# Patient Record
Sex: MALE | Race: WHITE | NOT HISPANIC OR LATINO | Employment: OTHER | URBAN - METROPOLITAN AREA
[De-identification: names, ages, dates, MRNs, and addresses within clinical notes are randomized per-mention and may not be internally consistent; named-entity substitution may affect disease eponyms.]

---

## 2021-04-24 ENCOUNTER — APPOINTMENT (OUTPATIENT)
Dept: GENERAL RADIOLOGY | Facility: HOSPITAL | Age: 76
End: 2021-04-24

## 2021-04-24 ENCOUNTER — APPOINTMENT (OUTPATIENT)
Dept: CT IMAGING | Facility: HOSPITAL | Age: 76
End: 2021-04-24

## 2021-04-24 ENCOUNTER — HOSPITAL ENCOUNTER (INPATIENT)
Facility: HOSPITAL | Age: 76
LOS: 26 days | Discharge: NURSING, REHAB OR LONG-TERM CARE | End: 2021-05-20
Attending: EMERGENCY MEDICINE | Admitting: INTERNAL MEDICINE

## 2021-04-24 ENCOUNTER — APPOINTMENT (OUTPATIENT)
Dept: CARDIOLOGY | Facility: HOSPITAL | Age: 76
End: 2021-04-24

## 2021-04-24 DIAGNOSIS — S01.311A LACERATION OF RIGHT EAR, INITIAL ENCOUNTER: ICD-10-CM

## 2021-04-24 DIAGNOSIS — M54.50 CHRONIC BILATERAL LOW BACK PAIN WITHOUT SCIATICA: ICD-10-CM

## 2021-04-24 DIAGNOSIS — G93.41 ENCEPHALOPATHY, METABOLIC: ICD-10-CM

## 2021-04-24 DIAGNOSIS — G89.29 CHRONIC BILATERAL LOW BACK PAIN WITHOUT SCIATICA: ICD-10-CM

## 2021-04-24 DIAGNOSIS — N39.0 URINARY TRACT INFECTION WITHOUT HEMATURIA, SITE UNSPECIFIED: Primary | ICD-10-CM

## 2021-04-24 DIAGNOSIS — R13.12 OROPHARYNGEAL DYSPHAGIA: ICD-10-CM

## 2021-04-24 DIAGNOSIS — R41.841 COGNITIVE COMMUNICATION DEFICIT: ICD-10-CM

## 2021-04-24 DIAGNOSIS — N28.9 RENAL INSUFFICIENCY: ICD-10-CM
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
## 2021-04-24 DIAGNOSIS — R41.82 ALTERED MENTAL STATUS, UNSPECIFIED ALTERED MENTAL STATUS TYPE: ICD-10-CM

## 2021-04-24 PROBLEM — E03.9 HYPOTHYROIDISM (ACQUIRED): Status: ACTIVE | Noted: 2021-04-24

## 2021-04-24 PROBLEM — Z85.46 HISTORY OF PROSTATE CANCER: Status: ACTIVE | Noted: 2021-04-24

## 2021-04-24 PROBLEM — N17.9 AKI (ACUTE KIDNEY INJURY) (HCC): Status: ACTIVE | Noted: 2021-04-24

## 2021-04-24 PROBLEM — R19.7 DIARRHEA: Status: ACTIVE | Noted: 2021-04-24

## 2021-04-24 PROBLEM — E11.9 INSULIN DEPENDENT TYPE 2 DIABETES MELLITUS: Status: ACTIVE | Noted: 2021-04-24

## 2021-04-24 PROBLEM — E11.628 CELLULITIS IN DIABETIC FOOT: Status: ACTIVE | Noted: 2021-04-24

## 2021-04-24 PROBLEM — IMO0002 SOLITARY KIDNEY: Status: ACTIVE | Noted: 2021-04-24

## 2021-04-24 PROBLEM — D72.829 LEUKOCYTOSIS: Status: ACTIVE | Noted: 2021-04-24

## 2021-04-24 PROBLEM — L03.119 CELLULITIS IN DIABETIC FOOT: Status: ACTIVE | Noted: 2021-04-24

## 2021-04-24 PROBLEM — Z79.4 INSULIN DEPENDENT TYPE 2 DIABETES MELLITUS (HCC): Status: ACTIVE | Noted: 2021-04-24

## 2021-04-24 PROBLEM — R53.1 WEAKNESS: Status: ACTIVE | Noted: 2021-04-24

## 2021-04-24 PROBLEM — I48.0 PAROXYSMAL A-FIB: Status: ACTIVE | Noted: 2021-04-24

## 2021-04-24 PROBLEM — I10 HTN (HYPERTENSION): Status: ACTIVE | Noted: 2021-04-24

## 2021-04-24 PROBLEM — R82.81 PYURIA: Status: ACTIVE | Noted: 2021-04-24

## 2021-04-24 PROBLEM — M54.9 CHRONIC BACK PAIN: Status: ACTIVE | Noted: 2021-04-24

## 2021-04-24 PROBLEM — R25.1 TREMOR: Status: ACTIVE | Noted: 2021-04-24

## 2021-04-24 LAB
ADV 40+41 DNA STL QL NAA+NON-PROBE: NOT DETECTED
ALBUMIN SERPL-MCNC: 4.1 G/DL (ref 3.5–5.2)
ALBUMIN/GLOB SERPL: 1.5 G/DL
ALP SERPL-CCNC: 140 U/L (ref 39–117)
ALT SERPL W P-5'-P-CCNC: 20 U/L (ref 1–41)
AMPHET+METHAMPHET UR QL: NEGATIVE
AMPHETAMINES UR QL: NEGATIVE
ANION GAP SERPL CALCULATED.3IONS-SCNC: 12 MMOL/L (ref 5–15)
ASCENDING AORTA: 3.1 CM
AST SERPL-CCNC: 15 U/L (ref 1–40)
ASTRO TYP 1-8 RNA STL QL NAA+NON-PROBE: NOT DETECTED
B PARAPERT DNA SPEC QL NAA+PROBE: NOT DETECTED
B PERT DNA SPEC QL NAA+PROBE: NOT DETECTED
BACTERIA UR QL AUTO: ABNORMAL /HPF
BARBITURATES UR QL SCN: NEGATIVE
BASOPHILS # BLD AUTO: 0.17 10*3/MM3 (ref 0–0.2)
BASOPHILS NFR BLD AUTO: 1.2 % (ref 0–1.5)
BENZODIAZ UR QL SCN: NEGATIVE
BH CV ECHO MEAS - AO MAX PG (FULL): 3 MMHG
BH CV ECHO MEAS - AO MAX PG: 7.7 MMHG
BH CV ECHO MEAS - AO ROOT AREA (BSA CORRECTED): 1.3
BH CV ECHO MEAS - AO ROOT AREA: 7.4 CM^2
BH CV ECHO MEAS - AO ROOT DIAM: 3.1 CM
BH CV ECHO MEAS - AO V2 MAX: 138.8 CM/SEC
BH CV ECHO MEAS - ASC AORTA: 3.1 CM
BH CV ECHO MEAS - AVA(V,A): 2.8 CM^2
BH CV ECHO MEAS - AVA(V,D): 2.8 CM^2
BH CV ECHO MEAS - BSA(HAYCOCK): 2.4 M^2
BH CV ECHO MEAS - BSA: 2.3 M^2
BH CV ECHO MEAS - BZI_BMI: 38.4 KILOGRAMS/M^2
BH CV ECHO MEAS - BZI_METRIC_HEIGHT: 175.3 CM
BH CV ECHO MEAS - BZI_METRIC_WEIGHT: 117.9 KG
BH CV ECHO MEAS - EDV(CUBED): 123.8 ML
BH CV ECHO MEAS - EDV(MOD-SP4): 47 ML
BH CV ECHO MEAS - EDV(TEICH): 117.4 ML
BH CV ECHO MEAS - EF(CUBED): 73.6 %
BH CV ECHO MEAS - EF(MOD-SP4): 55.3 %
BH CV ECHO MEAS - EF(TEICH): 65.2 %
BH CV ECHO MEAS - ESV(CUBED): 32.6 ML
BH CV ECHO MEAS - ESV(MOD-SP4): 21 ML
BH CV ECHO MEAS - ESV(TEICH): 40.8 ML
BH CV ECHO MEAS - FS: 35.9 %
BH CV ECHO MEAS - IVS/LVPW: 0.81
BH CV ECHO MEAS - IVSD: 0.79 CM
BH CV ECHO MEAS - LA DIMENSION: 2.7 CM
BH CV ECHO MEAS - LA/AO: 0.87
BH CV ECHO MEAS - LAT PEAK E' VEL: 5.6 CM/SEC
BH CV ECHO MEAS - LATERAL E/E' RATIO: 11.9
BH CV ECHO MEAS - LV DIASTOLIC VOL/BSA (35-75): 20.4 ML/M^2
BH CV ECHO MEAS - LV IVRT: 0.16 SEC
BH CV ECHO MEAS - LV MASS(C)D: 153.3 GRAMS
BH CV ECHO MEAS - LV MASS(C)DI: 66.4 GRAMS/M^2
BH CV ECHO MEAS - LV MAX PG: 4.7 MMHG
BH CV ECHO MEAS - LV MEAN PG: 3.1 MMHG
BH CV ECHO MEAS - LV SYSTOLIC VOL/BSA (12-30): 9.1 ML/M^2
BH CV ECHO MEAS - LV V1 MAX: 108.1 CM/SEC
BH CV ECHO MEAS - LV V1 MEAN: 84.6 CM/SEC
BH CV ECHO MEAS - LV V1 VTI: 16.5 CM
BH CV ECHO MEAS - LVIDD: 5 CM
BH CV ECHO MEAS - LVIDS: 3.2 CM
BH CV ECHO MEAS - LVLD AP4: 6.8 CM
BH CV ECHO MEAS - LVLS AP4: 7.1 CM
BH CV ECHO MEAS - LVOT AREA (M): 3.5 CM^2
BH CV ECHO MEAS - LVOT AREA: 3.6 CM^2
BH CV ECHO MEAS - LVOT DIAM: 2.1 CM
BH CV ECHO MEAS - LVPWD: 0.98 CM
BH CV ECHO MEAS - MED PEAK E' VEL: 4.5 CM/SEC
BH CV ECHO MEAS - MEDIAL E/E' RATIO: 14.9
BH CV ECHO MEAS - MV A MAX VEL: 96.3 CM/SEC
BH CV ECHO MEAS - MV DEC TIME: 0.23 SEC
BH CV ECHO MEAS - MV E MAX VEL: 68.6 CM/SEC
BH CV ECHO MEAS - MV E/A: 0.71
BH CV ECHO MEAS - SI(CUBED): 39.5 ML/M^2
BH CV ECHO MEAS - SI(LVOT): 25.6 ML/M^2
BH CV ECHO MEAS - SI(MOD-SP4): 11.3 ML/M^2
BH CV ECHO MEAS - SI(TEICH): 33.1 ML/M^2
BH CV ECHO MEAS - SV(CUBED): 91.2 ML
BH CV ECHO MEAS - SV(LVOT): 59.2 ML
BH CV ECHO MEAS - SV(MOD-SP4): 26 ML
BH CV ECHO MEAS - SV(TEICH): 76.5 ML
BH CV ECHO MEASUREMENTS AVERAGE E/E' RATIO: 13.58
BH CV XLRA - RV BASE: 3.4 CM
BH CV XLRA - RV LENGTH: 7.1 CM
BH CV XLRA - RV MID: 3.1 CM
BILIRUB SERPL-MCNC: 1.7 MG/DL (ref 0–1.2)
BILIRUB UR QL STRIP: NEGATIVE
BUN SERPL-MCNC: 14 MG/DL (ref 8–23)
BUN/CREAT SERPL: 7.3 (ref 7–25)
BUPRENORPHINE SERPL-MCNC: NEGATIVE NG/ML
C CAYETANENSIS DNA STL QL NAA+NON-PROBE: NOT DETECTED
C COLI+JEJ+UPSA DNA STL QL NAA+NON-PROBE: NOT DETECTED
C DIFF TOX GENS STL QL NAA+PROBE: NOT DETECTED
C PNEUM DNA NPH QL NAA+NON-PROBE: NOT DETECTED
CALCIUM SPEC-SCNC: 8.9 MG/DL (ref 8.6–10.5)
CANNABINOIDS SERPL QL: NEGATIVE
CHLORIDE SERPL-SCNC: 99 MMOL/L (ref 98–107)
CK SERPL-CCNC: 54 U/L (ref 20–200)
CLARITY UR: ABNORMAL
CO2 SERPL-SCNC: 26 MMOL/L (ref 22–29)
COCAINE UR QL: NEGATIVE
COLOR UR: ABNORMAL
CREAT SERPL-MCNC: 1.92 MG/DL (ref 0.76–1.27)
CREAT UR-MCNC: 309.6 MG/DL
CRYPTOSP DNA STL QL NAA+NON-PROBE: NOT DETECTED
D-LACTATE SERPL-SCNC: 1.6 MMOL/L (ref 0.5–2)
DEPRECATED RDW RBC AUTO: 47.3 FL (ref 37–54)
E HISTOLYT DNA STL QL NAA+NON-PROBE: NOT DETECTED
EAEC PAA PLAS AGGR+AATA ST NAA+NON-PRB: NOT DETECTED
EC STX1+STX2 GENES STL QL NAA+NON-PROBE: NOT DETECTED
EOSINOPHIL # BLD AUTO: 0.21 10*3/MM3 (ref 0–0.4)
EOSINOPHIL NFR BLD AUTO: 1.5 % (ref 0.3–6.2)
EOSINOPHIL SPEC QL MICRO: 0 % EOS/100 CELLS (ref 0–0)
EPEC EAE GENE STL QL NAA+NON-PROBE: NOT DETECTED
ERYTHROCYTE [DISTWIDTH] IN BLOOD BY AUTOMATED COUNT: 13.7 % (ref 12.3–15.4)
ERYTHROCYTE [SEDIMENTATION RATE] IN BLOOD: 18 MM/HR (ref 0–20)
ETEC LTA+ST1A+ST1B TOX ST NAA+NON-PROBE: NOT DETECTED
FLUAV SUBTYP SPEC NAA+PROBE: NOT DETECTED
FLUBV RNA ISLT QL NAA+PROBE: NOT DETECTED
G LAMBLIA DNA STL QL NAA+NON-PROBE: NOT DETECTED
GFR SERPL CREATININE-BSD FRML MDRD: 34 ML/MIN/1.73
GLOBULIN UR ELPH-MCNC: 2.8 GM/DL
GLUCOSE BLDC GLUCOMTR-MCNC: 255 MG/DL (ref 70–130)
GLUCOSE BLDC GLUCOMTR-MCNC: 266 MG/DL (ref 70–130)
GLUCOSE BLDC GLUCOMTR-MCNC: 581 MG/DL (ref 70–130)
GLUCOSE SERPL-MCNC: 280 MG/DL (ref 65–99)
GLUCOSE UR STRIP-MCNC: NEGATIVE MG/DL
HADV DNA SPEC NAA+PROBE: NOT DETECTED
HBA1C MFR BLD: 8.1 % (ref 4.8–5.6)
HCOV 229E RNA SPEC QL NAA+PROBE: NOT DETECTED
HCOV HKU1 RNA SPEC QL NAA+PROBE: NOT DETECTED
HCOV NL63 RNA SPEC QL NAA+PROBE: NOT DETECTED
HCOV OC43 RNA SPEC QL NAA+PROBE: NOT DETECTED
HCT VFR BLD AUTO: 38.6 % (ref 37.5–51)
HGB BLD-MCNC: 12.8 G/DL (ref 13–17.7)
HGB UR QL STRIP.AUTO: ABNORMAL
HMPV RNA NPH QL NAA+NON-PROBE: NOT DETECTED
HPIV1 RNA SPEC QL NAA+PROBE: NOT DETECTED
HPIV2 RNA SPEC QL NAA+PROBE: NOT DETECTED
HPIV3 RNA NPH QL NAA+PROBE: NOT DETECTED
HPIV4 P GENE NPH QL NAA+PROBE: NOT DETECTED
HYALINE CASTS UR QL AUTO: ABNORMAL /LPF
IMM GRANULOCYTES # BLD AUTO: 0.08 10*3/MM3 (ref 0–0.05)
IMM GRANULOCYTES NFR BLD AUTO: 0.6 % (ref 0–0.5)
IVRT: 161 MSEC
KETONES UR QL STRIP: ABNORMAL
LEUKOCYTE ESTERASE UR QL STRIP.AUTO: NEGATIVE
LIPASE SERPL-CCNC: 18 U/L (ref 13–60)
LYMPHOCYTES # BLD AUTO: 1.4 10*3/MM3 (ref 0.7–3.1)
LYMPHOCYTES NFR BLD AUTO: 10.1 % (ref 19.6–45.3)
M PNEUMO IGG SER IA-ACNC: NOT DETECTED
MAGNESIUM SERPL-MCNC: 1.9 MG/DL (ref 1.6–2.4)
MAXIMAL PREDICTED HEART RATE: 146 BPM
MCH RBC QN AUTO: 31.1 PG (ref 26.6–33)
MCHC RBC AUTO-ENTMCNC: 33.2 G/DL (ref 31.5–35.7)
MCV RBC AUTO: 93.9 FL (ref 79–97)
METHADONE UR QL SCN: NEGATIVE
MONOCYTES # BLD AUTO: 1.14 10*3/MM3 (ref 0.1–0.9)
MONOCYTES NFR BLD AUTO: 8.2 % (ref 5–12)
NEUTROPHILS NFR BLD AUTO: 10.93 10*3/MM3 (ref 1.7–7)
NEUTROPHILS NFR BLD AUTO: 78.4 % (ref 42.7–76)
NITRITE UR QL STRIP: NEGATIVE
NOROVIRUS GI+II RNA STL QL NAA+NON-PROBE: NOT DETECTED
NRBC BLD AUTO-RTO: 0 /100 WBC (ref 0–0.2)
NT-PROBNP SERPL-MCNC: 487.3 PG/ML (ref 0–900)
OPIATES UR QL: NEGATIVE
OXYCODONE UR QL SCN: NEGATIVE
P SHIGELLOIDES DNA STL QL NAA+NON-PROBE: NOT DETECTED
PCP UR QL SCN: NEGATIVE
PH UR STRIP.AUTO: 5.5 [PH] (ref 5–8)
PLATELET # BLD AUTO: 246 10*3/MM3 (ref 140–450)
PMV BLD AUTO: 9.5 FL (ref 6–12)
POTASSIUM SERPL-SCNC: 3.3 MMOL/L (ref 3.5–5.2)
PROCALCITONIN SERPL-MCNC: 0.16 NG/ML (ref 0–0.25)
PROPOXYPH UR QL: NEGATIVE
PROT SERPL-MCNC: 6.9 G/DL (ref 6–8.5)
PROT UR QL STRIP: ABNORMAL
QT INTERVAL: 386 MS
QTC INTERVAL: 479 MS
RBC # BLD AUTO: 4.11 10*6/MM3 (ref 4.14–5.8)
RBC # UR: ABNORMAL /HPF
REF LAB TEST METHOD: ABNORMAL
RHINOVIRUS RNA SPEC NAA+PROBE: NOT DETECTED
RSV RNA NPH QL NAA+NON-PROBE: NOT DETECTED
RVA RNA STL QL NAA+NON-PROBE: NOT DETECTED
S ENT+BONG DNA STL QL NAA+NON-PROBE: NOT DETECTED
SAPO I+II+IV+V RNA STL QL NAA+NON-PROBE: NOT DETECTED
SARS-COV-2 RNA NPH QL NAA+NON-PROBE: NOT DETECTED
SHIGELLA SP+EIEC IPAH ST NAA+NON-PROBE: NOT DETECTED
SODIUM SERPL-SCNC: 137 MMOL/L (ref 136–145)
SODIUM UR-SCNC: 104 MMOL/L
SP GR UR STRIP: >=1.03 (ref 1–1.03)
SQUAMOUS #/AREA URNS HPF: ABNORMAL /HPF
STRESS TARGET HR: 124 BPM
T4 FREE SERPL-MCNC: 0.95 NG/DL (ref 0.93–1.7)
TRICYCLICS UR QL SCN: NEGATIVE
TROPONIN T SERPL-MCNC: 0.03 NG/ML (ref 0–0.03)
TSH SERPL DL<=0.05 MIU/L-ACNC: 50.67 UIU/ML (ref 0.27–4.2)
UROBILINOGEN UR QL STRIP: ABNORMAL
V CHOL+PARA+VUL DNA STL QL NAA+NON-PROBE: NOT DETECTED
V CHOLERAE DNA STL QL NAA+NON-PROBE: NOT DETECTED
WBC # BLD AUTO: 13.93 10*3/MM3 (ref 3.4–10.8)
WBC UR QL AUTO: ABNORMAL /HPF
Y ENTEROCOL DNA STL QL NAA+NON-PROBE: NOT DETECTED

## 2021-04-24 PROCEDURE — 87086 URINE CULTURE/COLONY COUNT: CPT | Performed by: NURSE PRACTITIONER

## 2021-04-24 PROCEDURE — 84484 ASSAY OF TROPONIN QUANT: CPT | Performed by: NURSE PRACTITIONER

## 2021-04-24 PROCEDURE — 81001 URINALYSIS AUTO W/SCOPE: CPT | Performed by: NURSE PRACTITIONER

## 2021-04-24 PROCEDURE — 83880 ASSAY OF NATRIURETIC PEPTIDE: CPT | Performed by: NURSE PRACTITIONER

## 2021-04-24 PROCEDURE — 87493 C DIFF AMPLIFIED PROBE: CPT | Performed by: INTERNAL MEDICINE

## 2021-04-24 PROCEDURE — 82550 ASSAY OF CK (CPK): CPT | Performed by: NURSE PRACTITIONER

## 2021-04-24 PROCEDURE — 25010000002 LORAZEPAM PER 2 MG: Performed by: NURSE PRACTITIONER

## 2021-04-24 PROCEDURE — 84443 ASSAY THYROID STIM HORMONE: CPT | Performed by: NURSE PRACTITIONER

## 2021-04-24 PROCEDURE — 25010000002 LORAZEPAM PER 2 MG: Performed by: EMERGENCY MEDICINE

## 2021-04-24 PROCEDURE — 87040 BLOOD CULTURE FOR BACTERIA: CPT | Performed by: INTERNAL MEDICINE

## 2021-04-24 PROCEDURE — 0097U HC BIOFIRE FILMARRAY GI PANEL: CPT | Performed by: INTERNAL MEDICINE

## 2021-04-24 PROCEDURE — 87150 DNA/RNA AMPLIFIED PROBE: CPT | Performed by: INTERNAL MEDICINE

## 2021-04-24 PROCEDURE — 99223 1ST HOSP IP/OBS HIGH 75: CPT | Performed by: INTERNAL MEDICINE

## 2021-04-24 PROCEDURE — 93306 TTE W/DOPPLER COMPLETE: CPT

## 2021-04-24 PROCEDURE — 84300 ASSAY OF URINE SODIUM: CPT | Performed by: INTERNAL MEDICINE

## 2021-04-24 PROCEDURE — 71250 CT THORAX DX C-: CPT

## 2021-04-24 PROCEDURE — 71045 X-RAY EXAM CHEST 1 VIEW: CPT

## 2021-04-24 PROCEDURE — 80306 DRUG TEST PRSMV INSTRMNT: CPT | Performed by: INTERNAL MEDICINE

## 2021-04-24 PROCEDURE — 09Q1XZZ REPAIR LEFT EXTERNAL EAR, EXTERNAL APPROACH: ICD-10-PCS | Performed by: EMERGENCY MEDICINE

## 2021-04-24 PROCEDURE — 84145 PROCALCITONIN (PCT): CPT | Performed by: INTERNAL MEDICINE

## 2021-04-24 PROCEDURE — 83690 ASSAY OF LIPASE: CPT | Performed by: NURSE PRACTITIONER

## 2021-04-24 PROCEDURE — 74176 CT ABD & PELVIS W/O CONTRAST: CPT

## 2021-04-24 PROCEDURE — 85652 RBC SED RATE AUTOMATED: CPT | Performed by: INTERNAL MEDICINE

## 2021-04-24 PROCEDURE — 70450 CT HEAD/BRAIN W/O DYE: CPT

## 2021-04-24 PROCEDURE — 25010000002 CEFTRIAXONE PER 250 MG: Performed by: NURSE PRACTITIONER

## 2021-04-24 PROCEDURE — 25010000002 DAPTOMYCIN PER 1 MG: Performed by: INTERNAL MEDICINE

## 2021-04-24 PROCEDURE — 99284 EMERGENCY DEPT VISIT MOD MDM: CPT

## 2021-04-24 PROCEDURE — 99285 EMERGENCY DEPT VISIT HI MDM: CPT

## 2021-04-24 PROCEDURE — 82962 GLUCOSE BLOOD TEST: CPT

## 2021-04-24 PROCEDURE — 25010000002 HYDRALAZINE PER 20 MG: Performed by: INTERNAL MEDICINE

## 2021-04-24 PROCEDURE — 82570 ASSAY OF URINE CREATININE: CPT | Performed by: INTERNAL MEDICINE

## 2021-04-24 PROCEDURE — 80053 COMPREHEN METABOLIC PANEL: CPT | Performed by: EMERGENCY MEDICINE

## 2021-04-24 PROCEDURE — 83036 HEMOGLOBIN GLYCOSYLATED A1C: CPT | Performed by: INTERNAL MEDICINE

## 2021-04-24 PROCEDURE — 83605 ASSAY OF LACTIC ACID: CPT | Performed by: NURSE PRACTITIONER

## 2021-04-24 PROCEDURE — 85025 COMPLETE CBC W/AUTO DIFF WBC: CPT | Performed by: EMERGENCY MEDICINE

## 2021-04-24 PROCEDURE — 72125 CT NECK SPINE W/O DYE: CPT

## 2021-04-24 PROCEDURE — 87040 BLOOD CULTURE FOR BACTERIA: CPT | Performed by: NURSE PRACTITIONER

## 2021-04-24 PROCEDURE — 0202U NFCT DS 22 TRGT SARS-COV-2: CPT | Performed by: INTERNAL MEDICINE

## 2021-04-24 PROCEDURE — 84439 ASSAY OF FREE THYROXINE: CPT | Performed by: INTERNAL MEDICINE

## 2021-04-24 PROCEDURE — 83735 ASSAY OF MAGNESIUM: CPT | Performed by: NURSE PRACTITIONER

## 2021-04-24 PROCEDURE — 73140 X-RAY EXAM OF FINGER(S): CPT

## 2021-04-24 PROCEDURE — 93005 ELECTROCARDIOGRAM TRACING: CPT | Performed by: NURSE PRACTITIONER

## 2021-04-24 PROCEDURE — 87205 SMEAR GRAM STAIN: CPT | Performed by: INTERNAL MEDICINE

## 2021-04-24 PROCEDURE — 63710000001 INSULIN LISPRO (HUMAN) PER 5 UNITS: Performed by: INTERNAL MEDICINE

## 2021-04-24 PROCEDURE — 87147 CULTURE TYPE IMMUNOLOGIC: CPT | Performed by: INTERNAL MEDICINE

## 2021-04-24 RX ORDER — LEVOTHYROXINE SODIUM 0.1 MG/1
100 TABLET ORAL DAILY
COMMUNITY
End: 2021-05-20 | Stop reason: HOSPADM

## 2021-04-24 RX ORDER — DULOXETIN HYDROCHLORIDE 30 MG/1
30 CAPSULE, DELAYED RELEASE ORAL DAILY
COMMUNITY
End: 2021-05-20 | Stop reason: HOSPADM

## 2021-04-24 RX ORDER — ONDANSETRON 2 MG/ML
4 INJECTION INTRAMUSCULAR; INTRAVENOUS EVERY 6 HOURS PRN
Status: DISCONTINUED | OUTPATIENT
Start: 2021-04-24 | End: 2021-05-20 | Stop reason: HOSPADM

## 2021-04-24 RX ORDER — METOPROLOL TARTRATE 50 MG/1
50 TABLET, FILM COATED ORAL ONCE
Status: COMPLETED | OUTPATIENT
Start: 2021-04-24 | End: 2021-04-24

## 2021-04-24 RX ORDER — HYDROCODONE BITARTRATE AND ACETAMINOPHEN 10; 325 MG/1; MG/1
1 TABLET ORAL EVERY 8 HOURS
COMMUNITY
End: 2021-05-20 | Stop reason: HOSPADM

## 2021-04-24 RX ORDER — ONDANSETRON 4 MG/1
4 TABLET, FILM COATED ORAL EVERY 6 HOURS PRN
Status: DISCONTINUED | OUTPATIENT
Start: 2021-04-24 | End: 2021-04-26

## 2021-04-24 RX ORDER — LORAZEPAM 2 MG/ML
0.5 INJECTION INTRAMUSCULAR ONCE
Status: COMPLETED | OUTPATIENT
Start: 2021-04-24 | End: 2021-04-24

## 2021-04-24 RX ORDER — AZITHROMYCIN 250 MG/1
250 TABLET, FILM COATED ORAL DAILY
COMMUNITY
End: 2021-05-20 | Stop reason: HOSPADM

## 2021-04-24 RX ORDER — ATORVASTATIN CALCIUM 40 MG/1
40 TABLET, FILM COATED ORAL NIGHTLY
COMMUNITY

## 2021-04-24 RX ORDER — SODIUM CHLORIDE 0.9 % (FLUSH) 0.9 %
10 SYRINGE (ML) INJECTION AS NEEDED
Status: DISCONTINUED | OUTPATIENT
Start: 2021-04-24 | End: 2021-04-26

## 2021-04-24 RX ORDER — LORAZEPAM 2 MG/ML
1 INJECTION INTRAMUSCULAR ONCE
Status: DISCONTINUED | OUTPATIENT
Start: 2021-04-24 | End: 2021-04-24

## 2021-04-24 RX ORDER — NICOTINE POLACRILEX 4 MG
15 LOZENGE BUCCAL
Status: DISCONTINUED | OUTPATIENT
Start: 2021-04-24 | End: 2021-04-26

## 2021-04-24 RX ORDER — AMOXICILLIN 875 MG/1
875 TABLET, COATED ORAL 2 TIMES DAILY
COMMUNITY
End: 2021-05-20 | Stop reason: HOSPADM

## 2021-04-24 RX ORDER — LORAZEPAM 0.5 MG/1
0.5 TABLET ORAL EVERY 6 HOURS PRN
Status: DISCONTINUED | OUTPATIENT
Start: 2021-04-24 | End: 2021-04-25

## 2021-04-24 RX ORDER — SODIUM CHLORIDE, SODIUM LACTATE, POTASSIUM CHLORIDE, CALCIUM CHLORIDE 600; 310; 30; 20 MG/100ML; MG/100ML; MG/100ML; MG/100ML
100 INJECTION, SOLUTION INTRAVENOUS CONTINUOUS
Status: ACTIVE | OUTPATIENT
Start: 2021-04-24 | End: 2021-04-25

## 2021-04-24 RX ORDER — SIMETHICONE 80 MG
80 TABLET,CHEWABLE ORAL EVERY 6 HOURS PRN
COMMUNITY
End: 2022-03-25

## 2021-04-24 RX ORDER — LISINOPRIL 20 MG/1
10 TABLET ORAL DAILY
COMMUNITY
End: 2021-05-20 | Stop reason: HOSPADM

## 2021-04-24 RX ORDER — GLIPIZIDE 10 MG/1
10 TABLET ORAL
COMMUNITY
End: 2021-05-20 | Stop reason: HOSPADM

## 2021-04-24 RX ORDER — HYDRALAZINE HYDROCHLORIDE 20 MG/ML
10 INJECTION INTRAMUSCULAR; INTRAVENOUS EVERY 4 HOURS PRN
Status: DISCONTINUED | OUTPATIENT
Start: 2021-04-24 | End: 2021-05-20 | Stop reason: HOSPADM

## 2021-04-24 RX ORDER — HYDROCODONE BITARTRATE AND ACETAMINOPHEN 7.5; 325 MG/1; MG/1
1 TABLET ORAL ONCE
Status: COMPLETED | OUTPATIENT
Start: 2021-04-24 | End: 2021-04-24

## 2021-04-24 RX ORDER — ACETAMINOPHEN 325 MG/1
650 TABLET ORAL EVERY 6 HOURS PRN
Status: DISCONTINUED | OUTPATIENT
Start: 2021-04-24 | End: 2021-04-26

## 2021-04-24 RX ORDER — MELATONIN
1000 DAILY
COMMUNITY
End: 2022-03-25

## 2021-04-24 RX ORDER — LEVOTHYROXINE SODIUM 20 UG/ML
75 INJECTION, SOLUTION INTRAVENOUS
Status: DISCONTINUED | OUTPATIENT
Start: 2021-04-24 | End: 2021-04-25

## 2021-04-24 RX ORDER — SODIUM CHLORIDE 0.9 % (FLUSH) 0.9 %
10 SYRINGE (ML) INJECTION EVERY 12 HOURS SCHEDULED
Status: DISCONTINUED | OUTPATIENT
Start: 2021-04-24 | End: 2021-04-26

## 2021-04-24 RX ORDER — POTASSIUM CHLORIDE 750 MG/1
40 CAPSULE, EXTENDED RELEASE ORAL DAILY
Status: DISCONTINUED | OUTPATIENT
Start: 2021-04-24 | End: 2021-04-26

## 2021-04-24 RX ORDER — FUROSEMIDE 20 MG/1
10 TABLET ORAL DAILY PRN
COMMUNITY
End: 2021-05-20 | Stop reason: HOSPADM

## 2021-04-24 RX ORDER — TAMSULOSIN HYDROCHLORIDE 0.4 MG/1
0.4 CAPSULE ORAL DAILY
Status: DISCONTINUED | OUTPATIENT
Start: 2021-04-24 | End: 2021-04-26

## 2021-04-24 RX ORDER — DEXTROSE MONOHYDRATE 25 G/50ML
25 INJECTION, SOLUTION INTRAVENOUS
Status: DISCONTINUED | OUTPATIENT
Start: 2021-04-24 | End: 2021-05-20 | Stop reason: HOSPADM

## 2021-04-24 RX ORDER — METOPROLOL TARTRATE 100 MG/1
50 TABLET ORAL 2 TIMES DAILY
COMMUNITY
End: 2021-05-20 | Stop reason: HOSPADM

## 2021-04-24 RX ORDER — CLOTRIMAZOLE 1 %
CREAM (GRAM) TOPICAL 2 TIMES DAILY
COMMUNITY
End: 2021-05-20 | Stop reason: HOSPADM

## 2021-04-24 RX ORDER — FLUOROURACIL 50 MG/G
CREAM TOPICAL 2 TIMES DAILY
COMMUNITY
End: 2022-03-25

## 2021-04-24 RX ORDER — ASCORBIC ACID 500 MG
500 TABLET ORAL DAILY
COMMUNITY

## 2021-04-24 RX ORDER — OMEPRAZOLE 20 MG/1
20 CAPSULE, DELAYED RELEASE ORAL DAILY
COMMUNITY
End: 2021-05-20 | Stop reason: HOSPADM

## 2021-04-24 RX ORDER — FAMOTIDINE 20 MG/1
20 TABLET, FILM COATED ORAL 2 TIMES DAILY
COMMUNITY
End: 2021-05-20 | Stop reason: HOSPADM

## 2021-04-24 RX ORDER — LISINOPRIL 10 MG/1
20 TABLET ORAL ONCE
Status: COMPLETED | OUTPATIENT
Start: 2021-04-24 | End: 2021-04-24

## 2021-04-24 RX ORDER — AMLODIPINE BESYLATE 5 MG/1
5 TABLET ORAL
Status: DISCONTINUED | OUTPATIENT
Start: 2021-04-24 | End: 2021-04-26

## 2021-04-24 RX ORDER — LIDOCAINE HYDROCHLORIDE 10 MG/ML
10 INJECTION, SOLUTION EPIDURAL; INFILTRATION; INTRACAUDAL; PERINEURAL ONCE
Status: COMPLETED | OUTPATIENT
Start: 2021-04-24 | End: 2021-04-24

## 2021-04-24 RX ADMIN — SODIUM CHLORIDE 1 G: 900 INJECTION INTRAVENOUS at 10:10

## 2021-04-24 RX ADMIN — SODIUM CHLORIDE, PRESERVATIVE FREE 10 ML: 5 INJECTION INTRAVENOUS at 13:51

## 2021-04-24 RX ADMIN — ACETAMINOPHEN 650 MG: 325 TABLET ORAL at 21:25

## 2021-04-24 RX ADMIN — TAMSULOSIN HYDROCHLORIDE 0.4 MG: 0.4 CAPSULE ORAL at 18:27

## 2021-04-24 RX ADMIN — LORAZEPAM 0.5 MG: 2 INJECTION INTRAMUSCULAR; INTRAVENOUS at 12:10

## 2021-04-24 RX ADMIN — HYDRALAZINE HYDROCHLORIDE 10 MG: 20 INJECTION INTRAMUSCULAR; INTRAVENOUS at 11:54

## 2021-04-24 RX ADMIN — LORAZEPAM 0.5 MG: 2 INJECTION INTRAMUSCULAR; INTRAVENOUS at 10:15

## 2021-04-24 RX ADMIN — SODIUM CHLORIDE, PRESERVATIVE FREE 10 ML: 5 INJECTION INTRAVENOUS at 21:28

## 2021-04-24 RX ADMIN — METOPROLOL TARTRATE 50 MG: 50 TABLET, FILM COATED ORAL at 07:29

## 2021-04-24 RX ADMIN — LEVOTHYROXINE SODIUM 75 MCG: 20 INJECTION, SOLUTION INTRAVENOUS at 14:14

## 2021-04-24 RX ADMIN — DAPTOMYCIN 350 MG: 500 INJECTION, POWDER, LYOPHILIZED, FOR SOLUTION INTRAVENOUS at 15:17

## 2021-04-24 RX ADMIN — INSULIN LISPRO 6 UNITS: 100 INJECTION, SOLUTION INTRAVENOUS; SUBCUTANEOUS at 17:41

## 2021-04-24 RX ADMIN — HYDROCODONE BITARTRATE AND ACETAMINOPHEN 1 TABLET: 7.5; 325 TABLET ORAL at 10:08

## 2021-04-24 RX ADMIN — LISINOPRIL 20 MG: 10 TABLET ORAL at 07:29

## 2021-04-24 RX ADMIN — LORAZEPAM 0.5 MG: 0.5 TABLET ORAL at 15:17

## 2021-04-24 RX ADMIN — SODIUM CHLORIDE 500 ML: 9 INJECTION, SOLUTION INTRAVENOUS at 07:29

## 2021-04-24 RX ADMIN — INSULIN LISPRO 2 UNITS: 100 INJECTION, SOLUTION INTRAVENOUS; SUBCUTANEOUS at 17:42

## 2021-04-24 RX ADMIN — AMLODIPINE BESYLATE 5 MG: 5 TABLET ORAL at 17:42

## 2021-04-24 RX ADMIN — SODIUM CHLORIDE, POTASSIUM CHLORIDE, SODIUM LACTATE AND CALCIUM CHLORIDE 75 ML/HR: 600; 310; 30; 20 INJECTION, SOLUTION INTRAVENOUS at 13:49

## 2021-04-24 RX ADMIN — POTASSIUM CHLORIDE 40 MEQ: 750 CAPSULE, EXTENDED RELEASE ORAL at 14:14

## 2021-04-24 RX ADMIN — LORAZEPAM 0.5 MG: 0.5 TABLET ORAL at 21:26

## 2021-04-24 RX ADMIN — LIDOCAINE HYDROCHLORIDE 10 ML: 10 INJECTION, SOLUTION EPIDURAL; INFILTRATION; INTRACAUDAL; PERINEURAL at 12:05

## 2021-04-24 RX ADMIN — HYDRALAZINE HYDROCHLORIDE 10 MG: 20 INJECTION INTRAMUSCULAR; INTRAVENOUS at 16:06

## 2021-04-25 ENCOUNTER — APPOINTMENT (OUTPATIENT)
Dept: NEUROLOGY | Facility: HOSPITAL | Age: 76
End: 2021-04-25

## 2021-04-25 PROBLEM — S62.609A: Status: ACTIVE | Noted: 2021-04-25

## 2021-04-25 PROBLEM — S62.639A: Status: ACTIVE | Noted: 2021-04-25

## 2021-04-25 LAB
ALBUMIN SERPL-MCNC: 3.3 G/DL (ref 3.5–5.2)
ALBUMIN/GLOB SERPL: 1.2 G/DL
ALP SERPL-CCNC: 116 U/L (ref 39–117)
ALT SERPL W P-5'-P-CCNC: 15 U/L (ref 1–41)
AMMONIA BLD-SCNC: 21 UMOL/L (ref 16–60)
ANION GAP SERPL CALCULATED.3IONS-SCNC: 12 MMOL/L (ref 5–15)
AST SERPL-CCNC: 12 U/L (ref 1–40)
BACTERIA SPEC AEROBE CULT: NO GROWTH
BASOPHILS # BLD AUTO: 0.12 10*3/MM3 (ref 0–0.2)
BASOPHILS NFR BLD AUTO: 1 % (ref 0–1.5)
BILIRUB SERPL-MCNC: 1.9 MG/DL (ref 0–1.2)
BUN SERPL-MCNC: 15 MG/DL (ref 8–23)
BUN/CREAT SERPL: 10.1 (ref 7–25)
CALCIUM SPEC-SCNC: 8.4 MG/DL (ref 8.6–10.5)
CHLORIDE SERPL-SCNC: 104 MMOL/L (ref 98–107)
CO2 SERPL-SCNC: 20 MMOL/L (ref 22–29)
CREAT SERPL-MCNC: 1.48 MG/DL (ref 0.76–1.27)
CRP SERPL-MCNC: 2.34 MG/DL (ref 0–0.5)
DEPRECATED RDW RBC AUTO: 46.7 FL (ref 37–54)
EOSINOPHIL # BLD AUTO: 0.07 10*3/MM3 (ref 0–0.4)
EOSINOPHIL NFR BLD AUTO: 0.6 % (ref 0.3–6.2)
ERYTHROCYTE [DISTWIDTH] IN BLOOD BY AUTOMATED COUNT: 13.5 % (ref 12.3–15.4)
GFR SERPL CREATININE-BSD FRML MDRD: 46 ML/MIN/1.73
GLOBULIN UR ELPH-MCNC: 2.7 GM/DL
GLUCOSE BLDC GLUCOMTR-MCNC: 237 MG/DL (ref 70–130)
GLUCOSE BLDC GLUCOMTR-MCNC: 238 MG/DL (ref 70–130)
GLUCOSE BLDC GLUCOMTR-MCNC: 271 MG/DL (ref 70–130)
GLUCOSE BLDC GLUCOMTR-MCNC: 272 MG/DL (ref 70–130)
GLUCOSE BLDC GLUCOMTR-MCNC: 281 MG/DL (ref 70–130)
GLUCOSE SERPL-MCNC: 287 MG/DL (ref 65–99)
HCT VFR BLD AUTO: 34.4 % (ref 37.5–51)
HGB BLD-MCNC: 11.3 G/DL (ref 13–17.7)
IMM GRANULOCYTES # BLD AUTO: 0.06 10*3/MM3 (ref 0–0.05)
IMM GRANULOCYTES NFR BLD AUTO: 0.5 % (ref 0–0.5)
INR PPP: 1.07 (ref 0.85–1.16)
LYMPHOCYTES # BLD AUTO: 0.97 10*3/MM3 (ref 0.7–3.1)
LYMPHOCYTES NFR BLD AUTO: 8 % (ref 19.6–45.3)
MAGNESIUM SERPL-MCNC: 1.8 MG/DL (ref 1.6–2.4)
MCH RBC QN AUTO: 31 PG (ref 26.6–33)
MCHC RBC AUTO-ENTMCNC: 32.8 G/DL (ref 31.5–35.7)
MCV RBC AUTO: 94.5 FL (ref 79–97)
MONOCYTES # BLD AUTO: 1.29 10*3/MM3 (ref 0.1–0.9)
MONOCYTES NFR BLD AUTO: 10.7 % (ref 5–12)
NEUTROPHILS NFR BLD AUTO: 79.2 % (ref 42.7–76)
NEUTROPHILS NFR BLD AUTO: 9.59 10*3/MM3 (ref 1.7–7)
NRBC BLD AUTO-RTO: 0 /100 WBC (ref 0–0.2)
PLATELET # BLD AUTO: 204 10*3/MM3 (ref 140–450)
PMV BLD AUTO: 10.2 FL (ref 6–12)
POTASSIUM SERPL-SCNC: 3.5 MMOL/L (ref 3.5–5.2)
PROT SERPL-MCNC: 6 G/DL (ref 6–8.5)
PROTHROMBIN TIME: 13.6 SECONDS (ref 11.4–14.4)
RBC # BLD AUTO: 3.64 10*6/MM3 (ref 4.14–5.8)
SODIUM SERPL-SCNC: 136 MMOL/L (ref 136–145)
WBC # BLD AUTO: 12.1 10*3/MM3 (ref 3.4–10.8)

## 2021-04-25 PROCEDURE — 82140 ASSAY OF AMMONIA: CPT | Performed by: INTERNAL MEDICINE

## 2021-04-25 PROCEDURE — 85025 COMPLETE CBC W/AUTO DIFF WBC: CPT | Performed by: INTERNAL MEDICINE

## 2021-04-25 PROCEDURE — 92610 EVALUATE SWALLOWING FUNCTION: CPT

## 2021-04-25 PROCEDURE — 25010000002 HYDRALAZINE PER 20 MG: Performed by: INTERNAL MEDICINE

## 2021-04-25 PROCEDURE — 80053 COMPREHEN METABOLIC PANEL: CPT | Performed by: INTERNAL MEDICINE

## 2021-04-25 PROCEDURE — 82962 GLUCOSE BLOOD TEST: CPT

## 2021-04-25 PROCEDURE — 99233 SBSQ HOSP IP/OBS HIGH 50: CPT | Performed by: INTERNAL MEDICINE

## 2021-04-25 PROCEDURE — 95816 EEG AWAKE AND DROWSY: CPT

## 2021-04-25 PROCEDURE — 85610 PROTHROMBIN TIME: CPT | Performed by: INTERNAL MEDICINE

## 2021-04-25 PROCEDURE — 86140 C-REACTIVE PROTEIN: CPT | Performed by: INTERNAL MEDICINE

## 2021-04-25 PROCEDURE — 63710000001 INSULIN LISPRO (HUMAN) PER 5 UNITS: Performed by: INTERNAL MEDICINE

## 2021-04-25 PROCEDURE — 83735 ASSAY OF MAGNESIUM: CPT | Performed by: INTERNAL MEDICINE

## 2021-04-25 PROCEDURE — 25010000002 LORAZEPAM PER 2 MG: Performed by: INTERNAL MEDICINE

## 2021-04-25 PROCEDURE — 25010000002 CEFTRIAXONE PER 250 MG: Performed by: INTERNAL MEDICINE

## 2021-04-25 PROCEDURE — 97162 PT EVAL MOD COMPLEX 30 MIN: CPT

## 2021-04-25 RX ORDER — DULOXETIN HYDROCHLORIDE 30 MG/1
30 CAPSULE, DELAYED RELEASE ORAL DAILY
Status: DISCONTINUED | OUTPATIENT
Start: 2021-04-25 | End: 2021-04-26

## 2021-04-25 RX ORDER — ACETAMINOPHEN 650 MG/1
650 SUPPOSITORY RECTAL EVERY 6 HOURS PRN
Status: DISCONTINUED | OUTPATIENT
Start: 2021-04-25 | End: 2021-05-04

## 2021-04-25 RX ORDER — ATORVASTATIN CALCIUM 40 MG/1
40 TABLET, FILM COATED ORAL NIGHTLY
Status: DISCONTINUED | OUTPATIENT
Start: 2021-04-25 | End: 2021-04-26

## 2021-04-25 RX ORDER — METOPROLOL TARTRATE 5 MG/5ML
INJECTION INTRAVENOUS
Status: DISPENSED
Start: 2021-04-25 | End: 2021-04-26

## 2021-04-25 RX ORDER — METOPROLOL TARTRATE 5 MG/5ML
2.5 INJECTION INTRAVENOUS ONCE
Status: COMPLETED | OUTPATIENT
Start: 2021-04-25 | End: 2021-04-25

## 2021-04-25 RX ORDER — METOPROLOL TARTRATE 50 MG/1
50 TABLET, FILM COATED ORAL 2 TIMES DAILY
Status: DISCONTINUED | OUTPATIENT
Start: 2021-04-25 | End: 2021-04-26

## 2021-04-25 RX ORDER — LEVOTHYROXINE SODIUM 20 UG/ML
150 INJECTION, SOLUTION INTRAVENOUS
Status: DISCONTINUED | OUTPATIENT
Start: 2021-04-26 | End: 2021-04-25

## 2021-04-25 RX ORDER — FAMOTIDINE 20 MG/1
20 TABLET, FILM COATED ORAL
Status: DISCONTINUED | OUTPATIENT
Start: 2021-04-25 | End: 2021-04-26

## 2021-04-25 RX ORDER — LEVOTHYROXINE SODIUM 20 UG/ML
75 INJECTION, SOLUTION INTRAVENOUS
Status: DISCONTINUED | OUTPATIENT
Start: 2021-04-26 | End: 2021-05-14

## 2021-04-25 RX ORDER — LORAZEPAM 2 MG/ML
0.25 INJECTION INTRAMUSCULAR EVERY 4 HOURS PRN
Status: DISCONTINUED | OUTPATIENT
Start: 2021-04-25 | End: 2021-04-26

## 2021-04-25 RX ADMIN — LORAZEPAM 0.5 MG: 0.5 TABLET ORAL at 04:47

## 2021-04-25 RX ADMIN — INSULIN LISPRO 6 UNITS: 100 INJECTION, SOLUTION INTRAVENOUS; SUBCUTANEOUS at 09:37

## 2021-04-25 RX ADMIN — METOPROLOL TARTRATE 2.5 MG: 5 INJECTION INTRAVENOUS at 21:55

## 2021-04-25 RX ADMIN — INSULIN LISPRO 8 UNITS: 100 INJECTION, SOLUTION INTRAVENOUS; SUBCUTANEOUS at 18:55

## 2021-04-25 RX ADMIN — SODIUM CHLORIDE, POTASSIUM CHLORIDE, SODIUM LACTATE AND CALCIUM CHLORIDE 100 ML/HR: 600; 310; 30; 20 INJECTION, SOLUTION INTRAVENOUS at 09:36

## 2021-04-25 RX ADMIN — INSULIN LISPRO 2 UNITS: 100 INJECTION, SOLUTION INTRAVENOUS; SUBCUTANEOUS at 09:37

## 2021-04-25 RX ADMIN — ACETAMINOPHEN 650 MG: 325 TABLET ORAL at 04:45

## 2021-04-25 RX ADMIN — HYDRALAZINE HYDROCHLORIDE 10 MG: 20 INJECTION INTRAMUSCULAR; INTRAVENOUS at 06:12

## 2021-04-25 RX ADMIN — HYDRALAZINE HYDROCHLORIDE 10 MG: 20 INJECTION INTRAMUSCULAR; INTRAVENOUS at 10:08

## 2021-04-25 RX ADMIN — SODIUM CHLORIDE 1 G: 900 INJECTION INTRAVENOUS at 09:38

## 2021-04-25 RX ADMIN — INSULIN LISPRO 2 UNITS: 100 INJECTION, SOLUTION INTRAVENOUS; SUBCUTANEOUS at 12:12

## 2021-04-25 RX ADMIN — LEVOTHYROXINE SODIUM 75 MCG: 20 INJECTION, SOLUTION INTRAVENOUS at 12:02

## 2021-04-25 RX ADMIN — INSULIN LISPRO 6 UNITS: 100 INJECTION, SOLUTION INTRAVENOUS; SUBCUTANEOUS at 12:12

## 2021-04-25 RX ADMIN — LORAZEPAM 0.25 MG: 2 INJECTION INTRAMUSCULAR; INTRAVENOUS at 21:15

## 2021-04-25 RX ADMIN — SODIUM CHLORIDE, PRESERVATIVE FREE 10 ML: 5 INJECTION INTRAVENOUS at 21:15

## 2021-04-25 RX ADMIN — SODIUM CHLORIDE, POTASSIUM CHLORIDE, SODIUM LACTATE AND CALCIUM CHLORIDE 100 ML/HR: 600; 310; 30; 20 INJECTION, SOLUTION INTRAVENOUS at 05:07

## 2021-04-25 RX ADMIN — INSULIN LISPRO 4 UNITS: 100 INJECTION, SOLUTION INTRAVENOUS; SUBCUTANEOUS at 00:07

## 2021-04-25 RX ADMIN — AMLODIPINE BESYLATE 5 MG: 5 TABLET ORAL at 09:37

## 2021-04-25 RX ADMIN — ACETAMINOPHEN 650 MG: 650 SUPPOSITORY RECTAL at 21:15

## 2021-04-25 RX ADMIN — APIXABAN 5 MG: 5 TABLET, FILM COATED ORAL at 14:38

## 2021-04-25 RX ADMIN — SODIUM CHLORIDE, PRESERVATIVE FREE 10 ML: 5 INJECTION INTRAVENOUS at 09:38

## 2021-04-26 ENCOUNTER — APPOINTMENT (OUTPATIENT)
Dept: GENERAL RADIOLOGY | Facility: HOSPITAL | Age: 76
End: 2021-04-26

## 2021-04-26 PROBLEM — R53.1 WEAKNESS: Status: RESOLVED | Noted: 2021-04-24 | Resolved: 2021-04-26

## 2021-04-26 PROBLEM — G25.0 BENIGN ESSENTIAL TREMOR: Status: ACTIVE | Noted: 2021-04-24

## 2021-04-26 PROBLEM — K02.9 DENTAL CARIES: Status: ACTIVE | Noted: 2021-04-26

## 2021-04-26 LAB
ANION GAP SERPL CALCULATED.3IONS-SCNC: 14 MMOL/L (ref 5–15)
ARTERIAL PATENCY WRIST A: ABNORMAL
ATMOSPHERIC PRESS: ABNORMAL MM[HG]
BACTERIA BLD CULT: ABNORMAL
BASE EXCESS BLDA CALC-SCNC: -2.1 MMOL/L (ref 0–2)
BDY SITE: ABNORMAL
BODY TEMPERATURE: 37 C
BUN SERPL-MCNC: 16 MG/DL (ref 8–23)
BUN/CREAT SERPL: 11.8 (ref 7–25)
CA-I SERPL ISE-MCNC: 1.28 MMOL/L (ref 1.12–1.32)
CALCIUM SPEC-SCNC: 8.9 MG/DL (ref 8.6–10.5)
CHLORIDE SERPL-SCNC: 107 MMOL/L (ref 98–107)
CK SERPL-CCNC: 96 U/L (ref 20–200)
CO2 BLDA-SCNC: 20.7 MMOL/L (ref 22–33)
CO2 SERPL-SCNC: 19 MMOL/L (ref 22–29)
COHGB MFR BLD: 0.8 % (ref 0–2)
CREAT SERPL-MCNC: 1.36 MG/DL (ref 0.76–1.27)
D-LACTATE SERPL-SCNC: 1.4 MMOL/L (ref 0.5–2)
D-LACTATE SERPL-SCNC: 2.4 MMOL/L (ref 0.5–2)
DEPRECATED RDW RBC AUTO: 49.7 FL (ref 37–54)
ERYTHROCYTE [DISTWIDTH] IN BLOOD BY AUTOMATED COUNT: 13.7 % (ref 12.3–15.4)
GFR SERPL CREATININE-BSD FRML MDRD: 51 ML/MIN/1.73
GLUCOSE BLDC GLUCOMTR-MCNC: 226 MG/DL (ref 70–130)
GLUCOSE BLDC GLUCOMTR-MCNC: 263 MG/DL (ref 70–130)
GLUCOSE BLDC GLUCOMTR-MCNC: 264 MG/DL (ref 70–130)
GLUCOSE BLDC GLUCOMTR-MCNC: 303 MG/DL (ref 70–130)
GLUCOSE SERPL-MCNC: 317 MG/DL (ref 65–99)
HCO3 BLDA-SCNC: 19.9 MMOL/L (ref 20–26)
HCT VFR BLD AUTO: 38.6 % (ref 37.5–51)
HCT VFR BLD CALC: 37.3 %
HGB BLD-MCNC: 11.9 G/DL (ref 13–17.7)
HGB BLDA-MCNC: 12.2 G/DL (ref 13.5–17.5)
INHALED O2 CONCENTRATION: 21 %
MAGNESIUM SERPL-MCNC: 1.9 MG/DL (ref 1.6–2.4)
MCH RBC QN AUTO: 30.5 PG (ref 26.6–33)
MCHC RBC AUTO-ENTMCNC: 30.8 G/DL (ref 31.5–35.7)
MCV RBC AUTO: 99 FL (ref 79–97)
METHGB BLD QL: ABNORMAL
MODALITY: ABNORMAL
NOTE: ABNORMAL
OXYHGB MFR BLDV: 98.3 % (ref 94–99)
PCO2 BLDA: 25.8 MM HG (ref 35–45)
PCO2 TEMP ADJ BLD: 25.8 MM HG (ref 35–48)
PH BLDA: 7.5 PH UNITS (ref 7.35–7.45)
PH, TEMP CORRECTED: 7.5 PH UNITS
PLATELET # BLD AUTO: 232 10*3/MM3 (ref 140–450)
PMV BLD AUTO: 10 FL (ref 6–12)
PO2 BLDA: 81.9 MM HG (ref 83–108)
PO2 TEMP ADJ BLD: 81.9 MM HG (ref 83–108)
POTASSIUM SERPL-SCNC: 3.6 MMOL/L (ref 3.5–5.2)
PROCALCITONIN SERPL-MCNC: 0.38 NG/ML (ref 0–0.25)
QT INTERVAL: 302 MS
QTC INTERVAL: 428 MS
RBC # BLD AUTO: 3.9 10*6/MM3 (ref 4.14–5.8)
SODIUM SERPL-SCNC: 140 MMOL/L (ref 136–145)
VENTILATOR MODE: ABNORMAL
WBC # BLD AUTO: 17.54 10*3/MM3 (ref 3.4–10.8)

## 2021-04-26 PROCEDURE — 5A09357 ASSISTANCE WITH RESPIRATORY VENTILATION, LESS THAN 24 CONSECUTIVE HOURS, CONTINUOUS POSITIVE AIRWAY PRESSURE: ICD-10-PCS | Performed by: INTERNAL MEDICINE

## 2021-04-26 PROCEDURE — 93005 ELECTROCARDIOGRAM TRACING: CPT | Performed by: INTERNAL MEDICINE

## 2021-04-26 PROCEDURE — 99291 CRITICAL CARE FIRST HOUR: CPT | Performed by: INTERNAL MEDICINE

## 2021-04-26 PROCEDURE — 25010000002 DAPTOMYCIN PER 1 MG: Performed by: INTERNAL MEDICINE

## 2021-04-26 PROCEDURE — 94660 CPAP INITIATION&MGMT: CPT

## 2021-04-26 PROCEDURE — 25010000002 MEROPENEM PER 100 MG: Performed by: INTERNAL MEDICINE

## 2021-04-26 PROCEDURE — 84145 PROCALCITONIN (PCT): CPT | Performed by: INTERNAL MEDICINE

## 2021-04-26 PROCEDURE — 25010000002 HYDRALAZINE PER 20 MG: Performed by: INTERNAL MEDICINE

## 2021-04-26 PROCEDURE — 82375 ASSAY CARBOXYHB QUANT: CPT

## 2021-04-26 PROCEDURE — 87899 AGENT NOS ASSAY W/OPTIC: CPT | Performed by: INTERNAL MEDICINE

## 2021-04-26 PROCEDURE — 99233 SBSQ HOSP IP/OBS HIGH 50: CPT | Performed by: INTERNAL MEDICINE

## 2021-04-26 PROCEDURE — 74018 RADEX ABDOMEN 1 VIEW: CPT

## 2021-04-26 PROCEDURE — 25010000002 CEFTRIAXONE PER 250 MG: Performed by: INTERNAL MEDICINE

## 2021-04-26 PROCEDURE — C1751 CATH, INF, PER/CENT/MIDLINE: HCPCS

## 2021-04-26 PROCEDURE — 82550 ASSAY OF CK (CPK): CPT | Performed by: INTERNAL MEDICINE

## 2021-04-26 PROCEDURE — 82962 GLUCOSE BLOOD TEST: CPT

## 2021-04-26 PROCEDURE — 93010 ELECTROCARDIOGRAM REPORT: CPT | Performed by: INTERNAL MEDICINE

## 2021-04-26 PROCEDURE — 80048 BASIC METABOLIC PNL TOTAL CA: CPT | Performed by: INTERNAL MEDICINE

## 2021-04-26 PROCEDURE — 63710000001 INSULIN DETEMIR PER 5 UNITS: Performed by: INTERNAL MEDICINE

## 2021-04-26 PROCEDURE — 85027 COMPLETE CBC AUTOMATED: CPT | Performed by: INTERNAL MEDICINE

## 2021-04-26 PROCEDURE — 63710000001 INSULIN LISPRO (HUMAN) PER 5 UNITS: Performed by: INTERNAL MEDICINE

## 2021-04-26 PROCEDURE — 25010000002 ACYCLOVIR PER 5 MG: Performed by: INTERNAL MEDICINE

## 2021-04-26 PROCEDURE — 99223 1ST HOSP IP/OBS HIGH 75: CPT | Performed by: PSYCHIATRY & NEUROLOGY

## 2021-04-26 PROCEDURE — 36600 WITHDRAWAL OF ARTERIAL BLOOD: CPT

## 2021-04-26 PROCEDURE — 02HV33Z INSERTION OF INFUSION DEVICE INTO SUPERIOR VENA CAVA, PERCUTANEOUS APPROACH: ICD-10-PCS | Performed by: INTERNAL MEDICINE

## 2021-04-26 PROCEDURE — 82330 ASSAY OF CALCIUM: CPT | Performed by: INTERNAL MEDICINE

## 2021-04-26 PROCEDURE — 83735 ASSAY OF MAGNESIUM: CPT | Performed by: INTERNAL MEDICINE

## 2021-04-26 PROCEDURE — C1894 INTRO/SHEATH, NON-LASER: HCPCS

## 2021-04-26 PROCEDURE — 25010000002 MORPHINE PER 10 MG: Performed by: INTERNAL MEDICINE

## 2021-04-26 PROCEDURE — 83605 ASSAY OF LACTIC ACID: CPT | Performed by: INTERNAL MEDICINE

## 2021-04-26 PROCEDURE — 83050 HGB METHEMOGLOBIN QUAN: CPT

## 2021-04-26 PROCEDURE — 63710000001 INSULIN REGULAR HUMAN PER 5 UNITS: Performed by: INTERNAL MEDICINE

## 2021-04-26 PROCEDURE — 82805 BLOOD GASES W/O2 SATURATION: CPT

## 2021-04-26 PROCEDURE — 25010000003 POTASSIUM CHLORIDE 10 MEQ/100ML SOLUTION

## 2021-04-26 RX ORDER — MORPHINE SULFATE 4 MG/ML
4 INJECTION, SOLUTION INTRAMUSCULAR; INTRAVENOUS EVERY 4 HOURS PRN
Status: DISCONTINUED | OUTPATIENT
Start: 2021-04-26 | End: 2021-04-26

## 2021-04-26 RX ORDER — AMLODIPINE BESYLATE 5 MG/1
5 TABLET ORAL
Status: DISCONTINUED | OUTPATIENT
Start: 2021-04-27 | End: 2021-05-16

## 2021-04-26 RX ORDER — SODIUM CHLORIDE 9 MG/ML
100 INJECTION, SOLUTION INTRAVENOUS CONTINUOUS
Status: DISCONTINUED | OUTPATIENT
Start: 2021-04-26 | End: 2021-05-01

## 2021-04-26 RX ORDER — POTASSIUM CHLORIDE 1.5 G/1.77G
40 POWDER, FOR SOLUTION ORAL DAILY
Status: DISCONTINUED | OUTPATIENT
Start: 2021-04-27 | End: 2021-04-26

## 2021-04-26 RX ORDER — METOPROLOL TARTRATE 5 MG/5ML
2.5 INJECTION INTRAVENOUS ONCE
Status: COMPLETED | OUTPATIENT
Start: 2021-04-26 | End: 2021-04-26

## 2021-04-26 RX ORDER — POTASSIUM CHLORIDE 1.5 G/1.77G
40 POWDER, FOR SOLUTION ORAL AS NEEDED
Status: DISCONTINUED | OUTPATIENT
Start: 2021-04-26 | End: 2021-05-20 | Stop reason: HOSPADM

## 2021-04-26 RX ORDER — ESMOLOL HYDROCHLORIDE 10 MG/ML
500 INJECTION INTRAVENOUS ONCE
Status: DISCONTINUED | OUTPATIENT
Start: 2021-04-26 | End: 2021-04-26

## 2021-04-26 RX ORDER — POTASSIUM CHLORIDE 750 MG/1
40 CAPSULE, EXTENDED RELEASE ORAL AS NEEDED
Status: DISCONTINUED | OUTPATIENT
Start: 2021-04-26 | End: 2021-04-26

## 2021-04-26 RX ORDER — POTASSIUM CHLORIDE 7.45 MG/ML
10 INJECTION INTRAVENOUS
Status: DISCONTINUED | OUTPATIENT
Start: 2021-04-26 | End: 2021-05-20 | Stop reason: HOSPADM

## 2021-04-26 RX ORDER — LINEZOLID 600 MG/1
600 TABLET, FILM COATED ORAL ONCE
Status: COMPLETED | OUTPATIENT
Start: 2021-04-26 | End: 2021-04-26

## 2021-04-26 RX ORDER — ESMOLOL HYDROCHLORIDE 10 MG/ML
50-300 INJECTION, SOLUTION INTRAVENOUS
Status: DISCONTINUED | OUTPATIENT
Start: 2021-04-26 | End: 2021-04-26

## 2021-04-26 RX ORDER — SODIUM CHLORIDE 0.9 % (FLUSH) 0.9 %
10 SYRINGE (ML) INJECTION EVERY 12 HOURS SCHEDULED
Status: DISCONTINUED | OUTPATIENT
Start: 2021-04-26 | End: 2021-05-20 | Stop reason: HOSPADM

## 2021-04-26 RX ORDER — METOPROLOL TARTRATE 5 MG/5ML
2.5 INJECTION INTRAVENOUS EVERY 6 HOURS
Status: DISCONTINUED | OUTPATIENT
Start: 2021-04-26 | End: 2021-04-26

## 2021-04-26 RX ORDER — POTASSIUM CHLORIDE 7.45 MG/ML
10 INJECTION INTRAVENOUS
Status: DISCONTINUED | OUTPATIENT
Start: 2021-04-26 | End: 2021-04-26

## 2021-04-26 RX ORDER — SODIUM CHLORIDE 0.9 % (FLUSH) 0.9 %
10 SYRINGE (ML) INJECTION AS NEEDED
Status: DISCONTINUED | OUTPATIENT
Start: 2021-04-26 | End: 2021-04-27

## 2021-04-26 RX ORDER — POTASSIUM CHLORIDE 1.5 G/1.77G
40 POWDER, FOR SOLUTION ORAL AS NEEDED
Status: DISCONTINUED | OUTPATIENT
Start: 2021-04-26 | End: 2021-04-26

## 2021-04-26 RX ORDER — ATORVASTATIN CALCIUM 40 MG/1
40 TABLET, FILM COATED ORAL NIGHTLY
Status: DISCONTINUED | OUTPATIENT
Start: 2021-04-26 | End: 2021-04-30

## 2021-04-26 RX ORDER — DEXMEDETOMIDINE HYDROCHLORIDE 4 UG/ML
.2-1.5 INJECTION, SOLUTION INTRAVENOUS
Status: DISCONTINUED | OUTPATIENT
Start: 2021-04-26 | End: 2021-05-01

## 2021-04-26 RX ORDER — MORPHINE SULFATE 4 MG/ML
4 INJECTION, SOLUTION INTRAMUSCULAR; INTRAVENOUS
Status: DISCONTINUED | OUTPATIENT
Start: 2021-04-26 | End: 2021-04-27

## 2021-04-26 RX ORDER — LABETALOL HYDROCHLORIDE 5 MG/ML
20 INJECTION, SOLUTION INTRAVENOUS EVERY 4 HOURS PRN
Status: DISCONTINUED | OUTPATIENT
Start: 2021-04-26 | End: 2021-04-28

## 2021-04-26 RX ORDER — SODIUM CHLORIDE 0.9 % (FLUSH) 0.9 %
20 SYRINGE (ML) INJECTION AS NEEDED
Status: DISCONTINUED | OUTPATIENT
Start: 2021-04-26 | End: 2021-05-20 | Stop reason: HOSPADM

## 2021-04-26 RX ORDER — FAMOTIDINE 10 MG/ML
20 INJECTION, SOLUTION INTRAVENOUS EVERY 12 HOURS SCHEDULED
Status: DISCONTINUED | OUTPATIENT
Start: 2021-04-26 | End: 2021-04-28

## 2021-04-26 RX ADMIN — HYDRALAZINE HYDROCHLORIDE 10 MG: 20 INJECTION INTRAMUSCULAR; INTRAVENOUS at 06:17

## 2021-04-26 RX ADMIN — ACYCLOVIR SODIUM 900 MG: 500 INJECTION, SOLUTION INTRAVENOUS at 12:44

## 2021-04-26 RX ADMIN — FAMOTIDINE 20 MG: 10 INJECTION, SOLUTION INTRAVENOUS at 12:44

## 2021-04-26 RX ADMIN — ATORVASTATIN CALCIUM 40 MG: 40 TABLET, FILM COATED ORAL at 20:35

## 2021-04-26 RX ADMIN — DEXMEDETOMIDINE HYDROCHLORIDE 0.6 MCG/KG/HR: 4 INJECTION, SOLUTION INTRAVENOUS at 23:10

## 2021-04-26 RX ADMIN — DEXMEDETOMIDINE HYDROCHLORIDE 0.6 MCG/KG/HR: 4 INJECTION, SOLUTION INTRAVENOUS at 18:13

## 2021-04-26 RX ADMIN — DEXMEDETOMIDINE HYDROCHLORIDE 1 MCG/KG/HR: 4 INJECTION, SOLUTION INTRAVENOUS at 14:45

## 2021-04-26 RX ADMIN — MORPHINE SULFATE 4 MG: 4 INJECTION, SOLUTION INTRAMUSCULAR; INTRAVENOUS at 23:13

## 2021-04-26 RX ADMIN — SODIUM CHLORIDE 100 ML/HR: 9 INJECTION, SOLUTION INTRAVENOUS at 11:21

## 2021-04-26 RX ADMIN — SODIUM CHLORIDE 100 ML/HR: 9 INJECTION, SOLUTION INTRAVENOUS at 08:08

## 2021-04-26 RX ADMIN — SODIUM CHLORIDE 100 ML/HR: 9 INJECTION, SOLUTION INTRAVENOUS at 11:25

## 2021-04-26 RX ADMIN — MORPHINE SULFATE 4 MG: 4 INJECTION, SOLUTION INTRAMUSCULAR; INTRAVENOUS at 12:09

## 2021-04-26 RX ADMIN — MEROPENEM 1 G: 1 INJECTION, POWDER, FOR SOLUTION INTRAVENOUS at 18:13

## 2021-04-26 RX ADMIN — MEROPENEM 1 G: 1 INJECTION, POWDER, FOR SOLUTION INTRAVENOUS at 12:44

## 2021-04-26 RX ADMIN — ACYCLOVIR SODIUM 900 MG: 500 INJECTION, SOLUTION INTRAVENOUS at 23:14

## 2021-04-26 RX ADMIN — DEXMEDETOMIDINE HYDROCHLORIDE 0.2 MCG/KG/HR: 4 INJECTION, SOLUTION INTRAVENOUS at 11:21

## 2021-04-26 RX ADMIN — METOPROLOL TARTRATE 2.5 MG: 5 INJECTION INTRAVENOUS at 03:54

## 2021-04-26 RX ADMIN — SODIUM CHLORIDE, PRESERVATIVE FREE 10 ML: 5 INJECTION INTRAVENOUS at 08:10

## 2021-04-26 RX ADMIN — INSULIN DETEMIR 15 UNITS: 100 INJECTION, SOLUTION SUBCUTANEOUS at 20:35

## 2021-04-26 RX ADMIN — INSULIN LISPRO 12 UNITS: 100 INJECTION, SOLUTION INTRAVENOUS; SUBCUTANEOUS at 08:07

## 2021-04-26 RX ADMIN — INSULIN HUMAN 10 UNITS: 100 INJECTION, SOLUTION PARENTERAL at 18:13

## 2021-04-26 RX ADMIN — SODIUM CHLORIDE 100 ML/HR: 9 INJECTION, SOLUTION INTRAVENOUS at 21:53

## 2021-04-26 RX ADMIN — ACETAMINOPHEN 650 MG: 650 SUPPOSITORY RECTAL at 06:17

## 2021-04-26 RX ADMIN — SODIUM CHLORIDE, PRESERVATIVE FREE 10 ML: 5 INJECTION INTRAVENOUS at 20:35

## 2021-04-26 RX ADMIN — MORPHINE SULFATE 4 MG: 4 INJECTION, SOLUTION INTRAMUSCULAR; INTRAVENOUS at 14:12

## 2021-04-26 RX ADMIN — POTASSIUM CHLORIDE 10 MEQ: 7.46 INJECTION, SOLUTION INTRAVENOUS at 08:08

## 2021-04-26 RX ADMIN — DAPTOMYCIN 350 MG: 500 INJECTION, POWDER, LYOPHILIZED, FOR SOLUTION INTRAVENOUS at 12:44

## 2021-04-26 RX ADMIN — SODIUM CHLORIDE 1 G: 900 INJECTION INTRAVENOUS at 08:07

## 2021-04-26 RX ADMIN — FAMOTIDINE 20 MG: 10 INJECTION, SOLUTION INTRAVENOUS at 20:34

## 2021-04-26 RX ADMIN — METOPROLOL TARTRATE 2.5 MG: 5 INJECTION INTRAVENOUS at 09:27

## 2021-04-26 RX ADMIN — LINEZOLID 600 MG: 600 TABLET, FILM COATED ORAL at 16:10

## 2021-04-26 RX ADMIN — SODIUM CHLORIDE 1000 ML: 9 INJECTION, SOLUTION INTRAVENOUS at 12:18

## 2021-04-27 LAB
ALBUMIN SERPL-MCNC: 3 G/DL (ref 3.5–5.2)
ALBUMIN/GLOB SERPL: 0.9 G/DL
ALP SERPL-CCNC: 101 U/L (ref 39–117)
ALT SERPL W P-5'-P-CCNC: 10 U/L (ref 1–41)
ANION GAP SERPL CALCULATED.3IONS-SCNC: 9 MMOL/L (ref 5–15)
AST SERPL-CCNC: 13 U/L (ref 1–40)
BACTERIA SPEC AEROBE CULT: ABNORMAL
BASOPHILS # BLD AUTO: 0.09 10*3/MM3 (ref 0–0.2)
BASOPHILS NFR BLD AUTO: 0.6 % (ref 0–1.5)
BILIRUB SERPL-MCNC: 0.6 MG/DL (ref 0–1.2)
BUN SERPL-MCNC: 16 MG/DL (ref 8–23)
BUN/CREAT SERPL: 13.4 (ref 7–25)
CALCIUM SPEC-SCNC: 8.5 MG/DL (ref 8.6–10.5)
CHLORIDE SERPL-SCNC: 112 MMOL/L (ref 98–107)
CK SERPL-CCNC: 110 U/L (ref 20–200)
CO2 SERPL-SCNC: 23 MMOL/L (ref 22–29)
CREAT SERPL-MCNC: 1.19 MG/DL (ref 0.76–1.27)
CRYPTOC AG CSF QL: NEGATIVE
D-LACTATE SERPL-SCNC: 1.3 MMOL/L (ref 0.5–2)
DEPRECATED RDW RBC AUTO: 50.3 FL (ref 37–54)
EOSINOPHIL # BLD AUTO: 0.25 10*3/MM3 (ref 0–0.4)
EOSINOPHIL NFR BLD AUTO: 1.8 % (ref 0.3–6.2)
ERYTHROCYTE [DISTWIDTH] IN BLOOD BY AUTOMATED COUNT: 13.7 % (ref 12.3–15.4)
GFR SERPL CREATININE-BSD FRML MDRD: 60 ML/MIN/1.73
GLOBULIN UR ELPH-MCNC: 3.2 GM/DL
GLUCOSE BLDC GLUCOMTR-MCNC: 165 MG/DL (ref 70–130)
GLUCOSE BLDC GLUCOMTR-MCNC: 197 MG/DL (ref 70–130)
GLUCOSE BLDC GLUCOMTR-MCNC: 201 MG/DL (ref 70–130)
GLUCOSE BLDC GLUCOMTR-MCNC: 208 MG/DL (ref 70–130)
GLUCOSE SERPL-MCNC: 194 MG/DL (ref 65–99)
GRAM STN SPEC: ABNORMAL
HCT VFR BLD AUTO: 35.8 % (ref 37.5–51)
HGB BLD-MCNC: 11 G/DL (ref 13–17.7)
IMM GRANULOCYTES # BLD AUTO: 0.18 10*3/MM3 (ref 0–0.05)
IMM GRANULOCYTES NFR BLD AUTO: 1.3 % (ref 0–0.5)
ISOLATED FROM: ABNORMAL
LYMPHOCYTES # BLD AUTO: 0.99 10*3/MM3 (ref 0.7–3.1)
LYMPHOCYTES NFR BLD AUTO: 7.1 % (ref 19.6–45.3)
MCH RBC QN AUTO: 30.6 PG (ref 26.6–33)
MCHC RBC AUTO-ENTMCNC: 30.7 G/DL (ref 31.5–35.7)
MCV RBC AUTO: 99.7 FL (ref 79–97)
MONOCYTES # BLD AUTO: 1.01 10*3/MM3 (ref 0.1–0.9)
MONOCYTES NFR BLD AUTO: 7.2 % (ref 5–12)
NEUTROPHILS NFR BLD AUTO: 11.46 10*3/MM3 (ref 1.7–7)
NEUTROPHILS NFR BLD AUTO: 82 % (ref 42.7–76)
NRBC BLD AUTO-RTO: 0 /100 WBC (ref 0–0.2)
PLATELET # BLD AUTO: 210 10*3/MM3 (ref 140–450)
PMV BLD AUTO: 10.5 FL (ref 6–12)
POTASSIUM SERPL-SCNC: 3.6 MMOL/L (ref 3.5–5.2)
PROCALCITONIN SERPL-MCNC: 0.37 NG/ML (ref 0–0.25)
PROT SERPL-MCNC: 6.2 G/DL (ref 6–8.5)
RBC # BLD AUTO: 3.59 10*6/MM3 (ref 4.14–5.8)
SODIUM SERPL-SCNC: 144 MMOL/L (ref 136–145)
WBC # BLD AUTO: 13.98 10*3/MM3 (ref 3.4–10.8)

## 2021-04-27 PROCEDURE — 25010000002 MEROPENEM PER 100 MG: Performed by: INTERNAL MEDICINE

## 2021-04-27 PROCEDURE — 83605 ASSAY OF LACTIC ACID: CPT | Performed by: INTERNAL MEDICINE

## 2021-04-27 PROCEDURE — 82962 GLUCOSE BLOOD TEST: CPT

## 2021-04-27 PROCEDURE — 25010000002 ACYCLOVIR PER 5 MG: Performed by: INTERNAL MEDICINE

## 2021-04-27 PROCEDURE — 82550 ASSAY OF CK (CPK): CPT | Performed by: INTERNAL MEDICINE

## 2021-04-27 PROCEDURE — 63710000001 INSULIN DETEMIR PER 5 UNITS: Performed by: PHYSICIAN ASSISTANT

## 2021-04-27 PROCEDURE — 63710000001 INSULIN REGULAR HUMAN PER 5 UNITS: Performed by: INTERNAL MEDICINE

## 2021-04-27 PROCEDURE — 94660 CPAP INITIATION&MGMT: CPT

## 2021-04-27 PROCEDURE — 80053 COMPREHEN METABOLIC PANEL: CPT | Performed by: INTERNAL MEDICINE

## 2021-04-27 PROCEDURE — 25010000002 ACYCLOVIR PER 5 MG: Performed by: PHYSICIAN ASSISTANT

## 2021-04-27 PROCEDURE — 99232 SBSQ HOSP IP/OBS MODERATE 35: CPT | Performed by: PSYCHIATRY & NEUROLOGY

## 2021-04-27 PROCEDURE — 84145 PROCALCITONIN (PCT): CPT | Performed by: INTERNAL MEDICINE

## 2021-04-27 PROCEDURE — 25010000002 HYDROMORPHONE PER 4 MG: Performed by: INTERNAL MEDICINE

## 2021-04-27 PROCEDURE — 86593 SYPHILIS TEST NON-TREP QUANT: CPT | Performed by: INTERNAL MEDICINE

## 2021-04-27 PROCEDURE — 25010000002 MORPHINE PER 10 MG: Performed by: INTERNAL MEDICINE

## 2021-04-27 PROCEDURE — 94799 UNLISTED PULMONARY SVC/PX: CPT

## 2021-04-27 PROCEDURE — 63710000001 INSULIN REGULAR HUMAN PER 5 UNITS: Performed by: PHYSICIAN ASSISTANT

## 2021-04-27 PROCEDURE — 99291 CRITICAL CARE FIRST HOUR: CPT | Performed by: INTERNAL MEDICINE

## 2021-04-27 PROCEDURE — 85025 COMPLETE CBC W/AUTO DIFF WBC: CPT | Performed by: INTERNAL MEDICINE

## 2021-04-27 PROCEDURE — 25010000002 MEROPENEM: Performed by: INTERNAL MEDICINE

## 2021-04-27 PROCEDURE — 25010000002 DAPTOMYCIN PER 1 MG: Performed by: INTERNAL MEDICINE

## 2021-04-27 RX ORDER — LABETALOL HYDROCHLORIDE 5 MG/ML
20 INJECTION, SOLUTION INTRAVENOUS EVERY 4 HOURS PRN
Status: DISCONTINUED | OUTPATIENT
Start: 2021-04-27 | End: 2021-05-20 | Stop reason: HOSPADM

## 2021-04-27 RX ORDER — HYDROMORPHONE HYDROCHLORIDE 1 MG/ML
0.5 INJECTION, SOLUTION INTRAMUSCULAR; INTRAVENOUS; SUBCUTANEOUS
Status: DISPENSED | OUTPATIENT
Start: 2021-04-27 | End: 2021-05-04

## 2021-04-27 RX ADMIN — MORPHINE SULFATE 4 MG: 4 INJECTION, SOLUTION INTRAMUSCULAR; INTRAVENOUS at 05:23

## 2021-04-27 RX ADMIN — ACYCLOVIR SODIUM 900 MG: 500 INJECTION, SOLUTION INTRAVENOUS at 12:51

## 2021-04-27 RX ADMIN — INSULIN HUMAN 3 UNITS: 100 INJECTION, SOLUTION PARENTERAL at 23:55

## 2021-04-27 RX ADMIN — ACYCLOVIR SODIUM 900 MG: 500 INJECTION, SOLUTION INTRAVENOUS at 23:55

## 2021-04-27 RX ADMIN — SODIUM CHLORIDE 100 ML/HR: 9 INJECTION, SOLUTION INTRAVENOUS at 08:14

## 2021-04-27 RX ADMIN — DEXMEDETOMIDINE HYDROCHLORIDE 0.8 MCG/KG/HR: 4 INJECTION, SOLUTION INTRAVENOUS at 04:07

## 2021-04-27 RX ADMIN — FAMOTIDINE 20 MG: 10 INJECTION, SOLUTION INTRAVENOUS at 20:38

## 2021-04-27 RX ADMIN — LEVOTHYROXINE SODIUM 75 MCG: 20 INJECTION, SOLUTION INTRAVENOUS at 12:52

## 2021-04-27 RX ADMIN — DEXMEDETOMIDINE HYDROCHLORIDE 0.8 MCG/KG/HR: 4 INJECTION, SOLUTION INTRAVENOUS at 08:13

## 2021-04-27 RX ADMIN — HYDROMORPHONE HYDROCHLORIDE 0.5 MG: 1 INJECTION, SOLUTION INTRAMUSCULAR; INTRAVENOUS; SUBCUTANEOUS at 15:36

## 2021-04-27 RX ADMIN — DEXMEDETOMIDINE HYDROCHLORIDE 1.2 MCG/KG/HR: 4 INJECTION, SOLUTION INTRAVENOUS at 18:02

## 2021-04-27 RX ADMIN — INSULIN HUMAN 5 UNITS: 100 INJECTION, SOLUTION PARENTERAL at 12:51

## 2021-04-27 RX ADMIN — AMLODIPINE BESYLATE 5 MG: 5 TABLET ORAL at 08:13

## 2021-04-27 RX ADMIN — MEROPENEM 1 G: 1 INJECTION, POWDER, FOR SOLUTION INTRAVENOUS at 03:40

## 2021-04-27 RX ADMIN — ATORVASTATIN CALCIUM 40 MG: 40 TABLET, FILM COATED ORAL at 20:38

## 2021-04-27 RX ADMIN — INSULIN DETEMIR 15 UNITS: 100 INJECTION, SOLUTION SUBCUTANEOUS at 20:40

## 2021-04-27 RX ADMIN — DEXMEDETOMIDINE HYDROCHLORIDE 0.8 MCG/KG/HR: 4 INJECTION, SOLUTION INTRAVENOUS at 12:51

## 2021-04-27 RX ADMIN — FAMOTIDINE 20 MG: 10 INJECTION, SOLUTION INTRAVENOUS at 08:13

## 2021-04-27 RX ADMIN — INSULIN HUMAN 5 UNITS: 100 INJECTION, SOLUTION PARENTERAL at 06:10

## 2021-04-27 RX ADMIN — MEROPENEM 1 G: 1 INJECTION, POWDER, FOR SOLUTION INTRAVENOUS at 17:59

## 2021-04-27 RX ADMIN — INSULIN HUMAN 5 UNITS: 100 INJECTION, SOLUTION PARENTERAL at 00:02

## 2021-04-27 RX ADMIN — DAPTOMYCIN 350 MG: 500 INJECTION, POWDER, LYOPHILIZED, FOR SOLUTION INTRAVENOUS at 12:51

## 2021-04-27 RX ADMIN — SODIUM CHLORIDE 100 ML/HR: 9 INJECTION, SOLUTION INTRAVENOUS at 18:02

## 2021-04-27 RX ADMIN — DEXMEDETOMIDINE HYDROCHLORIDE 1.2 MCG/KG/HR: 4 INJECTION, SOLUTION INTRAVENOUS at 23:55

## 2021-04-27 RX ADMIN — SODIUM CHLORIDE, PRESERVATIVE FREE 10 ML: 5 INJECTION INTRAVENOUS at 20:41

## 2021-04-27 RX ADMIN — MEROPENEM 1 G: 1 INJECTION, POWDER, FOR SOLUTION INTRAVENOUS at 10:42

## 2021-04-27 RX ADMIN — DEXMEDETOMIDINE HYDROCHLORIDE 1.2 MCG/KG/HR: 4 INJECTION, SOLUTION INTRAVENOUS at 20:38

## 2021-04-28 LAB
ALBUMIN SERPL-MCNC: 3 G/DL (ref 3.5–5.2)
ALBUMIN/GLOB SERPL: 0.8 G/DL
ALP SERPL-CCNC: 121 U/L (ref 39–117)
ALT SERPL W P-5'-P-CCNC: 11 U/L (ref 1–41)
ANION GAP SERPL CALCULATED.3IONS-SCNC: 11 MMOL/L (ref 5–15)
AST SERPL-CCNC: 19 U/L (ref 1–40)
BASOPHILS # BLD AUTO: 0.11 10*3/MM3 (ref 0–0.2)
BASOPHILS NFR BLD AUTO: 0.8 % (ref 0–1.5)
BILIRUB SERPL-MCNC: 0.7 MG/DL (ref 0–1.2)
BUN SERPL-MCNC: 17 MG/DL (ref 8–23)
BUN/CREAT SERPL: 13.7 (ref 7–25)
CALCIUM SPEC-SCNC: 8.5 MG/DL (ref 8.6–10.5)
CHLORIDE SERPL-SCNC: 112 MMOL/L (ref 98–107)
CK SERPL-CCNC: 69 U/L (ref 20–200)
CO2 SERPL-SCNC: 20 MMOL/L (ref 22–29)
CREAT SERPL-MCNC: 1.24 MG/DL (ref 0.76–1.27)
CRP SERPL-MCNC: 11.83 MG/DL (ref 0–0.5)
D-LACTATE SERPL-SCNC: 2.2 MMOL/L (ref 0.5–2)
DEPRECATED RDW RBC AUTO: 49.1 FL (ref 37–54)
EOSINOPHIL # BLD AUTO: 0.52 10*3/MM3 (ref 0–0.4)
EOSINOPHIL NFR BLD AUTO: 3.9 % (ref 0.3–6.2)
ERYTHROCYTE [DISTWIDTH] IN BLOOD BY AUTOMATED COUNT: 13.3 % (ref 12.3–15.4)
GFR SERPL CREATININE-BSD FRML MDRD: 57 ML/MIN/1.73
GLOBULIN UR ELPH-MCNC: 3.6 GM/DL
GLUCOSE BLDC GLUCOMTR-MCNC: 159 MG/DL (ref 70–130)
GLUCOSE BLDC GLUCOMTR-MCNC: 168 MG/DL (ref 70–130)
GLUCOSE BLDC GLUCOMTR-MCNC: 175 MG/DL (ref 70–130)
GLUCOSE BLDC GLUCOMTR-MCNC: 182 MG/DL (ref 70–130)
GLUCOSE SERPL-MCNC: 173 MG/DL (ref 65–99)
HCT VFR BLD AUTO: 36.9 % (ref 37.5–51)
HGB BLD-MCNC: 11.4 G/DL (ref 13–17.7)
IMM GRANULOCYTES # BLD AUTO: 0.09 10*3/MM3 (ref 0–0.05)
IMM GRANULOCYTES NFR BLD AUTO: 0.7 % (ref 0–0.5)
LYMPHOCYTES # BLD AUTO: 1.4 10*3/MM3 (ref 0.7–3.1)
LYMPHOCYTES NFR BLD AUTO: 10.4 % (ref 19.6–45.3)
MCH RBC QN AUTO: 30.9 PG (ref 26.6–33)
MCHC RBC AUTO-ENTMCNC: 30.9 G/DL (ref 31.5–35.7)
MCV RBC AUTO: 100 FL (ref 79–97)
MONOCYTES # BLD AUTO: 0.79 10*3/MM3 (ref 0.1–0.9)
MONOCYTES NFR BLD AUTO: 5.9 % (ref 5–12)
NEUTROPHILS NFR BLD AUTO: 10.53 10*3/MM3 (ref 1.7–7)
NEUTROPHILS NFR BLD AUTO: 78.3 % (ref 42.7–76)
NRBC BLD AUTO-RTO: 0 /100 WBC (ref 0–0.2)
PLATELET # BLD AUTO: 258 10*3/MM3 (ref 140–450)
PMV BLD AUTO: 10.1 FL (ref 6–12)
POTASSIUM SERPL-SCNC: 3.5 MMOL/L (ref 3.5–5.2)
PROCALCITONIN SERPL-MCNC: 0.25 NG/ML (ref 0–0.25)
PROT SERPL-MCNC: 6.6 G/DL (ref 6–8.5)
RBC # BLD AUTO: 3.69 10*6/MM3 (ref 4.14–5.8)
RPR SER-TITR: NON REACTIVE {TITER}
SODIUM SERPL-SCNC: 143 MMOL/L (ref 136–145)
WBC # BLD AUTO: 13.44 10*3/MM3 (ref 3.4–10.8)

## 2021-04-28 PROCEDURE — 80053 COMPREHEN METABOLIC PANEL: CPT | Performed by: PHYSICIAN ASSISTANT

## 2021-04-28 PROCEDURE — 92610 EVALUATE SWALLOWING FUNCTION: CPT

## 2021-04-28 PROCEDURE — 25010000002 DAPTOMYCIN PER 1 MG: Performed by: PHYSICIAN ASSISTANT

## 2021-04-28 PROCEDURE — 85025 COMPLETE CBC W/AUTO DIFF WBC: CPT | Performed by: PHYSICIAN ASSISTANT

## 2021-04-28 PROCEDURE — 97110 THERAPEUTIC EXERCISES: CPT

## 2021-04-28 PROCEDURE — 97164 PT RE-EVAL EST PLAN CARE: CPT

## 2021-04-28 PROCEDURE — 84145 PROCALCITONIN (PCT): CPT | Performed by: PHYSICIAN ASSISTANT

## 2021-04-28 PROCEDURE — 63710000001 INSULIN LISPRO (HUMAN) PER 5 UNITS: Performed by: INTERNAL MEDICINE

## 2021-04-28 PROCEDURE — 94660 CPAP INITIATION&MGMT: CPT

## 2021-04-28 PROCEDURE — 94799 UNLISTED PULMONARY SVC/PX: CPT

## 2021-04-28 PROCEDURE — 25010000002 MEROPENEM PER 100 MG: Performed by: PHYSICIAN ASSISTANT

## 2021-04-28 PROCEDURE — 86140 C-REACTIVE PROTEIN: CPT | Performed by: PHYSICIAN ASSISTANT

## 2021-04-28 PROCEDURE — 25010000002 HYDRALAZINE PER 20 MG: Performed by: PHYSICIAN ASSISTANT

## 2021-04-28 PROCEDURE — 99231 SBSQ HOSP IP/OBS SF/LOW 25: CPT | Performed by: PSYCHIATRY & NEUROLOGY

## 2021-04-28 PROCEDURE — 63710000001 INSULIN DETEMIR PER 5 UNITS: Performed by: PHYSICIAN ASSISTANT

## 2021-04-28 PROCEDURE — 25010000002 HEPARIN (PORCINE) PER 1000 UNITS: Performed by: INTERNAL MEDICINE

## 2021-04-28 PROCEDURE — 25010000002 HYDROMORPHONE PER 4 MG: Performed by: PHYSICIAN ASSISTANT

## 2021-04-28 PROCEDURE — 82550 ASSAY OF CK (CPK): CPT | Performed by: PHYSICIAN ASSISTANT

## 2021-04-28 PROCEDURE — 25010000002 MEROPENEM: Performed by: PHYSICIAN ASSISTANT

## 2021-04-28 PROCEDURE — 99291 CRITICAL CARE FIRST HOUR: CPT | Performed by: INTERNAL MEDICINE

## 2021-04-28 PROCEDURE — 25010000002 ACYCLOVIR PER 5 MG: Performed by: PHYSICIAN ASSISTANT

## 2021-04-28 PROCEDURE — 82962 GLUCOSE BLOOD TEST: CPT

## 2021-04-28 PROCEDURE — 63710000001 INSULIN REGULAR HUMAN PER 5 UNITS: Performed by: PHYSICIAN ASSISTANT

## 2021-04-28 PROCEDURE — 83605 ASSAY OF LACTIC ACID: CPT | Performed by: PHYSICIAN ASSISTANT

## 2021-04-28 RX ORDER — HYDROCODONE BITARTRATE AND ACETAMINOPHEN 10; 325 MG/1; MG/1
1 TABLET ORAL EVERY 8 HOURS
Status: DISCONTINUED | OUTPATIENT
Start: 2021-04-28 | End: 2021-05-03

## 2021-04-28 RX ORDER — RISPERIDONE 1 MG/ML
1 SOLUTION ORAL EVERY 12 HOURS SCHEDULED
Status: DISCONTINUED | OUTPATIENT
Start: 2021-04-28 | End: 2021-04-29

## 2021-04-28 RX ORDER — METOPROLOL TARTRATE 50 MG/1
50 TABLET, FILM COATED ORAL EVERY 12 HOURS SCHEDULED
Status: DISCONTINUED | OUTPATIENT
Start: 2021-04-28 | End: 2021-04-28

## 2021-04-28 RX ORDER — HEPARIN SODIUM 5000 [USP'U]/ML
5000 INJECTION, SOLUTION INTRAVENOUS; SUBCUTANEOUS EVERY 8 HOURS SCHEDULED
Status: DISCONTINUED | OUTPATIENT
Start: 2021-04-28 | End: 2021-04-29

## 2021-04-28 RX ORDER — FAMOTIDINE 20 MG/1
20 TABLET, FILM COATED ORAL EVERY 12 HOURS
Status: DISCONTINUED | OUTPATIENT
Start: 2021-04-28 | End: 2021-05-06

## 2021-04-28 RX ORDER — PREGABALIN 75 MG/1
75 CAPSULE ORAL EVERY 12 HOURS SCHEDULED
Status: DISCONTINUED | OUTPATIENT
Start: 2021-04-28 | End: 2021-05-03

## 2021-04-28 RX ORDER — ECHINACEA PURPUREA EXTRACT 125 MG
2 TABLET ORAL AS NEEDED
Status: DISCONTINUED | OUTPATIENT
Start: 2021-04-28 | End: 2021-05-20 | Stop reason: HOSPADM

## 2021-04-28 RX ORDER — METOPROLOL TARTRATE 50 MG/1
50 TABLET, FILM COATED ORAL EVERY 12 HOURS SCHEDULED
Status: DISCONTINUED | OUTPATIENT
Start: 2021-04-28 | End: 2021-05-16

## 2021-04-28 RX ORDER — LISINOPRIL 20 MG/1
20 TABLET ORAL
Status: DISCONTINUED | OUTPATIENT
Start: 2021-04-28 | End: 2021-05-06

## 2021-04-28 RX ORDER — HYDROCODONE BITARTRATE AND ACETAMINOPHEN 10; 325 MG/1; MG/1
1 TABLET ORAL EVERY 8 HOURS
Status: DISCONTINUED | OUTPATIENT
Start: 2021-04-28 | End: 2021-04-28

## 2021-04-28 RX ADMIN — HYDROMORPHONE HYDROCHLORIDE 0.5 MG: 1 INJECTION, SOLUTION INTRAMUSCULAR; INTRAVENOUS; SUBCUTANEOUS at 05:40

## 2021-04-28 RX ADMIN — DEXMEDETOMIDINE HYDROCHLORIDE 1.2 MCG/KG/HR: 4 INJECTION, SOLUTION INTRAVENOUS at 11:22

## 2021-04-28 RX ADMIN — PREGABALIN 75 MG: 75 CAPSULE ORAL at 21:07

## 2021-04-28 RX ADMIN — HYDROMORPHONE HYDROCHLORIDE 0.5 MG: 1 INJECTION, SOLUTION INTRAMUSCULAR; INTRAVENOUS; SUBCUTANEOUS at 22:11

## 2021-04-28 RX ADMIN — DEXMEDETOMIDINE HYDROCHLORIDE 1.2 MCG/KG/HR: 4 INJECTION, SOLUTION INTRAVENOUS at 02:58

## 2021-04-28 RX ADMIN — HYDROCODONE BITARTRATE AND ACETAMINOPHEN 1 TABLET: 10; 325 TABLET ORAL at 21:07

## 2021-04-28 RX ADMIN — HYDRALAZINE HYDROCHLORIDE 10 MG: 20 INJECTION INTRAMUSCULAR; INTRAVENOUS at 00:07

## 2021-04-28 RX ADMIN — HYDROCODONE BITARTRATE AND ACETAMINOPHEN 1 TABLET: 10; 325 TABLET ORAL at 14:48

## 2021-04-28 RX ADMIN — LEVOTHYROXINE SODIUM 75 MCG: 20 INJECTION, SOLUTION INTRAVENOUS at 11:15

## 2021-04-28 RX ADMIN — METOPROLOL TARTRATE 50 MG: 50 TABLET, FILM COATED ORAL at 21:07

## 2021-04-28 RX ADMIN — ACYCLOVIR SODIUM 900 MG: 500 INJECTION, SOLUTION INTRAVENOUS at 11:15

## 2021-04-28 RX ADMIN — INSULIN LISPRO 3 UNITS: 100 INJECTION, SOLUTION INTRAVENOUS; SUBCUTANEOUS at 21:08

## 2021-04-28 RX ADMIN — SODIUM CHLORIDE 100 ML/HR: 9 INJECTION, SOLUTION INTRAVENOUS at 21:12

## 2021-04-28 RX ADMIN — DEXMEDETOMIDINE HYDROCHLORIDE 1.2 MCG/KG/HR: 4 INJECTION, SOLUTION INTRAVENOUS at 05:41

## 2021-04-28 RX ADMIN — MEROPENEM 1 G: 1 INJECTION, POWDER, FOR SOLUTION INTRAVENOUS at 01:52

## 2021-04-28 RX ADMIN — RISPERIDONE 1 MG: 1 SOLUTION ORAL at 14:48

## 2021-04-28 RX ADMIN — RISPERIDONE 1 MG: 1 SOLUTION ORAL at 21:07

## 2021-04-28 RX ADMIN — HEPARIN SODIUM 5000 UNITS: 5000 INJECTION INTRAVENOUS; SUBCUTANEOUS at 14:48

## 2021-04-28 RX ADMIN — FAMOTIDINE 20 MG: 20 TABLET, FILM COATED ORAL at 21:06

## 2021-04-28 RX ADMIN — SODIUM CHLORIDE, PRESERVATIVE FREE 10 ML: 5 INJECTION INTRAVENOUS at 21:09

## 2021-04-28 RX ADMIN — FAMOTIDINE 20 MG: 10 INJECTION, SOLUTION INTRAVENOUS at 08:15

## 2021-04-28 RX ADMIN — HEPARIN SODIUM 5000 UNITS: 5000 INJECTION INTRAVENOUS; SUBCUTANEOUS at 21:07

## 2021-04-28 RX ADMIN — MEROPENEM 1 G: 1 INJECTION, POWDER, FOR SOLUTION INTRAVENOUS at 10:55

## 2021-04-28 RX ADMIN — HYDROMORPHONE HYDROCHLORIDE 0.5 MG: 1 INJECTION, SOLUTION INTRAMUSCULAR; INTRAVENOUS; SUBCUTANEOUS at 00:07

## 2021-04-28 RX ADMIN — MEROPENEM 1 G: 1 INJECTION, POWDER, FOR SOLUTION INTRAVENOUS at 08:15

## 2021-04-28 RX ADMIN — INSULIN DETEMIR 15 UNITS: 100 INJECTION, SOLUTION SUBCUTANEOUS at 21:08

## 2021-04-28 RX ADMIN — ATORVASTATIN CALCIUM 40 MG: 40 TABLET, FILM COATED ORAL at 21:06

## 2021-04-28 RX ADMIN — DEXMEDETOMIDINE HYDROCHLORIDE 1.2 MCG/KG/HR: 4 INJECTION, SOLUTION INTRAVENOUS at 10:54

## 2021-04-28 RX ADMIN — DAPTOMYCIN 350 MG: 500 INJECTION, POWDER, LYOPHILIZED, FOR SOLUTION INTRAVENOUS at 11:15

## 2021-04-28 RX ADMIN — METOPROLOL TARTRATE 50 MG: 50 TABLET, FILM COATED ORAL at 11:15

## 2021-04-28 RX ADMIN — POTASSIUM CHLORIDE 40 MEQ: 1.5 POWDER, FOR SOLUTION ORAL at 11:15

## 2021-04-28 RX ADMIN — AMLODIPINE BESYLATE 5 MG: 5 TABLET ORAL at 08:16

## 2021-04-28 RX ADMIN — PREGABALIN 75 MG: 75 CAPSULE ORAL at 14:47

## 2021-04-28 RX ADMIN — INSULIN HUMAN 3 UNITS: 100 INJECTION, SOLUTION PARENTERAL at 05:40

## 2021-04-28 RX ADMIN — LISINOPRIL 20 MG: 20 TABLET ORAL at 11:15

## 2021-04-28 RX ADMIN — HYDRALAZINE HYDROCHLORIDE 10 MG: 20 INJECTION INTRAMUSCULAR; INTRAVENOUS at 08:15

## 2021-04-28 RX ADMIN — DEXMEDETOMIDINE HYDROCHLORIDE 1.5 MCG/KG/HR: 4 INJECTION, SOLUTION INTRAVENOUS at 19:59

## 2021-04-28 RX ADMIN — MEROPENEM 1 G: 1 INJECTION, POWDER, FOR SOLUTION INTRAVENOUS at 17:13

## 2021-04-28 RX ADMIN — POTASSIUM CHLORIDE 40 MEQ: 1.5 POWDER, FOR SOLUTION ORAL at 08:15

## 2021-04-29 LAB
ALBUMIN SERPL-MCNC: 2.4 G/DL (ref 3.5–5.2)
ALBUMIN/GLOB SERPL: 0.8 G/DL
ALP SERPL-CCNC: 94 U/L (ref 39–117)
ALT SERPL W P-5'-P-CCNC: 11 U/L (ref 1–41)
ANION GAP SERPL CALCULATED.3IONS-SCNC: 12 MMOL/L (ref 5–15)
AST SERPL-CCNC: 15 U/L (ref 1–40)
BACTERIA SPEC AEROBE CULT: NORMAL
BASOPHILS # BLD AUTO: 0.06 10*3/MM3 (ref 0–0.2)
BASOPHILS NFR BLD AUTO: 0.8 % (ref 0–1.5)
BILIRUB SERPL-MCNC: 0.8 MG/DL (ref 0–1.2)
BUN SERPL-MCNC: 15 MG/DL (ref 8–23)
BUN/CREAT SERPL: 13 (ref 7–25)
CALCIUM SPEC-SCNC: 8 MG/DL (ref 8.6–10.5)
CHLORIDE SERPL-SCNC: 109 MMOL/L (ref 98–107)
CO2 SERPL-SCNC: 19 MMOL/L (ref 22–29)
CREAT SERPL-MCNC: 1.15 MG/DL (ref 0.76–1.27)
D-LACTATE SERPL-SCNC: 1.3 MMOL/L (ref 0.5–2)
DEPRECATED RDW RBC AUTO: 47.8 FL (ref 37–54)
EOSINOPHIL # BLD AUTO: 0.55 10*3/MM3 (ref 0–0.4)
EOSINOPHIL NFR BLD AUTO: 7.6 % (ref 0.3–6.2)
ERYTHROCYTE [DISTWIDTH] IN BLOOD BY AUTOMATED COUNT: 13.3 % (ref 12.3–15.4)
GFR SERPL CREATININE-BSD FRML MDRD: 62 ML/MIN/1.73
GLOBULIN UR ELPH-MCNC: 3.2 GM/DL
GLUCOSE BLDC GLUCOMTR-MCNC: 135 MG/DL (ref 70–130)
GLUCOSE BLDC GLUCOMTR-MCNC: 161 MG/DL (ref 70–130)
GLUCOSE BLDC GLUCOMTR-MCNC: 179 MG/DL (ref 70–130)
GLUCOSE BLDC GLUCOMTR-MCNC: 181 MG/DL (ref 70–130)
GLUCOSE SERPL-MCNC: 158 MG/DL (ref 65–99)
HCT VFR BLD AUTO: 34.2 % (ref 37.5–51)
HGB BLD-MCNC: 10.5 G/DL (ref 13–17.7)
IMM GRANULOCYTES # BLD AUTO: 0.04 10*3/MM3 (ref 0–0.05)
IMM GRANULOCYTES NFR BLD AUTO: 0.6 % (ref 0–0.5)
LYMPHOCYTES # BLD AUTO: 0.87 10*3/MM3 (ref 0.7–3.1)
LYMPHOCYTES NFR BLD AUTO: 12.1 % (ref 19.6–45.3)
MAGNESIUM SERPL-MCNC: 1.7 MG/DL (ref 1.6–2.4)
MCH RBC QN AUTO: 30.3 PG (ref 26.6–33)
MCHC RBC AUTO-ENTMCNC: 30.7 G/DL (ref 31.5–35.7)
MCV RBC AUTO: 98.6 FL (ref 79–97)
MONOCYTES # BLD AUTO: 0.48 10*3/MM3 (ref 0.1–0.9)
MONOCYTES NFR BLD AUTO: 6.7 % (ref 5–12)
NEUTROPHILS NFR BLD AUTO: 5.21 10*3/MM3 (ref 1.7–7)
NEUTROPHILS NFR BLD AUTO: 72.2 % (ref 42.7–76)
NRBC BLD AUTO-RTO: 0 /100 WBC (ref 0–0.2)
PHOSPHATE SERPL-MCNC: 2.4 MG/DL (ref 2.5–4.5)
PLATELET # BLD AUTO: 205 10*3/MM3 (ref 140–450)
PMV BLD AUTO: 9.9 FL (ref 6–12)
POTASSIUM SERPL-SCNC: 3.6 MMOL/L (ref 3.5–5.2)
PROCALCITONIN SERPL-MCNC: 0.21 NG/ML (ref 0–0.25)
PROT SERPL-MCNC: 5.6 G/DL (ref 6–8.5)
QT INTERVAL: 446 MS
QTC INTERVAL: 527 MS
RBC # BLD AUTO: 3.47 10*6/MM3 (ref 4.14–5.8)
SODIUM SERPL-SCNC: 140 MMOL/L (ref 136–145)
WBC # BLD AUTO: 7.21 10*3/MM3 (ref 3.4–10.8)

## 2021-04-29 PROCEDURE — 97530 THERAPEUTIC ACTIVITIES: CPT

## 2021-04-29 PROCEDURE — 93005 ELECTROCARDIOGRAM TRACING: CPT | Performed by: INTERNAL MEDICINE

## 2021-04-29 PROCEDURE — 25010000002 ACYCLOVIR PER 5 MG: Performed by: PHYSICIAN ASSISTANT

## 2021-04-29 PROCEDURE — 97166 OT EVAL MOD COMPLEX 45 MIN: CPT

## 2021-04-29 PROCEDURE — 80053 COMPREHEN METABOLIC PANEL: CPT | Performed by: INTERNAL MEDICINE

## 2021-04-29 PROCEDURE — 82962 GLUCOSE BLOOD TEST: CPT

## 2021-04-29 PROCEDURE — 25010000002 HEPARIN (PORCINE) PER 1000 UNITS: Performed by: INTERNAL MEDICINE

## 2021-04-29 PROCEDURE — 25010000002 HYDROMORPHONE PER 4 MG: Performed by: PHYSICIAN ASSISTANT

## 2021-04-29 PROCEDURE — 25010000002 HYDRALAZINE PER 20 MG: Performed by: PHYSICIAN ASSISTANT

## 2021-04-29 PROCEDURE — 93010 ELECTROCARDIOGRAM REPORT: CPT | Performed by: INTERNAL MEDICINE

## 2021-04-29 PROCEDURE — 84100 ASSAY OF PHOSPHORUS: CPT | Performed by: INTERNAL MEDICINE

## 2021-04-29 PROCEDURE — 83735 ASSAY OF MAGNESIUM: CPT | Performed by: INTERNAL MEDICINE

## 2021-04-29 PROCEDURE — 92523 SPEECH SOUND LANG COMPREHEN: CPT

## 2021-04-29 PROCEDURE — 99291 CRITICAL CARE FIRST HOUR: CPT | Performed by: INTERNAL MEDICINE

## 2021-04-29 PROCEDURE — 94799 UNLISTED PULMONARY SVC/PX: CPT

## 2021-04-29 PROCEDURE — 85025 COMPLETE CBC W/AUTO DIFF WBC: CPT | Performed by: INTERNAL MEDICINE

## 2021-04-29 PROCEDURE — 84145 PROCALCITONIN (PCT): CPT | Performed by: INTERNAL MEDICINE

## 2021-04-29 PROCEDURE — 92610 EVALUATE SWALLOWING FUNCTION: CPT

## 2021-04-29 PROCEDURE — 83605 ASSAY OF LACTIC ACID: CPT | Performed by: INTERNAL MEDICINE

## 2021-04-29 PROCEDURE — 25010000002 DAPTOMYCIN PER 1 MG: Performed by: PHYSICIAN ASSISTANT

## 2021-04-29 PROCEDURE — 63710000001 INSULIN DETEMIR PER 5 UNITS: Performed by: PHYSICIAN ASSISTANT

## 2021-04-29 PROCEDURE — 25010000002 MEROPENEM PER 100 MG: Performed by: PHYSICIAN ASSISTANT

## 2021-04-29 PROCEDURE — 63710000001 INSULIN LISPRO (HUMAN) PER 5 UNITS: Performed by: INTERNAL MEDICINE

## 2021-04-29 PROCEDURE — 99232 SBSQ HOSP IP/OBS MODERATE 35: CPT | Performed by: PSYCHIATRY & NEUROLOGY

## 2021-04-29 RX ORDER — RIVASTIGMINE 4.6 MG/24H
1 PATCH, EXTENDED RELEASE TRANSDERMAL DAILY
Status: DISCONTINUED | OUTPATIENT
Start: 2021-04-29 | End: 2021-05-13

## 2021-04-29 RX ORDER — CHOLECALCIFEROL (VITAMIN D3) 125 MCG
5 CAPSULE ORAL NIGHTLY
Status: DISCONTINUED | OUTPATIENT
Start: 2021-04-29 | End: 2021-05-01

## 2021-04-29 RX ORDER — MAGNESIUM SULFATE HEPTAHYDRATE 40 MG/ML
2 INJECTION, SOLUTION INTRAVENOUS AS NEEDED
Status: DISCONTINUED | OUTPATIENT
Start: 2021-04-29 | End: 2021-05-20 | Stop reason: HOSPADM

## 2021-04-29 RX ORDER — AMINO ACIDS/PROTEIN HYDROLYS 15G-100/30
30 LIQUID (ML) ORAL 2 TIMES DAILY
Status: DISCONTINUED | OUTPATIENT
Start: 2021-04-29 | End: 2021-05-05

## 2021-04-29 RX ORDER — RISPERIDONE 1 MG/ML
2 SOLUTION ORAL EVERY 12 HOURS SCHEDULED
Status: DISCONTINUED | OUTPATIENT
Start: 2021-04-29 | End: 2021-05-03

## 2021-04-29 RX ORDER — RISPERIDONE 1 MG/1
1 TABLET ORAL ONCE
Status: COMPLETED | OUTPATIENT
Start: 2021-04-29 | End: 2021-04-29

## 2021-04-29 RX ORDER — CITALOPRAM 20 MG/1
20 TABLET ORAL DAILY
Status: DISCONTINUED | OUTPATIENT
Start: 2021-04-29 | End: 2021-05-01

## 2021-04-29 RX ORDER — MAGNESIUM SULFATE HEPTAHYDRATE 40 MG/ML
4 INJECTION, SOLUTION INTRAVENOUS AS NEEDED
Status: DISCONTINUED | OUTPATIENT
Start: 2021-04-29 | End: 2021-05-20 | Stop reason: HOSPADM

## 2021-04-29 RX ADMIN — DEXMEDETOMIDINE HYDROCHLORIDE 1.5 MCG/KG/HR: 4 INJECTION, SOLUTION INTRAVENOUS at 06:43

## 2021-04-29 RX ADMIN — LEVOTHYROXINE SODIUM 75 MCG: 20 INJECTION, SOLUTION INTRAVENOUS at 11:17

## 2021-04-29 RX ADMIN — DEXMEDETOMIDINE HYDROCHLORIDE 1.5 MCG/KG/HR: 4 INJECTION, SOLUTION INTRAVENOUS at 02:04

## 2021-04-29 RX ADMIN — DAPTOMYCIN 350 MG: 500 INJECTION, POWDER, LYOPHILIZED, FOR SOLUTION INTRAVENOUS at 11:17

## 2021-04-29 RX ADMIN — PREGABALIN 75 MG: 75 CAPSULE ORAL at 22:06

## 2021-04-29 RX ADMIN — DEXMEDETOMIDINE HYDROCHLORIDE 1.5 MCG/KG/HR: 4 INJECTION, SOLUTION INTRAVENOUS at 04:35

## 2021-04-29 RX ADMIN — METOPROLOL TARTRATE 50 MG: 50 TABLET, FILM COATED ORAL at 08:27

## 2021-04-29 RX ADMIN — INSULIN LISPRO 3 UNITS: 100 INJECTION, SOLUTION INTRAVENOUS; SUBCUTANEOUS at 22:07

## 2021-04-29 RX ADMIN — APIXABAN 5 MG: 5 TABLET, FILM COATED ORAL at 22:06

## 2021-04-29 RX ADMIN — ATORVASTATIN CALCIUM 40 MG: 40 TABLET, FILM COATED ORAL at 22:06

## 2021-04-29 RX ADMIN — DEXMEDETOMIDINE 1.5 MCG/KG/HR: 100 INJECTION, SOLUTION, CONCENTRATE INTRAVENOUS at 13:37

## 2021-04-29 RX ADMIN — LISINOPRIL 20 MG: 20 TABLET ORAL at 08:27

## 2021-04-29 RX ADMIN — HYDROCODONE BITARTRATE AND ACETAMINOPHEN 1 TABLET: 10; 325 TABLET ORAL at 13:37

## 2021-04-29 RX ADMIN — RISPERIDONE 1 MG: 1 TABLET ORAL at 13:38

## 2021-04-29 RX ADMIN — MEROPENEM 1 G: 1 INJECTION, POWDER, FOR SOLUTION INTRAVENOUS at 02:04

## 2021-04-29 RX ADMIN — RIVASTIGMINE TRANSDERMAL SYSTEM 1 PATCH: 4.6 PATCH, EXTENDED RELEASE TRANSDERMAL at 14:12

## 2021-04-29 RX ADMIN — INSULIN LISPRO 3 UNITS: 100 INJECTION, SOLUTION INTRAVENOUS; SUBCUTANEOUS at 08:27

## 2021-04-29 RX ADMIN — PREGABALIN 75 MG: 75 CAPSULE ORAL at 08:38

## 2021-04-29 RX ADMIN — DEXMEDETOMIDINE HYDROCHLORIDE 1.5 MCG/KG/HR: 4 INJECTION, SOLUTION INTRAVENOUS at 00:09

## 2021-04-29 RX ADMIN — HYDROMORPHONE HYDROCHLORIDE 0.5 MG: 1 INJECTION, SOLUTION INTRAMUSCULAR; INTRAVENOUS; SUBCUTANEOUS at 04:35

## 2021-04-29 RX ADMIN — DEXMEDETOMIDINE HYDROCHLORIDE 1.5 MCG/KG/HR: 4 INJECTION, SOLUTION INTRAVENOUS at 12:15

## 2021-04-29 RX ADMIN — APIXABAN 5 MG: 5 TABLET, FILM COATED ORAL at 13:37

## 2021-04-29 RX ADMIN — FAMOTIDINE 20 MG: 20 TABLET, FILM COATED ORAL at 08:27

## 2021-04-29 RX ADMIN — METOPROLOL TARTRATE 50 MG: 50 TABLET, FILM COATED ORAL at 22:06

## 2021-04-29 RX ADMIN — MEROPENEM 1 G: 1 INJECTION, POWDER, FOR SOLUTION INTRAVENOUS at 17:35

## 2021-04-29 RX ADMIN — DEXMEDETOMIDINE HYDROCHLORIDE 1.5 MCG/KG/HR: 4 INJECTION, SOLUTION INTRAVENOUS at 10:22

## 2021-04-29 RX ADMIN — ACYCLOVIR SODIUM 900 MG: 500 INJECTION, SOLUTION INTRAVENOUS at 11:17

## 2021-04-29 RX ADMIN — INSULIN LISPRO 3 UNITS: 100 INJECTION, SOLUTION INTRAVENOUS; SUBCUTANEOUS at 18:13

## 2021-04-29 RX ADMIN — AMLODIPINE BESYLATE 5 MG: 5 TABLET ORAL at 08:27

## 2021-04-29 RX ADMIN — MEROPENEM 1 G: 1 INJECTION, POWDER, FOR SOLUTION INTRAVENOUS at 11:17

## 2021-04-29 RX ADMIN — Medication 30 ML: at 22:06

## 2021-04-29 RX ADMIN — SODIUM CHLORIDE, PRESERVATIVE FREE 10 ML: 5 INJECTION INTRAVENOUS at 08:28

## 2021-04-29 RX ADMIN — SODIUM CHLORIDE, PRESERVATIVE FREE 10 ML: 5 INJECTION INTRAVENOUS at 22:07

## 2021-04-29 RX ADMIN — RISPERIDONE 1 MG: 1 SOLUTION ORAL at 08:38

## 2021-04-29 RX ADMIN — RISPERIDONE 2 MG: 1 SOLUTION ORAL at 22:06

## 2021-04-29 RX ADMIN — HYDROCODONE BITARTRATE AND ACETAMINOPHEN 1 TABLET: 10; 325 TABLET ORAL at 22:06

## 2021-04-29 RX ADMIN — HYDRALAZINE HYDROCHLORIDE 10 MG: 20 INJECTION INTRAMUSCULAR; INTRAVENOUS at 05:44

## 2021-04-29 RX ADMIN — Medication 5 MG: at 22:06

## 2021-04-29 RX ADMIN — INSULIN DETEMIR 15 UNITS: 100 INJECTION, SOLUTION SUBCUTANEOUS at 22:07

## 2021-04-29 RX ADMIN — ACYCLOVIR SODIUM 900 MG: 500 INJECTION, SOLUTION INTRAVENOUS at 00:10

## 2021-04-29 RX ADMIN — HEPARIN SODIUM 5000 UNITS: 5000 INJECTION INTRAVENOUS; SUBCUTANEOUS at 05:44

## 2021-04-29 RX ADMIN — FAMOTIDINE 20 MG: 20 TABLET, FILM COATED ORAL at 22:05

## 2021-04-29 RX ADMIN — CITALOPRAM HYDROBROMIDE 20 MG: 20 TABLET ORAL at 13:37

## 2021-04-29 RX ADMIN — HYDROCODONE BITARTRATE AND ACETAMINOPHEN 1 TABLET: 10; 325 TABLET ORAL at 05:45

## 2021-04-29 RX ADMIN — DEXMEDETOMIDINE 1 MCG/KG/HR: 100 INJECTION, SOLUTION, CONCENTRATE INTRAVENOUS at 22:07

## 2021-04-30 LAB
ALBUMIN SERPL-MCNC: 2.3 G/DL (ref 3.5–5.2)
ALP SERPL-CCNC: 88 U/L (ref 39–117)
ALT SERPL W P-5'-P-CCNC: 14 U/L (ref 1–41)
ANION GAP SERPL CALCULATED.3IONS-SCNC: 9 MMOL/L (ref 5–15)
AST SERPL-CCNC: 29 U/L (ref 1–40)
BASOPHILS # BLD AUTO: 0.04 10*3/MM3 (ref 0–0.2)
BASOPHILS NFR BLD AUTO: 0.9 % (ref 0–1.5)
BILIRUB SERPL-MCNC: 0.6 MG/DL (ref 0–1.2)
BUN SERPL-MCNC: 18 MG/DL (ref 8–23)
CALCIUM SPEC-SCNC: 8.2 MG/DL (ref 8.6–10.5)
CHLORIDE SERPL-SCNC: 112 MMOL/L (ref 98–107)
CHOLEST SERPL-MCNC: 78 MG/DL (ref 0–200)
CK SERPL-CCNC: 458 U/L (ref 20–200)
CO2 SERPL-SCNC: 19 MMOL/L (ref 22–29)
CREAT SERPL-MCNC: 1.16 MG/DL (ref 0.76–1.27)
CRP SERPL-MCNC: 5.05 MG/DL (ref 0–0.5)
D-LACTATE SERPL-SCNC: 0.9 MMOL/L (ref 0.5–2)
DEPRECATED RDW RBC AUTO: 47.7 FL (ref 37–54)
EOSINOPHIL # BLD AUTO: 0.44 10*3/MM3 (ref 0–0.4)
EOSINOPHIL NFR BLD AUTO: 9.5 % (ref 0.3–6.2)
ERYTHROCYTE [DISTWIDTH] IN BLOOD BY AUTOMATED COUNT: 13.1 % (ref 12.3–15.4)
GLUCOSE BLDC GLUCOMTR-MCNC: 211 MG/DL (ref 70–130)
GLUCOSE BLDC GLUCOMTR-MCNC: 212 MG/DL (ref 70–130)
GLUCOSE BLDC GLUCOMTR-MCNC: 239 MG/DL (ref 70–130)
GLUCOSE BLDC GLUCOMTR-MCNC: 242 MG/DL (ref 70–130)
GLUCOSE SERPL-MCNC: 162 MG/DL (ref 65–99)
HCT VFR BLD AUTO: 31 % (ref 37.5–51)
HGB BLD-MCNC: 9.5 G/DL (ref 13–17.7)
IMM GRANULOCYTES # BLD AUTO: 0.03 10*3/MM3 (ref 0–0.05)
IMM GRANULOCYTES NFR BLD AUTO: 0.7 % (ref 0–0.5)
LYMPHOCYTES # BLD AUTO: 0.56 10*3/MM3 (ref 0.7–3.1)
LYMPHOCYTES NFR BLD AUTO: 12.1 % (ref 19.6–45.3)
MAGNESIUM SERPL-MCNC: 1.8 MG/DL (ref 1.6–2.4)
MCH RBC QN AUTO: 30.4 PG (ref 26.6–33)
MCHC RBC AUTO-ENTMCNC: 30.6 G/DL (ref 31.5–35.7)
MCV RBC AUTO: 99.4 FL (ref 79–97)
MONOCYTES # BLD AUTO: 0.49 10*3/MM3 (ref 0.1–0.9)
MONOCYTES NFR BLD AUTO: 10.6 % (ref 5–12)
NEUTROPHILS NFR BLD AUTO: 3.05 10*3/MM3 (ref 1.7–7)
NEUTROPHILS NFR BLD AUTO: 66.2 % (ref 42.7–76)
NRBC BLD AUTO-RTO: 0 /100 WBC (ref 0–0.2)
PHOSPHATE SERPL-MCNC: 2.7 MG/DL (ref 2.5–4.5)
PLATELET # BLD AUTO: 178 10*3/MM3 (ref 140–450)
PMV BLD AUTO: 9.9 FL (ref 6–12)
POTASSIUM SERPL-SCNC: 3.7 MMOL/L (ref 3.5–5.2)
PREALB SERPL-MCNC: 6.6 MG/DL (ref 20–40)
PROCALCITONIN SERPL-MCNC: 0.27 NG/ML (ref 0–0.25)
PROT SERPL-MCNC: 5.1 G/DL (ref 6–8.5)
RBC # BLD AUTO: 3.12 10*6/MM3 (ref 4.14–5.8)
SODIUM SERPL-SCNC: 140 MMOL/L (ref 136–145)
TRIGL SERPL-MCNC: 104 MG/DL (ref 0–150)
WBC # BLD AUTO: 4.61 10*3/MM3 (ref 3.4–10.8)

## 2021-04-30 PROCEDURE — 97110 THERAPEUTIC EXERCISES: CPT

## 2021-04-30 PROCEDURE — 82550 ASSAY OF CK (CPK): CPT | Performed by: INTERNAL MEDICINE

## 2021-04-30 PROCEDURE — 92526 ORAL FUNCTION THERAPY: CPT

## 2021-04-30 PROCEDURE — 92507 TX SP LANG VOICE COMM INDIV: CPT

## 2021-04-30 PROCEDURE — 63710000001 INSULIN LISPRO (HUMAN) PER 5 UNITS: Performed by: INTERNAL MEDICINE

## 2021-04-30 PROCEDURE — 84145 PROCALCITONIN (PCT): CPT | Performed by: INTERNAL MEDICINE

## 2021-04-30 PROCEDURE — 25010000002 ACYCLOVIR PER 5 MG: Performed by: PHYSICIAN ASSISTANT

## 2021-04-30 PROCEDURE — 84100 ASSAY OF PHOSPHORUS: CPT

## 2021-04-30 PROCEDURE — 86140 C-REACTIVE PROTEIN: CPT

## 2021-04-30 PROCEDURE — 25010000002 MEROPENEM PER 100 MG: Performed by: INTERNAL MEDICINE

## 2021-04-30 PROCEDURE — 82465 ASSAY BLD/SERUM CHOLESTEROL: CPT

## 2021-04-30 PROCEDURE — 25010000002 CEFTAROLINE FOSAMIL PER 10 MG: Performed by: INTERNAL MEDICINE

## 2021-04-30 PROCEDURE — G0108 DIAB MANAGE TRN  PER INDIV: HCPCS

## 2021-04-30 PROCEDURE — 83735 ASSAY OF MAGNESIUM: CPT

## 2021-04-30 PROCEDURE — 99233 SBSQ HOSP IP/OBS HIGH 50: CPT | Performed by: INTERNAL MEDICINE

## 2021-04-30 PROCEDURE — 63710000001 INSULIN DETEMIR PER 5 UNITS: Performed by: PHYSICIAN ASSISTANT

## 2021-04-30 PROCEDURE — 83605 ASSAY OF LACTIC ACID: CPT | Performed by: INTERNAL MEDICINE

## 2021-04-30 PROCEDURE — 97530 THERAPEUTIC ACTIVITIES: CPT

## 2021-04-30 PROCEDURE — 80053 COMPREHEN METABOLIC PANEL: CPT

## 2021-04-30 PROCEDURE — 25010000003 MAGNESIUM SULFATE 4 GM/100ML SOLUTION: Performed by: INTERNAL MEDICINE

## 2021-04-30 PROCEDURE — 82962 GLUCOSE BLOOD TEST: CPT

## 2021-04-30 PROCEDURE — 25010000002 HYDROMORPHONE PER 4 MG: Performed by: PHYSICIAN ASSISTANT

## 2021-04-30 PROCEDURE — 84478 ASSAY OF TRIGLYCERIDES: CPT

## 2021-04-30 PROCEDURE — 85025 COMPLETE CBC W/AUTO DIFF WBC: CPT | Performed by: INTERNAL MEDICINE

## 2021-04-30 PROCEDURE — 99232 SBSQ HOSP IP/OBS MODERATE 35: CPT | Performed by: PSYCHIATRY & NEUROLOGY

## 2021-04-30 PROCEDURE — 25010000002 MEROPENEM PER 100 MG: Performed by: PHYSICIAN ASSISTANT

## 2021-04-30 PROCEDURE — 94799 UNLISTED PULMONARY SVC/PX: CPT

## 2021-04-30 PROCEDURE — 94660 CPAP INITIATION&MGMT: CPT

## 2021-04-30 PROCEDURE — 84134 ASSAY OF PREALBUMIN: CPT

## 2021-04-30 RX ADMIN — INSULIN LISPRO 3 UNITS: 100 INJECTION, SOLUTION INTRAVENOUS; SUBCUTANEOUS at 08:07

## 2021-04-30 RX ADMIN — METOPROLOL TARTRATE 50 MG: 50 TABLET, FILM COATED ORAL at 21:25

## 2021-04-30 RX ADMIN — DEXMEDETOMIDINE HYDROCHLORIDE 1.2 MCG/KG/HR: 4 INJECTION, SOLUTION INTRAVENOUS at 05:54

## 2021-04-30 RX ADMIN — FAMOTIDINE 20 MG: 20 TABLET, FILM COATED ORAL at 21:25

## 2021-04-30 RX ADMIN — HYDROCODONE BITARTRATE AND ACETAMINOPHEN 1 TABLET: 10; 325 TABLET ORAL at 14:06

## 2021-04-30 RX ADMIN — LISINOPRIL 20 MG: 20 TABLET ORAL at 08:05

## 2021-04-30 RX ADMIN — APIXABAN 5 MG: 5 TABLET, FILM COATED ORAL at 21:25

## 2021-04-30 RX ADMIN — PREGABALIN 75 MG: 75 CAPSULE ORAL at 08:05

## 2021-04-30 RX ADMIN — RIVASTIGMINE TRANSDERMAL SYSTEM 1 PATCH: 4.6 PATCH, EXTENDED RELEASE TRANSDERMAL at 08:10

## 2021-04-30 RX ADMIN — MEROPENEM 1 G: 1 INJECTION, POWDER, FOR SOLUTION INTRAVENOUS at 09:53

## 2021-04-30 RX ADMIN — MEROPENEM 1 G: 1 INJECTION, POWDER, FOR SOLUTION INTRAVENOUS at 02:08

## 2021-04-30 RX ADMIN — APIXABAN 5 MG: 5 TABLET, FILM COATED ORAL at 08:05

## 2021-04-30 RX ADMIN — CEFTAROLINE FOSAMIL 600 MG: 600 POWDER, FOR SOLUTION INTRAVENOUS at 23:55

## 2021-04-30 RX ADMIN — AMLODIPINE BESYLATE 5 MG: 5 TABLET ORAL at 08:05

## 2021-04-30 RX ADMIN — INSULIN LISPRO 5 UNITS: 100 INJECTION, SOLUTION INTRAVENOUS; SUBCUTANEOUS at 21:24

## 2021-04-30 RX ADMIN — Medication 5 MG: at 21:25

## 2021-04-30 RX ADMIN — SODIUM CHLORIDE, PRESERVATIVE FREE 10 ML: 5 INJECTION INTRAVENOUS at 08:10

## 2021-04-30 RX ADMIN — PREGABALIN 75 MG: 75 CAPSULE ORAL at 21:25

## 2021-04-30 RX ADMIN — SODIUM CHLORIDE 100 ML/HR: 9 INJECTION, SOLUTION INTRAVENOUS at 23:12

## 2021-04-30 RX ADMIN — CITALOPRAM HYDROBROMIDE 20 MG: 20 TABLET ORAL at 08:10

## 2021-04-30 RX ADMIN — FAMOTIDINE 20 MG: 20 TABLET, FILM COATED ORAL at 08:05

## 2021-04-30 RX ADMIN — RISPERIDONE 2 MG: 1 SOLUTION ORAL at 08:06

## 2021-04-30 RX ADMIN — HYDROCODONE BITARTRATE AND ACETAMINOPHEN 1 TABLET: 10; 325 TABLET ORAL at 05:54

## 2021-04-30 RX ADMIN — HYDROMORPHONE HYDROCHLORIDE 0.5 MG: 1 INJECTION, SOLUTION INTRAMUSCULAR; INTRAVENOUS; SUBCUTANEOUS at 00:05

## 2021-04-30 RX ADMIN — LEVOTHYROXINE SODIUM 75 MCG: 20 INJECTION, SOLUTION INTRAVENOUS at 12:08

## 2021-04-30 RX ADMIN — INSULIN LISPRO 5 UNITS: 100 INJECTION, SOLUTION INTRAVENOUS; SUBCUTANEOUS at 17:46

## 2021-04-30 RX ADMIN — HYDROCODONE BITARTRATE AND ACETAMINOPHEN 1 TABLET: 10; 325 TABLET ORAL at 21:37

## 2021-04-30 RX ADMIN — ACYCLOVIR SODIUM 900 MG: 500 INJECTION, SOLUTION INTRAVENOUS at 12:09

## 2021-04-30 RX ADMIN — SODIUM CHLORIDE, PRESERVATIVE FREE 10 ML: 5 INJECTION INTRAVENOUS at 21:25

## 2021-04-30 RX ADMIN — METOPROLOL TARTRATE 50 MG: 50 TABLET, FILM COATED ORAL at 08:05

## 2021-04-30 RX ADMIN — RISPERIDONE 2 MG: 1 SOLUTION ORAL at 21:25

## 2021-04-30 RX ADMIN — ACYCLOVIR SODIUM 900 MG: 500 INJECTION, SOLUTION INTRAVENOUS at 00:01

## 2021-04-30 RX ADMIN — MEROPENEM 1 G: 1 INJECTION, POWDER, FOR SOLUTION INTRAVENOUS at 17:46

## 2021-04-30 RX ADMIN — Medication 30 ML: at 08:10

## 2021-04-30 RX ADMIN — HYDROMORPHONE HYDROCHLORIDE 0.5 MG: 1 INJECTION, SOLUTION INTRAMUSCULAR; INTRAVENOUS; SUBCUTANEOUS at 17:05

## 2021-04-30 RX ADMIN — ACYCLOVIR SODIUM 900 MG: 500 INJECTION, SOLUTION INTRAVENOUS at 23:55

## 2021-04-30 RX ADMIN — Medication 30 ML: at 21:37

## 2021-04-30 RX ADMIN — INSULIN LISPRO 5 UNITS: 100 INJECTION, SOLUTION INTRAVENOUS; SUBCUTANEOUS at 12:08

## 2021-04-30 RX ADMIN — MAGNESIUM SULFATE HEPTAHYDRATE 4 G: 40 INJECTION, SOLUTION INTRAVENOUS at 08:07

## 2021-04-30 RX ADMIN — INSULIN DETEMIR 15 UNITS: 100 INJECTION, SOLUTION SUBCUTANEOUS at 21:23

## 2021-04-30 RX ADMIN — CEFTAROLINE FOSAMIL 600 MG: 600 POWDER, FOR SOLUTION INTRAVENOUS at 12:08

## 2021-04-30 RX ADMIN — DEXMEDETOMIDINE HYDROCHLORIDE 1.2 MCG/KG/HR: 4 INJECTION, SOLUTION INTRAVENOUS at 08:20

## 2021-05-01 LAB
ALBUMIN SERPL-MCNC: 2.5 G/DL (ref 3.5–5.2)
ALBUMIN/GLOB SERPL: 0.9 G/DL
ALP SERPL-CCNC: 91 U/L (ref 39–117)
ALT SERPL W P-5'-P-CCNC: 16 U/L (ref 1–41)
ANION GAP SERPL CALCULATED.3IONS-SCNC: 10 MMOL/L (ref 5–15)
AST SERPL-CCNC: 29 U/L (ref 1–40)
BASOPHILS # BLD AUTO: 0.05 10*3/MM3 (ref 0–0.2)
BASOPHILS NFR BLD AUTO: 0.9 % (ref 0–1.5)
BILIRUB SERPL-MCNC: 0.4 MG/DL (ref 0–1.2)
BUN SERPL-MCNC: 26 MG/DL (ref 8–23)
BUN/CREAT SERPL: 19 (ref 7–25)
CALCIUM SPEC-SCNC: 8.7 MG/DL (ref 8.6–10.5)
CHLORIDE SERPL-SCNC: 112 MMOL/L (ref 98–107)
CK SERPL-CCNC: 339 U/L (ref 20–200)
CO2 SERPL-SCNC: 21 MMOL/L (ref 22–29)
CREAT SERPL-MCNC: 1.37 MG/DL (ref 0.76–1.27)
D-LACTATE SERPL-SCNC: 0.9 MMOL/L (ref 0.5–2)
DEPRECATED RDW RBC AUTO: 47.1 FL (ref 37–54)
EOSINOPHIL # BLD AUTO: 0.29 10*3/MM3 (ref 0–0.4)
EOSINOPHIL NFR BLD AUTO: 4.9 % (ref 0.3–6.2)
ERYTHROCYTE [DISTWIDTH] IN BLOOD BY AUTOMATED COUNT: 13.3 % (ref 12.3–15.4)
GFR SERPL CREATININE-BSD FRML MDRD: 51 ML/MIN/1.73
GLOBULIN UR ELPH-MCNC: 2.8 GM/DL
GLUCOSE BLDC GLUCOMTR-MCNC: 203 MG/DL (ref 70–130)
GLUCOSE BLDC GLUCOMTR-MCNC: 203 MG/DL (ref 70–130)
GLUCOSE BLDC GLUCOMTR-MCNC: 222 MG/DL (ref 70–130)
GLUCOSE BLDC GLUCOMTR-MCNC: 244 MG/DL (ref 70–130)
GLUCOSE SERPL-MCNC: 217 MG/DL (ref 65–99)
HCT VFR BLD AUTO: 31 % (ref 37.5–51)
HGB BLD-MCNC: 9.8 G/DL (ref 13–17.7)
IMM GRANULOCYTES # BLD AUTO: 0.07 10*3/MM3 (ref 0–0.05)
IMM GRANULOCYTES NFR BLD AUTO: 1.2 % (ref 0–0.5)
LYMPHOCYTES # BLD AUTO: 0.67 10*3/MM3 (ref 0.7–3.1)
LYMPHOCYTES NFR BLD AUTO: 11.4 % (ref 19.6–45.3)
MCH RBC QN AUTO: 30.4 PG (ref 26.6–33)
MCHC RBC AUTO-ENTMCNC: 31.6 G/DL (ref 31.5–35.7)
MCV RBC AUTO: 96.3 FL (ref 79–97)
MONOCYTES # BLD AUTO: 0.6 10*3/MM3 (ref 0.1–0.9)
MONOCYTES NFR BLD AUTO: 10.2 % (ref 5–12)
NEUTROPHILS NFR BLD AUTO: 4.19 10*3/MM3 (ref 1.7–7)
NEUTROPHILS NFR BLD AUTO: 71.4 % (ref 42.7–76)
NRBC BLD AUTO-RTO: 0 /100 WBC (ref 0–0.2)
PLATELET # BLD AUTO: 165 10*3/MM3 (ref 140–450)
PMV BLD AUTO: 9.8 FL (ref 6–12)
POTASSIUM SERPL-SCNC: 3.8 MMOL/L (ref 3.5–5.2)
PROT SERPL-MCNC: 5.3 G/DL (ref 6–8.5)
RBC # BLD AUTO: 3.22 10*6/MM3 (ref 4.14–5.8)
SODIUM SERPL-SCNC: 143 MMOL/L (ref 136–145)
WBC # BLD AUTO: 5.87 10*3/MM3 (ref 3.4–10.8)

## 2021-05-01 PROCEDURE — 25010000002 MEROPENEM PER 100 MG: Performed by: INTERNAL MEDICINE

## 2021-05-01 PROCEDURE — 82962 GLUCOSE BLOOD TEST: CPT

## 2021-05-01 PROCEDURE — 25010000002 FUROSEMIDE PER 20 MG: Performed by: INTERNAL MEDICINE

## 2021-05-01 PROCEDURE — 25010000002 ACYCLOVIR PER 5 MG: Performed by: PHYSICIAN ASSISTANT

## 2021-05-01 PROCEDURE — 99232 SBSQ HOSP IP/OBS MODERATE 35: CPT | Performed by: PSYCHIATRY & NEUROLOGY

## 2021-05-01 PROCEDURE — 80053 COMPREHEN METABOLIC PANEL: CPT | Performed by: INTERNAL MEDICINE

## 2021-05-01 PROCEDURE — 94799 UNLISTED PULMONARY SVC/PX: CPT

## 2021-05-01 PROCEDURE — 85025 COMPLETE CBC W/AUTO DIFF WBC: CPT | Performed by: INTERNAL MEDICINE

## 2021-05-01 PROCEDURE — 25010000002 CEFTAROLINE FOSAMIL PER 10 MG: Performed by: INTERNAL MEDICINE

## 2021-05-01 PROCEDURE — 63710000001 INSULIN LISPRO (HUMAN) PER 5 UNITS: Performed by: INTERNAL MEDICINE

## 2021-05-01 PROCEDURE — 82550 ASSAY OF CK (CPK): CPT | Performed by: INTERNAL MEDICINE

## 2021-05-01 PROCEDURE — 99233 SBSQ HOSP IP/OBS HIGH 50: CPT | Performed by: INTERNAL MEDICINE

## 2021-05-01 PROCEDURE — 63710000001 INSULIN DETEMIR PER 5 UNITS: Performed by: PHYSICIAN ASSISTANT

## 2021-05-01 PROCEDURE — 83605 ASSAY OF LACTIC ACID: CPT | Performed by: INTERNAL MEDICINE

## 2021-05-01 RX ORDER — CHOLECALCIFEROL (VITAMIN D3) 125 MCG
5 CAPSULE ORAL NIGHTLY
Status: DISCONTINUED | OUTPATIENT
Start: 2021-05-01 | End: 2021-05-03

## 2021-05-01 RX ORDER — FUROSEMIDE 10 MG/ML
40 INJECTION INTRAMUSCULAR; INTRAVENOUS ONCE
Status: COMPLETED | OUTPATIENT
Start: 2021-05-01 | End: 2021-05-01

## 2021-05-01 RX ORDER — CITALOPRAM 20 MG/1
20 TABLET ORAL DAILY
Status: DISCONTINUED | OUTPATIENT
Start: 2021-05-02 | End: 2021-05-13

## 2021-05-01 RX ORDER — ATORVASTATIN CALCIUM 40 MG/1
40 TABLET, FILM COATED ORAL NIGHTLY
Status: DISCONTINUED | OUTPATIENT
Start: 2021-05-01 | End: 2021-05-16

## 2021-05-01 RX ADMIN — SALINE NASAL SPRAY 2 SPRAY: 1.5 SOLUTION NASAL at 10:15

## 2021-05-01 RX ADMIN — INSULIN HUMAN 5 UNITS: 100 INJECTION, SOLUTION PARENTERAL at 23:52

## 2021-05-01 RX ADMIN — RISPERIDONE 2 MG: 1 SOLUTION ORAL at 20:37

## 2021-05-01 RX ADMIN — Medication 30 ML: at 10:21

## 2021-05-01 RX ADMIN — FAMOTIDINE 20 MG: 20 TABLET, FILM COATED ORAL at 20:38

## 2021-05-01 RX ADMIN — PREGABALIN 75 MG: 75 CAPSULE ORAL at 10:17

## 2021-05-01 RX ADMIN — FUROSEMIDE 40 MG: 10 INJECTION, SOLUTION INTRAVENOUS at 16:13

## 2021-05-01 RX ADMIN — CEFTAROLINE FOSAMIL 600 MG: 600 POWDER, FOR SOLUTION INTRAVENOUS at 23:52

## 2021-05-01 RX ADMIN — APIXABAN 5 MG: 5 TABLET, FILM COATED ORAL at 20:38

## 2021-05-01 RX ADMIN — INSULIN LISPRO 5 UNITS: 100 INJECTION, SOLUTION INTRAVENOUS; SUBCUTANEOUS at 18:58

## 2021-05-01 RX ADMIN — FAMOTIDINE 20 MG: 20 TABLET, FILM COATED ORAL at 10:16

## 2021-05-01 RX ADMIN — MEROPENEM 1 G: 1 INJECTION, POWDER, FOR SOLUTION INTRAVENOUS at 18:58

## 2021-05-01 RX ADMIN — ATORVASTATIN CALCIUM 40 MG: 40 TABLET, FILM COATED ORAL at 20:38

## 2021-05-01 RX ADMIN — LISINOPRIL 20 MG: 20 TABLET ORAL at 10:17

## 2021-05-01 RX ADMIN — SODIUM CHLORIDE, PRESERVATIVE FREE 10 ML: 5 INJECTION INTRAVENOUS at 10:20

## 2021-05-01 RX ADMIN — ACYCLOVIR SODIUM 900 MG: 500 INJECTION, SOLUTION INTRAVENOUS at 23:52

## 2021-05-01 RX ADMIN — METOPROLOL TARTRATE 50 MG: 50 TABLET, FILM COATED ORAL at 10:15

## 2021-05-01 RX ADMIN — METOPROLOL TARTRATE 50 MG: 50 TABLET, FILM COATED ORAL at 20:38

## 2021-05-01 RX ADMIN — CEFTAROLINE FOSAMIL 600 MG: 600 POWDER, FOR SOLUTION INTRAVENOUS at 12:40

## 2021-05-01 RX ADMIN — CITALOPRAM HYDROBROMIDE 20 MG: 20 TABLET ORAL at 10:19

## 2021-05-01 RX ADMIN — LEVOTHYROXINE SODIUM 75 MCG: 20 INJECTION, SOLUTION INTRAVENOUS at 10:24

## 2021-05-01 RX ADMIN — HYDROCODONE BITARTRATE AND ACETAMINOPHEN 1 TABLET: 10; 325 TABLET ORAL at 05:55

## 2021-05-01 RX ADMIN — SODIUM CHLORIDE, PRESERVATIVE FREE 10 ML: 5 INJECTION INTRAVENOUS at 20:37

## 2021-05-01 RX ADMIN — HYDROCODONE BITARTRATE AND ACETAMINOPHEN 1 TABLET: 10; 325 TABLET ORAL at 21:58

## 2021-05-01 RX ADMIN — INSULIN DETEMIR 15 UNITS: 100 INJECTION, SOLUTION SUBCUTANEOUS at 20:38

## 2021-05-01 RX ADMIN — RIVASTIGMINE TRANSDERMAL SYSTEM 1 PATCH: 4.6 PATCH, EXTENDED RELEASE TRANSDERMAL at 10:19

## 2021-05-01 RX ADMIN — Medication 30 ML: at 20:43

## 2021-05-01 RX ADMIN — SODIUM CHLORIDE, PRESERVATIVE FREE 20 ML: 5 INJECTION INTRAVENOUS at 10:20

## 2021-05-01 RX ADMIN — MEROPENEM 1 G: 1 INJECTION, POWDER, FOR SOLUTION INTRAVENOUS at 10:15

## 2021-05-01 RX ADMIN — INSULIN LISPRO 5 UNITS: 100 INJECTION, SOLUTION INTRAVENOUS; SUBCUTANEOUS at 10:17

## 2021-05-01 RX ADMIN — APIXABAN 5 MG: 5 TABLET, FILM COATED ORAL at 10:19

## 2021-05-01 RX ADMIN — HYDROCODONE BITARTRATE AND ACETAMINOPHEN 1 TABLET: 10; 325 TABLET ORAL at 16:14

## 2021-05-01 RX ADMIN — PREGABALIN 75 MG: 75 CAPSULE ORAL at 20:38

## 2021-05-01 RX ADMIN — MEROPENEM 1 G: 1 INJECTION, POWDER, FOR SOLUTION INTRAVENOUS at 01:57

## 2021-05-01 RX ADMIN — AMLODIPINE BESYLATE 5 MG: 5 TABLET ORAL at 10:16

## 2021-05-01 RX ADMIN — ACYCLOVIR SODIUM 900 MG: 500 INJECTION, SOLUTION INTRAVENOUS at 12:40

## 2021-05-01 RX ADMIN — INSULIN LISPRO 5 UNITS: 100 INJECTION, SOLUTION INTRAVENOUS; SUBCUTANEOUS at 12:41

## 2021-05-01 RX ADMIN — RISPERIDONE 2 MG: 1 SOLUTION ORAL at 10:16

## 2021-05-01 RX ADMIN — Medication 5 MG: at 20:38

## 2021-05-02 LAB
ANION GAP SERPL CALCULATED.3IONS-SCNC: 7 MMOL/L (ref 5–15)
BACTERIA UR QL AUTO: ABNORMAL /HPF
BILIRUB UR QL STRIP: NEGATIVE
BUN SERPL-MCNC: 30 MG/DL (ref 8–23)
BUN/CREAT SERPL: 19.7 (ref 7–25)
CALCIUM SPEC-SCNC: 8.6 MG/DL (ref 8.6–10.5)
CHLORIDE SERPL-SCNC: 113 MMOL/L (ref 98–107)
CLARITY UR: CLEAR
CO2 SERPL-SCNC: 23 MMOL/L (ref 22–29)
COLOR UR: YELLOW
CREAT SERPL-MCNC: 1.52 MG/DL (ref 0.76–1.27)
DEPRECATED RDW RBC AUTO: 48.8 FL (ref 37–54)
EOSINOPHIL SPEC QL MICRO: 2 % EOS/100 CELLS (ref 0–0)
ERYTHROCYTE [DISTWIDTH] IN BLOOD BY AUTOMATED COUNT: 13.6 % (ref 12.3–15.4)
GFR SERPL CREATININE-BSD FRML MDRD: 45 ML/MIN/1.73
GLUCOSE BLDC GLUCOMTR-MCNC: 177 MG/DL (ref 70–130)
GLUCOSE BLDC GLUCOMTR-MCNC: 204 MG/DL (ref 70–130)
GLUCOSE BLDC GLUCOMTR-MCNC: 234 MG/DL (ref 70–130)
GLUCOSE BLDC GLUCOMTR-MCNC: 276 MG/DL (ref 70–130)
GLUCOSE SERPL-MCNC: 194 MG/DL (ref 65–99)
GLUCOSE UR STRIP-MCNC: NEGATIVE MG/DL
HCT VFR BLD AUTO: 30.1 % (ref 37.5–51)
HGB BLD-MCNC: 9.5 G/DL (ref 13–17.7)
HGB UR QL STRIP.AUTO: ABNORMAL
HYALINE CASTS UR QL AUTO: ABNORMAL /LPF
KETONES UR QL STRIP: NEGATIVE
LEUKOCYTE ESTERASE UR QL STRIP.AUTO: NEGATIVE
MCH RBC QN AUTO: 30.7 PG (ref 26.6–33)
MCHC RBC AUTO-ENTMCNC: 31.6 G/DL (ref 31.5–35.7)
MCV RBC AUTO: 97.4 FL (ref 79–97)
NITRITE UR QL STRIP: NEGATIVE
PH UR STRIP.AUTO: 5.5 [PH] (ref 5–8)
PLATELET # BLD AUTO: 178 10*3/MM3 (ref 140–450)
PMV BLD AUTO: 10.2 FL (ref 6–12)
POTASSIUM SERPL-SCNC: 3.6 MMOL/L (ref 3.5–5.2)
PROT UR QL STRIP: ABNORMAL
PROT UR-MCNC: 52.3 MG/DL
RBC # BLD AUTO: 3.09 10*6/MM3 (ref 4.14–5.8)
RBC # UR: ABNORMAL /HPF
REF LAB TEST METHOD: ABNORMAL
SODIUM SERPL-SCNC: 143 MMOL/L (ref 136–145)
SODIUM UR-SCNC: 107 MMOL/L
SP GR UR STRIP: 1.02 (ref 1–1.03)
SQUAMOUS #/AREA URNS HPF: ABNORMAL /HPF
UROBILINOGEN UR QL STRIP: ABNORMAL
WBC # BLD AUTO: 6.83 10*3/MM3 (ref 3.4–10.8)
WBC UR QL AUTO: ABNORMAL /HPF
YEAST URNS QL MICRO: ABNORMAL /HPF

## 2021-05-02 PROCEDURE — 85027 COMPLETE CBC AUTOMATED: CPT | Performed by: INTERNAL MEDICINE

## 2021-05-02 PROCEDURE — 81001 URINALYSIS AUTO W/SCOPE: CPT | Performed by: INTERNAL MEDICINE

## 2021-05-02 PROCEDURE — 80048 BASIC METABOLIC PNL TOTAL CA: CPT | Performed by: INTERNAL MEDICINE

## 2021-05-02 PROCEDURE — 87205 SMEAR GRAM STAIN: CPT | Performed by: INTERNAL MEDICINE

## 2021-05-02 PROCEDURE — 94799 UNLISTED PULMONARY SVC/PX: CPT

## 2021-05-02 PROCEDURE — 82570 ASSAY OF URINE CREATININE: CPT | Performed by: INTERNAL MEDICINE

## 2021-05-02 PROCEDURE — 84540 ASSAY OF URINE/UREA-N: CPT | Performed by: INTERNAL MEDICINE

## 2021-05-02 PROCEDURE — 99233 SBSQ HOSP IP/OBS HIGH 50: CPT | Performed by: INTERNAL MEDICINE

## 2021-05-02 PROCEDURE — 25010000002 MEROPENEM PER 100 MG: Performed by: INTERNAL MEDICINE

## 2021-05-02 PROCEDURE — 94660 CPAP INITIATION&MGMT: CPT

## 2021-05-02 PROCEDURE — 84300 ASSAY OF URINE SODIUM: CPT | Performed by: INTERNAL MEDICINE

## 2021-05-02 PROCEDURE — 25010000002 CEFTAROLINE FOSAMIL PER 10 MG: Performed by: INTERNAL MEDICINE

## 2021-05-02 PROCEDURE — 82962 GLUCOSE BLOOD TEST: CPT

## 2021-05-02 PROCEDURE — 99231 SBSQ HOSP IP/OBS SF/LOW 25: CPT | Performed by: PSYCHIATRY & NEUROLOGY

## 2021-05-02 PROCEDURE — 63710000001 INSULIN DETEMIR PER 5 UNITS: Performed by: PHYSICIAN ASSISTANT

## 2021-05-02 PROCEDURE — 63710000001 INSULIN REGULAR HUMAN PER 5 UNITS: Performed by: INTERNAL MEDICINE

## 2021-05-02 PROCEDURE — 84156 ASSAY OF PROTEIN URINE: CPT | Performed by: INTERNAL MEDICINE

## 2021-05-02 PROCEDURE — 25010000002 ACYCLOVIR PER 5 MG: Performed by: PHYSICIAN ASSISTANT

## 2021-05-02 RX ORDER — ACETAMINOPHEN 325 MG/1
650 TABLET ORAL EVERY 6 HOURS PRN
Status: DISCONTINUED | OUTPATIENT
Start: 2021-05-02 | End: 2021-05-20 | Stop reason: HOSPADM

## 2021-05-02 RX ADMIN — PREGABALIN 75 MG: 75 CAPSULE ORAL at 10:15

## 2021-05-02 RX ADMIN — INSULIN HUMAN 5 UNITS: 100 INJECTION, SOLUTION PARENTERAL at 12:21

## 2021-05-02 RX ADMIN — FAMOTIDINE 20 MG: 20 TABLET, FILM COATED ORAL at 20:16

## 2021-05-02 RX ADMIN — SODIUM CHLORIDE, PRESERVATIVE FREE 10 ML: 5 INJECTION INTRAVENOUS at 10:15

## 2021-05-02 RX ADMIN — LEVOTHYROXINE SODIUM 75 MCG: 20 INJECTION, SOLUTION INTRAVENOUS at 12:21

## 2021-05-02 RX ADMIN — INSULIN HUMAN 8 UNITS: 100 INJECTION, SOLUTION PARENTERAL at 17:36

## 2021-05-02 RX ADMIN — AMLODIPINE BESYLATE 5 MG: 5 TABLET ORAL at 10:15

## 2021-05-02 RX ADMIN — Medication 5 MG: at 20:16

## 2021-05-02 RX ADMIN — MEROPENEM 1 G: 1 INJECTION, POWDER, FOR SOLUTION INTRAVENOUS at 17:37

## 2021-05-02 RX ADMIN — ACYCLOVIR SODIUM 900 MG: 500 INJECTION, SOLUTION INTRAVENOUS at 12:20

## 2021-05-02 RX ADMIN — MEROPENEM 1 G: 1 INJECTION, POWDER, FOR SOLUTION INTRAVENOUS at 10:13

## 2021-05-02 RX ADMIN — ATORVASTATIN CALCIUM 40 MG: 40 TABLET, FILM COATED ORAL at 20:16

## 2021-05-02 RX ADMIN — MEROPENEM 1 G: 1 INJECTION, POWDER, FOR SOLUTION INTRAVENOUS at 01:50

## 2021-05-02 RX ADMIN — PREGABALIN 75 MG: 75 CAPSULE ORAL at 20:16

## 2021-05-02 RX ADMIN — HYDROCODONE BITARTRATE AND ACETAMINOPHEN 1 TABLET: 10; 325 TABLET ORAL at 10:13

## 2021-05-02 RX ADMIN — ACETAMINOPHEN 650 MG: 325 TABLET, FILM COATED ORAL at 17:36

## 2021-05-02 RX ADMIN — FAMOTIDINE 20 MG: 20 TABLET, FILM COATED ORAL at 10:14

## 2021-05-02 RX ADMIN — HYDROCODONE BITARTRATE AND ACETAMINOPHEN 1 TABLET: 10; 325 TABLET ORAL at 17:36

## 2021-05-02 RX ADMIN — LISINOPRIL 20 MG: 20 TABLET ORAL at 10:14

## 2021-05-02 RX ADMIN — APIXABAN 5 MG: 5 TABLET, FILM COATED ORAL at 20:16

## 2021-05-02 RX ADMIN — CITALOPRAM HYDROBROMIDE 20 MG: 20 TABLET ORAL at 10:15

## 2021-05-02 RX ADMIN — RISPERIDONE 2 MG: 1 SOLUTION ORAL at 20:15

## 2021-05-02 RX ADMIN — APIXABAN 5 MG: 5 TABLET, FILM COATED ORAL at 10:15

## 2021-05-02 RX ADMIN — METOPROLOL TARTRATE 50 MG: 50 TABLET, FILM COATED ORAL at 10:15

## 2021-05-02 RX ADMIN — INSULIN DETEMIR 15 UNITS: 100 INJECTION, SOLUTION SUBCUTANEOUS at 20:14

## 2021-05-02 RX ADMIN — INSULIN HUMAN 5 UNITS: 100 INJECTION, SOLUTION PARENTERAL at 06:44

## 2021-05-02 RX ADMIN — RIVASTIGMINE TRANSDERMAL SYSTEM 1 PATCH: 4.6 PATCH, EXTENDED RELEASE TRANSDERMAL at 10:16

## 2021-05-02 RX ADMIN — METOPROLOL TARTRATE 50 MG: 50 TABLET, FILM COATED ORAL at 20:16

## 2021-05-02 RX ADMIN — CEFTAROLINE FOSAMIL 600 MG: 600 POWDER, FOR SOLUTION INTRAVENOUS at 12:20

## 2021-05-02 RX ADMIN — SODIUM CHLORIDE, PRESERVATIVE FREE 10 ML: 5 INJECTION INTRAVENOUS at 21:23

## 2021-05-02 RX ADMIN — INSULIN HUMAN 5 UNITS: 100 INJECTION, SOLUTION PARENTERAL at 12:20

## 2021-05-02 RX ADMIN — RISPERIDONE 2 MG: 1 SOLUTION ORAL at 10:14

## 2021-05-02 RX ADMIN — Medication 30 ML: at 10:17

## 2021-05-03 LAB
ALBUMIN SERPL-MCNC: 2.4 G/DL (ref 3.5–5.2)
ALBUMIN SERPL-MCNC: 2.4 G/DL (ref 3.5–5.2)
ALBUMIN/GLOB SERPL: 0.9 G/DL
ALP SERPL-CCNC: 89 U/L (ref 39–117)
ALP SERPL-CCNC: 89 U/L (ref 39–117)
ALT SERPL W P-5'-P-CCNC: 16 U/L (ref 1–41)
ALT SERPL W P-5'-P-CCNC: 16 U/L (ref 1–41)
ANION GAP SERPL CALCULATED.3IONS-SCNC: 7 MMOL/L (ref 5–15)
ANION GAP SERPL CALCULATED.3IONS-SCNC: 7 MMOL/L (ref 5–15)
AST SERPL-CCNC: 17 U/L (ref 1–40)
AST SERPL-CCNC: 17 U/L (ref 1–40)
BASOPHILS # BLD AUTO: 0.05 10*3/MM3 (ref 0–0.2)
BASOPHILS NFR BLD AUTO: 0.6 % (ref 0–1.5)
BILIRUB SERPL-MCNC: 0.4 MG/DL (ref 0–1.2)
BILIRUB SERPL-MCNC: 0.4 MG/DL (ref 0–1.2)
BUN SERPL-MCNC: 38 MG/DL (ref 8–23)
BUN SERPL-MCNC: 38 MG/DL (ref 8–23)
BUN/CREAT SERPL: 23.2 (ref 7–25)
CALCIUM SPEC-SCNC: 9.1 MG/DL (ref 8.6–10.5)
CALCIUM SPEC-SCNC: 9.1 MG/DL (ref 8.6–10.5)
CHLORIDE SERPL-SCNC: 113 MMOL/L (ref 98–107)
CHLORIDE SERPL-SCNC: 113 MMOL/L (ref 98–107)
CHOLEST SERPL-MCNC: 82 MG/DL (ref 0–200)
CK SERPL-CCNC: 44 U/L (ref 20–200)
CO2 SERPL-SCNC: 24 MMOL/L (ref 22–29)
CO2 SERPL-SCNC: 24 MMOL/L (ref 22–29)
CREAT SERPL-MCNC: 1.64 MG/DL (ref 0.76–1.27)
CREAT SERPL-MCNC: 1.64 MG/DL (ref 0.76–1.27)
CREAT UR-MCNC: 75.3 MG/DL
CRP SERPL-MCNC: 3.79 MG/DL (ref 0–0.5)
D-LACTATE SERPL-SCNC: 0.8 MMOL/L (ref 0.5–2)
DEPRECATED RDW RBC AUTO: 49.1 FL (ref 37–54)
EOSINOPHIL # BLD AUTO: 0.45 10*3/MM3 (ref 0–0.4)
EOSINOPHIL NFR BLD AUTO: 5.3 % (ref 0.3–6.2)
ERYTHROCYTE [DISTWIDTH] IN BLOOD BY AUTOMATED COUNT: 13.6 % (ref 12.3–15.4)
GFR SERPL CREATININE-BSD FRML MDRD: 41 ML/MIN/1.73
GLOBULIN UR ELPH-MCNC: 2.7 GM/DL
GLUCOSE BLDC GLUCOMTR-MCNC: 165 MG/DL (ref 70–130)
GLUCOSE BLDC GLUCOMTR-MCNC: 176 MG/DL (ref 70–130)
GLUCOSE BLDC GLUCOMTR-MCNC: 227 MG/DL (ref 70–130)
GLUCOSE BLDC GLUCOMTR-MCNC: 239 MG/DL (ref 70–130)
GLUCOSE SERPL-MCNC: 162 MG/DL (ref 65–99)
GLUCOSE SERPL-MCNC: 162 MG/DL (ref 65–99)
HCT VFR BLD AUTO: 30.2 % (ref 37.5–51)
HGB BLD-MCNC: 9.3 G/DL (ref 13–17.7)
IMM GRANULOCYTES # BLD AUTO: 0.15 10*3/MM3 (ref 0–0.05)
IMM GRANULOCYTES NFR BLD AUTO: 1.8 % (ref 0–0.5)
LYMPHOCYTES # BLD AUTO: 0.88 10*3/MM3 (ref 0.7–3.1)
LYMPHOCYTES NFR BLD AUTO: 10.4 % (ref 19.6–45.3)
MAGNESIUM SERPL-MCNC: 2.3 MG/DL (ref 1.6–2.4)
MCH RBC QN AUTO: 30.6 PG (ref 26.6–33)
MCHC RBC AUTO-ENTMCNC: 30.8 G/DL (ref 31.5–35.7)
MCV RBC AUTO: 99.3 FL (ref 79–97)
MONOCYTES # BLD AUTO: 0.8 10*3/MM3 (ref 0.1–0.9)
MONOCYTES NFR BLD AUTO: 9.5 % (ref 5–12)
NEUTROPHILS NFR BLD AUTO: 6.1 10*3/MM3 (ref 1.7–7)
NEUTROPHILS NFR BLD AUTO: 72.4 % (ref 42.7–76)
NRBC BLD AUTO-RTO: 0 /100 WBC (ref 0–0.2)
PHOSPHATE SERPL-MCNC: 2.7 MG/DL (ref 2.5–4.5)
PLATELET # BLD AUTO: 178 10*3/MM3 (ref 140–450)
PMV BLD AUTO: 10.5 FL (ref 6–12)
POTASSIUM SERPL-SCNC: 3.4 MMOL/L (ref 3.5–5.2)
POTASSIUM SERPL-SCNC: 3.4 MMOL/L (ref 3.5–5.2)
POTASSIUM SERPL-SCNC: 4.1 MMOL/L (ref 3.5–5.2)
PREALB SERPL-MCNC: 10.2 MG/DL (ref 20–40)
PROCALCITONIN SERPL-MCNC: 0.21 NG/ML (ref 0–0.25)
PROT SERPL-MCNC: 5.1 G/DL (ref 6–8.5)
PROT SERPL-MCNC: 5.1 G/DL (ref 6–8.5)
RBC # BLD AUTO: 3.04 10*6/MM3 (ref 4.14–5.8)
SODIUM SERPL-SCNC: 144 MMOL/L (ref 136–145)
SODIUM SERPL-SCNC: 144 MMOL/L (ref 136–145)
TRIGL SERPL-MCNC: 104 MG/DL (ref 0–150)
UUN 24H UR-MCNC: 528 MG/DL
WBC # BLD AUTO: 8.43 10*3/MM3 (ref 3.4–10.8)

## 2021-05-03 PROCEDURE — 25010000002 CEFTAROLINE FOSAMIL PER 10 MG: Performed by: INTERNAL MEDICINE

## 2021-05-03 PROCEDURE — 84145 PROCALCITONIN (PCT): CPT | Performed by: INTERNAL MEDICINE

## 2021-05-03 PROCEDURE — 25010000002 MEROPENEM PER 100 MG: Performed by: INTERNAL MEDICINE

## 2021-05-03 PROCEDURE — 82962 GLUCOSE BLOOD TEST: CPT

## 2021-05-03 PROCEDURE — 85025 COMPLETE CBC W/AUTO DIFF WBC: CPT | Performed by: INTERNAL MEDICINE

## 2021-05-03 PROCEDURE — 94799 UNLISTED PULMONARY SVC/PX: CPT

## 2021-05-03 PROCEDURE — 63710000001 INSULIN REGULAR HUMAN PER 5 UNITS: Performed by: INTERNAL MEDICINE

## 2021-05-03 PROCEDURE — 25010000002 ACYCLOVIR PER 5 MG: Performed by: INTERNAL MEDICINE

## 2021-05-03 PROCEDURE — 63710000001 INSULIN DETEMIR PER 5 UNITS: Performed by: INTERNAL MEDICINE

## 2021-05-03 PROCEDURE — 97110 THERAPEUTIC EXERCISES: CPT

## 2021-05-03 PROCEDURE — 83605 ASSAY OF LACTIC ACID: CPT | Performed by: INTERNAL MEDICINE

## 2021-05-03 PROCEDURE — 82550 ASSAY OF CK (CPK): CPT | Performed by: INTERNAL MEDICINE

## 2021-05-03 PROCEDURE — 87040 BLOOD CULTURE FOR BACTERIA: CPT | Performed by: INTERNAL MEDICINE

## 2021-05-03 PROCEDURE — 94660 CPAP INITIATION&MGMT: CPT

## 2021-05-03 PROCEDURE — 84478 ASSAY OF TRIGLYCERIDES: CPT

## 2021-05-03 PROCEDURE — 92526 ORAL FUNCTION THERAPY: CPT

## 2021-05-03 PROCEDURE — 80053 COMPREHEN METABOLIC PANEL: CPT | Performed by: INTERNAL MEDICINE

## 2021-05-03 PROCEDURE — 80053 COMPREHEN METABOLIC PANEL: CPT

## 2021-05-03 PROCEDURE — 86140 C-REACTIVE PROTEIN: CPT

## 2021-05-03 PROCEDURE — 84100 ASSAY OF PHOSPHORUS: CPT

## 2021-05-03 PROCEDURE — 99232 SBSQ HOSP IP/OBS MODERATE 35: CPT | Performed by: PSYCHIATRY & NEUROLOGY

## 2021-05-03 PROCEDURE — 99291 CRITICAL CARE FIRST HOUR: CPT | Performed by: INTERNAL MEDICINE

## 2021-05-03 PROCEDURE — 82465 ASSAY BLD/SERUM CHOLESTEROL: CPT

## 2021-05-03 PROCEDURE — 92507 TX SP LANG VOICE COMM INDIV: CPT

## 2021-05-03 PROCEDURE — 84134 ASSAY OF PREALBUMIN: CPT

## 2021-05-03 PROCEDURE — 25010000002 ACYCLOVIR PER 5 MG: Performed by: PHYSICIAN ASSISTANT

## 2021-05-03 PROCEDURE — 83735 ASSAY OF MAGNESIUM: CPT

## 2021-05-03 PROCEDURE — 84132 ASSAY OF SERUM POTASSIUM: CPT | Performed by: INTERNAL MEDICINE

## 2021-05-03 RX ORDER — RISPERIDONE 1 MG/ML
1 SOLUTION ORAL NIGHTLY
Status: DISCONTINUED | OUTPATIENT
Start: 2021-05-03 | End: 2021-05-13

## 2021-05-03 RX ORDER — RISPERIDONE 1 MG/ML
1 SOLUTION ORAL EVERY 12 HOURS SCHEDULED
Status: DISCONTINUED | OUTPATIENT
Start: 2021-05-03 | End: 2021-05-03

## 2021-05-03 RX ORDER — CHOLECALCIFEROL (VITAMIN D3) 125 MCG
5 CAPSULE ORAL NIGHTLY PRN
Status: DISCONTINUED | OUTPATIENT
Start: 2021-05-03 | End: 2021-05-08

## 2021-05-03 RX ORDER — RISPERIDONE 1 MG/ML
0.5 SOLUTION ORAL DAILY PRN
Status: DISCONTINUED | OUTPATIENT
Start: 2021-05-03 | End: 2021-05-18

## 2021-05-03 RX ORDER — HYDROCODONE BITARTRATE AND ACETAMINOPHEN 7.5; 325 MG/1; MG/1
1 TABLET ORAL EVERY 8 HOURS
Status: DISCONTINUED | OUTPATIENT
Start: 2021-05-03 | End: 2021-05-04

## 2021-05-03 RX ORDER — PREGABALIN 50 MG/1
50 CAPSULE ORAL EVERY 12 HOURS SCHEDULED
Status: DISCONTINUED | OUTPATIENT
Start: 2021-05-03 | End: 2021-05-09

## 2021-05-03 RX ORDER — GUAR GUM
1 PACKET (EA) ORAL 3 TIMES DAILY
Status: DISCONTINUED | OUTPATIENT
Start: 2021-05-03 | End: 2021-05-05

## 2021-05-03 RX ADMIN — FAMOTIDINE 20 MG: 20 TABLET, FILM COATED ORAL at 20:23

## 2021-05-03 RX ADMIN — Medication 1 PACKET: at 17:11

## 2021-05-03 RX ADMIN — POTASSIUM CHLORIDE 40 MEQ: 1.5 POWDER, FOR SOLUTION ORAL at 17:11

## 2021-05-03 RX ADMIN — INSULIN HUMAN 8 UNITS: 100 INJECTION, SOLUTION PARENTERAL at 06:57

## 2021-05-03 RX ADMIN — Medication 30 ML: at 20:25

## 2021-05-03 RX ADMIN — ACYCLOVIR SODIUM 900 MG: 500 INJECTION, SOLUTION INTRAVENOUS at 12:26

## 2021-05-03 RX ADMIN — SODIUM CHLORIDE, PRESERVATIVE FREE 10 ML: 5 INJECTION INTRAVENOUS at 08:58

## 2021-05-03 RX ADMIN — CEFTAROLINE FOSAMIL 600 MG: 600 POWDER, FOR SOLUTION INTRAVENOUS at 12:26

## 2021-05-03 RX ADMIN — INSULIN HUMAN 3 UNITS: 100 INJECTION, SOLUTION PARENTERAL at 06:58

## 2021-05-03 RX ADMIN — INSULIN HUMAN 3 UNITS: 100 INJECTION, SOLUTION PARENTERAL at 00:48

## 2021-05-03 RX ADMIN — HYDROCODONE BITARTRATE AND ACETAMINOPHEN 1 TABLET: 10; 325 TABLET ORAL at 03:17

## 2021-05-03 RX ADMIN — POTASSIUM CHLORIDE 40 MEQ: 1.5 POWDER, FOR SOLUTION ORAL at 10:33

## 2021-05-03 RX ADMIN — INSULIN HUMAN 8 UNITS: 100 INJECTION, SOLUTION PARENTERAL at 12:27

## 2021-05-03 RX ADMIN — RISPERIDONE 1 MG: 1 SOLUTION ORAL at 20:24

## 2021-05-03 RX ADMIN — Medication 1 PACKET: at 20:30

## 2021-05-03 RX ADMIN — INSULIN HUMAN 8 UNITS: 100 INJECTION, SOLUTION PARENTERAL at 18:32

## 2021-05-03 RX ADMIN — AMLODIPINE BESYLATE 5 MG: 5 TABLET ORAL at 08:56

## 2021-05-03 RX ADMIN — ACYCLOVIR SODIUM 900 MG: 500 INJECTION, SOLUTION INTRAVENOUS at 00:49

## 2021-05-03 RX ADMIN — APIXABAN 5 MG: 5 TABLET, FILM COATED ORAL at 20:24

## 2021-05-03 RX ADMIN — Medication 30 ML: at 10:35

## 2021-05-03 RX ADMIN — RISPERIDONE 1 MG: 1 SOLUTION ORAL at 08:58

## 2021-05-03 RX ADMIN — LEVOTHYROXINE SODIUM 75 MCG: 20 INJECTION, SOLUTION INTRAVENOUS at 10:32

## 2021-05-03 RX ADMIN — MEROPENEM 1 G: 1 INJECTION, POWDER, FOR SOLUTION INTRAVENOUS at 03:17

## 2021-05-03 RX ADMIN — INSULIN HUMAN 3 UNITS: 100 INJECTION, SOLUTION PARENTERAL at 12:27

## 2021-05-03 RX ADMIN — APIXABAN 5 MG: 5 TABLET, FILM COATED ORAL at 08:57

## 2021-05-03 RX ADMIN — RIVASTIGMINE TRANSDERMAL SYSTEM 1 PATCH: 4.6 PATCH, EXTENDED RELEASE TRANSDERMAL at 09:57

## 2021-05-03 RX ADMIN — ACETAMINOPHEN 650 MG: 325 TABLET, FILM COATED ORAL at 12:26

## 2021-05-03 RX ADMIN — CEFTAROLINE FOSAMIL 600 MG: 600 POWDER, FOR SOLUTION INTRAVENOUS at 23:50

## 2021-05-03 RX ADMIN — CEFTAROLINE FOSAMIL 600 MG: 600 POWDER, FOR SOLUTION INTRAVENOUS at 00:49

## 2021-05-03 RX ADMIN — ATORVASTATIN CALCIUM 40 MG: 40 TABLET, FILM COATED ORAL at 20:23

## 2021-05-03 RX ADMIN — ACYCLOVIR SODIUM 900 MG: 500 INJECTION, SOLUTION INTRAVENOUS at 23:50

## 2021-05-03 RX ADMIN — INSULIN HUMAN 8 UNITS: 100 INJECTION, SOLUTION PARENTERAL at 00:48

## 2021-05-03 RX ADMIN — HYDROCODONE BITARTRATE AND ACETAMINOPHEN 1 TABLET: 7.5; 325 TABLET ORAL at 12:27

## 2021-05-03 RX ADMIN — LISINOPRIL 20 MG: 20 TABLET ORAL at 08:57

## 2021-05-03 RX ADMIN — HYDROCODONE BITARTRATE AND ACETAMINOPHEN 1 TABLET: 7.5; 325 TABLET ORAL at 20:23

## 2021-05-03 RX ADMIN — INSULIN DETEMIR 15 UNITS: 100 INJECTION, SOLUTION SUBCUTANEOUS at 20:21

## 2021-05-03 RX ADMIN — METOPROLOL TARTRATE 50 MG: 50 TABLET, FILM COATED ORAL at 20:23

## 2021-05-03 RX ADMIN — PREGABALIN 50 MG: 50 CAPSULE ORAL at 20:23

## 2021-05-03 RX ADMIN — METOPROLOL TARTRATE 50 MG: 50 TABLET, FILM COATED ORAL at 08:56

## 2021-05-03 RX ADMIN — FAMOTIDINE 20 MG: 20 TABLET, FILM COATED ORAL at 08:56

## 2021-05-03 RX ADMIN — SODIUM CHLORIDE, PRESERVATIVE FREE 10 ML: 5 INJECTION INTRAVENOUS at 20:50

## 2021-05-03 RX ADMIN — INSULIN HUMAN 5 UNITS: 100 INJECTION, SOLUTION PARENTERAL at 18:32

## 2021-05-03 RX ADMIN — PREGABALIN 75 MG: 75 CAPSULE ORAL at 08:57

## 2021-05-03 RX ADMIN — CITALOPRAM HYDROBROMIDE 20 MG: 20 TABLET ORAL at 08:57

## 2021-05-04 ENCOUNTER — APPOINTMENT (OUTPATIENT)
Dept: GENERAL RADIOLOGY | Facility: HOSPITAL | Age: 76
End: 2021-05-04

## 2021-05-04 LAB
ALBUMIN SERPL-MCNC: 2.5 G/DL (ref 3.5–5.2)
ALBUMIN/GLOB SERPL: 0.9 G/DL
ALP SERPL-CCNC: 90 U/L (ref 39–117)
ALT SERPL W P-5'-P-CCNC: 15 U/L (ref 1–41)
ANION GAP SERPL CALCULATED.3IONS-SCNC: 7 MMOL/L (ref 5–15)
ARTERIAL PATENCY WRIST A: POSITIVE
AST SERPL-CCNC: 16 U/L (ref 1–40)
ATMOSPHERIC PRESS: ABNORMAL MM[HG]
BASE EXCESS BLDA CALC-SCNC: -1.6 MMOL/L (ref 0–2)
BASOPHILS # BLD AUTO: 0.08 10*3/MM3 (ref 0–0.2)
BASOPHILS NFR BLD AUTO: 0.9 % (ref 0–1.5)
BDY SITE: ABNORMAL
BILIRUB SERPL-MCNC: 0.3 MG/DL (ref 0–1.2)
BODY TEMPERATURE: 37 C
BUN SERPL-MCNC: 43 MG/DL (ref 8–23)
BUN/CREAT SERPL: 29.9 (ref 7–25)
CALCIUM SPEC-SCNC: 8.8 MG/DL (ref 8.6–10.5)
CHLORIDE SERPL-SCNC: 115 MMOL/L (ref 98–107)
CO2 BLDA-SCNC: 25 MMOL/L (ref 22–33)
CO2 SERPL-SCNC: 23 MMOL/L (ref 22–29)
COHGB MFR BLD: 0.5 % (ref 0–2)
CREAT SERPL-MCNC: 1.44 MG/DL (ref 0.76–1.27)
D-LACTATE SERPL-SCNC: 1.1 MMOL/L (ref 0.5–2)
DEPRECATED RDW RBC AUTO: 49.1 FL (ref 37–54)
EOSINOPHIL # BLD AUTO: 0.5 10*3/MM3 (ref 0–0.4)
EOSINOPHIL NFR BLD AUTO: 5.7 % (ref 0.3–6.2)
ERYTHROCYTE [DISTWIDTH] IN BLOOD BY AUTOMATED COUNT: 13.9 % (ref 12.3–15.4)
GFR SERPL CREATININE-BSD FRML MDRD: 48 ML/MIN/1.73
GLOBULIN UR ELPH-MCNC: 2.9 GM/DL
GLUCOSE BLDC GLUCOMTR-MCNC: 148 MG/DL (ref 70–130)
GLUCOSE BLDC GLUCOMTR-MCNC: 160 MG/DL (ref 70–130)
GLUCOSE BLDC GLUCOMTR-MCNC: 180 MG/DL (ref 70–130)
GLUCOSE SERPL-MCNC: 186 MG/DL (ref 65–99)
HCO3 BLDA-SCNC: 23.7 MMOL/L (ref 20–26)
HCT VFR BLD AUTO: 30.6 % (ref 37.5–51)
HCT VFR BLD CALC: 28.2 %
HGB BLD-MCNC: 9.5 G/DL (ref 13–17.7)
HGB BLDA-MCNC: 9.2 G/DL (ref 13.5–17.5)
IMM GRANULOCYTES # BLD AUTO: 0.16 10*3/MM3 (ref 0–0.05)
IMM GRANULOCYTES NFR BLD AUTO: 1.8 % (ref 0–0.5)
INHALED O2 CONCENTRATION: 32 %
LYMPHOCYTES # BLD AUTO: 1.05 10*3/MM3 (ref 0.7–3.1)
LYMPHOCYTES NFR BLD AUTO: 12.1 % (ref 19.6–45.3)
MCH RBC QN AUTO: 30.5 PG (ref 26.6–33)
MCHC RBC AUTO-ENTMCNC: 31 G/DL (ref 31.5–35.7)
MCV RBC AUTO: 98.4 FL (ref 79–97)
METHGB BLD QL: -0.9 % (ref 0–1.5)
MODALITY: ABNORMAL
MONOCYTES # BLD AUTO: 0.82 10*3/MM3 (ref 0.1–0.9)
MONOCYTES NFR BLD AUTO: 9.4 % (ref 5–12)
NEUTROPHILS NFR BLD AUTO: 6.09 10*3/MM3 (ref 1.7–7)
NEUTROPHILS NFR BLD AUTO: 70.1 % (ref 42.7–76)
NOTE: ABNORMAL
NRBC BLD AUTO-RTO: 0 /100 WBC (ref 0–0.2)
OXYHGB MFR BLDV: 95.7 % (ref 94–99)
PCO2 BLDA: 41.5 MM HG (ref 35–45)
PCO2 TEMP ADJ BLD: 41.5 MM HG (ref 35–48)
PH BLDA: 7.37 PH UNITS (ref 7.35–7.45)
PH, TEMP CORRECTED: 7.37 PH UNITS
PLATELET # BLD AUTO: 189 10*3/MM3 (ref 140–450)
PMV BLD AUTO: 10.3 FL (ref 6–12)
PO2 BLDA: 76.1 MM HG (ref 83–108)
PO2 TEMP ADJ BLD: 76.1 MM HG (ref 83–108)
POTASSIUM SERPL-SCNC: 3.9 MMOL/L (ref 3.5–5.2)
PROCALCITONIN SERPL-MCNC: 0.17 NG/ML (ref 0–0.25)
PROT SERPL-MCNC: 5.4 G/DL (ref 6–8.5)
RBC # BLD AUTO: 3.11 10*6/MM3 (ref 4.14–5.8)
SODIUM SERPL-SCNC: 145 MMOL/L (ref 136–145)
VENTILATOR MODE: ABNORMAL
WBC # BLD AUTO: 8.7 10*3/MM3 (ref 3.4–10.8)

## 2021-05-04 PROCEDURE — 82805 BLOOD GASES W/O2 SATURATION: CPT

## 2021-05-04 PROCEDURE — 63710000001 INSULIN REGULAR HUMAN PER 5 UNITS: Performed by: INTERNAL MEDICINE

## 2021-05-04 PROCEDURE — 99232 SBSQ HOSP IP/OBS MODERATE 35: CPT | Performed by: PSYCHIATRY & NEUROLOGY

## 2021-05-04 PROCEDURE — 94660 CPAP INITIATION&MGMT: CPT

## 2021-05-04 PROCEDURE — 85025 COMPLETE CBC W/AUTO DIFF WBC: CPT | Performed by: INTERNAL MEDICINE

## 2021-05-04 PROCEDURE — 82375 ASSAY CARBOXYHB QUANT: CPT

## 2021-05-04 PROCEDURE — 63710000001 INSULIN DETEMIR PER 5 UNITS: Performed by: INTERNAL MEDICINE

## 2021-05-04 PROCEDURE — 74018 RADEX ABDOMEN 1 VIEW: CPT

## 2021-05-04 PROCEDURE — 74230 X-RAY XM SWLNG FUNCJ C+: CPT

## 2021-05-04 PROCEDURE — 97110 THERAPEUTIC EXERCISES: CPT

## 2021-05-04 PROCEDURE — 83050 HGB METHEMOGLOBIN QUAN: CPT

## 2021-05-04 PROCEDURE — 94799 UNLISTED PULMONARY SVC/PX: CPT

## 2021-05-04 PROCEDURE — 25010000002 ACYCLOVIR PER 5 MG: Performed by: INTERNAL MEDICINE

## 2021-05-04 PROCEDURE — 99233 SBSQ HOSP IP/OBS HIGH 50: CPT | Performed by: INTERNAL MEDICINE

## 2021-05-04 PROCEDURE — 92611 MOTION FLUOROSCOPY/SWALLOW: CPT | Performed by: SPEECH-LANGUAGE PATHOLOGIST

## 2021-05-04 PROCEDURE — 25010000002 ONDANSETRON PER 1 MG: Performed by: INTERNAL MEDICINE

## 2021-05-04 PROCEDURE — 80053 COMPREHEN METABOLIC PANEL: CPT | Performed by: INTERNAL MEDICINE

## 2021-05-04 PROCEDURE — 25010000002 CEFTAROLINE FOSAMIL PER 10 MG: Performed by: INTERNAL MEDICINE

## 2021-05-04 PROCEDURE — 36600 WITHDRAWAL OF ARTERIAL BLOOD: CPT

## 2021-05-04 PROCEDURE — 82962 GLUCOSE BLOOD TEST: CPT

## 2021-05-04 PROCEDURE — 83605 ASSAY OF LACTIC ACID: CPT | Performed by: INTERNAL MEDICINE

## 2021-05-04 PROCEDURE — 97530 THERAPEUTIC ACTIVITIES: CPT

## 2021-05-04 PROCEDURE — 84145 PROCALCITONIN (PCT): CPT | Performed by: INTERNAL MEDICINE

## 2021-05-04 RX ORDER — NYSTATIN 100000 U/G
CREAM TOPICAL EVERY 12 HOURS SCHEDULED
Status: DISCONTINUED | OUTPATIENT
Start: 2021-05-04 | End: 2021-05-07

## 2021-05-04 RX ORDER — HYDROCODONE BITARTRATE AND ACETAMINOPHEN 5; 325 MG/1; MG/1
1 TABLET ORAL EVERY 8 HOURS
Status: DISCONTINUED | OUTPATIENT
Start: 2021-05-04 | End: 2021-05-06

## 2021-05-04 RX ORDER — HYDROCODONE BITARTRATE AND ACETAMINOPHEN 7.5; 325 MG/1; MG/1
1 TABLET ORAL EVERY 8 HOURS
Status: DISCONTINUED | OUTPATIENT
Start: 2021-05-04 | End: 2021-05-04

## 2021-05-04 RX ADMIN — ACETAMINOPHEN 650 MG: 325 TABLET, FILM COATED ORAL at 21:45

## 2021-05-04 RX ADMIN — LEVOTHYROXINE SODIUM 75 MCG: 20 INJECTION, SOLUTION INTRAVENOUS at 11:00

## 2021-05-04 RX ADMIN — AMLODIPINE BESYLATE 5 MG: 5 TABLET ORAL at 08:51

## 2021-05-04 RX ADMIN — NYSTATIN: 100000 CREAM TOPICAL at 11:00

## 2021-05-04 RX ADMIN — METOPROLOL TARTRATE 50 MG: 50 TABLET, FILM COATED ORAL at 21:38

## 2021-05-04 RX ADMIN — INSULIN HUMAN 8 UNITS: 100 INJECTION, SOLUTION PARENTERAL at 17:33

## 2021-05-04 RX ADMIN — FAMOTIDINE 20 MG: 20 TABLET, FILM COATED ORAL at 21:38

## 2021-05-04 RX ADMIN — BARIUM SULFATE 10 ML: 400 SUSPENSION ORAL at 12:55

## 2021-05-04 RX ADMIN — CEFTAROLINE FOSAMIL 600 MG: 600 POWDER, FOR SOLUTION INTRAVENOUS at 11:55

## 2021-05-04 RX ADMIN — LISINOPRIL 20 MG: 20 TABLET ORAL at 08:51

## 2021-05-04 RX ADMIN — NYSTATIN: 100000 CREAM TOPICAL at 21:39

## 2021-05-04 RX ADMIN — CITALOPRAM HYDROBROMIDE 20 MG: 20 TABLET ORAL at 08:51

## 2021-05-04 RX ADMIN — FAMOTIDINE 20 MG: 20 TABLET, FILM COATED ORAL at 08:51

## 2021-05-04 RX ADMIN — INSULIN HUMAN 8 UNITS: 100 INJECTION, SOLUTION PARENTERAL at 11:49

## 2021-05-04 RX ADMIN — PREGABALIN 50 MG: 50 CAPSULE ORAL at 08:51

## 2021-05-04 RX ADMIN — APIXABAN 5 MG: 5 TABLET, FILM COATED ORAL at 21:38

## 2021-05-04 RX ADMIN — INSULIN DETEMIR 15 UNITS: 100 INJECTION, SOLUTION SUBCUTANEOUS at 21:41

## 2021-05-04 RX ADMIN — HYDROCODONE BITARTRATE AND ACETAMINOPHEN 1 TABLET: 5; 325 TABLET ORAL at 19:38

## 2021-05-04 RX ADMIN — INSULIN HUMAN 8 UNITS: 100 INJECTION, SOLUTION PARENTERAL at 00:04

## 2021-05-04 RX ADMIN — SODIUM CHLORIDE, PRESERVATIVE FREE 10 ML: 5 INJECTION INTRAVENOUS at 21:45

## 2021-05-04 RX ADMIN — INSULIN HUMAN 3 UNITS: 100 INJECTION, SOLUTION PARENTERAL at 11:49

## 2021-05-04 RX ADMIN — METOPROLOL TARTRATE 50 MG: 50 TABLET, FILM COATED ORAL at 08:51

## 2021-05-04 RX ADMIN — ATORVASTATIN CALCIUM 40 MG: 40 TABLET, FILM COATED ORAL at 21:38

## 2021-05-04 RX ADMIN — INSULIN HUMAN 5 UNITS: 100 INJECTION, SOLUTION PARENTERAL at 00:07

## 2021-05-04 RX ADMIN — HYDROCODONE BITARTRATE AND ACETAMINOPHEN 1 TABLET: 7.5; 325 TABLET ORAL at 04:31

## 2021-05-04 RX ADMIN — ACYCLOVIR SODIUM 900 MG: 500 INJECTION, SOLUTION INTRAVENOUS at 11:55

## 2021-05-04 RX ADMIN — Medication 30 ML: at 08:51

## 2021-05-04 RX ADMIN — BARIUM SULFATE 20 ML: 400 PASTE ORAL at 12:56

## 2021-05-04 RX ADMIN — INSULIN HUMAN 3 UNITS: 100 INJECTION, SOLUTION PARENTERAL at 05:55

## 2021-05-04 RX ADMIN — ONDANSETRON 4 MG: 2 INJECTION INTRAMUSCULAR; INTRAVENOUS at 19:38

## 2021-05-04 RX ADMIN — BARIUM SULFATE 100 ML: 0.81 POWDER, FOR SUSPENSION ORAL at 12:56

## 2021-05-04 RX ADMIN — APIXABAN 5 MG: 5 TABLET, FILM COATED ORAL at 08:51

## 2021-05-04 RX ADMIN — RIVASTIGMINE TRANSDERMAL SYSTEM 1 PATCH: 4.6 PATCH, EXTENDED RELEASE TRANSDERMAL at 09:24

## 2021-05-04 RX ADMIN — Medication 1 PACKET: at 21:40

## 2021-05-04 RX ADMIN — INSULIN HUMAN 8 UNITS: 100 INJECTION, SOLUTION PARENTERAL at 05:55

## 2021-05-04 RX ADMIN — Medication 30 ML: at 21:40

## 2021-05-04 RX ADMIN — BARIUM SULFATE 50 ML: 400 SUSPENSION ORAL at 12:55

## 2021-05-04 RX ADMIN — Medication 1 PACKET: at 08:51

## 2021-05-04 RX ADMIN — PREGABALIN 50 MG: 50 CAPSULE ORAL at 21:38

## 2021-05-04 RX ADMIN — RISPERIDONE 1 MG: 1 SOLUTION ORAL at 21:38

## 2021-05-05 ENCOUNTER — APPOINTMENT (OUTPATIENT)
Dept: GENERAL RADIOLOGY | Facility: HOSPITAL | Age: 76
End: 2021-05-05

## 2021-05-05 LAB
ALBUMIN SERPL-MCNC: 2.5 G/DL (ref 3.5–5.2)
ALBUMIN/GLOB SERPL: 0.8 G/DL
ALP SERPL-CCNC: 100 U/L (ref 39–117)
ALT SERPL W P-5'-P-CCNC: 27 U/L (ref 1–41)
ANION GAP SERPL CALCULATED.3IONS-SCNC: 6 MMOL/L (ref 5–15)
AST SERPL-CCNC: 30 U/L (ref 1–40)
BASOPHILS # BLD AUTO: 0.06 10*3/MM3 (ref 0–0.2)
BASOPHILS NFR BLD AUTO: 0.6 % (ref 0–1.5)
BILIRUB SERPL-MCNC: 0.4 MG/DL (ref 0–1.2)
BUN SERPL-MCNC: 47 MG/DL (ref 8–23)
BUN/CREAT SERPL: 32.4 (ref 7–25)
CALCIUM SPEC-SCNC: 9.4 MG/DL (ref 8.6–10.5)
CHLORIDE SERPL-SCNC: 112 MMOL/L (ref 98–107)
CO2 SERPL-SCNC: 24 MMOL/L (ref 22–29)
CREAT SERPL-MCNC: 1.45 MG/DL (ref 0.76–1.27)
D-LACTATE SERPL-SCNC: 1 MMOL/L (ref 0.5–2)
DEPRECATED RDW RBC AUTO: 49 FL (ref 37–54)
EOSINOPHIL # BLD AUTO: 0.43 10*3/MM3 (ref 0–0.4)
EOSINOPHIL NFR BLD AUTO: 4.6 % (ref 0.3–6.2)
ERYTHROCYTE [DISTWIDTH] IN BLOOD BY AUTOMATED COUNT: 13.8 % (ref 12.3–15.4)
GFR SERPL CREATININE-BSD FRML MDRD: 48 ML/MIN/1.73
GLOBULIN UR ELPH-MCNC: 3 GM/DL
GLUCOSE BLDC GLUCOMTR-MCNC: 189 MG/DL (ref 70–130)
GLUCOSE BLDC GLUCOMTR-MCNC: 209 MG/DL (ref 70–130)
GLUCOSE BLDC GLUCOMTR-MCNC: 233 MG/DL (ref 70–130)
GLUCOSE BLDC GLUCOMTR-MCNC: 276 MG/DL (ref 70–130)
GLUCOSE SERPL-MCNC: 223 MG/DL (ref 65–99)
HCT VFR BLD AUTO: 28.8 % (ref 37.5–51)
HGB BLD-MCNC: 9 G/DL (ref 13–17.7)
IMM GRANULOCYTES # BLD AUTO: 0.21 10*3/MM3 (ref 0–0.05)
IMM GRANULOCYTES NFR BLD AUTO: 2.3 % (ref 0–0.5)
LYMPHOCYTES # BLD AUTO: 0.8 10*3/MM3 (ref 0.7–3.1)
LYMPHOCYTES NFR BLD AUTO: 8.6 % (ref 19.6–45.3)
MCH RBC QN AUTO: 30.9 PG (ref 26.6–33)
MCHC RBC AUTO-ENTMCNC: 31.3 G/DL (ref 31.5–35.7)
MCV RBC AUTO: 99 FL (ref 79–97)
MONOCYTES # BLD AUTO: 0.68 10*3/MM3 (ref 0.1–0.9)
MONOCYTES NFR BLD AUTO: 7.3 % (ref 5–12)
NEUTROPHILS NFR BLD AUTO: 7.11 10*3/MM3 (ref 1.7–7)
NEUTROPHILS NFR BLD AUTO: 76.6 % (ref 42.7–76)
NRBC BLD AUTO-RTO: 0 /100 WBC (ref 0–0.2)
PLATELET # BLD AUTO: 193 10*3/MM3 (ref 140–450)
PMV BLD AUTO: 10.5 FL (ref 6–12)
POTASSIUM SERPL-SCNC: 3.8 MMOL/L (ref 3.5–5.2)
PROCALCITONIN SERPL-MCNC: 0.13 NG/ML (ref 0–0.25)
PROT SERPL-MCNC: 5.5 G/DL (ref 6–8.5)
RBC # BLD AUTO: 2.91 10*6/MM3 (ref 4.14–5.8)
SODIUM SERPL-SCNC: 142 MMOL/L (ref 136–145)
WBC # BLD AUTO: 9.29 10*3/MM3 (ref 3.4–10.8)

## 2021-05-05 PROCEDURE — 94799 UNLISTED PULMONARY SVC/PX: CPT

## 2021-05-05 PROCEDURE — 83605 ASSAY OF LACTIC ACID: CPT | Performed by: INTERNAL MEDICINE

## 2021-05-05 PROCEDURE — 25010000002 HALOPERIDOL LACTATE PER 5 MG: Performed by: NURSE PRACTITIONER

## 2021-05-05 PROCEDURE — 63710000001 INSULIN REGULAR HUMAN PER 5 UNITS: Performed by: INTERNAL MEDICINE

## 2021-05-05 PROCEDURE — 99232 SBSQ HOSP IP/OBS MODERATE 35: CPT | Performed by: NURSE PRACTITIONER

## 2021-05-05 PROCEDURE — 99231 SBSQ HOSP IP/OBS SF/LOW 25: CPT | Performed by: PSYCHIATRY & NEUROLOGY

## 2021-05-05 PROCEDURE — 85025 COMPLETE CBC W/AUTO DIFF WBC: CPT | Performed by: INTERNAL MEDICINE

## 2021-05-05 PROCEDURE — 80053 COMPREHEN METABOLIC PANEL: CPT | Performed by: INTERNAL MEDICINE

## 2021-05-05 PROCEDURE — 84145 PROCALCITONIN (PCT): CPT | Performed by: INTERNAL MEDICINE

## 2021-05-05 PROCEDURE — 74018 RADEX ABDOMEN 1 VIEW: CPT

## 2021-05-05 PROCEDURE — 97530 THERAPEUTIC ACTIVITIES: CPT

## 2021-05-05 PROCEDURE — 25010000002 CEFTAROLINE FOSAMIL PER 10 MG: Performed by: INTERNAL MEDICINE

## 2021-05-05 PROCEDURE — 25010000002 ACYCLOVIR PER 5 MG: Performed by: INTERNAL MEDICINE

## 2021-05-05 PROCEDURE — 82962 GLUCOSE BLOOD TEST: CPT

## 2021-05-05 PROCEDURE — 94660 CPAP INITIATION&MGMT: CPT

## 2021-05-05 PROCEDURE — 73110 X-RAY EXAM OF WRIST: CPT

## 2021-05-05 PROCEDURE — 63710000001 INSULIN DETEMIR PER 5 UNITS: Performed by: NURSE PRACTITIONER

## 2021-05-05 RX ORDER — HALOPERIDOL 5 MG/ML
5 INJECTION INTRAMUSCULAR EVERY 6 HOURS PRN
Status: DISCONTINUED | OUTPATIENT
Start: 2021-05-05 | End: 2021-05-18

## 2021-05-05 RX ADMIN — ACYCLOVIR SODIUM 900 MG: 500 INJECTION, SOLUTION INTRAVENOUS at 12:03

## 2021-05-05 RX ADMIN — NYSTATIN: 100000 CREAM TOPICAL at 08:03

## 2021-05-05 RX ADMIN — PREGABALIN 50 MG: 50 CAPSULE ORAL at 08:05

## 2021-05-05 RX ADMIN — AMLODIPINE BESYLATE 5 MG: 5 TABLET ORAL at 08:05

## 2021-05-05 RX ADMIN — PREGABALIN 50 MG: 50 CAPSULE ORAL at 21:20

## 2021-05-05 RX ADMIN — Medication 30 ML: at 08:03

## 2021-05-05 RX ADMIN — FAMOTIDINE 20 MG: 20 TABLET, FILM COATED ORAL at 08:05

## 2021-05-05 RX ADMIN — NYSTATIN: 100000 CREAM TOPICAL at 20:46

## 2021-05-05 RX ADMIN — SODIUM CHLORIDE, PRESERVATIVE FREE 10 ML: 5 INJECTION INTRAVENOUS at 20:46

## 2021-05-05 RX ADMIN — METOPROLOL TARTRATE 50 MG: 50 TABLET, FILM COATED ORAL at 21:20

## 2021-05-05 RX ADMIN — ACYCLOVIR SODIUM 900 MG: 500 INJECTION, SOLUTION INTRAVENOUS at 00:18

## 2021-05-05 RX ADMIN — INSULIN HUMAN 8 UNITS: 100 INJECTION, SOLUTION PARENTERAL at 06:03

## 2021-05-05 RX ADMIN — CEFTAROLINE FOSAMIL 600 MG: 600 POWDER, FOR SOLUTION INTRAVENOUS at 00:20

## 2021-05-05 RX ADMIN — INSULIN HUMAN 5 UNITS: 100 INJECTION, SOLUTION PARENTERAL at 00:31

## 2021-05-05 RX ADMIN — CITALOPRAM HYDROBROMIDE 20 MG: 20 TABLET ORAL at 08:04

## 2021-05-05 RX ADMIN — INSULIN HUMAN 5 UNITS: 100 INJECTION, SOLUTION PARENTERAL at 12:03

## 2021-05-05 RX ADMIN — CEFTAROLINE FOSAMIL 600 MG: 600 POWDER, FOR SOLUTION INTRAVENOUS at 12:03

## 2021-05-05 RX ADMIN — LISINOPRIL 20 MG: 20 TABLET ORAL at 08:05

## 2021-05-05 RX ADMIN — ATORVASTATIN CALCIUM 40 MG: 40 TABLET, FILM COATED ORAL at 21:20

## 2021-05-05 RX ADMIN — APIXABAN 5 MG: 5 TABLET, FILM COATED ORAL at 21:20

## 2021-05-05 RX ADMIN — HYDROCODONE BITARTRATE AND ACETAMINOPHEN 1 TABLET: 5; 325 TABLET ORAL at 10:06

## 2021-05-05 RX ADMIN — HALOPERIDOL LACTATE 5 MG: 5 INJECTION, SOLUTION INTRAMUSCULAR at 02:25

## 2021-05-05 RX ADMIN — Medication 1 PACKET: at 08:03

## 2021-05-05 RX ADMIN — INSULIN DETEMIR 18 UNITS: 100 INJECTION, SOLUTION SUBCUTANEOUS at 21:22

## 2021-05-05 RX ADMIN — RIVASTIGMINE TRANSDERMAL SYSTEM 1 PATCH: 4.6 PATCH, EXTENDED RELEASE TRANSDERMAL at 08:09

## 2021-05-05 RX ADMIN — INSULIN HUMAN 8 UNITS: 100 INJECTION, SOLUTION PARENTERAL at 00:30

## 2021-05-05 RX ADMIN — APIXABAN 5 MG: 5 TABLET, FILM COATED ORAL at 08:05

## 2021-05-05 RX ADMIN — LEVOTHYROXINE SODIUM 75 MCG: 20 INJECTION, SOLUTION INTRAVENOUS at 10:06

## 2021-05-05 RX ADMIN — INSULIN HUMAN 8 UNITS: 100 INJECTION, SOLUTION PARENTERAL at 12:03

## 2021-05-05 RX ADMIN — INSULIN HUMAN 8 UNITS: 100 INJECTION, SOLUTION PARENTERAL at 06:02

## 2021-05-05 RX ADMIN — RISPERIDONE 1 MG: 1 SOLUTION ORAL at 21:20

## 2021-05-05 RX ADMIN — METOPROLOL TARTRATE 50 MG: 50 TABLET, FILM COATED ORAL at 08:05

## 2021-05-05 RX ADMIN — HYDROCODONE BITARTRATE AND ACETAMINOPHEN 1 TABLET: 5; 325 TABLET ORAL at 21:20

## 2021-05-05 RX ADMIN — RISPERIDONE 0.5 MG: 1 SOLUTION ORAL at 00:17

## 2021-05-05 RX ADMIN — FAMOTIDINE 20 MG: 20 TABLET, FILM COATED ORAL at 21:21

## 2021-05-06 ENCOUNTER — APPOINTMENT (OUTPATIENT)
Dept: CARDIOLOGY | Facility: HOSPITAL | Age: 76
End: 2021-05-06

## 2021-05-06 ENCOUNTER — APPOINTMENT (OUTPATIENT)
Dept: GENERAL RADIOLOGY | Facility: HOSPITAL | Age: 76
End: 2021-05-06

## 2021-05-06 LAB
ANION GAP SERPL CALCULATED.3IONS-SCNC: 6 MMOL/L (ref 5–15)
BUN SERPL-MCNC: 51 MG/DL (ref 8–23)
BUN/CREAT SERPL: 32.3 (ref 7–25)
CALCIUM SPEC-SCNC: 9.2 MG/DL (ref 8.6–10.5)
CHLORIDE SERPL-SCNC: 114 MMOL/L (ref 98–107)
CO2 SERPL-SCNC: 26 MMOL/L (ref 22–29)
CREAT SERPL-MCNC: 1.58 MG/DL (ref 0.76–1.27)
DEPRECATED RDW RBC AUTO: 50.5 FL (ref 37–54)
ERYTHROCYTE [DISTWIDTH] IN BLOOD BY AUTOMATED COUNT: 13.8 % (ref 12.3–15.4)
GFR SERPL CREATININE-BSD FRML MDRD: 43 ML/MIN/1.73
GLUCOSE BLDC GLUCOMTR-MCNC: 259 MG/DL (ref 70–130)
GLUCOSE BLDC GLUCOMTR-MCNC: 269 MG/DL (ref 70–130)
GLUCOSE BLDC GLUCOMTR-MCNC: 271 MG/DL (ref 70–130)
GLUCOSE BLDC GLUCOMTR-MCNC: 328 MG/DL (ref 70–130)
GLUCOSE SERPL-MCNC: 329 MG/DL (ref 65–99)
HCT VFR BLD AUTO: 30.7 % (ref 37.5–51)
HGB BLD-MCNC: 9.3 G/DL (ref 13–17.7)
MCH RBC QN AUTO: 30.8 PG (ref 26.6–33)
MCHC RBC AUTO-ENTMCNC: 30.3 G/DL (ref 31.5–35.7)
MCV RBC AUTO: 101.7 FL (ref 79–97)
PLATELET # BLD AUTO: 202 10*3/MM3 (ref 140–450)
PMV BLD AUTO: 10.6 FL (ref 6–12)
POTASSIUM SERPL-SCNC: 4.2 MMOL/L (ref 3.5–5.2)
RBC # BLD AUTO: 3.02 10*6/MM3 (ref 4.14–5.8)
SODIUM SERPL-SCNC: 146 MMOL/L (ref 136–145)
WBC # BLD AUTO: 9.3 10*3/MM3 (ref 3.4–10.8)

## 2021-05-06 PROCEDURE — 85027 COMPLETE CBC AUTOMATED: CPT | Performed by: INTERNAL MEDICINE

## 2021-05-06 PROCEDURE — 93971 EXTREMITY STUDY: CPT

## 2021-05-06 PROCEDURE — 99232 SBSQ HOSP IP/OBS MODERATE 35: CPT | Performed by: PSYCHIATRY & NEUROLOGY

## 2021-05-06 PROCEDURE — 80048 BASIC METABOLIC PNL TOTAL CA: CPT | Performed by: INTERNAL MEDICINE

## 2021-05-06 PROCEDURE — 93971 EXTREMITY STUDY: CPT | Performed by: INTERNAL MEDICINE

## 2021-05-06 PROCEDURE — 71045 X-RAY EXAM CHEST 1 VIEW: CPT

## 2021-05-06 PROCEDURE — 92507 TX SP LANG VOICE COMM INDIV: CPT

## 2021-05-06 PROCEDURE — 99232 SBSQ HOSP IP/OBS MODERATE 35: CPT | Performed by: NURSE PRACTITIONER

## 2021-05-06 PROCEDURE — 97530 THERAPEUTIC ACTIVITIES: CPT

## 2021-05-06 PROCEDURE — 82962 GLUCOSE BLOOD TEST: CPT

## 2021-05-06 PROCEDURE — 94799 UNLISTED PULMONARY SVC/PX: CPT

## 2021-05-06 PROCEDURE — 94660 CPAP INITIATION&MGMT: CPT

## 2021-05-06 PROCEDURE — 63710000001 INSULIN REGULAR HUMAN PER 5 UNITS: Performed by: INTERNAL MEDICINE

## 2021-05-06 PROCEDURE — 63710000001 INSULIN DETEMIR PER 5 UNITS: Performed by: NURSE PRACTITIONER

## 2021-05-06 PROCEDURE — 92526 ORAL FUNCTION THERAPY: CPT

## 2021-05-06 PROCEDURE — 25010000002 ACYCLOVIR PER 5 MG: Performed by: INTERNAL MEDICINE

## 2021-05-06 PROCEDURE — 25010000002 CEFTAROLINE FOSAMIL PER 10 MG: Performed by: INTERNAL MEDICINE

## 2021-05-06 PROCEDURE — 63710000001 INSULIN REGULAR HUMAN PER 5 UNITS: Performed by: NURSE PRACTITIONER

## 2021-05-06 RX ORDER — HYDROCODONE BITARTRATE AND ACETAMINOPHEN 5; 325 MG/1; MG/1
1 TABLET ORAL EVERY 8 HOURS PRN
Status: DISCONTINUED | OUTPATIENT
Start: 2021-05-06 | End: 2021-05-20 | Stop reason: HOSPADM

## 2021-05-06 RX ORDER — PANTOPRAZOLE SODIUM 40 MG/10ML
40 INJECTION, POWDER, LYOPHILIZED, FOR SOLUTION INTRAVENOUS
Status: DISCONTINUED | OUTPATIENT
Start: 2021-05-07 | End: 2021-05-16

## 2021-05-06 RX ADMIN — CITALOPRAM HYDROBROMIDE 20 MG: 20 TABLET ORAL at 08:17

## 2021-05-06 RX ADMIN — INSULIN HUMAN 10 UNITS: 100 INJECTION, SOLUTION PARENTERAL at 05:53

## 2021-05-06 RX ADMIN — ATORVASTATIN CALCIUM 40 MG: 40 TABLET, FILM COATED ORAL at 20:35

## 2021-05-06 RX ADMIN — NYSTATIN: 100000 CREAM TOPICAL at 08:20

## 2021-05-06 RX ADMIN — HYDROCODONE BITARTRATE AND ACETAMINOPHEN 1 TABLET: 5; 325 TABLET ORAL at 20:35

## 2021-05-06 RX ADMIN — INSULIN HUMAN 10 UNITS: 100 INJECTION, SOLUTION PARENTERAL at 12:32

## 2021-05-06 RX ADMIN — HYDROCODONE BITARTRATE AND ACETAMINOPHEN 1 TABLET: 5; 325 TABLET ORAL at 10:44

## 2021-05-06 RX ADMIN — INSULIN HUMAN 8 UNITS: 100 INJECTION, SOLUTION PARENTERAL at 00:12

## 2021-05-06 RX ADMIN — INSULIN DETEMIR 20 UNITS: 100 INJECTION, SOLUTION SUBCUTANEOUS at 20:41

## 2021-05-06 RX ADMIN — LISINOPRIL 20 MG: 20 TABLET ORAL at 08:18

## 2021-05-06 RX ADMIN — PREGABALIN 50 MG: 50 CAPSULE ORAL at 08:18

## 2021-05-06 RX ADMIN — ACYCLOVIR SODIUM 900 MG: 500 INJECTION, SOLUTION INTRAVENOUS at 00:00

## 2021-05-06 RX ADMIN — RISPERIDONE 1 MG: 1 SOLUTION ORAL at 20:36

## 2021-05-06 RX ADMIN — INSULIN HUMAN 8 UNITS: 100 INJECTION, SOLUTION PARENTERAL at 17:43

## 2021-05-06 RX ADMIN — METOPROLOL TARTRATE 50 MG: 50 TABLET, FILM COATED ORAL at 08:17

## 2021-05-06 RX ADMIN — ACETAMINOPHEN 650 MG: 325 TABLET, FILM COATED ORAL at 08:53

## 2021-05-06 RX ADMIN — INSULIN HUMAN 8 UNITS: 100 INJECTION, SOLUTION PARENTERAL at 05:53

## 2021-05-06 RX ADMIN — AMLODIPINE BESYLATE 5 MG: 5 TABLET ORAL at 08:17

## 2021-05-06 RX ADMIN — PREGABALIN 50 MG: 50 CAPSULE ORAL at 20:35

## 2021-05-06 RX ADMIN — SODIUM CHLORIDE, PRESERVATIVE FREE 10 ML: 5 INJECTION INTRAVENOUS at 20:36

## 2021-05-06 RX ADMIN — APIXABAN 5 MG: 5 TABLET, FILM COATED ORAL at 08:18

## 2021-05-06 RX ADMIN — RIVASTIGMINE TRANSDERMAL SYSTEM 1 PATCH: 4.6 PATCH, EXTENDED RELEASE TRANSDERMAL at 08:18

## 2021-05-06 RX ADMIN — APIXABAN 5 MG: 5 TABLET, FILM COATED ORAL at 20:35

## 2021-05-06 RX ADMIN — NYSTATIN: 100000 CREAM TOPICAL at 20:36

## 2021-05-06 RX ADMIN — FAMOTIDINE 20 MG: 20 TABLET, FILM COATED ORAL at 08:21

## 2021-05-06 RX ADMIN — INSULIN HUMAN 10 UNITS: 100 INJECTION, SOLUTION PARENTERAL at 17:42

## 2021-05-06 RX ADMIN — CEFTAROLINE FOSAMIL 600 MG: 600 POWDER, FOR SOLUTION INTRAVENOUS at 23:03

## 2021-05-06 RX ADMIN — ACYCLOVIR SODIUM 900 MG: 500 INJECTION, SOLUTION INTRAVENOUS at 12:06

## 2021-05-06 RX ADMIN — LEVOTHYROXINE SODIUM 75 MCG: 20 INJECTION, SOLUTION INTRAVENOUS at 10:44

## 2021-05-06 RX ADMIN — METOPROLOL TARTRATE 50 MG: 50 TABLET, FILM COATED ORAL at 20:35

## 2021-05-06 RX ADMIN — CEFTAROLINE FOSAMIL 600 MG: 600 POWDER, FOR SOLUTION INTRAVENOUS at 00:00

## 2021-05-06 RX ADMIN — HYDROCODONE BITARTRATE AND ACETAMINOPHEN 1 TABLET: 5; 325 TABLET ORAL at 03:00

## 2021-05-06 RX ADMIN — INSULIN HUMAN 8 UNITS: 100 INJECTION, SOLUTION PARENTERAL at 12:31

## 2021-05-06 RX ADMIN — CEFTAROLINE FOSAMIL 600 MG: 600 POWDER, FOR SOLUTION INTRAVENOUS at 12:06

## 2021-05-06 RX ADMIN — SODIUM CHLORIDE, PRESERVATIVE FREE 10 ML: 5 INJECTION INTRAVENOUS at 08:21

## 2021-05-07 ENCOUNTER — APPOINTMENT (OUTPATIENT)
Dept: GENERAL RADIOLOGY | Facility: HOSPITAL | Age: 76
End: 2021-05-07

## 2021-05-07 LAB
ANION GAP SERPL CALCULATED.3IONS-SCNC: 7 MMOL/L (ref 5–15)
BH CV ECHO MEAS - BSA(HAYCOCK): 2.6 M^2
BH CV ECHO MEAS - BSA: 2.5 M^2
BH CV ECHO MEAS - BZI_BMI: 44.5 KILOGRAMS/M^2
BH CV ECHO MEAS - BZI_METRIC_HEIGHT: 175.3 CM
BH CV ECHO MEAS - BZI_METRIC_WEIGHT: 136.5 KG
BH CV UPPER VENOUS LEFT INTERNAL JUGULAR AUGMENT: NORMAL
BH CV UPPER VENOUS LEFT INTERNAL JUGULAR COMPRESS: NORMAL
BH CV UPPER VENOUS LEFT INTERNAL JUGULAR PHASIC: NORMAL
BH CV UPPER VENOUS LEFT INTERNAL JUGULAR SPONT: NORMAL
BH CV UPPER VENOUS LEFT SUBCLAVIAN AUGMENT: NORMAL
BH CV UPPER VENOUS LEFT SUBCLAVIAN PHASIC: NORMAL
BH CV UPPER VENOUS LEFT SUBCLAVIAN SPONT: NORMAL
BH CV UPPER VENOUS RIGHT AXILLARY AUGMENT: NORMAL
BH CV UPPER VENOUS RIGHT AXILLARY COMPRESS: NORMAL
BH CV UPPER VENOUS RIGHT AXILLARY PHASIC: NORMAL
BH CV UPPER VENOUS RIGHT AXILLARY SPONT: NORMAL
BH CV UPPER VENOUS RIGHT BASILIC FOREARM COMPRESS: NORMAL
BH CV UPPER VENOUS RIGHT BRACHIAL AUGMENT: NORMAL
BH CV UPPER VENOUS RIGHT BRACHIAL COMPRESS: NORMAL
BH CV UPPER VENOUS RIGHT CEPHALIC FOREARM COMPRESS: NORMAL
BH CV UPPER VENOUS RIGHT CEPHALIC UPPER AUGMENT: NORMAL
BH CV UPPER VENOUS RIGHT CEPHALIC UPPER COMPRESS: NORMAL
BH CV UPPER VENOUS RIGHT INTERNAL JUGULAR AUGMENT: NORMAL
BH CV UPPER VENOUS RIGHT INTERNAL JUGULAR COMPRESS: NORMAL
BH CV UPPER VENOUS RIGHT INTERNAL JUGULAR PHASIC: NORMAL
BH CV UPPER VENOUS RIGHT INTERNAL JUGULAR SPONT: NORMAL
BH CV UPPER VENOUS RIGHT RADIAL AUGMENT: NORMAL
BH CV UPPER VENOUS RIGHT RADIAL COMPRESS: NORMAL
BH CV UPPER VENOUS RIGHT SUBCLAVIAN AUGMENT: NORMAL
BH CV UPPER VENOUS RIGHT SUBCLAVIAN COMPRESS: NORMAL
BH CV UPPER VENOUS RIGHT SUBCLAVIAN PHASIC: NORMAL
BH CV UPPER VENOUS RIGHT SUBCLAVIAN SPONT: NORMAL
BH CV UPPER VENOUS RIGHT ULNAR AUGMENT: NORMAL
BH CV UPPER VENOUS RIGHT ULNAR COMPRESS: NORMAL
BUN SERPL-MCNC: 47 MG/DL (ref 8–23)
BUN/CREAT SERPL: 30.1 (ref 7–25)
CALCIUM SPEC-SCNC: 9.1 MG/DL (ref 8.6–10.5)
CHLORIDE SERPL-SCNC: 109 MMOL/L (ref 98–107)
CO2 SERPL-SCNC: 25 MMOL/L (ref 22–29)
CREAT SERPL-MCNC: 1.56 MG/DL (ref 0.76–1.27)
DEPRECATED RDW RBC AUTO: 49 FL (ref 37–54)
ERYTHROCYTE [DISTWIDTH] IN BLOOD BY AUTOMATED COUNT: 13.7 % (ref 12.3–15.4)
GFR SERPL CREATININE-BSD FRML MDRD: 44 ML/MIN/1.73
GLUCOSE BLDC GLUCOMTR-MCNC: 176 MG/DL (ref 70–130)
GLUCOSE BLDC GLUCOMTR-MCNC: 232 MG/DL (ref 70–130)
GLUCOSE BLDC GLUCOMTR-MCNC: 264 MG/DL (ref 70–130)
GLUCOSE BLDC GLUCOMTR-MCNC: 272 MG/DL (ref 70–130)
GLUCOSE SERPL-MCNC: 243 MG/DL (ref 65–99)
HCT VFR BLD AUTO: 27.9 % (ref 37.5–51)
HGB BLD-MCNC: 8.4 G/DL (ref 13–17.7)
MCH RBC QN AUTO: 30.2 PG (ref 26.6–33)
MCHC RBC AUTO-ENTMCNC: 30.1 G/DL (ref 31.5–35.7)
MCV RBC AUTO: 100.4 FL (ref 79–97)
PLATELET # BLD AUTO: 209 10*3/MM3 (ref 140–450)
PMV BLD AUTO: 10.5 FL (ref 6–12)
POTASSIUM SERPL-SCNC: 3.6 MMOL/L (ref 3.5–5.2)
RBC # BLD AUTO: 2.78 10*6/MM3 (ref 4.14–5.8)
SODIUM SERPL-SCNC: 141 MMOL/L (ref 136–145)
WBC # BLD AUTO: 9.58 10*3/MM3 (ref 3.4–10.8)

## 2021-05-07 PROCEDURE — 63710000001 INSULIN REGULAR HUMAN PER 5 UNITS: Performed by: NURSE PRACTITIONER

## 2021-05-07 PROCEDURE — 25010000002 CEFTAROLINE FOSAMIL PER 10 MG: Performed by: NURSE PRACTITIONER

## 2021-05-07 PROCEDURE — 82962 GLUCOSE BLOOD TEST: CPT

## 2021-05-07 PROCEDURE — 63710000001 INSULIN DETEMIR PER 5 UNITS: Performed by: NURSE PRACTITIONER

## 2021-05-07 PROCEDURE — 63710000001 INSULIN REGULAR HUMAN PER 5 UNITS: Performed by: INTERNAL MEDICINE

## 2021-05-07 PROCEDURE — 94660 CPAP INITIATION&MGMT: CPT

## 2021-05-07 PROCEDURE — 74230 X-RAY XM SWLNG FUNCJ C+: CPT

## 2021-05-07 PROCEDURE — 94799 UNLISTED PULMONARY SVC/PX: CPT

## 2021-05-07 PROCEDURE — 99232 SBSQ HOSP IP/OBS MODERATE 35: CPT | Performed by: NURSE PRACTITIONER

## 2021-05-07 PROCEDURE — 85027 COMPLETE CBC AUTOMATED: CPT | Performed by: INTERNAL MEDICINE

## 2021-05-07 PROCEDURE — 92611 MOTION FLUOROSCOPY/SWALLOW: CPT

## 2021-05-07 PROCEDURE — 80048 BASIC METABOLIC PNL TOTAL CA: CPT | Performed by: INTERNAL MEDICINE

## 2021-05-07 PROCEDURE — 25010000002 ACYCLOVIR PER 5 MG: Performed by: INTERNAL MEDICINE

## 2021-05-07 PROCEDURE — 25010000002 CEFTAROLINE FOSAMIL PER 10 MG: Performed by: INTERNAL MEDICINE

## 2021-05-07 PROCEDURE — 25010000002 ACYCLOVIR PER 5 MG: Performed by: NURSE PRACTITIONER

## 2021-05-07 RX ORDER — NYSTATIN 100000 [USP'U]/G
POWDER TOPICAL EVERY 12 HOURS SCHEDULED
Status: DISCONTINUED | OUTPATIENT
Start: 2021-05-07 | End: 2021-05-20 | Stop reason: HOSPADM

## 2021-05-07 RX ADMIN — APIXABAN 5 MG: 5 TABLET, FILM COATED ORAL at 08:04

## 2021-05-07 RX ADMIN — LEVOTHYROXINE SODIUM 75 MCG: 20 INJECTION, SOLUTION INTRAVENOUS at 11:54

## 2021-05-07 RX ADMIN — INSULIN DETEMIR 30 UNITS: 100 INJECTION, SOLUTION SUBCUTANEOUS at 21:45

## 2021-05-07 RX ADMIN — CITALOPRAM HYDROBROMIDE 20 MG: 20 TABLET ORAL at 08:04

## 2021-05-07 RX ADMIN — BARIUM SULFATE 20 ML: 400 PASTE ORAL at 10:44

## 2021-05-07 RX ADMIN — SODIUM CHLORIDE, PRESERVATIVE FREE 10 ML: 5 INJECTION INTRAVENOUS at 21:46

## 2021-05-07 RX ADMIN — BARIUM SULFATE 100 ML: 0.81 POWDER, FOR SUSPENSION ORAL at 10:44

## 2021-05-07 RX ADMIN — INSULIN HUMAN 3 UNITS: 100 INJECTION, SOLUTION PARENTERAL at 18:02

## 2021-05-07 RX ADMIN — PANTOPRAZOLE SODIUM 40 MG: 40 INJECTION, POWDER, FOR SOLUTION INTRAVENOUS at 06:26

## 2021-05-07 RX ADMIN — HYDROCODONE BITARTRATE AND ACETAMINOPHEN 1 TABLET: 5; 325 TABLET ORAL at 22:37

## 2021-05-07 RX ADMIN — SODIUM CHLORIDE, PRESERVATIVE FREE 10 ML: 5 INJECTION INTRAVENOUS at 08:03

## 2021-05-07 RX ADMIN — ATORVASTATIN CALCIUM 40 MG: 40 TABLET, FILM COATED ORAL at 21:46

## 2021-05-07 RX ADMIN — INSULIN HUMAN 10 UNITS: 100 INJECTION, SOLUTION PARENTERAL at 00:23

## 2021-05-07 RX ADMIN — RIVASTIGMINE TRANSDERMAL SYSTEM 1 PATCH: 4.6 PATCH, EXTENDED RELEASE TRANSDERMAL at 08:05

## 2021-05-07 RX ADMIN — INSULIN HUMAN 10 UNITS: 100 INJECTION, SOLUTION PARENTERAL at 12:06

## 2021-05-07 RX ADMIN — INSULIN HUMAN 8 UNITS: 100 INJECTION, SOLUTION PARENTERAL at 00:24

## 2021-05-07 RX ADMIN — CEFTAROLINE FOSAMIL 600 MG: 600 POWDER, FOR SOLUTION INTRAVENOUS at 11:54

## 2021-05-07 RX ADMIN — CEFTAROLINE FOSAMIL 600 MG: 600 POWDER, FOR SOLUTION INTRAVENOUS at 23:11

## 2021-05-07 RX ADMIN — INSULIN HUMAN 10 UNITS: 100 INJECTION, SOLUTION PARENTERAL at 06:27

## 2021-05-07 RX ADMIN — PREGABALIN 50 MG: 50 CAPSULE ORAL at 08:04

## 2021-05-07 RX ADMIN — INSULIN HUMAN 15 UNITS: 100 INJECTION, SOLUTION PARENTERAL at 18:02

## 2021-05-07 RX ADMIN — ACYCLOVIR SODIUM 900 MG: 500 INJECTION, SOLUTION INTRAVENOUS at 23:22

## 2021-05-07 RX ADMIN — HYDROCODONE BITARTRATE AND ACETAMINOPHEN 1 TABLET: 5; 325 TABLET ORAL at 06:27

## 2021-05-07 RX ADMIN — NYSTATIN: 100000 POWDER TOPICAL at 23:07

## 2021-05-07 RX ADMIN — NYSTATIN: 100000 CREAM TOPICAL at 08:05

## 2021-05-07 RX ADMIN — ACYCLOVIR SODIUM 900 MG: 500 INJECTION, SOLUTION INTRAVENOUS at 00:23

## 2021-05-07 RX ADMIN — METOPROLOL TARTRATE 50 MG: 50 TABLET, FILM COATED ORAL at 08:04

## 2021-05-07 RX ADMIN — AMLODIPINE BESYLATE 5 MG: 5 TABLET ORAL at 08:04

## 2021-05-07 RX ADMIN — METOPROLOL TARTRATE 50 MG: 50 TABLET, FILM COATED ORAL at 21:46

## 2021-05-07 RX ADMIN — BARIUM SULFATE 50 ML: 400 SUSPENSION ORAL at 10:44

## 2021-05-07 RX ADMIN — RISPERIDONE 1 MG: 1 SOLUTION ORAL at 23:44

## 2021-05-07 RX ADMIN — APIXABAN 5 MG: 5 TABLET, FILM COATED ORAL at 21:46

## 2021-05-07 RX ADMIN — INSULIN HUMAN 8 UNITS: 100 INJECTION, SOLUTION PARENTERAL at 06:26

## 2021-05-07 RX ADMIN — PREGABALIN 50 MG: 50 CAPSULE ORAL at 21:46

## 2021-05-07 RX ADMIN — INSULIN HUMAN 5 UNITS: 100 INJECTION, SOLUTION PARENTERAL at 12:05

## 2021-05-07 RX ADMIN — ACYCLOVIR SODIUM 900 MG: 500 INJECTION, SOLUTION INTRAVENOUS at 11:54

## 2021-05-08 LAB
ANION GAP SERPL CALCULATED.3IONS-SCNC: 10 MMOL/L (ref 5–15)
BACTERIA SPEC AEROBE CULT: NORMAL
BUN SERPL-MCNC: 42 MG/DL (ref 8–23)
BUN/CREAT SERPL: 29.4 (ref 7–25)
CALCIUM SPEC-SCNC: 9 MG/DL (ref 8.6–10.5)
CHLORIDE SERPL-SCNC: 106 MMOL/L (ref 98–107)
CO2 SERPL-SCNC: 26 MMOL/L (ref 22–29)
CREAT SERPL-MCNC: 1.43 MG/DL (ref 0.76–1.27)
DEPRECATED RDW RBC AUTO: 47.8 FL (ref 37–54)
ERYTHROCYTE [DISTWIDTH] IN BLOOD BY AUTOMATED COUNT: 13.7 % (ref 12.3–15.4)
GFR SERPL CREATININE-BSD FRML MDRD: 48 ML/MIN/1.73
GLUCOSE BLDC GLUCOMTR-MCNC: 152 MG/DL (ref 70–130)
GLUCOSE BLDC GLUCOMTR-MCNC: 174 MG/DL (ref 70–130)
GLUCOSE BLDC GLUCOMTR-MCNC: 189 MG/DL (ref 70–130)
GLUCOSE BLDC GLUCOMTR-MCNC: 74 MG/DL (ref 70–130)
GLUCOSE SERPL-MCNC: 257 MG/DL (ref 65–99)
HCT VFR BLD AUTO: 28.6 % (ref 37.5–51)
HGB BLD-MCNC: 9 G/DL (ref 13–17.7)
MCH RBC QN AUTO: 30.9 PG (ref 26.6–33)
MCHC RBC AUTO-ENTMCNC: 31.5 G/DL (ref 31.5–35.7)
MCV RBC AUTO: 98.3 FL (ref 79–97)
PLATELET # BLD AUTO: 243 10*3/MM3 (ref 140–450)
PMV BLD AUTO: 11.1 FL (ref 6–12)
POTASSIUM SERPL-SCNC: 4.1 MMOL/L (ref 3.5–5.2)
RBC # BLD AUTO: 2.91 10*6/MM3 (ref 4.14–5.8)
SODIUM SERPL-SCNC: 142 MMOL/L (ref 136–145)
WBC # BLD AUTO: 11.25 10*3/MM3 (ref 3.4–10.8)

## 2021-05-08 PROCEDURE — 85027 COMPLETE CBC AUTOMATED: CPT | Performed by: NURSE PRACTITIONER

## 2021-05-08 PROCEDURE — 97530 THERAPEUTIC ACTIVITIES: CPT

## 2021-05-08 PROCEDURE — 63710000001 INSULIN REGULAR HUMAN PER 5 UNITS: Performed by: NURSE PRACTITIONER

## 2021-05-08 PROCEDURE — 25010000002 CEFTAROLINE FOSAMIL PER 10 MG: Performed by: NURSE PRACTITIONER

## 2021-05-08 PROCEDURE — 25010000002 ACYCLOVIR PER 5 MG: Performed by: NURSE PRACTITIONER

## 2021-05-08 PROCEDURE — 63710000001 INSULIN REGULAR HUMAN PER 5 UNITS: Performed by: INTERNAL MEDICINE

## 2021-05-08 PROCEDURE — 63710000001 INSULIN DETEMIR PER 5 UNITS: Performed by: NURSE PRACTITIONER

## 2021-05-08 PROCEDURE — 82962 GLUCOSE BLOOD TEST: CPT

## 2021-05-08 PROCEDURE — 99233 SBSQ HOSP IP/OBS HIGH 50: CPT | Performed by: INTERNAL MEDICINE

## 2021-05-08 PROCEDURE — 80048 BASIC METABOLIC PNL TOTAL CA: CPT | Performed by: NURSE PRACTITIONER

## 2021-05-08 RX ORDER — CHOLECALCIFEROL (VITAMIN D3) 125 MCG
10 CAPSULE ORAL NIGHTLY
Status: DISCONTINUED | OUTPATIENT
Start: 2021-05-08 | End: 2021-05-20 | Stop reason: HOSPADM

## 2021-05-08 RX ORDER — DIPHENHYDRAMINE HYDROCHLORIDE 50 MG/ML
12.5 INJECTION INTRAMUSCULAR; INTRAVENOUS NIGHTLY PRN
Status: DISCONTINUED | OUTPATIENT
Start: 2021-05-08 | End: 2021-05-12

## 2021-05-08 RX ADMIN — INSULIN HUMAN 15 UNITS: 100 INJECTION, SOLUTION PARENTERAL at 06:17

## 2021-05-08 RX ADMIN — PREGABALIN 50 MG: 50 CAPSULE ORAL at 09:02

## 2021-05-08 RX ADMIN — RIVASTIGMINE TRANSDERMAL SYSTEM 1 PATCH: 4.6 PATCH, EXTENDED RELEASE TRANSDERMAL at 09:03

## 2021-05-08 RX ADMIN — HYDROCODONE BITARTRATE AND ACETAMINOPHEN 1 TABLET: 5; 325 TABLET ORAL at 22:51

## 2021-05-08 RX ADMIN — PANTOPRAZOLE SODIUM 40 MG: 40 INJECTION, POWDER, FOR SOLUTION INTRAVENOUS at 06:18

## 2021-05-08 RX ADMIN — METOPROLOL TARTRATE 50 MG: 50 TABLET, FILM COATED ORAL at 21:13

## 2021-05-08 RX ADMIN — SODIUM CHLORIDE, PRESERVATIVE FREE 10 ML: 5 INJECTION INTRAVENOUS at 21:13

## 2021-05-08 RX ADMIN — CITALOPRAM HYDROBROMIDE 20 MG: 20 TABLET ORAL at 09:02

## 2021-05-08 RX ADMIN — INSULIN DETEMIR 30 UNITS: 100 INJECTION, SOLUTION SUBCUTANEOUS at 09:50

## 2021-05-08 RX ADMIN — INSULIN HUMAN 3 UNITS: 100 INJECTION, SOLUTION PARENTERAL at 09:10

## 2021-05-08 RX ADMIN — HYDROCODONE BITARTRATE AND ACETAMINOPHEN 1 TABLET: 5; 325 TABLET ORAL at 15:26

## 2021-05-08 RX ADMIN — PREGABALIN 50 MG: 50 CAPSULE ORAL at 21:13

## 2021-05-08 RX ADMIN — SODIUM CHLORIDE, PRESERVATIVE FREE 10 ML: 5 INJECTION INTRAVENOUS at 09:03

## 2021-05-08 RX ADMIN — INSULIN HUMAN 3 UNITS: 100 INJECTION, SOLUTION PARENTERAL at 11:56

## 2021-05-08 RX ADMIN — RISPERIDONE 1 MG: 1 SOLUTION ORAL at 21:13

## 2021-05-08 RX ADMIN — ACYCLOVIR SODIUM 900 MG: 500 INJECTION, SOLUTION INTRAVENOUS at 11:56

## 2021-05-08 RX ADMIN — LEVOTHYROXINE SODIUM 75 MCG: 20 INJECTION, SOLUTION INTRAVENOUS at 11:57

## 2021-05-08 RX ADMIN — ATORVASTATIN CALCIUM 40 MG: 40 TABLET, FILM COATED ORAL at 21:13

## 2021-05-08 RX ADMIN — NYSTATIN: 100000 POWDER TOPICAL at 09:10

## 2021-05-08 RX ADMIN — METOPROLOL TARTRATE 50 MG: 50 TABLET, FILM COATED ORAL at 09:02

## 2021-05-08 RX ADMIN — Medication 10 MG: at 21:13

## 2021-05-08 RX ADMIN — INSULIN HUMAN 20 UNITS: 100 INJECTION, SOLUTION PARENTERAL at 11:57

## 2021-05-08 RX ADMIN — NYSTATIN 1 APPLICATION: 100000 POWDER TOPICAL at 21:14

## 2021-05-08 RX ADMIN — APIXABAN 5 MG: 5 TABLET, FILM COATED ORAL at 09:03

## 2021-05-08 RX ADMIN — CEFTAROLINE FOSAMIL 600 MG: 600 POWDER, FOR SOLUTION INTRAVENOUS at 11:56

## 2021-05-08 RX ADMIN — INSULIN DETEMIR 30 UNITS: 100 INJECTION, SOLUTION SUBCUTANEOUS at 23:00

## 2021-05-08 RX ADMIN — APIXABAN 5 MG: 5 TABLET, FILM COATED ORAL at 21:13

## 2021-05-08 RX ADMIN — AMLODIPINE BESYLATE 5 MG: 5 TABLET ORAL at 09:02

## 2021-05-08 RX ADMIN — INSULIN HUMAN 15 UNITS: 100 INJECTION, SOLUTION PARENTERAL at 01:02

## 2021-05-09 LAB
ALBUMIN SERPL-MCNC: 2.2 G/DL (ref 3.5–5.2)
ANION GAP SERPL CALCULATED.3IONS-SCNC: 6 MMOL/L (ref 5–15)
BASOPHILS # BLD AUTO: 0.09 10*3/MM3 (ref 0–0.2)
BASOPHILS NFR BLD AUTO: 0.8 % (ref 0–1.5)
BUN SERPL-MCNC: 40 MG/DL (ref 8–23)
BUN/CREAT SERPL: 25 (ref 7–25)
CALCIUM SPEC-SCNC: 8.4 MG/DL (ref 8.6–10.5)
CHLORIDE SERPL-SCNC: 106 MMOL/L (ref 98–107)
CO2 SERPL-SCNC: 28 MMOL/L (ref 22–29)
CREAT SERPL-MCNC: 1.6 MG/DL (ref 0.76–1.27)
DEPRECATED RDW RBC AUTO: 49.3 FL (ref 37–54)
EOSINOPHIL # BLD AUTO: 0.44 10*3/MM3 (ref 0–0.4)
EOSINOPHIL NFR BLD AUTO: 4 % (ref 0.3–6.2)
ERYTHROCYTE [DISTWIDTH] IN BLOOD BY AUTOMATED COUNT: 14 % (ref 12.3–15.4)
GFR SERPL CREATININE-BSD FRML MDRD: 42 ML/MIN/1.73
GLUCOSE BLDC GLUCOMTR-MCNC: 139 MG/DL (ref 70–130)
GLUCOSE BLDC GLUCOMTR-MCNC: 153 MG/DL (ref 70–130)
GLUCOSE BLDC GLUCOMTR-MCNC: 212 MG/DL (ref 70–130)
GLUCOSE BLDC GLUCOMTR-MCNC: 246 MG/DL (ref 70–130)
GLUCOSE BLDC GLUCOMTR-MCNC: 274 MG/DL (ref 70–130)
GLUCOSE BLDC GLUCOMTR-MCNC: 285 MG/DL (ref 70–130)
GLUCOSE BLDC GLUCOMTR-MCNC: 59 MG/DL (ref 70–130)
GLUCOSE BLDC GLUCOMTR-MCNC: 74 MG/DL (ref 70–130)
GLUCOSE SERPL-MCNC: 246 MG/DL (ref 65–99)
HCT VFR BLD AUTO: 26.1 % (ref 37.5–51)
HGB BLD-MCNC: 7.9 G/DL (ref 13–17.7)
IMM GRANULOCYTES # BLD AUTO: 0.15 10*3/MM3 (ref 0–0.05)
IMM GRANULOCYTES NFR BLD AUTO: 1.4 % (ref 0–0.5)
LYMPHOCYTES # BLD AUTO: 1.01 10*3/MM3 (ref 0.7–3.1)
LYMPHOCYTES NFR BLD AUTO: 9.1 % (ref 19.6–45.3)
MCH RBC QN AUTO: 30.3 PG (ref 26.6–33)
MCHC RBC AUTO-ENTMCNC: 30.3 G/DL (ref 31.5–35.7)
MCV RBC AUTO: 100 FL (ref 79–97)
MONOCYTES # BLD AUTO: 1.11 10*3/MM3 (ref 0.1–0.9)
MONOCYTES NFR BLD AUTO: 10 % (ref 5–12)
NEUTROPHILS NFR BLD AUTO: 74.7 % (ref 42.7–76)
NEUTROPHILS NFR BLD AUTO: 8.31 10*3/MM3 (ref 1.7–7)
NRBC BLD AUTO-RTO: 0 /100 WBC (ref 0–0.2)
PHOSPHATE SERPL-MCNC: 3.7 MG/DL (ref 2.5–4.5)
PLATELET # BLD AUTO: 233 10*3/MM3 (ref 140–450)
PMV BLD AUTO: 10.8 FL (ref 6–12)
POTASSIUM SERPL-SCNC: 4.1 MMOL/L (ref 3.5–5.2)
RBC # BLD AUTO: 2.61 10*6/MM3 (ref 4.14–5.8)
SODIUM SERPL-SCNC: 140 MMOL/L (ref 136–145)
WBC # BLD AUTO: 11.11 10*3/MM3 (ref 3.4–10.8)

## 2021-05-09 PROCEDURE — 97110 THERAPEUTIC EXERCISES: CPT

## 2021-05-09 PROCEDURE — 80069 RENAL FUNCTION PANEL: CPT | Performed by: INTERNAL MEDICINE

## 2021-05-09 PROCEDURE — 25010000002 CEFTAROLINE FOSAMIL PER 10 MG: Performed by: NURSE PRACTITIONER

## 2021-05-09 PROCEDURE — 63710000001 INSULIN REGULAR HUMAN PER 5 UNITS: Performed by: NURSE PRACTITIONER

## 2021-05-09 PROCEDURE — 63710000001 INSULIN DETEMIR PER 5 UNITS: Performed by: NURSE PRACTITIONER

## 2021-05-09 PROCEDURE — 97535 SELF CARE MNGMENT TRAINING: CPT

## 2021-05-09 PROCEDURE — 82962 GLUCOSE BLOOD TEST: CPT

## 2021-05-09 PROCEDURE — 99233 SBSQ HOSP IP/OBS HIGH 50: CPT | Performed by: INTERNAL MEDICINE

## 2021-05-09 PROCEDURE — 25010000002 ACYCLOVIR PER 5 MG: Performed by: NURSE PRACTITIONER

## 2021-05-09 PROCEDURE — 97530 THERAPEUTIC ACTIVITIES: CPT

## 2021-05-09 PROCEDURE — 85025 COMPLETE CBC W/AUTO DIFF WBC: CPT | Performed by: INTERNAL MEDICINE

## 2021-05-09 PROCEDURE — 63710000001 INSULIN REGULAR HUMAN PER 5 UNITS: Performed by: INTERNAL MEDICINE

## 2021-05-09 RX ORDER — PREGABALIN 50 MG/1
50 CAPSULE ORAL NIGHTLY
Status: DISCONTINUED | OUTPATIENT
Start: 2021-05-09 | End: 2021-05-20 | Stop reason: HOSPADM

## 2021-05-09 RX ADMIN — NYSTATIN: 100000 POWDER TOPICAL at 21:06

## 2021-05-09 RX ADMIN — METOPROLOL TARTRATE 50 MG: 50 TABLET, FILM COATED ORAL at 21:07

## 2021-05-09 RX ADMIN — ATORVASTATIN CALCIUM 40 MG: 40 TABLET, FILM COATED ORAL at 21:07

## 2021-05-09 RX ADMIN — INSULIN HUMAN 20 UNITS: 100 INJECTION, SOLUTION PARENTERAL at 06:38

## 2021-05-09 RX ADMIN — METOPROLOL TARTRATE 50 MG: 50 TABLET, FILM COATED ORAL at 08:42

## 2021-05-09 RX ADMIN — ACYCLOVIR SODIUM 900 MG: 500 INJECTION, SOLUTION INTRAVENOUS at 12:36

## 2021-05-09 RX ADMIN — PREGABALIN 50 MG: 50 CAPSULE ORAL at 21:07

## 2021-05-09 RX ADMIN — Medication 10 MG: at 21:07

## 2021-05-09 RX ADMIN — INSULIN HUMAN 8 UNITS: 100 INJECTION, SOLUTION PARENTERAL at 06:38

## 2021-05-09 RX ADMIN — ACYCLOVIR SODIUM 900 MG: 500 INJECTION, SOLUTION INTRAVENOUS at 00:45

## 2021-05-09 RX ADMIN — PANTOPRAZOLE SODIUM 40 MG: 40 INJECTION, POWDER, FOR SOLUTION INTRAVENOUS at 06:38

## 2021-05-09 RX ADMIN — SODIUM CHLORIDE, PRESERVATIVE FREE 10 ML: 5 INJECTION INTRAVENOUS at 21:07

## 2021-05-09 RX ADMIN — HYDROCODONE BITARTRATE AND ACETAMINOPHEN 1 TABLET: 5; 325 TABLET ORAL at 18:04

## 2021-05-09 RX ADMIN — CITALOPRAM HYDROBROMIDE 20 MG: 20 TABLET ORAL at 08:42

## 2021-05-09 RX ADMIN — LEVOTHYROXINE SODIUM 75 MCG: 20 INJECTION, SOLUTION INTRAVENOUS at 12:36

## 2021-05-09 RX ADMIN — CEFTAROLINE FOSAMIL 600 MG: 600 POWDER, FOR SOLUTION INTRAVENOUS at 00:45

## 2021-05-09 RX ADMIN — SALINE NASAL SPRAY 2 SPRAY: 1.5 SOLUTION NASAL at 08:42

## 2021-05-09 RX ADMIN — INSULIN HUMAN 20 UNITS: 100 INJECTION, SOLUTION PARENTERAL at 12:35

## 2021-05-09 RX ADMIN — INSULIN DETEMIR 30 UNITS: 100 INJECTION, SOLUTION SUBCUTANEOUS at 21:08

## 2021-05-09 RX ADMIN — CEFTAROLINE FOSAMIL 600 MG: 600 POWDER, FOR SOLUTION INTRAVENOUS at 12:35

## 2021-05-09 RX ADMIN — INSULIN HUMAN 3 UNITS: 100 INJECTION, SOLUTION PARENTERAL at 12:35

## 2021-05-09 RX ADMIN — AMLODIPINE BESYLATE 5 MG: 5 TABLET ORAL at 08:42

## 2021-05-09 RX ADMIN — APIXABAN 5 MG: 5 TABLET, FILM COATED ORAL at 08:42

## 2021-05-09 RX ADMIN — NYSTATIN: 100000 POWDER TOPICAL at 08:43

## 2021-05-09 RX ADMIN — RISPERIDONE 1 MG: 1 SOLUTION ORAL at 21:08

## 2021-05-09 RX ADMIN — INSULIN DETEMIR 30 UNITS: 100 INJECTION, SOLUTION SUBCUTANEOUS at 09:00

## 2021-05-09 RX ADMIN — PREGABALIN 50 MG: 50 CAPSULE ORAL at 08:42

## 2021-05-09 RX ADMIN — RIVASTIGMINE TRANSDERMAL SYSTEM 1 PATCH: 4.6 PATCH, EXTENDED RELEASE TRANSDERMAL at 08:41

## 2021-05-09 RX ADMIN — HYDROCODONE BITARTRATE AND ACETAMINOPHEN 1 TABLET: 5; 325 TABLET ORAL at 08:47

## 2021-05-09 RX ADMIN — APIXABAN 5 MG: 5 TABLET, FILM COATED ORAL at 21:07

## 2021-05-10 LAB
ALBUMIN SERPL-MCNC: 2.2 G/DL (ref 3.5–5.2)
ALP SERPL-CCNC: 107 U/L (ref 39–117)
ALT SERPL W P-5'-P-CCNC: 40 U/L (ref 1–41)
ANION GAP SERPL CALCULATED.3IONS-SCNC: 8 MMOL/L (ref 5–15)
AST SERPL-CCNC: 30 U/L (ref 1–40)
BACTERIA UR QL AUTO: ABNORMAL /HPF
BASOPHILS # BLD AUTO: 0.1 10*3/MM3 (ref 0–0.2)
BASOPHILS NFR BLD AUTO: 0.8 % (ref 0–1.5)
BILIRUB SERPL-MCNC: 0.6 MG/DL (ref 0–1.2)
BILIRUB UR QL STRIP: NEGATIVE
BUN SERPL-MCNC: 41 MG/DL (ref 8–23)
CALCIUM SPEC-SCNC: 8.5 MG/DL (ref 8.6–10.5)
CHLORIDE SERPL-SCNC: 101 MMOL/L (ref 98–107)
CHOLEST SERPL-MCNC: 72 MG/DL (ref 0–200)
CLARITY UR: CLEAR
CO2 SERPL-SCNC: 25 MMOL/L (ref 22–29)
COLOR UR: YELLOW
CREAT SERPL-MCNC: 1.6 MG/DL (ref 0.76–1.27)
CRP SERPL-MCNC: 8.52 MG/DL (ref 0–0.5)
DEPRECATED RDW RBC AUTO: 49.1 FL (ref 37–54)
EOSINOPHIL # BLD AUTO: 0.57 10*3/MM3 (ref 0–0.4)
EOSINOPHIL NFR BLD AUTO: 4.7 % (ref 0.3–6.2)
ERYTHROCYTE [DISTWIDTH] IN BLOOD BY AUTOMATED COUNT: 13.9 % (ref 12.3–15.4)
GLUCOSE BLDC GLUCOMTR-MCNC: 143 MG/DL (ref 70–130)
GLUCOSE BLDC GLUCOMTR-MCNC: 170 MG/DL (ref 70–130)
GLUCOSE BLDC GLUCOMTR-MCNC: 204 MG/DL (ref 70–130)
GLUCOSE BLDC GLUCOMTR-MCNC: 238 MG/DL (ref 70–130)
GLUCOSE BLDC GLUCOMTR-MCNC: 78 MG/DL (ref 70–130)
GLUCOSE SERPL-MCNC: 138 MG/DL (ref 65–99)
GLUCOSE UR STRIP-MCNC: NEGATIVE MG/DL
HCT VFR BLD AUTO: 25 % (ref 37.5–51)
HGB BLD-MCNC: 7.8 G/DL (ref 13–17.7)
HGB UR QL STRIP.AUTO: ABNORMAL
HYALINE CASTS UR QL AUTO: ABNORMAL /LPF
IMM GRANULOCYTES # BLD AUTO: 0.12 10*3/MM3 (ref 0–0.05)
IMM GRANULOCYTES NFR BLD AUTO: 1 % (ref 0–0.5)
KETONES UR QL STRIP: NEGATIVE
LEUKOCYTE ESTERASE UR QL STRIP.AUTO: ABNORMAL
LYMPHOCYTES # BLD AUTO: 0.89 10*3/MM3 (ref 0.7–3.1)
LYMPHOCYTES NFR BLD AUTO: 7.3 % (ref 19.6–45.3)
MAGNESIUM SERPL-MCNC: 2.1 MG/DL (ref 1.6–2.4)
MCH RBC QN AUTO: 31.3 PG (ref 26.6–33)
MCHC RBC AUTO-ENTMCNC: 31.2 G/DL (ref 31.5–35.7)
MCV RBC AUTO: 100.4 FL (ref 79–97)
MONOCYTES # BLD AUTO: 1.09 10*3/MM3 (ref 0.1–0.9)
MONOCYTES NFR BLD AUTO: 9 % (ref 5–12)
NEUTROPHILS NFR BLD AUTO: 77.2 % (ref 42.7–76)
NEUTROPHILS NFR BLD AUTO: 9.4 10*3/MM3 (ref 1.7–7)
NITRITE UR QL STRIP: NEGATIVE
NRBC BLD AUTO-RTO: 0 /100 WBC (ref 0–0.2)
PH UR STRIP.AUTO: 5.5 [PH] (ref 5–8)
PHOSPHATE SERPL-MCNC: 3.9 MG/DL (ref 2.5–4.5)
PLATELET # BLD AUTO: 242 10*3/MM3 (ref 140–450)
PMV BLD AUTO: 10.8 FL (ref 6–12)
POTASSIUM SERPL-SCNC: 4.1 MMOL/L (ref 3.5–5.2)
PREALB SERPL-MCNC: 10.2 MG/DL (ref 20–40)
PROT SERPL-MCNC: 5.5 G/DL (ref 6–8.5)
PROT UR QL STRIP: ABNORMAL
RBC # BLD AUTO: 2.49 10*6/MM3 (ref 4.14–5.8)
RBC # UR: ABNORMAL /HPF
REF LAB TEST METHOD: ABNORMAL
SODIUM SERPL-SCNC: 134 MMOL/L (ref 136–145)
SP GR UR STRIP: 1.02 (ref 1–1.03)
SQUAMOUS #/AREA URNS HPF: ABNORMAL /HPF
TRIGL SERPL-MCNC: 84 MG/DL (ref 0–150)
UROBILINOGEN UR QL STRIP: ABNORMAL
WBC # BLD AUTO: 12.17 10*3/MM3 (ref 3.4–10.8)
WBC UR QL AUTO: ABNORMAL /HPF

## 2021-05-10 PROCEDURE — 83735 ASSAY OF MAGNESIUM: CPT | Performed by: NURSE PRACTITIONER

## 2021-05-10 PROCEDURE — 63710000001 INSULIN REGULAR HUMAN PER 5 UNITS: Performed by: NURSE PRACTITIONER

## 2021-05-10 PROCEDURE — 84134 ASSAY OF PREALBUMIN: CPT | Performed by: NURSE PRACTITIONER

## 2021-05-10 PROCEDURE — 92526 ORAL FUNCTION THERAPY: CPT

## 2021-05-10 PROCEDURE — 97530 THERAPEUTIC ACTIVITIES: CPT

## 2021-05-10 PROCEDURE — 84478 ASSAY OF TRIGLYCERIDES: CPT | Performed by: NURSE PRACTITIONER

## 2021-05-10 PROCEDURE — 87040 BLOOD CULTURE FOR BACTERIA: CPT | Performed by: INTERNAL MEDICINE

## 2021-05-10 PROCEDURE — 81001 URINALYSIS AUTO W/SCOPE: CPT | Performed by: INTERNAL MEDICINE

## 2021-05-10 PROCEDURE — 84100 ASSAY OF PHOSPHORUS: CPT | Performed by: NURSE PRACTITIONER

## 2021-05-10 PROCEDURE — 25010000002 HALOPERIDOL LACTATE PER 5 MG: Performed by: NURSE PRACTITIONER

## 2021-05-10 PROCEDURE — 25010000002 CEFTAROLINE FOSAMIL PER 10 MG: Performed by: NURSE PRACTITIONER

## 2021-05-10 PROCEDURE — 82465 ASSAY BLD/SERUM CHOLESTEROL: CPT | Performed by: NURSE PRACTITIONER

## 2021-05-10 PROCEDURE — 82962 GLUCOSE BLOOD TEST: CPT

## 2021-05-10 PROCEDURE — 99233 SBSQ HOSP IP/OBS HIGH 50: CPT | Performed by: INTERNAL MEDICINE

## 2021-05-10 PROCEDURE — 80053 COMPREHEN METABOLIC PANEL: CPT | Performed by: NURSE PRACTITIONER

## 2021-05-10 PROCEDURE — 25010000002 ACYCLOVIR PER 5 MG: Performed by: NURSE PRACTITIONER

## 2021-05-10 PROCEDURE — 25010000002 DIPHENHYDRAMINE PER 50 MG: Performed by: INTERNAL MEDICINE

## 2021-05-10 PROCEDURE — 85025 COMPLETE CBC W/AUTO DIFF WBC: CPT | Performed by: INTERNAL MEDICINE

## 2021-05-10 PROCEDURE — 63710000001 INSULIN DETEMIR PER 5 UNITS: Performed by: NURSE PRACTITIONER

## 2021-05-10 PROCEDURE — 63710000001 INSULIN REGULAR HUMAN PER 5 UNITS: Performed by: INTERNAL MEDICINE

## 2021-05-10 PROCEDURE — 86140 C-REACTIVE PROTEIN: CPT | Performed by: NURSE PRACTITIONER

## 2021-05-10 RX ORDER — FUROSEMIDE 40 MG/1
80 TABLET ORAL ONCE
Status: COMPLETED | OUTPATIENT
Start: 2021-05-10 | End: 2021-05-10

## 2021-05-10 RX ADMIN — INSULIN HUMAN 3 UNITS: 100 INJECTION, SOLUTION PARENTERAL at 06:51

## 2021-05-10 RX ADMIN — NYSTATIN: 100000 POWDER TOPICAL at 20:21

## 2021-05-10 RX ADMIN — CEFTAROLINE FOSAMIL 600 MG: 600 POWDER, FOR SOLUTION INTRAVENOUS at 00:02

## 2021-05-10 RX ADMIN — ATORVASTATIN CALCIUM 40 MG: 40 TABLET, FILM COATED ORAL at 20:22

## 2021-05-10 RX ADMIN — PREGABALIN 50 MG: 50 CAPSULE ORAL at 20:22

## 2021-05-10 RX ADMIN — HYDROCODONE BITARTRATE AND ACETAMINOPHEN 1 TABLET: 5; 325 TABLET ORAL at 16:03

## 2021-05-10 RX ADMIN — METOPROLOL TARTRATE 50 MG: 50 TABLET, FILM COATED ORAL at 08:08

## 2021-05-10 RX ADMIN — RIVASTIGMINE TRANSDERMAL SYSTEM 1 PATCH: 4.6 PATCH, EXTENDED RELEASE TRANSDERMAL at 08:55

## 2021-05-10 RX ADMIN — INSULIN HUMAN 20 UNITS: 100 INJECTION, SOLUTION PARENTERAL at 06:52

## 2021-05-10 RX ADMIN — INSULIN HUMAN 20 UNITS: 100 INJECTION, SOLUTION PARENTERAL at 18:51

## 2021-05-10 RX ADMIN — METOPROLOL TARTRATE 50 MG: 50 TABLET, FILM COATED ORAL at 20:21

## 2021-05-10 RX ADMIN — NYSTATIN: 100000 POWDER TOPICAL at 08:08

## 2021-05-10 RX ADMIN — CITALOPRAM HYDROBROMIDE 20 MG: 20 TABLET ORAL at 08:08

## 2021-05-10 RX ADMIN — ACYCLOVIR SODIUM 900 MG: 500 INJECTION, SOLUTION INTRAVENOUS at 00:02

## 2021-05-10 RX ADMIN — INSULIN DETEMIR 30 UNITS: 100 INJECTION, SOLUTION SUBCUTANEOUS at 20:22

## 2021-05-10 RX ADMIN — AMLODIPINE BESYLATE 5 MG: 5 TABLET ORAL at 08:08

## 2021-05-10 RX ADMIN — DIPHENHYDRAMINE HYDROCHLORIDE 12.5 MG: 50 INJECTION INTRAMUSCULAR; INTRAVENOUS at 00:01

## 2021-05-10 RX ADMIN — Medication 10 MG: at 20:22

## 2021-05-10 RX ADMIN — RISPERIDONE 1 MG: 1 SOLUTION ORAL at 20:21

## 2021-05-10 RX ADMIN — INSULIN HUMAN 20 UNITS: 100 INJECTION, SOLUTION PARENTERAL at 00:02

## 2021-05-10 RX ADMIN — FUROSEMIDE 80 MG: 40 TABLET ORAL at 12:33

## 2021-05-10 RX ADMIN — APIXABAN 5 MG: 5 TABLET, FILM COATED ORAL at 20:22

## 2021-05-10 RX ADMIN — HALOPERIDOL LACTATE 5 MG: 5 INJECTION, SOLUTION INTRAMUSCULAR at 01:34

## 2021-05-10 RX ADMIN — INSULIN HUMAN 5 UNITS: 100 INJECTION, SOLUTION PARENTERAL at 18:52

## 2021-05-10 RX ADMIN — HYDROCODONE BITARTRATE AND ACETAMINOPHEN 1 TABLET: 5; 325 TABLET ORAL at 08:55

## 2021-05-10 RX ADMIN — APIXABAN 5 MG: 5 TABLET, FILM COATED ORAL at 08:08

## 2021-05-10 RX ADMIN — INSULIN DETEMIR 30 UNITS: 100 INJECTION, SOLUTION SUBCUTANEOUS at 08:28

## 2021-05-11 LAB
ALBUMIN SERPL-MCNC: 2.3 G/DL (ref 3.5–5.2)
ALBUMIN/GLOB SERPL: 0.7 G/DL
ALP SERPL-CCNC: 120 U/L (ref 39–117)
ALT SERPL W P-5'-P-CCNC: 54 U/L (ref 1–41)
ANION GAP SERPL CALCULATED.3IONS-SCNC: 10 MMOL/L (ref 5–15)
AST SERPL-CCNC: 50 U/L (ref 1–40)
BASOPHILS # BLD AUTO: 0.11 10*3/MM3 (ref 0–0.2)
BASOPHILS NFR BLD AUTO: 0.9 % (ref 0–1.5)
BILIRUB SERPL-MCNC: 0.5 MG/DL (ref 0–1.2)
BUN SERPL-MCNC: 43 MG/DL (ref 8–23)
BUN/CREAT SERPL: 25.1 (ref 7–25)
CALCIUM SPEC-SCNC: 8.6 MG/DL (ref 8.6–10.5)
CHLORIDE SERPL-SCNC: 100 MMOL/L (ref 98–107)
CO2 SERPL-SCNC: 26 MMOL/L (ref 22–29)
CREAT SERPL-MCNC: 1.71 MG/DL (ref 0.76–1.27)
D-LACTATE SERPL-SCNC: 1.2 MMOL/L (ref 0.5–2)
DEPRECATED RDW RBC AUTO: 50 FL (ref 37–54)
EOSINOPHIL # BLD AUTO: 0.61 10*3/MM3 (ref 0–0.4)
EOSINOPHIL NFR BLD AUTO: 5 % (ref 0.3–6.2)
ERYTHROCYTE [DISTWIDTH] IN BLOOD BY AUTOMATED COUNT: 14 % (ref 12.3–15.4)
GFR SERPL CREATININE-BSD FRML MDRD: 39 ML/MIN/1.73
GLOBULIN UR ELPH-MCNC: 3.5 GM/DL
GLUCOSE BLDC GLUCOMTR-MCNC: 157 MG/DL (ref 70–130)
GLUCOSE BLDC GLUCOMTR-MCNC: 216 MG/DL (ref 70–130)
GLUCOSE BLDC GLUCOMTR-MCNC: 250 MG/DL (ref 70–130)
GLUCOSE BLDC GLUCOMTR-MCNC: 253 MG/DL (ref 70–130)
GLUCOSE BLDC GLUCOMTR-MCNC: 258 MG/DL (ref 70–130)
GLUCOSE BLDC GLUCOMTR-MCNC: 263 MG/DL (ref 70–130)
GLUCOSE SERPL-MCNC: 232 MG/DL (ref 65–99)
HCT VFR BLD AUTO: 24.7 % (ref 37.5–51)
HGB BLD-MCNC: 7.7 G/DL (ref 13–17.7)
IMM GRANULOCYTES # BLD AUTO: 0.15 10*3/MM3 (ref 0–0.05)
IMM GRANULOCYTES NFR BLD AUTO: 1.2 % (ref 0–0.5)
LYMPHOCYTES # BLD AUTO: 0.95 10*3/MM3 (ref 0.7–3.1)
LYMPHOCYTES NFR BLD AUTO: 7.7 % (ref 19.6–45.3)
MCH RBC QN AUTO: 30.9 PG (ref 26.6–33)
MCHC RBC AUTO-ENTMCNC: 31.2 G/DL (ref 31.5–35.7)
MCV RBC AUTO: 99.2 FL (ref 79–97)
MONOCYTES # BLD AUTO: 1.01 10*3/MM3 (ref 0.1–0.9)
MONOCYTES NFR BLD AUTO: 8.2 % (ref 5–12)
NEUTROPHILS NFR BLD AUTO: 77 % (ref 42.7–76)
NEUTROPHILS NFR BLD AUTO: 9.43 10*3/MM3 (ref 1.7–7)
NRBC BLD AUTO-RTO: 0 /100 WBC (ref 0–0.2)
PLATELET # BLD AUTO: 273 10*3/MM3 (ref 140–450)
PMV BLD AUTO: 10.8 FL (ref 6–12)
POTASSIUM SERPL-SCNC: 4.5 MMOL/L (ref 3.5–5.2)
PROT SERPL-MCNC: 5.8 G/DL (ref 6–8.5)
RBC # BLD AUTO: 2.49 10*6/MM3 (ref 4.14–5.8)
SODIUM SERPL-SCNC: 136 MMOL/L (ref 136–145)
WBC # BLD AUTO: 12.26 10*3/MM3 (ref 3.4–10.8)

## 2021-05-11 PROCEDURE — 82962 GLUCOSE BLOOD TEST: CPT

## 2021-05-11 PROCEDURE — 85025 COMPLETE CBC W/AUTO DIFF WBC: CPT | Performed by: INTERNAL MEDICINE

## 2021-05-11 PROCEDURE — 63710000001 INSULIN DETEMIR PER 5 UNITS: Performed by: NURSE PRACTITIONER

## 2021-05-11 PROCEDURE — 63710000001 INSULIN REGULAR HUMAN PER 5 UNITS: Performed by: INTERNAL MEDICINE

## 2021-05-11 PROCEDURE — 97530 THERAPEUTIC ACTIVITIES: CPT

## 2021-05-11 PROCEDURE — 63710000001 INSULIN REGULAR HUMAN PER 5 UNITS: Performed by: NURSE PRACTITIONER

## 2021-05-11 PROCEDURE — 83605 ASSAY OF LACTIC ACID: CPT | Performed by: INTERNAL MEDICINE

## 2021-05-11 PROCEDURE — 80053 COMPREHEN METABOLIC PANEL: CPT | Performed by: INTERNAL MEDICINE

## 2021-05-11 PROCEDURE — 99233 SBSQ HOSP IP/OBS HIGH 50: CPT | Performed by: INTERNAL MEDICINE

## 2021-05-11 RX ADMIN — APIXABAN 5 MG: 5 TABLET, FILM COATED ORAL at 08:11

## 2021-05-11 RX ADMIN — INSULIN HUMAN 8 UNITS: 100 INJECTION, SOLUTION PARENTERAL at 08:11

## 2021-05-11 RX ADMIN — APIXABAN 5 MG: 5 TABLET, FILM COATED ORAL at 20:49

## 2021-05-11 RX ADMIN — NYSTATIN: 100000 POWDER TOPICAL at 08:12

## 2021-05-11 RX ADMIN — INSULIN DETEMIR 30 UNITS: 100 INJECTION, SOLUTION SUBCUTANEOUS at 21:05

## 2021-05-11 RX ADMIN — RIVASTIGMINE TRANSDERMAL SYSTEM 1 PATCH: 4.6 PATCH, EXTENDED RELEASE TRANSDERMAL at 10:55

## 2021-05-11 RX ADMIN — PANTOPRAZOLE SODIUM 40 MG: 40 INJECTION, POWDER, FOR SOLUTION INTRAVENOUS at 06:23

## 2021-05-11 RX ADMIN — SODIUM CHLORIDE, PRESERVATIVE FREE 10 ML: 5 INJECTION INTRAVENOUS at 08:12

## 2021-05-11 RX ADMIN — Medication 10 MG: at 20:49

## 2021-05-11 RX ADMIN — HYDROCODONE BITARTRATE AND ACETAMINOPHEN 1 TABLET: 5; 325 TABLET ORAL at 08:11

## 2021-05-11 RX ADMIN — INSULIN HUMAN 20 UNITS: 100 INJECTION, SOLUTION PARENTERAL at 12:30

## 2021-05-11 RX ADMIN — PREGABALIN 50 MG: 50 CAPSULE ORAL at 20:49

## 2021-05-11 RX ADMIN — INSULIN HUMAN 3 UNITS: 100 INJECTION, SOLUTION PARENTERAL at 12:30

## 2021-05-11 RX ADMIN — ATORVASTATIN CALCIUM 40 MG: 40 TABLET, FILM COATED ORAL at 20:49

## 2021-05-11 RX ADMIN — NYSTATIN: 100000 POWDER TOPICAL at 20:49

## 2021-05-11 RX ADMIN — INSULIN DETEMIR 30 UNITS: 100 INJECTION, SOLUTION SUBCUTANEOUS at 08:16

## 2021-05-11 RX ADMIN — AMLODIPINE BESYLATE 5 MG: 5 TABLET ORAL at 08:11

## 2021-05-11 RX ADMIN — INSULIN HUMAN 20 UNITS: 100 INJECTION, SOLUTION PARENTERAL at 00:23

## 2021-05-11 RX ADMIN — SODIUM CHLORIDE, PRESERVATIVE FREE 10 ML: 5 INJECTION INTRAVENOUS at 20:49

## 2021-05-11 RX ADMIN — METOPROLOL TARTRATE 50 MG: 50 TABLET, FILM COATED ORAL at 20:49

## 2021-05-11 RX ADMIN — RISPERIDONE 1 MG: 1 SOLUTION ORAL at 20:49

## 2021-05-11 RX ADMIN — CITALOPRAM HYDROBROMIDE 20 MG: 20 TABLET ORAL at 08:11

## 2021-05-11 RX ADMIN — INSULIN HUMAN 20 UNITS: 100 INJECTION, SOLUTION PARENTERAL at 06:23

## 2021-05-11 RX ADMIN — METOPROLOL TARTRATE 50 MG: 50 TABLET, FILM COATED ORAL at 08:12

## 2021-05-12 ENCOUNTER — APPOINTMENT (OUTPATIENT)
Dept: GENERAL RADIOLOGY | Facility: HOSPITAL | Age: 76
End: 2021-05-12

## 2021-05-12 LAB
ANION GAP SERPL CALCULATED.3IONS-SCNC: 9 MMOL/L (ref 5–15)
ARTERIAL PATENCY WRIST A: ABNORMAL
ATMOSPHERIC PRESS: ABNORMAL MM[HG]
BASE EXCESS BLDA CALC-SCNC: 5.4 MMOL/L (ref 0–2)
BDY SITE: ABNORMAL
BODY TEMPERATURE: 37 C
BUN SERPL-MCNC: 47 MG/DL (ref 8–23)
BUN/CREAT SERPL: 27.3 (ref 7–25)
CALCIUM SPEC-SCNC: 8.5 MG/DL (ref 8.6–10.5)
CHLORIDE SERPL-SCNC: 97 MMOL/L (ref 98–107)
CO2 BLDA-SCNC: 29.8 MMOL/L (ref 22–33)
CO2 SERPL-SCNC: 28 MMOL/L (ref 22–29)
COHGB MFR BLD: 1.1 % (ref 0–2)
CREAT SERPL-MCNC: 1.72 MG/DL (ref 0.76–1.27)
DEPRECATED RDW RBC AUTO: 49.5 FL (ref 37–54)
EPAP: 0
ERYTHROCYTE [DISTWIDTH] IN BLOOD BY AUTOMATED COUNT: 14.2 % (ref 12.3–15.4)
GFR SERPL CREATININE-BSD FRML MDRD: 39 ML/MIN/1.73
GLUCOSE BLDC GLUCOMTR-MCNC: 101 MG/DL (ref 70–130)
GLUCOSE BLDC GLUCOMTR-MCNC: 148 MG/DL (ref 70–130)
GLUCOSE BLDC GLUCOMTR-MCNC: 162 MG/DL (ref 70–130)
GLUCOSE BLDC GLUCOMTR-MCNC: 175 MG/DL (ref 70–130)
GLUCOSE BLDC GLUCOMTR-MCNC: 251 MG/DL (ref 70–130)
GLUCOSE BLDC GLUCOMTR-MCNC: 255 MG/DL (ref 70–130)
GLUCOSE BLDC GLUCOMTR-MCNC: 260 MG/DL (ref 70–130)
GLUCOSE SERPL-MCNC: 233 MG/DL (ref 65–99)
HCO3 BLDA-SCNC: 28.7 MMOL/L (ref 20–26)
HCT VFR BLD AUTO: 24.5 % (ref 37.5–51)
HCT VFR BLD CALC: 24.5 %
HGB BLD-MCNC: 7.7 G/DL (ref 13–17.7)
HGB BLDA-MCNC: 8 G/DL (ref 13.5–17.5)
INHALED O2 CONCENTRATION: 28 %
IPAP: 0
MCH RBC QN AUTO: 30.3 PG (ref 26.6–33)
MCHC RBC AUTO-ENTMCNC: 31.4 G/DL (ref 31.5–35.7)
MCV RBC AUTO: 96.5 FL (ref 79–97)
METHGB BLD QL: ABNORMAL
MODALITY: ABNORMAL
NOTE: ABNORMAL
OXYHGB MFR BLDV: 96 % (ref 94–99)
PAW @ PEAK INSP FLOW SETTING VENT: 0 CMH2O
PCO2 BLDA: 35.9 MM HG (ref 35–45)
PCO2 TEMP ADJ BLD: 35.9 MM HG (ref 35–48)
PH BLDA: 7.51 PH UNITS (ref 7.35–7.45)
PH, TEMP CORRECTED: 7.51 PH UNITS
PLATELET # BLD AUTO: 307 10*3/MM3 (ref 140–450)
PMV BLD AUTO: 10.9 FL (ref 6–12)
PO2 BLDA: 67.3 MM HG (ref 83–108)
PO2 TEMP ADJ BLD: 67.3 MM HG (ref 83–108)
POTASSIUM SERPL-SCNC: 4.4 MMOL/L (ref 3.5–5.2)
RBC # BLD AUTO: 2.54 10*6/MM3 (ref 4.14–5.8)
SODIUM SERPL-SCNC: 134 MMOL/L (ref 136–145)
TOTAL RATE: 0 BREATHS/MINUTE
WBC # BLD AUTO: 12.01 10*3/MM3 (ref 3.4–10.8)

## 2021-05-12 PROCEDURE — 97110 THERAPEUTIC EXERCISES: CPT

## 2021-05-12 PROCEDURE — 83050 HGB METHEMOGLOBIN QUAN: CPT

## 2021-05-12 PROCEDURE — 80048 BASIC METABOLIC PNL TOTAL CA: CPT | Performed by: NURSE PRACTITIONER

## 2021-05-12 PROCEDURE — 82962 GLUCOSE BLOOD TEST: CPT

## 2021-05-12 PROCEDURE — 97535 SELF CARE MNGMENT TRAINING: CPT

## 2021-05-12 PROCEDURE — 63710000001 INSULIN REGULAR HUMAN PER 5 UNITS: Performed by: NURSE PRACTITIONER

## 2021-05-12 PROCEDURE — 97530 THERAPEUTIC ACTIVITIES: CPT

## 2021-05-12 PROCEDURE — 63710000001 INSULIN REGULAR HUMAN PER 5 UNITS: Performed by: INTERNAL MEDICINE

## 2021-05-12 PROCEDURE — 63710000001 INSULIN DETEMIR PER 5 UNITS: Performed by: INTERNAL MEDICINE

## 2021-05-12 PROCEDURE — 99232 SBSQ HOSP IP/OBS MODERATE 35: CPT | Performed by: INTERNAL MEDICINE

## 2021-05-12 PROCEDURE — 82375 ASSAY CARBOXYHB QUANT: CPT

## 2021-05-12 PROCEDURE — 92610 EVALUATE SWALLOWING FUNCTION: CPT

## 2021-05-12 PROCEDURE — 63710000001 INSULIN DETEMIR PER 5 UNITS: Performed by: NURSE PRACTITIONER

## 2021-05-12 PROCEDURE — 85027 COMPLETE CBC AUTOMATED: CPT | Performed by: NURSE PRACTITIONER

## 2021-05-12 PROCEDURE — 82805 BLOOD GASES W/O2 SATURATION: CPT

## 2021-05-12 PROCEDURE — 71046 X-RAY EXAM CHEST 2 VIEWS: CPT

## 2021-05-12 PROCEDURE — 36600 WITHDRAWAL OF ARTERIAL BLOOD: CPT

## 2021-05-12 PROCEDURE — 25010000002 HALOPERIDOL LACTATE PER 5 MG: Performed by: NURSE PRACTITIONER

## 2021-05-12 RX ORDER — QUETIAPINE FUMARATE 25 MG/1
12.5 TABLET, FILM COATED ORAL NIGHTLY
Status: DISCONTINUED | OUTPATIENT
Start: 2021-05-12 | End: 2021-05-13

## 2021-05-12 RX ADMIN — INSULIN HUMAN 3 UNITS: 100 INJECTION, SOLUTION PARENTERAL at 23:59

## 2021-05-12 RX ADMIN — METOPROLOL TARTRATE 50 MG: 50 TABLET, FILM COATED ORAL at 09:09

## 2021-05-12 RX ADMIN — RIVASTIGMINE TRANSDERMAL SYSTEM 1 PATCH: 4.6 PATCH, EXTENDED RELEASE TRANSDERMAL at 09:11

## 2021-05-12 RX ADMIN — METOPROLOL TARTRATE 50 MG: 50 TABLET, FILM COATED ORAL at 21:39

## 2021-05-12 RX ADMIN — INSULIN HUMAN 8 UNITS: 100 INJECTION, SOLUTION PARENTERAL at 06:31

## 2021-05-12 RX ADMIN — ATORVASTATIN CALCIUM 40 MG: 40 TABLET, FILM COATED ORAL at 21:39

## 2021-05-12 RX ADMIN — PANTOPRAZOLE SODIUM 40 MG: 40 INJECTION, POWDER, FOR SOLUTION INTRAVENOUS at 06:31

## 2021-05-12 RX ADMIN — SODIUM CHLORIDE, PRESERVATIVE FREE 10 ML: 5 INJECTION INTRAVENOUS at 09:11

## 2021-05-12 RX ADMIN — INSULIN DETEMIR 35 UNITS: 100 INJECTION, SOLUTION SUBCUTANEOUS at 21:39

## 2021-05-12 RX ADMIN — INSULIN HUMAN 8 UNITS: 100 INJECTION, SOLUTION PARENTERAL at 01:00

## 2021-05-12 RX ADMIN — NYSTATIN: 100000 POWDER TOPICAL at 09:10

## 2021-05-12 RX ADMIN — INSULIN HUMAN 20 UNITS: 100 INJECTION, SOLUTION PARENTERAL at 12:17

## 2021-05-12 RX ADMIN — APIXABAN 5 MG: 5 TABLET, FILM COATED ORAL at 09:09

## 2021-05-12 RX ADMIN — PREGABALIN 50 MG: 50 CAPSULE ORAL at 21:39

## 2021-05-12 RX ADMIN — INSULIN HUMAN 20 UNITS: 100 INJECTION, SOLUTION PARENTERAL at 23:59

## 2021-05-12 RX ADMIN — RISPERIDONE 1 MG: 1 SOLUTION ORAL at 21:38

## 2021-05-12 RX ADMIN — QUETIAPINE FUMARATE 12.5 MG: 25 TABLET ORAL at 21:39

## 2021-05-12 RX ADMIN — NYSTATIN: 100000 POWDER TOPICAL at 21:39

## 2021-05-12 RX ADMIN — APIXABAN 5 MG: 5 TABLET, FILM COATED ORAL at 21:39

## 2021-05-12 RX ADMIN — INSULIN HUMAN 20 UNITS: 100 INJECTION, SOLUTION PARENTERAL at 06:31

## 2021-05-12 RX ADMIN — AMLODIPINE BESYLATE 5 MG: 5 TABLET ORAL at 09:09

## 2021-05-12 RX ADMIN — INSULIN HUMAN 8 UNITS: 100 INJECTION, SOLUTION PARENTERAL at 12:18

## 2021-05-12 RX ADMIN — HALOPERIDOL LACTATE 5 MG: 5 INJECTION, SOLUTION INTRAMUSCULAR at 23:59

## 2021-05-12 RX ADMIN — Medication 10 MG: at 21:39

## 2021-05-12 RX ADMIN — SODIUM CHLORIDE, PRESERVATIVE FREE 10 ML: 5 INJECTION INTRAVENOUS at 21:39

## 2021-05-12 RX ADMIN — LEVOTHYROXINE SODIUM 75 MCG: 20 INJECTION, SOLUTION INTRAVENOUS at 11:15

## 2021-05-12 RX ADMIN — INSULIN HUMAN 20 UNITS: 100 INJECTION, SOLUTION PARENTERAL at 01:00

## 2021-05-12 RX ADMIN — CITALOPRAM HYDROBROMIDE 20 MG: 20 TABLET ORAL at 09:09

## 2021-05-12 RX ADMIN — INSULIN DETEMIR 30 UNITS: 100 INJECTION, SOLUTION SUBCUTANEOUS at 09:09

## 2021-05-13 ENCOUNTER — APPOINTMENT (OUTPATIENT)
Dept: NEUROLOGY | Facility: HOSPITAL | Age: 76
End: 2021-05-13

## 2021-05-13 LAB
ANION GAP SERPL CALCULATED.3IONS-SCNC: 10 MMOL/L (ref 5–15)
ARTERIAL PATENCY WRIST A: ABNORMAL
ATMOSPHERIC PRESS: ABNORMAL MM[HG]
BACTERIA UR QL AUTO: ABNORMAL /HPF
BASE EXCESS BLDA CALC-SCNC: 5.3 MMOL/L (ref 0–2)
BDY SITE: ABNORMAL
BILIRUB UR QL STRIP: NEGATIVE
BODY TEMPERATURE: 37 C
BUN SERPL-MCNC: 46 MG/DL (ref 8–23)
BUN/CREAT SERPL: 26 (ref 7–25)
CALCIUM SPEC-SCNC: 8.6 MG/DL (ref 8.6–10.5)
CHLORIDE SERPL-SCNC: 98 MMOL/L (ref 98–107)
CLARITY UR: CLEAR
CO2 BLDA-SCNC: 30.6 MMOL/L (ref 22–33)
CO2 SERPL-SCNC: 26 MMOL/L (ref 22–29)
COHGB MFR BLD: 1.3 % (ref 0–2)
COLOR UR: YELLOW
CORTIS SERPL-MCNC: 11.27 MCG/DL
CREAT SERPL-MCNC: 1.77 MG/DL (ref 0.76–1.27)
D-LACTATE SERPL-SCNC: 1.2 MMOL/L (ref 0.5–2)
DEPRECATED RDW RBC AUTO: 50.7 FL (ref 37–54)
EPAP: 0
ERYTHROCYTE [DISTWIDTH] IN BLOOD BY AUTOMATED COUNT: 14.1 % (ref 12.3–15.4)
GFR SERPL CREATININE-BSD FRML MDRD: 38 ML/MIN/1.73
GLUCOSE BLDC GLUCOMTR-MCNC: 124 MG/DL (ref 70–130)
GLUCOSE BLDC GLUCOMTR-MCNC: 153 MG/DL (ref 70–130)
GLUCOSE BLDC GLUCOMTR-MCNC: 160 MG/DL (ref 70–130)
GLUCOSE BLDC GLUCOMTR-MCNC: 249 MG/DL (ref 70–130)
GLUCOSE BLDC GLUCOMTR-MCNC: 267 MG/DL (ref 70–130)
GLUCOSE SERPL-MCNC: 234 MG/DL (ref 65–99)
GLUCOSE UR STRIP-MCNC: NEGATIVE MG/DL
HCO3 BLDA-SCNC: 29.3 MMOL/L (ref 20–26)
HCT VFR BLD AUTO: 25 % (ref 37.5–51)
HCT VFR BLD CALC: 22.4 %
HGB BLD-MCNC: 7.7 G/DL (ref 13–17.7)
HGB BLDA-MCNC: 7.3 G/DL (ref 13.5–17.5)
HGB UR QL STRIP.AUTO: NEGATIVE
HYALINE CASTS UR QL AUTO: ABNORMAL /LPF
INHALED O2 CONCENTRATION: 30 %
IPAP: 0
KETONES UR QL STRIP: NEGATIVE
LEUKOCYTE ESTERASE UR QL STRIP.AUTO: ABNORMAL
MCH RBC QN AUTO: 30.7 PG (ref 26.6–33)
MCHC RBC AUTO-ENTMCNC: 30.8 G/DL (ref 31.5–35.7)
MCV RBC AUTO: 99.6 FL (ref 79–97)
METHGB BLD QL: 0.5 % (ref 0–1.5)
MODALITY: ABNORMAL
MRSA DNA SPEC QL NAA+PROBE: NEGATIVE
NITRITE UR QL STRIP: NEGATIVE
NOTE: ABNORMAL
OXYHGB MFR BLDV: 92.9 % (ref 94–99)
PAW @ PEAK INSP FLOW SETTING VENT: 0 CMH2O
PCO2 BLDA: 40.1 MM HG (ref 35–45)
PCO2 TEMP ADJ BLD: 40.1 MM HG (ref 35–48)
PH BLDA: 7.47 PH UNITS (ref 7.35–7.45)
PH UR STRIP.AUTO: 6.5 [PH] (ref 5–8)
PH, TEMP CORRECTED: 7.47 PH UNITS
PLATELET # BLD AUTO: 326 10*3/MM3 (ref 140–450)
PMV BLD AUTO: 10.5 FL (ref 6–12)
PO2 BLDA: 68.4 MM HG (ref 83–108)
PO2 TEMP ADJ BLD: 68.4 MM HG (ref 83–108)
POTASSIUM SERPL-SCNC: 4.7 MMOL/L (ref 3.5–5.2)
PROT UR QL STRIP: ABNORMAL
RBC # BLD AUTO: 2.51 10*6/MM3 (ref 4.14–5.8)
RBC # UR: ABNORMAL /HPF
REF LAB TEST METHOD: ABNORMAL
SODIUM SERPL-SCNC: 134 MMOL/L (ref 136–145)
SP GR UR STRIP: 1.02 (ref 1–1.03)
SQUAMOUS #/AREA URNS HPF: ABNORMAL /HPF
T4 FREE SERPL-MCNC: 0.73 NG/DL (ref 0.93–1.7)
TOTAL RATE: 0 BREATHS/MINUTE
TSH SERPL DL<=0.05 MIU/L-ACNC: 59.82 UIU/ML (ref 0.27–4.2)
UROBILINOGEN UR QL STRIP: ABNORMAL
WBC # BLD AUTO: 12.5 10*3/MM3 (ref 3.4–10.8)
WBC UR QL AUTO: ABNORMAL /HPF
YEAST URNS QL MICRO: ABNORMAL /HPF

## 2021-05-13 PROCEDURE — 99233 SBSQ HOSP IP/OBS HIGH 50: CPT | Performed by: INTERNAL MEDICINE

## 2021-05-13 PROCEDURE — 63710000001 INSULIN REGULAR HUMAN PER 5 UNITS: Performed by: NURSE PRACTITIONER

## 2021-05-13 PROCEDURE — 82533 TOTAL CORTISOL: CPT | Performed by: INTERNAL MEDICINE

## 2021-05-13 PROCEDURE — 92610 EVALUATE SWALLOWING FUNCTION: CPT

## 2021-05-13 PROCEDURE — 82375 ASSAY CARBOXYHB QUANT: CPT

## 2021-05-13 PROCEDURE — 63710000001 INSULIN DETEMIR PER 5 UNITS: Performed by: INTERNAL MEDICINE

## 2021-05-13 PROCEDURE — 25010000002 HALOPERIDOL LACTATE PER 5 MG: Performed by: NURSE PRACTITIONER

## 2021-05-13 PROCEDURE — 84443 ASSAY THYROID STIM HORMONE: CPT | Performed by: INTERNAL MEDICINE

## 2021-05-13 PROCEDURE — 82962 GLUCOSE BLOOD TEST: CPT

## 2021-05-13 PROCEDURE — 87086 URINE CULTURE/COLONY COUNT: CPT | Performed by: INTERNAL MEDICINE

## 2021-05-13 PROCEDURE — 36600 WITHDRAWAL OF ARTERIAL BLOOD: CPT

## 2021-05-13 PROCEDURE — 84439 ASSAY OF FREE THYROXINE: CPT | Performed by: INTERNAL MEDICINE

## 2021-05-13 PROCEDURE — 63710000001 INSULIN REGULAR HUMAN PER 5 UNITS: Performed by: INTERNAL MEDICINE

## 2021-05-13 PROCEDURE — 80048 BASIC METABOLIC PNL TOTAL CA: CPT | Performed by: NURSE PRACTITIONER

## 2021-05-13 PROCEDURE — 81001 URINALYSIS AUTO W/SCOPE: CPT | Performed by: INTERNAL MEDICINE

## 2021-05-13 PROCEDURE — 95819 EEG AWAKE AND ASLEEP: CPT

## 2021-05-13 PROCEDURE — 87641 MR-STAPH DNA AMP PROBE: CPT | Performed by: INTERNAL MEDICINE

## 2021-05-13 PROCEDURE — 85027 COMPLETE CBC AUTOMATED: CPT | Performed by: NURSE PRACTITIONER

## 2021-05-13 PROCEDURE — 82805 BLOOD GASES W/O2 SATURATION: CPT

## 2021-05-13 PROCEDURE — 83605 ASSAY OF LACTIC ACID: CPT | Performed by: INTERNAL MEDICINE

## 2021-05-13 PROCEDURE — 25010000002 PIPERACILLIN SOD-TAZOBACTAM PER 1 G: Performed by: INTERNAL MEDICINE

## 2021-05-13 PROCEDURE — 83050 HGB METHEMOGLOBIN QUAN: CPT

## 2021-05-13 PROCEDURE — 87040 BLOOD CULTURE FOR BACTERIA: CPT | Performed by: INTERNAL MEDICINE

## 2021-05-13 RX ORDER — LEVOTHYROXINE SODIUM 20 UG/ML
125 INJECTION, SOLUTION INTRAVENOUS
Status: DISCONTINUED | OUTPATIENT
Start: 2021-05-14 | End: 2021-05-16

## 2021-05-13 RX ORDER — RISPERIDONE 1 MG/ML
2 SOLUTION ORAL NIGHTLY
Status: DISCONTINUED | OUTPATIENT
Start: 2021-05-13 | End: 2021-05-16

## 2021-05-13 RX ORDER — LEVOTHYROXINE SODIUM 20 UG/ML
50 INJECTION, SOLUTION INTRAVENOUS ONCE
Status: COMPLETED | OUTPATIENT
Start: 2021-05-13 | End: 2021-05-13

## 2021-05-13 RX ORDER — DONEPEZIL HYDROCHLORIDE 10 MG/1
10 TABLET, FILM COATED ORAL NIGHTLY
Status: DISCONTINUED | OUTPATIENT
Start: 2021-05-13 | End: 2021-05-18

## 2021-05-13 RX ORDER — CITALOPRAM 20 MG/1
20 TABLET ORAL NIGHTLY
Status: DISCONTINUED | OUTPATIENT
Start: 2021-05-14 | End: 2021-05-16

## 2021-05-13 RX ADMIN — APIXABAN 5 MG: 5 TABLET, FILM COATED ORAL at 22:50

## 2021-05-13 RX ADMIN — Medication 10 MG: at 22:47

## 2021-05-13 RX ADMIN — RIVASTIGMINE TRANSDERMAL SYSTEM 1 PATCH: 4.6 PATCH, EXTENDED RELEASE TRANSDERMAL at 08:49

## 2021-05-13 RX ADMIN — AMLODIPINE BESYLATE 5 MG: 5 TABLET ORAL at 08:48

## 2021-05-13 RX ADMIN — NYSTATIN: 100000 POWDER TOPICAL at 22:51

## 2021-05-13 RX ADMIN — PREGABALIN 50 MG: 50 CAPSULE ORAL at 22:47

## 2021-05-13 RX ADMIN — HALOPERIDOL LACTATE 5 MG: 5 INJECTION, SOLUTION INTRAMUSCULAR at 07:38

## 2021-05-13 RX ADMIN — HALOPERIDOL LACTATE 5 MG: 5 INJECTION, SOLUTION INTRAMUSCULAR at 13:25

## 2021-05-13 RX ADMIN — LEVOTHYROXINE SODIUM 50 MCG: 20 INJECTION, SOLUTION INTRAVENOUS at 17:40

## 2021-05-13 RX ADMIN — RISPERIDONE 2 MG: 1 SOLUTION ORAL at 22:49

## 2021-05-13 RX ADMIN — INSULIN HUMAN 3 UNITS: 100 INJECTION, SOLUTION PARENTERAL at 12:34

## 2021-05-13 RX ADMIN — INSULIN HUMAN 20 UNITS: 100 INJECTION, SOLUTION PARENTERAL at 17:40

## 2021-05-13 RX ADMIN — ATORVASTATIN CALCIUM 40 MG: 40 TABLET, FILM COATED ORAL at 22:48

## 2021-05-13 RX ADMIN — NYSTATIN: 100000 POWDER TOPICAL at 08:49

## 2021-05-13 RX ADMIN — INSULIN DETEMIR 35 UNITS: 100 INJECTION, SOLUTION SUBCUTANEOUS at 10:10

## 2021-05-13 RX ADMIN — INSULIN HUMAN 20 UNITS: 100 INJECTION, SOLUTION PARENTERAL at 12:34

## 2021-05-13 RX ADMIN — PANTOPRAZOLE SODIUM 40 MG: 40 INJECTION, POWDER, FOR SOLUTION INTRAVENOUS at 06:39

## 2021-05-13 RX ADMIN — INSULIN HUMAN 20 UNITS: 100 INJECTION, SOLUTION PARENTERAL at 06:39

## 2021-05-13 RX ADMIN — TAZOBACTAM SODIUM AND PIPERACILLIN SODIUM 3.38 G: 375; 3 INJECTION, SOLUTION INTRAVENOUS at 15:15

## 2021-05-13 RX ADMIN — SODIUM CHLORIDE, PRESERVATIVE FREE 10 ML: 5 INJECTION INTRAVENOUS at 08:50

## 2021-05-13 RX ADMIN — INSULIN HUMAN 5 UNITS: 100 INJECTION, SOLUTION PARENTERAL at 06:39

## 2021-05-13 RX ADMIN — APIXABAN 5 MG: 5 TABLET, FILM COATED ORAL at 08:48

## 2021-05-13 RX ADMIN — INSULIN HUMAN 3 UNITS: 100 INJECTION, SOLUTION PARENTERAL at 17:40

## 2021-05-13 RX ADMIN — CITALOPRAM HYDROBROMIDE 20 MG: 20 TABLET ORAL at 08:48

## 2021-05-13 RX ADMIN — SODIUM CHLORIDE, PRESERVATIVE FREE 10 ML: 5 INJECTION INTRAVENOUS at 22:49

## 2021-05-13 RX ADMIN — DONEPEZIL HYDROCHLORIDE 10 MG: 10 TABLET, FILM COATED ORAL at 22:47

## 2021-05-13 RX ADMIN — METOPROLOL TARTRATE 50 MG: 50 TABLET, FILM COATED ORAL at 22:47

## 2021-05-13 RX ADMIN — ACETAMINOPHEN 650 MG: 325 TABLET, FILM COATED ORAL at 12:34

## 2021-05-13 RX ADMIN — TAZOBACTAM SODIUM AND PIPERACILLIN SODIUM 3.38 G: 375; 3 INJECTION, SOLUTION INTRAVENOUS at 08:49

## 2021-05-13 RX ADMIN — TAZOBACTAM SODIUM AND PIPERACILLIN SODIUM 3.38 G: 375; 3 INJECTION, SOLUTION INTRAVENOUS at 22:48

## 2021-05-13 RX ADMIN — METOPROLOL TARTRATE 50 MG: 50 TABLET, FILM COATED ORAL at 08:49

## 2021-05-13 RX ADMIN — RISPERIDONE 0.5 MG: 1 SOLUTION ORAL at 18:52

## 2021-05-13 RX ADMIN — LEVOTHYROXINE SODIUM 75 MCG: 20 INJECTION, SOLUTION INTRAVENOUS at 11:52

## 2021-05-14 ENCOUNTER — APPOINTMENT (OUTPATIENT)
Dept: GENERAL RADIOLOGY | Facility: HOSPITAL | Age: 76
End: 2021-05-14

## 2021-05-14 LAB
ALBUMIN SERPL-MCNC: 2.3 G/DL (ref 3.5–5.2)
ALBUMIN/GLOB SERPL: 0.7 G/DL
ALP SERPL-CCNC: 145 U/L (ref 39–117)
ALT SERPL W P-5'-P-CCNC: 75 U/L (ref 1–41)
ANION GAP SERPL CALCULATED.3IONS-SCNC: 10 MMOL/L (ref 5–15)
AST SERPL-CCNC: 60 U/L (ref 1–40)
BACTERIA SPEC AEROBE CULT: NO GROWTH
BASOPHILS # BLD AUTO: 0.09 10*3/MM3 (ref 0–0.2)
BASOPHILS NFR BLD AUTO: 0.9 % (ref 0–1.5)
BILIRUB SERPL-MCNC: 0.6 MG/DL (ref 0–1.2)
BUN SERPL-MCNC: 46 MG/DL (ref 8–23)
BUN/CREAT SERPL: 24.5 (ref 7–25)
CALCIUM SPEC-SCNC: 8.3 MG/DL (ref 8.6–10.5)
CHLORIDE SERPL-SCNC: 99 MMOL/L (ref 98–107)
CO2 SERPL-SCNC: 26 MMOL/L (ref 22–29)
CREAT SERPL-MCNC: 1.88 MG/DL (ref 0.76–1.27)
D-LACTATE SERPL-SCNC: 1.1 MMOL/L (ref 0.5–2)
DEPRECATED RDW RBC AUTO: 51.5 FL (ref 37–54)
EOSINOPHIL # BLD AUTO: 0.65 10*3/MM3 (ref 0–0.4)
EOSINOPHIL NFR BLD AUTO: 6.5 % (ref 0.3–6.2)
ERYTHROCYTE [DISTWIDTH] IN BLOOD BY AUTOMATED COUNT: 14.1 % (ref 12.3–15.4)
GFR SERPL CREATININE-BSD FRML MDRD: 35 ML/MIN/1.73
GLOBULIN UR ELPH-MCNC: 3.5 GM/DL
GLUCOSE BLDC GLUCOMTR-MCNC: 111 MG/DL (ref 70–130)
GLUCOSE BLDC GLUCOMTR-MCNC: 148 MG/DL (ref 70–130)
GLUCOSE BLDC GLUCOMTR-MCNC: 180 MG/DL (ref 70–130)
GLUCOSE BLDC GLUCOMTR-MCNC: 80 MG/DL (ref 70–130)
GLUCOSE SERPL-MCNC: 164 MG/DL (ref 65–99)
HCT VFR BLD AUTO: 23.7 % (ref 37.5–51)
HGB BLD-MCNC: 7.2 G/DL (ref 13–17.7)
IMM GRANULOCYTES # BLD AUTO: 0.05 10*3/MM3 (ref 0–0.05)
IMM GRANULOCYTES NFR BLD AUTO: 0.5 % (ref 0–0.5)
LYMPHOCYTES # BLD AUTO: 0.97 10*3/MM3 (ref 0.7–3.1)
LYMPHOCYTES NFR BLD AUTO: 9.8 % (ref 19.6–45.3)
MCH RBC QN AUTO: 30.6 PG (ref 26.6–33)
MCHC RBC AUTO-ENTMCNC: 30.4 G/DL (ref 31.5–35.7)
MCV RBC AUTO: 100.9 FL (ref 79–97)
MONOCYTES # BLD AUTO: 0.74 10*3/MM3 (ref 0.1–0.9)
MONOCYTES NFR BLD AUTO: 7.5 % (ref 5–12)
NEUTROPHILS NFR BLD AUTO: 7.43 10*3/MM3 (ref 1.7–7)
NEUTROPHILS NFR BLD AUTO: 74.8 % (ref 42.7–76)
NRBC BLD AUTO-RTO: 0 /100 WBC (ref 0–0.2)
PLATELET # BLD AUTO: 302 10*3/MM3 (ref 140–450)
PMV BLD AUTO: 10.5 FL (ref 6–12)
POTASSIUM SERPL-SCNC: 4.7 MMOL/L (ref 3.5–5.2)
PROCALCITONIN SERPL-MCNC: 0.29 NG/ML (ref 0–0.25)
PROT SERPL-MCNC: 5.8 G/DL (ref 6–8.5)
RBC # BLD AUTO: 2.35 10*6/MM3 (ref 4.14–5.8)
SODIUM SERPL-SCNC: 135 MMOL/L (ref 136–145)
WBC # BLD AUTO: 9.93 10*3/MM3 (ref 3.4–10.8)

## 2021-05-14 PROCEDURE — 97530 THERAPEUTIC ACTIVITIES: CPT

## 2021-05-14 PROCEDURE — 80053 COMPREHEN METABOLIC PANEL: CPT | Performed by: INTERNAL MEDICINE

## 2021-05-14 PROCEDURE — 63710000001 INSULIN REGULAR HUMAN PER 5 UNITS: Performed by: INTERNAL MEDICINE

## 2021-05-14 PROCEDURE — 85025 COMPLETE CBC W/AUTO DIFF WBC: CPT | Performed by: INTERNAL MEDICINE

## 2021-05-14 PROCEDURE — 74230 X-RAY XM SWLNG FUNCJ C+: CPT

## 2021-05-14 PROCEDURE — 99233 SBSQ HOSP IP/OBS HIGH 50: CPT | Performed by: INTERNAL MEDICINE

## 2021-05-14 PROCEDURE — 97110 THERAPEUTIC EXERCISES: CPT

## 2021-05-14 PROCEDURE — 82962 GLUCOSE BLOOD TEST: CPT

## 2021-05-14 PROCEDURE — 92610 EVALUATE SWALLOWING FUNCTION: CPT

## 2021-05-14 PROCEDURE — 63710000001 INSULIN REGULAR HUMAN PER 5 UNITS: Performed by: NURSE PRACTITIONER

## 2021-05-14 PROCEDURE — 84145 PROCALCITONIN (PCT): CPT | Performed by: INTERNAL MEDICINE

## 2021-05-14 PROCEDURE — 63710000001 INSULIN DETEMIR PER 5 UNITS: Performed by: INTERNAL MEDICINE

## 2021-05-14 PROCEDURE — 83605 ASSAY OF LACTIC ACID: CPT | Performed by: INTERNAL MEDICINE

## 2021-05-14 PROCEDURE — 92611 MOTION FLUOROSCOPY/SWALLOW: CPT

## 2021-05-14 PROCEDURE — 99231 SBSQ HOSP IP/OBS SF/LOW 25: CPT | Performed by: PSYCHIATRY & NEUROLOGY

## 2021-05-14 PROCEDURE — 25010000002 PIPERACILLIN SOD-TAZOBACTAM PER 1 G: Performed by: INTERNAL MEDICINE

## 2021-05-14 RX ORDER — ACETAMINOPHEN 160 MG/5ML
325 SOLUTION ORAL 4 TIMES DAILY
Status: DISCONTINUED | OUTPATIENT
Start: 2021-05-14 | End: 2021-05-16

## 2021-05-14 RX ADMIN — ACETAMINOPHEN ORAL SOLUTION 325 MG: 650 SOLUTION ORAL at 17:23

## 2021-05-14 RX ADMIN — HYDROCODONE BITARTRATE AND ACETAMINOPHEN 5 ML: 7.5; 325 SOLUTION ORAL at 20:39

## 2021-05-14 RX ADMIN — HYDROCODONE BITARTRATE AND ACETAMINOPHEN 5 ML: 7.5; 325 SOLUTION ORAL at 12:48

## 2021-05-14 RX ADMIN — HYDROCODONE BITARTRATE AND ACETAMINOPHEN 1 TABLET: 5; 325 TABLET ORAL at 01:47

## 2021-05-14 RX ADMIN — PREGABALIN 50 MG: 50 CAPSULE ORAL at 20:41

## 2021-05-14 RX ADMIN — TAZOBACTAM SODIUM AND PIPERACILLIN SODIUM 3.38 G: 375; 3 INJECTION, SOLUTION INTRAVENOUS at 20:50

## 2021-05-14 RX ADMIN — BARIUM SULFATE 50 ML: 400 SUSPENSION ORAL at 11:18

## 2021-05-14 RX ADMIN — PANTOPRAZOLE SODIUM 40 MG: 40 INJECTION, POWDER, FOR SOLUTION INTRAVENOUS at 06:43

## 2021-05-14 RX ADMIN — BARIUM SULFATE 100 ML: 0.81 POWDER, FOR SUSPENSION ORAL at 11:18

## 2021-05-14 RX ADMIN — TAZOBACTAM SODIUM AND PIPERACILLIN SODIUM 3.38 G: 375; 3 INJECTION, SOLUTION INTRAVENOUS at 04:33

## 2021-05-14 RX ADMIN — DONEPEZIL HYDROCHLORIDE 10 MG: 10 TABLET, FILM COATED ORAL at 20:41

## 2021-05-14 RX ADMIN — INSULIN DETEMIR 35 UNITS: 100 INJECTION, SOLUTION SUBCUTANEOUS at 09:18

## 2021-05-14 RX ADMIN — CITALOPRAM HYDROBROMIDE 20 MG: 20 TABLET ORAL at 20:42

## 2021-05-14 RX ADMIN — ACETAMINOPHEN 650 MG: 325 TABLET, FILM COATED ORAL at 00:50

## 2021-05-14 RX ADMIN — INSULIN HUMAN 3 UNITS: 100 INJECTION, SOLUTION PARENTERAL at 06:43

## 2021-05-14 RX ADMIN — ACETAMINOPHEN ORAL SOLUTION 325 MG: 650 SOLUTION ORAL at 20:40

## 2021-05-14 RX ADMIN — LEVOTHYROXINE SODIUM 125 MCG: 20 INJECTION, SOLUTION INTRAVENOUS at 11:54

## 2021-05-14 RX ADMIN — METOPROLOL TARTRATE 50 MG: 50 TABLET, FILM COATED ORAL at 08:15

## 2021-05-14 RX ADMIN — NYSTATIN: 100000 POWDER TOPICAL at 08:15

## 2021-05-14 RX ADMIN — APIXABAN 5 MG: 5 TABLET, FILM COATED ORAL at 20:41

## 2021-05-14 RX ADMIN — INSULIN HUMAN 20 UNITS: 100 INJECTION, SOLUTION PARENTERAL at 00:21

## 2021-05-14 RX ADMIN — RISPERIDONE 2 MG: 1 SOLUTION ORAL at 20:42

## 2021-05-14 RX ADMIN — HYDROCODONE BITARTRATE AND ACETAMINOPHEN 5 ML: 7.5; 325 SOLUTION ORAL at 17:23

## 2021-05-14 RX ADMIN — NYSTATIN: 100000 POWDER TOPICAL at 20:34

## 2021-05-14 RX ADMIN — INSULIN HUMAN 20 UNITS: 100 INJECTION, SOLUTION PARENTERAL at 12:48

## 2021-05-14 RX ADMIN — TAZOBACTAM SODIUM AND PIPERACILLIN SODIUM 3.38 G: 375; 3 INJECTION, SOLUTION INTRAVENOUS at 08:15

## 2021-05-14 RX ADMIN — TAZOBACTAM SODIUM AND PIPERACILLIN SODIUM 3.38 G: 375; 3 INJECTION, SOLUTION INTRAVENOUS at 15:04

## 2021-05-14 RX ADMIN — ACETAMINOPHEN ORAL SOLUTION 325 MG: 650 SOLUTION ORAL at 12:48

## 2021-05-14 RX ADMIN — APIXABAN 5 MG: 5 TABLET, FILM COATED ORAL at 08:15

## 2021-05-14 RX ADMIN — BARIUM SULFATE 20 ML: 400 PASTE ORAL at 11:18

## 2021-05-14 RX ADMIN — ATORVASTATIN CALCIUM 40 MG: 40 TABLET, FILM COATED ORAL at 20:41

## 2021-05-14 RX ADMIN — AMLODIPINE BESYLATE 5 MG: 5 TABLET ORAL at 08:15

## 2021-05-14 RX ADMIN — METOPROLOL TARTRATE 50 MG: 50 TABLET, FILM COATED ORAL at 20:41

## 2021-05-14 RX ADMIN — SALINE NASAL SPRAY 2 SPRAY: 1.5 SOLUTION NASAL at 20:33

## 2021-05-14 RX ADMIN — Medication 10 MG: at 20:41

## 2021-05-14 RX ADMIN — SODIUM CHLORIDE, PRESERVATIVE FREE 10 ML: 5 INJECTION INTRAVENOUS at 20:38

## 2021-05-14 RX ADMIN — INSULIN HUMAN 20 UNITS: 100 INJECTION, SOLUTION PARENTERAL at 06:43

## 2021-05-15 LAB
ALBUMIN SERPL-MCNC: 2.5 G/DL (ref 3.5–5.2)
ALBUMIN/GLOB SERPL: 0.7 G/DL
ALP SERPL-CCNC: 135 U/L (ref 39–117)
ALT SERPL W P-5'-P-CCNC: 84 U/L (ref 1–41)
ANION GAP SERPL CALCULATED.3IONS-SCNC: 8 MMOL/L (ref 5–15)
AST SERPL-CCNC: 77 U/L (ref 1–40)
BACTERIA SPEC AEROBE CULT: NORMAL
BACTERIA SPEC AEROBE CULT: NORMAL
BASOPHILS # BLD AUTO: 0.1 10*3/MM3 (ref 0–0.2)
BASOPHILS NFR BLD AUTO: 1 % (ref 0–1.5)
BILIRUB SERPL-MCNC: 0.6 MG/DL (ref 0–1.2)
BUN SERPL-MCNC: 41 MG/DL (ref 8–23)
BUN/CREAT SERPL: 22.4 (ref 7–25)
CALCIUM SPEC-SCNC: 8.6 MG/DL (ref 8.6–10.5)
CHLORIDE SERPL-SCNC: 101 MMOL/L (ref 98–107)
CK SERPL-CCNC: 40 U/L (ref 20–200)
CO2 SERPL-SCNC: 29 MMOL/L (ref 22–29)
CREAT SERPL-MCNC: 1.83 MG/DL (ref 0.76–1.27)
D-LACTATE SERPL-SCNC: 0.7 MMOL/L (ref 0.5–2)
DEPRECATED RDW RBC AUTO: 50.2 FL (ref 37–54)
EOSINOPHIL # BLD AUTO: 0.89 10*3/MM3 (ref 0–0.4)
EOSINOPHIL NFR BLD AUTO: 9.2 % (ref 0.3–6.2)
ERYTHROCYTE [DISTWIDTH] IN BLOOD BY AUTOMATED COUNT: 14.1 % (ref 12.3–15.4)
GFR SERPL CREATININE-BSD FRML MDRD: 36 ML/MIN/1.73
GLOBULIN UR ELPH-MCNC: 3.6 GM/DL
GLUCOSE BLDC GLUCOMTR-MCNC: 106 MG/DL (ref 70–130)
GLUCOSE BLDC GLUCOMTR-MCNC: 116 MG/DL (ref 70–130)
GLUCOSE BLDC GLUCOMTR-MCNC: 124 MG/DL (ref 70–130)
GLUCOSE BLDC GLUCOMTR-MCNC: 62 MG/DL (ref 70–130)
GLUCOSE BLDC GLUCOMTR-MCNC: 74 MG/DL (ref 70–130)
GLUCOSE BLDC GLUCOMTR-MCNC: 79 MG/DL (ref 70–130)
GLUCOSE BLDC GLUCOMTR-MCNC: 85 MG/DL (ref 70–130)
GLUCOSE SERPL-MCNC: 83 MG/DL (ref 65–99)
HCT VFR BLD AUTO: 22.5 % (ref 37.5–51)
HGB BLD-MCNC: 7 G/DL (ref 13–17.7)
IMM GRANULOCYTES # BLD AUTO: 0.05 10*3/MM3 (ref 0–0.05)
IMM GRANULOCYTES NFR BLD AUTO: 0.5 % (ref 0–0.5)
LIPASE SERPL-CCNC: 23 U/L (ref 13–60)
LYMPHOCYTES # BLD AUTO: 0.98 10*3/MM3 (ref 0.7–3.1)
LYMPHOCYTES NFR BLD AUTO: 10.2 % (ref 19.6–45.3)
MCH RBC QN AUTO: 30.8 PG (ref 26.6–33)
MCHC RBC AUTO-ENTMCNC: 31.1 G/DL (ref 31.5–35.7)
MCV RBC AUTO: 99.1 FL (ref 79–97)
MONOCYTES # BLD AUTO: 0.67 10*3/MM3 (ref 0.1–0.9)
MONOCYTES NFR BLD AUTO: 7 % (ref 5–12)
NEUTROPHILS NFR BLD AUTO: 6.94 10*3/MM3 (ref 1.7–7)
NEUTROPHILS NFR BLD AUTO: 72.1 % (ref 42.7–76)
NRBC BLD AUTO-RTO: 0 /100 WBC (ref 0–0.2)
PLATELET # BLD AUTO: 354 10*3/MM3 (ref 140–450)
PMV BLD AUTO: 10.6 FL (ref 6–12)
POTASSIUM SERPL-SCNC: 4.8 MMOL/L (ref 3.5–5.2)
PROCALCITONIN SERPL-MCNC: 0.22 NG/ML (ref 0–0.25)
PROT SERPL-MCNC: 6.1 G/DL (ref 6–8.5)
RBC # BLD AUTO: 2.27 10*6/MM3 (ref 4.14–5.8)
SODIUM SERPL-SCNC: 138 MMOL/L (ref 136–145)
WBC # BLD AUTO: 9.63 10*3/MM3 (ref 3.4–10.8)

## 2021-05-15 PROCEDURE — 83605 ASSAY OF LACTIC ACID: CPT | Performed by: INTERNAL MEDICINE

## 2021-05-15 PROCEDURE — 82962 GLUCOSE BLOOD TEST: CPT

## 2021-05-15 PROCEDURE — 84145 PROCALCITONIN (PCT): CPT | Performed by: INTERNAL MEDICINE

## 2021-05-15 PROCEDURE — 63710000001 INSULIN DETEMIR PER 5 UNITS: Performed by: INTERNAL MEDICINE

## 2021-05-15 PROCEDURE — 83690 ASSAY OF LIPASE: CPT | Performed by: INTERNAL MEDICINE

## 2021-05-15 PROCEDURE — 85025 COMPLETE CBC W/AUTO DIFF WBC: CPT | Performed by: INTERNAL MEDICINE

## 2021-05-15 PROCEDURE — 99232 SBSQ HOSP IP/OBS MODERATE 35: CPT | Performed by: INTERNAL MEDICINE

## 2021-05-15 PROCEDURE — 25010000002 PIPERACILLIN SOD-TAZOBACTAM PER 1 G: Performed by: INTERNAL MEDICINE

## 2021-05-15 PROCEDURE — 63710000001 INSULIN LISPRO (HUMAN) PER 5 UNITS: Performed by: INTERNAL MEDICINE

## 2021-05-15 PROCEDURE — 25010000002 PIPERACILLIN SOD-TAZOBACTAM PER 1 G

## 2021-05-15 PROCEDURE — 82550 ASSAY OF CK (CPK): CPT | Performed by: INTERNAL MEDICINE

## 2021-05-15 PROCEDURE — 63710000001 INSULIN REGULAR HUMAN PER 5 UNITS: Performed by: INTERNAL MEDICINE

## 2021-05-15 PROCEDURE — 25010000002 FUROSEMIDE PER 20 MG: Performed by: INTERNAL MEDICINE

## 2021-05-15 PROCEDURE — 99232 SBSQ HOSP IP/OBS MODERATE 35: CPT | Performed by: PSYCHIATRY & NEUROLOGY

## 2021-05-15 PROCEDURE — 80053 COMPREHEN METABOLIC PANEL: CPT | Performed by: INTERNAL MEDICINE

## 2021-05-15 RX ORDER — FUROSEMIDE 10 MG/ML
40 INJECTION INTRAMUSCULAR; INTRAVENOUS ONCE
Status: COMPLETED | OUTPATIENT
Start: 2021-05-15 | End: 2021-05-15

## 2021-05-15 RX ORDER — MEGESTROL ACETATE 40 MG/ML
800 SUSPENSION ORAL DAILY
Status: DISCONTINUED | OUTPATIENT
Start: 2021-05-15 | End: 2021-05-20 | Stop reason: HOSPADM

## 2021-05-15 RX ADMIN — NYSTATIN: 100000 POWDER TOPICAL at 20:25

## 2021-05-15 RX ADMIN — HYDROCODONE BITARTRATE AND ACETAMINOPHEN 5 ML: 7.5; 325 SOLUTION ORAL at 20:47

## 2021-05-15 RX ADMIN — CITALOPRAM HYDROBROMIDE 20 MG: 20 TABLET ORAL at 20:23

## 2021-05-15 RX ADMIN — TAZOBACTAM SODIUM AND PIPERACILLIN SODIUM 3.38 G: 375; 3 INJECTION, SOLUTION INTRAVENOUS at 17:21

## 2021-05-15 RX ADMIN — FUROSEMIDE 40 MG: 10 INJECTION INTRAMUSCULAR; INTRAVENOUS at 15:17

## 2021-05-15 RX ADMIN — ATORVASTATIN CALCIUM 40 MG: 40 TABLET, FILM COATED ORAL at 20:23

## 2021-05-15 RX ADMIN — INSULIN LISPRO 10 UNITS: 100 INJECTION, SOLUTION INTRAVENOUS; SUBCUTANEOUS at 17:21

## 2021-05-15 RX ADMIN — METOPROLOL TARTRATE 50 MG: 50 TABLET, FILM COATED ORAL at 09:03

## 2021-05-15 RX ADMIN — DONEPEZIL HYDROCHLORIDE 10 MG: 10 TABLET, FILM COATED ORAL at 20:23

## 2021-05-15 RX ADMIN — AMLODIPINE BESYLATE 5 MG: 5 TABLET ORAL at 09:02

## 2021-05-15 RX ADMIN — HYDROCODONE BITARTRATE AND ACETAMINOPHEN 5 ML: 7.5; 325 SOLUTION ORAL at 13:12

## 2021-05-15 RX ADMIN — PREGABALIN 50 MG: 50 CAPSULE ORAL at 20:23

## 2021-05-15 RX ADMIN — ACETAMINOPHEN ORAL SOLUTION 325 MG: 650 SOLUTION ORAL at 17:20

## 2021-05-15 RX ADMIN — METOPROLOL TARTRATE 50 MG: 50 TABLET, FILM COATED ORAL at 20:23

## 2021-05-15 RX ADMIN — APIXABAN 5 MG: 5 TABLET, FILM COATED ORAL at 20:23

## 2021-05-15 RX ADMIN — SODIUM CHLORIDE, PRESERVATIVE FREE 10 ML: 5 INJECTION INTRAVENOUS at 10:48

## 2021-05-15 RX ADMIN — TAZOBACTAM SODIUM AND PIPERACILLIN SODIUM 3.38 G: 375; 3 INJECTION, SOLUTION INTRAVENOUS at 10:53

## 2021-05-15 RX ADMIN — Medication 10 MG: at 20:23

## 2021-05-15 RX ADMIN — MEGESTROL ACETATE 800 MG: 40 SUSPENSION ORAL at 15:18

## 2021-05-15 RX ADMIN — HYDROCODONE BITARTRATE AND ACETAMINOPHEN 5 ML: 7.5; 325 SOLUTION ORAL at 17:20

## 2021-05-15 RX ADMIN — INSULIN DETEMIR 35 UNITS: 100 INJECTION, SOLUTION SUBCUTANEOUS at 09:04

## 2021-05-15 RX ADMIN — INSULIN HUMAN 20 UNITS: 100 INJECTION, SOLUTION PARENTERAL at 13:12

## 2021-05-15 RX ADMIN — NYSTATIN: 100000 POWDER TOPICAL at 10:54

## 2021-05-15 RX ADMIN — RISPERIDONE 2 MG: 1 SOLUTION ORAL at 20:23

## 2021-05-15 RX ADMIN — ACETAMINOPHEN ORAL SOLUTION 325 MG: 650 SOLUTION ORAL at 09:03

## 2021-05-15 RX ADMIN — TAZOBACTAM SODIUM AND PIPERACILLIN SODIUM 3.38 G: 375; 3 INJECTION, SOLUTION INTRAVENOUS at 02:28

## 2021-05-15 RX ADMIN — ACETAMINOPHEN ORAL SOLUTION 325 MG: 650 SOLUTION ORAL at 13:12

## 2021-05-15 RX ADMIN — APIXABAN 5 MG: 5 TABLET, FILM COATED ORAL at 09:02

## 2021-05-15 RX ADMIN — PANTOPRAZOLE SODIUM 40 MG: 40 INJECTION, POWDER, FOR SOLUTION INTRAVENOUS at 05:34

## 2021-05-15 RX ADMIN — LEVOTHYROXINE SODIUM 125 MCG: 20 INJECTION, SOLUTION INTRAVENOUS at 10:53

## 2021-05-15 RX ADMIN — HYDROCODONE BITARTRATE AND ACETAMINOPHEN 5 ML: 7.5; 325 SOLUTION ORAL at 09:03

## 2021-05-15 RX ADMIN — ACETAMINOPHEN ORAL SOLUTION 325 MG: 650 SOLUTION ORAL at 20:24

## 2021-05-16 LAB
ANION GAP SERPL CALCULATED.3IONS-SCNC: 10 MMOL/L (ref 5–15)
BUN SERPL-MCNC: 37 MG/DL (ref 8–23)
BUN/CREAT SERPL: 18.3 (ref 7–25)
CALCIUM SPEC-SCNC: 8.4 MG/DL (ref 8.6–10.5)
CHLORIDE SERPL-SCNC: 96 MMOL/L (ref 98–107)
CO2 SERPL-SCNC: 26 MMOL/L (ref 22–29)
CREAT SERPL-MCNC: 2.02 MG/DL (ref 0.76–1.27)
DEPRECATED RDW RBC AUTO: 52.2 FL (ref 37–54)
ERYTHROCYTE [DISTWIDTH] IN BLOOD BY AUTOMATED COUNT: 13.9 % (ref 12.3–15.4)
FOLATE SERPL-MCNC: 9.15 NG/ML (ref 4.78–24.2)
GFR SERPL CREATININE-BSD FRML MDRD: 32 ML/MIN/1.73
GLUCOSE BLDC GLUCOMTR-MCNC: 182 MG/DL (ref 70–130)
GLUCOSE BLDC GLUCOMTR-MCNC: 193 MG/DL (ref 70–130)
GLUCOSE BLDC GLUCOMTR-MCNC: 206 MG/DL (ref 70–130)
GLUCOSE BLDC GLUCOMTR-MCNC: 219 MG/DL (ref 70–130)
GLUCOSE BLDC GLUCOMTR-MCNC: 232 MG/DL (ref 70–130)
GLUCOSE BLDC GLUCOMTR-MCNC: 296 MG/DL (ref 70–130)
GLUCOSE BLDC GLUCOMTR-MCNC: 328 MG/DL (ref 70–130)
GLUCOSE SERPL-MCNC: 281 MG/DL (ref 65–99)
HCT VFR BLD AUTO: 25.9 % (ref 37.5–51)
HGB BLD-MCNC: 7.4 G/DL (ref 13–17.7)
IRON 24H UR-MRATE: 32 MCG/DL (ref 59–158)
IRON SATN MFR SERPL: 15 % (ref 20–50)
MAGNESIUM SERPL-MCNC: 2.3 MG/DL (ref 1.6–2.4)
MCH RBC QN AUTO: 30 PG (ref 26.6–33)
MCHC RBC AUTO-ENTMCNC: 28.6 G/DL (ref 31.5–35.7)
MCV RBC AUTO: 104.9 FL (ref 79–97)
PLATELET # BLD AUTO: 375 10*3/MM3 (ref 140–450)
PMV BLD AUTO: 10.2 FL (ref 6–12)
POTASSIUM SERPL-SCNC: 4.7 MMOL/L (ref 3.5–5.2)
RBC # BLD AUTO: 2.47 10*6/MM3 (ref 4.14–5.8)
SODIUM SERPL-SCNC: 132 MMOL/L (ref 136–145)
TIBC SERPL-MCNC: 209 MCG/DL (ref 298–536)
TRANSFERRIN SERPL-MCNC: 140 MG/DL (ref 200–360)
VIT B12 BLD-MCNC: 935 PG/ML (ref 211–946)
WBC # BLD AUTO: 9.41 10*3/MM3 (ref 3.4–10.8)

## 2021-05-16 PROCEDURE — 63710000001 INSULIN DETEMIR PER 5 UNITS: Performed by: INTERNAL MEDICINE

## 2021-05-16 PROCEDURE — 82746 ASSAY OF FOLIC ACID SERUM: CPT | Performed by: PSYCHIATRY & NEUROLOGY

## 2021-05-16 PROCEDURE — 82607 VITAMIN B-12: CPT | Performed by: PSYCHIATRY & NEUROLOGY

## 2021-05-16 PROCEDURE — 83540 ASSAY OF IRON: CPT | Performed by: INTERNAL MEDICINE

## 2021-05-16 PROCEDURE — 85027 COMPLETE CBC AUTOMATED: CPT | Performed by: NURSE PRACTITIONER

## 2021-05-16 PROCEDURE — 99232 SBSQ HOSP IP/OBS MODERATE 35: CPT | Performed by: INTERNAL MEDICINE

## 2021-05-16 PROCEDURE — 63710000001 INSULIN LISPRO (HUMAN) PER 5 UNITS: Performed by: INTERNAL MEDICINE

## 2021-05-16 PROCEDURE — 25010000002 PIPERACILLIN SOD-TAZOBACTAM PER 1 G

## 2021-05-16 PROCEDURE — 63710000001 INSULIN REGULAR HUMAN PER 5 UNITS: Performed by: NURSE PRACTITIONER

## 2021-05-16 PROCEDURE — 80048 BASIC METABOLIC PNL TOTAL CA: CPT | Performed by: NURSE PRACTITIONER

## 2021-05-16 PROCEDURE — 82962 GLUCOSE BLOOD TEST: CPT

## 2021-05-16 PROCEDURE — 84466 ASSAY OF TRANSFERRIN: CPT | Performed by: INTERNAL MEDICINE

## 2021-05-16 PROCEDURE — 83735 ASSAY OF MAGNESIUM: CPT | Performed by: INTERNAL MEDICINE

## 2021-05-16 PROCEDURE — 63710000001 INSULIN LISPRO (HUMAN) PER 5 UNITS

## 2021-05-16 RX ORDER — AMLODIPINE BESYLATE 5 MG/1
5 TABLET ORAL
Status: DISCONTINUED | OUTPATIENT
Start: 2021-05-17 | End: 2021-05-19

## 2021-05-16 RX ORDER — METOPROLOL TARTRATE 50 MG/1
50 TABLET, FILM COATED ORAL EVERY 12 HOURS SCHEDULED
Status: DISCONTINUED | OUTPATIENT
Start: 2021-05-16 | End: 2021-05-20 | Stop reason: HOSPADM

## 2021-05-16 RX ORDER — LEVOTHYROXINE SODIUM 0.15 MG/1
150 TABLET ORAL
Status: DISCONTINUED | OUTPATIENT
Start: 2021-05-17 | End: 2021-05-20 | Stop reason: HOSPADM

## 2021-05-16 RX ORDER — PANTOPRAZOLE SODIUM 40 MG/1
40 TABLET, DELAYED RELEASE ORAL
Status: DISCONTINUED | OUTPATIENT
Start: 2021-05-17 | End: 2021-05-16

## 2021-05-16 RX ORDER — PANTOPRAZOLE SODIUM 40 MG/1
40 TABLET, DELAYED RELEASE ORAL
Status: DISCONTINUED | OUTPATIENT
Start: 2021-05-17 | End: 2021-05-20 | Stop reason: HOSPADM

## 2021-05-16 RX ORDER — CITALOPRAM 20 MG/1
20 TABLET ORAL NIGHTLY
Status: DISCONTINUED | OUTPATIENT
Start: 2021-05-16 | End: 2021-05-20 | Stop reason: HOSPADM

## 2021-05-16 RX ORDER — ACETAMINOPHEN 325 MG/1
325 TABLET ORAL 4 TIMES DAILY
Status: DISCONTINUED | OUTPATIENT
Start: 2021-05-16 | End: 2021-05-20 | Stop reason: HOSPADM

## 2021-05-16 RX ORDER — HYDROCODONE BITARTRATE AND ACETAMINOPHEN 5; 325 MG/1; MG/1
0.5 TABLET ORAL 4 TIMES DAILY
Status: DISCONTINUED | OUTPATIENT
Start: 2021-05-16 | End: 2021-05-20 | Stop reason: HOSPADM

## 2021-05-16 RX ORDER — ATORVASTATIN CALCIUM 40 MG/1
40 TABLET, FILM COATED ORAL NIGHTLY
Status: DISCONTINUED | OUTPATIENT
Start: 2021-05-16 | End: 2021-05-20 | Stop reason: HOSPADM

## 2021-05-16 RX ORDER — RISPERIDONE 1 MG/1
2 TABLET ORAL NIGHTLY
Status: DISCONTINUED | OUTPATIENT
Start: 2021-05-16 | End: 2021-05-20 | Stop reason: HOSPADM

## 2021-05-16 RX ADMIN — HYDROCODONE BITARTRATE AND ACETAMINOPHEN 5 ML: 7.5; 325 SOLUTION ORAL at 08:18

## 2021-05-16 RX ADMIN — SODIUM CHLORIDE, PRESERVATIVE FREE 10 ML: 5 INJECTION INTRAVENOUS at 08:17

## 2021-05-16 RX ADMIN — HYDROCODONE BITARTRATE AND ACETAMINOPHEN 0.5 TABLET: 5; 325 TABLET ORAL at 12:00

## 2021-05-16 RX ADMIN — INSULIN LISPRO 10 UNITS: 100 INJECTION, SOLUTION INTRAVENOUS; SUBCUTANEOUS at 12:01

## 2021-05-16 RX ADMIN — HYDROCODONE BITARTRATE AND ACETAMINOPHEN 0.5 TABLET: 5; 325 TABLET ORAL at 17:07

## 2021-05-16 RX ADMIN — INSULIN DETEMIR 35 UNITS: 100 INJECTION, SOLUTION SUBCUTANEOUS at 08:30

## 2021-05-16 RX ADMIN — Medication 10 MG: at 20:33

## 2021-05-16 RX ADMIN — APIXABAN 5 MG: 5 TABLET, FILM COATED ORAL at 08:18

## 2021-05-16 RX ADMIN — ACETAMINOPHEN 325 MG: 325 TABLET ORAL at 17:07

## 2021-05-16 RX ADMIN — TAZOBACTAM SODIUM AND PIPERACILLIN SODIUM 3.38 G: 375; 3 INJECTION, SOLUTION INTRAVENOUS at 17:07

## 2021-05-16 RX ADMIN — LEVOTHYROXINE SODIUM 125 MCG: 20 INJECTION, SOLUTION INTRAVENOUS at 12:01

## 2021-05-16 RX ADMIN — ACETAMINOPHEN 325 MG: 325 TABLET ORAL at 20:35

## 2021-05-16 RX ADMIN — DONEPEZIL HYDROCHLORIDE 10 MG: 10 TABLET, FILM COATED ORAL at 20:33

## 2021-05-16 RX ADMIN — PANTOPRAZOLE SODIUM 40 MG: 40 INJECTION, POWDER, FOR SOLUTION INTRAVENOUS at 05:27

## 2021-05-16 RX ADMIN — INSULIN LISPRO 10 UNITS: 100 INJECTION, SOLUTION INTRAVENOUS; SUBCUTANEOUS at 17:07

## 2021-05-16 RX ADMIN — AMLODIPINE BESYLATE 5 MG: 5 TABLET ORAL at 08:17

## 2021-05-16 RX ADMIN — INSULIN LISPRO 10 UNITS: 100 INJECTION, SOLUTION INTRAVENOUS; SUBCUTANEOUS at 12:02

## 2021-05-16 RX ADMIN — MEGESTROL ACETATE 800 MG: 40 SUSPENSION ORAL at 08:30

## 2021-05-16 RX ADMIN — RISPERIDONE 2 MG: 1 TABLET ORAL at 20:33

## 2021-05-16 RX ADMIN — TAZOBACTAM SODIUM AND PIPERACILLIN SODIUM 3.38 G: 375; 3 INJECTION, SOLUTION INTRAVENOUS at 00:16

## 2021-05-16 RX ADMIN — INSULIN LISPRO 10 UNITS: 100 INJECTION, SOLUTION INTRAVENOUS; SUBCUTANEOUS at 08:29

## 2021-05-16 RX ADMIN — APIXABAN 5 MG: 5 TABLET, FILM COATED ORAL at 20:33

## 2021-05-16 RX ADMIN — INSULIN LISPRO 5 UNITS: 100 INJECTION, SOLUTION INTRAVENOUS; SUBCUTANEOUS at 20:35

## 2021-05-16 RX ADMIN — PREGABALIN 50 MG: 50 CAPSULE ORAL at 20:32

## 2021-05-16 RX ADMIN — METOPROLOL TARTRATE 50 MG: 50 TABLET, FILM COATED ORAL at 08:18

## 2021-05-16 RX ADMIN — NYSTATIN: 100000 POWDER TOPICAL at 20:33

## 2021-05-16 RX ADMIN — HYDROCODONE BITARTRATE AND ACETAMINOPHEN 0.5 TABLET: 5; 325 TABLET ORAL at 20:35

## 2021-05-16 RX ADMIN — TAZOBACTAM SODIUM AND PIPERACILLIN SODIUM 3.38 G: 375; 3 INJECTION, SOLUTION INTRAVENOUS at 08:34

## 2021-05-16 RX ADMIN — ACETAMINOPHEN ORAL SOLUTION 325 MG: 650 SOLUTION ORAL at 08:18

## 2021-05-16 RX ADMIN — SODIUM CHLORIDE, PRESERVATIVE FREE 10 ML: 5 INJECTION INTRAVENOUS at 20:35

## 2021-05-16 RX ADMIN — INSULIN LISPRO 5 UNITS: 100 INJECTION, SOLUTION INTRAVENOUS; SUBCUTANEOUS at 08:30

## 2021-05-16 RX ADMIN — INSULIN DETEMIR 35 UNITS: 100 INJECTION, SOLUTION SUBCUTANEOUS at 20:35

## 2021-05-16 RX ADMIN — INSULIN LISPRO 8 UNITS: 100 INJECTION, SOLUTION INTRAVENOUS; SUBCUTANEOUS at 17:08

## 2021-05-16 RX ADMIN — CITALOPRAM HYDROBROMIDE 20 MG: 20 TABLET ORAL at 20:33

## 2021-05-16 RX ADMIN — NYSTATIN: 100000 POWDER TOPICAL at 08:22

## 2021-05-16 RX ADMIN — ATORVASTATIN CALCIUM 40 MG: 40 TABLET, FILM COATED ORAL at 20:33

## 2021-05-16 RX ADMIN — INSULIN HUMAN 3 UNITS: 100 INJECTION, SOLUTION PARENTERAL at 00:16

## 2021-05-16 RX ADMIN — ACETAMINOPHEN 325 MG: 325 TABLET ORAL at 12:00

## 2021-05-16 RX ADMIN — METOPROLOL TARTRATE 50 MG: 50 TABLET, FILM COATED ORAL at 20:33

## 2021-05-17 ENCOUNTER — APPOINTMENT (OUTPATIENT)
Dept: CARDIOLOGY | Facility: HOSPITAL | Age: 76
End: 2021-05-17

## 2021-05-17 LAB
ALBUMIN SERPL-MCNC: 2.7 G/DL (ref 3.5–5.2)
ALP SERPL-CCNC: 126 U/L (ref 39–117)
ALT SERPL W P-5'-P-CCNC: 55 U/L (ref 1–41)
ANION GAP SERPL CALCULATED.3IONS-SCNC: 8 MMOL/L (ref 5–15)
ANION GAP SERPL CALCULATED.3IONS-SCNC: 8 MMOL/L (ref 5–15)
AST SERPL-CCNC: 27 U/L (ref 1–40)
BILIRUB SERPL-MCNC: 0.4 MG/DL (ref 0–1.2)
BUN SERPL-MCNC: 32 MG/DL (ref 8–23)
BUN SERPL-MCNC: 32 MG/DL (ref 8–23)
BUN/CREAT SERPL: 16.5 (ref 7–25)
CA-I SERPL ISE-MCNC: 1.21 MMOL/L (ref 1.12–1.32)
CALCIUM SPEC-SCNC: 8.6 MG/DL (ref 8.6–10.5)
CALCIUM SPEC-SCNC: 8.6 MG/DL (ref 8.6–10.5)
CHLORIDE SERPL-SCNC: 98 MMOL/L (ref 98–107)
CHLORIDE SERPL-SCNC: 98 MMOL/L (ref 98–107)
CHOLEST SERPL-MCNC: 77 MG/DL (ref 0–200)
CO2 SERPL-SCNC: 30 MMOL/L (ref 22–29)
CO2 SERPL-SCNC: 30 MMOL/L (ref 22–29)
CREAT SERPL-MCNC: 1.94 MG/DL (ref 0.76–1.27)
CREAT SERPL-MCNC: 1.94 MG/DL (ref 0.76–1.27)
CRP SERPL-MCNC: 3.25 MG/DL (ref 0–0.5)
DEPRECATED RDW RBC AUTO: 49.4 FL (ref 37–54)
ERYTHROCYTE [DISTWIDTH] IN BLOOD BY AUTOMATED COUNT: 14 % (ref 12.3–15.4)
FERRITIN SERPL-MCNC: 514.2 NG/ML (ref 30–400)
GFR SERPL CREATININE-BSD FRML MDRD: 34 ML/MIN/1.73
GLUCOSE BLDC GLUCOMTR-MCNC: 218 MG/DL (ref 70–130)
GLUCOSE BLDC GLUCOMTR-MCNC: 284 MG/DL (ref 70–130)
GLUCOSE BLDC GLUCOMTR-MCNC: 316 MG/DL (ref 70–130)
GLUCOSE BLDC GLUCOMTR-MCNC: 369 MG/DL (ref 70–130)
GLUCOSE SERPL-MCNC: 215 MG/DL (ref 65–99)
GLUCOSE SERPL-MCNC: 215 MG/DL (ref 65–99)
HCT VFR BLD AUTO: 24.1 % (ref 37.5–51)
HGB BLD-MCNC: 7.4 G/DL (ref 13–17.7)
MAGNESIUM SERPL-MCNC: 2.3 MG/DL (ref 1.6–2.4)
MAGNESIUM SERPL-MCNC: 2.3 MG/DL (ref 1.6–2.4)
MCH RBC QN AUTO: 30.3 PG (ref 26.6–33)
MCHC RBC AUTO-ENTMCNC: 30.7 G/DL (ref 31.5–35.7)
MCV RBC AUTO: 98.8 FL (ref 79–97)
PHOSPHATE SERPL-MCNC: 4.3 MG/DL (ref 2.5–4.5)
PHOSPHATE SERPL-MCNC: 4.3 MG/DL (ref 2.5–4.5)
PLATELET # BLD AUTO: 407 10*3/MM3 (ref 140–450)
PMV BLD AUTO: 10.1 FL (ref 6–12)
POTASSIUM SERPL-SCNC: 4.9 MMOL/L (ref 3.5–5.2)
POTASSIUM SERPL-SCNC: 4.9 MMOL/L (ref 3.5–5.2)
PREALB SERPL-MCNC: 13.3 MG/DL (ref 20–40)
PROT SERPL-MCNC: 6.3 G/DL (ref 6–8.5)
RBC # BLD AUTO: 2.44 10*6/MM3 (ref 4.14–5.8)
SODIUM SERPL-SCNC: 136 MMOL/L (ref 136–145)
SODIUM SERPL-SCNC: 136 MMOL/L (ref 136–145)
TRIGL SERPL-MCNC: 64 MG/DL (ref 0–150)
WBC # BLD AUTO: 8.73 10*3/MM3 (ref 3.4–10.8)

## 2021-05-17 PROCEDURE — 80053 COMPREHEN METABOLIC PANEL: CPT | Performed by: NURSE PRACTITIONER

## 2021-05-17 PROCEDURE — 82728 ASSAY OF FERRITIN: CPT | Performed by: INTERNAL MEDICINE

## 2021-05-17 PROCEDURE — 97110 THERAPEUTIC EXERCISES: CPT

## 2021-05-17 PROCEDURE — 99233 SBSQ HOSP IP/OBS HIGH 50: CPT | Performed by: INTERNAL MEDICINE

## 2021-05-17 PROCEDURE — 82330 ASSAY OF CALCIUM: CPT | Performed by: INTERNAL MEDICINE

## 2021-05-17 PROCEDURE — 92507 TX SP LANG VOICE COMM INDIV: CPT

## 2021-05-17 PROCEDURE — 63710000001 INSULIN LISPRO (HUMAN) PER 5 UNITS: Performed by: INTERNAL MEDICINE

## 2021-05-17 PROCEDURE — 83735 ASSAY OF MAGNESIUM: CPT | Performed by: NURSE PRACTITIONER

## 2021-05-17 PROCEDURE — 92526 ORAL FUNCTION THERAPY: CPT

## 2021-05-17 PROCEDURE — 63710000001 INSULIN DETEMIR PER 5 UNITS: Performed by: INTERNAL MEDICINE

## 2021-05-17 PROCEDURE — 84134 ASSAY OF PREALBUMIN: CPT | Performed by: NURSE PRACTITIONER

## 2021-05-17 PROCEDURE — 97530 THERAPEUTIC ACTIVITIES: CPT

## 2021-05-17 PROCEDURE — 97116 GAIT TRAINING THERAPY: CPT

## 2021-05-17 PROCEDURE — 93306 TTE W/DOPPLER COMPLETE: CPT

## 2021-05-17 PROCEDURE — 82465 ASSAY BLD/SERUM CHOLESTEROL: CPT | Performed by: NURSE PRACTITIONER

## 2021-05-17 PROCEDURE — 85027 COMPLETE CBC AUTOMATED: CPT | Performed by: NURSE PRACTITIONER

## 2021-05-17 PROCEDURE — 84478 ASSAY OF TRIGLYCERIDES: CPT | Performed by: NURSE PRACTITIONER

## 2021-05-17 PROCEDURE — 86140 C-REACTIVE PROTEIN: CPT | Performed by: NURSE PRACTITIONER

## 2021-05-17 PROCEDURE — 82962 GLUCOSE BLOOD TEST: CPT

## 2021-05-17 PROCEDURE — 84100 ASSAY OF PHOSPHORUS: CPT | Performed by: NURSE PRACTITIONER

## 2021-05-17 PROCEDURE — 84100 ASSAY OF PHOSPHORUS: CPT | Performed by: INTERNAL MEDICINE

## 2021-05-17 PROCEDURE — 99231 SBSQ HOSP IP/OBS SF/LOW 25: CPT | Performed by: PSYCHIATRY & NEUROLOGY

## 2021-05-17 PROCEDURE — 97535 SELF CARE MNGMENT TRAINING: CPT

## 2021-05-17 PROCEDURE — 25010000002 NA FERRIC GLUC CPLX PER 12.5 MG: Performed by: INTERNAL MEDICINE

## 2021-05-17 PROCEDURE — 83735 ASSAY OF MAGNESIUM: CPT | Performed by: INTERNAL MEDICINE

## 2021-05-17 PROCEDURE — 63710000001 INSULIN LISPRO (HUMAN) PER 5 UNITS

## 2021-05-17 PROCEDURE — 25010000002 PIPERACILLIN SOD-TAZOBACTAM PER 1 G

## 2021-05-17 RX ORDER — SODIUM CHLORIDE, SODIUM LACTATE, POTASSIUM CHLORIDE, CALCIUM CHLORIDE 600; 310; 30; 20 MG/100ML; MG/100ML; MG/100ML; MG/100ML
50 INJECTION, SOLUTION INTRAVENOUS CONTINUOUS
Status: DISCONTINUED | OUTPATIENT
Start: 2021-05-17 | End: 2021-05-18

## 2021-05-17 RX ADMIN — NYSTATIN: 100000 POWDER TOPICAL at 10:05

## 2021-05-17 RX ADMIN — INSULIN DETEMIR 35 UNITS: 100 INJECTION, SOLUTION SUBCUTANEOUS at 09:00

## 2021-05-17 RX ADMIN — HYDROCODONE BITARTRATE AND ACETAMINOPHEN 0.5 TABLET: 5; 325 TABLET ORAL at 17:06

## 2021-05-17 RX ADMIN — ACETAMINOPHEN 325 MG: 325 TABLET ORAL at 10:05

## 2021-05-17 RX ADMIN — DONEPEZIL HYDROCHLORIDE 10 MG: 10 TABLET, FILM COATED ORAL at 20:48

## 2021-05-17 RX ADMIN — NYSTATIN: 100000 POWDER TOPICAL at 20:49

## 2021-05-17 RX ADMIN — INSULIN LISPRO 8 UNITS: 100 INJECTION, SOLUTION INTRAVENOUS; SUBCUTANEOUS at 20:49

## 2021-05-17 RX ADMIN — HYDROCODONE BITARTRATE AND ACETAMINOPHEN 0.5 TABLET: 5; 325 TABLET ORAL at 12:05

## 2021-05-17 RX ADMIN — INSULIN DETEMIR 35 UNITS: 100 INJECTION, SOLUTION SUBCUTANEOUS at 20:50

## 2021-05-17 RX ADMIN — ACETAMINOPHEN 325 MG: 325 TABLET ORAL at 20:47

## 2021-05-17 RX ADMIN — APIXABAN 5 MG: 5 TABLET, FILM COATED ORAL at 10:06

## 2021-05-17 RX ADMIN — PREGABALIN 50 MG: 50 CAPSULE ORAL at 20:48

## 2021-05-17 RX ADMIN — APIXABAN 5 MG: 5 TABLET, FILM COATED ORAL at 20:48

## 2021-05-17 RX ADMIN — RISPERIDONE 2 MG: 1 TABLET ORAL at 20:48

## 2021-05-17 RX ADMIN — HYDROCODONE BITARTRATE AND ACETAMINOPHEN 1 TABLET: 5; 325 TABLET ORAL at 15:38

## 2021-05-17 RX ADMIN — Medication 10 MG: at 20:47

## 2021-05-17 RX ADMIN — TAZOBACTAM SODIUM AND PIPERACILLIN SODIUM 3.38 G: 375; 3 INJECTION, SOLUTION INTRAVENOUS at 02:27

## 2021-05-17 RX ADMIN — ACETAMINOPHEN 325 MG: 325 TABLET ORAL at 17:05

## 2021-05-17 RX ADMIN — AMLODIPINE BESYLATE 5 MG: 5 TABLET ORAL at 10:06

## 2021-05-17 RX ADMIN — PANTOPRAZOLE SODIUM 40 MG: 40 TABLET, DELAYED RELEASE ORAL at 06:35

## 2021-05-17 RX ADMIN — SODIUM CHLORIDE, PRESERVATIVE FREE 10 ML: 5 INJECTION INTRAVENOUS at 10:04

## 2021-05-17 RX ADMIN — SODIUM CHLORIDE, POTASSIUM CHLORIDE, SODIUM LACTATE AND CALCIUM CHLORIDE 50 ML/HR: 600; 310; 30; 20 INJECTION, SOLUTION INTRAVENOUS at 12:04

## 2021-05-17 RX ADMIN — LEVOTHYROXINE SODIUM 150 MCG: 150 TABLET ORAL at 06:35

## 2021-05-17 RX ADMIN — INSULIN LISPRO 12 UNITS: 100 INJECTION, SOLUTION INTRAVENOUS; SUBCUTANEOUS at 12:06

## 2021-05-17 RX ADMIN — TAZOBACTAM SODIUM AND PIPERACILLIN SODIUM 3.38 G: 375; 3 INJECTION, SOLUTION INTRAVENOUS at 10:04

## 2021-05-17 RX ADMIN — INSULIN LISPRO 10 UNITS: 100 INJECTION, SOLUTION INTRAVENOUS; SUBCUTANEOUS at 17:51

## 2021-05-17 RX ADMIN — METOPROLOL TARTRATE 50 MG: 50 TABLET, FILM COATED ORAL at 10:04

## 2021-05-17 RX ADMIN — MEGESTROL ACETATE 800 MG: 40 SUSPENSION ORAL at 10:05

## 2021-05-17 RX ADMIN — TAZOBACTAM SODIUM AND PIPERACILLIN SODIUM 3.38 G: 375; 3 INJECTION, SOLUTION INTRAVENOUS at 17:07

## 2021-05-17 RX ADMIN — SODIUM CHLORIDE, PRESERVATIVE FREE 10 ML: 5 INJECTION INTRAVENOUS at 20:49

## 2021-05-17 RX ADMIN — HYDROCODONE BITARTRATE AND ACETAMINOPHEN 0.5 TABLET: 5; 325 TABLET ORAL at 20:48

## 2021-05-17 RX ADMIN — SODIUM CHLORIDE 125 MG: 9 INJECTION, SOLUTION INTRAVENOUS at 13:02

## 2021-05-17 RX ADMIN — INSULIN LISPRO 10 UNITS: 100 INJECTION, SOLUTION INTRAVENOUS; SUBCUTANEOUS at 10:09

## 2021-05-17 RX ADMIN — CITALOPRAM HYDROBROMIDE 20 MG: 20 TABLET ORAL at 20:48

## 2021-05-17 RX ADMIN — HYDROCODONE BITARTRATE AND ACETAMINOPHEN 0.5 TABLET: 5; 325 TABLET ORAL at 10:04

## 2021-05-17 RX ADMIN — INSULIN LISPRO 10 UNITS: 100 INJECTION, SOLUTION INTRAVENOUS; SUBCUTANEOUS at 12:05

## 2021-05-17 RX ADMIN — ATORVASTATIN CALCIUM 40 MG: 40 TABLET, FILM COATED ORAL at 20:48

## 2021-05-17 RX ADMIN — INSULIN LISPRO 10 UNITS: 100 INJECTION, SOLUTION INTRAVENOUS; SUBCUTANEOUS at 17:07

## 2021-05-17 RX ADMIN — INSULIN LISPRO 5 UNITS: 100 INJECTION, SOLUTION INTRAVENOUS; SUBCUTANEOUS at 10:09

## 2021-05-17 RX ADMIN — ACETAMINOPHEN 325 MG: 325 TABLET ORAL at 12:05

## 2021-05-17 RX ADMIN — METOPROLOL TARTRATE 50 MG: 50 TABLET, FILM COATED ORAL at 20:48

## 2021-05-18 LAB
ANION GAP SERPL CALCULATED.3IONS-SCNC: 7 MMOL/L (ref 5–15)
BACTERIA SPEC AEROBE CULT: NORMAL
BH CV ECHO MEAS - AO MAX PG (FULL): 2.2 MMHG
BH CV ECHO MEAS - AO MAX PG: 6 MMHG
BH CV ECHO MEAS - AO ROOT AREA (BSA CORRECTED): 1.3
BH CV ECHO MEAS - AO ROOT AREA: 10.7 CM^2
BH CV ECHO MEAS - AO ROOT DIAM: 3.7 CM
BH CV ECHO MEAS - AO V2 MAX: 122.5 CM/SEC
BH CV ECHO MEAS - ASC AORTA: 3.8 CM
BH CV ECHO MEAS - AVA(V,A): 2.8 CM^2
BH CV ECHO MEAS - AVA(V,D): 2.8 CM^2
BH CV ECHO MEAS - BSA(HAYCOCK): 3.1 M^2
BH CV ECHO MEAS - BSA: 2.8 M^2
BH CV ECHO MEAS - BZI_BMI: 58.6 KILOGRAMS/M^2
BH CV ECHO MEAS - BZI_METRIC_HEIGHT: 175.3 CM
BH CV ECHO MEAS - BZI_METRIC_WEIGHT: 180.1 KG
BH CV ECHO MEAS - EDV(CUBED): 71.5 ML
BH CV ECHO MEAS - EDV(MOD-SP4): 61 ML
BH CV ECHO MEAS - EDV(TEICH): 76.4 ML
BH CV ECHO MEAS - EF(CUBED): 79.2 %
BH CV ECHO MEAS - EF(MOD-SP4): 63.9 %
BH CV ECHO MEAS - EF(TEICH): 72 %
BH CV ECHO MEAS - ESV(CUBED): 14.9 ML
BH CV ECHO MEAS - ESV(MOD-SP4): 22 ML
BH CV ECHO MEAS - ESV(TEICH): 21.4 ML
BH CV ECHO MEAS - FS: 40.7 %
BH CV ECHO MEAS - IVS/LVPW: 0.73
BH CV ECHO MEAS - IVSD: 0.85 CM
BH CV ECHO MEAS - LA DIMENSION: 3.5 CM
BH CV ECHO MEAS - LAD MAJOR: 5.5 CM
BH CV ECHO MEAS - LAT PEAK E' VEL: 8.2 CM/SEC
BH CV ECHO MEAS - LATERAL E/E' RATIO: 11.8
BH CV ECHO MEAS - LV DIASTOLIC VOL/BSA (35-75): 22.1 ML/M^2
BH CV ECHO MEAS - LV IVRT: 0.1 SEC
BH CV ECHO MEAS - LV MASS(C)D: 136.6 GRAMS
BH CV ECHO MEAS - LV MASS(C)DI: 49.4 GRAMS/M^2
BH CV ECHO MEAS - LV MAX PG: 3.8 MMHG
BH CV ECHO MEAS - LV MEAN PG: 1.9 MMHG
BH CV ECHO MEAS - LV SYSTOLIC VOL/BSA (12-30): 8 ML/M^2
BH CV ECHO MEAS - LV V1 MAX: 97.7 CM/SEC
BH CV ECHO MEAS - LV V1 MEAN: 63.1 CM/SEC
BH CV ECHO MEAS - LV V1 VTI: 20.9 CM
BH CV ECHO MEAS - LVIDD: 4.2 CM
BH CV ECHO MEAS - LVIDS: 2.5 CM
BH CV ECHO MEAS - LVLD AP4: 7 CM
BH CV ECHO MEAS - LVLS AP4: 6.5 CM
BH CV ECHO MEAS - LVOT AREA (M): 3.5 CM^2
BH CV ECHO MEAS - LVOT AREA: 3.5 CM^2
BH CV ECHO MEAS - LVOT DIAM: 2.1 CM
BH CV ECHO MEAS - LVPWD: 1.2 CM
BH CV ECHO MEAS - MED PEAK E' VEL: 7.3 CM/SEC
BH CV ECHO MEAS - MEDIAL E/E' RATIO: 13.2
BH CV ECHO MEAS - MV A MAX VEL: 78 CM/SEC
BH CV ECHO MEAS - MV DEC TIME: 0.32 SEC
BH CV ECHO MEAS - MV E MAX VEL: 99.2 CM/SEC
BH CV ECHO MEAS - MV E/A: 1.3
BH CV ECHO MEAS - MV MAX PG: 5.4 MMHG
BH CV ECHO MEAS - MV MEAN PG: 1.6 MMHG
BH CV ECHO MEAS - MV V2 MAX: 116.4 CM/SEC
BH CV ECHO MEAS - MV V2 MEAN: 57.9 CM/SEC
BH CV ECHO MEAS - MV V2 VTI: 34 CM
BH CV ECHO MEAS - MVA(VTI): 2.2 CM^2
BH CV ECHO MEAS - PA ACC SLOPE: 873 CM/SEC^2
BH CV ECHO MEAS - PA ACC TIME: 0.08 SEC
BH CV ECHO MEAS - PA MAX PG: 4.3 MMHG
BH CV ECHO MEAS - PA PR(ACCEL): 44.1 MMHG
BH CV ECHO MEAS - PA V2 MAX: 103.9 CM/SEC
BH CV ECHO MEAS - SI(CUBED): 20.5 ML/M^2
BH CV ECHO MEAS - SI(LVOT): 26.6 ML/M^2
BH CV ECHO MEAS - SI(MOD-SP4): 14.1 ML/M^2
BH CV ECHO MEAS - SI(TEICH): 19.9 ML/M^2
BH CV ECHO MEAS - SV(CUBED): 56.6 ML
BH CV ECHO MEAS - SV(LVOT): 73.6 ML
BH CV ECHO MEAS - SV(MOD-SP4): 39 ML
BH CV ECHO MEAS - SV(TEICH): 55 ML
BH CV ECHO MEASUREMENTS AVERAGE E/E' RATIO: 12.8
BUN SERPL-MCNC: 26 MG/DL (ref 8–23)
BUN/CREAT SERPL: 15.1 (ref 7–25)
CALCIUM SPEC-SCNC: 8.5 MG/DL (ref 8.6–10.5)
CHLORIDE SERPL-SCNC: 102 MMOL/L (ref 98–107)
CO2 SERPL-SCNC: 28 MMOL/L (ref 22–29)
CREAT SERPL-MCNC: 1.72 MG/DL (ref 0.76–1.27)
DEPRECATED RDW RBC AUTO: 49.1 FL (ref 37–54)
ERYTHROCYTE [DISTWIDTH] IN BLOOD BY AUTOMATED COUNT: 13.8 % (ref 12.3–15.4)
GFR SERPL CREATININE-BSD FRML MDRD: 39 ML/MIN/1.73
GLUCOSE BLDC GLUCOMTR-MCNC: 178 MG/DL (ref 70–130)
GLUCOSE BLDC GLUCOMTR-MCNC: 209 MG/DL (ref 70–130)
GLUCOSE BLDC GLUCOMTR-MCNC: 278 MG/DL (ref 70–130)
GLUCOSE BLDC GLUCOMTR-MCNC: 399 MG/DL (ref 70–130)
GLUCOSE SERPL-MCNC: 149 MG/DL (ref 65–99)
HCT VFR BLD AUTO: 24 % (ref 37.5–51)
HGB BLD-MCNC: 7.2 G/DL (ref 13–17.7)
MAGNESIUM SERPL-MCNC: 2.7 MG/DL (ref 1.6–2.4)
MAXIMAL PREDICTED HEART RATE: 146 BPM
MCH RBC QN AUTO: 29.8 PG (ref 26.6–33)
MCHC RBC AUTO-ENTMCNC: 30 G/DL (ref 31.5–35.7)
MCV RBC AUTO: 99.2 FL (ref 79–97)
PLATELET # BLD AUTO: 389 10*3/MM3 (ref 140–450)
PMV BLD AUTO: 9.9 FL (ref 6–12)
POTASSIUM SERPL-SCNC: 4.9 MMOL/L (ref 3.5–5.2)
RBC # BLD AUTO: 2.42 10*6/MM3 (ref 4.14–5.8)
SODIUM SERPL-SCNC: 137 MMOL/L (ref 136–145)
STRESS TARGET HR: 124 BPM
WBC # BLD AUTO: 8.88 10*3/MM3 (ref 3.4–10.8)

## 2021-05-18 PROCEDURE — 25010000002 NA FERRIC GLUC CPLX PER 12.5 MG: Performed by: INTERNAL MEDICINE

## 2021-05-18 PROCEDURE — 92526 ORAL FUNCTION THERAPY: CPT

## 2021-05-18 PROCEDURE — 25010000002 PIPERACILLIN SOD-TAZOBACTAM PER 1 G

## 2021-05-18 PROCEDURE — 85027 COMPLETE CBC AUTOMATED: CPT | Performed by: NURSE PRACTITIONER

## 2021-05-18 PROCEDURE — 63710000001 INSULIN LISPRO (HUMAN) PER 5 UNITS: Performed by: INTERNAL MEDICINE

## 2021-05-18 PROCEDURE — 82962 GLUCOSE BLOOD TEST: CPT

## 2021-05-18 PROCEDURE — 97530 THERAPEUTIC ACTIVITIES: CPT

## 2021-05-18 PROCEDURE — 99233 SBSQ HOSP IP/OBS HIGH 50: CPT | Performed by: INTERNAL MEDICINE

## 2021-05-18 PROCEDURE — 25010000002 FUROSEMIDE PER 20 MG: Performed by: INTERNAL MEDICINE

## 2021-05-18 PROCEDURE — 83735 ASSAY OF MAGNESIUM: CPT | Performed by: INTERNAL MEDICINE

## 2021-05-18 PROCEDURE — 92507 TX SP LANG VOICE COMM INDIV: CPT

## 2021-05-18 PROCEDURE — 80048 BASIC METABOLIC PNL TOTAL CA: CPT | Performed by: INTERNAL MEDICINE

## 2021-05-18 PROCEDURE — 97110 THERAPEUTIC EXERCISES: CPT

## 2021-05-18 PROCEDURE — 97116 GAIT TRAINING THERAPY: CPT

## 2021-05-18 PROCEDURE — 63710000001 INSULIN DETEMIR PER 5 UNITS: Performed by: INTERNAL MEDICINE

## 2021-05-18 PROCEDURE — 63710000001 INSULIN LISPRO (HUMAN) PER 5 UNITS

## 2021-05-18 PROCEDURE — 99231 SBSQ HOSP IP/OBS SF/LOW 25: CPT | Performed by: PSYCHIATRY & NEUROLOGY

## 2021-05-18 RX ORDER — RIVASTIGMINE 9.5 MG/24H
1 PATCH, EXTENDED RELEASE TRANSDERMAL DAILY
Status: DISCONTINUED | OUTPATIENT
Start: 2021-05-18 | End: 2021-05-18

## 2021-05-18 RX ORDER — OLANZAPINE 10 MG/1
2.5 INJECTION, POWDER, LYOPHILIZED, FOR SOLUTION INTRAMUSCULAR EVERY 8 HOURS PRN
Status: DISCONTINUED | OUTPATIENT
Start: 2021-05-18 | End: 2021-05-18

## 2021-05-18 RX ORDER — DONEPEZIL HYDROCHLORIDE 10 MG/1
10 TABLET, FILM COATED ORAL NIGHTLY
Status: DISCONTINUED | OUTPATIENT
Start: 2021-05-18 | End: 2021-05-20 | Stop reason: HOSPADM

## 2021-05-18 RX ORDER — RISPERIDONE 1 MG/1
0.5 TABLET ORAL 2 TIMES DAILY PRN
Status: DISCONTINUED | OUTPATIENT
Start: 2021-05-18 | End: 2021-05-20 | Stop reason: HOSPADM

## 2021-05-18 RX ORDER — FUROSEMIDE 10 MG/ML
20 INJECTION INTRAMUSCULAR; INTRAVENOUS ONCE
Status: COMPLETED | OUTPATIENT
Start: 2021-05-18 | End: 2021-05-18

## 2021-05-18 RX ADMIN — DONEPEZIL HYDROCHLORIDE 10 MG: 10 TABLET, FILM COATED ORAL at 21:02

## 2021-05-18 RX ADMIN — INSULIN LISPRO 10 UNITS: 100 INJECTION, SOLUTION INTRAVENOUS; SUBCUTANEOUS at 12:19

## 2021-05-18 RX ADMIN — ATORVASTATIN CALCIUM 40 MG: 40 TABLET, FILM COATED ORAL at 21:01

## 2021-05-18 RX ADMIN — APIXABAN 5 MG: 5 TABLET, FILM COATED ORAL at 09:20

## 2021-05-18 RX ADMIN — HYDROCODONE BITARTRATE AND ACETAMINOPHEN 0.5 TABLET: 5; 325 TABLET ORAL at 12:05

## 2021-05-18 RX ADMIN — INSULIN LISPRO 3 UNITS: 100 INJECTION, SOLUTION INTRAVENOUS; SUBCUTANEOUS at 09:22

## 2021-05-18 RX ADMIN — PANTOPRAZOLE SODIUM 40 MG: 40 TABLET, DELAYED RELEASE ORAL at 06:47

## 2021-05-18 RX ADMIN — MEGESTROL ACETATE 800 MG: 40 SUSPENSION ORAL at 09:23

## 2021-05-18 RX ADMIN — INSULIN DETEMIR 35 UNITS: 100 INJECTION, SOLUTION SUBCUTANEOUS at 09:00

## 2021-05-18 RX ADMIN — SODIUM CHLORIDE, PRESERVATIVE FREE 10 ML: 5 INJECTION INTRAVENOUS at 21:02

## 2021-05-18 RX ADMIN — APIXABAN 5 MG: 5 TABLET, FILM COATED ORAL at 21:02

## 2021-05-18 RX ADMIN — TAZOBACTAM SODIUM AND PIPERACILLIN SODIUM 3.38 G: 375; 3 INJECTION, SOLUTION INTRAVENOUS at 01:32

## 2021-05-18 RX ADMIN — INSULIN LISPRO 8 UNITS: 100 INJECTION, SOLUTION INTRAVENOUS; SUBCUTANEOUS at 18:53

## 2021-05-18 RX ADMIN — SODIUM CHLORIDE 125 MG: 9 INJECTION, SOLUTION INTRAVENOUS at 09:20

## 2021-05-18 RX ADMIN — RISPERIDONE 2 MG: 1 TABLET ORAL at 21:01

## 2021-05-18 RX ADMIN — CITALOPRAM HYDROBROMIDE 20 MG: 20 TABLET ORAL at 21:02

## 2021-05-18 RX ADMIN — INSULIN LISPRO 10 UNITS: 100 INJECTION, SOLUTION INTRAVENOUS; SUBCUTANEOUS at 09:22

## 2021-05-18 RX ADMIN — HYDROCODONE BITARTRATE AND ACETAMINOPHEN 0.5 TABLET: 5; 325 TABLET ORAL at 09:20

## 2021-05-18 RX ADMIN — METOPROLOL TARTRATE 50 MG: 50 TABLET, FILM COATED ORAL at 21:02

## 2021-05-18 RX ADMIN — HYDROCODONE BITARTRATE AND ACETAMINOPHEN 0.5 TABLET: 5; 325 TABLET ORAL at 18:52

## 2021-05-18 RX ADMIN — TAZOBACTAM SODIUM AND PIPERACILLIN SODIUM 3.38 G: 375; 3 INJECTION, SOLUTION INTRAVENOUS at 09:20

## 2021-05-18 RX ADMIN — NYSTATIN 1 APPLICATION: 100000 POWDER TOPICAL at 21:01

## 2021-05-18 RX ADMIN — TAZOBACTAM SODIUM AND PIPERACILLIN SODIUM 3.38 G: 375; 3 INJECTION, SOLUTION INTRAVENOUS at 18:52

## 2021-05-18 RX ADMIN — Medication 10 MG: at 21:02

## 2021-05-18 RX ADMIN — ACETAMINOPHEN 325 MG: 325 TABLET ORAL at 09:20

## 2021-05-18 RX ADMIN — METOPROLOL TARTRATE 50 MG: 50 TABLET, FILM COATED ORAL at 09:20

## 2021-05-18 RX ADMIN — PREGABALIN 50 MG: 50 CAPSULE ORAL at 21:01

## 2021-05-18 RX ADMIN — NYSTATIN: 100000 POWDER TOPICAL at 09:20

## 2021-05-18 RX ADMIN — ACETAMINOPHEN 325 MG: 325 TABLET ORAL at 12:05

## 2021-05-18 RX ADMIN — AMLODIPINE BESYLATE 5 MG: 5 TABLET ORAL at 09:21

## 2021-05-18 RX ADMIN — INSULIN DETEMIR 35 UNITS: 100 INJECTION, SOLUTION SUBCUTANEOUS at 21:02

## 2021-05-18 RX ADMIN — ACETAMINOPHEN 325 MG: 325 TABLET ORAL at 18:52

## 2021-05-18 RX ADMIN — ACETAMINOPHEN 325 MG: 325 TABLET ORAL at 21:02

## 2021-05-18 RX ADMIN — HYDROCODONE BITARTRATE AND ACETAMINOPHEN 0.5 TABLET: 5; 325 TABLET ORAL at 21:02

## 2021-05-18 RX ADMIN — INSULIN LISPRO 5 UNITS: 100 INJECTION, SOLUTION INTRAVENOUS; SUBCUTANEOUS at 21:02

## 2021-05-18 RX ADMIN — LEVOTHYROXINE SODIUM 150 MCG: 150 TABLET ORAL at 06:47

## 2021-05-18 RX ADMIN — INSULIN LISPRO 12 UNITS: 100 INJECTION, SOLUTION INTRAVENOUS; SUBCUTANEOUS at 12:05

## 2021-05-18 RX ADMIN — FUROSEMIDE 20 MG: 10 INJECTION, SOLUTION INTRAMUSCULAR; INTRAVENOUS at 18:53

## 2021-05-18 RX ADMIN — INSULIN LISPRO 10 UNITS: 100 INJECTION, SOLUTION INTRAVENOUS; SUBCUTANEOUS at 18:00

## 2021-05-19 LAB
ANION GAP SERPL CALCULATED.3IONS-SCNC: 7 MMOL/L (ref 5–15)
BUN SERPL-MCNC: 22 MG/DL (ref 8–23)
BUN/CREAT SERPL: 13.4 (ref 7–25)
CALCIUM SPEC-SCNC: 9 MG/DL (ref 8.6–10.5)
CHLORIDE SERPL-SCNC: 101 MMOL/L (ref 98–107)
CO2 SERPL-SCNC: 27 MMOL/L (ref 22–29)
CREAT SERPL-MCNC: 1.64 MG/DL (ref 0.76–1.27)
GFR SERPL CREATININE-BSD FRML MDRD: 41 ML/MIN/1.73
GLUCOSE BLDC GLUCOMTR-MCNC: 121 MG/DL (ref 70–130)
GLUCOSE BLDC GLUCOMTR-MCNC: 185 MG/DL (ref 70–130)
GLUCOSE BLDC GLUCOMTR-MCNC: 269 MG/DL (ref 70–130)
GLUCOSE BLDC GLUCOMTR-MCNC: 272 MG/DL (ref 70–130)
GLUCOSE SERPL-MCNC: 98 MG/DL (ref 65–99)
HCT VFR BLD AUTO: 23.7 % (ref 37.5–51)
HGB BLD-MCNC: 7.5 G/DL (ref 13–17.7)
POTASSIUM SERPL-SCNC: 4.5 MMOL/L (ref 3.5–5.2)
SODIUM SERPL-SCNC: 135 MMOL/L (ref 136–145)

## 2021-05-19 PROCEDURE — 99231 SBSQ HOSP IP/OBS SF/LOW 25: CPT | Performed by: PSYCHIATRY & NEUROLOGY

## 2021-05-19 PROCEDURE — 97110 THERAPEUTIC EXERCISES: CPT

## 2021-05-19 PROCEDURE — 97530 THERAPEUTIC ACTIVITIES: CPT

## 2021-05-19 PROCEDURE — 80048 BASIC METABOLIC PNL TOTAL CA: CPT | Performed by: INTERNAL MEDICINE

## 2021-05-19 PROCEDURE — 97535 SELF CARE MNGMENT TRAINING: CPT

## 2021-05-19 PROCEDURE — 25010000002 NA FERRIC GLUC CPLX PER 12.5 MG: Performed by: INTERNAL MEDICINE

## 2021-05-19 PROCEDURE — 63710000001 INSULIN LISPRO (HUMAN) PER 5 UNITS: Performed by: INTERNAL MEDICINE

## 2021-05-19 PROCEDURE — 99233 SBSQ HOSP IP/OBS HIGH 50: CPT | Performed by: INTERNAL MEDICINE

## 2021-05-19 PROCEDURE — 97116 GAIT TRAINING THERAPY: CPT

## 2021-05-19 PROCEDURE — 85014 HEMATOCRIT: CPT | Performed by: INTERNAL MEDICINE

## 2021-05-19 PROCEDURE — 25010000002 FUROSEMIDE PER 20 MG: Performed by: INTERNAL MEDICINE

## 2021-05-19 PROCEDURE — 25010000002 PIPERACILLIN SOD-TAZOBACTAM PER 1 G

## 2021-05-19 PROCEDURE — 82962 GLUCOSE BLOOD TEST: CPT

## 2021-05-19 PROCEDURE — 85018 HEMOGLOBIN: CPT | Performed by: INTERNAL MEDICINE

## 2021-05-19 PROCEDURE — 63710000001 INSULIN DETEMIR PER 5 UNITS: Performed by: INTERNAL MEDICINE

## 2021-05-19 RX ORDER — AMLODIPINE BESYLATE 2.5 MG/1
2.5 TABLET ORAL
Status: DISCONTINUED | OUTPATIENT
Start: 2021-05-20 | End: 2021-05-20 | Stop reason: HOSPADM

## 2021-05-19 RX ORDER — FUROSEMIDE 10 MG/ML
20 INJECTION INTRAMUSCULAR; INTRAVENOUS ONCE
Status: COMPLETED | OUTPATIENT
Start: 2021-05-19 | End: 2021-05-19

## 2021-05-19 RX ADMIN — LEVOTHYROXINE SODIUM 150 MCG: 150 TABLET ORAL at 05:30

## 2021-05-19 RX ADMIN — CITALOPRAM HYDROBROMIDE 20 MG: 20 TABLET ORAL at 20:59

## 2021-05-19 RX ADMIN — INSULIN LISPRO 12 UNITS: 100 INJECTION, SOLUTION INTRAVENOUS; SUBCUTANEOUS at 17:45

## 2021-05-19 RX ADMIN — NYSTATIN: 100000 POWDER TOPICAL at 09:35

## 2021-05-19 RX ADMIN — HYDROCODONE BITARTRATE AND ACETAMINOPHEN 0.5 TABLET: 5; 325 TABLET ORAL at 21:04

## 2021-05-19 RX ADMIN — MEGESTROL ACETATE 800 MG: 40 SUSPENSION ORAL at 09:35

## 2021-05-19 RX ADMIN — ACETAMINOPHEN 325 MG: 325 TABLET ORAL at 17:44

## 2021-05-19 RX ADMIN — HYDROCODONE BITARTRATE AND ACETAMINOPHEN 1 TABLET: 5; 325 TABLET ORAL at 14:53

## 2021-05-19 RX ADMIN — APIXABAN 5 MG: 5 TABLET, FILM COATED ORAL at 20:59

## 2021-05-19 RX ADMIN — SODIUM CHLORIDE, PRESERVATIVE FREE 10 ML: 5 INJECTION INTRAVENOUS at 09:35

## 2021-05-19 RX ADMIN — INSULIN LISPRO 10 UNITS: 100 INJECTION, SOLUTION INTRAVENOUS; SUBCUTANEOUS at 09:34

## 2021-05-19 RX ADMIN — METOPROLOL TARTRATE 50 MG: 50 TABLET, FILM COATED ORAL at 20:59

## 2021-05-19 RX ADMIN — TAZOBACTAM SODIUM AND PIPERACILLIN SODIUM 3.38 G: 375; 3 INJECTION, SOLUTION INTRAVENOUS at 02:14

## 2021-05-19 RX ADMIN — TAZOBACTAM SODIUM AND PIPERACILLIN SODIUM 3.38 G: 375; 3 INJECTION, SOLUTION INTRAVENOUS at 17:45

## 2021-05-19 RX ADMIN — HYDROCODONE BITARTRATE AND ACETAMINOPHEN 0.5 TABLET: 5; 325 TABLET ORAL at 17:44

## 2021-05-19 RX ADMIN — RISPERIDONE 2 MG: 1 TABLET ORAL at 20:59

## 2021-05-19 RX ADMIN — INSULIN LISPRO 10 UNITS: 100 INJECTION, SOLUTION INTRAVENOUS; SUBCUTANEOUS at 12:11

## 2021-05-19 RX ADMIN — ATORVASTATIN CALCIUM 40 MG: 40 TABLET, FILM COATED ORAL at 20:59

## 2021-05-19 RX ADMIN — HYDROCODONE BITARTRATE AND ACETAMINOPHEN 0.5 TABLET: 5; 325 TABLET ORAL at 09:33

## 2021-05-19 RX ADMIN — ACETAMINOPHEN 325 MG: 325 TABLET ORAL at 12:11

## 2021-05-19 RX ADMIN — PREGABALIN 50 MG: 50 CAPSULE ORAL at 20:59

## 2021-05-19 RX ADMIN — PANTOPRAZOLE SODIUM 40 MG: 40 TABLET, DELAYED RELEASE ORAL at 05:30

## 2021-05-19 RX ADMIN — Medication 10 MG: at 20:59

## 2021-05-19 RX ADMIN — FUROSEMIDE 20 MG: 10 INJECTION, SOLUTION INTRAMUSCULAR; INTRAVENOUS at 14:44

## 2021-05-19 RX ADMIN — SODIUM CHLORIDE 125 MG: 9 INJECTION, SOLUTION INTRAVENOUS at 09:34

## 2021-05-19 RX ADMIN — DONEPEZIL HYDROCHLORIDE 10 MG: 10 TABLET, FILM COATED ORAL at 20:59

## 2021-05-19 RX ADMIN — RISPERIDONE 0.5 MG: 1 TABLET ORAL at 21:00

## 2021-05-19 RX ADMIN — METOPROLOL TARTRATE 50 MG: 50 TABLET, FILM COATED ORAL at 09:34

## 2021-05-19 RX ADMIN — TAZOBACTAM SODIUM AND PIPERACILLIN SODIUM 3.38 G: 375; 3 INJECTION, SOLUTION INTRAVENOUS at 09:34

## 2021-05-19 RX ADMIN — AMLODIPINE BESYLATE 5 MG: 5 TABLET ORAL at 09:42

## 2021-05-19 RX ADMIN — INSULIN DETEMIR 28 UNITS: 100 INJECTION, SOLUTION SUBCUTANEOUS at 21:00

## 2021-05-19 RX ADMIN — HYDROCODONE BITARTRATE AND ACETAMINOPHEN 0.5 TABLET: 5; 325 TABLET ORAL at 12:11

## 2021-05-19 RX ADMIN — INSULIN LISPRO 8 UNITS: 100 INJECTION, SOLUTION INTRAVENOUS; SUBCUTANEOUS at 12:12

## 2021-05-19 RX ADMIN — ACETAMINOPHEN 325 MG: 325 TABLET ORAL at 09:34

## 2021-05-19 RX ADMIN — INSULIN DETEMIR 28 UNITS: 100 INJECTION, SOLUTION SUBCUTANEOUS at 09:30

## 2021-05-19 RX ADMIN — ACETAMINOPHEN 325 MG: 325 TABLET ORAL at 20:59

## 2021-05-19 RX ADMIN — NYSTATIN: 100000 POWDER TOPICAL at 20:58

## 2021-05-19 RX ADMIN — APIXABAN 5 MG: 5 TABLET, FILM COATED ORAL at 09:34

## 2021-05-19 RX ADMIN — SODIUM CHLORIDE, PRESERVATIVE FREE 10 ML: 5 INJECTION INTRAVENOUS at 21:00

## 2021-05-19 RX ADMIN — INSULIN LISPRO 8 UNITS: 100 INJECTION, SOLUTION INTRAVENOUS; SUBCUTANEOUS at 17:44

## 2021-05-20 ENCOUNTER — TELEPHONE (OUTPATIENT)
Dept: NEUROLOGY | Facility: CLINIC | Age: 76
End: 2021-05-20

## 2021-05-20 VITALS
HEART RATE: 67 BPM | SYSTOLIC BLOOD PRESSURE: 130 MMHG | BODY MASS INDEX: 46.65 KG/M2 | OXYGEN SATURATION: 99 % | WEIGHT: 315 LBS | RESPIRATION RATE: 20 BRPM | DIASTOLIC BLOOD PRESSURE: 62 MMHG | TEMPERATURE: 98.2 F | HEIGHT: 69 IN

## 2021-05-20 PROBLEM — G93.41 ENCEPHALOPATHY, METABOLIC: Status: RESOLVED | Noted: 2021-04-24 | Resolved: 2021-05-20

## 2021-05-20 PROBLEM — R19.7 DIARRHEA: Status: RESOLVED | Noted: 2021-04-24 | Resolved: 2021-05-20

## 2021-05-20 LAB
ANION GAP SERPL CALCULATED.3IONS-SCNC: 7 MMOL/L (ref 5–15)
BUN SERPL-MCNC: 18 MG/DL (ref 8–23)
BUN/CREAT SERPL: 11.3 (ref 7–25)
CALCIUM SPEC-SCNC: 8.7 MG/DL (ref 8.6–10.5)
CHLORIDE SERPL-SCNC: 101 MMOL/L (ref 98–107)
CO2 SERPL-SCNC: 28 MMOL/L (ref 22–29)
CREAT SERPL-MCNC: 1.6 MG/DL (ref 0.76–1.27)
GFR SERPL CREATININE-BSD FRML MDRD: 42 ML/MIN/1.73
GLUCOSE BLDC GLUCOMTR-MCNC: 279 MG/DL (ref 70–130)
GLUCOSE BLDC GLUCOMTR-MCNC: 289 MG/DL (ref 70–130)
GLUCOSE SERPL-MCNC: 275 MG/DL (ref 65–99)
HCT VFR BLD AUTO: 25.9 % (ref 37.5–51)
HGB BLD-MCNC: 7.8 G/DL (ref 13–17.7)
MAGNESIUM SERPL-MCNC: 1.9 MG/DL (ref 1.6–2.4)
POTASSIUM SERPL-SCNC: 5 MMOL/L (ref 3.5–5.2)
SODIUM SERPL-SCNC: 136 MMOL/L (ref 136–145)

## 2021-05-20 PROCEDURE — 85018 HEMOGLOBIN: CPT | Performed by: INTERNAL MEDICINE

## 2021-05-20 PROCEDURE — 97530 THERAPEUTIC ACTIVITIES: CPT

## 2021-05-20 PROCEDURE — 63710000001 INSULIN DETEMIR PER 5 UNITS: Performed by: INTERNAL MEDICINE

## 2021-05-20 PROCEDURE — 99231 SBSQ HOSP IP/OBS SF/LOW 25: CPT | Performed by: PSYCHIATRY & NEUROLOGY

## 2021-05-20 PROCEDURE — 85014 HEMATOCRIT: CPT | Performed by: INTERNAL MEDICINE

## 2021-05-20 PROCEDURE — 82962 GLUCOSE BLOOD TEST: CPT

## 2021-05-20 PROCEDURE — 83735 ASSAY OF MAGNESIUM: CPT | Performed by: INTERNAL MEDICINE

## 2021-05-20 PROCEDURE — 63710000001 INSULIN LISPRO (HUMAN) PER 5 UNITS: Performed by: INTERNAL MEDICINE

## 2021-05-20 PROCEDURE — 99239 HOSP IP/OBS DSCHRG MGMT >30: CPT | Performed by: NURSE PRACTITIONER

## 2021-05-20 PROCEDURE — 80048 BASIC METABOLIC PNL TOTAL CA: CPT | Performed by: INTERNAL MEDICINE

## 2021-05-20 PROCEDURE — 25010000002 PIPERACILLIN SOD-TAZOBACTAM PER 1 G

## 2021-05-20 RX ORDER — ECHINACEA PURPUREA EXTRACT 125 MG
2 TABLET ORAL AS NEEDED
Refills: 12
Start: 2021-05-20 | End: 2022-03-25

## 2021-05-20 RX ORDER — HYDROCODONE BITARTRATE AND ACETAMINOPHEN 5; 325 MG/1; MG/1
0.5 TABLET ORAL 4 TIMES DAILY
Qty: 6 TABLET | Refills: 0
Start: 2021-05-20 | End: 2021-05-23

## 2021-05-20 RX ORDER — DONEPEZIL HYDROCHLORIDE 10 MG/1
10 TABLET, FILM COATED ORAL NIGHTLY
Start: 2021-05-20 | End: 2022-03-25

## 2021-05-20 RX ORDER — FERROUS SULFATE 325(65) MG
325 TABLET ORAL
Start: 2021-05-20 | End: 2022-03-25

## 2021-05-20 RX ORDER — MEGESTROL ACETATE 40 MG/ML
800 SUSPENSION ORAL DAILY
Start: 2021-05-21 | End: 2022-03-25

## 2021-05-20 RX ORDER — ACETAMINOPHEN 325 MG/1
325 TABLET ORAL 4 TIMES DAILY
Start: 2021-05-20 | End: 2022-04-15 | Stop reason: HOSPADM

## 2021-05-20 RX ORDER — RISPERIDONE 2 MG/1
2 TABLET ORAL NIGHTLY
Start: 2021-05-20 | End: 2022-03-25

## 2021-05-20 RX ORDER — AMLODIPINE BESYLATE 2.5 MG/1
2.5 TABLET ORAL
Start: 2021-05-21 | End: 2022-03-25

## 2021-05-20 RX ORDER — METOPROLOL TARTRATE 50 MG/1
50 TABLET, FILM COATED ORAL EVERY 12 HOURS SCHEDULED
Start: 2021-05-20 | End: 2022-04-15 | Stop reason: HOSPADM

## 2021-05-20 RX ORDER — PREGABALIN 50 MG/1
50 CAPSULE ORAL NIGHTLY
Start: 2021-05-20 | End: 2022-03-25

## 2021-05-20 RX ORDER — LEVOTHYROXINE SODIUM 0.15 MG/1
150 TABLET ORAL DAILY
Start: 2021-05-20

## 2021-05-20 RX ORDER — NYSTATIN 100000 [USP'U]/G
POWDER TOPICAL EVERY 12 HOURS SCHEDULED
Start: 2021-05-20 | End: 2022-03-25

## 2021-05-20 RX ORDER — PANTOPRAZOLE SODIUM 40 MG/1
40 TABLET, DELAYED RELEASE ORAL
Start: 2021-05-20 | End: 2022-03-25

## 2021-05-20 RX ORDER — CITALOPRAM 20 MG/1
20 TABLET ORAL NIGHTLY
Start: 2021-05-20 | End: 2022-03-25

## 2021-05-20 RX ORDER — CHOLECALCIFEROL (VITAMIN D3) 125 MCG
10 CAPSULE ORAL NIGHTLY
Start: 2021-05-20 | End: 2022-03-25

## 2021-05-20 RX ADMIN — HYDROCODONE BITARTRATE AND ACETAMINOPHEN 0.5 TABLET: 5; 325 TABLET ORAL at 11:55

## 2021-05-20 RX ADMIN — HYDROCODONE BITARTRATE AND ACETAMINOPHEN 0.5 TABLET: 5; 325 TABLET ORAL at 08:33

## 2021-05-20 RX ADMIN — NYSTATIN: 100000 POWDER TOPICAL at 08:33

## 2021-05-20 RX ADMIN — INSULIN LISPRO 8 UNITS: 100 INJECTION, SOLUTION INTRAVENOUS; SUBCUTANEOUS at 08:34

## 2021-05-20 RX ADMIN — ACETAMINOPHEN 325 MG: 325 TABLET ORAL at 08:33

## 2021-05-20 RX ADMIN — SODIUM CHLORIDE, PRESERVATIVE FREE 10 ML: 5 INJECTION INTRAVENOUS at 08:33

## 2021-05-20 RX ADMIN — INSULIN LISPRO 12 UNITS: 100 INJECTION, SOLUTION INTRAVENOUS; SUBCUTANEOUS at 08:33

## 2021-05-20 RX ADMIN — INSULIN LISPRO 12 UNITS: 100 INJECTION, SOLUTION INTRAVENOUS; SUBCUTANEOUS at 11:55

## 2021-05-20 RX ADMIN — AMLODIPINE BESYLATE 2.5 MG: 2.5 TABLET ORAL at 08:32

## 2021-05-20 RX ADMIN — METOPROLOL TARTRATE 50 MG: 50 TABLET, FILM COATED ORAL at 08:33

## 2021-05-20 RX ADMIN — PANTOPRAZOLE SODIUM 40 MG: 40 TABLET, DELAYED RELEASE ORAL at 06:15

## 2021-05-20 RX ADMIN — ACETAMINOPHEN 325 MG: 325 TABLET ORAL at 11:55

## 2021-05-20 RX ADMIN — INSULIN LISPRO 8 UNITS: 100 INJECTION, SOLUTION INTRAVENOUS; SUBCUTANEOUS at 11:55

## 2021-05-20 RX ADMIN — INSULIN DETEMIR 28 UNITS: 100 INJECTION, SOLUTION SUBCUTANEOUS at 08:30

## 2021-05-20 RX ADMIN — LEVOTHYROXINE SODIUM 150 MCG: 150 TABLET ORAL at 06:15

## 2021-05-20 RX ADMIN — APIXABAN 5 MG: 5 TABLET, FILM COATED ORAL at 08:32

## 2021-05-20 RX ADMIN — MEGESTROL ACETATE 800 MG: 40 SUSPENSION ORAL at 08:33

## 2021-05-20 RX ADMIN — HYDROCODONE BITARTRATE AND ACETAMINOPHEN 1 TABLET: 5; 325 TABLET ORAL at 13:43

## 2021-05-20 RX ADMIN — TAZOBACTAM SODIUM AND PIPERACILLIN SODIUM 3.38 G: 375; 3 INJECTION, SOLUTION INTRAVENOUS at 01:42

## 2021-05-20 NOTE — TELEPHONE ENCOUNTER
Caller: ROWAN - NURSE    Best call back number: [phone] - [facility]    New or established patient?  [x] New  [] Established    Date of discharge: 5/20/21    Facility discharged from: [facility]    Diagnosis/Symptoms: METABOLIC ENCEPHALOPATHY     Length of stay (If applicable): 26 DAYS    Specialty Only: Did you see a [facility] provider?    [x] Yes  [] No  If so, who? DR PINEDA    THE DISCHARGE PAPERWORK IS INSTRUCTING THE PT TO FOLLOW UP WITH DR PINEDA IN 1 MONTH. PT IS BEING DISCHARGED TO [facility] FOR 1 MONTH. PLEASE ADVISE ON SCHEDULING.

## 2021-05-20 NOTE — TELEPHONE ENCOUNTER
is a hospitalist so patient can be scheduled at our clinic with whomever after they get out of [facility] so a month out from now is fine. Thanks!

## 2022-03-25 ENCOUNTER — APPOINTMENT (OUTPATIENT)
Dept: CARDIOLOGY | Facility: HOSPITAL | Age: 77
End: 2022-03-25

## 2022-03-25 ENCOUNTER — APPOINTMENT (OUTPATIENT)
Dept: GENERAL RADIOLOGY | Facility: HOSPITAL | Age: 77
End: 2022-03-25

## 2022-03-25 ENCOUNTER — HOSPITAL ENCOUNTER (INPATIENT)
Facility: HOSPITAL | Age: 77
LOS: 21 days | Discharge: NURSING, REHAB OR LONG-TERM CARE | End: 2022-04-15
Attending: EMERGENCY MEDICINE | Admitting: INTERNAL MEDICINE

## 2022-03-25 DIAGNOSIS — N17.9 ACUTE RENAL FAILURE, UNSPECIFIED ACUTE RENAL FAILURE TYPE: ICD-10-CM

## 2022-03-25 DIAGNOSIS — R13.12 OROPHARYNGEAL DYSPHAGIA: Primary | ICD-10-CM

## 2022-03-25 DIAGNOSIS — L03.119 CELLULITIS OF LOWER EXTREMITY, UNSPECIFIED LATERALITY: ICD-10-CM

## 2022-03-25 DIAGNOSIS — R79.89 POSITIVE D DIMER: ICD-10-CM

## 2022-03-25 DIAGNOSIS — E86.0 DEHYDRATION: ICD-10-CM

## 2022-03-25 DIAGNOSIS — I50.9 CONGESTIVE HEART FAILURE, UNSPECIFIED HF CHRONICITY, UNSPECIFIED HEART FAILURE TYPE: ICD-10-CM

## 2022-03-25 DIAGNOSIS — I77.6 VASCULITIS: ICD-10-CM

## 2022-03-25 PROBLEM — R21 RASH: Status: ACTIVE | Noted: 2022-03-25

## 2022-03-25 PROBLEM — R11.2 NAUSEA AND VOMITING: Status: ACTIVE | Noted: 2022-03-25

## 2022-03-25 LAB
ALBUMIN SERPL-MCNC: 3.2 G/DL (ref 3.5–5.2)
ALBUMIN/GLOB SERPL: 1 G/DL
ALP SERPL-CCNC: 96 U/L (ref 39–117)
ALT SERPL W P-5'-P-CCNC: 12 U/L (ref 1–41)
AMORPH URATE CRY URNS QL MICRO: ABNORMAL /HPF
ANION GAP SERPL CALCULATED.3IONS-SCNC: 15 MMOL/L (ref 5–15)
AST SERPL-CCNC: 14 U/L (ref 1–40)
BACTERIA UR QL AUTO: ABNORMAL /HPF
BASOPHILS # BLD AUTO: 0.01 10*3/MM3 (ref 0–0.2)
BASOPHILS NFR BLD AUTO: 0.1 % (ref 0–1.5)
BH CV LOWER VASCULAR LEFT COMMON FEMORAL AUGMENT: NORMAL
BH CV LOWER VASCULAR LEFT COMMON FEMORAL COMPETENT: NORMAL
BH CV LOWER VASCULAR LEFT COMMON FEMORAL COMPRESS: NORMAL
BH CV LOWER VASCULAR LEFT COMMON FEMORAL PHASIC: NORMAL
BH CV LOWER VASCULAR LEFT COMMON FEMORAL SPONT: NORMAL
BH CV LOWER VASCULAR LEFT DISTAL FEMORAL COMPRESS: NORMAL
BH CV LOWER VASCULAR LEFT GASTRONEMIUS COMPRESS: NORMAL
BH CV LOWER VASCULAR LEFT GREATER SAPH AK COMPRESS: NORMAL
BH CV LOWER VASCULAR LEFT GREATER SAPH BK COMPRESS: NORMAL
BH CV LOWER VASCULAR LEFT LESSER SAPH COMPRESS: NORMAL
BH CV LOWER VASCULAR LEFT MID FEMORAL AUGMENT: NORMAL
BH CV LOWER VASCULAR LEFT MID FEMORAL COMPETENT: NORMAL
BH CV LOWER VASCULAR LEFT MID FEMORAL COMPRESS: NORMAL
BH CV LOWER VASCULAR LEFT MID FEMORAL PHASIC: NORMAL
BH CV LOWER VASCULAR LEFT MID FEMORAL SPONT: NORMAL
BH CV LOWER VASCULAR LEFT POPLITEAL AUGMENT: NORMAL
BH CV LOWER VASCULAR LEFT POPLITEAL COMPETENT: NORMAL
BH CV LOWER VASCULAR LEFT POPLITEAL COMPRESS: NORMAL
BH CV LOWER VASCULAR LEFT POPLITEAL PHASIC: NORMAL
BH CV LOWER VASCULAR LEFT POPLITEAL SPONT: NORMAL
BH CV LOWER VASCULAR LEFT POSTERIOR TIBIAL COMPRESS: NORMAL
BH CV LOWER VASCULAR LEFT PROFUNDA FEMORAL COMPRESS: NORMAL
BH CV LOWER VASCULAR LEFT PROXIMAL FEMORAL COMPRESS: NORMAL
BH CV LOWER VASCULAR LEFT SAPHENOFEMORAL JUNCTION COMPRESS: NORMAL
BH CV LOWER VASCULAR RIGHT COMMON FEMORAL AUGMENT: NORMAL
BH CV LOWER VASCULAR RIGHT COMMON FEMORAL COMPETENT: NORMAL
BH CV LOWER VASCULAR RIGHT COMMON FEMORAL COMPRESS: NORMAL
BH CV LOWER VASCULAR RIGHT COMMON FEMORAL PHASIC: NORMAL
BH CV LOWER VASCULAR RIGHT COMMON FEMORAL SPONT: NORMAL
BH CV LOWER VASCULAR RIGHT DISTAL FEMORAL COMPRESS: NORMAL
BH CV LOWER VASCULAR RIGHT GASTRONEMIUS COMPRESS: NORMAL
BH CV LOWER VASCULAR RIGHT GREATER SAPH AK COMPRESS: NORMAL
BH CV LOWER VASCULAR RIGHT GREATER SAPH BK COMPRESS: NORMAL
BH CV LOWER VASCULAR RIGHT LESSER SAPH COMPRESS: NORMAL
BH CV LOWER VASCULAR RIGHT MID FEMORAL AUGMENT: NORMAL
BH CV LOWER VASCULAR RIGHT MID FEMORAL COMPETENT: NORMAL
BH CV LOWER VASCULAR RIGHT MID FEMORAL COMPRESS: NORMAL
BH CV LOWER VASCULAR RIGHT MID FEMORAL PHASIC: NORMAL
BH CV LOWER VASCULAR RIGHT MID FEMORAL SPONT: NORMAL
BH CV LOWER VASCULAR RIGHT POPLITEAL AUGMENT: NORMAL
BH CV LOWER VASCULAR RIGHT POPLITEAL COMPETENT: NORMAL
BH CV LOWER VASCULAR RIGHT POPLITEAL COMPRESS: NORMAL
BH CV LOWER VASCULAR RIGHT POPLITEAL PHASIC: NORMAL
BH CV LOWER VASCULAR RIGHT POPLITEAL SPONT: NORMAL
BH CV LOWER VASCULAR RIGHT POSTERIOR TIBIAL COMPRESS: NORMAL
BH CV LOWER VASCULAR RIGHT PROFUNDA FEMORAL COMPRESS: NORMAL
BH CV LOWER VASCULAR RIGHT PROXIMAL FEMORAL COMPRESS: NORMAL
BH CV LOWER VASCULAR RIGHT SAPHENOFEMORAL JUNCTION COMPRESS: NORMAL
BILIRUB SERPL-MCNC: 0.7 MG/DL (ref 0–1.2)
BILIRUB UR QL STRIP: NEGATIVE
BUN SERPL-MCNC: 36 MG/DL (ref 8–23)
BUN/CREAT SERPL: 15.3 (ref 7–25)
C3 SERPL-MCNC: 144 MG/DL (ref 82–167)
C4 SERPL-MCNC: 29 MG/DL (ref 14–44)
CALCIUM SPEC-SCNC: 8.3 MG/DL (ref 8.6–10.5)
CHLORIDE SERPL-SCNC: 98 MMOL/L (ref 98–107)
CHROMATIN AB SERPL-ACNC: 16.4 IU/ML (ref 0–14)
CLARITY UR: ABNORMAL
CO2 SERPL-SCNC: 22 MMOL/L (ref 22–29)
COLOR UR: YELLOW
CREAT SERPL-MCNC: 2.35 MG/DL (ref 0.76–1.27)
CRP SERPL-MCNC: 12.02 MG/DL (ref 0–0.5)
D DIMER PPP FEU-MCNC: 4.44 MCGFEU/ML (ref 0.01–0.5)
D-LACTATE SERPL-SCNC: 2 MMOL/L (ref 0.5–2)
DEPRECATED RDW RBC AUTO: 47.8 FL (ref 37–54)
EGFRCR SERPLBLD CKD-EPI 2021: 28.2 ML/MIN/1.73
EOSINOPHIL # BLD AUTO: 0.02 10*3/MM3 (ref 0–0.4)
EOSINOPHIL NFR BLD AUTO: 0.2 % (ref 0.3–6.2)
ERYTHROCYTE [DISTWIDTH] IN BLOOD BY AUTOMATED COUNT: 14.5 % (ref 12.3–15.4)
ERYTHROCYTE [SEDIMENTATION RATE] IN BLOOD: 44 MM/HR (ref 0–20)
FLUAV RNA RESP QL NAA+PROBE: NOT DETECTED
FLUBV RNA RESP QL NAA+PROBE: NOT DETECTED
GLOBULIN UR ELPH-MCNC: 3.1 GM/DL
GLUCOSE BLDC GLUCOMTR-MCNC: 120 MG/DL (ref 70–130)
GLUCOSE BLDC GLUCOMTR-MCNC: 140 MG/DL (ref 70–130)
GLUCOSE BLDC GLUCOMTR-MCNC: 142 MG/DL (ref 70–130)
GLUCOSE SERPL-MCNC: 155 MG/DL (ref 65–99)
GLUCOSE UR STRIP-MCNC: NEGATIVE MG/DL
HAV IGM SERPL QL IA: NORMAL
HBV CORE IGM SERPL QL IA: NORMAL
HBV SURFACE AG SERPL QL IA: NORMAL
HCT VFR BLD AUTO: 29.4 % (ref 37.5–51)
HCV AB SER DONR QL: NORMAL
HGB BLD-MCNC: 9.8 G/DL (ref 13–17.7)
HGB UR QL STRIP.AUTO: ABNORMAL
HOLD SPECIMEN: NORMAL
HYALINE CASTS UR QL AUTO: ABNORMAL /LPF
IMM GRANULOCYTES # BLD AUTO: 0.1 10*3/MM3 (ref 0–0.05)
IMM GRANULOCYTES NFR BLD AUTO: 0.9 % (ref 0–0.5)
INR PPP: 1.2 (ref 0.85–1.16)
KETONES UR QL STRIP: ABNORMAL
LEUKOCYTE ESTERASE UR QL STRIP.AUTO: ABNORMAL
LIPASE SERPL-CCNC: 8 U/L (ref 13–60)
LYMPHOCYTES # BLD AUTO: 0.69 10*3/MM3 (ref 0.7–3.1)
LYMPHOCYTES NFR BLD AUTO: 6.5 % (ref 19.6–45.3)
MAGNESIUM SERPL-MCNC: 2 MG/DL (ref 1.6–2.4)
MAXIMAL PREDICTED HEART RATE: 145 BPM
MCH RBC QN AUTO: 30.3 PG (ref 26.6–33)
MCHC RBC AUTO-ENTMCNC: 33.3 G/DL (ref 31.5–35.7)
MCV RBC AUTO: 91 FL (ref 79–97)
MONOCYTES # BLD AUTO: 1.03 10*3/MM3 (ref 0.1–0.9)
MONOCYTES NFR BLD AUTO: 9.7 % (ref 5–12)
NEUTROPHILS NFR BLD AUTO: 8.79 10*3/MM3 (ref 1.7–7)
NEUTROPHILS NFR BLD AUTO: 82.6 % (ref 42.7–76)
NITRITE UR QL STRIP: NEGATIVE
NRBC BLD AUTO-RTO: 0 /100 WBC (ref 0–0.2)
NT-PROBNP SERPL-MCNC: 5087 PG/ML (ref 0–1800)
PH UR STRIP.AUTO: <=5 [PH] (ref 5–8)
PLATELET # BLD AUTO: 345 10*3/MM3 (ref 140–450)
PMV BLD AUTO: 8.5 FL (ref 6–12)
POTASSIUM SERPL-SCNC: 4.9 MMOL/L (ref 3.5–5.2)
PROCALCITONIN SERPL-MCNC: 0.78 NG/ML (ref 0–0.25)
PROT SERPL-MCNC: 6.3 G/DL (ref 6–8.5)
PROT UR QL STRIP: ABNORMAL
PROTHROMBIN TIME: 14.8 SECONDS (ref 11.4–14.4)
RBC # BLD AUTO: 3.23 10*6/MM3 (ref 4.14–5.8)
RBC # UR STRIP: ABNORMAL /HPF
REF LAB TEST METHOD: ABNORMAL
SARS-COV-2 RNA RESP QL NAA+PROBE: NOT DETECTED
SODIUM SERPL-SCNC: 135 MMOL/L (ref 136–145)
SP GR UR STRIP: 1.01 (ref 1–1.03)
SQUAMOUS #/AREA URNS HPF: ABNORMAL /HPF
STRESS TARGET HR: 123 BPM
UROBILINOGEN UR QL STRIP: ABNORMAL
WBC # UR STRIP: ABNORMAL /HPF
WBC NRBC COR # BLD: 10.64 10*3/MM3 (ref 3.4–10.8)
WHOLE BLOOD HOLD SPECIMEN: NORMAL
WHOLE BLOOD HOLD SPECIMEN: NORMAL

## 2022-03-25 PROCEDURE — 86160 COMPLEMENT ANTIGEN: CPT | Performed by: INTERNAL MEDICINE

## 2022-03-25 PROCEDURE — 87070 CULTURE OTHR SPECIMN AEROBIC: CPT | Performed by: EMERGENCY MEDICINE

## 2022-03-25 PROCEDURE — 25010000002 ONDANSETRON PER 1 MG: Performed by: INTERNAL MEDICINE

## 2022-03-25 PROCEDURE — 84145 PROCALCITONIN (PCT): CPT | Performed by: EMERGENCY MEDICINE

## 2022-03-25 PROCEDURE — 86140 C-REACTIVE PROTEIN: CPT | Performed by: EMERGENCY MEDICINE

## 2022-03-25 PROCEDURE — 87186 SC STD MICRODIL/AGAR DIL: CPT | Performed by: INTERNAL MEDICINE

## 2022-03-25 PROCEDURE — 85025 COMPLETE CBC W/AUTO DIFF WBC: CPT | Performed by: EMERGENCY MEDICINE

## 2022-03-25 PROCEDURE — 80053 COMPREHEN METABOLIC PANEL: CPT | Performed by: EMERGENCY MEDICINE

## 2022-03-25 PROCEDURE — 93970 EXTREMITY STUDY: CPT

## 2022-03-25 PROCEDURE — 86431 RHEUMATOID FACTOR QUANT: CPT | Performed by: INTERNAL MEDICINE

## 2022-03-25 PROCEDURE — 83605 ASSAY OF LACTIC ACID: CPT | Performed by: EMERGENCY MEDICINE

## 2022-03-25 PROCEDURE — 25010000002 CEFTRIAXONE PER 250 MG: Performed by: EMERGENCY MEDICINE

## 2022-03-25 PROCEDURE — 87636 SARSCOV2 & INF A&B AMP PRB: CPT | Performed by: EMERGENCY MEDICINE

## 2022-03-25 PROCEDURE — 83735 ASSAY OF MAGNESIUM: CPT | Performed by: EMERGENCY MEDICINE

## 2022-03-25 PROCEDURE — 71045 X-RAY EXAM CHEST 1 VIEW: CPT

## 2022-03-25 PROCEDURE — 93005 ELECTROCARDIOGRAM TRACING: CPT | Performed by: INTERNAL MEDICINE

## 2022-03-25 PROCEDURE — 85652 RBC SED RATE AUTOMATED: CPT | Performed by: EMERGENCY MEDICINE

## 2022-03-25 PROCEDURE — 87205 SMEAR GRAM STAIN: CPT | Performed by: INTERNAL MEDICINE

## 2022-03-25 PROCEDURE — 93010 ELECTROCARDIOGRAM REPORT: CPT | Performed by: INTERNAL MEDICINE

## 2022-03-25 PROCEDURE — 83880 ASSAY OF NATRIURETIC PEPTIDE: CPT | Performed by: EMERGENCY MEDICINE

## 2022-03-25 PROCEDURE — 25010000002 ENOXAPARIN PER 10 MG: Performed by: INTERNAL MEDICINE

## 2022-03-25 PROCEDURE — 82962 GLUCOSE BLOOD TEST: CPT

## 2022-03-25 PROCEDURE — 83690 ASSAY OF LIPASE: CPT | Performed by: EMERGENCY MEDICINE

## 2022-03-25 PROCEDURE — 25010000002 ONDANSETRON PER 1 MG: Performed by: EMERGENCY MEDICINE

## 2022-03-25 PROCEDURE — 85379 FIBRIN DEGRADATION QUANT: CPT | Performed by: EMERGENCY MEDICINE

## 2022-03-25 PROCEDURE — 81001 URINALYSIS AUTO W/SCOPE: CPT | Performed by: EMERGENCY MEDICINE

## 2022-03-25 PROCEDURE — 87205 SMEAR GRAM STAIN: CPT | Performed by: EMERGENCY MEDICINE

## 2022-03-25 PROCEDURE — 87070 CULTURE OTHR SPECIMN AEROBIC: CPT | Performed by: INTERNAL MEDICINE

## 2022-03-25 PROCEDURE — 99284 EMERGENCY DEPT VISIT MOD MDM: CPT

## 2022-03-25 PROCEDURE — 87147 CULTURE TYPE IMMUNOLOGIC: CPT | Performed by: INTERNAL MEDICINE

## 2022-03-25 PROCEDURE — 87076 CULTURE ANAEROBE IDENT EACH: CPT | Performed by: INTERNAL MEDICINE

## 2022-03-25 PROCEDURE — 80074 ACUTE HEPATITIS PANEL: CPT | Performed by: INTERNAL MEDICINE

## 2022-03-25 PROCEDURE — 85610 PROTHROMBIN TIME: CPT | Performed by: EMERGENCY MEDICINE

## 2022-03-25 PROCEDURE — 93970 EXTREMITY STUDY: CPT | Performed by: INTERNAL MEDICINE

## 2022-03-25 PROCEDURE — 99223 1ST HOSP IP/OBS HIGH 75: CPT | Performed by: INTERNAL MEDICINE

## 2022-03-25 RX ORDER — NOREPINEPHRINE BIT/0.9 % NACL 8 MG/250ML
.02-.3 INFUSION BOTTLE (ML) INTRAVENOUS
Status: DISCONTINUED | OUTPATIENT
Start: 2022-03-25 | End: 2022-03-25

## 2022-03-25 RX ORDER — MIRTAZAPINE 15 MG/1
7.5 TABLET, FILM COATED ORAL NIGHTLY
COMMUNITY

## 2022-03-25 RX ORDER — BUPRENORPHINE HYDROCHLORIDE AND NALOXONE HYDROCHLORIDE DIHYDRATE 8; 2 MG/1; MG/1
2 TABLET SUBLINGUAL NIGHTLY
Status: DISCONTINUED | OUTPATIENT
Start: 2022-03-25 | End: 2022-04-15 | Stop reason: HOSPADM

## 2022-03-25 RX ORDER — METOPROLOL TARTRATE 50 MG/1
50 TABLET, FILM COATED ORAL EVERY 12 HOURS SCHEDULED
Status: DISCONTINUED | OUTPATIENT
Start: 2022-03-25 | End: 2022-03-28

## 2022-03-25 RX ORDER — MULTIVITAMIN WITH IRON
50 TABLET ORAL DAILY
COMMUNITY

## 2022-03-25 RX ORDER — SODIUM CHLORIDE 9 MG/ML
125 INJECTION, SOLUTION INTRAVENOUS CONTINUOUS
Status: DISCONTINUED | OUTPATIENT
Start: 2022-03-25 | End: 2022-03-25

## 2022-03-25 RX ORDER — GLIPIZIDE 10 MG/1
10 TABLET ORAL
COMMUNITY

## 2022-03-25 RX ORDER — BUPRENORPHINE HYDROCHLORIDE AND NALOXONE HYDROCHLORIDE DIHYDRATE 8; 2 MG/1; MG/1
2 TABLET SUBLINGUAL
COMMUNITY
End: 2022-04-29 | Stop reason: HOSPADM

## 2022-03-25 RX ORDER — ACETAMINOPHEN 160 MG/5ML
650 SOLUTION ORAL EVERY 4 HOURS PRN
Status: DISCONTINUED | OUTPATIENT
Start: 2022-03-25 | End: 2022-04-07

## 2022-03-25 RX ORDER — POLYETHYLENE GLYCOL 3350 17 G/17G
17 POWDER, FOR SOLUTION ORAL DAILY
COMMUNITY

## 2022-03-25 RX ORDER — MIRTAZAPINE 15 MG/1
7.5 TABLET, FILM COATED ORAL NIGHTLY
Status: DISCONTINUED | OUTPATIENT
Start: 2022-03-25 | End: 2022-04-07

## 2022-03-25 RX ORDER — LEVOTHYROXINE SODIUM 0.15 MG/1
150 TABLET ORAL DAILY
Status: DISCONTINUED | OUTPATIENT
Start: 2022-03-26 | End: 2022-04-07

## 2022-03-25 RX ORDER — PANTOPRAZOLE SODIUM 40 MG/1
40 TABLET, DELAYED RELEASE ORAL EVERY MORNING
Status: DISCONTINUED | OUTPATIENT
Start: 2022-03-26 | End: 2022-04-09

## 2022-03-25 RX ORDER — DEXTROSE MONOHYDRATE 25 G/50ML
25 INJECTION, SOLUTION INTRAVENOUS
Status: DISCONTINUED | OUTPATIENT
Start: 2022-03-25 | End: 2022-04-15 | Stop reason: HOSPADM

## 2022-03-25 RX ORDER — ONDANSETRON 4 MG/1
4 TABLET, FILM COATED ORAL EVERY 6 HOURS PRN
Status: DISCONTINUED | OUTPATIENT
Start: 2022-03-25 | End: 2022-04-07

## 2022-03-25 RX ORDER — OMEPRAZOLE 20 MG/1
20 CAPSULE, DELAYED RELEASE ORAL DAILY
COMMUNITY
End: 2022-04-29 | Stop reason: HOSPADM

## 2022-03-25 RX ORDER — CEPHALEXIN 500 MG/1
500 CAPSULE ORAL 2 TIMES DAILY
COMMUNITY
End: 2022-04-15 | Stop reason: HOSPADM

## 2022-03-25 RX ORDER — ONDANSETRON 2 MG/ML
4 INJECTION INTRAMUSCULAR; INTRAVENOUS ONCE
Status: COMPLETED | OUTPATIENT
Start: 2022-03-25 | End: 2022-03-25

## 2022-03-25 RX ORDER — UBIDECARENONE 75 MG
50 CAPSULE ORAL DAILY
Status: DISCONTINUED | OUTPATIENT
Start: 2022-03-25 | End: 2022-04-07

## 2022-03-25 RX ORDER — SODIUM CHLORIDE 9 MG/ML
75 INJECTION, SOLUTION INTRAVENOUS CONTINUOUS
Status: ACTIVE | OUTPATIENT
Start: 2022-03-25 | End: 2022-03-25

## 2022-03-25 RX ORDER — DULOXETIN HYDROCHLORIDE 30 MG/1
90 CAPSULE, DELAYED RELEASE ORAL DAILY
COMMUNITY

## 2022-03-25 RX ORDER — SODIUM CHLORIDE 9 MG/ML
10 INJECTION INTRAVENOUS AS NEEDED
Status: DISCONTINUED | OUTPATIENT
Start: 2022-03-25 | End: 2022-04-15 | Stop reason: HOSPADM

## 2022-03-25 RX ORDER — ERYTHROMYCIN 5 MG/G
1 OINTMENT OPHTHALMIC 4 TIMES DAILY
COMMUNITY

## 2022-03-25 RX ORDER — LIDOCAINE 50 MG/G
2 PATCH TOPICAL EVERY 24 HOURS
COMMUNITY

## 2022-03-25 RX ORDER — MELATONIN
2000 DAILY
COMMUNITY

## 2022-03-25 RX ORDER — ATORVASTATIN CALCIUM 40 MG/1
40 TABLET, FILM COATED ORAL NIGHTLY
Status: DISCONTINUED | OUTPATIENT
Start: 2022-03-25 | End: 2022-04-07

## 2022-03-25 RX ORDER — ACETAMINOPHEN 325 MG/1
650 TABLET ORAL EVERY 4 HOURS PRN
Status: DISCONTINUED | OUTPATIENT
Start: 2022-03-25 | End: 2022-04-07

## 2022-03-25 RX ORDER — ONDANSETRON 2 MG/ML
4 INJECTION INTRAMUSCULAR; INTRAVENOUS EVERY 6 HOURS PRN
Status: DISCONTINUED | OUTPATIENT
Start: 2022-03-25 | End: 2022-04-15 | Stop reason: HOSPADM

## 2022-03-25 RX ORDER — SODIUM CHLORIDE 0.9 % (FLUSH) 0.9 %
10 SYRINGE (ML) INJECTION AS NEEDED
Status: DISCONTINUED | OUTPATIENT
Start: 2022-03-25 | End: 2022-04-15 | Stop reason: HOSPADM

## 2022-03-25 RX ORDER — ACETAMINOPHEN 650 MG/1
650 SUPPOSITORY RECTAL EVERY 4 HOURS PRN
Status: DISCONTINUED | OUTPATIENT
Start: 2022-03-25 | End: 2022-04-07

## 2022-03-25 RX ORDER — NICOTINE POLACRILEX 4 MG
15 LOZENGE BUCCAL
Status: DISCONTINUED | OUTPATIENT
Start: 2022-03-25 | End: 2022-04-07

## 2022-03-25 RX ORDER — POLYETHYLENE GLYCOL 3350 17 G/17G
17 POWDER, FOR SOLUTION ORAL DAILY
Status: DISCONTINUED | OUTPATIENT
Start: 2022-03-26 | End: 2022-04-07

## 2022-03-25 RX ORDER — SODIUM CHLORIDE 0.9 % (FLUSH) 0.9 %
10 SYRINGE (ML) INJECTION EVERY 12 HOURS SCHEDULED
Status: DISCONTINUED | OUTPATIENT
Start: 2022-03-25 | End: 2022-04-15 | Stop reason: HOSPADM

## 2022-03-25 RX ADMIN — ATORVASTATIN CALCIUM 40 MG: 40 TABLET, FILM COATED ORAL at 21:42

## 2022-03-25 RX ADMIN — METOPROLOL TARTRATE 50 MG: 50 TABLET, FILM COATED ORAL at 21:44

## 2022-03-25 RX ADMIN — Medication 10 ML: at 21:46

## 2022-03-25 RX ADMIN — SODIUM CHLORIDE 1000 ML: 9 INJECTION, SOLUTION INTRAVENOUS at 09:16

## 2022-03-25 RX ADMIN — SODIUM CHLORIDE 1 G: 900 INJECTION INTRAVENOUS at 12:14

## 2022-03-25 RX ADMIN — ONDANSETRON 4 MG: 2 INJECTION INTRAMUSCULAR; INTRAVENOUS at 09:16

## 2022-03-25 RX ADMIN — ENOXAPARIN SODIUM 110 MG: 120 INJECTION SUBCUTANEOUS at 21:45

## 2022-03-25 RX ADMIN — SODIUM CHLORIDE 125 ML/HR: 9 INJECTION, SOLUTION INTRAVENOUS at 12:14

## 2022-03-25 RX ADMIN — MIRTAZAPINE 7.5 MG: 15 TABLET, FILM COATED ORAL at 21:44

## 2022-03-25 RX ADMIN — ONDANSETRON 4 MG: 2 INJECTION INTRAMUSCULAR; INTRAVENOUS at 18:37

## 2022-03-25 RX ADMIN — VITAM B12 50 MCG: 100 TAB at 21:43

## 2022-03-26 ENCOUNTER — APPOINTMENT (OUTPATIENT)
Dept: ULTRASOUND IMAGING | Facility: HOSPITAL | Age: 77
End: 2022-03-26

## 2022-03-26 ENCOUNTER — APPOINTMENT (OUTPATIENT)
Dept: CT IMAGING | Facility: HOSPITAL | Age: 77
End: 2022-03-26

## 2022-03-26 LAB
ALBUMIN SERPL-MCNC: 2.5 G/DL (ref 3.5–5.2)
ALBUMIN/GLOB SERPL: 1 G/DL
ALP SERPL-CCNC: 81 U/L (ref 39–117)
ALT SERPL W P-5'-P-CCNC: 9 U/L (ref 1–41)
ANION GAP SERPL CALCULATED.3IONS-SCNC: 15 MMOL/L (ref 5–15)
AST SERPL-CCNC: 10 U/L (ref 1–40)
BACTERIA UR QL AUTO: ABNORMAL /HPF
BASOPHILS # BLD AUTO: 0.03 10*3/MM3 (ref 0–0.2)
BASOPHILS NFR BLD AUTO: 0.3 % (ref 0–1.5)
BILIRUB SERPL-MCNC: 0.4 MG/DL (ref 0–1.2)
BILIRUB UR QL STRIP: NEGATIVE
BUN SERPL-MCNC: 37 MG/DL (ref 8–23)
BUN/CREAT SERPL: 13.5 (ref 7–25)
CALCIUM SPEC-SCNC: 7.8 MG/DL (ref 8.6–10.5)
CHLORIDE SERPL-SCNC: 101 MMOL/L (ref 98–107)
CLARITY UR: ABNORMAL
CO2 SERPL-SCNC: 17 MMOL/L (ref 22–29)
COLOR UR: ABNORMAL
CREAT SERPL-MCNC: 2.75 MG/DL (ref 0.76–1.27)
CREAT UR-MCNC: 99.7 MG/DL
CRP SERPL-MCNC: 12.53 MG/DL (ref 0–0.5)
DEPRECATED RDW RBC AUTO: 49.9 FL (ref 37–54)
EGFRCR SERPLBLD CKD-EPI 2021: 23.3 ML/MIN/1.73
EOSINOPHIL # BLD AUTO: 0.02 10*3/MM3 (ref 0–0.4)
EOSINOPHIL NFR BLD AUTO: 0.2 % (ref 0.3–6.2)
EOSINOPHIL SPEC QL MICRO: 0 % EOS/100 CELLS (ref 0–0)
ERYTHROCYTE [DISTWIDTH] IN BLOOD BY AUTOMATED COUNT: 14.6 % (ref 12.3–15.4)
ERYTHROCYTE [SEDIMENTATION RATE] IN BLOOD: 62 MM/HR (ref 0–20)
GLOBULIN UR ELPH-MCNC: 2.5 GM/DL
GLUCOSE BLDC GLUCOMTR-MCNC: 167 MG/DL (ref 70–130)
GLUCOSE BLDC GLUCOMTR-MCNC: 173 MG/DL (ref 70–130)
GLUCOSE BLDC GLUCOMTR-MCNC: 181 MG/DL (ref 70–130)
GLUCOSE BLDC GLUCOMTR-MCNC: 182 MG/DL (ref 70–130)
GLUCOSE SERPL-MCNC: 168 MG/DL (ref 65–99)
GLUCOSE UR STRIP-MCNC: NEGATIVE MG/DL
HBA1C MFR BLD: 7.9 % (ref 4.8–5.6)
HCT VFR BLD AUTO: 28.6 % (ref 37.5–51)
HGB BLD-MCNC: 9.1 G/DL (ref 13–17.7)
HGB UR QL STRIP.AUTO: ABNORMAL
HYALINE CASTS UR QL AUTO: ABNORMAL /LPF
IMM GRANULOCYTES # BLD AUTO: 0.16 10*3/MM3 (ref 0–0.05)
IMM GRANULOCYTES NFR BLD AUTO: 1.5 % (ref 0–0.5)
KETONES UR QL STRIP: ABNORMAL
LEUKOCYTE ESTERASE UR QL STRIP.AUTO: ABNORMAL
LYMPHOCYTES # BLD AUTO: 0.72 10*3/MM3 (ref 0.7–3.1)
LYMPHOCYTES NFR BLD AUTO: 6.6 % (ref 19.6–45.3)
MCH RBC QN AUTO: 29.5 PG (ref 26.6–33)
MCHC RBC AUTO-ENTMCNC: 31.8 G/DL (ref 31.5–35.7)
MCV RBC AUTO: 92.9 FL (ref 79–97)
MONOCYTES # BLD AUTO: 0.94 10*3/MM3 (ref 0.1–0.9)
MONOCYTES NFR BLD AUTO: 8.6 % (ref 5–12)
NEUTROPHILS NFR BLD AUTO: 82.8 % (ref 42.7–76)
NEUTROPHILS NFR BLD AUTO: 9.12 10*3/MM3 (ref 1.7–7)
NITRITE UR QL STRIP: NEGATIVE
NRBC BLD AUTO-RTO: 0 /100 WBC (ref 0–0.2)
PH UR STRIP.AUTO: <=5 [PH] (ref 5–8)
PLATELET # BLD AUTO: 346 10*3/MM3 (ref 140–450)
PMV BLD AUTO: 8.7 FL (ref 6–12)
POTASSIUM SERPL-SCNC: 4.8 MMOL/L (ref 3.5–5.2)
PROCALCITONIN SERPL-MCNC: 0.94 NG/ML (ref 0–0.25)
PROT SERPL-MCNC: 5 G/DL (ref 6–8.5)
PROT UR QL STRIP: ABNORMAL
RBC # BLD AUTO: 3.08 10*6/MM3 (ref 4.14–5.8)
RBC # UR STRIP: ABNORMAL /HPF
REF LAB TEST METHOD: ABNORMAL
SODIUM SERPL-SCNC: 133 MMOL/L (ref 136–145)
SODIUM UR-SCNC: 32 MMOL/L
SP GR UR STRIP: 1.01 (ref 1–1.03)
SQUAMOUS #/AREA URNS HPF: ABNORMAL /HPF
T4 FREE SERPL-MCNC: 1.04 NG/DL (ref 0.93–1.7)
TSH SERPL DL<=0.05 MIU/L-ACNC: 2.67 UIU/ML (ref 0.27–4.2)
UROBILINOGEN UR QL STRIP: ABNORMAL
WBC # UR STRIP: ABNORMAL /HPF
WBC NRBC COR # BLD: 10.99 10*3/MM3 (ref 3.4–10.8)

## 2022-03-26 PROCEDURE — 83520 IMMUNOASSAY QUANT NOS NONAB: CPT | Performed by: INTERNAL MEDICINE

## 2022-03-26 PROCEDURE — 86140 C-REACTIVE PROTEIN: CPT | Performed by: INTERNAL MEDICINE

## 2022-03-26 PROCEDURE — 86162 COMPLEMENT TOTAL (CH50): CPT | Performed by: INTERNAL MEDICINE

## 2022-03-26 PROCEDURE — 84300 ASSAY OF URINE SODIUM: CPT | Performed by: INTERNAL MEDICINE

## 2022-03-26 PROCEDURE — 82595 ASSAY OF CRYOGLOBULIN: CPT | Performed by: INTERNAL MEDICINE

## 2022-03-26 PROCEDURE — 25010000002 ONDANSETRON PER 1 MG: Performed by: INTERNAL MEDICINE

## 2022-03-26 PROCEDURE — 86038 ANTINUCLEAR ANTIBODIES: CPT | Performed by: INTERNAL MEDICINE

## 2022-03-26 PROCEDURE — 86037 ANCA TITER EACH ANTIBODY: CPT | Performed by: INTERNAL MEDICINE

## 2022-03-26 PROCEDURE — 81001 URINALYSIS AUTO W/SCOPE: CPT | Performed by: INTERNAL MEDICINE

## 2022-03-26 PROCEDURE — 87205 SMEAR GRAM STAIN: CPT | Performed by: INTERNAL MEDICINE

## 2022-03-26 PROCEDURE — 93005 ELECTROCARDIOGRAM TRACING: CPT | Performed by: INTERNAL MEDICINE

## 2022-03-26 PROCEDURE — 82962 GLUCOSE BLOOD TEST: CPT

## 2022-03-26 PROCEDURE — 93010 ELECTROCARDIOGRAM REPORT: CPT | Performed by: INTERNAL MEDICINE

## 2022-03-26 PROCEDURE — 80053 COMPREHEN METABOLIC PANEL: CPT | Performed by: INTERNAL MEDICINE

## 2022-03-26 PROCEDURE — 99233 SBSQ HOSP IP/OBS HIGH 50: CPT | Performed by: INTERNAL MEDICINE

## 2022-03-26 PROCEDURE — 82570 ASSAY OF URINE CREATININE: CPT | Performed by: INTERNAL MEDICINE

## 2022-03-26 PROCEDURE — 25010000002 ENOXAPARIN PER 10 MG: Performed by: INTERNAL MEDICINE

## 2022-03-26 PROCEDURE — 84145 PROCALCITONIN (PCT): CPT | Performed by: INTERNAL MEDICINE

## 2022-03-26 PROCEDURE — 25010000002 CEFTRIAXONE PER 250 MG: Performed by: INTERNAL MEDICINE

## 2022-03-26 PROCEDURE — 85025 COMPLETE CBC W/AUTO DIFF WBC: CPT | Performed by: INTERNAL MEDICINE

## 2022-03-26 PROCEDURE — 63710000001 INSULIN LISPRO (HUMAN) PER 5 UNITS: Performed by: INTERNAL MEDICINE

## 2022-03-26 PROCEDURE — 74176 CT ABD & PELVIS W/O CONTRAST: CPT

## 2022-03-26 PROCEDURE — 76775 US EXAM ABDO BACK WALL LIM: CPT

## 2022-03-26 PROCEDURE — 84443 ASSAY THYROID STIM HORMONE: CPT | Performed by: INTERNAL MEDICINE

## 2022-03-26 PROCEDURE — 85652 RBC SED RATE AUTOMATED: CPT | Performed by: INTERNAL MEDICINE

## 2022-03-26 PROCEDURE — 84439 ASSAY OF FREE THYROXINE: CPT | Performed by: INTERNAL MEDICINE

## 2022-03-26 PROCEDURE — 83036 HEMOGLOBIN GLYCOSYLATED A1C: CPT | Performed by: INTERNAL MEDICINE

## 2022-03-26 RX ORDER — SODIUM CHLORIDE 9 MG/ML
100 INJECTION, SOLUTION INTRAVENOUS CONTINUOUS
Status: ACTIVE | OUTPATIENT
Start: 2022-03-26 | End: 2022-03-26

## 2022-03-26 RX ORDER — PROMETHAZINE HYDROCHLORIDE 12.5 MG/1
12.5 TABLET ORAL EVERY 8 HOURS PRN
Status: DISCONTINUED | OUTPATIENT
Start: 2022-03-26 | End: 2022-03-26

## 2022-03-26 RX ORDER — DILTIAZEM HCL IN NACL,ISO-OSM 125 MG/125
5-15 PLASTIC BAG, INJECTION (ML) INTRAVENOUS
Status: DISCONTINUED | OUTPATIENT
Start: 2022-03-26 | End: 2022-03-29

## 2022-03-26 RX ORDER — METOPROLOL TARTRATE 5 MG/5ML
2.5 INJECTION INTRAVENOUS ONCE
Status: COMPLETED | OUTPATIENT
Start: 2022-03-27 | End: 2022-03-26

## 2022-03-26 RX ADMIN — Medication 10 ML: at 21:21

## 2022-03-26 RX ADMIN — ONDANSETRON 4 MG: 2 INJECTION INTRAMUSCULAR; INTRAVENOUS at 21:21

## 2022-03-26 RX ADMIN — ONDANSETRON 4 MG: 2 INJECTION INTRAMUSCULAR; INTRAVENOUS at 14:43

## 2022-03-26 RX ADMIN — ONDANSETRON 4 MG: 2 INJECTION INTRAMUSCULAR; INTRAVENOUS at 09:02

## 2022-03-26 RX ADMIN — PANTOPRAZOLE SODIUM 40 MG: 40 TABLET, DELAYED RELEASE ORAL at 06:03

## 2022-03-26 RX ADMIN — SODIUM CHLORIDE 1 G: 900 INJECTION INTRAVENOUS at 11:04

## 2022-03-26 RX ADMIN — INSULIN LISPRO 2 UNITS: 100 INJECTION, SOLUTION INTRAVENOUS; SUBCUTANEOUS at 17:38

## 2022-03-26 RX ADMIN — METOPROLOL TARTRATE 2.5 MG: 5 INJECTION INTRAVENOUS at 23:43

## 2022-03-26 RX ADMIN — ACETAMINOPHEN 650 MG: 325 TABLET ORAL at 04:46

## 2022-03-26 RX ADMIN — SODIUM CHLORIDE 100 ML/HR: 9 INJECTION, SOLUTION INTRAVENOUS at 10:15

## 2022-03-26 RX ADMIN — ENOXAPARIN SODIUM 110 MG: 120 INJECTION SUBCUTANEOUS at 08:41

## 2022-03-26 RX ADMIN — DULOXETINE HYDROCHLORIDE 90 MG: 30 CAPSULE, DELAYED RELEASE ORAL at 08:39

## 2022-03-26 RX ADMIN — ENOXAPARIN SODIUM 110 MG: 120 INJECTION SUBCUTANEOUS at 21:21

## 2022-03-26 RX ADMIN — INSULIN LISPRO 2 UNITS: 100 INJECTION, SOLUTION INTRAVENOUS; SUBCUTANEOUS at 11:05

## 2022-03-26 RX ADMIN — POLYETHYLENE GLYCOL 3350 17 G: 17 POWDER, FOR SOLUTION ORAL at 08:43

## 2022-03-26 RX ADMIN — VITAM B12 50 MCG: 100 TAB at 08:43

## 2022-03-26 RX ADMIN — METOPROLOL TARTRATE 50 MG: 50 TABLET, FILM COATED ORAL at 08:32

## 2022-03-26 RX ADMIN — Medication 5 MG/HR: at 22:35

## 2022-03-27 LAB
ALBUMIN SERPL-MCNC: 2.3 G/DL (ref 3.5–5.2)
ALBUMIN/GLOB SERPL: 0.6 G/DL
ALP SERPL-CCNC: 84 U/L (ref 39–117)
ALT SERPL W P-5'-P-CCNC: 8 U/L (ref 1–41)
ANA SER QL: NEGATIVE
ANION GAP SERPL CALCULATED.3IONS-SCNC: 13 MMOL/L (ref 5–15)
ANION GAP SERPL CALCULATED.3IONS-SCNC: 19 MMOL/L (ref 5–15)
AST SERPL-CCNC: 11 U/L (ref 1–40)
BACTERIA SPEC AEROBE CULT: NORMAL
BASOPHILS # BLD AUTO: 0.05 10*3/MM3 (ref 0–0.2)
BASOPHILS NFR BLD AUTO: 0.3 % (ref 0–1.5)
BILIRUB SERPL-MCNC: 0.3 MG/DL (ref 0–1.2)
BUN SERPL-MCNC: 47 MG/DL (ref 8–23)
BUN SERPL-MCNC: 54 MG/DL (ref 8–23)
BUN/CREAT SERPL: 16.7 (ref 7–25)
BUN/CREAT SERPL: 19.9 (ref 7–25)
CALCIUM SPEC-SCNC: 8.2 MG/DL (ref 8.6–10.5)
CALCIUM SPEC-SCNC: 8.3 MG/DL (ref 8.6–10.5)
CHLORIDE SERPL-SCNC: 101 MMOL/L (ref 98–107)
CHLORIDE SERPL-SCNC: 98 MMOL/L (ref 98–107)
CK SERPL-CCNC: 25 U/L (ref 20–200)
CO2 SERPL-SCNC: 16 MMOL/L (ref 22–29)
CO2 SERPL-SCNC: 19 MMOL/L (ref 22–29)
COLLECT DURATION TIME UR: 24 HRS
CREAT SERPL-MCNC: 2.71 MG/DL (ref 0.76–1.27)
CREAT SERPL-MCNC: 2.82 MG/DL (ref 0.76–1.27)
CRP SERPL-MCNC: 11.58 MG/DL (ref 0–0.5)
DEPRECATED RDW RBC AUTO: 51 FL (ref 37–54)
EGFRCR SERPLBLD CKD-EPI 2021: 22.6 ML/MIN/1.73
EGFRCR SERPLBLD CKD-EPI 2021: 23.7 ML/MIN/1.73
EOSINOPHIL # BLD AUTO: 0 10*3/MM3 (ref 0–0.4)
EOSINOPHIL NFR BLD AUTO: 0 % (ref 0.3–6.2)
ERYTHROCYTE [DISTWIDTH] IN BLOOD BY AUTOMATED COUNT: 14.6 % (ref 12.3–15.4)
ERYTHROCYTE [SEDIMENTATION RATE] IN BLOOD: 27 MM/HR (ref 0–20)
FERRITIN SERPL-MCNC: 682 NG/ML (ref 30–400)
GLOBULIN UR ELPH-MCNC: 3.6 GM/DL
GLUCOSE BLDC GLUCOMTR-MCNC: 185 MG/DL (ref 70–130)
GLUCOSE BLDC GLUCOMTR-MCNC: 206 MG/DL (ref 70–130)
GLUCOSE BLDC GLUCOMTR-MCNC: 242 MG/DL (ref 70–130)
GLUCOSE BLDC GLUCOMTR-MCNC: 248 MG/DL (ref 70–130)
GLUCOSE SERPL-MCNC: 202 MG/DL (ref 65–99)
GLUCOSE SERPL-MCNC: 243 MG/DL (ref 65–99)
GRAM STN SPEC: NORMAL
GRAM STN SPEC: NORMAL
HCT VFR BLD AUTO: 38.5 % (ref 37.5–51)
HGB BLD-MCNC: 12 G/DL (ref 13–17.7)
IMM GRANULOCYTES # BLD AUTO: 0.22 10*3/MM3 (ref 0–0.05)
IMM GRANULOCYTES NFR BLD AUTO: 1.3 % (ref 0–0.5)
IRON 24H UR-MRATE: 23 MCG/DL (ref 59–158)
IRON SATN MFR SERPL: 15 % (ref 20–50)
LYMPHOCYTES # BLD AUTO: 0.86 10*3/MM3 (ref 0.7–3.1)
LYMPHOCYTES NFR BLD AUTO: 5 % (ref 19.6–45.3)
MCH RBC QN AUTO: 29.6 PG (ref 26.6–33)
MCHC RBC AUTO-ENTMCNC: 31.2 G/DL (ref 31.5–35.7)
MCV RBC AUTO: 95.1 FL (ref 79–97)
MONOCYTES # BLD AUTO: 1.17 10*3/MM3 (ref 0.1–0.9)
MONOCYTES NFR BLD AUTO: 6.8 % (ref 5–12)
NEUTROPHILS NFR BLD AUTO: 14.91 10*3/MM3 (ref 1.7–7)
NEUTROPHILS NFR BLD AUTO: 86.6 % (ref 42.7–76)
NRBC BLD AUTO-RTO: 0 /100 WBC (ref 0–0.2)
PHOSPHATE SERPL-MCNC: 5 MG/DL (ref 2.5–4.5)
PLATELET # BLD AUTO: 524 10*3/MM3 (ref 140–450)
PMV BLD AUTO: 9.3 FL (ref 6–12)
POTASSIUM SERPL-SCNC: 4.9 MMOL/L (ref 3.5–5.2)
POTASSIUM SERPL-SCNC: 5.2 MMOL/L (ref 3.5–5.2)
PROT 24H UR-MRATE: 358.8 MG/24HOURS (ref 0–150)
PROT SERPL-MCNC: 5.9 G/DL (ref 6–8.5)
QT INTERVAL: 278 MS
QT INTERVAL: 302 MS
QT INTERVAL: 346 MS
QTC INTERVAL: 437 MS
QTC INTERVAL: 475 MS
QTC INTERVAL: 480 MS
RBC # BLD AUTO: 4.05 10*6/MM3 (ref 4.14–5.8)
SODIUM SERPL-SCNC: 133 MMOL/L (ref 136–145)
SODIUM SERPL-SCNC: 133 MMOL/L (ref 136–145)
SPECIMEN VOL 24H UR: 300 ML
TIBC SERPL-MCNC: 153 MCG/DL (ref 298–536)
TRANSFERRIN SERPL-MCNC: 103 MG/DL (ref 200–360)
WBC NRBC COR # BLD: 17.21 10*3/MM3 (ref 3.4–10.8)

## 2022-03-27 PROCEDURE — 63710000001 INSULIN DETEMIR PER 5 UNITS: Performed by: INTERNAL MEDICINE

## 2022-03-27 PROCEDURE — 82728 ASSAY OF FERRITIN: CPT | Performed by: INTERNAL MEDICINE

## 2022-03-27 PROCEDURE — 99233 SBSQ HOSP IP/OBS HIGH 50: CPT | Performed by: INTERNAL MEDICINE

## 2022-03-27 PROCEDURE — 97110 THERAPEUTIC EXERCISES: CPT

## 2022-03-27 PROCEDURE — 82962 GLUCOSE BLOOD TEST: CPT

## 2022-03-27 PROCEDURE — 86140 C-REACTIVE PROTEIN: CPT | Performed by: INTERNAL MEDICINE

## 2022-03-27 PROCEDURE — 85025 COMPLETE CBC W/AUTO DIFF WBC: CPT | Performed by: INTERNAL MEDICINE

## 2022-03-27 PROCEDURE — 84466 ASSAY OF TRANSFERRIN: CPT | Performed by: INTERNAL MEDICINE

## 2022-03-27 PROCEDURE — 25010000002 NA FERRIC GLUC CPLX PER 12.5 MG: Performed by: INTERNAL MEDICINE

## 2022-03-27 PROCEDURE — 63710000001 INSULIN LISPRO (HUMAN) PER 5 UNITS: Performed by: INTERNAL MEDICINE

## 2022-03-27 PROCEDURE — 97530 THERAPEUTIC ACTIVITIES: CPT

## 2022-03-27 PROCEDURE — 84100 ASSAY OF PHOSPHORUS: CPT | Performed by: INTERNAL MEDICINE

## 2022-03-27 PROCEDURE — 93005 ELECTROCARDIOGRAM TRACING: CPT | Performed by: INTERNAL MEDICINE

## 2022-03-27 PROCEDURE — 84156 ASSAY OF PROTEIN URINE: CPT | Performed by: INTERNAL MEDICINE

## 2022-03-27 PROCEDURE — 81050 URINALYSIS VOLUME MEASURE: CPT | Performed by: INTERNAL MEDICINE

## 2022-03-27 PROCEDURE — 80053 COMPREHEN METABOLIC PANEL: CPT | Performed by: INTERNAL MEDICINE

## 2022-03-27 PROCEDURE — 82550 ASSAY OF CK (CPK): CPT | Performed by: INTERNAL MEDICINE

## 2022-03-27 PROCEDURE — 83540 ASSAY OF IRON: CPT | Performed by: INTERNAL MEDICINE

## 2022-03-27 PROCEDURE — 86334 IMMUNOFIX E-PHORESIS SERUM: CPT | Performed by: INTERNAL MEDICINE

## 2022-03-27 PROCEDURE — 25010000002 ONDANSETRON PER 1 MG: Performed by: INTERNAL MEDICINE

## 2022-03-27 PROCEDURE — 85652 RBC SED RATE AUTOMATED: CPT | Performed by: INTERNAL MEDICINE

## 2022-03-27 PROCEDURE — 97166 OT EVAL MOD COMPLEX 45 MIN: CPT

## 2022-03-27 PROCEDURE — 93010 ELECTROCARDIOGRAM REPORT: CPT | Performed by: INTERNAL MEDICINE

## 2022-03-27 PROCEDURE — 97162 PT EVAL MOD COMPLEX 30 MIN: CPT

## 2022-03-27 PROCEDURE — 82784 ASSAY IGA/IGD/IGG/IGM EACH: CPT | Performed by: INTERNAL MEDICINE

## 2022-03-27 PROCEDURE — 25010000002 ENOXAPARIN PER 10 MG: Performed by: INTERNAL MEDICINE

## 2022-03-27 RX ORDER — DILTIAZEM HYDROCHLORIDE 5 MG/ML
10 INJECTION INTRAVENOUS ONCE
Status: COMPLETED | OUTPATIENT
Start: 2022-03-27 | End: 2022-03-27

## 2022-03-27 RX ORDER — METOPROLOL TARTRATE 5 MG/5ML
2.5 INJECTION INTRAVENOUS ONCE
Status: COMPLETED | OUTPATIENT
Start: 2022-03-27 | End: 2022-03-27

## 2022-03-27 RX ADMIN — ONDANSETRON 4 MG: 2 INJECTION INTRAMUSCULAR; INTRAVENOUS at 07:58

## 2022-03-27 RX ADMIN — INSULIN LISPRO 2 UNITS: 100 INJECTION, SOLUTION INTRAVENOUS; SUBCUTANEOUS at 18:39

## 2022-03-27 RX ADMIN — METOPROLOL TARTRATE 50 MG: 50 TABLET, FILM COATED ORAL at 18:03

## 2022-03-27 RX ADMIN — LEVOTHYROXINE SODIUM 150 MCG: 150 TABLET ORAL at 16:40

## 2022-03-27 RX ADMIN — METOPROLOL TARTRATE 2.5 MG: 5 INJECTION INTRAVENOUS at 00:23

## 2022-03-27 RX ADMIN — ATORVASTATIN CALCIUM 40 MG: 40 TABLET, FILM COATED ORAL at 20:12

## 2022-03-27 RX ADMIN — ACETAMINOPHEN ORAL SOLUTION 649.6 MG: 650 SOLUTION ORAL at 09:43

## 2022-03-27 RX ADMIN — ENOXAPARIN SODIUM 110 MG: 120 INJECTION SUBCUTANEOUS at 08:01

## 2022-03-27 RX ADMIN — INSULIN DETEMIR 10 UNITS: 100 INJECTION, SOLUTION SUBCUTANEOUS at 22:09

## 2022-03-27 RX ADMIN — INSULIN LISPRO 4 UNITS: 100 INJECTION, SOLUTION INTRAVENOUS; SUBCUTANEOUS at 12:17

## 2022-03-27 RX ADMIN — SODIUM CHLORIDE 1000 ML: 9 INJECTION, SOLUTION INTRAVENOUS at 02:32

## 2022-03-27 RX ADMIN — PANTOPRAZOLE SODIUM 40 MG: 40 TABLET, DELAYED RELEASE ORAL at 05:52

## 2022-03-27 RX ADMIN — SODIUM CHLORIDE 125 MG: 9 INJECTION, SOLUTION INTRAVENOUS at 16:43

## 2022-03-27 RX ADMIN — Medication 5 MG/HR: at 01:22

## 2022-03-27 RX ADMIN — MIRTAZAPINE 7.5 MG: 15 TABLET, FILM COATED ORAL at 20:12

## 2022-03-27 RX ADMIN — Medication 10 ML: at 08:05

## 2022-03-27 RX ADMIN — Medication 10 ML: at 20:13

## 2022-03-27 RX ADMIN — DULOXETINE HYDROCHLORIDE 90 MG: 30 CAPSULE, DELAYED RELEASE ORAL at 18:04

## 2022-03-27 RX ADMIN — BUPRENORPHINE AND NALOXONE 2 TABLET: 8; 2 TABLET SUBLINGUAL at 20:13

## 2022-03-27 RX ADMIN — INSULIN LISPRO 4 UNITS: 100 INJECTION, SOLUTION INTRAVENOUS; SUBCUTANEOUS at 07:58

## 2022-03-27 RX ADMIN — DILTIAZEM HYDROCHLORIDE 10 MG: 5 INJECTION INTRAVENOUS at 01:23

## 2022-03-28 LAB
ANION GAP SERPL CALCULATED.3IONS-SCNC: 14 MMOL/L (ref 5–15)
BUN SERPL-MCNC: 56 MG/DL (ref 8–23)
BUN/CREAT SERPL: 21.7 (ref 7–25)
C-ANCA TITR SER IF: NORMAL TITER
CALCIUM SPEC-SCNC: 8.5 MG/DL (ref 8.6–10.5)
CH50 SERPL-ACNC: >60 U/ML
CHLORIDE SERPL-SCNC: 100 MMOL/L (ref 98–107)
CO2 SERPL-SCNC: 19 MMOL/L (ref 22–29)
CREAT SERPL-MCNC: 2.58 MG/DL (ref 0.76–1.27)
DEPRECATED RDW RBC AUTO: 51.1 FL (ref 37–54)
EGFRCR SERPLBLD CKD-EPI 2021: 25.2 ML/MIN/1.73
ERYTHROCYTE [DISTWIDTH] IN BLOOD BY AUTOMATED COUNT: 14.8 % (ref 12.3–15.4)
GLUCOSE BLDC GLUCOMTR-MCNC: 195 MG/DL (ref 70–130)
GLUCOSE BLDC GLUCOMTR-MCNC: 218 MG/DL (ref 70–130)
GLUCOSE BLDC GLUCOMTR-MCNC: 231 MG/DL (ref 70–130)
GLUCOSE BLDC GLUCOMTR-MCNC: 249 MG/DL (ref 70–130)
GLUCOSE SERPL-MCNC: 225 MG/DL (ref 65–99)
HCT VFR BLD AUTO: 37.7 % (ref 37.5–51)
HGB BLD-MCNC: 12.1 G/DL (ref 13–17.7)
INR PPP: 1.01 (ref 0.85–1.16)
MCH RBC QN AUTO: 30 PG (ref 26.6–33)
MCHC RBC AUTO-ENTMCNC: 32.1 G/DL (ref 31.5–35.7)
MCV RBC AUTO: 93.3 FL (ref 79–97)
MYELOPEROXIDASE AB SER IA-ACNC: <9 U/ML (ref 0–9)
P-ANCA ATYPICAL TITR SER IF: NORMAL TITER
P-ANCA TITR SER IF: NORMAL TITER
PLATELET # BLD AUTO: 523 10*3/MM3 (ref 140–450)
PMV BLD AUTO: 8.6 FL (ref 6–12)
POTASSIUM SERPL-SCNC: 5.1 MMOL/L (ref 3.5–5.2)
PROTEINASE3 AB SER IA-ACNC: <3.5 U/ML (ref 0–3.5)
PROTHROMBIN TIME: 13 SECONDS (ref 11.4–14.4)
RBC # BLD AUTO: 4.04 10*6/MM3 (ref 4.14–5.8)
SODIUM SERPL-SCNC: 133 MMOL/L (ref 136–145)
WBC NRBC COR # BLD: 14.58 10*3/MM3 (ref 3.4–10.8)

## 2022-03-28 PROCEDURE — 93005 ELECTROCARDIOGRAM TRACING: CPT | Performed by: NURSE PRACTITIONER

## 2022-03-28 PROCEDURE — 85610 PROTHROMBIN TIME: CPT | Performed by: INTERNAL MEDICINE

## 2022-03-28 PROCEDURE — 25010000002 NA FERRIC GLUC CPLX PER 12.5 MG: Performed by: INTERNAL MEDICINE

## 2022-03-28 PROCEDURE — 85027 COMPLETE CBC AUTOMATED: CPT | Performed by: INTERNAL MEDICINE

## 2022-03-28 PROCEDURE — 25010000002 ONDANSETRON PER 1 MG: Performed by: INTERNAL MEDICINE

## 2022-03-28 PROCEDURE — 63710000001 INSULIN LISPRO (HUMAN) PER 5 UNITS: Performed by: INTERNAL MEDICINE

## 2022-03-28 PROCEDURE — 80048 BASIC METABOLIC PNL TOTAL CA: CPT | Performed by: INTERNAL MEDICINE

## 2022-03-28 PROCEDURE — 99232 SBSQ HOSP IP/OBS MODERATE 35: CPT | Performed by: INTERNAL MEDICINE

## 2022-03-28 PROCEDURE — 82962 GLUCOSE BLOOD TEST: CPT

## 2022-03-28 PROCEDURE — 93010 ELECTROCARDIOGRAM REPORT: CPT | Performed by: INTERNAL MEDICINE

## 2022-03-28 PROCEDURE — 25010000002 ENOXAPARIN PER 10 MG: Performed by: INTERNAL MEDICINE

## 2022-03-28 RX ORDER — METOPROLOL TARTRATE 50 MG/1
50 TABLET, FILM COATED ORAL EVERY 8 HOURS SCHEDULED
Status: DISCONTINUED | OUTPATIENT
Start: 2022-03-28 | End: 2022-04-07

## 2022-03-28 RX ADMIN — MIRTAZAPINE 7.5 MG: 15 TABLET, FILM COATED ORAL at 21:58

## 2022-03-28 RX ADMIN — INSULIN LISPRO 4 UNITS: 100 INJECTION, SOLUTION INTRAVENOUS; SUBCUTANEOUS at 12:15

## 2022-03-28 RX ADMIN — LEVOTHYROXINE SODIUM 150 MCG: 150 TABLET ORAL at 08:45

## 2022-03-28 RX ADMIN — INSULIN LISPRO 4 UNITS: 100 INJECTION, SOLUTION INTRAVENOUS; SUBCUTANEOUS at 08:44

## 2022-03-28 RX ADMIN — ATORVASTATIN CALCIUM 40 MG: 40 TABLET, FILM COATED ORAL at 21:58

## 2022-03-28 RX ADMIN — INSULIN LISPRO 4 UNITS: 100 INJECTION, SOLUTION INTRAVENOUS; SUBCUTANEOUS at 17:15

## 2022-03-28 RX ADMIN — PANTOPRAZOLE SODIUM 40 MG: 40 TABLET, DELAYED RELEASE ORAL at 06:16

## 2022-03-28 RX ADMIN — ONDANSETRON 4 MG: 2 INJECTION INTRAMUSCULAR; INTRAVENOUS at 14:03

## 2022-03-28 RX ADMIN — DULOXETINE HYDROCHLORIDE 90 MG: 30 CAPSULE, DELAYED RELEASE ORAL at 08:45

## 2022-03-28 RX ADMIN — Medication 10 ML: at 08:46

## 2022-03-28 RX ADMIN — Medication 5 MG/HR: at 17:15

## 2022-03-28 RX ADMIN — METOPROLOL TARTRATE 50 MG: 50 TABLET, FILM COATED ORAL at 14:03

## 2022-03-28 RX ADMIN — METOPROLOL TARTRATE 50 MG: 50 TABLET, FILM COATED ORAL at 21:58

## 2022-03-28 RX ADMIN — ENOXAPARIN SODIUM 120 MG: 120 INJECTION SUBCUTANEOUS at 08:44

## 2022-03-28 RX ADMIN — VITAM B12 50 MCG: 100 TAB at 08:44

## 2022-03-28 RX ADMIN — Medication 10 ML: at 21:58

## 2022-03-28 RX ADMIN — METOPROLOL TARTRATE 50 MG: 50 TABLET, FILM COATED ORAL at 08:45

## 2022-03-28 RX ADMIN — SODIUM CHLORIDE 125 MG: 9 INJECTION, SOLUTION INTRAVENOUS at 16:02

## 2022-03-28 RX ADMIN — BUPRENORPHINE AND NALOXONE 2 TABLET: 8; 2 TABLET SUBLINGUAL at 21:58

## 2022-03-28 RX ADMIN — ONDANSETRON 4 MG: 2 INJECTION INTRAMUSCULAR; INTRAVENOUS at 08:45

## 2022-03-28 RX ADMIN — ACETAMINOPHEN 650 MG: 325 TABLET ORAL at 21:58

## 2022-03-29 LAB
ANION GAP SERPL CALCULATED.3IONS-SCNC: 12 MMOL/L (ref 5–15)
BACTERIA SPEC AEROBE CULT: ABNORMAL
BACTERIA SPEC AEROBE CULT: ABNORMAL
BUN SERPL-MCNC: 64 MG/DL (ref 8–23)
BUN/CREAT SERPL: 23.4 (ref 7–25)
CALCIUM SPEC-SCNC: 8.1 MG/DL (ref 8.6–10.5)
CHLORIDE SERPL-SCNC: 99 MMOL/L (ref 98–107)
CO2 SERPL-SCNC: 20 MMOL/L (ref 22–29)
CREAT SERPL-MCNC: 2.73 MG/DL (ref 0.76–1.27)
EGFRCR SERPLBLD CKD-EPI 2021: 23.5 ML/MIN/1.73
GLUCOSE BLDC GLUCOMTR-MCNC: 212 MG/DL (ref 70–130)
GLUCOSE BLDC GLUCOMTR-MCNC: 227 MG/DL (ref 70–130)
GLUCOSE BLDC GLUCOMTR-MCNC: 246 MG/DL (ref 70–130)
GLUCOSE SERPL-MCNC: 246 MG/DL (ref 65–99)
GRAM STN SPEC: ABNORMAL
IGA SERPL-MCNC: 409 MG/DL (ref 61–437)
IGG SERPL-MCNC: 1006 MG/DL (ref 603–1613)
IGM SERPL-MCNC: 51 MG/DL (ref 15–143)
POTASSIUM SERPL-SCNC: 5.4 MMOL/L (ref 3.5–5.2)
PROT PATTERN SERPL IFE-IMP: NORMAL
QT INTERVAL: 266 MS
QTC INTERVAL: 399 MS
SODIUM SERPL-SCNC: 131 MMOL/L (ref 136–145)

## 2022-03-29 PROCEDURE — 63710000001 INSULIN LISPRO (HUMAN) PER 5 UNITS: Performed by: INTERNAL MEDICINE

## 2022-03-29 PROCEDURE — 97110 THERAPEUTIC EXERCISES: CPT

## 2022-03-29 PROCEDURE — 99232 SBSQ HOSP IP/OBS MODERATE 35: CPT | Performed by: INTERNAL MEDICINE

## 2022-03-29 PROCEDURE — 82962 GLUCOSE BLOOD TEST: CPT

## 2022-03-29 PROCEDURE — 80048 BASIC METABOLIC PNL TOTAL CA: CPT | Performed by: INTERNAL MEDICINE

## 2022-03-29 PROCEDURE — 25010000002 AMIODARONE IN DEXTROSE 5% 150-4.21 MG/100ML-% SOLUTION: Performed by: INTERNAL MEDICINE

## 2022-03-29 PROCEDURE — 63710000001 INSULIN DETEMIR PER 5 UNITS: Performed by: INTERNAL MEDICINE

## 2022-03-29 PROCEDURE — 25010000002 AMIODARONE IN DEXTROSE 5% 360-4.14 MG/200ML-% SOLUTION: Performed by: INTERNAL MEDICINE

## 2022-03-29 PROCEDURE — 97530 THERAPEUTIC ACTIVITIES: CPT

## 2022-03-29 PROCEDURE — 25010000002 ENOXAPARIN PER 10 MG: Performed by: INTERNAL MEDICINE

## 2022-03-29 RX ORDER — AMIODARONE HYDROCHLORIDE 200 MG/1
200 TABLET ORAL ONCE
Status: COMPLETED | OUTPATIENT
Start: 2022-03-30 | End: 2022-03-30

## 2022-03-29 RX ORDER — AMIODARONE HYDROCHLORIDE 200 MG/1
200 TABLET ORAL EVERY 8 HOURS
Status: DISPENSED | OUTPATIENT
Start: 2022-03-30 | End: 2022-04-06

## 2022-03-29 RX ORDER — AMIODARONE HYDROCHLORIDE 200 MG/1
200 TABLET ORAL EVERY 12 HOURS
Status: DISCONTINUED | OUTPATIENT
Start: 2022-04-06 | End: 2022-04-07

## 2022-03-29 RX ORDER — AMIODARONE HYDROCHLORIDE 200 MG/1
200 TABLET ORAL DAILY
Status: DISCONTINUED | OUTPATIENT
Start: 2022-04-20 | End: 2022-04-07

## 2022-03-29 RX ADMIN — INSULIN DETEMIR 10 UNITS: 100 INJECTION, SOLUTION SUBCUTANEOUS at 21:27

## 2022-03-29 RX ADMIN — METOPROLOL TARTRATE 50 MG: 50 TABLET, FILM COATED ORAL at 05:42

## 2022-03-29 RX ADMIN — LEVOTHYROXINE SODIUM 150 MCG: 150 TABLET ORAL at 08:16

## 2022-03-29 RX ADMIN — METOPROLOL TARTRATE 50 MG: 50 TABLET, FILM COATED ORAL at 14:09

## 2022-03-29 RX ADMIN — POLYETHYLENE GLYCOL 3350 17 G: 17 POWDER, FOR SOLUTION ORAL at 08:16

## 2022-03-29 RX ADMIN — METOPROLOL TARTRATE 50 MG: 50 TABLET, FILM COATED ORAL at 21:27

## 2022-03-29 RX ADMIN — Medication 10 ML: at 08:15

## 2022-03-29 RX ADMIN — ATORVASTATIN CALCIUM 40 MG: 40 TABLET, FILM COATED ORAL at 21:27

## 2022-03-29 RX ADMIN — PANTOPRAZOLE SODIUM 40 MG: 40 TABLET, DELAYED RELEASE ORAL at 05:42

## 2022-03-29 RX ADMIN — SODIUM ZIRCONIUM CYCLOSILICATE 10 G: 10 POWDER, FOR SUSPENSION ORAL at 17:57

## 2022-03-29 RX ADMIN — Medication 15 MG/HR: at 02:08

## 2022-03-29 RX ADMIN — ENOXAPARIN SODIUM 120 MG: 120 INJECTION SUBCUTANEOUS at 08:15

## 2022-03-29 RX ADMIN — DULOXETINE HYDROCHLORIDE 90 MG: 30 CAPSULE, DELAYED RELEASE ORAL at 08:16

## 2022-03-29 RX ADMIN — MIRTAZAPINE 7.5 MG: 15 TABLET, FILM COATED ORAL at 21:27

## 2022-03-29 RX ADMIN — BUPRENORPHINE AND NALOXONE 2 TABLET: 8; 2 TABLET SUBLINGUAL at 21:27

## 2022-03-29 RX ADMIN — INSULIN LISPRO 4 UNITS: 100 INJECTION, SOLUTION INTRAVENOUS; SUBCUTANEOUS at 17:57

## 2022-03-29 RX ADMIN — AMIODARONE HYDROCHLORIDE 150 MG: 1.5 INJECTION, SOLUTION INTRAVENOUS at 08:16

## 2022-03-29 RX ADMIN — INSULIN LISPRO 4 UNITS: 100 INJECTION, SOLUTION INTRAVENOUS; SUBCUTANEOUS at 11:59

## 2022-03-29 RX ADMIN — AMIODARONE HYDROCHLORIDE 1 MG/MIN: 1.8 INJECTION, SOLUTION INTRAVENOUS at 08:29

## 2022-03-29 RX ADMIN — AMIODARONE HYDROCHLORIDE 0.5 MG/MIN: 1.8 INJECTION, SOLUTION INTRAVENOUS at 14:09

## 2022-03-29 RX ADMIN — AMIODARONE HYDROCHLORIDE 0.5 MG/MIN: 1.8 INJECTION, SOLUTION INTRAVENOUS at 22:38

## 2022-03-29 RX ADMIN — VITAM B12 50 MCG: 100 TAB at 08:15

## 2022-03-29 RX ADMIN — INSULIN LISPRO 4 UNITS: 100 INJECTION, SOLUTION INTRAVENOUS; SUBCUTANEOUS at 08:16

## 2022-03-30 LAB
ANION GAP SERPL CALCULATED.3IONS-SCNC: 14 MMOL/L (ref 5–15)
BUN SERPL-MCNC: 69 MG/DL (ref 8–23)
BUN/CREAT SERPL: 25.4 (ref 7–25)
CALCIUM SPEC-SCNC: 8.1 MG/DL (ref 8.6–10.5)
CHLORIDE SERPL-SCNC: 98 MMOL/L (ref 98–107)
CO2 SERPL-SCNC: 19 MMOL/L (ref 22–29)
CREAT SERPL-MCNC: 2.72 MG/DL (ref 0.76–1.27)
EGFRCR SERPLBLD CKD-EPI 2021: 23.6 ML/MIN/1.73
GLUCOSE BLDC GLUCOMTR-MCNC: 204 MG/DL (ref 70–130)
GLUCOSE BLDC GLUCOMTR-MCNC: 227 MG/DL (ref 70–130)
GLUCOSE BLDC GLUCOMTR-MCNC: 255 MG/DL (ref 70–130)
GLUCOSE BLDC GLUCOMTR-MCNC: 271 MG/DL (ref 70–130)
GLUCOSE SERPL-MCNC: 251 MG/DL (ref 65–99)
POTASSIUM SERPL-SCNC: 5.6 MMOL/L (ref 3.5–5.2)
SODIUM SERPL-SCNC: 131 MMOL/L (ref 136–145)

## 2022-03-30 PROCEDURE — 25010000002 ONDANSETRON PER 1 MG: Performed by: INTERNAL MEDICINE

## 2022-03-30 PROCEDURE — 82962 GLUCOSE BLOOD TEST: CPT

## 2022-03-30 PROCEDURE — 97535 SELF CARE MNGMENT TRAINING: CPT

## 2022-03-30 PROCEDURE — 63710000001 INSULIN LISPRO (HUMAN) PER 5 UNITS: Performed by: INTERNAL MEDICINE

## 2022-03-30 PROCEDURE — 97530 THERAPEUTIC ACTIVITIES: CPT

## 2022-03-30 PROCEDURE — 80048 BASIC METABOLIC PNL TOTAL CA: CPT | Performed by: INTERNAL MEDICINE

## 2022-03-30 PROCEDURE — 25010000002 ENOXAPARIN PER 10 MG: Performed by: INTERNAL MEDICINE

## 2022-03-30 PROCEDURE — 99232 SBSQ HOSP IP/OBS MODERATE 35: CPT | Performed by: INTERNAL MEDICINE

## 2022-03-30 PROCEDURE — 82595 ASSAY OF CRYOGLOBULIN: CPT | Performed by: INTERNAL MEDICINE

## 2022-03-30 PROCEDURE — 63710000001 INSULIN DETEMIR PER 5 UNITS: Performed by: INTERNAL MEDICINE

## 2022-03-30 PROCEDURE — 25010000002 AMIODARONE IN DEXTROSE 5% 360-4.14 MG/200ML-% SOLUTION: Performed by: INTERNAL MEDICINE

## 2022-03-30 PROCEDURE — 25010000002 NA FERRIC GLUC CPLX PER 12.5 MG

## 2022-03-30 RX ORDER — BUMETANIDE 0.25 MG/ML
2 INJECTION INTRAMUSCULAR; INTRAVENOUS DAILY
Status: DISCONTINUED | OUTPATIENT
Start: 2022-03-30 | End: 2022-04-03

## 2022-03-30 RX ORDER — CHOLECALCIFEROL (VITAMIN D3) 125 MCG
5 CAPSULE ORAL NIGHTLY PRN
Status: DISCONTINUED | OUTPATIENT
Start: 2022-03-30 | End: 2022-04-05

## 2022-03-30 RX ADMIN — INSULIN LISPRO 6 UNITS: 100 INJECTION, SOLUTION INTRAVENOUS; SUBCUTANEOUS at 08:40

## 2022-03-30 RX ADMIN — METOPROLOL TARTRATE 50 MG: 50 TABLET, FILM COATED ORAL at 07:14

## 2022-03-30 RX ADMIN — VITAM B12 50 MCG: 100 TAB at 08:42

## 2022-03-30 RX ADMIN — BUPRENORPHINE AND NALOXONE 2 TABLET: 8; 2 TABLET SUBLINGUAL at 22:14

## 2022-03-30 RX ADMIN — INSULIN LISPRO 6 UNITS: 100 INJECTION, SOLUTION INTRAVENOUS; SUBCUTANEOUS at 12:45

## 2022-03-30 RX ADMIN — SODIUM CHLORIDE 125 MG: 9 INJECTION, SOLUTION INTRAVENOUS at 16:39

## 2022-03-30 RX ADMIN — ATORVASTATIN CALCIUM 40 MG: 40 TABLET, FILM COATED ORAL at 21:05

## 2022-03-30 RX ADMIN — AMIODARONE HYDROCHLORIDE 0.5 MG/MIN: 1.8 INJECTION, SOLUTION INTRAVENOUS at 10:27

## 2022-03-30 RX ADMIN — PANTOPRAZOLE SODIUM 40 MG: 40 TABLET, DELAYED RELEASE ORAL at 07:14

## 2022-03-30 RX ADMIN — ONDANSETRON 4 MG: 2 INJECTION INTRAMUSCULAR; INTRAVENOUS at 14:03

## 2022-03-30 RX ADMIN — MIRTAZAPINE 7.5 MG: 15 TABLET, FILM COATED ORAL at 21:05

## 2022-03-30 RX ADMIN — AMIODARONE HYDROCHLORIDE 0.5 MG/MIN: 1.8 INJECTION, SOLUTION INTRAVENOUS at 22:14

## 2022-03-30 RX ADMIN — LEVOTHYROXINE SODIUM 150 MCG: 150 TABLET ORAL at 08:41

## 2022-03-30 RX ADMIN — METOPROLOL TARTRATE 50 MG: 50 TABLET, FILM COATED ORAL at 13:50

## 2022-03-30 RX ADMIN — Medication 5 MG: at 21:05

## 2022-03-30 RX ADMIN — METOPROLOL TARTRATE 50 MG: 50 TABLET, FILM COATED ORAL at 21:05

## 2022-03-30 RX ADMIN — ENOXAPARIN SODIUM 120 MG: 120 INJECTION SUBCUTANEOUS at 08:39

## 2022-03-30 RX ADMIN — INSULIN LISPRO 4 UNITS: 100 INJECTION, SOLUTION INTRAVENOUS; SUBCUTANEOUS at 18:20

## 2022-03-30 RX ADMIN — SODIUM ZIRCONIUM CYCLOSILICATE 10 G: 10 POWDER, FOR SUSPENSION ORAL at 14:57

## 2022-03-30 RX ADMIN — DULOXETINE HYDROCHLORIDE 90 MG: 30 CAPSULE, DELAYED RELEASE ORAL at 08:41

## 2022-03-30 RX ADMIN — BUMETANIDE 2 MG: 0.25 INJECTION, SOLUTION INTRAMUSCULAR; INTRAVENOUS at 14:57

## 2022-03-30 RX ADMIN — AMIODARONE HYDROCHLORIDE 200 MG: 200 TABLET ORAL at 08:41

## 2022-03-30 RX ADMIN — AMIODARONE HYDROCHLORIDE 200 MG: 200 TABLET ORAL at 16:26

## 2022-03-30 RX ADMIN — INSULIN DETEMIR 10 UNITS: 100 INJECTION, SOLUTION SUBCUTANEOUS at 22:14

## 2022-03-31 LAB
ALBUMIN SERPL-MCNC: 2.4 G/DL (ref 3.5–5.2)
ALBUMIN/GLOB SERPL: 1.1 G/DL
ALP SERPL-CCNC: 106 U/L (ref 39–117)
ALT SERPL W P-5'-P-CCNC: 14 U/L (ref 1–41)
ANION GAP SERPL CALCULATED.3IONS-SCNC: 13 MMOL/L (ref 5–15)
AST SERPL-CCNC: 16 U/L (ref 1–40)
BASOPHILS # BLD AUTO: 0.07 10*3/MM3 (ref 0–0.2)
BASOPHILS NFR BLD AUTO: 0.5 % (ref 0–1.5)
BILIRUB SERPL-MCNC: 0.3 MG/DL (ref 0–1.2)
BUN SERPL-MCNC: 70 MG/DL (ref 8–23)
BUN/CREAT SERPL: 26.9 (ref 7–25)
CALCIUM SPEC-SCNC: 7.8 MG/DL (ref 8.6–10.5)
CHLORIDE SERPL-SCNC: 100 MMOL/L (ref 98–107)
CO2 SERPL-SCNC: 20 MMOL/L (ref 22–29)
CREAT SERPL-MCNC: 2.6 MG/DL (ref 0.76–1.27)
CRP SERPL-MCNC: 6.84 MG/DL (ref 0–0.5)
CRYOGLOB SER QL 1D COLD INC: NORMAL
DEPRECATED RDW RBC AUTO: 50.5 FL (ref 37–54)
EGFRCR SERPLBLD CKD-EPI 2021: 24.9 ML/MIN/1.73
EOSINOPHIL # BLD AUTO: 0.08 10*3/MM3 (ref 0–0.4)
EOSINOPHIL NFR BLD AUTO: 0.5 % (ref 0.3–6.2)
ERYTHROCYTE [DISTWIDTH] IN BLOOD BY AUTOMATED COUNT: 14.8 % (ref 12.3–15.4)
GLOBULIN UR ELPH-MCNC: 2.2 GM/DL
GLUCOSE BLDC GLUCOMTR-MCNC: 189 MG/DL (ref 70–130)
GLUCOSE BLDC GLUCOMTR-MCNC: 191 MG/DL (ref 70–130)
GLUCOSE BLDC GLUCOMTR-MCNC: 194 MG/DL (ref 70–130)
GLUCOSE BLDC GLUCOMTR-MCNC: 208 MG/DL (ref 70–130)
GLUCOSE SERPL-MCNC: 179 MG/DL (ref 65–99)
HCT VFR BLD AUTO: 30 % (ref 37.5–51)
HGB BLD-MCNC: 9.6 G/DL (ref 13–17.7)
IMM GRANULOCYTES # BLD AUTO: 0.3 10*3/MM3 (ref 0–0.05)
IMM GRANULOCYTES NFR BLD AUTO: 2 % (ref 0–0.5)
LYMPHOCYTES # BLD AUTO: 1.27 10*3/MM3 (ref 0.7–3.1)
LYMPHOCYTES NFR BLD AUTO: 8.3 % (ref 19.6–45.3)
MCH RBC QN AUTO: 30 PG (ref 26.6–33)
MCHC RBC AUTO-ENTMCNC: 32 G/DL (ref 31.5–35.7)
MCV RBC AUTO: 93.8 FL (ref 79–97)
MONOCYTES # BLD AUTO: 1.22 10*3/MM3 (ref 0.1–0.9)
MONOCYTES NFR BLD AUTO: 7.9 % (ref 5–12)
NEUTROPHILS NFR BLD AUTO: 12.41 10*3/MM3 (ref 1.7–7)
NEUTROPHILS NFR BLD AUTO: 80.8 % (ref 42.7–76)
NRBC BLD AUTO-RTO: 0 /100 WBC (ref 0–0.2)
PLATELET # BLD AUTO: 337 10*3/MM3 (ref 140–450)
PMV BLD AUTO: 9 FL (ref 6–12)
POTASSIUM SERPL-SCNC: 4.9 MMOL/L (ref 3.5–5.2)
PROT SERPL-MCNC: 4.6 G/DL (ref 6–8.5)
RBC # BLD AUTO: 3.2 10*6/MM3 (ref 4.14–5.8)
SODIUM SERPL-SCNC: 133 MMOL/L (ref 136–145)
WBC NRBC COR # BLD: 15.35 10*3/MM3 (ref 3.4–10.8)

## 2022-03-31 PROCEDURE — 99232 SBSQ HOSP IP/OBS MODERATE 35: CPT | Performed by: INTERNAL MEDICINE

## 2022-03-31 PROCEDURE — 63710000001 INSULIN DETEMIR PER 5 UNITS: Performed by: INTERNAL MEDICINE

## 2022-03-31 PROCEDURE — 82962 GLUCOSE BLOOD TEST: CPT

## 2022-03-31 PROCEDURE — 25010000002 ENOXAPARIN PER 10 MG: Performed by: INTERNAL MEDICINE

## 2022-03-31 PROCEDURE — 85025 COMPLETE CBC W/AUTO DIFF WBC: CPT | Performed by: INTERNAL MEDICINE

## 2022-03-31 PROCEDURE — 63710000001 INSULIN LISPRO (HUMAN) PER 5 UNITS: Performed by: INTERNAL MEDICINE

## 2022-03-31 PROCEDURE — 86140 C-REACTIVE PROTEIN: CPT | Performed by: INTERNAL MEDICINE

## 2022-03-31 PROCEDURE — 80053 COMPREHEN METABOLIC PANEL: CPT | Performed by: INTERNAL MEDICINE

## 2022-03-31 PROCEDURE — 25010000002 AMIODARONE IN DEXTROSE 5% 360-4.14 MG/200ML-% SOLUTION: Performed by: INTERNAL MEDICINE

## 2022-03-31 RX ADMIN — LEVOTHYROXINE SODIUM 150 MCG: 150 TABLET ORAL at 08:51

## 2022-03-31 RX ADMIN — AMIODARONE HYDROCHLORIDE 0.5 MG/MIN: 1.8 INJECTION, SOLUTION INTRAVENOUS at 10:21

## 2022-03-31 RX ADMIN — AMIODARONE HYDROCHLORIDE 200 MG: 200 TABLET ORAL at 15:36

## 2022-03-31 RX ADMIN — INSULIN DETEMIR 10 UNITS: 100 INJECTION, SOLUTION SUBCUTANEOUS at 21:47

## 2022-03-31 RX ADMIN — ENOXAPARIN SODIUM 120 MG: 120 INJECTION SUBCUTANEOUS at 08:50

## 2022-03-31 RX ADMIN — METOPROLOL TARTRATE 50 MG: 50 TABLET, FILM COATED ORAL at 14:07

## 2022-03-31 RX ADMIN — BUMETANIDE 2 MG: 0.25 INJECTION, SOLUTION INTRAMUSCULAR; INTRAVENOUS at 08:50

## 2022-03-31 RX ADMIN — DULOXETINE HYDROCHLORIDE 90 MG: 30 CAPSULE, DELAYED RELEASE ORAL at 08:51

## 2022-03-31 RX ADMIN — Medication 10 ML: at 08:58

## 2022-03-31 RX ADMIN — ATORVASTATIN CALCIUM 40 MG: 40 TABLET, FILM COATED ORAL at 21:45

## 2022-03-31 RX ADMIN — MIRTAZAPINE 7.5 MG: 15 TABLET, FILM COATED ORAL at 21:45

## 2022-03-31 RX ADMIN — AMIODARONE HYDROCHLORIDE 200 MG: 200 TABLET ORAL at 00:13

## 2022-03-31 RX ADMIN — PANTOPRAZOLE SODIUM 40 MG: 40 TABLET, DELAYED RELEASE ORAL at 06:22

## 2022-03-31 RX ADMIN — AMIODARONE HYDROCHLORIDE 200 MG: 200 TABLET ORAL at 08:51

## 2022-03-31 RX ADMIN — INSULIN LISPRO 2 UNITS: 100 INJECTION, SOLUTION INTRAVENOUS; SUBCUTANEOUS at 11:32

## 2022-03-31 RX ADMIN — AMIODARONE HYDROCHLORIDE 0.5 MG/MIN: 1.8 INJECTION, SOLUTION INTRAVENOUS at 22:02

## 2022-03-31 RX ADMIN — BUPRENORPHINE AND NALOXONE 2 TABLET: 8; 2 TABLET SUBLINGUAL at 21:45

## 2022-03-31 RX ADMIN — Medication 5 MG: at 22:02

## 2022-03-31 RX ADMIN — VITAM B12 50 MCG: 100 TAB at 08:50

## 2022-03-31 RX ADMIN — METOPROLOL TARTRATE 50 MG: 50 TABLET, FILM COATED ORAL at 21:45

## 2022-03-31 RX ADMIN — SODIUM ZIRCONIUM CYCLOSILICATE 10 G: 10 POWDER, FOR SUSPENSION ORAL at 08:51

## 2022-03-31 RX ADMIN — INSULIN LISPRO 4 UNITS: 100 INJECTION, SOLUTION INTRAVENOUS; SUBCUTANEOUS at 08:50

## 2022-03-31 RX ADMIN — METOPROLOL TARTRATE 50 MG: 50 TABLET, FILM COATED ORAL at 06:22

## 2022-03-31 RX ADMIN — INSULIN LISPRO 2 UNITS: 100 INJECTION, SOLUTION INTRAVENOUS; SUBCUTANEOUS at 16:46

## 2022-04-01 ENCOUNTER — APPOINTMENT (OUTPATIENT)
Dept: CARDIOLOGY | Facility: HOSPITAL | Age: 77
End: 2022-04-01

## 2022-04-01 LAB
ALBUMIN SERPL-MCNC: 2.4 G/DL (ref 3.5–5.2)
ALBUMIN/GLOB SERPL: 1 G/DL
ALP SERPL-CCNC: 80 U/L (ref 39–117)
ALT SERPL W P-5'-P-CCNC: 12 U/L (ref 1–41)
ANION GAP SERPL CALCULATED.3IONS-SCNC: 12 MMOL/L (ref 5–15)
AST SERPL-CCNC: 16 U/L (ref 1–40)
BH CV ECHO MEAS - BSA(HAYCOCK): 2.5 M^2
BH CV ECHO MEAS - BSA: 2.3 M^2
BH CV ECHO MEAS - BZI_BMI: 38.8 KILOGRAMS/M^2
BH CV ECHO MEAS - BZI_METRIC_HEIGHT: 175.3 CM
BH CV ECHO MEAS - BZI_METRIC_WEIGHT: 119.3 KG
BH CV VAS BP LEFT ARM: NORMAL MMHG
BILIRUB SERPL-MCNC: 0.3 MG/DL (ref 0–1.2)
BUN SERPL-MCNC: 63 MG/DL (ref 8–23)
BUN/CREAT SERPL: 27 (ref 7–25)
CALCIUM SPEC-SCNC: 7.9 MG/DL (ref 8.6–10.5)
CHLORIDE SERPL-SCNC: 103 MMOL/L (ref 98–107)
CO2 SERPL-SCNC: 20 MMOL/L (ref 22–29)
CREAT SERPL-MCNC: 2.33 MG/DL (ref 0.76–1.27)
EGFRCR SERPLBLD CKD-EPI 2021: 28.4 ML/MIN/1.73
GLOBULIN UR ELPH-MCNC: 2.4 GM/DL
GLUCOSE BLDC GLUCOMTR-MCNC: 195 MG/DL (ref 70–130)
GLUCOSE BLDC GLUCOMTR-MCNC: 197 MG/DL (ref 70–130)
GLUCOSE BLDC GLUCOMTR-MCNC: 212 MG/DL (ref 70–130)
GLUCOSE BLDC GLUCOMTR-MCNC: 214 MG/DL (ref 70–130)
GLUCOSE SERPL-MCNC: 225 MG/DL (ref 65–99)
LV EF 2D ECHO EST: 65 %
MAXIMAL PREDICTED HEART RATE: 145 BPM
POTASSIUM SERPL-SCNC: 5 MMOL/L (ref 3.5–5.2)
PROT SERPL-MCNC: 4.8 G/DL (ref 6–8.5)
SODIUM SERPL-SCNC: 135 MMOL/L (ref 136–145)
STRESS TARGET HR: 123 BPM

## 2022-04-01 PROCEDURE — B246ZZ4 ULTRASONOGRAPHY OF RIGHT AND LEFT HEART, TRANSESOPHAGEAL: ICD-10-PCS | Performed by: INTERNAL MEDICINE

## 2022-04-01 PROCEDURE — 63710000001 INSULIN LISPRO (HUMAN) PER 5 UNITS: Performed by: INTERNAL MEDICINE

## 2022-04-01 PROCEDURE — 93312 ECHO TRANSESOPHAGEAL: CPT

## 2022-04-01 PROCEDURE — 63710000001 PREDNISONE PER 1 MG: Performed by: INTERNAL MEDICINE

## 2022-04-01 PROCEDURE — 82962 GLUCOSE BLOOD TEST: CPT

## 2022-04-01 PROCEDURE — 25010000002 AMIODARONE IN DEXTROSE 5% 360-4.14 MG/200ML-% SOLUTION: Performed by: INTERNAL MEDICINE

## 2022-04-01 PROCEDURE — 93321 DOPPLER ECHO F-UP/LMTD STD: CPT | Performed by: INTERNAL MEDICINE

## 2022-04-01 PROCEDURE — 93312 ECHO TRANSESOPHAGEAL: CPT | Performed by: INTERNAL MEDICINE

## 2022-04-01 PROCEDURE — 5A2204Z RESTORATION OF CARDIAC RHYTHM, SINGLE: ICD-10-PCS | Performed by: INTERNAL MEDICINE

## 2022-04-01 PROCEDURE — 93321 DOPPLER ECHO F-UP/LMTD STD: CPT

## 2022-04-01 PROCEDURE — 25010000002 ENOXAPARIN PER 10 MG: Performed by: INTERNAL MEDICINE

## 2022-04-01 PROCEDURE — 25010000002 MIDAZOLAM PER 1 MG: Performed by: INTERNAL MEDICINE

## 2022-04-01 PROCEDURE — 93325 DOPPLER ECHO COLOR FLOW MAPG: CPT | Performed by: INTERNAL MEDICINE

## 2022-04-01 PROCEDURE — 92960 CARDIOVERSION ELECTRIC EXT: CPT | Performed by: INTERNAL MEDICINE

## 2022-04-01 PROCEDURE — 99232 SBSQ HOSP IP/OBS MODERATE 35: CPT | Performed by: INTERNAL MEDICINE

## 2022-04-01 PROCEDURE — 93325 DOPPLER ECHO COLOR FLOW MAPG: CPT

## 2022-04-01 PROCEDURE — 92960 CARDIOVERSION ELECTRIC EXT: CPT

## 2022-04-01 PROCEDURE — 93005 ELECTROCARDIOGRAM TRACING: CPT | Performed by: INTERNAL MEDICINE

## 2022-04-01 PROCEDURE — 63710000001 INSULIN DETEMIR PER 5 UNITS: Performed by: INTERNAL MEDICINE

## 2022-04-01 PROCEDURE — 80053 COMPREHEN METABOLIC PANEL: CPT | Performed by: INTERNAL MEDICINE

## 2022-04-01 RX ORDER — PREDNISONE 20 MG/1
40 TABLET ORAL
Status: DISCONTINUED | OUTPATIENT
Start: 2022-04-01 | End: 2022-04-04

## 2022-04-01 RX ORDER — FLUMAZENIL 0.1 MG/ML
INJECTION INTRAVENOUS
Status: DISCONTINUED
Start: 2022-04-01 | End: 2022-04-01 | Stop reason: WASHOUT

## 2022-04-01 RX ORDER — NALOXONE HYDROCHLORIDE 0.4 MG/ML
INJECTION, SOLUTION INTRAMUSCULAR; INTRAVENOUS; SUBCUTANEOUS
Status: DISCONTINUED
Start: 2022-04-01 | End: 2022-04-01 | Stop reason: WASHOUT

## 2022-04-01 RX ORDER — FENTANYL CITRATE 50 UG/ML
INJECTION, SOLUTION INTRAMUSCULAR; INTRAVENOUS
Status: DISCONTINUED
Start: 2022-04-01 | End: 2022-04-01 | Stop reason: WASHOUT

## 2022-04-01 RX ORDER — MIDAZOLAM HYDROCHLORIDE 1 MG/ML
INJECTION INTRAMUSCULAR; INTRAVENOUS
Status: DISCONTINUED
Start: 2022-04-01 | End: 2022-04-15 | Stop reason: HOSPADM

## 2022-04-01 RX ORDER — MIDAZOLAM HYDROCHLORIDE 1 MG/ML
INJECTION INTRAMUSCULAR; INTRAVENOUS
Status: COMPLETED | OUTPATIENT
Start: 2022-04-01 | End: 2022-04-01

## 2022-04-01 RX ORDER — AMOXICILLIN 250 MG
2 CAPSULE ORAL 2 TIMES DAILY
Status: DISCONTINUED | OUTPATIENT
Start: 2022-04-01 | End: 2022-04-07

## 2022-04-01 RX ADMIN — INSULIN DETEMIR 15 UNITS: 100 INJECTION, SOLUTION SUBCUTANEOUS at 20:19

## 2022-04-01 RX ADMIN — METOPROLOL TARTRATE 50 MG: 50 TABLET, FILM COATED ORAL at 20:18

## 2022-04-01 RX ADMIN — ENOXAPARIN SODIUM 110 MG: 120 INJECTION SUBCUTANEOUS at 15:24

## 2022-04-01 RX ADMIN — MIRTAZAPINE 7.5 MG: 15 TABLET, FILM COATED ORAL at 20:18

## 2022-04-01 RX ADMIN — VITAM B12 50 MCG: 100 TAB at 15:23

## 2022-04-01 RX ADMIN — MIDAZOLAM 2 MG: 1 INJECTION INTRAMUSCULAR; INTRAVENOUS at 10:16

## 2022-04-01 RX ADMIN — ATORVASTATIN CALCIUM 40 MG: 40 TABLET, FILM COATED ORAL at 20:19

## 2022-04-01 RX ADMIN — Medication 10 ML: at 15:25

## 2022-04-01 RX ADMIN — Medication 10 ML: at 20:19

## 2022-04-01 RX ADMIN — AMIODARONE HYDROCHLORIDE 200 MG: 200 TABLET ORAL at 15:23

## 2022-04-01 RX ADMIN — ACETAMINOPHEN 650 MG: 325 TABLET ORAL at 20:18

## 2022-04-01 RX ADMIN — METOPROLOL TARTRATE 50 MG: 50 TABLET, FILM COATED ORAL at 15:24

## 2022-04-01 RX ADMIN — Medication 5 MG: at 20:18

## 2022-04-01 RX ADMIN — AMIODARONE HYDROCHLORIDE 200 MG: 200 TABLET ORAL at 00:27

## 2022-04-01 RX ADMIN — DULOXETINE HYDROCHLORIDE 90 MG: 30 CAPSULE, DELAYED RELEASE ORAL at 15:22

## 2022-04-01 RX ADMIN — LEVOTHYROXINE SODIUM 150 MCG: 150 TABLET ORAL at 15:23

## 2022-04-01 RX ADMIN — AMIODARONE HYDROCHLORIDE 200 MG: 200 TABLET ORAL at 23:15

## 2022-04-01 RX ADMIN — BUPRENORPHINE AND NALOXONE 2 TABLET: 8; 2 TABLET SUBLINGUAL at 21:40

## 2022-04-01 RX ADMIN — INSULIN LISPRO 2 UNITS: 100 INJECTION, SOLUTION INTRAVENOUS; SUBCUTANEOUS at 17:30

## 2022-04-01 RX ADMIN — AMIODARONE HYDROCHLORIDE 0.5 MG/MIN: 1.8 INJECTION, SOLUTION INTRAVENOUS at 09:32

## 2022-04-01 RX ADMIN — PANTOPRAZOLE SODIUM 40 MG: 40 TABLET, DELAYED RELEASE ORAL at 15:22

## 2022-04-01 RX ADMIN — SENNOSIDES AND DOCUSATE SODIUM 2 TABLET: 50; 8.6 TABLET ORAL at 17:30

## 2022-04-01 RX ADMIN — BUMETANIDE 2 MG: 0.25 INJECTION, SOLUTION INTRAMUSCULAR; INTRAVENOUS at 15:24

## 2022-04-01 RX ADMIN — MINERAL OIL 5 ML: 1000 LIQUID ORAL at 17:31

## 2022-04-01 RX ADMIN — PREDNISONE 40 MG: 20 TABLET ORAL at 15:24

## 2022-04-01 NOTE — PROGRESS NOTES
INFECTIOUS DISEASE Progress Note    Yinka Cummings  1945  [id number]      Admission Date: 3/25/2022      Requesting Provider: Yinka Desai MD  Evaluating Physician: Brandt Ward MD    Reason for Consultation: vasculitis rash with cellulitis    History of present illness:    3/26/2022: Patient is a 77 y.o. male, ,known to Dr. Wili Rees, with h/o CKD, T2DM, afib/flutter, chronic diastolic heart failure, VINI, chronic pain, gastroparesis, Gilbert's disease,  hypothyroidism, essential tremor, HTN, Obesity, Prostate cancer/radical prostatectomy, renal cell carcinoma/right nephrectomy, multiple skin cancer excisions, and cutaneous vasculitis who we were asked to see for cellulitis.  The patient presented to [facility] ED on 3/25/22 with nausea, vomiting, and LE rash/redness.  [facility] with rash and Afib/RVR.  Dermatology did a punch biopsy with path showing perivascular lymphocytic infiltrate with no evidence of vasculitis at the time.  He was though to have rash from Pradaxa and was changed to Lovenox.  The rash worsened and eventually Lyrica, Primidone, and Norco were stopped one by one.  He was started on Suboxone on 3/16 and his rash began to improve.  Primidone had been a new drug started just prior the start of the rash.  He [facility] cardio[facility] while at [facility] and converted to NSR.  He was discharge home on Lovenox.  He followed up with his PCP on 3/21 and was placed on Keflex for possible cutaneous infections at the rash site and Lasix for BLE edema.    His rash has worsened again on feet, legs, abdomen, and arms prompting him to come to [facility] ED on 3/25.  He developed nausea and vomiting prior to his presentation.  He denies fever, chills, abdominal pain, diarrhea, or dysuria.  On arrival, his Tmax is 99.4.  Initial labs were CRP 12.02-->12.5, INR 1.2, ESR 44-->62, PCT 0.78-->0.94, lactic acid 2.0, lipase 8, proBNP 5100, D-dimer 4.44, RF 16.4, C3 144, C4 29, WBC 10,600  with 83% neutrophils, and creatinine 2.35-->2.75 (baseline 1.6 on 5/20/21).  A leg wound culture showed normal skin ananth with GS showing GPC in pairs/clusters.  A second wound culture is pending with growth present.   A COVID-19/Flu PCR is negative.  A Hep panel was negative.  A UA WBC is 13-20 with TNTC RBCs, trace LE, negative nitrite.  KENZIE, ANCA panel, cryogobulin, and complement are pending.  A CXR showed no acute findings.  A DVT work up of BLE was negative although peroneal veins were not well visualized bilaterally. A CT scan of a/p with contrast has been ordered.  He is currently on low dose Rocephin.  ID was asked to evaluate and manage his antibiotic therapy.    He continues to have nausea and vomiting with cold sweat.  He denies any diarrhea.     3/27/2022: He complains of lower extremity pain which is most prominent in his feet.  He has remained afebrile.  His creatinine today is 2.82 and his C-reactive protein is 11.6.  His white blood cell count is 17.2.  He has anorexia but denies nausea and vomiting.    3/28/22: He has decreased Bilateral foot pain.  He has remained afebrile.  His creatinine is down to 2.5. His 24 hour protein is 360. He denies nausea, vomiting, and diarrhea    3/29/22:  Maximum temperature over the last 24 hours is 99.2. Creatinine yesterday was 2.58. CH 50 was greater than 60, ANCA was negative and KENZIE was negative.  He has decreased foot pain.  He complains of malaise but denies nausea and vomiting.    3/30/22: He has remained afebrile.  He denies nausea and vomiting.  He has decreased lower extremity pain.  He denies dyspnea.    3/31/22: He remains afebrile. His creatinine today is 2.6.  His C-reactive protein is 6.8.  His white blood cell count is 15.4.  His echocardiogram revealed no valvular vegetations. He and his family indicate improvement in his lower extremity rash but he has more extensive lesions over his palms.     4/1/2022: He underwent cardioversion today.  He is  now in sinus rhythm.  He has remained afebrile.  He is currently drowsy from his sedation for cardioversion.  His family thinks that his rash is no worse today.  His creatinine today is 2.33.    Past Medical History:   Diagnosis Date   • Anxiety    • Arthritis    • Chronic kidney disease    • Diabetes mellitus (HCC)    • Disease of thyroid gland    • Diverticulosis    • Essential tremor    • HL (hearing loss)    • Hypertension    • Obesity    • Prostate cancer (HCC)    • Renal cell cancer (HCC)    • Skin cancer    • Vasculitis of skin        Past Surgical History:   Procedure Laterality Date   • CHOLECYSTECTOMY     • COLONOSCOPY      Polyps in the past but not on the most recent scope   • NEPHRECTOMY Right    • PROSTATE SURGERY      Radical prostatectomy   • SKIN CANCER EXCISION      Large incision left neck, multiple other cancers removed       Family History   Problem Relation Age of Onset   • Cancer Mother    • Heart attack Father    • Kidney failure Sister    • Coronary artery disease Sister        Social History     Socioeconomic History   • Marital status:    • Number of children: 4   Tobacco Use   • Smoking status: Never Smoker   • Smokeless tobacco: Never Used   Vaping Use   • Vaping Use: Never used   Substance and Sexual Activity   • Alcohol use: Not Currently   • Drug use: Not Currently   • Sexual activity: Defer       Allergies   Allergen Reactions   • Contrast Dye Rash     [facility]   • Doxycycline Rash     Urticaria per [facility]   • Chlorthalidone Other (See Comments)     Hyponatremia [facility]   • Morphine Unknown - Low Severity     Headache [facility]   • Neurontin [Gabapentin] Mental Status Change     Drowsy [facility]   • Phenergan [Promethazine] Unknown - Low Severity     Confusion [facility]         Medication:    Current Facility-Administered Medications:   •  acetaminophen (TYLENOL) tablet 650 mg, 650 mg, Oral, Q4H PRN, 650 mg at 03/28/22 0223 **OR**  acetaminophen (TYLENOL) 160 MG/5ML solution 650 mg, 650 mg, Oral, Q4H PRN, 649.6 mg at 22 0943 **OR** acetaminophen (TYLENOL) suppository 650 mg, 650 mg, Rectal, Q4H PRN, Raymond Herrera MD  •  [COMPLETED] amiodarone in dextrose 5% (NEXTERONE) loading dose 150mg/100mL, 150 mg, Intravenous, Once, 150 mg at 22 0816 **FOLLOWED BY** [] amiodarone 360 mg in 200 mL D5W infusion, 1 mg/min, Intravenous, Continuous, Last Rate: 33.3 mL/hr at 22 08, 1 mg/min at 22 **FOLLOWED BY** [] amiodarone 360 mg in 200 mL D5W infusion, 0.5 mg/min, Intravenous, Continuous, Last Rate: 16.67 mL/hr at 22, 0.5 mg/min at 22 **FOLLOWED BY** [COMPLETED] amiodarone (PACERONE) tablet 200 mg, 200 mg, Oral, Once, 200 mg at 22 0841 **FOLLOWED BY** amiodarone (PACERONE) tablet 200 mg, 200 mg, Oral, Q8H, 200 mg at 22 1523 **FOLLOWED BY** [START ON 2022] amiodarone (PACERONE) tablet 200 mg, 200 mg, Oral, Q12H **FOLLOWED BY** [START ON 2022] amiodarone (PACERONE) tablet 200 mg, 200 mg, Oral, Daily, Raymond Herrera MD  •  atorvastatin (LIPITOR) tablet 40 mg, 40 mg, Oral, Nightly, Raymond Herrera MD, 40 mg at 22 2145  •  bumetanide (BUMEX) injection 2 mg, 2 mg, Intravenous, Daily, Raymond Herrera MD, 2 mg at 22 1524  •  buprenorphine-naloxone (SUBOXONE) 8-2 MG per SL tablet 2 tablet, 2 tablet, Sublingual, Nightly, Raymond Herrera MD, 2 tablet at 22 2145  •  dextrose (D50W) (25 g/50 mL) IV injection 25 g, 25 g, Intravenous, Q15 Min PRN, Raymond Herrera MD  •  dextrose (GLUTOSE) oral gel 15 g, 15 g, Oral, Q15 Min PRN, Raymond Herrera MD  •  DULoxetine (CYMBALTA) DR capsule 90 mg, 90 mg, Oral, Daily, Raymond Herrera MD, 90 mg at 22 1522  •  enoxaparin (LOVENOX) syringe 110 mg, 110 mg, Subcutaneous, Q12H, Obed Huston MD, 110 mg at 22 1524  •  glucagon (human recombinant) (GLUCAGEN DIAGNOSTIC) injection 1 mg, 1 mg, Intramuscular,  Q15 Min PRN, Raymond Herrera MD  •  insulin detemir (LEVEMIR) injection 15 Units, 15 Units, Subcutaneous, Nightly, Yinka Desai MD  •  insulin lispro (humaLOG) injection 0-9 Units, 0-9 Units, Subcutaneous, TID AC, Raymond Herrera MD, 2 Units at 03/31/22 1646  •  levothyroxine (SYNTHROID, LEVOTHROID) tablet 150 mcg, 150 mcg, Oral, Daily, Raymond Herrera MD, 150 mcg at 04/01/22 1523  •  melatonin tablet 5 mg, 5 mg, Oral, Nightly PRN, Raymond Herrera MD, 5 mg at 03/31/22 2202  •  methohexital (BREVITAL) injection  - ADS Override Pull, , , ,   •  metoprolol tartrate (LOPRESSOR) tablet 50 mg, 50 mg, Oral, Q8H, Raymond Herrera MD, 50 mg at 04/01/22 1524  •  midazolam (VERSED) 2 MG/2ML injection  - ADS Override Pull, , , ,   •  mirtazapine (REMERON) tablet 7.5 mg, 7.5 mg, Oral, Nightly, Raymond Herrera MD, 7.5 mg at 03/31/22 2145  •  ondansetron (ZOFRAN) tablet 4 mg, 4 mg, Oral, Q6H PRN **OR** ondansetron (ZOFRAN) injection 4 mg, 4 mg, Intravenous, Q6H PRN, Ryamond Herrera MD, 4 mg at 03/30/22 1403  •  pantoprazole (PROTONIX) EC tablet 40 mg, 40 mg, Oral, QAM, Raymond Herrera MD, 40 mg at 04/01/22 1522  •  polyethylene glycol (MIRALAX) packet 17 g, 17 g, Oral, Daily, Raymond Herrera MD, 17 g at 03/29/22 0816  •  predniSONE (DELTASONE) tablet 40 mg, 40 mg, Oral, Daily With Breakfast, Yinka Desai MD, 40 mg at 04/01/22 1524  •  Sodium Chloride (PF) 0.9 % 10 mL, 10 mL, Intravenous, PRN, Raymond Herrera MD  •  sodium chloride 0.9 % flush 10 mL, 10 mL, Intravenous, Q12H, Raymond Herrera MD, 10 mL at 04/01/22 1525  •  sodium chloride 0.9 % flush 10 mL, 10 mL, Intravenous, PRN, Raymond Herrera MD  •  vitamin B-12 (CYANOCOBALAMIN) tablet 50 mcg, 50 mcg, Oral, Daily, Raymond Herrera MD, 50 mcg at 04/01/22 1523    Antibiotics:  Anti-Infectives (From admission, onward)    Ordered     Dose/Rate Route Frequency Start Stop    03/25/22 1059  cefTRIAXone (ROCEPHIN) 1 g/100 mL 0.9% NS (MBP)        Ordering Provider:  Chace Chauhan MD    1 g  over 30 Minutes Intravenous Once 22 1101 22 1244            Review of Systems:  See HPI    Physical Exam:   Vital Signs  Temp (24hrs), Av.7 °F (37.1 °C), Min:97.8 °F (36.6 °C), Max:99.2 °F (37.3 °C)    Temp  Min: 97.8 °F (36.6 °C)  Max: 99.2 °F (37.3 °C)  BP  Min: 93/62  Max: 193/77  Pulse  Min: 80  Max: 120  Resp  Min: 18  Max: 20  SpO2  Min: 100 %  Max: 100 %    GENERAL: Debilitated appearing.  He is drowsy but in no acute distress  HEENT: Normocephalic, atraumatic.  PERRL. EOMI. No conjunctival injection. No icterus. Oropharynx clear without evidence of thrush or exudate.  NECK: Supple   HEART: RRR; No murmur  LUNGS: Clear to auscultation bilaterally without wheezing, rales, rhonchi. Normal respiratory effort. Nonlabored.  ABDOMEN: Soft, lower abdominal tenderness, nondistended. Positive bowel sounds. No rebound or guarding.   EXT: His feet are both dressed.  He has no increase in his vasculitic appearing necrotic ulcerations on his feet.  The palpable purpura on his legs is improving.  The rash on his hands is unchanged but is not ulcerated at present..  :  With condom catheter.  MSK:  FROM, no joint effusions  NEURO: Drowsy and not currently following commands.    Laboratory Data    Results from last 7 days   Lab Units 22  0805 22  0829 22  0755   WBC 10*3/mm3 15.35* 14.58* 17.21*   HEMOGLOBIN g/dL 9.6* 12.1* 12.0*   HEMATOCRIT % 30.0* 37.7 38.5   PLATELETS 10*3/mm3 337 523* 524*     Results from last 7 days   Lab Units 22  1227   SODIUM mmol/L 135*   POTASSIUM mmol/L 5.0   CHLORIDE mmol/L 103   CO2 mmol/L 20.0*   BUN mg/dL 63*   CREATININE mg/dL 2.33*   GLUCOSE mg/dL 225*   CALCIUM mg/dL 7.9*     Results from last 7 days   Lab Units 22  1227   ALK PHOS U/L 80   BILIRUBIN mg/dL 0.3   ALT (SGPT) U/L 12   AST (SGOT) U/L 16     Results from last 7 days   Lab Units 22  0755   SED RATE mm/hr 27*     Results from last 7 days   Lab  Units 03/31/22  0805   CRP mg/dL 6.84*         Results from last 7 days   Lab Units 03/27/22  0755   CK TOTAL U/L 25         Estimated Creatinine Clearance: 34.8 mL/min (A) (by C-G formula based on SCr of 2.33 mg/dL (H)).      Microbiology:  Wound cx 3/25: NL skin ananth SF  COVID-19/Flu PCR negative.  2nd wound cx 3/25: growth present but TYTE  Urine Eos Zero        Radiology:  Imaging Results (Last 72 Hours)     ** No results found for the last 72 hours. **            Impression:   1. Vasculitic rash-with palpable purpura.  This rash clinically looks like leukocytoclastic vasculitis.  I suspect that this is secondary to primidone given the apparent shortly after starting on primidone.  The rash is relatively stable.  He may have secondary involvement in his kidney given his renal dysfunction.  It would be relatively risky to biopsy his kidney since this is a solitary kidney.  I think it would be reasonable to give him another attempt at steroid therapy.  I discussed this with Dr. Yinka Desai and he will start him on prednisone at 40 mg/day.  2. Duodenitis- per CT scan  3. Elevated procalcitonin,  4. Acute kidney injury-a renal biopsy is relatively contraindicated since he has a solitary kidney.  5. Elevated CRP  6. Chronic diastolic heart failure  7. Afib/on Lovenox, recent cardioversion to NSR  8. Type 2 diabetes mellitus/gastroparesis  9. Hypothyroidism  10. Essential hypertension  11. Morbid obesity  12. Prostate cancer/radical prostatectomy  13. Renal cell carcinoma/radical prostatectomy  14. H/o multiple skin cancer excisions  15. Doxycycline (urticaria) allergy  16. Hematuria  17. Left heel decubitus lesion-he will need to aggressively offload this area.     PLAN/RECOMMENDATIONS:  1.  Continue wound care  2.  Continue off of primidone and as many other medications as possible  3.  Offload both heels  4.  Steroid therapy per Dr. Desai    I discussed his complex situation again with his family today.  I  discussed his situation in detail with Dr. Desai.  I will plan to see him again on Monday.      Brandt Ward MD  4/1/2022  16:01 EDT

## 2022-04-01 NOTE — CASE MANAGEMENT/SOCIAL WORK
Continued Stay Note  [facility]     Patient Name: Yinka Cummings  MRN: [id number]  Today's Date: 4/1/2022    Admit Date: 3/25/2022     Discharge Plan     Row Name 04/01/22 1431       Plan    Plan Will need SNF    Plan Comments I spoke with patient's spouse and daughter Nora. We discussed several facilities and I did reach out to the ones they had requested: [facility] ([facility] is reviewing), [facility] (No beds), [facility] (self pay), referral to [facility] facilities yesterday including [facility] referral given today. Current bed availability is for [facility].  I will review with family what facilities have to offer skilled services on Monday. Currently, [facility] would have a bed early next week.     Final Discharge Disposition Code 03 - skilled nursing facility (SNF)               Discharge Codes    No documentation.               Expected Discharge Date and Time     Expected Discharge Date Expected Discharge Time    Apr 6, 2022             Jessica Frias RN

## 2022-04-01 NOTE — PROGRESS NOTES
[facility] Medicine Services  PROGRESS NOTE    Patient Name: Yinka Cummings  : 1945  MRN: [id number]    Date of Admission: 3/25/2022  Primary Care Physician: Vivek Mercer,     Subjective   Subjective     CC:  Rash, nausea and vomiting    HPI:   Cardioverted this am.  Denies chest pain or palpitations.  Waiting for lunch      ROS:  Gen- No fevers, chills  CV- No chest pain, palpitations  Resp- No cough, dyspnea  GI- No N/V/D, abd pain            Objective   Objective     Vital Signs:   Temp:  [97.8 °F (36.6 °C)-99.2 °F (37.3 °C)] 98.6 °F (37 °C)  Heart Rate:  [] 87  Resp:  [18-20] 18  BP: ()/() 148/76  Flow (L/min):  [2-3] 2     Physical Exam:  Constitutional - appears fatigued and deconditioned, laying flat in bed  HEENT-NCAT, mucous membranes moist  CV-RRR  Resp-grossly clear bilaterally  Abd-soft, nontender, nondistended, normoactive bowel sounds  Ext-+ edema LE bilaterally  Neuro-awake, speech clear  Psych-flat affect   Skin- non blanching rash on palms, arms and legs          Results Reviewed:  LAB RESULTS:      Lab 22  0805 22  0829 22  0755 22  0708   WBC 15.35* 14.58* 17.21* 10.99*   HEMOGLOBIN 9.6* 12.1* 12.0* 9.1*   HEMATOCRIT 30.0* 37.7 38.5 28.6*   PLATELETS 337 523* 524* 346   NEUTROS ABS 12.41*  --  14.91* 9.12*   IMMATURE GRANS (ABS) 0.30*  --  0.22* 0.16*   LYMPHS ABS 1.27  --  0.86 0.72   MONOS ABS 1.22*  --  1.17* 0.94*   EOS ABS 0.08  --  0.00 0.02   MCV 93.8 93.3 95.1 92.9   SED RATE  --   --  27* 62*   CRP 6.84*  --  11.58* 12.53*   PROCALCITONIN  --   --   --  0.94*   PROTIME  --  13.0  --   --          Lab 22  1227 22  0805 22  0710 22  1110 22  0829 22  1547 22  0755 22  0708   SODIUM 135* 133* 131* 131* 133*   < > 133* 133*   POTASSIUM 5.0 4.9 5.6* 5.4* 5.1   < > 5.2 4.8   CHLORIDE 103 100 98 99 100   < > 98 101   CO2 20.0* 20.0* 19.0* 20.0* 19.0*   < >  16.0* 17.0*   ANION GAP 12.0 13.0 14.0 12.0 14.0   < > 19.0* 15.0   BUN 63* 70* 69* 64* 56*   < > 47* 37*   CREATININE 2.33* 2.60* 2.72* 2.73* 2.58*   < > 2.82* 2.75*   EGFR 28.4* 24.9* 23.6* 23.5* 25.2*   < > 22.6* 23.3*   GLUCOSE 225* 179* 251* 246* 225*   < > 243* 168*   CALCIUM 7.9* 7.8* 8.1* 8.1* 8.5*   < > 8.3* 7.8*   PHOSPHORUS  --   --   --   --   --   --  5.0*  --    HEMOGLOBIN A1C  --   --   --   --   --   --   --  7.90*   TSH  --   --   --   --   --   --   --  2.670    < > = values in this interval not displayed.         Lab 04/01/22  1227 03/31/22  0805 03/27/22  0755 03/26/22  0708   TOTAL PROTEIN 4.8* 4.6* 5.9* 5.0*   ALBUMIN 2.40* 2.40* 2.30* 2.50*   GLOBULIN 2.4 2.2 3.6 2.5   ALT (SGPT) 12 14 8 9   AST (SGOT) 16 16 11 10   BILIRUBIN 0.3 0.3 0.3 0.4   ALK PHOS 80 106 84 81         Lab 03/28/22  0829   PROTIME 13.0   INR 1.01             Lab 03/27/22  0755   IRON 23*   IRON SATURATION 15*   TIBC 153*   TRANSFERRIN 103*   FERRITIN 682.00*         Brief Urine Lab Results  (Last result in the past 365 days)      Color   Clarity   Blood   Leuk Est   Nitrite   Protein   CREAT   Urine HCG        03/26/22 1027             99.7         03/26/22 1027 Orange   Cloudy   Large (3+)   Trace   Negative   100 mg/dL (2+)                 Microbiology Results Abnormal     Procedure Component Value - Date/Time    Wound Culture - Wound, Leg, Left [[id number]] Collected: 03/25/22 0918    Lab Status: Final result Specimen: Wound from Leg, Left Updated: 03/27/22 0903     Wound Culture Light growth (2+) Normal Skin Cami     Gram Stain Rare (1+) WBCs seen      Moderate (3+) Gram positive cocci in pairs and clusters    Eosinophil Smear - Urine, Urine, Clean Catch [[id number]]  (Normal) Collected: 03/26/22 1027    Lab Status: Final result Specimen: Urine, Clean Catch Updated: 03/26/22 1113     Eosinophil Smear 0 % EOS/100 Cells     Narrative:      No eosinophil seen    COVID-19 and FLU A/B PCR - Swab, Nasopharynx [[id number]]   (Normal) Collected: 03/25/22 0918    Lab Status: Final result Specimen: Swab from Nasopharynx Updated: 03/25/22 0955     COVID19 Not Detected     Influenza A PCR Not Detected     Influenza B PCR Not Detected    Narrative:      Fact sheet for providers: https://www.fda.gov/media/930835/download    Fact sheet for patients: https://www.fda.gov/media/626123/download    Test performed by PCR.          Cardioversion External in Cardiology Department    Result Date: 4/1/2022  · Post cardioversion the patient displayed a sinus rhythm. · The cardioversion was successful.        Results for orders placed during the hospital encounter of 04/24/21    Adult Transthoracic Echo Complete W/ Cont if Necessary Per Protocol    Interpretation Summary  · Left ventricular ejection fraction appears to be 56 - 60%.  · No significant valvular disease      I have reviewed the medications:  Scheduled Meds:amiodarone, 200 mg, Oral, Q8H   Followed by  [START ON 4/6/2022] amiodarone, 200 mg, Oral, Q12H   Followed by  [START ON 4/20/2022] amiodarone, 200 mg, Oral, Daily  atorvastatin, 40 mg, Oral, Nightly  bumetanide, 2 mg, Intravenous, Daily  buprenorphine-naloxone, 2 tablet, Sublingual, Nightly  DULoxetine, 90 mg, Oral, Daily  enoxaparin, 120 mg, Subcutaneous, Q24H  insulin detemir, 15 Units, Subcutaneous, Nightly  insulin lispro, 0-9 Units, Subcutaneous, TID AC  levothyroxine, 150 mcg, Oral, Daily  methohexital, , ,   metoprolol tartrate, 50 mg, Oral, Q8H  midazolam, , ,   mirtazapine, 7.5 mg, Oral, Nightly  pantoprazole, 40 mg, Oral, QAM  polyethylene glycol, 17 g, Oral, Daily  predniSONE, 40 mg, Oral, Daily With Breakfast  sodium chloride, 10 mL, Intravenous, Q12H  cyanocobalamin, 50 mcg, Oral, Daily      Continuous Infusions:   PRN Meds:.•  acetaminophen **OR** acetaminophen **OR** acetaminophen  •  dextrose  •  dextrose  •  glucagon (human recombinant)  •  melatonin  •  ondansetron **OR** ondansetron  •  Sodium Chloride (PF)  •  sodium  "chloride    Assessment/Plan   Assessment & Plan     Active Hospital Problems    Diagnosis  POA   • Acute renal failure, unspecified acute renal failure type (HCC) [N17.9]  Yes   • Rash [R21]  Yes   • Nausea and vomiting [R11.2]  Unknown   • Paroxysmal A-fib (on eliquis & metoprolol) [I48.0]  Yes   • 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         Brief Hospital Course to date:  76 yo man with HTN Afib DHF VINI CKD3 (RCC/nephrectomy), DM and gastroparesis.  Comes w LE rash and N/V    All problems are new to me.  Chart reviewed and updated.       Rash  Cellulitis?  Leukocytosis   - appears vasculitic, non blanching   - main DDx is drug-induced (primidone most likely culprit) vs systemic vasculitis.    - [facility], biopsy showed a non-specific perivascular lymphocytic infiltrate.  - trial of steroids at [facility] stopped after one dose due to significant hyperglycemia  - multiple long term home medications stopped while at the [facility], including lortab, dabigatran, lyrica, lisinopril and primidone with subsequent improvement.  -Rheumatology saw the patient 3/29, much appreciate their input.  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 Recommended prednisone taper -- discussed with ID Dr Ward and Nephrology Dr Atkinson.  Start prednisone 40 mg daily with taper    Chronic Pain  - continue suboxone (home lortab stopped at [facility] out of concern this may have contributed to the rash)     Atrial fibrillation/Atrial Flutter  -Currently on lovenox.  Plans for resumption of NOAC, likely apixaban, after 30 day \"wash out\" period after stopping primidone.  Will defer to [facility].  -Cardiology follows, continue amiodarone load, now " oral.     SALEEM on CKD  Solitary kidney.  Anemia  - creatinine 2.3 today  - [facility] baseline around 1.5 (1.7 on 3/20/22 upon [facility]).  - [facility] follows    Left pyelo? Duodenitis? Both suspected on Abd CT  Bibasilar consolidations noted on Abd CT  Nausea and vomiting   - watching off abx       Hypothyroid - TSH 1.04   HTN   DMII  - insulin dependent  - levemir QHS, increase from 10 to 15 units hs  - SSI   - will likely need to add prandial humalog and increase levemir dose/frequency with prednisone initiation  - check midnight and 3 am glucose  - a1c 7.9     VINI  - CPAP non compliant     Tremor  - primidone recently discontinued -- appears patient was prescribed sinemet instead, but Mr Emiliano's daughter says he has not started this medicine yet. Will defer for now.     Mood disorder        DVT prophylaxis:  lovenox  Medical DVT prophylaxis orders are present.       AM-PAC 6 Clicks Score (PT): 11 (03/28/22 2000)    Disposition: I expect the patient to be discharged TBD    CODE STATUS:   Code Status and Medical Interventions:   Ordered at: 03/25/22 1328     Level Of Support Discussed With:    Patient     Code Status (Patient has no pulse and is not breathing):    CPR (Attempt to Resuscitate)     Medical Interventions (Patient has pulse or is breathing):    Full Support       Yinka Desai MD  04/01/22

## 2022-04-01 NOTE — DISCHARGE PLACEMENT REQUEST
"Yinka Mabry (77 y.o. Male)         From   Jessica Rodriguez DE LA CRUZ, RN, [facility]   [phone]      Rehab to home        Date of Birth   1945    Social Security Number       Address   [address] [address]    Home Phone   [phone]    MRN   [id number]       Spiritism   None    Marital Status                               Admission Date   3/25/22    Admission Type   Emergency    Admitting Provider   Yinka Desai MD    Attending Provider   Yinka Desai MD    Department, Room/Bed   [facility] 3F, S315/1       Discharge Date       Discharge Disposition       Discharge Destination                               Attending Provider: Yinka Desai MD    Allergies: Contrast Dye, Doxycycline, Chlorthalidone, Morphine, Neurontin [Gabapentin], Phenergan [Promethazine]    Isolation: None   Infection: None   Code Status: CPR   Advance Care Planning Activity    Ht: 175 cm (68.9\")   Wt: 119 kg (262 lb 5.6 oz)    Admission Cmt: None   Principal Problem: None                Active Insurance as of 3/25/2022     Primary Coverage     Payor Plan Insurance Group Employer/Plan Group    MEDICARE MEDICARE A & B      Payor Plan Address Payor Plan Phone Number Payor Plan Fax Number Effective Dates    [address] [phone]  10/1/2009 - None Entered    [place] [id number]       Subscriber Name Subscriber Birth Date Member ID       YINKA MABRY 1945 [id number]                 Emergency Contacts      (Rel.) Home Phone Work Phone Mobile Phone    Gauri Mabry (Spouse) -- -- [phone]    Claudia Mabry (Daughter) -- -- [phone]    Nora Shelton (Daughter) -- -- [phone]            Insurance Information                MEDICARE/MEDICARE A & B Phone: [phone]    Subscriber: Yinka Mabry Subscriber#: [id number]    Group#: -- Precert#: --             History & Physical      Yinka Desai MD at 03/25/22 [address] [place] [address]" [facility]  HISTORY AND PHYSICAL    Patient Name: Yinka Cummings  : 1945  MRN: [id number]  Primary Care Physician: Vivek Mercer DO  Date of admission: 3/25/2022      Subjective   Subjective     Chief Complaint:  Rash, nausea vomiting    HPI:  Yinka Cummings is a 77 y.o. male with history of hypertension, hypothyroid, A. Fib/flutter, DHF, VINI, chronic pain, GERD, CKD 3, essential tremor, renal cell carcinoma s/p nephrectomy, gastroparesis, DM 2 (insulin-dependent) and Gilbert's disease presents with lower extremity rash and nausea vomiting.    Patient was recently hospitalized at the [facility] from March 10 to 2022 with outside records obtained and summarized as follows:    · [facility] and A. fib with RVR  · For the rash, dermatology was consulted and a punch biopsy was performed which showed nonspecific perivascular lymphocytic infiltrate.  No vasculitis was noted at that time.  It was initially thought that the rash was secondary to Pradaxa so the patient was switched to therapeutic Lovenox.  The rash continued to progress.  Lyrica was then stopped but again the rash progressed.  The patient's primidone dose was weaned to 50 mg (eventually discontinued as an outpatient on 3/22).  Dermatology then recommended stopping the patient's Norco.  On 3/16 the patient was transition from Norco to Suboxone and the rash began to improve.  · For the A. fib, the patient was seen by cardiology and a LARRY was performed in conjunction with DCCV, which at that time restart the patient in normal sinus rhythm.  Pradaxa was stopped as above and the patient was placed on therapeutic Lovenox which continues presently.  Because of interactions between primidone and NOAC such as apixaban, 30-day washout period was recommended therefore the patient remains on low molecular weight heparin at present.    [facility]sequent [facility] the patient was seen by his PCP on Monday and placed on Keflex due to concern of secondary infection of the rash site as well as prescribed Lasix for lower extremity edema.  Over the past couple of days the rash is worsened again, including areas on the feet, legs, abdomen and both arms.  The distribution of the rash, however, is unchanged.    The patient denies fever chills    Yesterday Mr. Cummings developed nausea and vomiting.  He denies abdominal pain.  No loose stool.      COVID Details:    Symptoms:    [x] NONE [] Fever []  Cough [] Shortness of breath [] Change in taste/smell      Review of Systems   Constitutional: Positive for fatigue.   HENT: Negative.    Eyes: Negative.    Respiratory: Negative.    Cardiovascular: Negative.    Gastrointestinal: Positive for nausea and vomiting.   Endocrine: Negative.    Genitourinary: Negative.    Musculoskeletal: Negative.    Skin: Positive for rash.   Allergic/Immunologic: Negative.    Neurological: Negative.    Hematological: Negative.    Psychiatric/Behavioral: Negative.         All other systems reviewed and are negative.     Personal History     Past Medical History:   Diagnosis Date   • Anxiety    • Arthritis    • Chronic kidney disease    • Diabetes mellitus (HCC)    • Disease of thyroid gland    • Diverticulosis    • Essential tremor    • HL (hearing loss)    • Hypertension    • Obesity    • Prostate cancer (HCC)    • Renal cell cancer (HCC)    • Skin cancer    • Vasculitis of skin        Past Surgical History:   Procedure Laterality Date   • CHOLECYSTECTOMY     • COLONOSCOPY      Polyps in the past but not on the most recent scope   • NEPHRECTOMY Right    • PROSTATE SURGERY      Radical prostatectomy   • SKIN CANCER EXCISION      Large incision left neck, multiple other cancers removed       Family History:  family history includes Cancer in his mother; Coronary artery disease in his sister; Heart attack in his father; Kidney  failure in his sister. Otherwise pertinent FHx was reviewed and unremarkable.     Social History:  reports that he has never smoked. He has never used smokeless tobacco. He reports previous alcohol use. He reports previous drug use.  Social History     Social History Narrative   • Not on file       Medications:  Available home medication information reviewed.  Medications Prior to Admission   Medication Sig Dispense Refill Last Dose   • acetaminophen (TYLENOL) 325 MG tablet Take 1 tablet by mouth 4 (Four) Times a Day.   Past Week at Unknown time   • ascorbic acid (VITAMIN C) 500 MG tablet Take 500 mg by mouth Daily.   3/24/2022 at Unknown time   • atorvastatin (LIPITOR) 40 MG tablet Take 40 mg by mouth Every Night.   3/24/2022 at Unknown time   • buprenorphine-naloxone (SUBOXONE) 8-2 MG per SL tablet Place 2 tablets under the tongue every night at bedtime.   Past Week at Unknown time   • carbidopa-levodopa (SINEMET)  MG per tablet Take 0.5 tablets by mouth 3 (Three) Times a Day.   3/24/2022 at Unknown time   • cephalexin (KEFLEX) 500 MG capsule Take 500 mg by mouth 2 (Two) Times a Day. For 5 days, started on 03-22-22   3/24/2022 at Unknown time   • cholecalciferol (VITAMIN D3) 25 MCG (1000 UT) tablet Take 2,000 Units by mouth Daily.   3/24/2022 at Unknown time   • cyanocobalamin (VITAMIN B-12N) 50 MCG tablet Take 50 mcg by mouth Daily.   3/24/2022 at Unknown time   • Diclofenac Sodium (VOLTAREN) 1 % gel gel Apply 4 g topically to the appropriate area as directed 4 (Four) Times a Day As Needed.   Past Week at Unknown time   • DULoxetine (CYMBALTA) 30 MG capsule Take 90 mg by mouth Daily.   3/24/2022 at Unknown time   • enoxaparin (LOVENOX) 120 MG/0.8ML solution syringe Inject 120 mg under the skin into the appropriate area as directed Every 12 (Twelve) Hours.   3/24/2022 at Unknown time   • erythromycin (ROMYCIN) 5 MG/GM ophthalmic ointment Administer 1 application into the left eye 4 (Four) Times a Day. Left  lower eye lid, for 4 days, started on 03-22-22   3/24/2022 at Unknown time   • glipizide (GLUCOTROL) 10 MG tablet Take 10 mg by mouth 2 (Two) Times a Day Before Meals.   3/24/2022 at Unknown time   • insulin NPH (humuLIN N,novoLIN N) 100 UNIT/ML injection Inject 60 Units under the skin into the appropriate area as directed every night at bedtime.   3/24/2022 at Unknown time   • levothyroxine (SYNTHROID, LEVOTHROID) 150 MCG tablet Take 1 tablet by mouth Daily. (Patient taking differently: Take 175 mcg by mouth Daily.)   3/24/2022 at Unknown time   • lidocaine (LIDODERM) 5 % Place 2 patches on the skin as directed by provider Daily. Remove & Discard patch within 12 hours or as directed by MD   Past Week at Unknown time   • metoprolol tartrate (LOPRESSOR) 50 MG tablet Take 1 tablet by mouth Every 12 (Twelve) Hours. (Patient taking differently: Take 100 mg by mouth Every 12 (Twelve) Hours.)   3/24/2022 at Unknown time   • mirtazapine (REMERON) 15 MG tablet Take 7.5 mg by mouth Every Night.   3/24/2022 at Unknown time   • omeprazole (priLOSEC) 20 MG capsule Take 20 mg by mouth Daily.   3/24/2022 at Unknown time   • polyethylene glycol (MIRALAX) 17 g packet Take 17 g by mouth Daily.   3/24/2022 at Unknown time       Allergies   Allergen Reactions   • Contrast Dye Rash     Rash/swelling per [facility] records   • Doxycycline Rash     Urticaria per [facility] records   • Chlorthalidone Other (See Comments)     Hyponatremia per [facility] records   • Morphine Unknown - Low Severity     Headache per [facility] records   • Neurontin [Gabapentin] Mental Status Change     Drowsy per [facility] records   • Phenergan [Promethazine] Unknown - Low Severity     Confusion per [facility] records       Objective   Objective     Vital Signs:   Temp:  [97.7 °F (36.5 °C)] 97.7 °F (36.5 °C)  Heart Rate:  [84-91] 89  Resp:  [17-19] 19  BP: (149-181)/(75-79) 149/79       Physical Exam   Constitutional - appears fatigued, mucous membranes slightly dry  HEENT-NCAT, mucous membranes moist, a  bit hard of hearing  CV-RRR  Resp-CTAB  Abd-soft, nontender, nondistended, normoactive bowel sounds, obese  Ext-trace to 1+ edema LE bilaterally  Neuro-alert and oriented, speech clear, moves all extremities   Psych-slightly flat affect   Skin- non blanching rash, petechial in some areas (such as upper arms), but more confluent on lower arms, hands and legs.  What appear to be old sloughed blisters on dorsum of feet with mild surrounding erythema. Blood filled blisters on inner right lower leg.       Result Review:  I have personally reviewed the results from the time of this admission to 3/25/2022 13:38 EDT and agree with these findings:  []  Laboratory  []  Microbiology  []  Radiology  []  EKG/Telemetry   []  Cardiology/Vascular   []  Pathology  [x]  Old records  []  Other:  Most notable findings include: wbc 12, creat 2.3  LAB RESULTS:      Lab 03/25/22  0902   WBC 10.64   HEMOGLOBIN 9.8*   HEMATOCRIT 29.4*   PLATELETS 345   NEUTROS ABS 8.79*   IMMATURE GRANS (ABS) 0.10*   LYMPHS ABS 0.69*   MONOS ABS 1.03*   EOS ABS 0.02   MCV 91.0   SED RATE 44*   CRP 12.02*   PROCALCITONIN 0.78*   LACTATE 2.0   PROTIME 14.8*   INR 1.20*   D DIMER QUANT 4.44*         Lab 03/25/22  0902   SODIUM 135*   POTASSIUM 4.9   CHLORIDE 98   CO2 22.0   ANION GAP 15.0   BUN 36*   CREATININE 2.35*   EGFR 28.2*   GLUCOSE 155*   CALCIUM 8.3*   MAGNESIUM 2.0         Lab 03/25/22  0902   TOTAL PROTEIN 6.3   ALBUMIN 3.20*   GLOBULIN 3.1   ALT (SGPT) 12   AST (SGOT) 14   BILIRUBIN 0.7   ALK PHOS 96   LIPASE 8*         Lab 03/25/22  0902   PROBNP 5,087.0*                 UA    Urinalysis 5/10/21 5/10/21 5/13/21 5/13/21 3/25/22 3/25/22    1017 1017 1215 1215 1303 1303   Squamous Epithelial Cells, UA  0-2  0-2  0-2   Specific Gravity, UA 1.017  1.017  1.014    Ketones, UA Negative  Negative  Trace (A)    Blood, UA Small (1+) (A)  Negative  Large (3+) (A)    Leukocytes, UA Small (1+) (A)  Trace (A)  Trace (A)    Nitrite, UA Negative  Negative   Negative    RBC, UA  0-2  0-2  Too Numerous to Count (A)   WBC, UA  3-5 (A)  6-12 (A)  13-20 (A)   Bacteria, UA  None Seen  Trace  None Seen   (A) Abnormal value              Microbiology Results (last 10 days)     Procedure Component Value - Date/Time    COVID-19 and FLU A/B PCR - Swab, Nasopharynx [[id number]]  (Normal) Collected: 03/25/22 0918    Lab Status: Final result Specimen: Swab from Nasopharynx Updated: 03/25/22 0955     COVID19 Not Detected     Influenza A PCR Not Detected     Influenza B PCR Not Detected    Narrative:      Fact sheet for providers: https://www.fda.gov/media/528684/download    Fact sheet for patients: https://www.fda.gov/media/441550/download    Test performed by PCR.    Wound Culture - Wound, Leg, Left [[id number]] Collected: 03/25/22 0918    Lab Status: Preliminary result Specimen: Wound from Leg, Left Updated: 03/25/22 1100     Gram Stain Rare (1+) WBCs seen      Moderate (3+) Gram positive cocci in pairs and clusters          XR Chest 1 View    Result Date: 3/25/2022   DATE OF EXAM: 3/25/2022 10:45 AM  PROCEDURE: XR CHEST 1 VW-  INDICATIONS: chf  COMPARISON: 05/12/2021 11:32 PM  TECHNIQUE: [Portable chest radiograph]  FINDINGS: No new consolidations or pleural effusions are observed. The cardiac silhouette and mediastinum are stable. There is stable cardiomegaly. No acute osseous abnormalities are identified.      Impression: No evidence for acute cardiopulmonary process. Stable cardiomegaly is noted.  This report was finalized on 3/25/2022 10:53 AM by Yves Patino MD.        Results for orders placed during the hospital encounter of 04/24/21    Adult Transthoracic Echo Complete W/ Cont if Necessary Per Protocol    Interpretation Summary  · Left ventricular ejection fraction appears to be 56 - 60%.  · No significant valvular disease      Assessment/Plan   Assessment & Plan     Active Hospital Problems    Diagnosis  POA   • Acute renal failure, unspecified acute renal failure type  (HCC) [N17.9]  Yes       Rash  - appears vasculitic, non blanching --  ESR modestly elevated at 44, CRP 12.02  - [facility], biopsy showed a non-specific perivascular lymphocytic infiltrate.  - [facility] stopped after one dose due to significant hyperglycemia  - multiple long term home medications stopped, including lortab, dabigatran, lyrica, lisinopril and primidone with subsequent improvement.  - patient started on keflex and lasix post discharge with rash worsening described by patient and family -- however rash remains confined to the prior distribution  - Drug induced rash?  - patient denies infection prior to rash appearance  - received rocephin in ED for possible secondary infection, will continue for now.  Try to avoid lasix if possible for now.  - will check vasculitic serologies: hep B&C, serum complement levels (C3, C4 and total complement), KENZIE, RF, cyroglobulin level and ANCA panel.  - will ask Rheumatology to evaluate    Chronic Pain  - continue suboxone    Atrial fibrillation/Atrial Flutter  - continue therapeutic lovenox  - metoprolol  - check EKG    SALEEM on CKD  - creatinine 2.3, [facility] baseline around 1.5 (1.7 on 3/20/22).  - patient appears slightly volume depleted from N/V -- gentle IV fluids this afternoon, check bmp am    Nausea and vomiting.  - unclear etiology, denies abdominal pain  - clears diet    Hypothyroid  - check TSH    HTN    DMII  - insulin dependant  - SSI for now until appetite improves, then add basal bolus (patient says he typically uses 15-50 units of insulin/24hrs at home)    VINI  - CPAP non compliant    Tremor  - primidone recently discharge    Mood disorder      DVT prophylaxis:  lovenox      CODE STATUS:  full  Code Status and Medical Interventions:   Ordered at: 03/25/22 1328     Level Of Support Discussed With:    Patient     Code Status (Patient has no pulse and is not breathing):    CPR (Attempt to Resuscitate)     Medical  Interventions (Patient has pulse or is breathing):    Full Support         Yinka Desai MD  03/25/22    Electronically signed by Yinka Desai MD at 03/25/22 1440         Current Facility-Administered Medications   Medication Dose Route Frequency Provider Last Rate Last Admin   • acetaminophen (TYLENOL) tablet 650 mg  650 mg Oral Q4H PRN Raymond Herrera MD   650 mg at 03/28/22 2158    Or   • acetaminophen (TYLENOL) 160 MG/5ML solution 650 mg  650 mg Oral Q4H PRN Raymond Herrera MD   649.6 mg at 03/27/22 0943    Or   • acetaminophen (TYLENOL) suppository 650 mg  650 mg Rectal Q4H PRN Raymond Herrera MD       • amiodarone (PACERONE) tablet 200 mg  200 mg Oral Q8H Raymond Herrera MD   200 mg at 04/01/22 0027    Followed by   • [START ON 4/6/2022] amiodarone (PACERONE) tablet 200 mg  200 mg Oral Q12H Raymond Herrera MD        Followed by   • [START ON 4/20/2022] amiodarone (PACERONE) tablet 200 mg  200 mg Oral Daily Raymond Herrera MD       • atorvastatin (LIPITOR) tablet 40 mg  40 mg Oral Nightly Raymond Herrera MD   40 mg at 03/31/22 2145   • bumetanide (BUMEX) injection 2 mg  2 mg Intravenous Daily Raymond Herrera MD   2 mg at 03/31/22 0850   • buprenorphine-naloxone (SUBOXONE) 8-2 MG per SL tablet 2 tablet  2 tablet Sublingual Nightly Raymond Herrera MD   2 tablet at 03/31/22 2145   • dextrose (D50W) (25 g/50 mL) IV injection 25 g  25 g Intravenous Q15 Min PRN Raymond Herrera MD       • dextrose (GLUTOSE) oral gel 15 g  15 g Oral Q15 Min PRN Raymond Herrera MD       • DULoxetine (CYMBALTA) DR capsule 90 mg  90 mg Oral Daily Raymond Herrera MD   90 mg at 03/31/22 0851   • enoxaparin (LOVENOX) syringe 110 mg  110 mg Subcutaneous Q12H Obed Huston MD       • glucagon (human recombinant) (GLUCAGEN DIAGNOSTIC) injection 1 mg  1 mg Intramuscular Q15 Min PRN Raymond Herrera MD       • insulin detemir (LEVEMIR) injection 15 Units  15 Units Subcutaneous Nightly Yinka Desai MD       • insulin  lispro (humaLOG) injection 0-9 Units  0-9 Units Subcutaneous TID AC Raymnod Herrera MD   2 Units at 22 1646   • levothyroxine (SYNTHROID, LEVOTHROID) tablet 150 mcg  150 mcg Oral Daily Raymond Herrera MD   150 mcg at 22 0851   • melatonin tablet 5 mg  5 mg Oral Nightly PRN Raymond Herrera MD   5 mg at 22 2202   • methohexital (BREVITAL) injection  - ADS Override Pull            • metoprolol tartrate (LOPRESSOR) tablet 50 mg  50 mg Oral Q8H Raymond Herrera MD   50 mg at 22   • midazolam (VERSED) 2 MG/2ML injection  - ADS Override Pull            • mirtazapine (REMERON) tablet 7.5 mg  7.5 mg Oral Nightly Raymond Herrera MD   7.5 mg at 22 2145   • ondansetron (ZOFRAN) tablet 4 mg  4 mg Oral Q6H PRN Raymond Herrera MD        Or   • ondansetron (ZOFRAN) injection 4 mg  4 mg Intravenous Q6H PRN Raymond Herrera MD   4 mg at 22 1403   • pantoprazole (PROTONIX) EC tablet 40 mg  40 mg Oral QAM Raymond Herrera MD   40 mg at 22 0622   • polyethylene glycol (MIRALAX) packet 17 g  17 g Oral Daily Raymond Herrera MD   17 g at 22 0816   • predniSONE (DELTASONE) tablet 40 mg  40 mg Oral Daily With Breakfast Yinka Desai MD       • Sodium Chloride (PF) 0.9 % 10 mL  10 mL Intravenous PRN Raymond Herrera MD       • sodium chloride 0.9 % flush 10 mL  10 mL Intravenous Q12H Raymond Herrera MD   10 mL at 22 0858   • sodium chloride 0.9 % flush 10 mL  10 mL Intravenous PRN Raymond Herrera MD       • vitamin B-12 (CYANOCOBALAMIN) tablet 50 mcg  50 mcg Oral Daily Raymond Herrera MD   50 mcg at 22 0850        Occupational Therapy Notes (most recent note)      Annalisa Abbott, OT at 22 1147          Patient Name: Yinka Cummings  : 1945    MRN: [id number]                              Today's Date: 3/30/2022       Admit Date: 3/25/2022    Visit Dx:     ICD-10-CM ICD-9-CM   1. Acute renal failure, unspecified acute renal failure type (HCC)  N17.9  "584.9   2. Dehydration  E86.0 276.51   3. Cellulitis of lower extremity, unspecified laterality  L03.119 682.6   4. Vasculitis (HCC)  I77.6 447.6   5. Congestive heart failure, unspecified HF chronicity, unspecified heart failure type (HCC)  I50.9 428.0   6. 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)   • Chronic back pain (on chronic prescription lortab)   • HTN (hypertension)   • Solitary kidney (s/p nephrectomy for \"mass\")   • History of prostate cancer   • Closed fracture of phalanx of right hand   • Dental caries   • Acute renal failure, unspecified acute renal failure type (HCC)   • Rash   • Nausea and vomiting     Past Medical History:   Diagnosis Date   • Anxiety    • Arthritis    • Chronic kidney disease    • Diabetes mellitus (HCC)    • Disease of thyroid gland    • Diverticulosis    • Essential tremor    • HL (hearing loss)    • Hypertension    • Obesity    • Prostate cancer (HCC)    • Renal cell cancer (HCC)    • Skin cancer    • Vasculitis of skin      Past Surgical History:   Procedure Laterality Date   • CHOLECYSTECTOMY     • COLONOSCOPY      Polyps in the past but not on the most recent scope   • NEPHRECTOMY Right    • PROSTATE SURGERY      Radical prostatectomy   • SKIN CANCER EXCISION      Large incision left neck, multiple other cancers removed      General Information     Row Name 03/30/22 1344          OT Time and Intention    Document Type therapy note (daily note)  -JY     Mode of Treatment occupational therapy;individual therapy  -JY     Row Name 03/30/22 1973          General Information    Patient Profile Reviewed yes  -JY     Existing Precautions/Restrictions fall;other (see comments)  blisters on B feet, edematous hands, " sensitive to touch, [place]  -JY     Barriers to Rehab hearing deficit;medically complex;previous functional deficit  -JY     Row Name 03/30/22 1344          Cognition    Orientation Status (Cognition) oriented x 4  -JY     Row Name 03/30/22 1344          Safety Issues, Functional Mobility    Safety Issues Affecting Function (Mobility) safety precaution awareness;safety precautions follow-through/compliance;sequencing abilities  -JY     Impairments Affecting Function (Mobility) balance;coordination;endurance/activity tolerance;pain;strength;grasp  -JY     Comment, Safety Issues/Impairments (Mobility) Pt declined EOB or OOB activity on 2 different attempts with supplemental education on benefits indicating nausea, fatigue and pain; during bed mobility pt limited by pain, sensitivity to touch at extremities, weakness  -JY           User Key  (r) = Recorded By, (t) = Taken By, (c) = Cosigned By    Initials Name Provider Type    Annalisa Farrell, OT Occupational Therapist                 Mobility/ADL's     Row Name 03/30/22 8154          Bed Mobility    Bed Mobility rolling left;rolling right  -JY     Rolling Left [place] (Bed Mobility) maximum assist (25% patient effort);verbal cues  -JY     Rolling Right [place] (Bed Mobility) maximum assist (25% patient effort);verbal cues  -JY     Scooting/Bridging [place] (Bed Mobility) maximum assist (25% patient effort);2 person assist;verbal cues;nonverbal cues (demo/gesture)  -JY     Bed Mobility, Safety Issues decreased use of arms for pushing/pulling;decreased use of legs for bridging/pushing  -JY     Assistive Device (Bed Mobility) bed rails;draw sheet;head of bed elevated  -JY     Comment, (Bed Mobility) skilled cues for optimal hand placement to reach across body to assist with grasp of bed rail and pull to sidelying w/ A, pt u/a to reach bed rails to grasp d/t pain, edema at hands, initiated rolling w/ A and utilized draw sheet to place supports for pressure  relief  -[facility] Name 03/30/22 1452          Transfers    Comment, (Transfers) pt declined EOB or OOB activity on 2 different attempts with supplemental education on benefits with pt reporting nausea, fatigue and pain  -     Bed-Chair [place] (Transfers) not tested  -[facility] Name 03/30/22 1452          Functional Mobility    Functional Mobility- Comment pt declined EOB or OOB activity on 2 different attempts with supplemental education on benefits with pt reporting nausea, fatigue and pain  -[facility] Name 03/30/22 1452          Activities of Daily Living    BADL Assessment/Intervention feeding  -JY     Row Name 03/30/22 1452          Self-Feeding Assessment/Training    [place] Level (Feeding) unable to assess  -     Assistive Devices (Feeding) weighted utensils  -     Position (Self-Feeding) sitting up in bed  -     Comment, (Feeding) pt's family reports today being first opportunity for pt to feed self thus u/a to assess use of weighted utensils since admission (was using utensils at PLOF); pt agreeable to trial initially then declined indicating too nauseous to eat. Assessed pt grasp (palm) utensil to ensure optimal prehension, grasp and weight, did appear to decrease tremors minimally with simulated use in hand however did not assess authentic feeding per pt choice  -           User Key  (r) = Recorded By, (t) = Taken By, (c) = Cosigned By    Initials Name Provider Type    Annalisa Farrell OT Occupational Therapist               Obj/Interventions     [facility] Name 03/30/22 1500          Hand (Therapeutic Exercise)    Hand AROM/AAROM (Therapeutic Exercise) bilateral;AAROM (active assistive range of motion);finger flexion;finger extension;3 repetitions  -[facility] Name 03/30/22 1500          Motor Skills    Therapeutic Exercise hand;other (see comments)  attempted to facilitate flexion/extension at B hands to assist with edema prior to self feeding assessment, pt able to complete 3 reps  prior to pain too significant  -JJAMIL     Row Name 03/30/22 1500          Balance    Comment, Balance only able to assess pt balance while supported by bed at back, no overt LOB while supported and completing interventions  -JAYDEN           User Key  (r) = Recorded By, (t) = Taken By, (c) = Cosigned By    Initials Name Provider Type    Annalisa Farrell OT Occupational Therapist               Goals/Plan    No documentation.                Clinical Impression     Row Name 03/30/22 1502          Pain Assessment    Pretreatment Pain Rating 10/10  -JY     Posttreatment Pain Rating 10/10  -JY     Pain Location - Side/Orientation Bilateral  -JJAMIL     Pain Location - hand;other (see comments)  generalized body  -JY     Pre/Posttreatment Pain Comment pt self perceived pain at 10/10 at hands and general body throughout session, sensitive to touch at hands, RN aware  -JAYDEN     Pain Intervention(s) Repositioned;Ambulation/increased activity  -JJAMIL     Row Name 03/30/22 1502          Plan of Care Review    Plan of Care Reviewed With patient;spouse  -JAYDEN     Progress no change  -KAMILA     Outcome Evaluation Pt A & O x 4, reports 10/10 pain at B hands and generalized body w/ observed edema, very sensitive to touch at hands. Facilitated improved positioning in bed with gross max A for rolling L < > R and max A x 2 for upward movement toward HOB for improved position upon sitting with HOB elevated for meal time after pt deferred EOB or OOB activity x 2 attempts. Pt reported nausea, pain and fatigue limiting OOB activity this date. Attempted to facilitate hand flex/ext for edema mgmt in prep for self feeding, pt able to tolerate 3 reps prior to report of pain too significant. Assessed pt grasp of weighted utensils with good palmar grasp noted and mild decrease in tremors noted with simulated use, pt declined authentic self feeding assessment d/t nausea. Will progress pt as able while hospitalized, continue to recommend SNF at d/c.  -JAYDEN Lloyd  Name 03/30/22 1502          Therapy Plan Review/Discharge Plan (OT)    Anticipated Discharge Disposition (OT) skilled nursing facility  -JY     Row Name 03/30/22 1502          Vital Signs    Pre Systolic BP Rehab 144  -JY     Pre Treatment Diastolic BP 84  -JY     Post Systolic BP Rehab 132  -JY     Post Treatment Diastolic   -JY     Pretreatment Heart Rate (beats/min) 116  -JY     Posttreatment Heart Rate (beats/min) 114  -JY     Pre SpO2 (%) 97  -JY     O2 Delivery Pre Treatment room air  -JY     O2 Delivery Intra Treatment room air  -JY     Post SpO2 (%) 100  -JY     O2 Delivery Post Treatment room air  -JY     Pre Patient Position Supine  -JY     Intra Patient Position Side Lying  -JY     Post Patient Position Supine  sitting up in bed in preparation for lunch meal  -JY     Row Name 03/30/22 1502          Positioning and Restraints    Pre-Treatment Position in bed  -JY     Post Treatment Position bed  -JY     In Bed notified nsg;fowlers;call light within reach;encouraged to call for assist;exit alarm on;with family/caregiver;side rails up x3;legs elevated;heels elevated;waffle boots/both  -JY           User Key  (r) = Recorded By, (t) = Taken By, (c) = Cosigned By    Initials Name Provider Type    Annalisa Farrell OT Occupational Therapist               Outcome Measures     Row Name 03/30/22 1509          How much help from another is currently needed...    Putting on and taking off regular lower body clothing? 1  -JY     Bathing (including washing, rinsing, and drying) 2  -JY     Toileting (which includes using toilet bed pan or urinal) 1  -JY     Putting on and taking off regular upper body clothing 2  -JY     Taking care of personal grooming (such as brushing teeth) 2  -JY     Eating meals 2  -JY     AM-PAC 6 Clicks Score (OT) 10  -JY           User Key  (r) = Recorded By, (t) = Taken By, (c) = Cosigned By    Initials Name Provider Type    Annalisa Farrell OT Occupational Therapist                 Occupational Therapy Education                 Title: PT OT SLP Therapies (In Progress)     Topic: Occupational Therapy (In Progress)     Point: ADL training (In Progress)     Description:   Instruct learner(s) on proper safety adaptation and remediation techniques during self care or transfers.   Instruct in proper use of assistive devices.              Learning Progress Summary           Patient Acceptance, E,D, NR by JAYDEN at 3/30/2022 1147    Acceptance, E,D, VU,NR by FABIO at 3/27/2022 1502   Family Acceptance, E,D, NR by JAYDEN at 3/30/2022 1147                   Point: Home exercise program (In Progress)     Description:   Instruct learner(s) on appropriate technique for monitoring, assisting and/or progressing therapeutic exercises/activities.              Learning Progress Summary           Patient Acceptance, E,D, NR by JAYDEN at 3/30/2022 1147    Acceptance, E,D, VU,NR by FABIO at 3/27/2022 1502   Family Acceptance, E,D, NR by JAYDEN at 3/30/2022 1147                   Point: Precautions (In Progress)     Description:   Instruct learner(s) on prescribed precautions during self-care and functional transfers.              Learning Progress Summary           Patient Acceptance, E,D, NR by JAYDEN at 3/30/2022 1147    Acceptance, E,D, VU,NR by FABIO at 3/27/2022 1502   Family Acceptance, E,D, NR by JAYDEN at 3/30/2022 1147                   Point: Body mechanics (In Progress)     Description:   Instruct learner(s) on proper positioning and spine alignment during self-care, functional mobility activities and/or exercises.              Learning Progress Summary           Patient Acceptance, E,D, NR by JAYDEN at 3/30/2022 1147    Acceptance, E,D, VU,NR by FABIO at 3/27/2022 1502   Family Acceptance, E,D, NR by JAYDEN at 3/30/2022 1147                               User Key     Initials Effective Dates Name Provider Type Discipline    FABIO 06/16/21 -  Neno Russ OT Occupational Therapist OT    JAYDEN 06/16/21 -  Annalisa Abbott OT Occupational  Therapist OT              OT Recommendation and Plan     Plan of Care Review  Plan of Care Reviewed With: patient, spouse  Progress: no change  Outcome Evaluation: Pt A & O x 4, reports 10/10 pain at B hands and generalized body w/ observed edema, very sensitive to touch at hands. Facilitated improved positioning in bed with gross max A for rolling L < > R and max A x 2 for upward movement toward HOB for improved position upon sitting with HOB elevated for meal time after pt deferred EOB or OOB activity x 2 attempts. Pt reported nausea, pain and fatigue limiting OOB activity this date. Attempted to facilitate hand flex/ext for edema mgmt in prep for self feeding, pt able to tolerate 3 reps prior to report of pain too significant. Assessed pt grasp of weighted utensils with good palmar grasp noted and mild decrease in tremors noted with simulated use, pt declined authentic self feeding assessment d/t nausea. Will progress pt as able while hospitalized, continue to recommend SNF at d/c.     Time Calculation:    Time Calculation- OT     Row Name 03/30/22 1511             Time Calculation- OT    OT Start Time 1147  -JY      OT Received On 03/30/22  -JY      OT Goal Re-Cert Due Date 04/06/22  -JY              Timed Charges    42314 - OT Therapeutic Activity Minutes 15  -JY      60772 - OT Self Care/Mgmt Minutes 8  -JY              Total Minutes    Timed Charges Total Minutes 23  -JY       Total Minutes 23  -JY            User Key  (r) = Recorded By, (t) = Taken By, (c) = Cosigned By    Initials Name Provider Type    Annalisa Farrell OT Occupational Therapist              Therapy Charges for Today     Code Description Service Date Service Provider Modifiers Qty    60471763373 HC OT THERAPEUTIC ACT EA 15 MIN 3/30/2022 Annalisa Abbott OT GO 1    32517844133 HC OT SELF CARE/MGMT/TRAIN EA 15 MIN 3/30/2022 Annalisa Abbott OT GO 1               Annalisa Abbott OT  3/30/2022    Electronically signed by Annalisa Abbott OT at  03/30/22 1512       Anya Velez, PT    Physical Therapist   Physical Therapy   Plan of Care       Signed   Date of Service:  03/29/22 1427   Creation Time:  03/29/22 1517              Signed              Show:Clear all  []Manual[x]Template[]Copied    Added by:  [x]Anya Velez, PT      []Brandon for details    Goal Outcome Evaluation:  Plan of Care Reviewed With: patient, spouse  Progress: no change  Outcome Evaluation: Pt tolerated bed-level ther-ex and dep lift to chair well. Pt attempted to sit up at edge of chair but unable to maintain position with support due to reports of dizziness. Continue with PT POC as pt tolerates. Recommend SNF at discharge.

## 2022-04-01 NOTE — PROGRESS NOTES
"   LOS: 7 days    Patient Care Team:  Vivek Mercer DO as PCP - General (Family Medicine)    Reason For Visit:  F/U SALEEM ON CKD  Subjective     Underwent LARRY this morning. Doing well afterwards. No new labs.      Review of Systems:    Pulm: No soa   CV:  No CP      Objective     amiodarone, 200 mg, Oral, Q8H   Followed by  [START ON 4/6/2022] amiodarone, 200 mg, Oral, Q12H   Followed by  [START ON 4/20/2022] amiodarone, 200 mg, Oral, Daily  atorvastatin, 40 mg, Oral, Nightly  bumetanide, 2 mg, Intravenous, Daily  buprenorphine-naloxone, 2 tablet, Sublingual, Nightly  DULoxetine, 90 mg, Oral, Daily  enoxaparin, 120 mg, Subcutaneous, Q24H  insulin detemir, 10 Units, Subcutaneous, Nightly  insulin lispro, 0-9 Units, Subcutaneous, TID AC  levothyroxine, 150 mcg, Oral, Daily  methohexital, , ,   metoprolol tartrate, 50 mg, Oral, Q8H  midazolam, , ,   mirtazapine, 7.5 mg, Oral, Nightly  pantoprazole, 40 mg, Oral, QAM  polyethylene glycol, 17 g, Oral, Daily  sodium chloride, 10 mL, Intravenous, Q12H  sodium zirconium cyclosilicate, 10 g, Oral, Daily  cyanocobalamin, 50 mcg, Oral, Daily             Vital Signs:  Blood pressure 148/76, pulse 87, temperature 98.6 °F (37 °C), temperature source Axillary, resp. rate 18, height 175 cm (68.9\"), weight 119 kg (262 lb 5.6 oz), SpO2 100 %.    Flowsheet Rows    Flowsheet Row First Filed Value   Admission Height 175.3 cm (69\") Documented at 03/25/2022 0849   Admission Weight 113 kg (250 lb) Documented at 03/25/2022 0849          No intake/output data recorded.    Physical Exam:    General Appearance: NAD, alert and cooperative, Ox3  Eyes: PER, conjunctivae and sclerae normal, no icterus  Lungs: respirations regular and unlabored, no crepitus, clear to auscultation  Heart/CV: regular rhythm & normal rate, no murmur, no gallop, no rub and 1+ edema  Abdomen: not distended, soft, non-tender, no masses,  bowel sounds present  Skin:  Rash BETTER., Warm and " dry    Radiology:            Labs:  Results from last 7 days   Lab Units 03/31/22  0805 03/28/22  0829 03/27/22  0755   WBC 10*3/mm3 15.35* 14.58* 17.21*   HEMOGLOBIN g/dL 9.6* 12.1* 12.0*   HEMATOCRIT % 30.0* 37.7 38.5   PLATELETS 10*3/mm3 337 523* 524*     Results from last 7 days   Lab Units 03/31/22  0805 03/30/22  0710 03/29/22  1110 03/28/22  0829 03/27/22  1547 03/27/22  0755 03/26/22  0708   SODIUM mmol/L 133* 131* 131* 133*   < > 133* 133*   POTASSIUM mmol/L 4.9 5.6* 5.4* 5.1   < > 5.2 4.8   CHLORIDE mmol/L 100 98 99 100   < > 98 101   CO2 mmol/L 20.0* 19.0* 20.0* 19.0*   < > 16.0* 17.0*   BUN mg/dL 70* 69* 64* 56*   < > 47* 37*   CREATININE mg/dL 2.60* 2.72* 2.73* 2.58*   < > 2.82* 2.75*   CALCIUM mg/dL 7.8* 8.1* 8.1* 8.5*   < > 8.3* 7.8*   PHOSPHORUS mg/dL  --   --   --   --   --  5.0*  --    ALBUMIN g/dL 2.40*  --   --   --   --  2.30* 2.50*    < > = values in this interval not displayed.     Results from last 7 days   Lab Units 03/31/22  0805   GLUCOSE mg/dL 179*       Results from last 7 days   Lab Units 03/31/22  0805   ALK PHOS U/L 106   BILIRUBIN mg/dL 0.3   ALT (SGPT) U/L 14   AST (SGOT) U/L 16           Results from last 7 days   Lab Units 03/26/22  1027   COLOR UA  Orange*   CLARITY UA  Cloudy*   PH, URINE  <=5.0   SPECIFIC GRAVITY, URINE  1.013   GLUCOSE UA  Negative   KETONES UA  15 mg/dL (1+)*   BILIRUBIN UA  Negative   PROTEIN UA  100 mg/dL (2+)*   BLOOD UA  Large (3+)*   LEUKOCYTES UA  Trace*   NITRITE UA  Negative       Estimated Creatinine Clearance: 31.2 mL/min (A) (by C-G formula based on SCr of 2.6 mg/dL (H)).      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 Cr on this admission btw 2.3-2.7mg/dl. UA large blood 24hr urine protein 380mg. Etiology of SALEEM unspecified. Concern for possible drug eruption and possible AIN vs Systemic Ig-A vasculitis.   Serologies negative so far.     CKD stage III: Hx of solitary kidney. [facility].     Skin rash: Palpable purpura. Suspected drug rash vs Skin vasculitis.     Hyperkalemia: worsening     Hyponatremia: Worsening     Volume status: Dependent edema     Afib: On amiodarone. Plan for cardioversion per cardiology      Recommendations: No significant improvement in renal function. Relative contraindication to pursue renal biopsy given solitary kidney and high risk for bleeding. High suspicion for AIN in the setting of drug eruption. Less likely systemic IgA vasculitis w renal involvement given no significant proteinuria. Contiune Bumex 2mg IV daily. D/c  AIN main treatment includes stopping the offending agent. Trial of steroids can be considered however patient had significantly side effect [facility]. Discussed with daughter and wife at beside. Continue conservative care if worsening of renal function will consider steroids.         Francisco Atkinson MD  04/01/22  13:00 EDT

## 2022-04-02 LAB
ANION GAP SERPL CALCULATED.3IONS-SCNC: 13 MMOL/L (ref 5–15)
BUN SERPL-MCNC: 73 MG/DL (ref 8–23)
BUN/CREAT SERPL: 28.1 (ref 7–25)
CALCIUM SPEC-SCNC: 7.9 MG/DL (ref 8.6–10.5)
CHLORIDE SERPL-SCNC: 101 MMOL/L (ref 98–107)
CO2 SERPL-SCNC: 19 MMOL/L (ref 22–29)
CREAT SERPL-MCNC: 2.6 MG/DL (ref 0.76–1.27)
DEPRECATED RDW RBC AUTO: 46.8 FL (ref 37–54)
EGFRCR SERPLBLD CKD-EPI 2021: 24.9 ML/MIN/1.73
ERYTHROCYTE [DISTWIDTH] IN BLOOD BY AUTOMATED COUNT: 14.9 % (ref 12.3–15.4)
GLUCOSE BLDC GLUCOMTR-MCNC: 187 MG/DL (ref 70–130)
GLUCOSE BLDC GLUCOMTR-MCNC: 206 MG/DL (ref 70–130)
GLUCOSE BLDC GLUCOMTR-MCNC: 215 MG/DL (ref 70–130)
GLUCOSE BLDC GLUCOMTR-MCNC: 218 MG/DL (ref 70–130)
GLUCOSE BLDC GLUCOMTR-MCNC: 236 MG/DL (ref 70–130)
GLUCOSE SERPL-MCNC: 197 MG/DL (ref 65–99)
HCT VFR BLD AUTO: 24.1 % (ref 37.5–51)
HGB BLD-MCNC: 8 G/DL (ref 13–17.7)
MCH RBC QN AUTO: 29.3 PG (ref 26.6–33)
MCHC RBC AUTO-ENTMCNC: 33.2 G/DL (ref 31.5–35.7)
MCV RBC AUTO: 88.3 FL (ref 79–97)
PLATELET # BLD AUTO: 316 10*3/MM3 (ref 140–450)
PMV BLD AUTO: 9.2 FL (ref 6–12)
POTASSIUM SERPL-SCNC: 4.5 MMOL/L (ref 3.5–5.2)
RBC # BLD AUTO: 2.73 10*6/MM3 (ref 4.14–5.8)
SODIUM SERPL-SCNC: 133 MMOL/L (ref 136–145)
UFH PPP CHRO-ACNC: 1.17 IU/ML
WBC NRBC COR # BLD: 14.84 10*3/MM3 (ref 3.4–10.8)

## 2022-04-02 PROCEDURE — 63710000001 PREDNISONE PER 1 MG: Performed by: INTERNAL MEDICINE

## 2022-04-02 PROCEDURE — 25010000002 ENOXAPARIN PER 10 MG

## 2022-04-02 PROCEDURE — 85027 COMPLETE CBC AUTOMATED: CPT | Performed by: INTERNAL MEDICINE

## 2022-04-02 PROCEDURE — 63710000001 INSULIN DETEMIR PER 5 UNITS: Performed by: INTERNAL MEDICINE

## 2022-04-02 PROCEDURE — 63710000001 INSULIN LISPRO (HUMAN) PER 5 UNITS: Performed by: INTERNAL MEDICINE

## 2022-04-02 PROCEDURE — 97110 THERAPEUTIC EXERCISES: CPT

## 2022-04-02 PROCEDURE — 99232 SBSQ HOSP IP/OBS MODERATE 35: CPT | Performed by: INTERNAL MEDICINE

## 2022-04-02 PROCEDURE — 85520 HEPARIN ASSAY: CPT

## 2022-04-02 PROCEDURE — 97530 THERAPEUTIC ACTIVITIES: CPT

## 2022-04-02 PROCEDURE — 80048 BASIC METABOLIC PNL TOTAL CA: CPT | Performed by: INTERNAL MEDICINE

## 2022-04-02 PROCEDURE — 82962 GLUCOSE BLOOD TEST: CPT

## 2022-04-02 RX ADMIN — SENNOSIDES AND DOCUSATE SODIUM 2 TABLET: 50; 8.6 TABLET ORAL at 21:05

## 2022-04-02 RX ADMIN — ENOXAPARIN SODIUM 110 MG: 120 INJECTION SUBCUTANEOUS at 17:01

## 2022-04-02 RX ADMIN — SENNOSIDES AND DOCUSATE SODIUM 2 TABLET: 50; 8.6 TABLET ORAL at 08:25

## 2022-04-02 RX ADMIN — BUMETANIDE 2 MG: 0.25 INJECTION, SOLUTION INTRAMUSCULAR; INTRAVENOUS at 08:25

## 2022-04-02 RX ADMIN — INSULIN LISPRO 4 UNITS: 100 INJECTION, SOLUTION INTRAVENOUS; SUBCUTANEOUS at 17:01

## 2022-04-02 RX ADMIN — MINERAL OIL 5 ML: 1000 LIQUID ORAL at 08:29

## 2022-04-02 RX ADMIN — ACETAMINOPHEN 650 MG: 325 TABLET ORAL at 08:23

## 2022-04-02 RX ADMIN — INSULIN DETEMIR 12 UNITS: 100 INJECTION, SOLUTION SUBCUTANEOUS at 21:00

## 2022-04-02 RX ADMIN — Medication 10 ML: at 21:06

## 2022-04-02 RX ADMIN — AMIODARONE HYDROCHLORIDE 200 MG: 200 TABLET ORAL at 23:19

## 2022-04-02 RX ADMIN — INSULIN LISPRO 4 UNITS: 100 INJECTION, SOLUTION INTRAVENOUS; SUBCUTANEOUS at 08:28

## 2022-04-02 RX ADMIN — PREDNISONE 40 MG: 20 TABLET ORAL at 08:24

## 2022-04-02 RX ADMIN — METOPROLOL TARTRATE 50 MG: 50 TABLET, FILM COATED ORAL at 13:21

## 2022-04-02 RX ADMIN — Medication 10 ML: at 08:30

## 2022-04-02 RX ADMIN — BUPRENORPHINE AND NALOXONE 2 TABLET: 8; 2 TABLET SUBLINGUAL at 20:57

## 2022-04-02 RX ADMIN — DULOXETINE HYDROCHLORIDE 90 MG: 30 CAPSULE, DELAYED RELEASE ORAL at 08:24

## 2022-04-02 RX ADMIN — ENOXAPARIN SODIUM 110 MG: 120 INJECTION SUBCUTANEOUS at 05:51

## 2022-04-02 RX ADMIN — PANTOPRAZOLE SODIUM 40 MG: 40 TABLET, DELAYED RELEASE ORAL at 05:51

## 2022-04-02 RX ADMIN — METOPROLOL TARTRATE 50 MG: 50 TABLET, FILM COATED ORAL at 21:05

## 2022-04-02 RX ADMIN — ATORVASTATIN CALCIUM 40 MG: 40 TABLET, FILM COATED ORAL at 20:58

## 2022-04-02 RX ADMIN — VITAM B12 50 MCG: 100 TAB at 08:25

## 2022-04-02 RX ADMIN — INSULIN LISPRO 2 UNITS: 100 INJECTION, SOLUTION INTRAVENOUS; SUBCUTANEOUS at 13:21

## 2022-04-02 RX ADMIN — MIRTAZAPINE 7.5 MG: 15 TABLET, FILM COATED ORAL at 21:00

## 2022-04-02 RX ADMIN — AMIODARONE HYDROCHLORIDE 200 MG: 200 TABLET ORAL at 17:11

## 2022-04-02 RX ADMIN — AMIODARONE HYDROCHLORIDE 200 MG: 200 TABLET ORAL at 08:24

## 2022-04-02 RX ADMIN — LEVOTHYROXINE SODIUM 150 MCG: 150 TABLET ORAL at 08:24

## 2022-04-02 RX ADMIN — METOPROLOL TARTRATE 50 MG: 50 TABLET, FILM COATED ORAL at 05:51

## 2022-04-02 NOTE — PROGRESS NOTES
[facility] Medicine Services  PROGRESS NOTE    Patient Name: Yinka Cummings  : 1945  MRN: [id number]    Date of Admission: 3/25/2022  Primary Care Physician: Vivek Mercer,     Subjective   Subjective     CC:  Rash, nausea and vomiting    HPI:     Feels better, eating well. Looking forward to work with PT today.    ROS:  Gen- No fevers, chills  CV- No chest pain, palpitations  Resp- No cough, dyspnea  GI- No N/V/D, abd pain        Objective   Objective     Vital Signs:   Temp:  [97.4 °F (36.3 °C)-99.2 °F (37.3 °C)] 97.7 °F (36.5 °C)  Heart Rate:  [75-91] 84  Resp:  [20] 20  BP: ()/(58-75) 128/75     Physical Exam:    Constitutional - no acute distress, appears deconditioned, in bed  HEENT-NCAT  CV-RRR  Resp-grossly clear bilaterally  Abd-soft, nontender, nondistended, normoactive bowel sounds  Ext-+ edema  Neuro-alert, speech clear  Psych-at times flat affect   Skin- fading rash on palms, arms and legs      Results Reviewed:  LAB RESULTS:      Lab 22  1038 22  0805 22  0829 22  0755   WBC 14.84* 15.35* 14.58* 17.21*   HEMOGLOBIN 8.0* 9.6* 12.1* 12.0*   HEMATOCRIT 24.1* 30.0* 37.7 38.5   PLATELETS 316 337 523* 524*   NEUTROS ABS  --  12.41*  --  14.91*   IMMATURE GRANS (ABS)  --  0.30*  --  0.22*   LYMPHS ABS  --  1.27  --  0.86   MONOS ABS  --  1.22*  --  1.17*   EOS ABS  --  0.08  --  0.00   MCV 88.3 93.8 93.3 95.1   SED RATE  --   --   --  27*   CRP  --  6.84*  --  11.58*   PROTIME  --   --  13.0  --    HEPARIN ANTI-XA 1.17  --   --   --          Lab 22  1038 22  1227 22  0805 22  0710 22  1110 22  1547 22  0755   SODIUM 133* 135* 133* 131* 131*   < > 133*   POTASSIUM 4.5 5.0 4.9 5.6* 5.4*   < > 5.2   CHLORIDE 101 103 100 98 99   < > 98   CO2 19.0* 20.0* 20.0* 19.0* 20.0*   < > 16.0*   ANION GAP 13.0 12.0 13.0 14.0 12.0   < > 19.0*   BUN 73* 63* 70* 69* 64*   < > 47*   CREATININE 2.60* 2.33*  2.60* 2.72* 2.73*   < > 2.82*   EGFR 24.9* 28.4* 24.9* 23.6* 23.5*   < > 22.6*   GLUCOSE 197* 225* 179* 251* 246*   < > 243*   CALCIUM 7.9* 7.9* 7.8* 8.1* 8.1*   < > 8.3*   PHOSPHORUS  --   --   --   --   --   --  5.0*    < > = values in this interval not displayed.         Lab 04/01/22  1227 03/31/22  0805 03/27/22  0755   TOTAL PROTEIN 4.8* 4.6* 5.9*   ALBUMIN 2.40* 2.40* 2.30*   GLOBULIN 2.4 2.2 3.6   ALT (SGPT) 12 14 8   AST (SGOT) 16 16 11   BILIRUBIN 0.3 0.3 0.3   ALK PHOS 80 106 84         Lab 03/28/22  0829   PROTIME 13.0   INR 1.01             Lab 03/27/22  0755   IRON 23*   IRON SATURATION 15*   TIBC 153*   TRANSFERRIN 103*   FERRITIN 682.00*         Brief Urine Lab Results  (Last result in the past 365 days)      Color   Clarity   Blood   Leuk Est   Nitrite   Protein   CREAT   Urine HCG        03/26/22 1027             99.7         03/26/22 1027 Orange   Cloudy   Large (3+)   Trace   Negative   100 mg/dL (2+)                 Microbiology Results Abnormal     Procedure Component Value - Date/Time    Wound Culture - Wound, Leg, Left [[id number]] Collected: 03/25/22 0918    Lab Status: Final result Specimen: Wound from Leg, Left Updated: 03/27/22 0903     Wound Culture Light growth (2+) Normal Skin Cami     Gram Stain Rare (1+) WBCs seen      Moderate (3+) Gram positive cocci in pairs and clusters    Eosinophil Smear - Urine, Urine, Clean Catch [[id number]]  (Normal) Collected: 03/26/22 1027    Lab Status: Final result Specimen: Urine, Clean Catch Updated: 03/26/22 1113     Eosinophil Smear 0 % EOS/100 Cells     Narrative:      No eosinophil seen    COVID-19 and FLU A/B PCR - Swab, Nasopharynx [[id number]]  (Normal) Collected: 03/25/22 0918    Lab Status: Final result Specimen: Swab from Nasopharynx Updated: 03/25/22 0955     COVID19 Not Detected     Influenza A PCR Not Detected     Influenza B PCR Not Detected    Narrative:      Fact sheet for providers: https://www.fda.gov/media/311205/download    Fact sheet  for patients: https://www.fda.gov/media/604851/download    Test performed by PCR.          Adult Transesophageal Echo (LARRY) W/ Cont if Necessary Per Protocol    Result Date: 4/1/2022  · Estimated left ventricular EF = 65% · The cardiac valves are anatomically and functionally normal. · No evidence of a left atrial appendage thrombus was present.      Cardioversion External in Cardiology Department    Result Date: 4/1/2022  · Post cardioversion the patient displayed a sinus rhythm. · The cardioversion was successful.        Results for orders placed during the hospital encounter of 03/25/22    Adult Transesophageal Echo (LARRY) W/ Cont if Necessary Per Protocol    Interpretation Summary  · Estimated left ventricular EF = 65%  · The cardiac valves are anatomically and functionally normal.  · No evidence of a left atrial appendage thrombus was present.      I have reviewed the medications:  Scheduled Meds:amiodarone, 200 mg, Oral, Q8H   Followed by  [START ON 4/6/2022] amiodarone, 200 mg, Oral, Q12H   Followed by  [START ON 4/20/2022] amiodarone, 200 mg, Oral, Daily  atorvastatin, 40 mg, Oral, Nightly  bumetanide, 2 mg, Intravenous, Daily  buprenorphine-naloxone, 2 tablet, Sublingual, Nightly  DULoxetine, 90 mg, Oral, Daily  enoxaparin, 110 mg, Subcutaneous, Q12H  insulin detemir, 12 Units, Subcutaneous, Nightly  insulin lispro, 0-9 Units, Subcutaneous, TID AC  levothyroxine, 150 mcg, Oral, Daily  methohexital, , ,   metoprolol tartrate, 50 mg, Oral, Q8H  midazolam, , ,   mirtazapine, 7.5 mg, Oral, Nightly  pantoprazole, 40 mg, Oral, QAM  polyethylene glycol, 17 g, Oral, Daily  predniSONE, 40 mg, Oral, Daily With Breakfast  senna-docusate sodium, 2 tablet, Oral, BID  sodium chloride, 10 mL, Intravenous, Q12H  cyanocobalamin, 50 mcg, Oral, Daily      Continuous Infusions:   PRN Meds:.•  acetaminophen **OR** acetaminophen **OR** acetaminophen  •  dextrose  •  dextrose  •  glucagon (human recombinant)  •  melatonin  •   "ondansetron **OR** ondansetron  •  palliative care oral rinse  •  Sodium Chloride (PF)  •  sodium chloride    Assessment/Plan   Assessment & Plan     Active Hospital Problems    Diagnosis  POA   • Acute renal failure, unspecified acute renal failure type (HCC) [N17.9]  Yes   • Rash [R21]  Yes   • Nausea and vomiting [R11.2]  Unknown   • Paroxysmal A-fib (on eliquis & metoprolol) [I48.0]  Yes   • 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         Brief Hospital Course to date:  76 yo man with HTN Afib DHF VINI CKD3 (RCC/nephrectomy), DM and gastroparesis.  Comes w LE rash and N/V    All problems are new to me.  Chart reviewed and updated.       Rash  Leukocytosis   - rash appears vasculitic, non blanching   - main DDx is drug-induced (primidone most likely culprit) vs systemic vasculitis.    - [facility], biopsy showed a non-specific perivascular lymphocytic infiltrate.  - multiple long term home medications stopped while at the [facility], including lortab, dabigatran, lyrica, lisinopril and primidone with subsequent improvement.  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 Recommended prednisone taper.   - Steroids started 4/1/22 with some fading of rash on palms noted today    Chronic Pain  - continue suboxone (home lortab stopped at [facility] out of concern this may have contributed to the rash)     Atrial fibrillation/Atrial Flutter  -Currently on lovenox.  Plans for resumption of NOAC, likely apixaban, after 30 day \"wash out\" period after stopping primidone.  Will [facility] to [facility] providers.  -Cardiology follows, continue amiodarone load, now oral.  - NSR after ECV 4/1/22    Anemia  - hemoglobin 8.0  - cbc am     SALEEM on " CKD  Solitary kidney.  Anemia  - creatinine 2.6 today  - [facility] baseline around 1.5 (1.7 on 3/20/22 upon [facility]).    Left pyelo? Duodenitis? Both suspected on Abd CT  Bibasilar consolidations noted on Abd CT  Nausea and vomiting   - watching off abx       Hypothyroid - TSH 1.04   HTN   DMII  - insulin dependent  - levemir QHS, 12 units  - SSI   - may need to adjust insulin now that steroids started  - a1c 7.9     VINI  - CPAP non compliant     Tremor  - primidone recently discontinued -- appears patient was prescribed sinemet instead, but Mr Emiliano's daughter says he has not started this medicine yet. Will defer for now.     Mood disorder        DVT prophylaxis:  lovenox  Medical DVT prophylaxis orders are present.       AM-PAC 6 Clicks Score (PT): 8 (04/01/22 3975)    Disposition: I expect the patient to be discharged TBD    CODE STATUS:   Code Status and Medical Interventions:   Ordered at: 03/25/22 1328     Level Of Support Discussed With:    Patient     Code Status (Patient has no pulse and is not breathing):    CPR (Attempt to Resuscitate)     Medical Interventions (Patient has pulse or is breathing):    Full Support       Yinka Desai MD  04/02/22

## 2022-04-02 NOTE — PROGRESS NOTES
"   LOS: 8 days    Patient Care Team:  Vivek Mercer DO as PCP - General (Family Medicine)    Reason For Visit:  F/U SALEEM ON CKD  Subjective     Awake and alert this morning. Feels hungry wants home food. Cr 2.6 BUN 71. Skin rash improving       Review of Systems:    Pulm: No soa   CV:  No CP      Objective     amiodarone, 200 mg, Oral, Q8H   Followed by  [START ON 4/6/2022] amiodarone, 200 mg, Oral, Q12H   Followed by  [START ON 4/20/2022] amiodarone, 200 mg, Oral, Daily  atorvastatin, 40 mg, Oral, Nightly  bumetanide, 2 mg, Intravenous, Daily  buprenorphine-naloxone, 2 tablet, Sublingual, Nightly  DULoxetine, 90 mg, Oral, Daily  enoxaparin, 110 mg, Subcutaneous, Q12H  insulin detemir, 15 Units, Subcutaneous, Nightly  insulin lispro, 0-9 Units, Subcutaneous, TID AC  levothyroxine, 150 mcg, Oral, Daily  methohexital, , ,   metoprolol tartrate, 50 mg, Oral, Q8H  midazolam, , ,   mirtazapine, 7.5 mg, Oral, Nightly  pantoprazole, 40 mg, Oral, QAM  polyethylene glycol, 17 g, Oral, Daily  predniSONE, 40 mg, Oral, Daily With Breakfast  senna-docusate sodium, 2 tablet, Oral, BID  sodium chloride, 10 mL, Intravenous, Q12H  cyanocobalamin, 50 mcg, Oral, Daily             Vital Signs:  Blood pressure 130/58, pulse 81, temperature 97.4 °F (36.3 °C), temperature source Axillary, resp. rate 20, height 175 cm (68.9\"), weight 119 kg (262 lb 5.6 oz), SpO2 98 %.    Flowsheet Rows    Flowsheet Row First Filed Value   Admission Height 175.3 cm (69\") Documented at 03/25/2022 0849   Admission Weight 113 kg (250 lb) Documented at 03/25/2022 0849          No intake/output data recorded.    Physical Exam:    General Appearance: NAD, alert and cooperative, Ox3  Eyes: PER, conjunctivae and sclerae normal, no icterus  Lungs: respirations regular and unlabored, no crepitus, clear to auscultation  Heart/CV: regular rhythm & normal rate, no murmur, no gallop, no rub and 1+ edema  Abdomen: not distended, soft, non-tender, no masses,  bowel " sounds present  Skin:  Rash BETTER., Warm and dry    Radiology:            Labs:  Results from last 7 days   Lab Units 04/02/22  1038 03/31/22  0805 03/28/22  0829   WBC 10*3/mm3 14.84* 15.35* 14.58*   HEMOGLOBIN g/dL 8.0* 9.6* 12.1*   HEMATOCRIT % 24.1* 30.0* 37.7   PLATELETS 10*3/mm3 316 337 523*     Results from last 7 days   Lab Units 04/02/22  1038 04/01/22  1227 03/31/22  0805 03/30/22  0710 03/27/22  1547 03/27/22  0755   SODIUM mmol/L 133* 135* 133* 131*   < > 133*   POTASSIUM mmol/L 4.5 5.0 4.9 5.6*   < > 5.2   CHLORIDE mmol/L 101 103 100 98   < > 98   CO2 mmol/L 19.0* 20.0* 20.0* 19.0*   < > 16.0*   BUN mg/dL 73* 63* 70* 69*   < > 47*   CREATININE mg/dL 2.60* 2.33* 2.60* 2.72*   < > 2.82*   CALCIUM mg/dL 7.9* 7.9* 7.8* 8.1*   < > 8.3*   PHOSPHORUS mg/dL  --   --   --   --   --  5.0*   ALBUMIN g/dL  --  2.40* 2.40*  --   --  2.30*    < > = values in this interval not displayed.     Results from last 7 days   Lab Units 04/02/22  1038   GLUCOSE mg/dL 197*       Results from last 7 days   Lab Units 04/01/22  1227   ALK PHOS U/L 80   BILIRUBIN mg/dL 0.3   ALT (SGPT) U/L 12   AST (SGOT) U/L 16                 Estimated Creatinine Clearance: 31.2 mL/min (A) (by C-G formula based on SCr of 2.6 mg/dL (H)).      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 Cr on this admission btw 2.3-2.7mg/dl. UA large blood 24hr urine protein 380mg. Etiology of SHAILA unspecified. Concern for possible drug eruption and possible AIN vs Systemic Ig-A vasculitis.   Serologies negative so far.     CKD stage III: Hx of solitary kidney. [facility].     Skin rash: Palpable purpura. Suspected drug rash  vs Skin vasculitis.   Started on prednisone per primary service.    Hyperkalemia: worsening     Hyponatremia: Worsening     Volume status: Dependent edema     Afib: On amiodarone. Plan for cardioversion per cardiology      Recommendations: No significant improvement in renal function. Relative contraindication to pursue renal biopsy given solitary kidney and high risk for bleeding. High suspicion for AIN in the setting of drug eruption. Less likely systemic IgA vasculitis w renal involvement given no significant proteinuria. Started on prednisone per primary service. Will monitor renal function while on steroids. Continue diuretics for now.Daily labs.     Francisco Atkinson MD  04/02/22  12:54 EDT

## 2022-04-02 NOTE — THERAPY TREATMENT NOTE
"Patient Name: Yinka Cummings  : 1945    MRN: [id number]                              Today's Date: 2022       Admit Date: 3/25/2022    Visit Dx:     ICD-10-CM ICD-9-CM   1. Acute renal failure, unspecified acute renal failure type (HCC)  N17.9 584.9   2. Dehydration  E86.0 276.51   3. Cellulitis of lower extremity, unspecified laterality  L03.119 682.6   4. Vasculitis (HCC)  I77.6 447.6   5. Congestive heart failure, unspecified HF chronicity, unspecified heart failure type (HCC)  I50.9 428.0   6. 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)   • Chronic back pain (on chronic prescription lortab)   • HTN (hypertension)   • Solitary kidney (s/p nephrectomy for \"mass\")   • History of prostate cancer   • Closed fracture of phalanx of right hand   • Dental caries   • Acute renal failure, unspecified acute renal failure type (HCC)   • Rash   • Nausea and vomiting     Past Medical History:   Diagnosis Date   • Anxiety    • Arthritis    • Chronic kidney disease    • Diabetes mellitus (HCC)    • Disease of thyroid gland    • Diverticulosis    • Essential tremor    • HL (hearing loss)    • Hypertension    • Obesity    • Prostate cancer (HCC)    • Renal cell cancer (HCC)    • Skin cancer    • Vasculitis of skin      Past Surgical History:   Procedure Laterality Date   • CHOLECYSTECTOMY     • COLONOSCOPY      Polyps in the past but not on the most recent scope   • NEPHRECTOMY Right    • PROSTATE SURGERY      Radical prostatectomy   • SKIN CANCER EXCISION      Large incision left neck, multiple other cancers removed      General Information     Row Name 22 1622          Physical Therapy Time and Intention    Document Type therapy note (daily " note)  -CD     Mode of Treatment physical therapy  -CD     Row Name 04/02/22 1622          General Information    Patient Profile Reviewed yes  -CD     Existing Precautions/Restrictions fall  Blisters on B feet, L heel decubitis,  edematous hands, sensitive to touch, [place]  -CD     Barriers to Rehab hearing deficit;medically complex;previous functional deficit  -CD     Row Name 04/02/22 1622          Cognition    Orientation Status (Cognition) oriented to;person  -CD     Row Name 04/02/22 1622          Safety Issues, Functional Mobility    Safety Issues Affecting Function (Mobility) safety precaution awareness;safety precautions follow-through/compliance;sequencing abilities  -CD     Impairments Affecting Function (Mobility) balance;coordination;endurance/activity tolerance;pain;strength  -CD     Comment, Safety Issues/Impairments (Mobility) NEEDS CUES FOR SEQUENCING AND INCREASED TIME TO RESPOND TO COMMANDS. LIMITED BY PAIN, FATIGUE WITH MOBILITY. VITALS STABLE.  -CD           User Key  (r) = Recorded By, (t) = Taken By, (c) = Cosigned By    Initials Name Provider Type    CD Alessandra Madrid PT Physical Therapist               Mobility     Row Name 04/02/22 6194          Bed Mobility    Bed Mobility rolling left;rolling right  -CD     Rolling Left [place] (Bed Mobility) maximum assist (25% patient effort);2 person assist;verbal cues  -CD     Rolling Right [place] (Bed Mobility) maximum assist (25% patient effort);2 person assist;verbal cues  -CD     Assistive Device (Bed Mobility) bed rails;draw sheet  -CD     Comment, (Bed Mobility) ROLLED L/R FOR PLACEMENT OF MECHANICAL LIFT SLING. NOT SAFE TO ATTEMPT TO SIT EDGE OF SPECIALTY BED.  -CD     Row Name 04/02/22 3281          Transfers    Comment, (Transfers) MECHANICAL LIFT BED TO RECLINER. DEFERRED STS TRIALS DUE TO WEAKNESS, FATIGUE AND PAINFUL FEET. OPTED TO FOCUS ON STATIC SITTING BALANCE/ENDURANCE.  -CD     Row Name 04/02/22 0122          Bed-Chair  Transfer    Bed-Chair [place] (Transfers) dependent (less than 25% patient effort);2 person assist  -CD     Assistive Device (Bed-Chair Transfers) lift device  -CD           User Key  (r) = Recorded By, (t) = Taken By, (c) = Cosigned By    Initials Name Provider Type    Alessandra Choudhury PT Physical Therapist               Obj/Interventions     Row Name 04/02/22 1627          Motor Skills    Therapeutic Exercise --  SEATED B LE THER EX: LAQ, SHOULDER FLEX, CHICKEN WINGS, ELBOW FLEX/EXT - 1 SET OF 10 REPS EACH. AND B SLR FROM RECLINER- 1 SET OF 3 REPS EACH.  -CD     Row Name 04/02/22 1627          Balance    Balance Assessment sitting static balance;sitting dynamic balance  -CD     Static Sitting Balance minimal assist  UNABLE TO TOLERATE FULL PRESSURE OF FLOOR ON FEET SITTING EDGE OF RECLINER.  -CD     Dynamic Sitting Balance moderate assist  -CD     Position, Sitting Balance supported;sitting in chair  -CD           User Key  (r) = Recorded By, (t) = Taken By, (c) = Cosigned By    Initials Name Provider Type    Alessandra Choudhury PT Physical Therapist               Goals/Plan    No documentation.                Clinical Impression     Row Name 04/02/22 1630          Pain    Pretreatment Pain Rating 6/10  -CD     Posttreatment Pain Rating 6/10  -CD     Pain Location - head  AND MOUTH  -CD     Pre/Posttreatment Pain Comment INTERMITTENT PAIN AT BLE'S THROUGHOUT MOBILITY- BANDAGES INTACT B LE'S  -CD     Pain Intervention(s) Repositioned;Rest  -CD     Row Name 04/02/22 1630          Plan of Care Review    Plan of Care Reviewed With patient;spouse;daughter  -CD     Outcome Evaluation NEEDS CUES FOR SEQUENCING AND INCREASED TIME TO RESPOND TO COMMANDS. LIMITED BY PAIN, FATIGUE WITH MOBILITY. VITALS STABLE AFTER TRANSFER FROM BED TO CHAIR. PT REQUIRED MAX ASSIST OF 2 TO ROLL IN BED AND MECHANICAL LIFT FOR SAFETY BED TO RECLNER. SAT EDGE OF RECLINER X APPROX 8 MINUTES WITH ASSIST.  -CD     Row Name 04/02/22 3488           Therapy Assessment/Plan (PT)    Patient/Family Therapy Goals Statement (PT) PLANS TO D/C TO SNF.  -CD     Rehab Potential (PT) good, to achieve stated therapy goals  -CD     Criteria for Skilled Interventions Met (PT) yes;meets criteria;skilled treatment is necessary  -CD     Row Name 04/02/22 1630          Vital Signs    Post Systolic BP Rehab 128  -CD     Post Treatment Diastolic BP 53  -CD     Posttreatment Heart Rate (beats/min) 77  -CD     Pre SpO2 (%) 97  -CD     O2 Delivery Pre Treatment room air  -CD     O2 Delivery Intra Treatment room air  -CD     Post SpO2 (%) 97  -CD     O2 Delivery Post Treatment room air  -CD     Pre Patient Position Supine  -CD     Intra Patient Position Sitting  -CD     Post Patient Position Sitting  -CD     Row Name 04/02/22 1630          Positioning and Restraints    Pre-Treatment Position in bed  -CD     Post Treatment Position chair  -CD     In Chair reclined;call light within reach;encouraged to call for assist;exit alarm on;with family/caregiver;legs elevated;notified nsg;on mechanical lift sling;RUE elevated;LUE elevated;waffle boot/both  -CD           User Key  (r) = Recorded By, (t) = Taken By, (c) = Cosigned By    Initials Name Provider Type    CD Alessandra Madrid, PT Physical Therapist               Outcome Measures     Row Name 04/02/22 1633          How much help from another person do you currently need...    Turning from your back to your side while in flat bed without using bedrails? 2  -CD     Moving from lying on back to sitting on the side of a flat bed without bedrails? 2  -CD     Moving to and from a bed to a chair (including a wheelchair)? 1  -CD     Standing up from a chair using your arms (e.g., wheelchair, bedside chair)? 1  -CD     Climbing 3-5 steps with a railing? 1  -CD     To walk in hospital room? 1  -CD     AM-PAC 6 Clicks Score (PT) 8  -CD     Row Name 04/02/22 1633          Functional Assessment    Outcome Measure Options AM-PAC 6 Clicks Basic  Mobility (PT)  -CD           User Key  (r) = Recorded By, (t) = Taken By, (c) = Cosigned By    Initials Name Provider Type    Alessandra Choudhury PT Physical Therapist                             Physical Therapy Education                 Title: PT OT SLP Therapies (In Progress)     Topic: Physical Therapy (Done)     Point: Mobility training (Done)     Learning Progress Summary           Patient Acceptance, E, VU,NR by CD at 4/2/2022 1634    Comment: BENEFITS OF OOB ACTIVITY, ROM EXERCISE, PROGRESSION OF POC, D/C PLANNING,    Acceptance, E,D, VU,NR by HP at 3/29/2022 1517    Acceptance, E, NR by BA at 3/27/2022 1412   Family Acceptance, E,D, VU,NR by HP at 3/29/2022 1517    Acceptance, E, NR by BA at 3/27/2022 1412                   Point: Home exercise program (Done)     Learning Progress Summary           Patient Acceptance, E, VU,NR by CD at 4/2/2022 1634    Comment: BENEFITS OF OOB ACTIVITY, ROM EXERCISE, PROGRESSION OF POC, D/C PLANNING,    Acceptance, E,D, VU,NR by HP at 3/29/2022 1517    Acceptance, E, NR by BA at 3/27/2022 1412   Family Acceptance, E,D, VU,NR by HP at 3/29/2022 1517    Acceptance, E, NR by BA at 3/27/2022 1412                   Point: Body mechanics (Done)     Learning Progress Summary           Patient Acceptance, E, VU,NR by CD at 4/2/2022 1634    Comment: BENEFITS OF OOB ACTIVITY, ROM EXERCISE, PROGRESSION OF POC, D/C PLANNING,    Acceptance, E,D, VU,NR by HP at 3/29/2022 1517    Acceptance, E, NR by BA at 3/27/2022 1412   Family Acceptance, E,D, VU,NR by HP at 3/29/2022 1517    Acceptance, E, NR by BA at 3/27/2022 1412                   Point: Precautions (Done)     Learning Progress Summary           Patient Acceptance, E, VU,NR by CD at 4/2/2022 1634    Comment: BENEFITS OF OOB ACTIVITY, ROM EXERCISE, PROGRESSION OF POC, D/C PLANNING,    Acceptance, E,D, VU,NR by HP at 3/29/2022 1517    Acceptance, E, NR by BA at 3/27/2022 1412   Family Acceptance, E,D, VU,NR by HP at 3/29/2022 1510     Acceptance, E, NR by BA at 3/27/2022 1412                               User Key     Initials Effective Dates Name Provider Type Discipline    CD 06/16/21 -  Alessandra Madrid PT Physical Therapist PT     06/01/21 -  Anya Velez, PT Physical Therapist PT    BA 09/21/21 -  Renate Mendoza, ELIZABETH Physical Therapist PT              PT Recommendation and Plan     Plan of Care Reviewed With: patient, spouse, daughter  Outcome Evaluation: NEEDS CUES FOR SEQUENCING AND INCREASED TIME TO RESPOND TO COMMANDS. LIMITED BY PAIN, FATIGUE WITH MOBILITY. VITALS STABLE AFTER TRANSFER FROM BED TO CHAIR. PT REQUIRED MAX ASSIST OF 2 TO ROLL IN BED AND MECHANICAL LIFT FOR SAFETY BED TO RECLNER. SAT EDGE OF RECLINER X APPROX 8 MINUTES WITH ASSIST.     Time Calculation:    PT Charges     Row Name 04/02/22 1636             Time Calculation    Start Time 1542  -CD      PT Received On 04/02/22  -CD      PT Goal Re-Cert Due Date 04/06/22  -CD              Time Calculation- PT    Total Timed Code Minutes- PT 38 minute(s)  -CD              Timed Charges    43735 - PT Therapeutic Exercise Minutes 10  -CD      88265 - PT Therapeutic Activity Minutes 28  -CD              Total Minutes    Timed Charges Total Minutes 38  -CD       Total Minutes 38  -CD            User Key  (r) = Recorded By, (t) = Taken By, (c) = Cosigned By    Initials Name Provider Type    CD Alessandra Madrid PT Physical Therapist              Therapy Charges for Today     Code Description Service Date Service Provider Modifiers Qty    48719083870 HC PT THERAPEUTIC ACT EA 15 MIN 4/2/2022 Alessandra Madrid, PT GP 2    20565480185 HC PT THER PROC EA 15 MIN 4/2/2022 Alessandra Madrid, PT GP 1    18511801314 HC PT THER SUPP EA 15 MIN 4/2/2022 Alessandra Madrid, PT GP 2          PT G-Codes  Outcome Measure Options: AM-PAC 6 Clicks Basic Mobility (PT)  AM-PAC 6 Clicks Score (PT): 8  AM-PAC 6 Clicks Score (OT): 10    Alessandra Madrid PT  4/2/2022

## 2022-04-02 NOTE — PLAN OF CARE
Goal Outcome Evaluation:  Plan of Care Reviewed With: patient, spouse, daughter           Outcome Evaluation: NEEDS CUES FOR SEQUENCING AND INCREASED TIME TO RESPOND TO COMMANDS. LIMITED BY PAIN, FATIGUE WITH MOBILITY. VITALS STABLE AFTER TRANSFER FROM BED TO CHAIR. PT REQUIRED MAX ASSIST OF 2 TO ROLL IN BED AND MECHANICAL LIFT FOR SAFETY BED TO RECLNER. SAT EDGE OF RECLINER X APPROX 8 MINUTES WITH ASSIST.

## 2022-04-02 NOTE — PLAN OF CARE
Goal Outcome Evaluation:      Patient very confused tonight, appears to have been this way for the past day or so per the dayshift RN report, however has not been address in any physician note. Able to reorient patient fairly easy. VSS. Fall precautions in place. Call light within reach. Will continue to monitor.

## 2022-04-03 ENCOUNTER — APPOINTMENT (OUTPATIENT)
Dept: CT IMAGING | Facility: HOSPITAL | Age: 77
End: 2022-04-03

## 2022-04-03 LAB
ALBUMIN SERPL-MCNC: 2.7 G/DL (ref 3.5–5.2)
ALBUMIN/GLOB SERPL: 0.9 G/DL
ALP SERPL-CCNC: 76 U/L (ref 39–117)
ALT SERPL W P-5'-P-CCNC: 24 U/L (ref 1–41)
ANION GAP SERPL CALCULATED.3IONS-SCNC: 14 MMOL/L (ref 5–15)
ANION GAP SERPL CALCULATED.3IONS-SCNC: 14 MMOL/L (ref 5–15)
ARTERIAL PATENCY WRIST A: ABNORMAL
AST SERPL-CCNC: 37 U/L (ref 1–40)
ATMOSPHERIC PRESS: ABNORMAL MM[HG]
BASE EXCESS BLDA CALC-SCNC: -2.7 MMOL/L (ref 0–2)
BASOPHILS # BLD AUTO: 0.02 10*3/MM3 (ref 0–0.2)
BASOPHILS NFR BLD AUTO: 0.2 % (ref 0–1.5)
BDY SITE: ABNORMAL
BILIRUB SERPL-MCNC: 0.4 MG/DL (ref 0–1.2)
BODY TEMPERATURE: 37 C
BUN SERPL-MCNC: 75 MG/DL (ref 8–23)
BUN SERPL-MCNC: 78 MG/DL (ref 8–23)
BUN/CREAT SERPL: 28.2 (ref 7–25)
BUN/CREAT SERPL: 28.6 (ref 7–25)
CALCIUM SPEC-SCNC: 8.2 MG/DL (ref 8.6–10.5)
CALCIUM SPEC-SCNC: 8.4 MG/DL (ref 8.6–10.5)
CHLORIDE SERPL-SCNC: 102 MMOL/L (ref 98–107)
CHLORIDE SERPL-SCNC: 103 MMOL/L (ref 98–107)
CO2 BLDA-SCNC: 23.3 MMOL/L (ref 22–33)
CO2 SERPL-SCNC: 21 MMOL/L (ref 22–29)
CO2 SERPL-SCNC: 22 MMOL/L (ref 22–29)
COHGB MFR BLD: 1.3 % (ref 0–2)
CREAT SERPL-MCNC: 2.62 MG/DL (ref 0.76–1.27)
CREAT SERPL-MCNC: 2.77 MG/DL (ref 0.76–1.27)
D-LACTATE SERPL-SCNC: 1.7 MMOL/L (ref 0.5–2)
DEPRECATED RDW RBC AUTO: 50.5 FL (ref 37–54)
DEPRECATED RDW RBC AUTO: 54.6 FL (ref 37–54)
EGFRCR SERPLBLD CKD-EPI 2021: 23.1 ML/MIN/1.73
EGFRCR SERPLBLD CKD-EPI 2021: 24.7 ML/MIN/1.73
EOSINOPHIL # BLD AUTO: 0.02 10*3/MM3 (ref 0–0.4)
EOSINOPHIL NFR BLD AUTO: 0.2 % (ref 0.3–6.2)
EPAP: 0
ERYTHROCYTE [DISTWIDTH] IN BLOOD BY AUTOMATED COUNT: 15.4 % (ref 12.3–15.4)
ERYTHROCYTE [DISTWIDTH] IN BLOOD BY AUTOMATED COUNT: 15.5 % (ref 12.3–15.4)
GLOBULIN UR ELPH-MCNC: 2.9 GM/DL
GLUCOSE BLDC GLUCOMTR-MCNC: 141 MG/DL (ref 70–130)
GLUCOSE BLDC GLUCOMTR-MCNC: 173 MG/DL (ref 70–130)
GLUCOSE BLDC GLUCOMTR-MCNC: 243 MG/DL (ref 70–130)
GLUCOSE BLDC GLUCOMTR-MCNC: 255 MG/DL (ref 70–130)
GLUCOSE BLDC GLUCOMTR-MCNC: 257 MG/DL (ref 70–130)
GLUCOSE SERPL-MCNC: 157 MG/DL (ref 65–99)
GLUCOSE SERPL-MCNC: 232 MG/DL (ref 65–99)
HCO3 BLDA-SCNC: 22.2 MMOL/L (ref 20–26)
HCT VFR BLD AUTO: 24.9 % (ref 37.5–51)
HCT VFR BLD AUTO: 27.4 % (ref 37.5–51)
HCT VFR BLD CALC: 25.4 % (ref 38–51)
HGB BLD-MCNC: 8.3 G/DL (ref 13–17.7)
HGB BLD-MCNC: 8.5 G/DL (ref 13–17.7)
HGB BLDA-MCNC: 8.3 G/DL (ref 13.5–17.5)
IMM GRANULOCYTES # BLD AUTO: 0.15 10*3/MM3 (ref 0–0.05)
IMM GRANULOCYTES NFR BLD AUTO: 1.2 % (ref 0–0.5)
INHALED O2 CONCENTRATION: 21 %
IPAP: 0
LYMPHOCYTES # BLD AUTO: 1.69 10*3/MM3 (ref 0.7–3.1)
LYMPHOCYTES NFR BLD AUTO: 13 % (ref 19.6–45.3)
MCH RBC QN AUTO: 30.1 PG (ref 26.6–33)
MCH RBC QN AUTO: 30.4 PG (ref 26.6–33)
MCHC RBC AUTO-ENTMCNC: 31 G/DL (ref 31.5–35.7)
MCHC RBC AUTO-ENTMCNC: 33.3 G/DL (ref 31.5–35.7)
MCV RBC AUTO: 90.2 FL (ref 79–97)
MCV RBC AUTO: 97.9 FL (ref 79–97)
METHGB BLD QL: ABNORMAL
MODALITY: ABNORMAL
MONOCYTES # BLD AUTO: 1.33 10*3/MM3 (ref 0.1–0.9)
MONOCYTES NFR BLD AUTO: 10.3 % (ref 5–12)
NEUTROPHILS NFR BLD AUTO: 75.1 % (ref 42.7–76)
NEUTROPHILS NFR BLD AUTO: 9.75 10*3/MM3 (ref 1.7–7)
NOTE: ABNORMAL
NRBC BLD AUTO-RTO: 0 /100 WBC (ref 0–0.2)
OXYHGB MFR BLDV: 93.1 % (ref 94–99)
PAW @ PEAK INSP FLOW SETTING VENT: 0 CMH2O
PCO2 BLDA: 37.4 MM HG (ref 35–45)
PCO2 TEMP ADJ BLD: 37.4 MM HG (ref 35–48)
PH BLDA: 7.38 PH UNITS (ref 7.35–7.45)
PH, TEMP CORRECTED: 7.38 PH UNITS
PLATELET # BLD AUTO: 314 10*3/MM3 (ref 140–450)
PLATELET # BLD AUTO: 377 10*3/MM3 (ref 140–450)
PMV BLD AUTO: 9.3 FL (ref 6–12)
PMV BLD AUTO: 9.3 FL (ref 6–12)
PO2 BLDA: 70 MM HG (ref 83–108)
PO2 TEMP ADJ BLD: 70 MM HG (ref 83–108)
POTASSIUM SERPL-SCNC: 4.4 MMOL/L (ref 3.5–5.2)
POTASSIUM SERPL-SCNC: 5.2 MMOL/L (ref 3.5–5.2)
PROT SERPL-MCNC: 5.6 G/DL (ref 6–8.5)
RBC # BLD AUTO: 2.76 10*6/MM3 (ref 4.14–5.8)
RBC # BLD AUTO: 2.8 10*6/MM3 (ref 4.14–5.8)
SODIUM SERPL-SCNC: 138 MMOL/L (ref 136–145)
SODIUM SERPL-SCNC: 138 MMOL/L (ref 136–145)
TOTAL RATE: 0 BREATHS/MINUTE
WBC NRBC COR # BLD: 12.96 10*3/MM3 (ref 3.4–10.8)
WBC NRBC COR # BLD: 9.55 10*3/MM3 (ref 3.4–10.8)

## 2022-04-03 PROCEDURE — 83605 ASSAY OF LACTIC ACID: CPT | Performed by: PHYSICIAN ASSISTANT

## 2022-04-03 PROCEDURE — 82962 GLUCOSE BLOOD TEST: CPT

## 2022-04-03 PROCEDURE — 63710000001 INSULIN LISPRO (HUMAN) PER 5 UNITS: Performed by: INTERNAL MEDICINE

## 2022-04-03 PROCEDURE — 82805 BLOOD GASES W/O2 SATURATION: CPT

## 2022-04-03 PROCEDURE — 83050 HGB METHEMOGLOBIN QUAN: CPT

## 2022-04-03 PROCEDURE — 63710000001 INSULIN DETEMIR PER 5 UNITS: Performed by: INTERNAL MEDICINE

## 2022-04-03 PROCEDURE — 80053 COMPREHEN METABOLIC PANEL: CPT | Performed by: INTERNAL MEDICINE

## 2022-04-03 PROCEDURE — 99232 SBSQ HOSP IP/OBS MODERATE 35: CPT | Performed by: INTERNAL MEDICINE

## 2022-04-03 PROCEDURE — 25010000002 ENOXAPARIN PER 10 MG

## 2022-04-03 PROCEDURE — 36600 WITHDRAWAL OF ARTERIAL BLOOD: CPT

## 2022-04-03 PROCEDURE — 82375 ASSAY CARBOXYHB QUANT: CPT

## 2022-04-03 PROCEDURE — 70450 CT HEAD/BRAIN W/O DYE: CPT

## 2022-04-03 PROCEDURE — 85025 COMPLETE CBC W/AUTO DIFF WBC: CPT | Performed by: INTERNAL MEDICINE

## 2022-04-03 PROCEDURE — 63710000001 PREDNISONE PER 1 MG: Performed by: INTERNAL MEDICINE

## 2022-04-03 PROCEDURE — 85027 COMPLETE CBC AUTOMATED: CPT | Performed by: PHYSICIAN ASSISTANT

## 2022-04-03 RX ADMIN — DULOXETINE HYDROCHLORIDE 90 MG: 30 CAPSULE, DELAYED RELEASE ORAL at 08:54

## 2022-04-03 RX ADMIN — Medication 10 ML: at 08:58

## 2022-04-03 RX ADMIN — MIRTAZAPINE 7.5 MG: 15 TABLET, FILM COATED ORAL at 21:50

## 2022-04-03 RX ADMIN — SENNOSIDES AND DOCUSATE SODIUM 2 TABLET: 50; 8.6 TABLET ORAL at 21:51

## 2022-04-03 RX ADMIN — VITAM B12 50 MCG: 100 TAB at 08:54

## 2022-04-03 RX ADMIN — ENOXAPARIN SODIUM 110 MG: 120 INJECTION SUBCUTANEOUS at 19:31

## 2022-04-03 RX ADMIN — METOPROLOL TARTRATE 50 MG: 50 TABLET, FILM COATED ORAL at 15:49

## 2022-04-03 RX ADMIN — LEVOTHYROXINE SODIUM 150 MCG: 150 TABLET ORAL at 08:56

## 2022-04-03 RX ADMIN — PREDNISONE 40 MG: 20 TABLET ORAL at 11:24

## 2022-04-03 RX ADMIN — METOPROLOL TARTRATE 50 MG: 50 TABLET, FILM COATED ORAL at 21:50

## 2022-04-03 RX ADMIN — AMIODARONE HYDROCHLORIDE 200 MG: 200 TABLET ORAL at 08:55

## 2022-04-03 RX ADMIN — MINERAL OIL: 1000 LIQUID ORAL at 15:16

## 2022-04-03 RX ADMIN — SENNOSIDES AND DOCUSATE SODIUM 2 TABLET: 50; 8.6 TABLET ORAL at 08:55

## 2022-04-03 RX ADMIN — AMIODARONE HYDROCHLORIDE 200 MG: 200 TABLET ORAL at 23:18

## 2022-04-03 RX ADMIN — AMIODARONE HYDROCHLORIDE 200 MG: 200 TABLET ORAL at 15:49

## 2022-04-03 RX ADMIN — PANTOPRAZOLE SODIUM 40 MG: 40 TABLET, DELAYED RELEASE ORAL at 05:16

## 2022-04-03 RX ADMIN — INSULIN DETEMIR 12 UNITS: 100 INJECTION, SOLUTION SUBCUTANEOUS at 21:51

## 2022-04-03 RX ADMIN — METOPROLOL TARTRATE 50 MG: 50 TABLET, FILM COATED ORAL at 05:15

## 2022-04-03 RX ADMIN — INSULIN LISPRO 6 UNITS: 100 INJECTION, SOLUTION INTRAVENOUS; SUBCUTANEOUS at 18:38

## 2022-04-03 RX ADMIN — INSULIN LISPRO 2 UNITS: 100 INJECTION, SOLUTION INTRAVENOUS; SUBCUTANEOUS at 08:58

## 2022-04-03 RX ADMIN — ATORVASTATIN CALCIUM 40 MG: 40 TABLET, FILM COATED ORAL at 21:50

## 2022-04-03 RX ADMIN — Medication 10 ML: at 21:59

## 2022-04-03 RX ADMIN — POLYETHYLENE GLYCOL 3350 17 G: 17 POWDER, FOR SOLUTION ORAL at 08:57

## 2022-04-03 RX ADMIN — ENOXAPARIN SODIUM 110 MG: 120 INJECTION SUBCUTANEOUS at 05:16

## 2022-04-03 NOTE — PROGRESS NOTES
"   LOS: 9 days    Patient Care Team:  Vivek Mercer DO as PCP - General (Family Medicine)    Reason For Visit:  F/U SALEEM ON CKD  Subjective     Stable renal function. Significant improvement in generalize edema       Review of Systems:    Pulm: No soa   CV:  No CP      Objective     amiodarone, 200 mg, Oral, Q8H   Followed by  [START ON 4/6/2022] amiodarone, 200 mg, Oral, Q12H   Followed by  [START ON 4/20/2022] amiodarone, 200 mg, Oral, Daily  atorvastatin, 40 mg, Oral, Nightly  bumetanide, 2 mg, Intravenous, Daily  buprenorphine-naloxone, 2 tablet, Sublingual, Nightly  DULoxetine, 90 mg, Oral, Daily  enoxaparin, 110 mg, Subcutaneous, Q12H  insulin detemir, 12 Units, Subcutaneous, Nightly  insulin lispro, 0-9 Units, Subcutaneous, TID AC  levothyroxine, 150 mcg, Oral, Daily  methohexital, , ,   metoprolol tartrate, 50 mg, Oral, Q8H  midazolam, , ,   mirtazapine, 7.5 mg, Oral, Nightly  pantoprazole, 40 mg, Oral, QAM  polyethylene glycol, 17 g, Oral, Daily  predniSONE, 40 mg, Oral, Daily With Breakfast  senna-docusate sodium, 2 tablet, Oral, BID  sodium chloride, 10 mL, Intravenous, Q12H  cyanocobalamin, 50 mcg, Oral, Daily             Vital Signs:  Blood pressure 119/68, pulse 73, temperature 97.9 °F (36.6 °C), temperature source Axillary, resp. rate 20, height 175 cm (68.9\"), weight 119 kg (262 lb 5.6 oz), SpO2 96 %.    Flowsheet Rows    Flowsheet Row First Filed Value   Admission Height 175.3 cm (69\") Documented at 03/25/2022 0849   Admission Weight 113 kg (250 lb) Documented at 03/25/2022 0849          04/02 0701 - 04/03 0700  In: 240 [P.O.:240]  Out: -     Physical Exam:    General Appearance: NAD, alert and cooperative, Ox3  Eyes: PER, conjunctivae and sclerae normal, no icterus  Lungs: respirations regular and unlabored, no crepitus, clear to auscultation  Heart/CV: regular rhythm & normal rate, no murmur, no gallop, no rub and 1+ edema  Abdomen: not distended, soft, non-tender, no masses,  bowel sounds " present  Skin:  Rash BETTER., Warm and dry    Radiology:            Labs:  Results from last 7 days   Lab Units 04/03/22  0740 04/02/22  1038 03/31/22  0805   WBC 10*3/mm3 12.96* 14.84* 15.35*   HEMOGLOBIN g/dL 8.5* 8.0* 9.6*   HEMATOCRIT % 27.4* 24.1* 30.0*   PLATELETS 10*3/mm3 377 316 337     Results from last 7 days   Lab Units 04/03/22  0740 04/02/22  1038 04/01/22  1227 03/31/22  0805   SODIUM mmol/L 138 133* 135* 133*   POTASSIUM mmol/L 4.4 4.5 5.0 4.9   CHLORIDE mmol/L 103 101 103 100   CO2 mmol/L 21.0* 19.0* 20.0* 20.0*   BUN mg/dL 75* 73* 63* 70*   CREATININE mg/dL 2.62* 2.60* 2.33* 2.60*   CALCIUM mg/dL 8.4* 7.9* 7.9* 7.8*   ALBUMIN g/dL  --   --  2.40* 2.40*     Results from last 7 days   Lab Units 04/03/22  0740   GLUCOSE mg/dL 157*       Results from last 7 days   Lab Units 04/01/22  1227   ALK PHOS U/L 80   BILIRUBIN mg/dL 0.3   ALT (SGPT) U/L 12   AST (SGOT) U/L 16                 Estimated Creatinine Clearance: 31 mL/min (A) (by C-G formula based on SCr of 2.62 mg/dL (H)).      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 Cr on this admission btw 2.3-2.7mg/dl. UA large blood 24hr urine protein 380mg. Etiology of SHAILA unspecified. Concern for possible drug eruption and possible AIN vs Systemic Ig-A vasculitis.   Serologies negative so far.     CKD stage III: Hx of solitary kidney. [facility].     Skin rash: Palpable purpura. Suspected drug rash vs Skin vasculitis.   Started on prednisone per primary service.    Hyperkalemia: worsening     Hyponatremia: Worsening     Volume status: Dependent edema     Afib: On amiodarone. Plan for cardioversion per  cardiology      Recommendations: No significant improvement in renal function. Relative contraindication to pursue renal biopsy given solitary kidney and high risk for bleeding. High suspicion for AIN in the setting of drug eruption. Less likely systemic IgA vasculitis w renal involvement given no significant proteinuria. Started on prednisone per primary service. Will monitor renal function while on steroids. D/C diuretics Encourage po intake.Daily labs.     Francisco Atkinson MD  04/03/22  12:49 EDT

## 2022-04-03 NOTE — PROGRESS NOTES
[facility] Medicine Services  PROGRESS NOTE    Patient Name: Yinka Cummings  : 1945  MRN: [id number]    Date of Admission: 3/25/2022  Primary Care Physician: Vivek Mercer,     Subjective   Subjective     CC:  Rash, nausea and vomiting    HPI:     Still has tremors, but rash improving. Good appetite.  Eager to work with PT    ROS:  Gen- No fevers, chills  CV- No chest pain, palpitations  Resp- No cough, dyspnea  GI- No N/V/D, abd pain      Objective   Objective     Vital Signs:   Temp:  [97.7 °F (36.5 °C)-98.3 °F (36.8 °C)] 97.7 °F (36.5 °C)  Heart Rate:  [73-80] 75  Resp:  [20] 20  BP: (119-156)/(56-85) 140/60     Physical Exam:    Constitutional - no acute distress, appears deconditioned, in bed  HEENT-NCAT, mucous membranes moist  CV-RRR  Resp-CTAB  Abd-soft, nontender, nondistended, normoactive bowel sounds, overweight  Ext-+ LE edema  Neuro-alert, speech clear, moves all extremities, intention tremor   Psych-normal affect   Skin- fading rash on palms, legs and arms      Results Reviewed:  LAB RESULTS:      Lab 22  0740 22  1038 22  0805 22  0829   WBC 12.96* 14.84* 15.35* 14.58*   HEMOGLOBIN 8.5* 8.0* 9.6* 12.1*   HEMATOCRIT 27.4* 24.1* 30.0* 37.7   PLATELETS 377 316 337 523*   NEUTROS ABS 9.75*  --  12.41*  --    IMMATURE GRANS (ABS) 0.15*  --  0.30*  --    LYMPHS ABS 1.69  --  1.27  --    MONOS ABS 1.33*  --  1.22*  --    EOS ABS 0.02  --  0.08  --    MCV 97.9* 88.3 93.8 93.3   CRP  --   --  6.84*  --    PROTIME  --   --   --  13.0   HEPARIN ANTI-XA  --  1.17  --   --          Lab 22  0740 22  1038 22  1227 22  0805 22  0710   SODIUM 138 133* 135* 133* 131*   POTASSIUM 4.4 4.5 5.0 4.9 5.6*   CHLORIDE 103 101 103 100 98   CO2 21.0* 19.0* 20.0* 20.0* 19.0*   ANION GAP 14.0 13.0 12.0 13.0 14.0   BUN 75* 73* 63* 70* 69*   CREATININE 2.62* 2.60* 2.33* 2.60* 2.72*   EGFR 24.7* 24.9* 28.4* 24.9* 23.6*   GLUCOSE 157*  197* 225* 179* 251*   CALCIUM 8.4* 7.9* 7.9* 7.8* 8.1*         Lab 04/01/22  1227 03/31/22  0805   TOTAL PROTEIN 4.8* 4.6*   ALBUMIN 2.40* 2.40*   GLOBULIN 2.4 2.2   ALT (SGPT) 12 14   AST (SGOT) 16 16   BILIRUBIN 0.3 0.3   ALK PHOS 80 106         Lab 03/28/22  0829   PROTIME 13.0   INR 1.01                 Brief Urine Lab Results  (Last result in the past 365 days)      Color   Clarity   Blood   Leuk Est   Nitrite   Protein   CREAT   Urine HCG        03/26/22 1027             99.7         03/26/22 1027 Orange   Cloudy   Large (3+)   Trace   Negative   100 mg/dL (2+)                 Microbiology Results Abnormal     Procedure Component Value - Date/Time    Wound Culture - Wound, Leg, Left [[id number]] Collected: 03/25/22 0918    Lab Status: Final result Specimen: Wound from Leg, Left Updated: 03/27/22 0903     Wound Culture Light growth (2+) Normal Skin Cami     Gram Stain Rare (1+) WBCs seen      Moderate (3+) Gram positive cocci in pairs and clusters    Eosinophil Smear - Urine, Urine, Clean Catch [[id number]]  (Normal) Collected: 03/26/22 1027    Lab Status: Final result Specimen: Urine, Clean Catch Updated: 03/26/22 1113     Eosinophil Smear 0 % EOS/100 Cells     Narrative:      No eosinophil seen    COVID-19 and FLU A/B PCR - Swab, Nasopharynx [[id number]]  (Normal) Collected: 03/25/22 0918    Lab Status: Final result Specimen: Swab from Nasopharynx Updated: 03/25/22 0955     COVID19 Not Detected     Influenza A PCR Not Detected     Influenza B PCR Not Detected    Narrative:      Fact sheet for providers: https://www.fda.gov/media/870172/download    Fact sheet for patients: https://www.fda.gov/media/258504/download    Test performed by PCR.          No radiology results from the last 24 hrs    Results for orders placed during the hospital encounter of 03/25/22    Adult Transesophageal Echo (LARRY) W/ Cont if Necessary Per Protocol    Interpretation Summary  · Estimated left ventricular EF = 65%  · The cardiac  valves are anatomically and functionally normal.  · No evidence of a left atrial appendage thrombus was present.      I have reviewed the medications:  Scheduled Meds:amiodarone, 200 mg, Oral, Q8H   Followed by  [START ON 4/6/2022] amiodarone, 200 mg, Oral, Q12H   Followed by  [START ON 4/20/2022] amiodarone, 200 mg, Oral, Daily  atorvastatin, 40 mg, Oral, Nightly  buprenorphine-naloxone, 2 tablet, Sublingual, Nightly  DULoxetine, 90 mg, Oral, Daily  enoxaparin, 110 mg, Subcutaneous, Q12H  insulin detemir, 12 Units, Subcutaneous, Nightly  insulin lispro, 0-9 Units, Subcutaneous, TID AC  levothyroxine, 150 mcg, Oral, Daily  methohexital, , ,   metoprolol tartrate, 50 mg, Oral, Q8H  midazolam, , ,   mirtazapine, 7.5 mg, Oral, Nightly  pantoprazole, 40 mg, Oral, QAM  polyethylene glycol, 17 g, Oral, Daily  predniSONE, 40 mg, Oral, Daily With Breakfast  senna-docusate sodium, 2 tablet, Oral, BID  sodium chloride, 10 mL, Intravenous, Q12H  cyanocobalamin, 50 mcg, Oral, Daily      Continuous Infusions:   PRN Meds:.•  acetaminophen **OR** acetaminophen **OR** acetaminophen  •  dextrose  •  dextrose  •  glucagon (human recombinant)  •  melatonin  •  ondansetron **OR** ondansetron  •  palliative care oral rinse  •  Sodium Chloride (PF)  •  sodium chloride    Assessment/Plan   Assessment & Plan     Active Hospital Problems    Diagnosis  POA   • Acute renal failure, unspecified acute renal failure type (HCC) [N17.9]  Yes   • Rash [R21]  Yes   • Nausea and vomiting [R11.2]  Unknown   • Paroxysmal A-fib (on eliquis & metoprolol) [I48.0]  Yes   • 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         Brief Hospital Course to date:  76 yo man with HTN Afib DHF VINI CKD3  "(RCC/nephrectomy), DM and gastroparesis.  Comes w LE rash and N/V    All problems are new to me.  Chart reviewed and updated.       Rash  Leukocytosis   - rash appears vasculitic, non blanching   - main DDx is drug-induced (primidone most likely culprit) vs systemic vasculitis.    - [facility], biopsy showed a non-specific perivascular lymphocytic infiltrate.  - multiple long term home medications stopped while at the [facility], including lortab, dabigatran, lyrica, lisinopril and primidone with subsequent improvement.  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 Recommended prednisone taper.   - Steroids started 4/1/22 with continued fading of rash on palms, arms and legs    Chronic Pain  - continue suboxone (home lortab stopped at [facility] out of concern this may have contributed to the rash)     Atrial fibrillation/Atrial Flutter  -Currently on lovenox.  Plans for resumption of NOAC, likely apixaban, after 30 day \"wash out\" period after stopping primidone.  Will [facility] to [facility].  -Cardiology follows, continue amiodarone load, now oral.  - NSR after ECV 4/1/22    Anemia  - hemoglobin 8.0  - cbc am     SALEEM on CKD  Solitary kidney.  Anemia  - creatinine 2.6 today  - [facility] baseline around 1.5 (1.7 on 3/20/22 upon [facility]).    Left pyelo? Duodenitis? Both suspected on Abd CT  Bibasilar consolidations noted on Abd CT  Nausea and vomiting   - watching off abx       Hypothyroid - TSH 1.04   HTN   DMII  - insulin dependent  - levemir QHS, 12 units  - SSI   - may need to adjust insulin now that steroids started  - a1c 7.9     VINI  - CPAP non compliant     Tremor  - primidone recently discontinued -- appears patient was prescribed sinemet instead, but Mr Emiliano's daughter says he has not started this medicine yet. Will defer for now.  - [facility] after rash has cleared to consider further treatment for the " tremor     Mood disorder        DVT prophylaxis:  lovenox  Medical DVT prophylaxis orders are present.       AM-PAC 6 Clicks Score (PT): 8 (04/03/22 0800)    Disposition: I expect the patient to be discharged TBD    CODE STATUS:   Code Status and Medical Interventions:   Ordered at: 03/25/22 1328     Level Of Support Discussed With:    Patient     Code Status (Patient has no pulse and is not breathing):    CPR (Attempt to Resuscitate)     Medical Interventions (Patient has pulse or is breathing):    Full Support       Yinka Desai MD  04/03/22

## 2022-04-04 LAB
ANION GAP SERPL CALCULATED.3IONS-SCNC: 12 MMOL/L (ref 5–15)
BACTERIA UR QL AUTO: ABNORMAL /HPF
BILIRUB UR QL STRIP: NEGATIVE
BUN SERPL-MCNC: 83 MG/DL (ref 8–23)
BUN/CREAT SERPL: 35.3 (ref 7–25)
CALCIUM SPEC-SCNC: 8.4 MG/DL (ref 8.6–10.5)
CHLORIDE SERPL-SCNC: 104 MMOL/L (ref 98–107)
CLARITY UR: CLEAR
CO2 SERPL-SCNC: 22 MMOL/L (ref 22–29)
COLOR UR: YELLOW
CREAT SERPL-MCNC: 2.35 MG/DL (ref 0.76–1.27)
CRYOGLOB SER QL 1D COLD INC: NORMAL
DEPRECATED RDW RBC AUTO: 52.8 FL (ref 37–54)
EGFRCR SERPLBLD CKD-EPI 2021: 28.2 ML/MIN/1.73
ERYTHROCYTE [DISTWIDTH] IN BLOOD BY AUTOMATED COUNT: 15.3 % (ref 12.3–15.4)
GLUCOSE BLDC GLUCOMTR-MCNC: 169 MG/DL (ref 70–130)
GLUCOSE BLDC GLUCOMTR-MCNC: 173 MG/DL (ref 70–130)
GLUCOSE BLDC GLUCOMTR-MCNC: 177 MG/DL (ref 70–130)
GLUCOSE BLDC GLUCOMTR-MCNC: 237 MG/DL (ref 70–130)
GLUCOSE SERPL-MCNC: 198 MG/DL (ref 65–99)
GLUCOSE UR STRIP-MCNC: NEGATIVE MG/DL
HCT VFR BLD AUTO: 26.7 % (ref 37.5–51)
HGB BLD-MCNC: 8.2 G/DL (ref 13–17.7)
HGB UR QL STRIP.AUTO: ABNORMAL
HYALINE CASTS UR QL AUTO: ABNORMAL /LPF
KETONES UR QL STRIP: NEGATIVE
LEUKOCYTE ESTERASE UR QL STRIP.AUTO: ABNORMAL
MCH RBC QN AUTO: 29.3 PG (ref 26.6–33)
MCHC RBC AUTO-ENTMCNC: 30.7 G/DL (ref 31.5–35.7)
MCV RBC AUTO: 95.4 FL (ref 79–97)
NITRITE UR QL STRIP: NEGATIVE
PH UR STRIP.AUTO: 6 [PH] (ref 5–8)
PLATELET # BLD AUTO: 369 10*3/MM3 (ref 140–450)
PMV BLD AUTO: 9.4 FL (ref 6–12)
POTASSIUM SERPL-SCNC: 4.8 MMOL/L (ref 3.5–5.2)
PROT UR QL STRIP: NEGATIVE
RBC # BLD AUTO: 2.8 10*6/MM3 (ref 4.14–5.8)
RBC # UR STRIP: ABNORMAL /HPF
REF LAB TEST METHOD: ABNORMAL
SODIUM SERPL-SCNC: 138 MMOL/L (ref 136–145)
SP GR UR STRIP: 1.01 (ref 1–1.03)
SQUAMOUS #/AREA URNS HPF: ABNORMAL /HPF
UROBILINOGEN UR QL STRIP: ABNORMAL
WBC # UR STRIP: ABNORMAL /HPF
WBC NRBC COR # BLD: 9.9 10*3/MM3 (ref 3.4–10.8)

## 2022-04-04 PROCEDURE — 81001 URINALYSIS AUTO W/SCOPE: CPT | Performed by: PHYSICIAN ASSISTANT

## 2022-04-04 PROCEDURE — 97530 THERAPEUTIC ACTIVITIES: CPT

## 2022-04-04 PROCEDURE — 63710000001 INSULIN DETEMIR PER 5 UNITS: Performed by: INTERNAL MEDICINE

## 2022-04-04 PROCEDURE — 63710000001 PREDNISONE PER 1 MG: Performed by: INTERNAL MEDICINE

## 2022-04-04 PROCEDURE — 97110 THERAPEUTIC EXERCISES: CPT

## 2022-04-04 PROCEDURE — 82962 GLUCOSE BLOOD TEST: CPT

## 2022-04-04 PROCEDURE — 25010000002 ENOXAPARIN PER 10 MG

## 2022-04-04 PROCEDURE — 80048 BASIC METABOLIC PNL TOTAL CA: CPT | Performed by: INTERNAL MEDICINE

## 2022-04-04 PROCEDURE — 99232 SBSQ HOSP IP/OBS MODERATE 35: CPT | Performed by: INTERNAL MEDICINE

## 2022-04-04 PROCEDURE — 85027 COMPLETE CBC AUTOMATED: CPT | Performed by: INTERNAL MEDICINE

## 2022-04-04 PROCEDURE — 99223 1ST HOSP IP/OBS HIGH 75: CPT | Performed by: PSYCHIATRY & NEUROLOGY

## 2022-04-04 PROCEDURE — 63710000001 INSULIN LISPRO (HUMAN) PER 5 UNITS: Performed by: INTERNAL MEDICINE

## 2022-04-04 PROCEDURE — 25010000002 HALOPERIDOL LACTATE PER 5 MG: Performed by: PSYCHIATRY & NEUROLOGY

## 2022-04-04 RX ORDER — HALOPERIDOL 5 MG/ML
2 INJECTION INTRAMUSCULAR ONCE AS NEEDED
Status: COMPLETED | OUTPATIENT
Start: 2022-04-04 | End: 2022-04-04

## 2022-04-04 RX ORDER — PREDNISONE 20 MG/1
20 TABLET ORAL
Status: DISCONTINUED | OUTPATIENT
Start: 2022-04-05 | End: 2022-04-05

## 2022-04-04 RX ORDER — QUETIAPINE FUMARATE 25 MG/1
25 TABLET, FILM COATED ORAL NIGHTLY
Status: DISCONTINUED | OUTPATIENT
Start: 2022-04-04 | End: 2022-04-05

## 2022-04-04 RX ADMIN — BUPRENORPHINE AND NALOXONE 2 TABLET: 8; 2 TABLET SUBLINGUAL at 21:31

## 2022-04-04 RX ADMIN — AMIODARONE HYDROCHLORIDE 200 MG: 200 TABLET ORAL at 08:13

## 2022-04-04 RX ADMIN — ENOXAPARIN SODIUM 110 MG: 120 INJECTION SUBCUTANEOUS at 17:43

## 2022-04-04 RX ADMIN — LEVOTHYROXINE SODIUM 150 MCG: 150 TABLET ORAL at 08:12

## 2022-04-04 RX ADMIN — MIRTAZAPINE 7.5 MG: 15 TABLET, FILM COATED ORAL at 21:19

## 2022-04-04 RX ADMIN — SENNOSIDES AND DOCUSATE SODIUM 2 TABLET: 50; 8.6 TABLET ORAL at 21:19

## 2022-04-04 RX ADMIN — INSULIN DETEMIR 10 UNITS: 100 INJECTION, SOLUTION SUBCUTANEOUS at 21:33

## 2022-04-04 RX ADMIN — INSULIN LISPRO 2 UNITS: 100 INJECTION, SOLUTION INTRAVENOUS; SUBCUTANEOUS at 08:13

## 2022-04-04 RX ADMIN — HALOPERIDOL LACTATE 2 MG: 5 INJECTION, SOLUTION INTRAMUSCULAR at 22:55

## 2022-04-04 RX ADMIN — PANTOPRAZOLE SODIUM 40 MG: 40 TABLET, DELAYED RELEASE ORAL at 05:54

## 2022-04-04 RX ADMIN — PREDNISONE 40 MG: 20 TABLET ORAL at 08:16

## 2022-04-04 RX ADMIN — INSULIN LISPRO 4 UNITS: 100 INJECTION, SOLUTION INTRAVENOUS; SUBCUTANEOUS at 17:43

## 2022-04-04 RX ADMIN — METOPROLOL TARTRATE 50 MG: 50 TABLET, FILM COATED ORAL at 21:19

## 2022-04-04 RX ADMIN — Medication 10 ML: at 21:19

## 2022-04-04 RX ADMIN — SENNOSIDES AND DOCUSATE SODIUM 2 TABLET: 50; 8.6 TABLET ORAL at 08:11

## 2022-04-04 RX ADMIN — AMIODARONE HYDROCHLORIDE 200 MG: 200 TABLET ORAL at 17:10

## 2022-04-04 RX ADMIN — BUPRENORPHINE AND NALOXONE 2 TABLET: 8; 2 TABLET SUBLINGUAL at 00:09

## 2022-04-04 RX ADMIN — INSULIN LISPRO 2 UNITS: 100 INJECTION, SOLUTION INTRAVENOUS; SUBCUTANEOUS at 13:47

## 2022-04-04 RX ADMIN — VITAM B12 50 MCG: 100 TAB at 08:12

## 2022-04-04 RX ADMIN — POLYETHYLENE GLYCOL 3350 17 G: 17 POWDER, FOR SOLUTION ORAL at 17:44

## 2022-04-04 RX ADMIN — ENOXAPARIN SODIUM 110 MG: 120 INJECTION SUBCUTANEOUS at 05:54

## 2022-04-04 RX ADMIN — ATORVASTATIN CALCIUM 40 MG: 40 TABLET, FILM COATED ORAL at 21:19

## 2022-04-04 RX ADMIN — METOPROLOL TARTRATE 50 MG: 50 TABLET, FILM COATED ORAL at 05:54

## 2022-04-04 RX ADMIN — DULOXETINE HYDROCHLORIDE 90 MG: 30 CAPSULE, DELAYED RELEASE ORAL at 08:12

## 2022-04-04 RX ADMIN — Medication 10 ML: at 08:14

## 2022-04-04 NOTE — THERAPY TREATMENT NOTE
"Patient Name: Yinka Cummings  : 1945    MRN: [id number]                              Today's Date: 2022       Admit Date: 3/25/2022    Visit Dx:     ICD-10-CM ICD-9-CM   1. Acute renal failure, unspecified acute renal failure type (HCC)  N17.9 584.9   2. Dehydration  E86.0 276.51   3. Cellulitis of lower extremity, unspecified laterality  L03.119 682.6   4. Vasculitis (HCC)  I77.6 447.6   5. Congestive heart failure, unspecified HF chronicity, unspecified heart failure type (HCC)  I50.9 428.0   6. 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)   • Chronic back pain (on chronic prescription lortab)   • HTN (hypertension)   • Solitary kidney (s/p nephrectomy for \"mass\")   • History of prostate cancer   • Closed fracture of phalanx of right hand   • Dental caries   • Acute renal failure, unspecified acute renal failure type (HCC)   • Rash   • Nausea and vomiting     Past Medical History:   Diagnosis Date   • Anxiety    • Arthritis    • Chronic kidney disease    • Diabetes mellitus (HCC)    • Disease of thyroid gland    • Diverticulosis    • Essential tremor    • HL (hearing loss)    • Hypertension    • Obesity    • Prostate cancer (HCC)    • Renal cell cancer (HCC)    • Skin cancer    • Vasculitis of skin      Past Surgical History:   Procedure Laterality Date   • CHOLECYSTECTOMY     • COLONOSCOPY      Polyps in the past but not on the most recent scope   • NEPHRECTOMY Right    • PROSTATE SURGERY      Radical prostatectomy   • SKIN CANCER EXCISION      Large incision left neck, multiple other cancers removed      General Information     Row Name 22 1456          Physical Therapy Time and Intention    Document Type therapy note (daily " note)  -KG     Mode of Treatment physical therapy  -KG     Row Name 04/04/22 1456          General Information    Patient Profile Reviewed yes  -KG     Existing Precautions/Restrictions fall;other (see comments)  Blisters on B feet, L heel decubitis, edematous hands, sensitive to touch, [place]  -KG     Row Name 04/04/22 1456          Cognition    Orientation Status (Cognition) oriented to;person  -KG     Row Name 04/04/22 1456          Safety Issues, Functional Mobility    Impairments Affecting Function (Mobility) balance;coordination;endurance/activity tolerance;pain;strength  -KG           User Key  (r) = Recorded By, (t) = Taken By, (c) = Cosigned By    Initials Name Provider Type    Rita Lackey Physical Therapist               Mobility     Row Name 04/04/22 1456          Bed Mobility    Comment, (Bed Mobility) UIC  -KG     Row Name 04/04/22 1456          Transfers    Comment, (Transfers) several attempts to scoot to edge of chair, pt needed manual hand placement in arm rests of cues for advancing hips, however severe fear of falling and patient would panic and lean back  -KG     Row Name 04/04/22 1456          Sit-Stand Transfer    Sit-Stand [place] (Transfers) unable to assess  -KG     Row Name 04/04/22 1456          Gait/Stairs (Locomotion)    [place] Level (Gait) unable to assess  -KG           User Key  (r) = Recorded By, (t) = Taken By, (c) = Cosigned By    Initials Name Provider Type    Rita Lackey Physical Therapist               Obj/Interventions     Row Name 04/04/22 1501          Motor Skills    Therapeutic Exercise knee;ankle  -KG     Row Name 04/04/22 1501          Hip (Therapeutic Exercise)    Hip (Therapeutic Exercise) other (see comments)  isometric leg press through feet into therapist hands with legs elevated  -KG     Row Name 04/04/22 1501          Knee (Therapeutic Exercise)    Knee (Therapeutic Exercise) AAROM (active assistive range of motion);AROM (active range of  motion)  -KG     Knee AROM (Therapeutic Exercise) bilateral;heel slides;10 repetitions  -KG     Knee AAROM (Therapeutic Exercise) bilateral;flexion;extension;10 repetitions  -KG           User Key  (r) = Recorded By, (t) = Taken By, (c) = Cosigned By    Initials Name Provider Type    Rita Lackey Physical Therapist               Goals/Plan    No documentation.                Clinical Impression     [facility] Name 04/04/22 1502          Pain Scale: FACES Pre/Post-Treatment    Pain: FACES Scale, Pretreatment 0-->no hurt  -KG     Posttreatment Pain Rating 4-->hurts little more  -KG     Pain Location - Side/Orientation Bilateral  -KG     Pain Location lower  -KG     Pain Location - extremity  -KG     Row Name 04/04/22 1502          Plan of Care Review    Plan of Care Reviewed With patient;spouse;daughter  -KG     Progress no change  -KG     Outcome Evaluation Significant confusion and anxiety throughout session making mobility challenging.several attempts to scoot to edge of chair, pt needed manual hand placement in arm rests of cues for advancing hips, however severe fear of falling and patient would panic and lean back.  Required min-A for sitting balance.  Able to participate in some LE exercises  -KG     [facility] Name 04/04/22 1502          Positioning and Restraints    Pre-Treatment Position sitting in chair/recliner  -KG     Post Treatment Position chair  -KG     In Chair notified nsg;call light within reach;encouraged to call for assist;exit alarm on;with family/caregiver;on mechanical lift sling  -KG           User Key  (r) = Recorded By, (t) = Taken By, (c) = Cosigned By    Initials Name Provider Type    Rita Lackey Physical Therapist               Outcome Measures     [facility] Name 04/04/22 1505 04/04/22 0800       How much help from another person do you currently need...    Turning from your back to your side while in flat bed without using bedrails? 2  -KG 2  -LM    Moving from lying on back to sitting on  the side of a flat bed without bedrails? 2  -KG 2  -LM    Moving to and from a bed to a chair (including a wheelchair)? 1  -KG 1  -LM    Standing up from a chair using your arms (e.g., wheelchair, bedside chair)? 1  -KG 1  -LM    Climbing 3-5 steps with a railing? 1  -KG 1  -LM    To walk in hospital room? 1  -KG 1  -LM    AM-PAC 6 Clicks Score (PT) 8  -KG 8  -LM    Row Name 04/04/22 1505 04/04/22 1447       Functional Assessment    Outcome Measure Options AM-PAC 6 Clicks Basic Mobility (PT)  -KG AM-PAC 6 Clicks Daily Activity (OT)  -MR          User Key  (r) = Recorded By, (t) = Taken By, (c) = Cosigned By    Initials Name Provider Type    Luz Abraham, RN Registered Nurse    Rita Lackey Physical Therapist    Alla Garces, OT Occupational Therapist                             Physical Therapy Education                 Title: PT OT SLP Therapies (In Progress)     Topic: Physical Therapy (In Progress)     Point: Mobility training (In Progress)     Learning Progress Summary           Patient Acceptance, E, NR by KG at 4/4/2022 1506    Acceptance, E, VU,NR by CD at 4/2/2022 1634    Comment: BENEFITS OF OOB ACTIVITY, ROM EXERCISE, PROGRESSION OF POC, D/C PLANNING,    Acceptance, E,D, VU,NR by HP at 3/29/2022 1517    Acceptance, E, NR by BA at 3/27/2022 1412   Family Acceptance, E,D, VU,NR by HP at 3/29/2022 1517    Acceptance, E, NR by BA at 3/27/2022 1412                   Point: Home exercise program (In Progress)     Learning Progress Summary           Patient Acceptance, E, NR by KG at 4/4/2022 1506    Acceptance, E, VU,NR by CD at 4/2/2022 1634    Comment: BENEFITS OF OOB ACTIVITY, ROM EXERCISE, PROGRESSION OF POC, D/C PLANNING,    Acceptance, E,D, VU,NR by HP at 3/29/2022 1517    Acceptance, E, NR by BA at 3/27/2022 1412   Family Acceptance, E,D, VU,NR by HP at 3/29/2022 1517    Acceptance, E, NR by BA at 3/27/2022 1412                   Point: Body mechanics (In Progress)     Learning  Progress Summary           Patient Acceptance, E, NR by KG at 4/4/2022 1506    Acceptance, E, VU,NR by CD at 4/2/2022 1634    Comment: BENEFITS OF OOB ACTIVITY, ROM EXERCISE, PROGRESSION OF POC, D/C PLANNING,    Acceptance, E,D, VU,NR by HP at 3/29/2022 1517    Acceptance, E, NR by BA at 3/27/2022 1412   Family Acceptance, E,D, VU,NR by HP at 3/29/2022 1517    Acceptance, E, NR by BA at 3/27/2022 1412                   Point: Precautions (In Progress)     Learning Progress Summary           Patient Acceptance, E, NR by KG at 4/4/2022 1506    Acceptance, E, VU,NR by CD at 4/2/2022 1634    Comment: BENEFITS OF OOB ACTIVITY, ROM EXERCISE, PROGRESSION OF POC, D/C PLANNING,    Acceptance, E,D, VU,NR by HP at 3/29/2022 1517    Acceptance, E, NR by BA at 3/27/2022 1412   Family Acceptance, E,D, VU,NR by HP at 3/29/2022 1517    Acceptance, E, NR by BA at 3/27/2022 1412                               User Key     Initials Effective Dates Name Provider Type Discipline    CD 06/16/21 -  Alessandra Madrid PT Physical Therapist PT     06/16/21 -  Rita Mchugh Physical Therapist PT     06/01/21 -  Anya Velez, ELIZABETH Physical Therapist PT     09/21/21 -  Renate Mendoza, ELIZABETH Physical Therapist PT              PT Recommendation and Plan     Plan of Care Reviewed With: patient, spouse, daughter  Progress: no change  Outcome Evaluation: Significant confusion and anxiety throughout session making mobility challenging.several attempts to scoot to edge of chair, pt needed manual hand placement in arm rests of cues for advancing hips, however severe fear of falling and patient would panic and lean back.  Required min-A for sitting balance.  Able to participate in some LE exercises     Time Calculation:    PT Charges     Row Name 04/04/22 1507             Time Calculation    Start Time 1420  -KG      PT Received On 04/04/22  -KG      PT Goal Re-Cert Due Date 04/06/22  -KG              Time Calculation- PT    Total Timed Code  Minutes- PT 23 minute(s)  -KG              Timed Charges    12692 - PT Therapeutic Exercise Minutes 8  -KG      02354 - PT Therapeutic Activity Minutes 15  -KG              Total Minutes    Timed Charges Total Minutes 23  -KG       Total Minutes 23  -KG            User Key  (r) = Recorded By, (t) = Taken By, (c) = Cosigned By    Initials Name Provider Type    Rita Lackey Physical Therapist              Therapy Charges for Today     Code Description Service Date Service Provider Modifiers Qty    52213020726 HC PT THER PROC EA 15 MIN 4/4/2022 Rita Mchugh GP 1    [id number] HC PT THERAPEUTIC ACT EA 15 MIN 4/4/2022 Rita Mchugh GP 1          PT G-Codes  Outcome Measure Options: AM-PAC 6 Clicks Basic Mobility (PT)  AM-PAC 6 Clicks Score (PT): 8  AM-PAC 6 Clicks Score (OT): 9    Rita Mchugh  4/4/2022

## 2022-04-04 NOTE — PROGRESS NOTES
"   LOS: 10 days    Patient Care Team:  Vivek Mercer DO as PCP - General (Family Medicine)    Reason For Visit:  F/U SALEEM ON CKD  Subjective     Slight improvement in renal function. Significant improvement in generalize edema       Review of Systems:    Pulm: No soa   CV:  No CP      Objective     amiodarone, 200 mg, Oral, Q8H   Followed by  [START ON 4/6/2022] amiodarone, 200 mg, Oral, Q12H   Followed by  [START ON 4/20/2022] amiodarone, 200 mg, Oral, Daily  atorvastatin, 40 mg, Oral, Nightly  buprenorphine-naloxone, 2 tablet, Sublingual, Nightly  DULoxetine, 90 mg, Oral, Daily  enoxaparin, 110 mg, Subcutaneous, Q12H  insulin detemir, 12 Units, Subcutaneous, Nightly  insulin lispro, 0-9 Units, Subcutaneous, TID AC  levothyroxine, 150 mcg, Oral, Daily  methohexital, , ,   metoprolol tartrate, 50 mg, Oral, Q8H  midazolam, , ,   mirtazapine, 7.5 mg, Oral, Nightly  pantoprazole, 40 mg, Oral, QAM  polyethylene glycol, 17 g, Oral, Daily  predniSONE, 40 mg, Oral, Daily With Breakfast  senna-docusate sodium, 2 tablet, Oral, BID  sodium chloride, 10 mL, Intravenous, Q12H  cyanocobalamin, 50 mcg, Oral, Daily             Vital Signs:  Blood pressure 102/74, pulse 79, temperature 99.6 °F (37.6 °C), temperature source Axillary, resp. rate 19, height 175 cm (68.9\"), weight 119 kg (262 lb 5.6 oz), SpO2 91 %.    Flowsheet Rows    Flowsheet Row First Filed Value   Admission Height 175.3 cm (69\") Documented at 03/25/2022 0849   Admission Weight 113 kg (250 lb) Documented at 03/25/2022 0849          No intake/output data recorded.    Physical Exam:    General Appearance: NAD, alert and cooperative, Ox3  Eyes: PER, conjunctivae and sclerae normal, no icterus  Lungs: respirations regular and unlabored, no crepitus, clear to auscultation  Heart/CV: regular rhythm & normal rate, no murmur, no gallop, no rub and 1+ edema  Abdomen: not distended, soft, non-tender, no masses,  bowel sounds present  Skin:  Rash BETTER., Warm and " dry    Radiology:            Labs:  Results from last 7 days   Lab Units 04/04/22  0634 04/03/22 2046 04/03/22  0740   WBC 10*3/mm3 9.90 9.55 12.96*   HEMOGLOBIN g/dL 8.2* 8.3* 8.5*   HEMATOCRIT % 26.7* 24.9* 27.4*   PLATELETS 10*3/mm3 369 314 377     Results from last 7 days   Lab Units 04/04/22  0634 04/03/22 2046 04/03/22  0740 04/02/22  1038 04/01/22  1227 03/31/22  0805   SODIUM mmol/L 138 138 138 133* 135* 133*   POTASSIUM mmol/L 4.8 5.2 4.4 4.5 5.0 4.9   CHLORIDE mmol/L 104 102 103 101 103 100   CO2 mmol/L 22.0 22.0 21.0* 19.0* 20.0* 20.0*   BUN mg/dL 83* 78* 75* 73* 63* 70*   CREATININE mg/dL 2.35* 2.77* 2.62* 2.60* 2.33* 2.60*   CALCIUM mg/dL 8.4* 8.2* 8.4* 7.9* 7.9* 7.8*   ALBUMIN g/dL  --  2.70*  --   --  2.40* 2.40*     Results from last 7 days   Lab Units 04/04/22  0634   GLUCOSE mg/dL 198*       Results from last 7 days   Lab Units 04/03/22 2046   ALK PHOS U/L 76   BILIRUBIN mg/dL 0.4   ALT (SGPT) U/L 24   AST (SGOT) U/L 37     Results from last 7 days   Lab Units 04/03/22  2056   PH, ARTERIAL pH units 7.381   PO2 ART mm Hg 70.0*   PCO2, ARTERIAL mm Hg 37.4   HCO3 ART mmol/L 22.2       Results from last 7 days   Lab Units 04/04/22  0045   COLOR UA  Yellow   CLARITY UA  Clear   PH, URINE  6.0   SPECIFIC GRAVITY, URINE  1.015   GLUCOSE UA  Negative   KETONES UA  Negative   BILIRUBIN UA  Negative   PROTEIN UA  Negative   BLOOD UA  Moderate (2+)*   LEUKOCYTES UA  Trace*   NITRITE UA  Negative       Estimated Creatinine Clearance: 34.5 mL/min (A) (by C-G formula based on SCr of 2.35 mg/dL (H)).      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 Cr on this admission btw  2.3-2.7mg/dl. UA large blood 24hr urine protein 380mg. Etiology of SALEEM unspecified. Concern for possible drug eruption and possible AIN vs Systemic Ig-A vasculitis.   Serologies negative so far.     CKD stage III: Hx of solitary kidney. [facility].     Skin rash: Palpable purpura. Suspected drug rash vs Skin vasculitis.   Started on prednisone per primary service.    Hyperkalemia: resolved    Hyponatremia: Resolved    Volume status: Dependent edema significantly improved. Diuretics stopped on 4/3/22    Afib: s/p LARRY cardioversion      Recommendations: . Relative contraindication to pursue renal biopsy given solitary kidney and high risk for bleeding. High suspicion for AIN in the setting of drug eruption. Less likely systemic IgA vasculitis w renal involvement given no significant proteinuria. Started on prednisone per primary service. Will monitor renal function while on steroids.Encourage po intake.Daily labs.     Francisco Atkinson MD  04/04/22  14:50 EDT

## 2022-04-04 NOTE — PROGRESS NOTES
[facility]  PROGRESS NOTE    Patient Name: Yinka Cummings  : 1945  MRN: [id number]    Date of Admission: 3/25/2022  Primary Care Physician: Vivek Mercer,     Subjective   Subjective     CC:  Rash, nausea and vomiting    HPI:     Confused with some insomnia overnight. Currently oriented. Denies pain.  Tremors continue    ROS:  Gen- No fevers, chills  CV- No chest pain, palpitations  Resp- No cough, dyspnea  GI- No N/V/D, abd pain        Objective   Objective     Vital Signs:   Temp:  [98.2 °F (36.8 °C)-99.6 °F (37.6 °C)] 99.6 °F (37.6 °C)  Heart Rate:  [79-88] 85  Resp:  [19-22] 20  BP: (102-151)/(52-91) 108/82     Physical Exam:    Constitutional - no acute distress, appears deconditioned, in bed  HEENT-NCAT, mucous membranes moist  CV-RRR, S1 S2 normal, no m/r/g  Resp-grossly clear bilaterally  Abd-soft, nontender, nondistended, normoactive bowel sounds  Ext-Lower legs in wraps, + edema  Neuro-alert with some mild confusion, but knows place and year, speech clear, moves all extremities, tremors both hands  Psych-normal affect   Skin- No rash on exposed UE or LE bilaterally        Results Reviewed:  LAB RESULTS:      Lab 22  0634 22  2046 22  0740 22  1038 22  0805   WBC 9.90 9.55 12.96* 14.84* 15.35*   HEMOGLOBIN 8.2* 8.3* 8.5* 8.0* 9.6*   HEMATOCRIT 26.7* 24.9* 27.4* 24.1* 30.0*   PLATELETS 369 314 377 316 337   NEUTROS ABS  --   --  9.75*  --  12.41*   IMMATURE GRANS (ABS)  --   --  0.15*  --  0.30*   LYMPHS ABS  --   --  1.69  --  1.27   MONOS ABS  --   --  1.33*  --  1.22*   EOS ABS  --   --  0.02  --  0.08   MCV 95.4 90.2 97.9* 88.3 93.8   CRP  --   --   --   --  6.84*   LACTATE  --  1.7  --   --   --    HEPARIN ANTI-XA  --   --   --  1.17  --          Lab 22  0634 22  2046 22  0740 22  1038 22  1227   SODIUM 138 138 138 133* 135*   POTASSIUM 4.8 5.2 4.4 4.5 5.0   CHLORIDE 104 102 103  101 103   CO2 22.0 22.0 21.0* 19.0* 20.0*   ANION GAP 12.0 14.0 14.0 13.0 12.0   BUN 83* 78* 75* 73* 63*   CREATININE 2.35* 2.77* 2.62* 2.60* 2.33*   EGFR 28.2* 23.1* 24.7* 24.9* 28.4*   GLUCOSE 198* 232* 157* 197* 225*   CALCIUM 8.4* 8.2* 8.4* 7.9* 7.9*         Lab 04/03/22  2046 04/01/22  1227 03/31/22  0805   TOTAL PROTEIN 5.6* 4.8* 4.6*   ALBUMIN 2.70* 2.40* 2.40*   GLOBULIN 2.9 2.4 2.2   ALT (SGPT) 24 12 14   AST (SGOT) 37 16 16   BILIRUBIN 0.4 0.3 0.3   ALK PHOS 76 80 106                     Lab 04/03/22 2056   PH, ARTERIAL 7.381   PCO2, ARTERIAL 37.4   PO2 ART 70.0*   FIO2 21   HCO3 ART 22.2   BASE EXCESS ART -2.7*   CARBOXYHEMOGLOBIN 1.3     Brief Urine Lab Results  (Last result in the past 365 days)      Color   Clarity   Blood   Leuk Est   Nitrite   Protein   CREAT   Urine HCG        04/04/22 0045 Yellow   Clear   Moderate (2+)   Trace   Negative   Negative                 Microbiology Results Abnormal     Procedure Component Value - Date/Time    Wound Culture - Wound, Leg, Left [[id number]] Collected: 03/25/22 0918    Lab Status: Final result Specimen: Wound from Leg, Left Updated: 03/27/22 0903     Wound Culture Light growth (2+) Normal Skin Cami     Gram Stain Rare (1+) WBCs seen      Moderate (3+) Gram positive cocci in pairs and clusters    Eosinophil Smear - Urine, Urine, Clean Catch [[id number]]  (Normal) Collected: 03/26/22 1027    Lab Status: Final result Specimen: Urine, Clean Catch Updated: 03/26/22 1113     Eosinophil Smear 0 % EOS/100 Cells     Narrative:      No eosinophil seen    COVID-19 and FLU A/B PCR - Swab, Nasopharynx [[id number]]  (Normal) Collected: 03/25/22 0918    Lab Status: Final result Specimen: Swab from Nasopharynx Updated: 03/25/22 0955     COVID19 Not Detected     Influenza A PCR Not Detected     Influenza B PCR Not Detected    Narrative:      Fact sheet for providers: https://www.fda.gov/media/977927/download    Fact sheet for patients:  https://www.fda.gov/media/471541/download    Test performed by [facility].          CT Head Without Contrast    Result Date: 4/3/2022  EXAMINATION: CT HEAD WO CONTRAST DATE: 4/3/2022 10:44 PM  INDICATION: Delirium  COMPARISON: CT head, 4/24/2021  TECHNIQUE: Thin section noncontrast axial images were obtained through the head. Coronal reformatted images were created.  CT dose lowering techniques were used, to include: automated exposure control, adjustment for patient size, and or use of iterative  reconstruction. FINDINGS: Intracranial contents: Generalized parenchymal volume loss without lobar predominance. No hydrocephalus.   A few foci of hypoattenuation within periventricular white matter most commonly represent chronic microangiopathy.  There is no midline shift or mass effect. No hemorrhage, mass lesion, or evidence of evolving large territorial infarct. Atherosclerosis of the carotid siphons. Bones and extracranial soft tissues: Normal calvarium.  Orbits unremarkable. The visualized paranasal sinuses are clear. No mastoid or middle ear effusions. Periodontal disease is partially imaged.     Impression: 1.  No acute intracranial abnormality. 2.  Roughly age commensurate parenchymal volume loss. Mild chronic microvascular changes. Atherosclerosis. Electronically signed by:  Chace Chin  4/3/2022 9:09 PM Mountain Time      Results for orders placed during the hospital encounter of 03/25/22    Adult Transesophageal Echo (LARRY) W/ Cont if Necessary Per Protocol    Interpretation Summary  · Estimated left ventricular EF = 65%  · The cardiac valves are anatomically and functionally normal.  · No evidence of a left atrial appendage thrombus was present.      I have reviewed the medications:  Scheduled Meds:amiodarone, 200 mg, Oral, Q8H   Followed by  [START ON 4/6/2022] amiodarone, 200 mg, Oral, Q12H   Followed by  [START ON 4/20/2022] amiodarone, 200 mg, Oral, Daily  atorvastatin, 40 mg, Oral,  Nightly  buprenorphine-naloxone, 2 tablet, Sublingual, Nightly  DULoxetine, 90 mg, Oral, Daily  enoxaparin, 110 mg, Subcutaneous, Q12H  insulin detemir, 10 Units, Subcutaneous, Nightly  insulin lispro, 0-9 Units, Subcutaneous, TID AC  levothyroxine, 150 mcg, Oral, Daily  methohexital, , ,   metoprolol tartrate, 50 mg, Oral, Q8H  midazolam, , ,   mirtazapine, 7.5 mg, Oral, Nightly  pantoprazole, 40 mg, Oral, QAM  polyethylene glycol, 17 g, Oral, Daily  [START ON 4/5/2022] predniSONE, 20 mg, Oral, Daily With Breakfast  senna-docusate sodium, 2 tablet, Oral, BID  sodium chloride, 10 mL, Intravenous, Q12H  cyanocobalamin, 50 mcg, Oral, Daily      Continuous Infusions:   PRN Meds:.•  acetaminophen **OR** acetaminophen **OR** acetaminophen  •  dextrose  •  dextrose  •  glucagon (human recombinant)  •  melatonin  •  ondansetron **OR** ondansetron  •  palliative care oral rinse  •  Sodium Chloride (PF)  •  sodium chloride    Assessment/Plan   Assessment & Plan     Active Hospital Problems    Diagnosis  POA   • Acute renal failure, unspecified acute renal failure type (HCC) [N17.9]  Yes   • Rash [R21]  Yes   • Nausea and vomiting [R11.2]  Unknown   • Paroxysmal A-fib (on eliquis & metoprolol) [I48.0]  Yes   • 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         Brief Hospital Course to date:  76 yo man with HTN Afib DHF VINI CKD3 (RCC/nephrectomy), DM and gastroparesis.  Comes w LE rash and N/V    All problems are new to me.  Chart reviewed and updated.       Rash  Leukocytosis   - rash appears vasculitic, non blanching   - main DDx is drug-induced (primidone most likely culprit) vs systemic vasculitis.    - [facility], biopsy showed a non-specific  "perivascular lymphocytic infiltrate.  - multiple long term home medications stopped while at the [facility], including lortab, dabigatran, lyrica, lisinopril and primidone with subsequent improvement.  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 Recommended prednisone taper.   - Steroids started 4/1/22 with significant fading of rash on palms, arms and legs. However, he appears to have experienced some activation from this medication (insomnia, confusion last night), therefore will give abbreviated taper (20 mg prednisone x 3 days, then 10 mg x 3 days, then stop).    Chronic Pain  - continue suboxone (home lortab stopped at [facility] out of concern this may have contributed to the rash)     Atrial fibrillation/Atrial Flutter  -Currently on lovenox.  Plans for resumption of NOAC, likely apixaban, after 30 day \"wash out\" period after stopping primidone.  Will [facility] to [facility].  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    Anemia  - hemoglobin 8.2  - cbc am     SALEEM on CKD  Solitary kidney.  Anemia  - creatinine 2.3 today  - [facility] baseline around 1.5 (1.7 on 3/20/22 upon [facility]).    Left pyelo? Duodenitis? Both suspected on Abd CT  Bibasilar consolidations noted on Abd CT  Nausea and vomiting   - watching off abx       Hypothyroid - TSH 1.04   HTN   DMII  - insulin dependent  - levemir QHS, 10 units  - SSI   - may need to lower insulin as steroids tapered  - a1c 7.9     VINI  - CPAP non compliant     Tremor  - primidone recently discontinued -- appears patient was prescribed sinemet instead, but Mr Cummings's daughter says he has not started this medicine yet. Will defer for now.  - [facility] after rash has cleared to consider further treatment for the tremor     Mood " disorder        DVT prophylaxis:  lovenox  Medical DVT prophylaxis orders are present.       AM-PAC 6 Clicks Score (PT): 8 (04/04/22 1505)    Disposition: I expect the patient to be discharged to rehab Tuesday    CODE STATUS:   Code Status and Medical Interventions:   Ordered at: 03/25/22 1328     Level Of Support Discussed With:    Patient     Code Status (Patient has no pulse and is not breathing):    CPR (Attempt to Resuscitate)     Medical Interventions (Patient has pulse or is breathing):    Full Support       Yinka Desai MD  04/04/22

## 2022-04-04 NOTE — NURSING NOTE
Pt d/o to place, time, situation; RA; VSS.  Course tremors present in LUE and RUE. Non-verbal pain indicators with movement only. Pt makes illogical statements throughout shift. Episodes of anxiety, especially when family leaves. No other problems identified.

## 2022-04-04 NOTE — PROGRESS NOTES
"                                 [facility] Progress Note      LOS: 10 days   Patient Care Team:  Vivek Mercer DO as PCP - General (Family Medicine)    Chief Complaint:    Chief Complaint   Patient presents with   • Dizziness   • Vomiting       Subjective     Interval History: Heart rate approximately 115 bpm    Patient Complaints: No chest pain or dyspnea.  Denies nausea to me this morning.  Says he is starting to feel better      Review of Systems:   A 14 point review of systems was negative except as was stated in the HPI      Objective     Vital Sign Min/Max for last 24 hours  Temp  Min: 97.7 °F (36.5 °C)  Max: 98.9 °F (37.2 °C)   BP  Min: 119/68  Max: 151/63   Pulse  Min: 73  Max: 88   Resp  Min: 20  Max: 22   SpO2  Min: 90 %  Max: 96 %   No data recorded   No data recorded     Flowsheet Rows    Flowsheet Row First Filed Value   Admission Height 175.3 cm (69\") Documented at 03/25/2022 0849   Admission Weight 113 kg (250 lb) Documented at 03/25/2022 0849          Physical Exam:  General Appearance: Alert, appears stated age and cooperative  Lungs: Clear to auscultation  Heart:: Regular rate and rhythm; no Murmurs, Rubs or Gallops  Abdomen: Soft and nontender with adequate bowel sounds.  No organomegaly  Extremities: 1+ pitting edema  Pulses: Pulses palpable and equal bilaterally  Skin: Rash noted  Psych: Normal     Results Review:     I reviewed the patient's new clinical results.  Results from last 7 days   Lab Units 04/04/22 0634 04/03/22 2046 04/03/22  0740   SODIUM mmol/L 138 138 138   POTASSIUM mmol/L 4.8 5.2 4.4   CHLORIDE mmol/L 104 102 103   CO2 mmol/L 22.0 22.0 21.0*   BUN mg/dL 83* 78* 75*   CREATININE mg/dL 2.35* 2.77* 2.62*   GLUCOSE mg/dL 198* 232* 157*   CALCIUM mg/dL 8.4* 8.2* 8.4*     Results from last 7 days   Lab Units 04/04/22 0634 04/03/22 2046 04/03/22  0740   WBC 10*3/mm3 9.90 9.55 12.96*   HEMOGLOBIN g/dL 8.2* 8.3* 8.5*   HEMATOCRIT % 26.7* 24.9* 27.4* "   PLATELETS 10*3/mm3 369 314 377     Lab Results   Lab Value Date/Time    TROPONINT 0.026 04/24/2021 0708             Results from last 7 days   Lab Units 04/03/22 2056   PH, ARTERIAL pH units 7.381   PO2 ART mm Hg 70.0*   PCO2, ARTERIAL mm Hg 37.4   HCO3 ART mmol/L 22.2           Medication Review: yes  Current Facility-Administered Medications   Medication Dose Route Frequency Provider Last Rate Last Admin   • acetaminophen (TYLENOL) tablet 650 mg  650 mg Oral Q4H PRN Raymond Herrera MD   650 mg at 04/02/22 0823    Or   • acetaminophen (TYLENOL) 160 MG/5ML solution 650 mg  650 mg Oral Q4H PRN Raymond Herrera MD   649.6 mg at 03/27/22 0943    Or   • acetaminophen (TYLENOL) suppository 650 mg  650 mg Rectal Q4H PRN Raymond Herrera MD       • amiodarone (PACERONE) tablet 200 mg  200 mg Oral Q8H Raymond Herrera MD   200 mg at 04/04/22 0813    Followed by   • [START ON 4/6/2022] amiodarone (PACERONE) tablet 200 mg  200 mg Oral Q12H Raymond Herrera MD        Followed by   • [START ON 4/20/2022] amiodarone (PACERONE) tablet 200 mg  200 mg Oral Daily Raymond Herrera MD       • atorvastatin (LIPITOR) tablet 40 mg  40 mg Oral Nightly Raymond Herrera MD   40 mg at 04/03/22 2150   • buprenorphine-naloxone (SUBOXONE) 8-2 MG per SL tablet 2 tablet  2 tablet Sublingual Nightly Raymond Herrera MD   2 tablet at 04/04/22 0009   • dextrose (D50W) (25 g/50 mL) IV injection 25 g  25 g Intravenous Q15 Min PRN Raymond Herrera MD       • dextrose (GLUTOSE) oral gel 15 g  15 g Oral Q15 Min PRN Raymond Herrera MD       • DULoxetine (CYMBALTA) DR capsule 90 mg  90 mg Oral Daily Raymond Herrera MD   90 mg at 04/04/22 0812   • enoxaparin (LOVENOX) syringe 110 mg  110 mg Subcutaneous Q12H Yinka Brush IV, PharmD   110 mg at 04/04/22 0554   • glucagon (human recombinant) (GLUCAGEN DIAGNOSTIC) injection 1 mg  1 mg Intramuscular Q15 Min Raymond Wallis MD       • insulin detemir (LEVEMIR) injection 12 Units  12 Units  Subcutaneous Nightly Yinka Desai MD   12 Units at 04/03/22 2151   • insulin lispro (humaLOG) injection 0-9 Units  0-9 Units Subcutaneous TID AC Raymond Herrera MD   2 Units at 04/04/22 0813   • levothyroxine (SYNTHROID, LEVOTHROID) tablet 150 mcg  150 mcg Oral Daily Raymond Herrera MD   150 mcg at 04/04/22 0812   • melatonin tablet 5 mg  5 mg Oral Nightly PRN Raymond Herrera MD   5 mg at 04/01/22 2018   • methohexital (BREVITAL) injection  - ADS Override Pull            • metoprolol tartrate (LOPRESSOR) tablet 50 mg  50 mg Oral Q8H Raymond Herrera MD   50 mg at 04/04/22 0554   • midazolam (VERSED) 2 MG/2ML injection  - ADS Override Pull            • mirtazapine (REMERON) tablet 7.5 mg  7.5 mg Oral Nightly Raymond Herrera MD   7.5 mg at 04/03/22 2150   • ondansetron (ZOFRAN) tablet 4 mg  4 mg Oral Q6H PRN Raymond Herrera MD        Or   • ondansetron (ZOFRAN) injection 4 mg  4 mg Intravenous Q6H PRN Raymond Herrera MD   4 mg at 03/30/22 1403   • palliative care oral rinse   Mouth/Throat PRN Yinka Desai MD   Given at 04/03/22 1516   • pantoprazole (PROTONIX) EC tablet 40 mg  40 mg Oral QAM Raymond Herrera MD   40 mg at 04/04/22 0554   • polyethylene glycol (MIRALAX) packet 17 g  17 g Oral Daily Raymond Herrera MD   17 g at 04/03/22 0857   • predniSONE (DELTASONE) tablet 40 mg  40 mg Oral Daily With Breakfast Yinka Desai MD   40 mg at 04/04/22 0816   • sennosides-docusate (PERICOLACE) 8.6-50 MG per tablet 2 tablet  2 tablet Oral BID Yinka Desai MD   2 tablet at 04/04/22 0811   • Sodium Chloride (PF) 0.9 % 10 mL  10 mL Intravenous PRN Raymond Herrera MD       • sodium chloride 0.9 % flush 10 mL  10 mL Intravenous Q12H Raymond Herrera MD   10 mL at 04/04/22 0814   • sodium chloride 0.9 % flush 10 mL  10 mL Intravenous PRN Raymond Herrera MD   10 mL at 04/02/22 0830   • vitamin B-12 (CYANOCOBALAMIN) tablet 50 mcg  50 mcg Oral Daily Raymond Herrera MD   50 mcg at 04/04/22 0812         Benign  essential tremor    Hypothyroidism (acquired)    Paroxysmal A-fib (on eliquis & metoprolol)    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  Would plan for only short-term and amiodarone due to propensity for hypothyroidism with this drug in the past  Anticoagulation with Lovenox continues      Tenzin Johnson MD   04/04/22  09:37 EDT

## 2022-04-04 NOTE — THERAPY TREATMENT NOTE
"Patient Name: Yinka Cummings  : 1945    MRN: [id number]                              Today's Date: 2022       Admit Date: 3/25/2022    Visit Dx:     ICD-10-CM ICD-9-CM   1. Acute renal failure, unspecified acute renal failure type (HCC)  N17.9 584.9   2. Dehydration  E86.0 276.51   3. Cellulitis of lower extremity, unspecified laterality  L03.119 682.6   4. Vasculitis (HCC)  I77.6 447.6   5. Congestive heart failure, unspecified HF chronicity, unspecified heart failure type (HCC)  I50.9 428.0   6. 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)   • Chronic back pain (on chronic prescription lortab)   • HTN (hypertension)   • Solitary kidney (s/p nephrectomy for \"mass\")   • History of prostate cancer   • Closed fracture of phalanx of right hand   • Dental caries   • Acute renal failure, unspecified acute renal failure type (HCC)   • Rash   • Nausea and vomiting     Past Medical History:   Diagnosis Date   • Anxiety    • Arthritis    • Chronic kidney disease    • Diabetes mellitus (HCC)    • Disease of thyroid gland    • Diverticulosis    • Essential tremor    • HL (hearing loss)    • Hypertension    • Obesity    • Prostate cancer (HCC)    • Renal cell cancer (HCC)    • Skin cancer    • Vasculitis of skin      Past Surgical History:   Procedure Laterality Date   • CHOLECYSTECTOMY     • COLONOSCOPY      Polyps in the past but not on the most recent scope   • NEPHRECTOMY Right    • PROSTATE SURGERY      Radical prostatectomy   • SKIN CANCER EXCISION      Large incision left neck, multiple other cancers removed      General Information     Row Name 22 1438          OT Time and Intention    Document Type therapy note (daily note)  -     " Mode of Treatment occupational therapy  -MR     Row Name 04/04/22 1438          General Information    Existing Precautions/Restrictions fall  Blisters on B feet, L heel decubitis, edematous hands, sensitive to touch, [place]  -MR     Barriers to Rehab medically complex;previous functional deficit;hearing deficit  -MR     Row Name 04/04/22 1438          Cognition    Orientation Status (Cognition) oriented to;person  -MR     Row Name 04/04/22 1438          Safety Issues, Functional Mobility    Safety Issues Affecting Function (Mobility) safety precautions follow-through/compliance;awareness of need for assistance;insight into deficits/self-awareness;judgment;safety precaution awareness;problem-solving  -MR     Impairments Affecting Function (Mobility) balance;coordination;endurance/activity tolerance;pain;strength  -MR           User Key  (r) = Recorded By, (t) = Taken By, (c) = Cosigned By    Initials Name Provider Type     Alla Mccauley, OT Occupational Therapist                 Mobility/ADL's     Row Name 04/04/22 1439          Bed Mobility    Bed Mobility scooting/bridging  -MR     Scooting/Bridging [place] (Bed Mobility) contact guard;verbal cues;nonverbal cues (demo/gesture)  -MR     Comment, (Bed Mobility) Pt received UIC and left UIC. CGA for advancing forward to edge of chair.  -MR     Row Name 04/04/22 1439          Transfers    Comment, (Transfers) Pt fearful of falling, becoming anxious when asked to advance hips forward to place BLE on the ground.  -MR     Row Name 04/04/22 1439          Activities of Daily Living    BADL Assessment/Intervention lower body dressing  -MR     Row Name 04/04/22 1439          Lower Body Dressing Assessment/Training    [place] Level (Lower Body Dressing) don;doff;socks;dependent (less than 25% patient effort)  -MR     Position (Lower Body Dressing) supported sitting  -MR           User Key  (r) = Recorded By, (t) = Taken By, (c) = Cosigned By    Initials Name  Provider Type    Alla Garces, OT Occupational Therapist               Obj/Interventions     Row Name 04/04/22 1440          Motor Skills    Therapeutic Exercise shoulder;elbow/forearm  Seated BUE ther ex x 10 reps elbow flex/extension, shoulder flexion, alternating cross body punches. Tricep push ups performed x2 to advance to edge of recliner  -MR     Row Name 04/04/22 1440          Balance    Balance Assessment sitting static balance;sitting dynamic balance  -MR     Static Sitting Balance contact guard  -MR     Dynamic Sitting Balance moderate assist  -MR     Position, Sitting Balance supported;sitting in chair  -MR     Balance Interventions sitting  -MR     Comment, Balance CGA for sitting balance, as pt advanced futher to edge of seat requiring Dion for safety.  -MR           User Key  (r) = Recorded By, (t) = Taken By, (c) = Cosigned By    Initials Name Provider Type    Alla Garces, OT Occupational Therapist               Goals/Plan    No documentation.                Clinical Impression     Row Name 04/04/22 144          Pain Assessment    Additional Documentation Pain Scale: FACES Pre/Post-Treatment (Group)  -MR     Row Name 04/04/22 2514          Pain Scale: FACES Pre/Post-Treatment    Pain: FACES Scale, Pretreatment 0-->no hurt  -MR     Posttreatment Pain Rating 4-->hurts little more  -MR     Pain Location - Side/Orientation Bilateral  -MR     Pain Location lower  -MR     Pain Location - extremity  -MR     Pre/Posttreatment Pain Comment Pt vocalized when BLE were lifted to magnus socks.  -MR     Row Name 04/04/22 3606          Plan of Care Review    Plan of Care Reviewed With patient;spouse;daughter  -MR     Progress no change  -MR     Outcome Evaluation Pt presenting confused throughout session, difficulty following commands, extremely anxious and fearful of falling when advancing to edge of recliner. Pt demo good effort w/ BUE ther ex, DEP for LB dressing, completing tricep chair pushups to  advance towards EOC. Pt requiring Dion for balance once at EOC d/t tremors and being fearful. Continue to progress per current POC.  -MR     Row Name 04/04/22 1444          Therapy Plan Review/Discharge Plan (OT)    Anticipated Discharge Disposition (OT) skilled nursing facility  -MR     Row Name 04/04/22 1444          Vital Signs    Pre Systolic BP Rehab 102  -MR     Pre Treatment Diastolic BP 74  -MR     Pretreatment Heart Rate (beats/min) 86  -MR     Posttreatment Heart Rate (beats/min) 100  -MR     O2 Delivery Pre Treatment room air  -MR     O2 Delivery Intra Treatment room air  -MR     O2 Delivery Post Treatment room air  -MR     Pre Patient Position Sitting  -MR     Intra Patient Position Sitting  -MR     Post Patient Position Sitting  -MR     Row Name 04/04/22 1444          Positioning and Restraints    Pre-Treatment Position sitting in chair/recliner  -MR     Post Treatment Position chair  -MR     In Chair notified nsg;reclined;with family/caregiver;with PT  -MR           User Key  (r) = Recorded By, (t) = Taken By, (c) = Cosigned By    Initials Name Provider Type    Alla Garces, OT Occupational Therapist               Outcome Measures     Row Name 04/04/22 1447          How much help from another is currently needed...    Putting on and taking off regular lower body clothing? 1  -MR     Bathing (including washing, rinsing, and drying) 1  -MR     Toileting (which includes using toilet bed pan or urinal) 1  -MR     Putting on and taking off regular upper body clothing 2  -MR     Taking care of personal grooming (such as brushing teeth) 2  -MR     Eating meals 2  -MR     AM-PAC 6 Clicks Score (OT) 9  -MR     Row Name 04/04/22 0800          How much help from another person do you currently need...    Turning from your back to your side while in flat bed without using bedrails? 2  -LM     Moving from lying on back to sitting on the side of a flat bed without bedrails? 2  -LM     Moving to and from a bed  to a chair (including a wheelchair)? 1  -LM     Standing up from a chair using your arms (e.g., wheelchair, bedside chair)? 1  -LM     Climbing 3-5 steps with a railing? 1  -LM     To walk in hospital room? 1  -LM     AM-PAC 6 Clicks Score (PT) 8  -LM     Row Name 04/04/22 1447          Functional Assessment    Outcome Measure Options AM-PAC 6 Clicks Daily Activity (OT)  -MR           User Key  (r) = Recorded By, (t) = Taken By, (c) = Cosigned By    Initials Name Provider Type    Luz Abraham RN Registered Nurse    Alla Garces, OT Occupational Therapist                Occupational Therapy Education                 Title: PT OT SLP Therapies (In Progress)     Topic: Occupational Therapy (In Progress)     Point: ADL training (In Progress)     Description:   Instruct learner(s) on proper safety adaptation and remediation techniques during self care or transfers.   Instruct in proper use of assistive devices.              Learning Progress Summary           Patient Acceptance, E, NR by MR at 4/4/2022 1447    Acceptance, E,D, NR by JAYDEN at 3/30/2022 1147    Acceptance, E,D, VU,NR by TA at 3/27/2022 1502   Family Acceptance, E, NR by MR at 4/4/2022 1447    Acceptance, E,D, NR by JAYDEN at 3/30/2022 1147                   Point: Home exercise program (In Progress)     Description:   Instruct learner(s) on appropriate technique for monitoring, assisting and/or progressing therapeutic exercises/activities.              Learning Progress Summary           Patient Acceptance, E, NR by MR at 4/4/2022 1447    Acceptance, E,D, NR by JAYDEN at 3/30/2022 1147    Acceptance, E,D, VU,NR by TA at 3/27/2022 1502   Family Acceptance, E, NR by MR at 4/4/2022 1447    Acceptance, E,D, NR by JAYDEN at 3/30/2022 1147                   Point: Precautions (In Progress)     Description:   Instruct learner(s) on prescribed precautions during self-care and functional transfers.              Learning Progress Summary           Patient Acceptance,  E, NR by MR at 4/4/2022 1447    Acceptance, E,D, NR by JY at 3/30/2022 1147    Acceptance, E,D, VU,NR by TA at 3/27/2022 1502   Family Acceptance, E, NR by MR at 4/4/2022 1447    Acceptance, E,D, NR by JY at 3/30/2022 1147                   Point: Body mechanics (In Progress)     Description:   Instruct learner(s) on proper positioning and spine alignment during self-care, functional mobility activities and/or exercises.              Learning Progress Summary           Patient Acceptance, E, NR by MR at 4/4/2022 1447    Acceptance, E,D, NR by JY at 3/30/2022 1147    Acceptance, E,D, VU,NR by TA at 3/27/2022 1502   Family Acceptance, E, NR by MR at 4/4/2022 1447    Acceptance, E,D, NR by JY at 3/30/2022 1147                               User Key     Initials Effective Dates Name Provider Type Discipline     06/16/21 -  Neno Russ, OT Occupational Therapist OT     06/16/21 -  Annalisa Abbott OT Occupational Therapist OT     10/06/21 -  Alla Mccauley OT Occupational Therapist OT              OT Recommendation and Plan     Plan of Care Review  Plan of Care Reviewed With: patient, spouse, daughter  Progress: no change  Outcome Evaluation: Pt presenting confused throughout session, difficulty following commands, extremely anxious and fearful of falling when advancing to edge of recliner. Pt demo good effort w/ BUE ther ex, DEP for LB dressing, completing tricep chair pushups to advance towards EOC. Pt requiring Dion for balance once at EOC d/t tremors and being fearful. Continue to progress per current POC.     Time Calculation:    Time Calculation- OT     Row Name 04/04/22 1448             Time Calculation- OT    OT Start Time 1348  -MR      OT Received On 04/04/22  -      OT Goal Re-Cert Due Date 04/06/22  -MR              Timed Charges    16062 - OT Therapeutic Exercise Minutes 10  -MR      78969 - OT Therapeutic Activity Minutes 15  -MR      60898 - OT Self Care/Mgmt Minutes 7  -MR               Total Minutes    Timed Charges Total Minutes 32  -MR       Total Minutes 32  -MR            User Key  (r) = Recorded By, (t) = Taken By, (c) = Cosigned By    Initials Name Provider Type    Catalino Garces OT Occupational Therapist              Therapy Charges for Today     Code Description Service Date Service Provider Modifiers Qty    21472673550  OT THER PROC EA 15 MIN 4/4/2022 Catalino Mccauley OT GO 1    [id number]  OT THERAPEUTIC ACT EA 15 MIN 4/4/2022 Catalino Mccauley OT GO 1               CATALINO MCCAULEY OT  4/4/2022

## 2022-04-04 NOTE — PLAN OF CARE
Goal Outcome Evaluation:  Plan of Care Reviewed With: patient, spouse, daughter        Progress: no change  Outcome Evaluation: Pt presenting confused throughout session, difficulty following commands, extremely anxious and fearful of falling when advancing to edge of recliner. Pt demo good effort w/ BUE ther ex, DEP for LB dressing, completing tricep chair pushups to advance towards EOC. Pt requiring Dion for balance once at EOC d/t tremors and being fearful. Continue to progress per current POC.

## 2022-04-04 NOTE — NURSING NOTE
New WOC consult for wounds on bottom of feet. I personally assessed patient and BLE vasculitic rash on 3/30. Per nursing, rash is improved, there was a large fluid filled blister back of left heel which may cause patient pain but should not prevent walking. Will plan to see as previously scheduled in a few days.

## 2022-04-04 NOTE — PROGRESS NOTES
Clinical Nutrition   Reason For Visit: Follow-up protocol, Physician consult    Patient Name: Yinka Cummings  YOB: 1945  MRN: [id number]  Date of Encounter: 04/04/22 14:52 EDT  Admission date: 3/25/2022    Diet education for caffeine free diet provided to family/caretakers, will f/u for verbal education with pt as mentation improves.    Per RN/family, he has continued with minimal PO intakes r/t AMS. He is extremely anxious especially when family is not here which may be affecting intakes.     Please continue efforts encouraging/cueing at meal times, especially when family not here. He has had limited acceptance supplements, will trial magic cup at L/D and ensure clear at breakfast.    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   PSxH: He  has a past surgical history that includes Prostate surgery; Nephrectomy (Right); Skin cancer excision; Cholecystectomy; and Colonoscopy.        Reported/Observed/Food/Nutrition Related History     4/4) Pt has been struggling with nutrition t/o admission. He remains extremely confused, asks RD where he is and where my car is. Actually asks about food and if he can get some juice, seems to think he is still NPO despite being on diet for 4 days.  He asks how long his family can stay today, clearly very anxious about them leaving during non vistor hours. RD efforts to encourage/educate on importance nutrition futile. He has tried breeze and boost and does not like either. Will trial magic cups and ensure clear. Per family pt has never been a big eater, eats 1 meal/day and snacks t/o the day. He did have N/V at beginning of admission but this has resolved. His tremor seems to have worsened at this RD visit. Dr. Weaver consulted RD to give diet education on discontinuing caffeine in diet. Education provided to family at bedside, will f/u with pt upon improvement mentation. Of concern pt with several wounds.    3/30) Pt now 5 days minimal nutrition on clear liquid diet. RD discussed with family at bedside who are frustrated stating he is not going to eat anything clear liquid. They request that he is advanced to full liquids so that he can have ensure and other options. He is not taking in broth which is the only source of protein on clear liquid diet. He is open to trying Breeze, however will need to monitor BG closely as already high.    Anthropometrics   Height: 68.9 kg  Weight: 262 lb  BMI: 38.86  BMI classification: Obese Class II: 35-39.9kg/m2   IBW: 155 lb    UBW: 250 lb per family  Weight change: ?gain 12 lb, suspect fluid retention    Nutrition Focused Physical Exam     Unable to perform exam due to: Patient agitation    Labs reviewed   Labs reviewed: Yes    Results from last 7 days   Lab Units 04/04/22  0634 04/03/22 2046 04/03/22  0740   SODIUM mmol/L 138 138 138   POTASSIUM mmol/L 4.8 5.2 4.4   CHLORIDE mmol/L 104 102 103   CO2 mmol/L 22.0 22.0 21.0*   BUN mg/dL 83* 78* 75*   CREATININE mg/dL 2.35* 2.77* 2.62*   GLUCOSE mg/dL 198* 232* 157*   CALCIUM mg/dL 8.4* 8.2* 8.4*     Results from last 7 days   Lab Units 04/04/22  0634 04/03/22 2046 04/03/22  0740 04/02/22  1038 04/01/22  1227 03/31/22  0805   WBC 10*3/mm3 9.90 9.55 12.96*   < >  --  15.35*  "  ALBUMIN g/dL  --  2.70*  --   --  2.40* 2.40*   CRP mg/dL  --   --   --   --   --  6.84*    < > = values in this interval not displayed.     Results from last 7 days   Lab Units 04/04/22  1114 04/04/22  0802 04/03/22 2057 04/03/22 2027 04/03/22  1645 04/03/22  1132   GLUCOSE mg/dL 173* 177* 243* 255* 257* 141*     Lab Results   Lab Value Date/Time    HGBA1C 7.90 (H) 03/26/2022 0708    HGBA1C 8.10 (H) 04/24/2021 0708     Medications reviewed   Medications reviewed: Yes  Scheduled: insulin, protonix, bowel regimen, steroids, B12  GTT:  PRN: [email or web address]    Current Nutrition Prescription   PO: Diet Soft Texture; Chopped; Thin; GI Soft, Consistent Carbohydrate  Oral Nutrition Supplement: Orders Placed This Encounter      Dietary Nutrition Supplements Magic Cup      Dietary Nutrition Supplements Other (see comment); Ensure Clear       Average PO intake: 15% x 6 meals documented while on clears, no meals documented since diet advanced 4 days ago.    Nutrition Diagnosis     Problem Inadequate oral intake   Etiology AMS   Signs/Symptoms Family/RN report \"not eating anything\"- no PO intake documentation to confirm       Problem Food and nutrition knowledge deficit   Etiology Caffeine intake   Signs/Symptoms Pt recommended no caffeine diet per Neurology     Goal:   General: Nutrition to support treatment  PO: Increase intake     Intervention   Intervention: Follow treatment progress, Care plan reviewed, Advise alternate selection, Advised available snacks, Interview for preferences, Encourage intake, Supplement provided, Education provided to family on caffeine free diet and encouraging po intakes    Trial magic cups with L/D and Ensure clear at breakfast    Monitoring/Evaluation:   Monitoring/Evaluation: Per protocol, I&O, PO intake, Supplement intake, Pertinent labs, Weight, Skin status, GI status, Symptoms, POC/GOC, Swallow function, Hemodynamic stability    Rhea Cobb RD  Time Spent: 30  "

## 2022-04-04 NOTE — CONSULTS
Neurology    Referring provider:   No referring provider defined for this encounter.    Reason for Consultation: Altered mental state    Chief complaint: Tremor    History of present illness: Pleasant 77-year-old man seen for Dr. Yinka Desai for evaluation of confusion and multiple systemic symptoms.    He is known to me from previous admission where he had sleep deprivation syndrome and some mild cognitive impairment.    His wife tells me that he has had problems with his cognition only in hospital and that at home he has self manages his medication.      He is suffering with a vasculitic rash which is being blamed on medications and since he is on polypharmacy it is hard to know which medications which.    Medications of interest are primidone and Sinemet.    Primidone has been stopped because of the rash but also because of interactions with the NOAC.    Consequently his tremor is dramatically worse giving him difficulty feeding himself.    There is some concern about wounds on the bottom of his feet preventing him from walking.  They have not been evaluated by wound care at this point.    By the confusion he recognizes me and calls me by name when I come in the room.    No history of focal numbness or weakness.    Wife is not clear whether he took Sinemet or not.    Been having nausea and vomiting which is improved since being in the hospital.    Sinemet 10/100 was prescribed 3 times daily as a substitute for primidone.        Review of Systems: Patient apparently has gastroparesis and peripheral neuropathy.    He is able to give only limited counting of his history.        Home meds:   Medications Prior to Admission   Medication Sig Dispense Refill Last Dose   • acetaminophen (TYLENOL) 325 MG tablet Take 1 tablet by mouth 4 (Four) Times a Day.   Past Week at Unknown time   • ascorbic acid (VITAMIN C) 500 MG tablet Take 500 mg by mouth Daily.   3/24/2022 at Unknown time   • atorvastatin (LIPITOR) 40 MG tablet  Take 40 mg by mouth Every Night.   3/24/2022 at Unknown time   • buprenorphine-naloxone (SUBOXONE) 8-2 MG per SL tablet Place 2 tablets under the tongue every night at bedtime.   Past Week at Unknown time   • carbidopa-levodopa (SINEMET)  MG per tablet Take 0.5 tablets by mouth 3 (Three) Times a Day.   3/24/2022 at Unknown time   • cephalexin (KEFLEX) 500 MG capsule Take 500 mg by mouth 2 (Two) Times a Day. For 5 days, started on 03-22-22   3/24/2022 at Unknown time   • cholecalciferol (VITAMIN D3) 25 MCG (1000 UT) tablet Take 2,000 Units by mouth Daily.   3/24/2022 at Unknown time   • cyanocobalamin (VITAMIN B-12N) 50 MCG tablet Take 50 mcg by mouth Daily.   3/24/2022 at Unknown time   • Diclofenac Sodium (VOLTAREN) 1 % gel gel Apply 4 g topically to the appropriate area as directed 4 (Four) Times a Day As Needed.   Past Week at Unknown time   • DULoxetine (CYMBALTA) 30 MG capsule Take 90 mg by mouth Daily.   3/24/2022 at Unknown time   • enoxaparin (LOVENOX) 120 MG/0.8ML solution syringe Inject 120 mg under the skin into the appropriate area as directed Every 12 (Twelve) Hours.   3/24/2022 at Unknown time   • erythromycin (ROMYCIN) 5 MG/GM ophthalmic ointment Administer 1 application into the left eye 4 (Four) Times a Day. Left lower eye lid, for 4 days, started on 03-22-22   3/24/2022 at Unknown time   • glipizide (GLUCOTROL) 10 MG tablet Take 10 mg by mouth 2 (Two) Times a Day Before Meals.   3/24/2022 at Unknown time   • insulin NPH (humuLIN N,novoLIN N) 100 UNIT/ML injection Inject 60 Units under the skin into the appropriate area as directed every night at bedtime.   3/24/2022 at Unknown time   • levothyroxine (SYNTHROID, LEVOTHROID) 150 MCG tablet Take 1 tablet by mouth Daily. (Patient taking differently: Take 175 mcg by mouth Daily.)   3/24/2022 at Unknown time   • lidocaine (LIDODERM) 5 % Place 2 patches on the skin as directed by provider Daily. Remove & Discard patch within 12 hours or as directed  "by MD   Past Week at Unknown time   • metoprolol tartrate (LOPRESSOR) 50 MG tablet Take 1 tablet by mouth Every 12 (Twelve) Hours. (Patient taking differently: Take 100 mg by mouth Every 12 (Twelve) Hours.)   3/24/2022 at Unknown time   • mirtazapine (REMERON) 15 MG tablet Take 7.5 mg by mouth Every Night.   3/24/2022 at Unknown time   • omeprazole (priLOSEC) 20 MG capsule Take 20 mg by mouth Daily.   3/24/2022 at Unknown time   • polyethylene glycol (MIRALAX) 17 g packet Take 17 g by mouth Daily.   3/24/2022 at Unknown time       History  Past Medical History:   Diagnosis Date   • Anxiety    • Arthritis    • Chronic kidney disease    • Diabetes mellitus (HCC)    • Disease of thyroid gland    • Diverticulosis    • Essential tremor    • HL (hearing loss)    • Hypertension    • Obesity    • Prostate cancer (HCC)    • Renal cell cancer (HCC)    • Skin cancer    • Vasculitis of skin    ,   Past Surgical History:   Procedure Laterality Date   • CHOLECYSTECTOMY     • COLONOSCOPY      Polyps in the past but not on the most recent scope   • NEPHRECTOMY Right    • PROSTATE SURGERY      Radical prostatectomy   • SKIN CANCER EXCISION      Large incision left neck, multiple other cancers removed   ,   Family History   Problem Relation Age of Onset   • Cancer Mother    • Heart attack Father    • Kidney failure Sister    • Coronary artery disease Sister    ,   Social History     Tobacco Use   • Smoking status: Never Smoker   • Smokeless tobacco: Never Used   Vaping Use   • Vaping Use: Never used   Substance Use Topics   • Alcohol use: Not Currently   • Drug use: Not Currently    and Allergies:  Contrast dye, Doxycycline, Chlorthalidone, Morphine, Neurontin [gabapentin], and Phenergan [promethazine],    Vital Signs   Blood pressure 102/74, pulse 79, temperature 98.2 °F (36.8 °C), temperature source Axillary, resp. rate 19, height 175 cm (68.9\"), weight 119 kg (262 lb 5.6 oz), SpO2 91 %.  Body mass index is 38.86 kg/m².    Physical " Exam:   General: Pleasant white male in no distress              Head: No injuries              Neck: No bruit              Resp: Normal breath sound              Cor: Regular rhythm              Extremities: Warm and dry              Skin: Purpuric nonblanching rash particularly on his legs and arms              Neuro: Mentally alert and oriented to person place and time.    Speech is articulate with no word finding problems.    Coordination shows a course 3 to 5/s action tremor which is present at rest and is symmetrical.    Cranial nerves show benign fundi equal pupils full eye movements.    Facial movement sensation are normal.    Palate elevates normally tongue protrudes normally.    Reflexes are 1+ and equal bilaterally in the upper extremities.  There are plus minus in the lower extremities.    Has decreased vibratory sense in his feet.        Results Review: Hematocrit is at 25.4 with hemoglobin of 8.3.    Sugars were all elevated.    Urine shows 3-6 red blood cells and 6-12 white blood cells with no bacteria seen.    Foot wound culture shows light growth of quarter corynebacterium species and rare Staph aureus.    Labs:  Lab Results (last 72 hours)     Procedure Component Value Units Date/Time    POC Glucose Once [[id number]]  (Abnormal) Collected: 04/04/22 1114    Specimen: Blood Updated: 04/04/22 1115     Glucose 173 mg/dL      Comment: Meter: [id number] : [id number] Moraima Avila       CBC (No Diff) [[id number]]  (Abnormal) Collected: 04/04/22 0634    Specimen: Blood Updated: 04/04/22 0816     WBC 9.90 10*3/mm3      RBC 2.80 10*6/mm3      Hemoglobin 8.2 g/dL      Hematocrit 26.7 %      MCV 95.4 fL      MCH 29.3 pg      MCHC 30.7 g/dL      RDW 15.3 %      RDW-SD 52.8 fl      MPV 9.4 fL      Platelets 369 10*3/mm3     POC Glucose Once [[id number]]  (Abnormal) Collected: 04/04/22 0802    Specimen: Blood Updated: 04/04/22 0803     Glucose 177 mg/dL      Comment: Meter: [id number] : [id number]  Moraima Avila       Basic Metabolic Panel [[id number]]  (Abnormal) Collected: 04/04/22 0634    Specimen: Blood Updated: 04/04/22 0733     Glucose 198 mg/dL      BUN 83 mg/dL      Creatinine 2.35 mg/dL      Sodium 138 mmol/L      Potassium 4.8 mmol/L      Chloride 104 mmol/L      CO2 22.0 mmol/L      Calcium 8.4 mg/dL      BUN/Creatinine Ratio 35.3     Anion Gap 12.0 mmol/L      eGFR 28.2 mL/min/1.73      Comment: National Kidney Foundation and American Society of Nephrology (ASN) Task Force recommended calculation based on the Chronic Kidney Disease Epidemiology Collaboration (CKD-EPI) equation refit without adjustment for race.       Narrative:      GFR Normal >60  Chronic Kidney Disease <60  Kidney Failure <15      Urinalysis, Microscopic Only - Urine, Clean Catch [[id number]]  (Abnormal) Collected: 04/04/22 0045    Specimen: Urine, Clean Catch Updated: 04/04/22 0111     RBC, UA 3-6 /HPF      WBC, UA 6-12 /HPF      Bacteria, UA None Seen /HPF      Squamous Epithelial Cells, UA 0-2 /HPF      Hyaline Casts, UA 0-6 /LPF      Methodology Automated Microscopy    Urinalysis With Microscopic If Indicated (No Culture) - Urine, Clean Catch [[id number]]  (Abnormal) Collected: 04/04/22 0045    Specimen: Urine, Clean Catch Updated: 04/04/22 0111     Color, UA Yellow     Appearance, UA Clear     pH, UA 6.0     Specific Gravity, UA 1.015     Glucose, UA Negative     Ketones, UA Negative     Bilirubin, UA Negative     Blood, UA Moderate (2+)     Protein, UA Negative     Leuk Esterase, UA Trace     Nitrite, UA Negative     Urobilinogen, UA 0.2 E.U./dL    Comprehensive Metabolic Panel [[id number]]  (Abnormal) Collected: 04/03/22 2046    Specimen: Blood Updated: 04/03/22 2147     Glucose 232 mg/dL      BUN 78 mg/dL      Creatinine 2.77 mg/dL      Sodium 138 mmol/L      Potassium 5.2 mmol/L      Comment: Slight hemolysis detected by analyzer. Results may be affected.        Chloride 102 mmol/L      CO2 22.0 mmol/L      Calcium 8.2  mg/dL      Total Protein 5.6 g/dL      Albumin 2.70 g/dL      ALT (SGPT) 24 U/L      AST (SGOT) 37 U/L      Alkaline Phosphatase 76 U/L      Total Bilirubin 0.4 mg/dL      Globulin 2.9 gm/dL      Comment: Calculated Result        A/G Ratio 0.9 g/dL      BUN/Creatinine Ratio 28.2     Anion Gap 14.0 mmol/L      eGFR 23.1 mL/min/1.73      Comment: National Kidney Foundation and American Society of Nephrology (ASN) Task Force recommended calculation based on the Chronic Kidney Disease Epidemiology Collaboration (CKD-EPI) equation refit without adjustment for race.       Narrative:      GFR Normal >60  Chronic Kidney Disease <60  Kidney Failure <15      Lactic Acid, Plasma [[id number]]  (Normal) Collected: 04/03/22 2046    Specimen: Blood Updated: 04/03/22 2139     Lactate 1.7 mmol/L      Comment: Falsely depressed results may occur on samples drawn from patients receiving N-Acetylcysteine (NAC) or Metamizole.       CBC (No Diff) [[id number]]  (Abnormal) Collected: 04/03/22 2046    Specimen: Blood Updated: 04/03/22 2124     WBC 9.55 10*3/mm3      RBC 2.76 10*6/mm3      Hemoglobin 8.3 g/dL      Hematocrit 24.9 %      MCV 90.2 fL      MCH 30.1 pg      MCHC 33.3 g/dL      RDW 15.4 %      RDW-SD 50.5 fl      MPV 9.3 fL      Platelets 314 10*3/mm3     POC Glucose Once [[id number]]  (Abnormal) Collected: 04/03/22 2057    Specimen: Blood Updated: 04/03/22 2059     Glucose 243 mg/dL      Comment: Meter: [id number] : [id number] Yanna Jefferson       Blood Gas, Arterial With Co-Ox [[id number]]  (Abnormal) Collected: 04/03/22 2056    Specimen: Arterial Blood Updated: 04/03/22 2057     Site Right Radial     Thompson's Test N/A     pH, Arterial 7.381 pH units      pCO2, Arterial 37.4 mm Hg      pO2, Arterial 70.0 mm Hg      Comment: 84 Value below reference range        HCO3, Arterial 22.2 mmol/L      Base Excess, Arterial -2.7 mmol/L      Hemoglobin, Blood Gas 8.3 g/dL      Comment: 84 Value below reference range        Hematocrit, Blood  Gas 25.4 %      Oxyhemoglobin 93.1 %      Comment: 84 Value below reference range        Methemoglobin --     Comment: 94 Value below reportable range < _0.1        Carboxyhemoglobin 1.3 %      CO2 Content 23.3 mmol/L      Temperature 37.0 C      Barometric Pressure for Blood Gas --     Comment: N/A        Modality Room Air     FIO2 21 %      Rate 0 Breaths/minute      PIP 0 cmH2O      Comment: Meter: [id number]     :  [id number]        IPAP 0     EPAP 0     Note --     pH, Temp Corrected 7.381 pH Units      pCO2, Temperature Corrected 37.4 mm Hg      pO2, Temperature Corrected 70.0 mm Hg     POC Glucose Once [[id number]]  (Abnormal) Collected: 04/03/22 2027    Specimen: Blood Updated: 04/03/22 2029     Glucose 255 mg/dL      Comment: Meter: [id number] : [id number] Kirsten Moreno       POC Glucose Once [[id number]]  (Abnormal) Collected: 04/03/22 1645    Specimen: Blood Updated: 04/03/22 1646     Glucose 257 mg/dL      Comment: Meter: [id number] : [id number] lEroy Rodriguez       POC Glucose Once [[id number]]  (Abnormal) Collected: 04/03/22 1132    Specimen: Blood Updated: 04/03/22 1135     Glucose 141 mg/dL      Comment: Meter: [id number] : [id number] Erlinda Julia       Basic Metabolic Panel [[id number]]  (Abnormal) Collected: 04/03/22 0740    Specimen: Blood Updated: 04/03/22 0902     Glucose 157 mg/dL      BUN 75 mg/dL      Creatinine 2.62 mg/dL      Sodium 138 mmol/L      Potassium 4.4 mmol/L      Comment: Slight hemolysis detected by analyzer. Results may be affected.        Chloride 103 mmol/L      CO2 21.0 mmol/L      Calcium 8.4 mg/dL      BUN/Creatinine Ratio 28.6     Anion Gap 14.0 mmol/L      eGFR 24.7 mL/min/1.73      Comment: National Kidney Foundation and American Society of Nephrology (ASN) Task Force recommended calculation based on the Chronic Kidney Disease Epidemiology Collaboration (CKD-EPI) equation refit without adjustment for race.       Narrative:      GFR Normal  >60  Chronic Kidney Disease <60  Kidney Failure <15      CBC & Differential [[id number]]  (Abnormal) Collected: 04/03/22 0740    Specimen: Blood Updated: 04/03/22 0842    Narrative:      The following orders were created for panel order CBC & Differential.  Procedure                               Abnormality         Status                     ---------                               -----------         ------                     CBC Auto Differential[[id number]]        Abnormal            Final result                 Please view results for these tests on the individual orders.    CBC Auto Differential [[id number]]  (Abnormal) Collected: 04/03/22 0740    Specimen: Blood Updated: 04/03/22 0842     WBC 12.96 10*3/mm3      RBC 2.80 10*6/mm3      Hemoglobin 8.5 g/dL      Hematocrit 27.4 %      MCV 97.9 fL      MCH 30.4 pg      MCHC 31.0 g/dL      RDW 15.5 %      RDW-SD 54.6 fl      MPV 9.3 fL      Platelets 377 10*3/mm3      Neutrophil % 75.1 %      Lymphocyte % 13.0 %      Monocyte % 10.3 %      Eosinophil % 0.2 %      Basophil % 0.2 %      Immature Grans % 1.2 %      Neutrophils, Absolute 9.75 10*3/mm3      Lymphocytes, Absolute 1.69 10*3/mm3      Monocytes, Absolute 1.33 10*3/mm3      Eosinophils, Absolute 0.02 10*3/mm3      Basophils, Absolute 0.02 10*3/mm3      Immature Grans, Absolute 0.15 10*3/mm3      nRBC 0.0 /100 WBC     POC Glucose Once [[id number]]  (Abnormal) Collected: 04/03/22 0735    Specimen: Blood Updated: 04/03/22 0736     Glucose 173 mg/dL      Comment: Meter: [id number] : [id number] Scar Avila       POC Glucose Once [[id number]]  (Abnormal) Collected: 04/02/22 2037    Specimen: Blood Updated: 04/02/22 2039     Glucose 215 mg/dL      Comment: Meter: [id number] : [id number] Yanna Jefferson       POC Glucose Once [[id number]]  (Abnormal) Collected: 04/02/22 1627    Specimen: Blood Updated: 04/02/22 1629     Glucose 206 mg/dL      Comment: Confirmed by Repeat Meter: [id number] : [id number] Khalif  Gretel       Basic Metabolic Panel [[id number]]  (Abnormal) Collected: 22 1038    Specimen: Blood Updated: 22 1123     Glucose 197 mg/dL      BUN 73 mg/dL      Creatinine 2.60 mg/dL      Sodium 133 mmol/L      Potassium 4.5 mmol/L      Chloride 101 mmol/L      CO2 19.0 mmol/L      Calcium 7.9 mg/dL      BUN/Creatinine Ratio 28.1     Anion Gap 13.0 mmol/L      eGFR 24.9 mL/min/1.73      Comment: National Kidney Foundation and American Society of Nephrology (ASN) Task Force recommended calculation based on the Chronic Kidney Disease Epidemiology Collaboration (CKD-EPI) equation refit without adjustment for race.       Narrative:      GFR Normal >60  Chronic Kidney Disease <60  Kidney Failure <15      Heparin Anti-Xa LMWH [[id number]]  (Normal) Collected: 22 1038    Specimen: Blood Updated: 22 1120     Heparin Anti-Xa (LMWH) 1.17 IU/ml     Narrative:      For Therapeutic Doses of Low Molecular Weight Heparin:  These levels represent drug concentration 5 hours after the 5th dose = Steady state LMWH levels.      Heparin Anti-Xa (LMWH) ranges:    - Twice daily dosin.6-1.1 IU/mL    -  Once daily dosin.0-2.0 IU/mL      For DVT Prophylaxis:     <0.5 IU/mL      POC Glucose Once [[id number]]  (Abnormal) Collected: 22 1118    Specimen: Blood Updated: 22 1120     Glucose 187 mg/dL      Comment: Confirmed by Repeat Meter: [id number] : [id number] Khalif Majano       CBC (No Diff) [[id number]]  (Abnormal) Collected: 22 1038    Specimen: Blood Updated: 22 1106     WBC 14.84 10*3/mm3      RBC 2.73 10*6/mm3      Hemoglobin 8.0 g/dL      Hematocrit 24.1 %      MCV 88.3 fL      MCH 29.3 pg      MCHC 33.2 g/dL      RDW 14.9 %      RDW-SD 46.8 fl      MPV 9.2 fL      Platelets 316 10*3/mm3     POC Glucose Once [[id number]]  (Abnormal) Collected: 22 0733    Specimen: Blood Updated: 22 0734     Glucose 218 mg/dL      Comment: Meter: [id number] : [id number] Elroy Rodriguez        POC Glucose Once [[id number]]  (Abnormal) Collected: 04/02/22 0602    Specimen: Blood Updated: 04/02/22 0605     Glucose 236 mg/dL      Comment: Meter: [id number] : [id number] Jhony Serrano       POC Glucose Once [[id number]]  (Abnormal) Collected: 04/01/22 2010    Specimen: Blood Updated: 04/01/22 2019     Glucose 212 mg/dL      Comment: Meter: [id number] : [id number] Yanna Jefferson       POC Glucose Once [[id number]]  (Abnormal) Collected: 04/01/22 1627    Specimen: Blood Updated: 04/01/22 1630     Glucose 197 mg/dL      Comment: Meter: [id number] : [id number] Cynthia Giancarlo       Comprehensive Metabolic Panel [[id number]]  (Abnormal) Collected: 04/01/22 1227    Specimen: Blood Updated: 04/01/22 1304     Glucose 225 mg/dL      BUN 63 mg/dL      Creatinine 2.33 mg/dL      Sodium 135 mmol/L      Potassium 5.0 mmol/L      Comment: Slight hemolysis detected by analyzer. Results may be affected.        Chloride 103 mmol/L      CO2 20.0 mmol/L      Calcium 7.9 mg/dL      Total Protein 4.8 g/dL      Albumin 2.40 g/dL      ALT (SGPT) 12 U/L      AST (SGOT) 16 U/L      Alkaline Phosphatase 80 U/L      Total Bilirubin 0.3 mg/dL      Globulin 2.4 gm/dL      Comment: Calculated Result        A/G Ratio 1.0 g/dL      BUN/Creatinine Ratio 27.0     Anion Gap 12.0 mmol/L      eGFR 28.4 mL/min/1.73      Comment: National Kidney Foundation and American Society of Nephrology (ASN) Task Force recommended calculation based on the Chronic Kidney Disease Epidemiology Collaboration (CKD-EPI) equation refit without adjustment for race.       Narrative:      GFR Normal >60  Chronic Kidney Disease <60  Kidney Failure <15      POC Glucose Once [[id number]]  (Abnormal) Collected: 04/01/22 1209    Specimen: Blood Updated: 04/01/22 1211     Glucose 214 mg/dL      Comment: Meter: [id number] : [id number] Elroy Rodriguez             Rads:  Imaging Results (Last 72 Hours)     Procedure Component Value Units Date/Time    CT Head  Without Contrast [[id number]] Collected: 04/03/22 2305     Updated: 04/03/22 2310    Narrative:      EXAMINATION: CT HEAD WO CONTRAST    DATE: 4/3/2022 10:44 PM     INDICATION: Delirium     COMPARISON: CT head, 4/24/2021     TECHNIQUE: Thin section noncontrast axial images were obtained through the head. Coronal reformatted images were created.  CT dose lowering techniques were used, to include: automated exposure control, adjustment for patient size, and or use of iterative   reconstruction.    FINDINGS:  Intracranial contents:    Generalized parenchymal volume loss without lobar predominance. No hydrocephalus.   A few foci of hypoattenuation within periventricular white matter most commonly represent chronic microangiopathy.  There is no midline shift or mass effect. No   hemorrhage, mass lesion, or evidence of evolving large territorial infarct. Atherosclerosis of the carotid siphons.    Bones and extracranial soft tissues:    Normal calvarium.  Orbits unremarkable. The visualized paranasal sinuses are clear. No mastoid or middle ear effusions. Periodontal disease is partially imaged.        Impression:        1.  No acute intracranial abnormality.    2.  Roughly age commensurate parenchymal volume loss. Mild chronic microvascular changes. Atherosclerosis.      Electronically signed by:  Chace Chin    4/3/2022 9:09 PM Mountain Time            Assessment: Essential tremor.    Mild cognitive impairment with minimal confusion.    Peripheral and autonomic neuropathy manifesting as numbness in the feet and gastroparesis.    Vasculitic rash,?  Drug related       Plan:    Wound care to see his feet.    Up in a chair twice daily.    Avoid Sinemet which can cause nausea dosage prescribed.    Consult with pharmacy about combining primidone and NOAC.        Comment:   Essential tremor response to primidone or beta-blockers.  He is not relieved by Sinemet.    Its not clear that the patient is ever taken the Sinemet as it  turns out.    It should be mobilized to the extent that he is able to tolerate it.  Apparently there is some concern about the wounds on the bottom of the feet breaking down and that needs to be further evaluated.        I discussed the patients findings and my recommendations with patient, family and primary care team      Peña Weaver MD  04/04/22  11:28 EDT

## 2022-04-04 NOTE — PROGRESS NOTES
INFECTIOUS DISEASE Progress Note    Yinka Cummings  1945  [id number]      Admission Date: 3/25/2022      Requesting Provider: Yinka Desai MD  Evaluating Physician: Brandt Ward MD    Reason for Consultation: vasculitis rash with cellulitis    History of present illness:    3/26/2022: Patient is a 77 y.o. male, ,known to Dr. Wili Rees, with h/o CKD, T2DM, afib/flutter, chronic diastolic heart failure, VINI, chronic pain, gastroparesis, Gilbert's disease,  hypothyroidism, essential tremor, HTN, Obesity, Prostate cancer/radical prostatectomy, renal cell carcinoma/right nephrectomy, multiple skin cancer excisions, and cutaneous vasculitis who we were asked to see for cellulitis.  The patient presented to [facility] ED on 3/25/22 with nausea, vomiting, and LE rash/redness.  [facility] with rash and Afib/RVR.  Dermatology did a punch biopsy with path showing perivascular lymphocytic infiltrate with no evidence of vasculitis at the time.  He was though to have rash from Pradaxa and was changed to Lovenox.  The rash worsened and eventually Lyrica, Primidone, and Norco were stopped one by one.  He was started on Suboxone on 3/16 and his rash began to improve.  Primidone had been a new drug started just prior the start of the rash.  He [facility] cardioversion while at [facility] and converted to NSR.  He was discharge home on Lovenox.  He followed up with his PCP on 3/21 and was placed on Keflex for possible cutaneous infections at the rash site and Lasix for BLE edema.    His rash has worsened again on feet, legs, abdomen, and arms prompting him to come to [facility] ED on 3/25.  He developed nausea and vomiting prior to his presentation.  He denies fever, chills, abdominal pain, diarrhea, or dysuria.  On arrival, his Tmax is 99.4.  Initial labs were CRP 12.02-->12.5, INR 1.2, ESR 44-->62, PCT 0.78-->0.94, lactic acid 2.0, lipase 8, proBNP 5100, D-dimer 4.44, RF 16.4, C3 144, C4 29, WBC 10,600  with 83% neutrophils, and creatinine 2.35-->2.75 (baseline 1.6 on 5/20/21).  A leg wound culture showed normal skin ananth with GS showing GPC in pairs/clusters.  A second wound culture is pending with growth present.   A COVID-19/Flu PCR is negative.  A Hep panel was negative.  A UA WBC is 13-20 with TNTC RBCs, trace LE, negative nitrite.  KENZIE, ANCA panel, cryogobulin, and complement are pending.  A CXR showed no acute findings.  A DVT work up of BLE was negative although peroneal veins were not well visualized bilaterally. A CT scan of a/p with contrast has been ordered.  He is currently on low dose Rocephin.  ID was asked to evaluate and manage his antibiotic therapy.    He continues to have nausea and vomiting with cold sweat.  He denies any diarrhea.     3/27/2022: He complains of lower extremity pain which is most prominent in his feet.  He has remained afebrile.  His creatinine today is 2.82 and his C-reactive protein is 11.6.  His white blood cell count is 17.2.  He has anorexia but denies nausea and vomiting.    3/28/22: He has decreased Bilateral foot pain.  He has remained afebrile.  His creatinine is down to 2.5. His 24 hour protein is 360. He denies nausea, vomiting, and diarrhea    3/29/22:  Maximum temperature over the last 24 hours is 99.2. Creatinine yesterday was 2.58. CH 50 was greater than 60, ANCA was negative and KENZIE was negative.  He has decreased foot pain.  He complains of malaise but denies nausea and vomiting.    3/30/22: He has remained afebrile.  He denies nausea and vomiting.  He has decreased lower extremity pain.  He denies dyspnea.    3/31/22: He remains afebrile. His creatinine today is 2.6.  His C-reactive protein is 6.8.  His white blood cell count is 15.4.  His echocardiogram revealed no valvular vegetations. He and his family indicate improvement in his lower extremity rash but he has more extensive lesions over his palms.     4/1/2022: He underwent cardioversion today.  He is  now in sinus rhythm.  He has remained afebrile.  He is currently drowsy from his sedation for cardioversion.  His family thinks that his rash is no worse today.  His creatinine today is 2.33.      4/4/2022: He has been confused over the weekend.  This worsened after he was started on prednisone.  He has been afebrile.  He notes a significant improvement in his rash over the weekend.  He denies nausea, vomiting, and diarrhea.  He has remained afebrile.  His creatinine today is 2.35.      Past Medical History:   Diagnosis Date   • Anxiety    • Arthritis    • Chronic kidney disease    • Diabetes mellitus (HCC)    • Disease of thyroid gland    • Diverticulosis    • Essential tremor    • HL (hearing loss)    • Hypertension    • Obesity    • Prostate cancer (HCC)    • Renal cell cancer (HCC)    • Skin cancer    • Vasculitis of skin        Past Surgical History:   Procedure Laterality Date   • CHOLECYSTECTOMY     • COLONOSCOPY      Polyps in the past but not on the most recent scope   • NEPHRECTOMY Right    • PROSTATE SURGERY      Radical prostatectomy   • SKIN CANCER EXCISION      Large incision left neck, multiple other cancers removed       Family History   Problem Relation Age of Onset   • Cancer Mother    • Heart attack Father    • Kidney failure Sister    • Coronary artery disease Sister        Social History     Socioeconomic History   • Marital status:    • Number of children: 4   Tobacco Use   • Smoking status: Never Smoker   • Smokeless tobacco: Never Used   Vaping Use   • Vaping Use: Never used   Substance and Sexual Activity   • Alcohol use: Not Currently   • Drug use: Not Currently   • Sexual activity: Defer       Allergies   Allergen Reactions   • Contrast Dye Rash     R[facility]   • Doxycycline Rash     Urticaria per [facility]   • Chlorthalidone Other (See Comments)     Hyponatremia [facility]   • Morphine Unknown - Low Severity     Headache [facility]   • Neurontin  [Gabapentin] Mental Status Change     Drowsy [facility]   • Phenergan [Promethazine] Unknown - Low Severity     Confusion [facility]         Medication:    Current Facility-Administered Medications:   •  acetaminophen (TYLENOL) tablet 650 mg, 650 mg, Oral, Q4H PRN, 650 mg at 22 0823 **OR** acetaminophen (TYLENOL) 160 MG/5ML solution 650 mg, 650 mg, Oral, Q4H PRN, 649.6 mg at 22 0943 **OR** acetaminophen (TYLENOL) suppository 650 mg, 650 mg, Rectal, Q4H PRN, Raymond Herrera MD  •  [COMPLETED] amiodarone in dextrose 5% (NEXTERONE) loading dose 150mg/100mL, 150 mg, Intravenous, Once, 150 mg at 22 0816 **FOLLOWED BY** [] amiodarone 360 mg in 200 mL D5W infusion, 1 mg/min, Intravenous, Continuous, Last Rate: 33.3 mL/hr at 22, 1 mg/min at 22 **FOLLOWED BY** [] amiodarone 360 mg in 200 mL D5W infusion, 0.5 mg/min, Intravenous, Continuous, Last Rate: 16.67 mL/hr at 22, 0.5 mg/min at 22 **FOLLOWED BY** [COMPLETED] amiodarone (PACERONE) tablet 200 mg, 200 mg, Oral, Once, 200 mg at 22 0841 **FOLLOWED BY** amiodarone (PACERONE) tablet 200 mg, 200 mg, Oral, Q8H, 200 mg at 22 0813 **FOLLOWED BY** [START ON 2022] amiodarone (PACERONE) tablet 200 mg, 200 mg, Oral, Q12H **FOLLOWED BY** [START ON 2022] amiodarone (PACERONE) tablet 200 mg, 200 mg, Oral, Daily, Raymond Herrera MD  •  atorvastatin (LIPITOR) tablet 40 mg, 40 mg, Oral, Nightly, Raymond Herrera MD, 40 mg at 04/03/22 2150  •  buprenorphine-naloxone (SUBOXONE) 8-2 MG per SL tablet 2 tablet, 2 tablet, Sublingual, Nightly, Raymond Herrera MD, 2 tablet at 22 0009  •  dextrose (D50W) (25 g/50 mL) IV injection 25 g, 25 g, Intravenous, Q15 Min PRN, Raymond Herrera MD  •  dextrose (GLUTOSE) oral gel 15 g, 15 g, Oral, Q15 Min PRN, Raymond Herrera MD  •  DULoxetine (CYMBALTA) DR capsule 90 mg, 90 mg, Oral, Daily, Raymond Herrera MD, 90 mg at 22 0812  •   enoxaparin (LOVENOX) syringe 110 mg, 110 mg, Subcutaneous, Q12H, Yinka Brush IV, PharmD, 110 mg at 04/04/22 0554  •  glucagon (human recombinant) (GLUCAGEN DIAGNOSTIC) injection 1 mg, 1 mg, Intramuscular, Q15 Min PRN, Raymond Herrera MD  •  insulin detemir (LEVEMIR) injection 12 Units, 12 Units, Subcutaneous, Nightly, Yinka Desai MD, 12 Units at 04/03/22 2151  •  insulin lispro (humaLOG) injection 0-9 Units, 0-9 Units, Subcutaneous, TID AC, Raymond Herrera MD, 2 Units at 04/04/22 1347  •  levothyroxine (SYNTHROID, LEVOTHROID) tablet 150 mcg, 150 mcg, Oral, Daily, Raymond Herrera MD, 150 mcg at 04/04/22 0812  •  melatonin tablet 5 mg, 5 mg, Oral, Nightly PRN, Raymond Herrera MD, 5 mg at 04/01/22 2018  •  methohexital (BREVITAL) injection  - ADS Override Pull, , , ,   •  metoprolol tartrate (LOPRESSOR) tablet 50 mg, 50 mg, Oral, Q8H, Raymond Herrera MD, 50 mg at 04/04/22 0554  •  midazolam (VERSED) 2 MG/2ML injection  - ADS Override Pull, , , ,   •  mirtazapine (REMERON) tablet 7.5 mg, 7.5 mg, Oral, Nightly, Raymond Herrera MD, 7.5 mg at 04/03/22 2150  •  ondansetron (ZOFRAN) tablet 4 mg, 4 mg, Oral, Q6H PRN **OR** ondansetron (ZOFRAN) injection 4 mg, 4 mg, Intravenous, Q6H PRN, Raymond Herrera MD, 4 mg at 03/30/22 1403  •  palliative care oral rinse, , Mouth/Throat, PRN, Yinka Desai MD, Given at 04/03/22 1516  •  pantoprazole (PROTONIX) EC tablet 40 mg, 40 mg, Oral, QAM, Raymond Herrera MD, 40 mg at 04/04/22 0554  •  polyethylene glycol (MIRALAX) packet 17 g, 17 g, Oral, Daily, Raymond Herrera MD, 17 g at 04/03/22 0857  •  predniSONE (DELTASONE) tablet 40 mg, 40 mg, Oral, Daily With Breakfast, Yinka Desai MD, 40 mg at 04/04/22 0816  •  sennosides-docusate (PERICOLACE) 8.6-50 MG per tablet 2 tablet, 2 tablet, Oral, BID, Yinka Desai MD, 2 tablet at 04/04/22 0811  •  Sodium Chloride (PF) 0.9 % 10 mL, 10 mL, Intravenous, PRN, Raymond Herrera MD  •  sodium chloride 0.9 % flush 10 mL,  10 mL, Intravenous, Q12H, Raymond Herrera MD, 10 mL at 22 0814  •  sodium chloride 0.9 % flush 10 mL, 10 mL, Intravenous, PRN, Raymond Herrera MD, 10 mL at 22 0830  •  vitamin B-12 (CYANOCOBALAMIN) tablet 50 mcg, 50 mcg, Oral, Daily, Raymond Herrera MD, 50 mcg at 22 0812    Antibiotics:  Anti-Infectives (From admission, onward)    Ordered     Dose/Rate Route Frequency Start Stop    22 1059  cefTRIAXone (ROCEPHIN) 1 g/100 mL 0.9% NS (MBP)        Ordering Provider: Chace Chauhan MD    1 g  over 30 Minutes Intravenous Once 22 1101 22 1244            Review of Systems:  See HPI    Physical Exam:   Vital Signs  Temp (24hrs), Av.9 °F (37.2 °C), Min:98.2 °F (36.8 °C), Max:99.6 °F (37.6 °C)    Temp  Min: 98.2 °F (36.8 °C)  Max: 99.6 °F (37.6 °C)  BP  Min: 102/74  Max: 151/63  Pulse  Min: 79  Max: 88  Resp  Min: 19  Max: 22  SpO2  Min: 90 %  Max: 96 %    GENERAL: Debilitated appearing.  He is alert and mildly confused.  HEENT: Normocephalic, atraumatic.  PERRL. EOMI. No conjunctival injection. No icterus. Oropharynx clear without evidence of thrush or exudate.  NECK: Supple   HEART: RRR; No murmur  LUNGS: Clear to auscultation bilaterally without wheezing, rales, rhonchi. Normal respiratory effort. Nonlabored.  ABDOMEN: Soft, lower abdominal tenderness, nondistended.  No rebound or guarding.   Skin: His diffuse hemorrhagic rash is markedly improved including over his palms.  He has no obvious new lesions.  The large black bullous lesion over his left heel is also markedly better and I can see some healthier tissue starting to peak through the black blister.  MSK:  FROM, no joint effusions  NEURO: He is more alert and responsive.  He can follow simple commands.  He is mildly confused.    Laboratory Data    Results from last 7 days   Lab Units 22  0634 22  2046 22  0740   WBC 10*3/mm3 9.90 9.55 12.96*   HEMOGLOBIN g/dL 8.2* 8.3* 8.5*   HEMATOCRIT % 26.7* 24.9*  27.4*   PLATELETS 10*3/mm3 369 314 377     Results from last 7 days   Lab Units 04/04/22  0634   SODIUM mmol/L 138   POTASSIUM mmol/L 4.8   CHLORIDE mmol/L 104   CO2 mmol/L 22.0   BUN mg/dL 83*   CREATININE mg/dL 2.35*   GLUCOSE mg/dL 198*   CALCIUM mg/dL 8.4*     Results from last 7 days   Lab Units 04/03/22  2046   ALK PHOS U/L 76   BILIRUBIN mg/dL 0.4   ALT (SGPT) U/L 24   AST (SGOT) U/L 37         Results from last 7 days   Lab Units 03/31/22  0805   CRP mg/dL 6.84*     Results from last 7 days   Lab Units 04/03/22 2046   LACTATE mmol/L 1.7             Estimated Creatinine Clearance: 34.5 mL/min (A) (by C-G formula based on SCr of 2.35 mg/dL (H)).      Microbiology:  Wound cx 3/25: NL skin ananth SF  COVID-19/Flu PCR negative.  2nd wound cx 3/25: growth present but TYTE  Urine Eos Zero        Radiology:  Imaging Results (Last 72 Hours)     Procedure Component Value Units Date/Time    CT Head Without Contrast [[id number]] Collected: 04/03/22 2305     Updated: 04/03/22 2310    Narrative:      EXAMINATION: CT HEAD WO CONTRAST    DATE: 4/3/2022 10:44 PM     INDICATION: Delirium     COMPARISON: CT head, 4/24/2021     TECHNIQUE: Thin section noncontrast axial images were obtained through the head. Coronal reformatted images were created.  CT dose lowering techniques were used, to include: automated exposure control, adjustment for patient size, and or use of iterative   reconstruction.    FINDINGS:  Intracranial contents:    Generalized parenchymal volume loss without lobar predominance. No hydrocephalus.   A few foci of hypoattenuation within periventricular white matter most commonly represent chronic microangiopathy.  There is no midline shift or mass effect. No   hemorrhage, mass lesion, or evidence of evolving large territorial infarct. Atherosclerosis of the carotid siphons.    Bones and extracranial soft tissues:    Normal calvarium.  Orbits unremarkable. The visualized paranasal sinuses are clear. No mastoid or  middle ear effusions. Periodontal disease is partially imaged.        Impression:        1.  No acute intracranial abnormality.    2.  Roughly age commensurate parenchymal volume loss. Mild chronic microvascular changes. Atherosclerosis.      Electronically signed by:  Chace Chin    4/3/2022 9:09 PM Mountain Time            Impression:   1. Vasculitic rash-with palpable purpura.  This rash clinically looks like leukocytoclastic vasculitis.  I suspect that this is secondary to primidone given the apparent shortly after starting on primidone.  His rash is now dramatically better off of primidone and on prednisone.  2. Duodenitis- per CT scan.  He is on PPI therapy.  3. Elevated procalcitonin,  4. Acute kidney injury-a renal biopsy is relatively contraindicated since he has a solitary kidney.  His acute kidney injury has partially improved.  5. Elevated CRP  6. Chronic diastolic heart failure  7. Afib/on Lovenox, recent cardioversion to NSR  8. Type 2 diabetes mellitus/gastroparesis  9. Hypothyroidism  10. Essential hypertension  11. Morbid obesity  12. Prostate cancer/radical prostatectomy  13. Renal cell carcinoma/radical prostatectomy  14. H/o multiple skin cancer excisions  15. Doxycycline (urticaria) allergy  16. Hematuria  17. Left heel decubitus lesion-this is significantly better.  18. Toxic/metabolic encephalopathy-some of this may be secondary to his prednisone.  His dose of prednisone will be reduced today.    PLAN/RECOMMENDATIONS:  1.  Continue wound care  2.  Continue off of primidone and as many other medications as possible  3.  Offload both heels  4.  Taper steroid therapy    I discussed his complex situation with his family again today.  I discussed his situation in detail with Dr. Desai again today      Brandt Ward MD  4/4/2022  15:03 EDT

## 2022-04-04 NOTE — PLAN OF CARE
Goal Outcome Evaluation:  Plan of Care Reviewed With: patient, spouse, daughter        Progress: no change  Outcome Evaluation: Significant confusion and anxiety throughout session making mobility challenging.several attempts to scoot to edge of chair, pt needed manual hand placement in arm rests of cues for advancing hips, however severe fear of falling and patient would panic and lean back.  Required min-A for sitting balance.  Able to participate in some LE exercises

## 2022-04-04 NOTE — CASE MANAGEMENT/SOCIAL WORK
Continued Stay Note  [facility]     Patient Name: Yinka Cummings  MRN: [id number]  Today's Date: 4/4/2022    Admit Date: 3/25/2022     Discharge Plan     Row Name 04/04/22 1203       Plan    Plan [facility]    Plan Comments [facility] interested in bringing in under skilled, pending final discharge plans including any antibiotics. [facility] in [place] is looking at the referral and [facility] has looked at referral as well. I asked Sakshi Brady RN with [facility] to given patient's wife a call to answer her questions.    Final Discharge Disposition Code 03 - skilled nursing facility (SNF)               Discharge Codes    No documentation.               Expected Discharge Date and Time     Expected Discharge Date Expected Discharge Time    Apr 7, 2022             Jessica Frias RN

## 2022-04-04 NOTE — DISCHARGE PLACEMENT REQUEST
"Yinka Mabry (77 y.o. Male)     I was told better today    Jessica Rodriguez DE LA CRUZ, RN, [facility]  [phone]        Date of Birth   1945    Social Security Number       Address   [address] [address]    Home Phone   [phone]    MRN   [id number]       Spiritism   None    Marital Status                               Admission Date   3/25/22    Admission Type   Emergency    Admitting Provider   Yinka Desai MD    Attending Provider   Yinka Desai MD    Department, Room/Bed   [facility] 3F, S315/1       Discharge Date       Discharge Disposition       Discharge Destination                               Attending Provider: Yinka Desai MD    Allergies: Contrast Dye, Doxycycline, Chlorthalidone, Morphine, Neurontin [Gabapentin], Phenergan [Promethazine]    Isolation: None   Infection: None   Code Status: CPR   Advance Care Planning Activity    Ht: 175 cm (68.9\")   Wt: 119 kg (262 lb 5.6 oz)    Admission Cmt: None   Principal Problem: None                Active Insurance as of 3/25/2022     Primary Coverage     Payor Plan Insurance Group Employer/Plan Group    MEDICARE MEDICARE A & B      Payor Plan Address Payor Plan Phone Number Payor Plan Fax Number Effective Dates    [address] [phone]  10/1/2009 - None Entered    [facility] [id number]       Subscriber Name Subscriber Birth Date Member ID       YINKA MABRY 1945 [id number]                 Emergency Contacts      (Rel.) Home Phone Work Phone Mobile Phone    Gauri Mabry (Spouse) -- -- [phone]    Claudia Mabry (Daughter) -- -- [phone]    Nora Shelton (Daughter) -- -- [phone]            Insurance Information                MEDICARE/MEDICARE A & B Phone: [phone]    Subscriber: Yinka Mabry Subscriber#: [id number]    Group#: -- Precert#: --             Physical Therapy Notes (most recent note)      Rita Mchugh at 04/04/22 1421  Version " "         Patient Name: Yinka Cummings  : 1945    MRN: [id number]                              Today's Date: 2022       Admit Date: 3/25/2022    Visit Dx:     ICD-10-CM ICD-9-CM   1. Acute renal failure, unspecified acute renal failure type (HCC)  N17.9 584.9   2. Dehydration  E86.0 276.51   3. Cellulitis of lower extremity, unspecified laterality  L03.119 682.6   4. Vasculitis (HCC)  I77.6 447.6   5. Congestive heart failure, unspecified HF chronicity, unspecified heart failure type (HCC)  I50.9 428.0   6. 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)   • Chronic back pain (on chronic prescription lortab)   • HTN (hypertension)   • Solitary kidney (s/p nephrectomy for \"mass\")   • History of prostate cancer   • Closed fracture of phalanx of right hand   • Dental caries   • Acute renal failure, unspecified acute renal failure type (HCC)   • Rash   • Nausea and vomiting     Past Medical History:   Diagnosis Date   • Anxiety    • Arthritis    • Chronic kidney disease    • Diabetes mellitus (HCC)    • Disease of thyroid gland    • Diverticulosis    • Essential tremor    • HL (hearing loss)    • Hypertension    • Obesity    • Prostate cancer (HCC)    • Renal cell cancer (HCC)    • Skin cancer    • Vasculitis of skin      Past Surgical History:   Procedure Laterality Date   • CHOLECYSTECTOMY     • COLONOSCOPY      Polyps in the past but not on the most recent scope   • NEPHRECTOMY Right    • PROSTATE SURGERY      Radical prostatectomy   • SKIN CANCER EXCISION      Large incision left neck, multiple other cancers removed      General Information     Row Name 22 6876          Physical Therapy Time and Intention    Document Type therapy " note (daily note)  -KG     Mode of Treatment physical therapy  -KG     Row Name 04/04/22 1456          General Information    Patient Profile Reviewed yes  -KG     Existing Precautions/Restrictions fall;other (see comments)  Blisters on B feet, L heel decubitis, edematous hands, sensitive to touch, [place]  -KG     Row Name 04/04/22 1456          Cognition    Orientation Status (Cognition) oriented to;person  -KG     Row Name 04/04/22 1456          Safety Issues, Functional Mobility    Impairments Affecting Function (Mobility) balance;coordination;endurance/activity tolerance;pain;strength  -KG           User Key  (r) = Recorded By, (t) = Taken By, (c) = Cosigned By    Initials Name Provider Type    Rita Lackey Physical Therapist               Mobility     Row Name 04/04/22 1456          Bed Mobility    Comment, (Bed Mobility) UIC  -KG     Row Name 04/04/22 1456          Transfers    Comment, (Transfers) several attempts to scoot to edge of chair, pt needed manual hand placement in arm rests of cues for advancing hips, however severe fear of falling and patient would panic and lean back  -KG     Row Name 04/04/22 1456          Sit-Stand Transfer    Sit-Stand [place] (Transfers) unable to assess  -KG     Row Name 04/04/22 1456          Gait/Stairs (Locomotion)    [place] Level (Gait) unable to assess  -KG           User Key  (r) = Recorded By, (t) = Taken By, (c) = Cosigned By    Initials Name Provider Type    Rita Lackey Physical Therapist               Obj/Interventions     Row Name 04/04/22 1501          Motor Skills    Therapeutic Exercise knee;ankle  -KG     Row Name 04/04/22 1501          Hip (Therapeutic Exercise)    Hip (Therapeutic Exercise) other (see comments)  isometric leg press through feet into therapist hands with legs elevated  -KG     Row Name 04/04/22 1501          Knee (Therapeutic Exercise)    Knee (Therapeutic Exercise) AAROM (active assistive range of motion);AROM  (active range of motion)  -KG     Knee AROM (Therapeutic Exercise) bilateral;heel slides;10 repetitions  -KG     Knee AAROM (Therapeutic Exercise) bilateral;flexion;extension;10 repetitions  -KG           User Key  (r) = Recorded By, (t) = Taken By, (c) = Cosigned By    Initials Name Provider Type    Rita Lackey Physical Therapist               Goals/Plan    No documentation.                Clinical Impression     Row Name 04/04/22 1502          Pain Scale: FACES Pre/Post-Treatment    Pain: FACES Scale, Pretreatment 0-->no hurt  -KG     Posttreatment Pain Rating 4-->hurts little more  -KG     Pain Location - Side/Orientation Bilateral  -KG     Pain Location lower  -KG     Pain Location - extremity  -KG     Row Name 04/04/22 1502          Plan of Care Review    Plan of Care Reviewed With patient;spouse;daughter  -KG     Progress no change  -KG     Outcome Evaluation Significant confusion and anxiety throughout session making mobility challenging.several attempts to scoot to edge of chair, pt needed manual hand placement in arm rests of cues for advancing hips, however severe fear of falling and patient would panic and lean back.  Required min-A for sitting balance.  Able to participate in some LE exercises  -KG     Row Name 04/04/22 1502          Positioning and Restraints    Pre-Treatment Position sitting in chair/recliner  -KG     Post Treatment Position chair  -KG     In Chair notified nsg;call light within reach;encouraged to call for assist;exit alarm on;with family/caregiver;on mechanical lift sling  -KG           User Key  (r) = Recorded By, (t) = Taken By, (c) = Cosigned By    Initials Name Provider Type    Rita Lackey Physical Therapist               Outcome Measures     Row Name 04/04/22 1505 04/04/22 0800       How much help from another person do you currently need...    Turning from your back to your side while in flat bed without using bedrails? 2  -KG 2  -LM    Moving from lying on  back to sitting on the side of a flat bed without bedrails? 2  -KG 2  -LM    Moving to and from a bed to a chair (including a wheelchair)? 1  -KG 1  -LM    Standing up from a chair using your arms (e.g., wheelchair, bedside chair)? 1  -KG 1  -LM    Climbing 3-5 steps with a railing? 1  -KG 1  -LM    To walk in hospital room? 1  -KG 1  -LM    AM-PAC 6 Clicks Score (PT) 8  -KG 8  -LM    Row Name 04/04/22 1505 04/04/22 1447       Functional Assessment    Outcome Measure Options AM-PAC 6 Clicks Basic Mobility (PT)  -KG AM-PAC 6 Clicks Daily Activity (OT)  -MR          User Key  (r) = Recorded By, (t) = Taken By, (c) = Cosigned By    Initials Name Provider Type    Luz Abraham RN Registered Nurse    Rita Lackey Physical Therapist    MR Alla Mccauley, OT Occupational Therapist                             Physical Therapy Education                 Title: PT OT SLP Therapies (In Progress)     Topic: Physical Therapy (In Progress)     Point: Mobility training (In Progress)     Learning Progress Summary           Patient Acceptance, E, NR by KG at 4/4/2022 1506    Acceptance, E, VU,NR by CD at 4/2/2022 1634    Comment: BENEFITS OF OOB ACTIVITY, ROM EXERCISE, PROGRESSION OF POC, D/C PLANNING,    Acceptance, E,D, VU,NR by HP at 3/29/2022 1517    Acceptance, E, NR by BA at 3/27/2022 1412   Family Acceptance, E,D, VU,NR by HP at 3/29/2022 1517    Acceptance, E, NR by BA at 3/27/2022 1412                   Point: Home exercise program (In Progress)     Learning Progress Summary           Patient Acceptance, E, NR by KG at 4/4/2022 1506    Acceptance, E, VU,NR by CD at 4/2/2022 1634    Comment: BENEFITS OF OOB ACTIVITY, ROM EXERCISE, PROGRESSION OF POC, D/C PLANNING,    Acceptance, E,D, VU,NR by HP at 3/29/2022 1517    Acceptance, E, NR by BA at 3/27/2022 1412   Family Acceptance, E,D, VU,NR by HP at 3/29/2022 1517    Acceptance, E, NR by RUMA at 3/27/2022 1412                   Point: Body mechanics (In Progress)      Learning Progress Summary           Patient Acceptance, E, NR by KG at 4/4/2022 1506    Acceptance, E, VU,NR by CD at 4/2/2022 1634    Comment: BENEFITS OF OOB ACTIVITY, ROM EXERCISE, PROGRESSION OF POC, D/C PLANNING,    Acceptance, E,D, VU,NR by HP at 3/29/2022 1517    Acceptance, E, NR by BA at 3/27/2022 1412   Family Acceptance, E,D, VU,NR by HP at 3/29/2022 1517    Acceptance, E, NR by BA at 3/27/2022 1412                   Point: Precautions (In Progress)     Learning Progress Summary           Patient Acceptance, E, NR by KG at 4/4/2022 1506    Acceptance, E, VU,NR by CD at 4/2/2022 1634    Comment: BENEFITS OF OOB ACTIVITY, ROM EXERCISE, PROGRESSION OF POC, D/C PLANNING,    Acceptance, E,D, VU,NR by HP at 3/29/2022 1517    Acceptance, E, NR by BA at 3/27/2022 1412   Family Acceptance, E,D, VU,NR by HP at 3/29/2022 1517    Acceptance, E, NR by BA at 3/27/2022 1412                               User Key     Initials Effective Dates Name Provider Type Discipline    CD 06/16/21 -  Alessandra Madrid PT Physical Therapist PT     06/16/21 -  Rita Mchugh Physical Therapist PT     06/01/21 -  Anya Velez, ELIZABETH Physical Therapist PT     09/21/21 -  Renate Mendoza, ELIZABETH Physical Therapist PT              PT Recommendation and Plan     Plan of Care Reviewed With: patient, spouse, daughter  Progress: no change  Outcome Evaluation: Significant confusion and anxiety throughout session making mobility challenging.several attempts to scoot to edge of chair, pt needed manual hand placement in arm rests of cues for advancing hips, however severe fear of falling and patient would panic and lean back.  Required min-A for sitting balance.  Able to participate in some LE exercises     Time Calculation:    PT Charges     Row Name 04/04/22 1507             Time Calculation    Start Time 1420  -KG      PT Received On 04/04/22  -KG      PT Goal Re-Cert Due Date 04/06/22  -KG              Time Calculation- PT    Total  Timed Code Minutes- PT 23 minute(s)  -KG              Timed Charges    35729 - PT Therapeutic Exercise Minutes 8  -KG      21287 - PT Therapeutic Activity Minutes 15  -KG              Total Minutes    Timed Charges Total Minutes 23  -KG       Total Minutes 23  -KG            User Key  (r) = Recorded By, (t) = Taken By, (c) = Cosigned By    Initials Name Provider Type    TATIANA Rita Mchugh Physical Therapist              Therapy Charges for Today     Code Description Service Date Service Provider Modifiers Qty    23312569495 HC PT THER PROC EA 15 MIN 2022 Rita Mchugh GP 1    13730549472 HC PT THERAPEUTIC ACT EA 15 MIN 2022 Rita Mchugh GP 1          PT G-Codes  Outcome Measure Options: AM-PAC 6 Clicks Basic Mobility (PT)  AM-PAC 6 Clicks Score (PT): 8  AM-PAC 6 Clicks Score (OT): 9    Rita Mchugh  2022      Electronically signed by Rita Mchugh at 22 1512          Occupational Therapy Notes (most recent note)      Alla Mccauley, OT at 22 1348          Patient Name: Yinka Cummings  : 1945    MRN: [id number]                              Today's Date: 2022       Admit Date: 3/25/2022    Visit Dx:     ICD-10-CM ICD-9-CM   1. Acute renal failure, unspecified acute renal failure type (HCC)  N17.9 584.9   2. Dehydration  E86.0 276.51   3. Cellulitis of lower extremity, unspecified laterality  L03.119 682.6   4. Vasculitis (HCC)  I77.6 447.6   5. Congestive heart failure, unspecified HF chronicity, unspecified heart failure type (HCC)  I50.9 428.0   6. 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)   • Chronic back pain (on chronic prescription lortab)   • HTN (hypertension)   • Solitary kidney (s/p  "nephrectomy for \"mass\")   • History of prostate cancer   • Closed fracture of phalanx of right hand   • Dental caries   • Acute renal failure, unspecified acute renal failure type (HCC)   • Rash   • Nausea and vomiting     Past Medical History:   Diagnosis Date   • Anxiety    • Arthritis    • Chronic kidney disease    • Diabetes mellitus (HCC)    • Disease of thyroid gland    • Diverticulosis    • Essential tremor    • HL (hearing loss)    • Hypertension    • Obesity    • Prostate cancer (HCC)    • Renal cell cancer (HCC)    • Skin cancer    • Vasculitis of skin      Past Surgical History:   Procedure Laterality Date   • CHOLECYSTECTOMY     • COLONOSCOPY      Polyps in the past but not on the most recent scope   • NEPHRECTOMY Right    • PROSTATE SURGERY      Radical prostatectomy   • SKIN CANCER EXCISION      Large incision left neck, multiple other cancers removed      General Information     Row Name 04/04/22 1438          OT Time and Intention    Document Type therapy note (daily note)  -MR     Mode of Treatment occupational therapy  -MR     Row Name 04/04/22 1438          General Information    Existing Precautions/Restrictions fall  Blisters on B feet, L heel decubitis, edematous hands, sensitive to touch, [place]  -MR     Barriers to Rehab medically complex;previous functional deficit;hearing deficit  -MR     Row Name 04/04/22 1438          Cognition    Orientation Status (Cognition) oriented to;person  -MR     Row Name 04/04/22 1438          Safety Issues, Functional Mobility    Safety Issues Affecting Function (Mobility) safety precautions follow-through/compliance;awareness of need for assistance;insight into deficits/self-awareness;judgment;safety precaution awareness;problem-solving  -MR     Impairments Affecting Function (Mobility) balance;coordination;endurance/activity tolerance;pain;strength  -MR           User Key  (r) = Recorded By, (t) = Taken By, (c) = Cosigned By    Initials Name Provider Type    MR " Alla Mccauley, OT Occupational Therapist                 Mobility/ADL's     Row Name 04/04/22 1439          Bed Mobility    Bed Mobility scooting/bridging  -MR     Scooting/Bridging [place] (Bed Mobility) contact guard;verbal cues;nonverbal cues (demo/gesture)  -MR     Comment, (Bed Mobility) Pt received UIC and left UIC. CGA for advancing forward to edge of chair.  -MR     Row Name 04/04/22 1439          Transfers    Comment, (Transfers) Pt fearful of falling, becoming anxious when asked to advance hips forward to place BLE on the ground.  -MR     Row Name 04/04/22 1439          Activities of Daily Living    BADL Assessment/Intervention lower body dressing  -MR     Row Name 04/04/22 1439          Lower Body Dressing Assessment/Training    [place] Level (Lower Body Dressing) don;doff;socks;dependent (less than 25% patient effort)  -MR     Position (Lower Body Dressing) supported sitting  -MR           User Key  (r) = Recorded By, (t) = Taken By, (c) = Cosigned By    Initials Name Provider Type    Alla Garces, NEPTALI Occupational Therapist               Obj/Interventions     Row Name 04/04/22 1440          Motor Skills    Therapeutic Exercise shoulder;elbow/forearm  Seated BUE ther ex x 10 reps elbow flex/extension, shoulder flexion, alternating cross body punches. Tricep push ups performed x2 to advance to edge of recliner  -MR     Row Name 04/04/22 1440          Balance    Balance Assessment sitting static balance;sitting dynamic balance  -MR     Static Sitting Balance contact guard  -MR     Dynamic Sitting Balance moderate assist  -MR     Position, Sitting Balance supported;sitting in chair  -MR     Balance Interventions sitting  -MR     Comment, Balance CGA for sitting balance, as pt advanced futher to edge of seat requiring Dion for safety.  -MR           User Key  (r) = Recorded By, (t) = Taken By, (c) = Cosigned By    Initials Name Provider Type    Alla Garces, NEPTALI Occupational Therapist                Goals/Plan    No documentation.                Clinical Impression     Gabino Name 04/04/22 1444          Pain Assessment    Additional Documentation Pain Scale: FACES Pre/Post-Treatment (Group)  -MR Lloyd Name 04/04/22 1444          Pain Scale: FACES Pre/Post-Treatment    Pain: FACES Scale, Pretreatment 0-->no hurt  -MR     Posttreatment Pain Rating 4-->hurts little more  -MR     Pain Location - Side/Orientation Bilateral  -MR     Pain Location lower  -MR     Pain Location - extremity  -MR     Pre/Posttreatment Pain Comment Pt vocalized when BLE were lifted to magnus socks.  -MR Lloyd Name 04/04/22 1444          Plan of Care Review    Plan of Care Reviewed With patient;spouse;daughter  -MR     Progress no change  -MR     Outcome Evaluation Pt presenting confused throughout session, difficulty following commands, extremely anxious and fearful of falling when advancing to edge of recliner. Pt demo good effort w/ BUE ther ex, DEP for LB dressing, completing tricep chair pushups to advance towards EOC. Pt requiring Dion for balance once at EOC d/t tremors and being fearful. Continue to progress per current POC.  -MR Gabino Wilkins 04/04/22 1444          Therapy Plan Review/Discharge Plan (OT)    Anticipated Discharge Disposition (OT) skilled nursing facility  -MR Lloyd Name 04/04/22 1444          Vital Signs    Pre Systolic BP Rehab 102  -MR     Pre Treatment Diastolic BP 74  -MR     Pretreatment Heart Rate (beats/min) 86  -MR     Posttreatment Heart Rate (beats/min) 100  -MR     O2 Delivery Pre Treatment room air  -MR     O2 Delivery Intra Treatment room air  -MR     O2 Delivery Post Treatment room air  -MR     Pre Patient Position Sitting  -MR     Intra Patient Position Sitting  -MR     Post Patient Position Sitting  -MR Lloyd Name 04/04/22 1444          Positioning and Restraints    Pre-Treatment Position sitting in chair/recliner  -MR     Post Treatment Position chair  -MR     In Chair notified  nsg;reclined;with family/caregiver;with PT  -MR           User Key  (r) = Recorded By, (t) = Taken By, (c) = Cosigned By    Initials Name Provider Type    Alla Garces OT Occupational Therapist               Outcome Measures     Row Name 04/04/22 1447          How much help from another is currently needed...    Putting on and taking off regular lower body clothing? 1  -MR     Bathing (including washing, rinsing, and drying) 1  -MR     Toileting (which includes using toilet bed pan or urinal) 1  -MR     Putting on and taking off regular upper body clothing 2  -MR     Taking care of personal grooming (such as brushing teeth) 2  -MR     Eating meals 2  -MR     AM-PAC 6 Clicks Score (OT) 9  -MR     Row Name 04/04/22 0800          How much help from another person do you currently need...    Turning from your back to your side while in flat bed without using bedrails? 2  -LM     Moving from lying on back to sitting on the side of a flat bed without bedrails? 2  -LM     Moving to and from a bed to a chair (including a wheelchair)? 1  -LM     Standing up from a chair using your arms (e.g., wheelchair, bedside chair)? 1  -LM     Climbing 3-5 steps with a railing? 1  -LM     To walk in hospital room? 1  -LM     AM-PAC 6 Clicks Score (PT) 8  -LM     Row Name 04/04/22 1447          Functional Assessment    Outcome Measure Options AM-PAC 6 Clicks Daily Activity (OT)  -MR           User Key  (r) = Recorded By, (t) = Taken By, (c) = Cosigned By    Initials Name Provider Type    Luz Abraham RN Registered Nurse    Alla Garces OT Occupational Therapist                Occupational Therapy Education                 Title: PT OT SLP Therapies (In Progress)     Topic: Occupational Therapy (In Progress)     Point: ADL training (In Progress)     Description:   Instruct learner(s) on proper safety adaptation and remediation techniques during self care or transfers.   Instruct in proper use of assistive devices.               Learning Progress Summary           Patient Acceptance, E, NR by MR at 4/4/2022 1447    Acceptance, E,D, NR by JY at 3/30/2022 1147    Acceptance, E,D, VU,NR by TA at 3/27/2022 1502   Family Acceptance, E, NR by MR at 4/4/2022 1447    Acceptance, E,D, NR by JY at 3/30/2022 1147                   Point: Home exercise program (In Progress)     Description:   Instruct learner(s) on appropriate technique for monitoring, assisting and/or progressing therapeutic exercises/activities.              Learning Progress Summary           Patient Acceptance, E, NR by MR at 4/4/2022 1447    Acceptance, E,D, NR by JY at 3/30/2022 1147    Acceptance, E,D, VU,NR by TA at 3/27/2022 1502   Family Acceptance, E, NR by MR at 4/4/2022 1447    Acceptance, E,D, NR by JY at 3/30/2022 1147                   Point: Precautions (In Progress)     Description:   Instruct learner(s) on prescribed precautions during self-care and functional transfers.              Learning Progress Summary           Patient Acceptance, E, NR by MR at 4/4/2022 1447    Acceptance, E,D, NR by JY at 3/30/2022 1147    Acceptance, E,D, VU,NR by TA at 3/27/2022 1502   Family Acceptance, E, NR by MR at 4/4/2022 1447    Acceptance, E,D, NR by JY at 3/30/2022 1147                   Point: Body mechanics (In Progress)     Description:   Instruct learner(s) on proper positioning and spine alignment during self-care, functional mobility activities and/or exercises.              Learning Progress Summary           Patient Acceptance, E, NR by MR at 4/4/2022 1447    Acceptance, E,D, NR by JY at 3/30/2022 1147    Acceptance, E,D, VU,NR by TA at 3/27/2022 1502   Family Acceptance, E, NR by MR at 4/4/2022 1447    Acceptance, E,D, NR by KAMILAY at 3/30/2022 1147                               User Key     Initials Effective Dates Name Provider Type Discipline    FABIO 06/16/21 -  Neno Russ OT Occupational Therapist OT    JAYDEN 06/16/21 -  Annalisa Abbott, NEPTALI Occupational  Therapist OT    MR 10/06/21 -  Catalino Mccauley OT Occupational Therapist OT              OT Recommendation and Plan     Plan of Care Review  Plan of Care Reviewed With: patient, spouse, daughter  Progress: no change  Outcome Evaluation: Pt presenting confused throughout session, difficulty following commands, extremely anxious and fearful of falling when advancing to edge of recliner. Pt demo good effort w/ BUE ther ex, DEP for LB dressing, completing tricep chair pushups to advance towards EOC. Pt requiring Dion for balance once at EOC d/t tremors and being fearful. Continue to progress per current POC.     Time Calculation:    Time Calculation- OT     Row Name 04/04/22 1448             Time Calculation- OT    OT Start Time 1348  -MR      OT Received On 04/04/22  -MR      OT Goal Re-Cert Due Date 04/06/22  -MR              Timed Charges    24949 - OT Therapeutic Exercise Minutes 10  -MR      07137 - OT Therapeutic Activity Minutes 15  -MR      13288 - OT Self Care/Mgmt Minutes 7  -MR              Total Minutes    Timed Charges Total Minutes 32  -MR       Total Minutes 32  -MR            User Key  (r) = Recorded By, (t) = Taken By, (c) = Cosigned By    Initials Name Provider Type    MR Catalino Mccauley OT Occupational Therapist              Therapy Charges for Today     Code Description Service Date Service Provider Modifiers Qty    92867372170 HC OT THER PROC EA 15 MIN 4/4/2022 Catalino Mccauley OT GO 1    38508629412 HC OT THERAPEUTIC ACT EA 15 MIN 4/4/2022 Catalino Mccauley OT GO 1               CATALINO MCCAULEY OT  4/4/2022    Electronically signed by Catalino Mccauley OT at 04/04/22 2660

## 2022-04-04 NOTE — NURSING NOTE
At shift change pt. Was found by staff to be ambulating from bed to chair unassisted. Pt. Anxious,. Confused and difficult to redirect.  Pt. Was assisted to the chair and then back to bed via lift.  Notified on call SHAHAB SALGADO Davis and PA came to see.  New orders received.  Pt. Taken to CT scan by this RN and PCT via bariatric bed.  Requested assistance locating transport monitor ( although policy does not require one) and monitor retrieved by Charge, RN, Marilynn, although monitor was inoperable due to dead battery. Pt. Had no episodes in transport, CT, or return. Attempted to clean catch urine by pt. Without success and received vo to straight cath for ua. Pt. Refused straight cath and cursed at me. Attempt again made to obtain clean catch and I did receive a small speciman and sent to lab.     In addition, I called PA to marbella admin Suboxone  As I was concerned with msc.  Ok luis give. Med given late.     Pt. Remains anxious, confused, and continues to pull off monitors and pulse ox.    He knows the name of the hospital, whos president, but states hes here for prostate cancer. [place] then [place], etc,   Cont. To monitor. Pt. Tremors are continuous.      At approx. 0400 I spoke with FARIHA SALGADO Davis regarding pt. Hallucinating, more confused, pulling things off, etc.  Vss stable at this time.    Neurology to see today.

## 2022-04-05 LAB
ALBUMIN SERPL-MCNC: 2.7 G/DL (ref 3.5–5.2)
ANION GAP SERPL CALCULATED.3IONS-SCNC: 16 MMOL/L (ref 5–15)
BUN SERPL-MCNC: 76 MG/DL (ref 8–23)
BUN/CREAT SERPL: 31.9 (ref 7–25)
CALCIUM SPEC-SCNC: 8.3 MG/DL (ref 8.6–10.5)
CHLORIDE SERPL-SCNC: 112 MMOL/L (ref 98–107)
CO2 SERPL-SCNC: 20 MMOL/L (ref 22–29)
CREAT SERPL-MCNC: 2.38 MG/DL (ref 0.76–1.27)
EGFRCR SERPLBLD CKD-EPI 2021: 27.7 ML/MIN/1.73
GLUCOSE BLDC GLUCOMTR-MCNC: 186 MG/DL (ref 70–130)
GLUCOSE BLDC GLUCOMTR-MCNC: 190 MG/DL (ref 70–130)
GLUCOSE BLDC GLUCOMTR-MCNC: 196 MG/DL (ref 70–130)
GLUCOSE BLDC GLUCOMTR-MCNC: 213 MG/DL (ref 70–130)
GLUCOSE SERPL-MCNC: 183 MG/DL (ref 65–99)
PHOSPHATE SERPL-MCNC: 3.3 MG/DL (ref 2.5–4.5)
POTASSIUM SERPL-SCNC: 4.1 MMOL/L (ref 3.5–5.2)
SODIUM SERPL-SCNC: 148 MMOL/L (ref 136–145)

## 2022-04-05 PROCEDURE — 99233 SBSQ HOSP IP/OBS HIGH 50: CPT | Performed by: INTERNAL MEDICINE

## 2022-04-05 PROCEDURE — 25010000002 ENOXAPARIN PER 10 MG

## 2022-04-05 PROCEDURE — 63710000001 INSULIN DETEMIR PER 5 UNITS: Performed by: INTERNAL MEDICINE

## 2022-04-05 PROCEDURE — 99232 SBSQ HOSP IP/OBS MODERATE 35: CPT | Performed by: PSYCHIATRY & NEUROLOGY

## 2022-04-05 PROCEDURE — 63710000001 INSULIN LISPRO (HUMAN) PER 5 UNITS: Performed by: INTERNAL MEDICINE

## 2022-04-05 PROCEDURE — 82962 GLUCOSE BLOOD TEST: CPT

## 2022-04-05 PROCEDURE — 80069 RENAL FUNCTION PANEL: CPT | Performed by: INTERNAL MEDICINE

## 2022-04-05 RX ORDER — HALOPERIDOL 2 MG/ML
2 SOLUTION ORAL EVERY 6 HOURS PRN
Status: DISCONTINUED | OUTPATIENT
Start: 2022-04-05 | End: 2022-04-15 | Stop reason: HOSPADM

## 2022-04-05 RX ORDER — QUETIAPINE FUMARATE 25 MG/1
25 TABLET, FILM COATED ORAL EVERY 12 HOURS SCHEDULED
Status: DISCONTINUED | OUTPATIENT
Start: 2022-04-05 | End: 2022-04-10

## 2022-04-05 RX ORDER — TEMAZEPAM 15 MG/1
15 CAPSULE ORAL NIGHTLY
Status: DISCONTINUED | OUTPATIENT
Start: 2022-04-05 | End: 2022-04-07

## 2022-04-05 RX ORDER — CHOLECALCIFEROL (VITAMIN D3) 125 MCG
5 CAPSULE ORAL NIGHTLY
Status: DISCONTINUED | OUTPATIENT
Start: 2022-04-05 | End: 2022-04-07

## 2022-04-05 RX ADMIN — INSULIN LISPRO 2 UNITS: 100 INJECTION, SOLUTION INTRAVENOUS; SUBCUTANEOUS at 18:39

## 2022-04-05 RX ADMIN — AMIODARONE HYDROCHLORIDE 200 MG: 200 TABLET ORAL at 12:26

## 2022-04-05 RX ADMIN — QUETIAPINE FUMARATE 25 MG: 25 TABLET ORAL at 12:19

## 2022-04-05 RX ADMIN — INSULIN LISPRO 4 UNITS: 100 INJECTION, SOLUTION INTRAVENOUS; SUBCUTANEOUS at 12:20

## 2022-04-05 RX ADMIN — INSULIN DETEMIR 10 UNITS: 100 INJECTION, SOLUTION SUBCUTANEOUS at 20:47

## 2022-04-05 RX ADMIN — TEMAZEPAM 15 MG: 15 CAPSULE ORAL at 20:35

## 2022-04-05 RX ADMIN — QUETIAPINE FUMARATE 25 MG: 25 TABLET ORAL at 20:36

## 2022-04-05 RX ADMIN — ENOXAPARIN SODIUM 110 MG: 120 INJECTION SUBCUTANEOUS at 12:09

## 2022-04-05 RX ADMIN — Medication 10 ML: at 20:36

## 2022-04-05 RX ADMIN — METOPROLOL TARTRATE 50 MG: 50 TABLET, FILM COATED ORAL at 20:36

## 2022-04-05 RX ADMIN — Medication 5 MG: at 20:35

## 2022-04-05 RX ADMIN — ENOXAPARIN SODIUM 110 MG: 120 INJECTION SUBCUTANEOUS at 20:59

## 2022-04-05 RX ADMIN — AMIODARONE HYDROCHLORIDE 200 MG: 200 TABLET ORAL at 20:35

## 2022-04-05 RX ADMIN — BUPRENORPHINE AND NALOXONE 2 TABLET: 8; 2 TABLET SUBLINGUAL at 20:35

## 2022-04-05 RX ADMIN — MIRTAZAPINE 7.5 MG: 15 TABLET, FILM COATED ORAL at 20:36

## 2022-04-05 RX ADMIN — ACETAMINOPHEN 650 MG: 650 SUPPOSITORY RECTAL at 12:37

## 2022-04-05 RX ADMIN — LEVOTHYROXINE SODIUM 150 MCG: 150 TABLET ORAL at 12:27

## 2022-04-05 RX ADMIN — SENNOSIDES AND DOCUSATE SODIUM 2 TABLET: 50; 8.6 TABLET ORAL at 20:35

## 2022-04-05 RX ADMIN — ENOXAPARIN SODIUM 110 MG: 120 INJECTION SUBCUTANEOUS at 05:16

## 2022-04-05 RX ADMIN — MINERAL OIL: 1000 LIQUID ORAL at 09:40

## 2022-04-05 RX ADMIN — ATORVASTATIN CALCIUM 40 MG: 40 TABLET, FILM COATED ORAL at 20:36

## 2022-04-05 NOTE — PROGRESS NOTES
Neurology       Patient Care Team:  Vivek Mercer DO as PCP - General (Family Medicine)    Chief complaint: Agitation    History: Patient became agitated last night and calm down with 2 mg of Haldol.    He has not slept for the last 3 days.    He did not get as needed melatonin is ordered.    Wound care does not feel that his feet would preclude weightbearing  Past Medical History:   Diagnosis Date   • Anxiety    • Arthritis    • Chronic kidney disease    • Diabetes mellitus (HCC)    • Disease of thyroid gland    • Diverticulosis    • Essential tremor    • HL (hearing loss)    • Hypertension    • Obesity    • Prostate cancer (HCC)    • Renal cell cancer (HCC)    • Skin cancer    • Vasculitis of skin        Vital Signs   Vitals:    04/05/22 0334 04/05/22 0509 04/05/22 0757 04/05/22 1156   BP: 97/84 110/90 139/99 142/90   BP Location: Left arm  Right arm Left arm   Patient Position: Lying  Lying Lying   Pulse: 84 87 92 98   Resp: 20 19 22   Temp: 98.1 °F (36.7 °C)  99.6 °F (37.6 °C) (!) 100.6 °F (38.1 °C)   TempSrc: Axillary  Axillary Axillary   SpO2:       Weight:       Height:           Physical Exam:   General: Restless and agitated              Neuro: Prominent tremor.        Results Review:  Reviewed  Results from last 7 days   Lab Units 04/04/22  0634   WBC 10*3/mm3 9.90   HEMOGLOBIN g/dL 8.2*   HEMATOCRIT % 26.7*   PLATELETS 10*3/mm3 369     Results from last 7 days   Lab Units 04/04/22  0634 04/03/22  2046 04/03/22  0740 04/02/22  1038 04/01/22  1227 03/31/22  0805   SODIUM mmol/L 138 138 138   < > 135* 133*   POTASSIUM mmol/L 4.8 5.2 4.4   < > 5.0 4.9   CHLORIDE mmol/L 104 102 103   < > 103 100   CO2 mmol/L 22.0 22.0 21.0*   < > 20.0* 20.0*   BUN mg/dL 83* 78* 75*   < > 63* 70*   CREATININE mg/dL 2.35* 2.77* 2.62*   < > 2.33* 2.60*   CALCIUM mg/dL 8.4* 8.2* 8.4*   < > 7.9* 7.8*   BILIRUBIN mg/dL  --  0.4  --   --  0.3 0.3   ALK PHOS U/L  --  76  --   --  80 106   ALT (SGPT) U/L  --  24  --   --  12 14    AST (SGOT) U/L  --  37  --   --  16 16   GLUCOSE mg/dL 198* 232* 157*   < > 225* 179*    < > = values in this interval not displayed.       Imaging Results (Last 24 Hours)     ** No results found for the last 24 hours. **          Assessment:  Sleep deprivation psychosis.    Essential tremor    Plan:  Melatonin 5 mg at bedtime.    Restoril 15 mg at bedtime.    Haldol solution, 2 mg every 6 hours as needed for agitation    Comment:  Patient needs good night sleep         I discussed the patients findings and my recommendations with patient, family, nursing staff and primary care team    Peña Weaver MD  04/05/22  12:28 EDT

## 2022-04-05 NOTE — NURSING NOTE
"Pt. Confused, agitated, yelling, refusing medications. Notified [facility] and was told to notify neurology.  Spoke with Dr. Campo with neurology who ordered seroquel.  Pt. Yellinhg, refusing seroquel and amiodarone.  Notified Dr. Campo again and one time dose of haldol ordered and appears to be effective.  Pt. Resting quietly with eyes open, continuous spastic movements and tremors ( unchanged from yesterday).     Cont. To monitor, offer support, and redirect.     0610    PT.. has yelled, \"Help me\", tried climbing out of bed, pulling off lead wires, etc, all shift. Pt. Appears to not understand how to eat or take sips of water due to confusion. Pt. Spit out meds at hs and this am\ and also refused dinner last night.  Pt. Also pulled out IV. New IV placed without difficulty.    2mg haldol iv was somewhat effective for a short time.       "

## 2022-04-05 NOTE — PROGRESS NOTES
[facility] Medicine Services  PROGRESS NOTE    Patient Name: Yinka Cummings  : 1945  MRN: [id number]    Date of Admission: 3/25/2022  Primary Care Physician: Vivek Mercer DO    Subjective   Subjective     CC:  Follow-up delirium    HPI:  Patient confused/agitated overnight and received IV Haldol, this morning he was initially poorly responsive and unable to take medication safely.  Wife at bedside at my evaluation states that previous hospitalizations have been complicated with delirium.  She does note that his rash is substantially improved after being treated with steroids.    Patient's wife also makes note of extremely poor sleep over the last several nights    ROS:  Unable to be reliably obtained due to patient's delirium    Objective   Objective     Vital Signs:   Temp:  [98.1 °F (36.7 °C)-99.6 °F (37.6 °C)] 99.6 °F (37.6 °C)  Heart Rate:  [78-92] 92  Resp:  [19-21] 19  BP: ()/(62-99) 139/99     Physical Exam:  Constitutional: Awake but unable to cooperate with exam, conically ill-appearing elderly male laying in bed with diffuse whole body tremors  HENT: NCAT, mucous membranes moist, pinpoint pupils  Respiratory: Clear to auscultation bilaterally, respiratory effort normal   Cardiovascular: RRR, no murmurs, rubs, or gallops, palpable radial pulses  Gastrointestinal: Positive bowel sounds, soft, nontender, nondistended  Musculoskeletal: No bilateral ankle edema  Psychiatric: Unable to assess due to mental status  Neurologic: Severe tremor in all extremities, unable to follow commands however he does make an effort to answer questions when directly addressed    Results Reviewed:  LAB RESULTS:      Lab 22  0634 22  2046 22  0740 22  1038 22  0805   WBC 9.90 9.55 12.96* 14.84* 15.35*   HEMOGLOBIN 8.2* 8.3* 8.5* 8.0* 9.6*   HEMATOCRIT 26.7* 24.9* 27.4* 24.1* 30.0*   PLATELETS 369 314 377 316 337   NEUTROS ABS  --   --  9.75*  --   12.41*   IMMATURE GRANS (ABS)  --   --  0.15*  --  0.30*   LYMPHS ABS  --   --  1.69  --  1.27   MONOS ABS  --   --  1.33*  --  1.22*   EOS ABS  --   --  0.02  --  0.08   MCV 95.4 90.2 97.9* 88.3 93.8   CRP  --   --   --   --  6.84*   LACTATE  --  1.7  --   --   --    HEPARIN ANTI-XA  --   --   --  1.17  --          Lab 04/04/22  0634 04/03/22 2046 04/03/22  0740 04/02/22  1038 04/01/22  1227   SODIUM 138 138 138 133* 135*   POTASSIUM 4.8 5.2 4.4 4.5 5.0   CHLORIDE 104 102 103 101 103   CO2 22.0 22.0 21.0* 19.0* 20.0*   ANION GAP 12.0 14.0 14.0 13.0 12.0   BUN 83* 78* 75* 73* 63*   CREATININE 2.35* 2.77* 2.62* 2.60* 2.33*   EGFR 28.2* 23.1* 24.7* 24.9* 28.4*   GLUCOSE 198* 232* 157* 197* 225*   CALCIUM 8.4* 8.2* 8.4* 7.9* 7.9*         Lab 04/03/22 2046 04/01/22  1227 03/31/22  0805   TOTAL PROTEIN 5.6* 4.8* 4.6*   ALBUMIN 2.70* 2.40* 2.40*   GLOBULIN 2.9 2.4 2.2   ALT (SGPT) 24 12 14   AST (SGOT) 37 16 16   BILIRUBIN 0.4 0.3 0.3   ALK PHOS 76 80 106                     Lab 04/03/22 2056   PH, ARTERIAL 7.381   PCO2, ARTERIAL 37.4   PO2 ART 70.0*   FIO2 21   HCO3 ART 22.2   BASE EXCESS ART -2.7*   CARBOXYHEMOGLOBIN 1.3     Brief Urine Lab Results  (Last result in the past 365 days)      Color   Clarity   Blood   Leuk Est   Nitrite   Protein   CREAT   Urine HCG        04/04/22 0045 Yellow   Clear   Moderate (2+)   Trace   Negative   Negative                 Microbiology Results Abnormal     Procedure Component Value - Date/Time    Wound Culture - Wound, Leg, Left [[id number]] Collected: 03/25/22 0918    Lab Status: Final result Specimen: Wound from Leg, Left Updated: 03/27/22 0903     Wound Culture Light growth (2+) Normal Skin Cami     Gram Stain Rare (1+) WBCs seen      Moderate (3+) Gram positive cocci in pairs and clusters    Eosinophil Smear - Urine, Urine, Clean Catch [[id number]]  (Normal) Collected: 03/26/22 1027    Lab Status: Final result Specimen: Urine, Clean Catch Updated: 03/26/22 1113     Eosinophil  Smear 0 % EOS/100 Cells     Narrative:      No eosinophil seen    COVID-19 and FLU A/B PCR - Swab, Nasopharynx [[id number]]  (Normal) Collected: 03/25/22 0918    Lab Status: Final result Specimen: Swab from Nasopharynx Updated: 03/25/22 0955     COVID19 Not Detected     Influenza A PCR Not Detected     Influenza B PCR Not Detected    Narrative:      Fact sheet for providers: https://www.fda.gov/media/234840/download    Fact sheet for patients: https://www.fda.gov/media/281729/download    Test performed by PCR.          CT Head Without Contrast    Result Date: 4/3/2022  EXAMINATION: CT HEAD WO CONTRAST DATE: 4/3/2022 10:44 PM  INDICATION: Delirium  COMPARISON: CT head, 4/24/2021  TECHNIQUE: Thin section noncontrast axial images were obtained through the head. Coronal reformatted images were created.  CT dose lowering techniques were used, to include: automated exposure control, adjustment for patient size, and or use of iterative  reconstruction. FINDINGS: Intracranial contents: Generalized parenchymal volume loss without lobar predominance. No hydrocephalus.   A few foci of hypoattenuation within periventricular white matter most commonly represent chronic microangiopathy.  There is no midline shift or mass effect. No hemorrhage, mass lesion, or evidence of evolving large territorial infarct. Atherosclerosis of the carotid siphons. Bones and extracranial soft tissues: Normal calvarium.  Orbits unremarkable. The visualized paranasal sinuses are clear. No mastoid or middle ear effusions. Periodontal disease is partially imaged.     Impression: 1.  No acute intracranial abnormality. 2.  Roughly age commensurate parenchymal volume loss. Mild chronic microvascular changes. Atherosclerosis. Electronically signed by:  Chace Chin  4/3/2022 9:09 PM Mountain Time      Results for orders placed during the hospital encounter of 03/25/22    Adult Transesophageal Echo (LARRY) W/ Cont if Necessary Per Protocol    Interpretation  Summary  · Estimated left ventricular EF = 65%  · The cardiac valves are anatomically and functionally normal.  · No evidence of a left atrial appendage thrombus was present.      I have reviewed the medications:  Scheduled Meds:amiodarone, 200 mg, Oral, Q8H   Followed by  [START ON 4/6/2022] amiodarone, 200 mg, Oral, Q12H   Followed by  [START ON 4/20/2022] amiodarone, 200 mg, Oral, Daily  atorvastatin, 40 mg, Oral, Nightly  buprenorphine-naloxone, 2 tablet, Sublingual, Nightly  DULoxetine, 90 mg, Oral, Daily  enoxaparin, 110 mg, Subcutaneous, Q12H  insulin detemir, 10 Units, Subcutaneous, Nightly  insulin lispro, 0-9 Units, Subcutaneous, TID AC  levothyroxine, 150 mcg, Oral, Daily  methohexital, , ,   metoprolol tartrate, 50 mg, Oral, Q8H  midazolam, , ,   mirtazapine, 7.5 mg, Oral, Nightly  pantoprazole, 40 mg, Oral, QAM  polyethylene glycol, 17 g, Oral, Daily  predniSONE, 20 mg, Oral, Daily With Breakfast  QUEtiapine, 25 mg, Oral, Nightly  senna-docusate sodium, 2 tablet, Oral, BID  sodium chloride, 10 mL, Intravenous, Q12H  cyanocobalamin, 50 mcg, Oral, Daily      Continuous Infusions:   PRN Meds:.•  acetaminophen **OR** acetaminophen **OR** acetaminophen  •  dextrose  •  dextrose  •  glucagon (human recombinant)  •  melatonin  •  ondansetron **OR** ondansetron  •  palliative care oral rinse  •  Sodium Chloride (PF)  •  sodium chloride    Assessment/Plan   Assessment & Plan     Active Hospital Problems    Diagnosis  POA   • Acute renal failure, unspecified acute renal failure type (HCC) [N17.9]  Yes   • Rash [R21]  Yes   • Nausea and vomiting [R11.2]  Unknown   • Paroxysmal A-fib (on eliquis & metoprolol) [I48.0]  Yes   • Insulin dependent type 2 diabetes mellitus (HCC) [E11.9, Z79.4]  Not Applicable   • Hypothyroidism (acquired) [E03.9]  Yes   • Chronic back pain (on chronic prescription lortab) [M54.9, G89.29]  Yes   • Benign essential tremor [G25.0]  Yes      Resolved Hospital Problems   No resolved problems  to display.        Brief Hospital Course to date:  Yinka Cummings is a 77 y.o. 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    The following problems are new to me today    Assessment/plan    Vasculitic rash,? 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  -Started on steroids 4/1 with significant improvement in rash, however precipitated worsening insomnia and delirium, was tapered down yesterday, after discussions with nephrology/ID we will discontinue steroids altogether and monitor    Acute encephalopathy, multifactorial  Sleep deprivation psychosis/hospital associated delirium  -Increased Seroquel this morning from nightly to bid  -d/w neurology, they are scheduling melatonin and Restoril for bedtime; oral Haldol solution prn  -Also noted to be uremic on last labs    Acute renal dysfunction on CKD 3  Hx RCC s/p nephrectomy  -Per documentation [facility] records report baseline CR 1.5-1.7 as recent as 3/20/22  -Creatinine yesterday 2.35, labs ordered for this morning still  "pending  -Nephrology follows    Atrial fibrillation/flutter  -Recently Dabigatran was DC'd due to rash; currently on full dose Lovenox with plan to transition to NOAC after 30-day \"washout\" from discontinuation of primidone (concern for drug-drug interaction)  -Cardiology follows, s/p ECV 4/1/22; cardiology planning short course amiodarone with ultimate discontinuation (personal Hx thyroid abnormalities on the same)  -Continue Lopressor    Chronic pain syndrome  -Recently transition to Suboxone from Lortab by the [facility] due to concern for contribution to rash; will continue here    DM type 2, A1c 7.9%, with long-term use of insulin  -Continue Levemir, SSI    Hypothyroidism  Hypertension  Hyperlipidemia  Mood disorder  -Continue atorvastatin, duloxetine, Lopressor    Severe tremor  -Primidone recently DC'd for rash  -Prescribed Sinemet recently but has not yet been able to start  -[facility]up with [facility]    Chronic normocytic anemia  -Relatively stable, monitor    Abnormal abdominal CT  -Per documentation questionable Clif versus duodenitis, potential bibasilar consolidation; ID follows, monitoring off antibiotics    VINI  -Documented as noncompliant with CPAP    Obesity, BMI 38.86 kg/M2  -Complicates all aspects of care      DVT prophylaxis:  Medical DVT prophylaxis orders are present.       AM-PAC 6 Clicks Score (PT): 8 (04/04/22 1505)    Disposition: I expect the patient to be discharged TBD, pretty significant encephalopathy at this time.    CODE STATUS:   Code Status and Medical Interventions:   Ordered at: 03/25/22 1328     Level Of Support Discussed With:    Patient     Code Status (Patient has no pulse and is not breathing):    CPR (Attempt to Resuscitate)     Medical Interventions (Patient has pulse or is breathing):    Full Support       Wili Conner, DO  04/05/22              "

## 2022-04-05 NOTE — PROGRESS NOTES
INFECTIOUS DISEASE Progress Note    Yinka Cummings  1945  [id number]      Admission Date: 3/25/2022      Requesting Provider: Yinka Desai MD  Evaluating Physician: Brandt Ward MD    Reason for Consultation: vasculitis rash with cellulitis    History of present illness:    3/26/2022: Patient is a 77 y.o. male, ,known to Dr. Wili Rees, with h/o CKD, T2DM, afib/flutter, chronic diastolic heart failure, VINI, chronic pain, gastroparesis, Gilbert's disease,  hypothyroidism, essential tremor, HTN, Obesity, Prostate cancer/radical prostatectomy, renal cell carcinoma/right nephrectomy, multiple skin cancer excisions, and cutaneous vasculitis who we were asked to see for cellulitis.  The patient presented to [facility] ED on 3/25/22 with nausea, vomiting, and LE rash/redness.  [facility] with rash and Afib/RVR.  Dermatology did a punch biopsy with path showing perivascular lymphocytic infiltrate with no evidence of vasculitis at the time.  He was though to have rash from Pradaxa and was changed to Lovenox.  The rash worsened and eventually Lyrica, Primidone, and Norco were stopped one by one.  He was started on Suboxone on 3/16 and his rash began to improve.  Primidone had been a new drug started just prior the start of the rash.  He [facility] cardioversion while at [facility] and converted to NSR.  He was discharge home on Lovenox.  He followed up with his PCP on 3/21 and was placed on Keflex for possible cutaneous infections at the rash site and Lasix for BLE edema.    His rash has worsened again on feet, legs, abdomen, and arms prompting him to come to [facility] ED on 3/25.  He developed nausea and vomiting prior to his presentation.  He denies fever, chills, abdominal pain, diarrhea, or dysuria.  On arrival, his Tmax is 99.4.  Initial labs were CRP 12.02-->12.5, INR 1.2, ESR 44-->62, PCT 0.78-->0.94, lactic acid 2.0, lipase 8, proBNP 5100, D-dimer 4.44, RF 16.4, C3 144, C4 29, WBC 10,600  with 83% neutrophils, and creatinine 2.35-->2.75 (baseline 1.6 on 5/20/21).  A leg wound culture showed normal skin ananth with GS showing GPC in pairs/clusters.  A second wound culture is pending with growth present.   A COVID-19/Flu PCR is negative.  A Hep panel was negative.  A UA WBC is 13-20 with TNTC RBCs, trace LE, negative nitrite.  KENZIE, ANCA panel, cryogobulin, and complement are pending.  A CXR showed no acute findings.  A DVT work up of BLE was negative although peroneal veins were not well visualized bilaterally. A CT scan of a/p with contrast has been ordered.  He is currently on low dose Rocephin.  ID was asked to evaluate and manage his antibiotic therapy.    He continues to have nausea and vomiting with cold sweat.  He denies any diarrhea.     3/27/2022: He complains of lower extremity pain which is most prominent in his feet.  He has remained afebrile.  His creatinine today is 2.82 and his C-reactive protein is 11.6.  His white blood cell count is 17.2.  He has anorexia but denies nausea and vomiting.    3/28/22: He has decreased Bilateral foot pain.  He has remained afebrile.  His creatinine is down to 2.5. His 24 hour protein is 360. He denies nausea, vomiting, and diarrhea    3/29/22:  Maximum temperature over the last 24 hours is 99.2. Creatinine yesterday was 2.58. CH 50 was greater than 60, ANCA was negative and KENZIE was negative.  He has decreased foot pain.  He complains of malaise but denies nausea and vomiting.    3/30/22: He has remained afebrile.  He denies nausea and vomiting.  He has decreased lower extremity pain.  He denies dyspnea.    3/31/22: He remains afebrile. His creatinine today is 2.6.  His C-reactive protein is 6.8.  His white blood cell count is 15.4.  His echocardiogram revealed no valvular vegetations. He and his family indicate improvement in his lower extremity rash but he has more extensive lesions over his palms.     4/1/2022: He underwent cardioversion today.  He is  now in sinus rhythm.  He has remained afebrile.  He is currently drowsy from his sedation for cardioversion.  His family thinks that his rash is no worse today.  His creatinine today is 2.33.      4/4/2022: He has been confused over the weekend.  This worsened after he was started on prednisone.  He has been afebrile.  He notes a significant improvement in his rash over the weekend.  He denies nausea, vomiting, and diarrhea.  He has remained afebrile.  His creatinine today is 2.35.    4/5/22: He has been severely confused overnight with agitation. He had a fever to 100.6° earlier today but is now afebrile. Laboratory studies today reveal a creatinine of 2.38.  A urinalysis yesterday revealed 6-12 white blood cells. He is unable to provide a review of systems.    Past Medical History:   Diagnosis Date   • Anxiety    • Arthritis    • Chronic kidney disease    • Diabetes mellitus (HCC)    • Disease of thyroid gland    • Diverticulosis    • Essential tremor    • HL (hearing loss)    • Hypertension    • Obesity    • Prostate cancer (HCC)    • Renal cell cancer (HCC)    • Skin cancer    • Vasculitis of skin        Past Surgical History:   Procedure Laterality Date   • CHOLECYSTECTOMY     • COLONOSCOPY      Polyps in the past but not on the most recent scope   • NEPHRECTOMY Right    • PROSTATE SURGERY      Radical prostatectomy   • SKIN CANCER EXCISION      Large incision left neck, multiple other cancers removed       Family History   Problem Relation Age of Onset   • Cancer Mother    • Heart attack Father    • Kidney failure Sister    • Coronary artery disease Sister        Social History     Socioeconomic History   • Marital status:    • Number of children: 4   Tobacco Use   • Smoking status: Never Smoker   • Smokeless tobacco: Never Used   Vaping Use   • Vaping Use: Never used   Substance and Sexual Activity   • Alcohol use: Not Currently   • Drug use: Not Currently   • Sexual activity: Defer       Allergies    Allergen Reactions   • Contrast Dye Rash     R[facility]   • Doxycycline Rash     Urticaria per [facility]   • Chlorthalidone Other (See Comments)     Hyponatremia per [facility]   • Morphine Unknown - Low Severity     Headache [facility]   • Neurontin [Gabapentin] Mental Status Change     Drowsy per [facility]   • Phenergan [Promethazine] Unknown - Low Severity     Confusion per [facility]         Medication:    Current Facility-Administered Medications:   •  acetaminophen (TYLENOL) tablet 650 mg, 650 mg, Oral, Q4H PRN, 650 mg at 22 0823 **OR** acetaminophen (TYLENOL) 160 MG/5ML solution 650 mg, 650 mg, Oral, Q4H PRN, 649.6 mg at 22 0943 **OR** acetaminophen (TYLENOL) suppository 650 mg, 650 mg, Rectal, Q4H PRN, Raymond Herrera MD, 650 mg at 22 1237  •  [COMPLETED] amiodarone in dextrose 5% (NEXTERONE) loading dose 150mg/100mL, 150 mg, Intravenous, Once, 150 mg at 22 0816 **FOLLOWED BY** [] amiodarone 360 mg in 200 mL D5W infusion, 1 mg/min, Intravenous, Continuous, Last Rate: 33.3 mL/hr at 22 08, 1 mg/min at 22 **FOLLOWED BY** [] amiodarone 360 mg in 200 mL D5W infusion, 0.5 mg/min, Intravenous, Continuous, Last Rate: 16.67 mL/hr at 22, 0.5 mg/min at 22 **FOLLOWED BY** [COMPLETED] amiodarone (PACERONE) tablet 200 mg, 200 mg, Oral, Once, 200 mg at 22 0841 **FOLLOWED BY** amiodarone (PACERONE) tablet 200 mg, 200 mg, Oral, Q8H, 200 mg at 22 1226 **FOLLOWED BY** [START ON 2022] amiodarone (PACERONE) tablet 200 mg, 200 mg, Oral, Q12H **FOLLOWED BY** [START ON 2022] amiodarone (PACERONE) tablet 200 mg, 200 mg, Oral, Daily, Raymond Herrera MD  •  atorvastatin (LIPITOR) tablet 40 mg, 40 mg, Oral, Nightly, Raymond Herrera MD, 40 mg at 22  •  buprenorphine-naloxone (SUBOXONE) 8-2 MG per SL tablet 2 tablet, 2 tablet, Sublingual, Nightly, Raymond Herrera MD, 2 tablet at 22  •   dextrose (D50W) (25 g/50 mL) IV injection 25 g, 25 g, Intravenous, Q15 Min PRN, Raymond Herrera MD  •  dextrose (GLUTOSE) oral gel 15 g, 15 g, Oral, Q15 Min PRN, Raymond Herrera MD  •  DULoxetine (CYMBALTA) DR capsule 90 mg, 90 mg, Oral, Daily, Raymond Herrera MD, 90 mg at 04/04/22 0812  •  enoxaparin (LOVENOX) syringe 110 mg, 110 mg, Subcutaneous, Q12H, Yinka Brush IV, PharmD, 110 mg at 04/05/22 1209  •  glucagon (human recombinant) (GLUCAGEN DIAGNOSTIC) injection 1 mg, 1 mg, Intramuscular, Q15 Min PRN, Raymond Herrera MD  •  haloperidol (HALDOL) 2 MG/ML solution 2 mg, 2 mg, Oral, Q6H PRN, Peña Weaver MD  •  insulin detemir (LEVEMIR) injection 10 Units, 10 Units, Subcutaneous, Nightly, Yinka Desai MD, 10 Units at 04/04/22 2133  •  insulin lispro (humaLOG) injection 0-9 Units, 0-9 Units, Subcutaneous, TID AC, Raymond Herrera MD, 4 Units at 04/05/22 1220  •  levothyroxine (SYNTHROID, LEVOTHROID) tablet 150 mcg, 150 mcg, Oral, Daily, Raymond Herrera MD, 150 mcg at 04/05/22 1227  •  melatonin tablet 5 mg, 5 mg, Oral, Nightly, Peña Weaver MD  •  methohexital (BREVITAL) injection  - ADS Override Pull, , , ,   •  metoprolol tartrate (LOPRESSOR) tablet 50 mg, 50 mg, Oral, Q8H, Raymond Herrera MD, 50 mg at 04/04/22 2119  •  midazolam (VERSED) 2 MG/2ML injection  - ADS Override Pull, , , ,   •  mirtazapine (REMERON) tablet 7.5 mg, 7.5 mg, Oral, Nightly, Raymond Herrera MD, 7.5 mg at 04/04/22 2119  •  ondansetron (ZOFRAN) tablet 4 mg, 4 mg, Oral, Q6H PRN **OR** ondansetron (ZOFRAN) injection 4 mg, 4 mg, Intravenous, Q6H PRN, Raymond Herrera MD, 4 mg at 03/30/22 1403  •  palliative care oral rinse, , Mouth/Throat, PRN, Yinka Desai MD, Given at 04/05/22 0940  •  pantoprazole (PROTONIX) EC tablet 40 mg, 40 mg, Oral, QAM, Raymond Herrera MD, 40 mg at 04/04/22 0574  •  polyethylene glycol (MIRALAX) packet 17 g, 17 g, Oral, Daily, Raymond Herrera MD, 17 g at 04/04/22 6685  •  QUEtiapine  (SEROquel) tablet 25 mg, 25 mg, Oral, Q12H, Wili Conner, , 25 mg at 22 1219  •  sennosides-docusate (PERICOLACE) 8.6-50 MG per tablet 2 tablet, 2 tablet, Oral, BID, Yinka Desai MD, 2 tablet at 22  •  Sodium Chloride (PF) 0.9 % 10 mL, 10 mL, Intravenous, PRN, Raymond Herrera MD  •  sodium chloride 0.9 % flush 10 mL, 10 mL, Intravenous, Q12H, Raymond Herrera MD, 10 mL at 22  •  sodium chloride 0.9 % flush 10 mL, 10 mL, Intravenous, PRN, Raymond Herrera MD, 10 mL at 22 0830  •  temazepam (RESTORIL) capsule 15 mg, 15 mg, Oral, Nightly, Peña Weaver MD  •  vitamin B-12 (CYANOCOBALAMIN) tablet 50 mcg, 50 mcg, Oral, Daily, Raymond Herrera MD, 50 mcg at 22 0812    Antibiotics:  Anti-Infectives (From admission, onward)    Ordered     Dose/Rate Route Frequency Start Stop    22 1059  cefTRIAXone (ROCEPHIN) 1 g/100 mL 0.9% NS (MBP)        Ordering Provider: Chace Chauhan MD    1 g  over 30 Minutes Intravenous Once 22 1101 22 1244            Review of Systems:  See HPI    Physical Exam:   Vital Signs  Temp (24hrs), Av.2 °F (37.3 °C), Min:98.1 °F (36.7 °C), Max:100.6 °F (38.1 °C)    Temp  Min: 98.1 °F (36.7 °C)  Max: 100.6 °F (38.1 °C)  BP  Min: 97/84  Max: 149/62  Pulse  Min: 77  Max: 98  Resp  Min: 19  Max: 22  SpO2  Min: 91 %  Max: 95 %    GENERAL: Debilitated appearing. He is somnolent and more severely confused.  HEENT: Normocephalic, atraumatic.  PERRL. EOMI. No conjunctival injection. No icterus. Oropharynx clear without evidence of thrush or exudate.  NECK: Supple   HEART: RRR; No murmur  LUNGS: Clear to auscultation bilaterally without wheezing, rales, rhonchi. Normal respiratory effort. Nonlabored.  ABDOMEN: Soft, lower abdominal tenderness, nondistended.  No rebound or guarding.   Skin: His diffuse hemorrhagic ulcerative rash is now markedly improved.  MSK:  FROM, no joint effusions  NEURO: He is severely confused and unable to  follow commands or answer questions  Laboratory Data    Results from last 7 days   Lab Units 04/04/22  0634 04/03/22  2046 04/03/22  0740   WBC 10*3/mm3 9.90 9.55 12.96*   HEMOGLOBIN g/dL 8.2* 8.3* 8.5*   HEMATOCRIT % 26.7* 24.9* 27.4*   PLATELETS 10*3/mm3 369 314 377     Results from last 7 days   Lab Units 04/05/22  1337   SODIUM mmol/L 148*   POTASSIUM mmol/L 4.1   CHLORIDE mmol/L 112*   CO2 mmol/L 20.0*   BUN mg/dL 76*   CREATININE mg/dL 2.38*   GLUCOSE mg/dL 183*   CALCIUM mg/dL 8.3*     Results from last 7 days   Lab Units 04/03/22  2046   ALK PHOS U/L 76   BILIRUBIN mg/dL 0.4   ALT (SGPT) U/L 24   AST (SGOT) U/L 37         Results from last 7 days   Lab Units 03/31/22  0805   CRP mg/dL 6.84*     Results from last 7 days   Lab Units 04/03/22  2046   LACTATE mmol/L 1.7             Estimated Creatinine Clearance: 34.1 mL/min (A) (by C-G formula based on SCr of 2.38 mg/dL (H)).      Microbiology:  Wound cx 3/25: NL skin ananth SF  COVID-19/Flu PCR negative.  2nd wound cx 3/25: growth present but TYTE  Urine Eos Zero        Radiology:  Imaging Results (Last 72 Hours)     Procedure Component Value Units Date/Time    CT Head Without Contrast [[id number]] Collected: 04/03/22 2305     Updated: 04/03/22 2310    Narrative:      EXAMINATION: CT HEAD WO CONTRAST    DATE: 4/3/2022 10:44 PM     INDICATION: Delirium     COMPARISON: CT head, 4/24/2021     TECHNIQUE: Thin section noncontrast axial images were obtained through the head. Coronal reformatted images were created.  CT dose lowering techniques were used, to include: automated exposure control, adjustment for patient size, and or use of iterative   reconstruction.    FINDINGS:  Intracranial contents:    Generalized parenchymal volume loss without lobar predominance. No hydrocephalus.   A few foci of hypoattenuation within periventricular white matter most commonly represent chronic microangiopathy.  There is no midline shift or mass effect. No   hemorrhage, mass  lesion, or evidence of evolving large territorial infarct. Atherosclerosis of the carotid siphons.    Bones and extracranial soft tissues:    Normal calvarium.  Orbits unremarkable. The visualized paranasal sinuses are clear. No mastoid or middle ear effusions. Periodontal disease is partially imaged.        Impression:        1.  No acute intracranial abnormality.    2.  Roughly age commensurate parenchymal volume loss. Mild chronic microvascular changes. Atherosclerosis.      Electronically signed by:  Chace Chin    4/3/2022 9:09 PM Mountain Time            Impression:   1. Vasculitic rash-with palpable purpura.  This rash clinically looks like leukocytoclastic vasculitis.  I suspect that this is secondary to primidone given the apparent shortly after starting on primidone.  His rash is now dramatically better off of primidone and on prednisone. Due to his severe confusional state and the fact that his rash is dramatically improved, I think that it would be prudent to discontinue the steroid therapy.  2. Duodenitis- per CT scan.  He is on PPI therapy.  3. Elevated procalcitonin,  4. Acute kidney injury-a renal biopsy is relatively contraindicated since he has a solitary kidney.  His acute kidney injury has partially improved.  5. Elevated CRP  6. Chronic diastolic heart failure  7. Afib/on Lovenox, recent cardioversion to NSR  8. Type 2 diabetes mellitus/gastroparesis  9. Hypothyroidism  10. Essential hypertension  11. Morbid obesity  12. Prostate cancer/radical prostatectomy  13. Renal cell carcinoma/radical prostatectomy  14. H/o multiple skin cancer excisions  15. Doxycycline (urticaria) allergy  16. Hematuria  17. Left heel decubitus lesion-this is significantly better.  18. Toxic/metabolic encephalopathy-some of this may be secondary to his prednisone.  I discussed his situation with Dr. Conner.  We will plan to discontinue his steroid therapy.    PLAN/RECOMMENDATIONS:  1.  Continue wound care  2.   Continue off of primidone and as many other medications as possible  3.  Offload both heels  4.  Discontinue prednisone  5.  Blood and urine cultures if his temperature is increased over 101°.    I discussed his complex situation with his family again today.  I discussed his situation in detail with Dr. Conner today      Brandt Ward MD  4/5/2022  16:11 EDT

## 2022-04-05 NOTE — PROGRESS NOTES
"   LOS: 11 days    Patient Care Team:  Vivek Mercer DO as PCP - General (Family Medicine)    Reason For Visit:  F/U SALEEM ON CKD  Subjective     No new labs this morning. Patient is delirious generalize tremors worse. Not getting sleep at night       Review of Systems:    Pulm: No soa   CV:  No CP      Objective     amiodarone, 200 mg, Oral, Q8H   Followed by  [START ON 4/6/2022] amiodarone, 200 mg, Oral, Q12H   Followed by  [START ON 4/20/2022] amiodarone, 200 mg, Oral, Daily  atorvastatin, 40 mg, Oral, Nightly  buprenorphine-naloxone, 2 tablet, Sublingual, Nightly  DULoxetine, 90 mg, Oral, Daily  enoxaparin, 110 mg, Subcutaneous, Q12H  insulin detemir, 10 Units, Subcutaneous, Nightly  insulin lispro, 0-9 Units, Subcutaneous, TID AC  levothyroxine, 150 mcg, Oral, Daily  melatonin, 5 mg, Oral, Nightly  methohexital, , ,   metoprolol tartrate, 50 mg, Oral, Q8H  midazolam, , ,   mirtazapine, 7.5 mg, Oral, Nightly  pantoprazole, 40 mg, Oral, QAM  polyethylene glycol, 17 g, Oral, Daily  QUEtiapine, 25 mg, Oral, Q12H  senna-docusate sodium, 2 tablet, Oral, BID  sodium chloride, 10 mL, Intravenous, Q12H  temazepam, 15 mg, Oral, Nightly  cyanocobalamin, 50 mcg, Oral, Daily             Vital Signs:  Blood pressure 142/90, pulse 98, temperature (!) 100.6 °F (38.1 °C), temperature source Axillary, resp. rate 22, height 175 cm (68.9\"), weight 119 kg (262 lb 5.6 oz), SpO2 95 %.    Flowsheet Rows    Flowsheet Row First Filed Value   Admission Height 175.3 cm (69\") Documented at 03/25/2022 0849   Admission Weight 113 kg (250 lb) Documented at 03/25/2022 0849          04/04 0701 - 04/05 0700  In: 60 [P.O.:60]  Out: -     Physical Exam:    General Appearance: NAD, alert and cooperative, Ox3  Eyes: PER, conjunctivae and sclerae normal, no icterus  Lungs: respirations regular and unlabored, no crepitus, clear to auscultation  Heart/CV: regular rhythm & normal rate, no murmur, no gallop, no rub and 1+ edema  Abdomen: not " distended, soft, non-tender, no masses,  bowel sounds present  Skin:  Rash BETTER., Warm and dry    Radiology:            Labs:  Results from last 7 days   Lab Units 04/04/22  0634 04/03/22 2046 04/03/22  0740   WBC 10*3/mm3 9.90 9.55 12.96*   HEMOGLOBIN g/dL 8.2* 8.3* 8.5*   HEMATOCRIT % 26.7* 24.9* 27.4*   PLATELETS 10*3/mm3 369 314 377     Results from last 7 days   Lab Units 04/04/22  0634 04/03/22 2046 04/03/22  0740 04/02/22  1038 04/01/22  1227 03/31/22  0805   SODIUM mmol/L 138 138 138 133* 135* 133*   POTASSIUM mmol/L 4.8 5.2 4.4 4.5 5.0 4.9   CHLORIDE mmol/L 104 102 103 101 103 100   CO2 mmol/L 22.0 22.0 21.0* 19.0* 20.0* 20.0*   BUN mg/dL 83* 78* 75* 73* 63* 70*   CREATININE mg/dL 2.35* 2.77* 2.62* 2.60* 2.33* 2.60*   CALCIUM mg/dL 8.4* 8.2* 8.4* 7.9* 7.9* 7.8*   ALBUMIN g/dL  --  2.70*  --   --  2.40* 2.40*     Results from last 7 days   Lab Units 04/04/22  0634   GLUCOSE mg/dL 198*       Results from last 7 days   Lab Units 04/03/22 2046   ALK PHOS U/L 76   BILIRUBIN mg/dL 0.4   ALT (SGPT) U/L 24   AST (SGOT) U/L 37     Results from last 7 days   Lab Units 04/03/22 2056   PH, ARTERIAL pH units 7.381   PO2 ART mm Hg 70.0*   PCO2, ARTERIAL mm Hg 37.4   HCO3 ART mmol/L 22.2       Results from last 7 days   Lab Units 04/04/22  0045   COLOR UA  Yellow   CLARITY UA  Clear   PH, URINE  6.0   SPECIFIC GRAVITY, URINE  1.015   GLUCOSE UA  Negative   KETONES UA  Negative   BILIRUBIN UA  Negative   PROTEIN UA  Negative   BLOOD UA  Moderate (2+)*   LEUKOCYTES UA  Trace*   NITRITE UA  Negative       Estimated Creatinine Clearance: 34.5 mL/min (A) (by C-G formula based on SCr of 2.35 mg/dL (H)).      Assessment       Benign essential tremor    Hypothyroidism (acquired)    Paroxysmal A-fib (on eliquis & metoprolol)    Insulin dependent type 2 diabetes mellitus (HCC)    Chronic back pain (on chronic prescription lortab)    Acute renal failure, unspecified acute renal failure type (HCC)    Rash    Nausea and  vomiting            Impression: Shaila on CKD stage III: baseline cr~ 1.5mg/dl. Cr on this admission btw 2.3-2.7mg/dl. UA large blood 24hr urine protein 380mg. Etiology of SHAILA unspecified. Concern for possible drug eruption and possible AIN vs Systemic Ig-A vasculitis.   Serologies negative so far.     CKD stage III: Hx of solitary kidney. [facility].     Skin rash: Palpable purpura. Suspected drug rash vs Skin vasculitis.   Started on prednisone per primary service.    Hyperkalemia: resolved    Hyponatremia: Resolved    Volume status: Dependent edema significantly improved. Diuretics stopped on 4/3/22    Afib: s/p LARRY cardioversion      Recommendations: . Relative contraindication to pursue renal biopsy given solitary kidney and high risk for bleeding. High suspicion for AIN in the setting of drug eruption. Less likely systemic IgA vasculitis w renal involvement given no significant proteinuria. Started on prednisone per primary service. However steroids might be contributing to worsening of mental status. Developing delirium.  Francisco Atkinson MD  04/05/22  13:13 EDT

## 2022-04-05 NOTE — PROGRESS NOTES
"                                 [facility] Progress Note      LOS: 11 days   Patient Care Team:  iVvek Mercer DO as PCP - General (Family Medicine)    Chief Complaint:    Chief Complaint   Patient presents with   • Dizziness   • Vomiting       Subjective     Interval History: Heart rate approximately 100 bpm but sinus.  Overnight events noted - continued today with Significant agitation, tremors with discontinuation of primidone. Family at bedside.      Review of Systems:   A 14 point review of systems was negative except as was stated in the HPI      Objective     Vital Sign Min/Max for last 24 hours  Temp  Min: 98.1 °F (36.7 °C)  Max: 99.6 °F (37.6 °C)   BP  Min: 97/84  Max: 149/62   Pulse  Min: 78  Max: 92   Resp  Min: 19  Max: 20   SpO2  Min: 95 %  Max: 95 %   No data recorded   No data recorded     Flowsheet Rows    Flowsheet Row First Filed Value   Admission Height 175.3 cm (69\") Documented at 03/25/2022 0849   Admission Weight 113 kg (250 lb) Documented at 03/25/2022 0849          Physical Exam:  General Appearance: Alert, appears stated age, agitated, tremors  Lungs: Clear to auscultation  Heart:: Tachycardic rate and rhythm; no Murmurs, Rubs or Gallops  Abdomen: Soft and nontender with adequate bowel sounds.  No organomegaly  Extremities: 1+ pitting edema  Pulses: Pulses palpable and equal bilaterally  Skin: Rash noted  Psych: agitated     Results Review:     I reviewed the patient's new clinical results.  Results from last 7 days   Lab Units 04/04/22 0634 04/03/22 2046 04/03/22  0740   SODIUM mmol/L 138 138 138   POTASSIUM mmol/L 4.8 5.2 4.4   CHLORIDE mmol/L 104 102 103   CO2 mmol/L 22.0 22.0 21.0*   BUN mg/dL 83* 78* 75*   CREATININE mg/dL 2.35* 2.77* 2.62*   GLUCOSE mg/dL 198* 232* 157*   CALCIUM mg/dL 8.4* 8.2* 8.4*     Results from last 7 days   Lab Units 04/04/22 0634 04/03/22 2046 04/03/22  0740   WBC 10*3/mm3 9.90 9.55 12.96*   HEMOGLOBIN g/dL 8.2* 8.3* 8.5* "   HEMATOCRIT % 26.7* 24.9* 27.4*   PLATELETS 10*3/mm3 369 314 377     Lab Results   Lab Value Date/Time    TROPONINT 0.026 04/24/2021 0708             Results from last 7 days   Lab Units 04/03/22 2056   PH, ARTERIAL pH units 7.381   PO2 ART mm Hg 70.0*   PCO2, ARTERIAL mm Hg 37.4   HCO3 ART mmol/L 22.2           Medication Review: yes  Current Facility-Administered Medications   Medication Dose Route Frequency Provider Last Rate Last Admin   • acetaminophen (TYLENOL) tablet 650 mg  650 mg Oral Q4H PRN Raymond Herrera MD   650 mg at 04/02/22 0823    Or   • acetaminophen (TYLENOL) 160 MG/5ML solution 650 mg  650 mg Oral Q4H PRN Raymond Herrera MD   649.6 mg at 03/27/22 0943    Or   • acetaminophen (TYLENOL) suppository 650 mg  650 mg Rectal Q4H PRN Raymond Herrera MD       • amiodarone (PACERONE) tablet 200 mg  200 mg Oral Q8H Raymond Herrera MD   200 mg at 04/04/22 1710    Followed by   • [START ON 4/6/2022] amiodarone (PACERONE) tablet 200 mg  200 mg Oral Q12H Raymond Herrera MD        Followed by   • [START ON 4/20/2022] amiodarone (PACERONE) tablet 200 mg  200 mg Oral Daily Raymond Herrera MD       • atorvastatin (LIPITOR) tablet 40 mg  40 mg Oral Nightly Raymond Herrera MD   40 mg at 04/04/22 2119   • buprenorphine-naloxone (SUBOXONE) 8-2 MG per SL tablet 2 tablet  2 tablet Sublingual Nightly Raymond Herrera MD   2 tablet at 04/04/22 2131   • dextrose (D50W) (25 g/50 mL) IV injection 25 g  25 g Intravenous Q15 Min PRN Raymond Herrera MD       • dextrose (GLUTOSE) oral gel 15 g  15 g Oral Q15 Min PRN Raymond Herrera MD       • DULoxetine (CYMBALTA) DR capsule 90 mg  90 mg Oral Daily Raymond Herrera MD   90 mg at 04/04/22 0812   • enoxaparin (LOVENOX) syringe 110 mg  110 mg Subcutaneous Q12H Yinka Brush IV, PharmD   110 mg at 04/05/22 0516   • glucagon (human recombinant) (GLUCAGEN DIAGNOSTIC) injection 1 mg  1 mg Intramuscular Q15 Min Raymond Wlalis MD       • insulin detemir (LEVEMIR)  injection 10 Units  10 Units Subcutaneous Nightly Yinka Desai MD   10 Units at 04/04/22 2133   • insulin lispro (humaLOG) injection 0-9 Units  0-9 Units Subcutaneous TID AC Raymond Herrera MD   4 Units at 04/04/22 1743   • levothyroxine (SYNTHROID, LEVOTHROID) tablet 150 mcg  150 mcg Oral Daily Raymond Herrera MD   150 mcg at 04/04/22 0812   • melatonin tablet 5 mg  5 mg Oral Nightly PRN Raymond Herrera MD   5 mg at 04/01/22 2018   • methohexital (BREVITAL) injection  - ADS Override Pull            • metoprolol tartrate (LOPRESSOR) tablet 50 mg  50 mg Oral Q8H Raymond Herrera MD   50 mg at 04/04/22 2119   • midazolam (VERSED) 2 MG/2ML injection  - ADS Override Pull            • mirtazapine (REMERON) tablet 7.5 mg  7.5 mg Oral Nightly Raymond Herrera MD   7.5 mg at 04/04/22 2119   • ondansetron (ZOFRAN) tablet 4 mg  4 mg Oral Q6H PRN Raymond Herrera MD        Or   • ondansetron (ZOFRAN) injection 4 mg  4 mg Intravenous Q6H PRN Raymond Herrera MD   4 mg at 03/30/22 1403   • palliative care oral rinse   Mouth/Throat PRN Yinka Desai MD   Given at 04/05/22 0940   • pantoprazole (PROTONIX) EC tablet 40 mg  40 mg Oral QAM Raymond Herrera MD   40 mg at 04/04/22 0554   • polyethylene glycol (MIRALAX) packet 17 g  17 g Oral Daily Raymond Herrera MD   17 g at 04/04/22 1744   • QUEtiapine (SEROquel) tablet 25 mg  25 mg Oral Q12H Wili Conner DO       • sennosides-docusate (PERICOLACE) 8.6-50 MG per tablet 2 tablet  2 tablet Oral BID Yinka Desai MD   2 tablet at 04/04/22 2119   • Sodium Chloride (PF) 0.9 % 10 mL  10 mL Intravenous PRN Raymond Herrera MD       • sodium chloride 0.9 % flush 10 mL  10 mL Intravenous Q12H Raymond Herrera MD   10 mL at 04/04/22 2119   • sodium chloride 0.9 % flush 10 mL  10 mL Intravenous PRN Raymond Herrera MD   10 mL at 04/02/22 0830   • vitamin B-12 (CYANOCOBALAMIN) tablet 50 mcg  50 mcg Oral Daily Raymond Herrera MD   50 mcg at 04/04/22 0812         Benign  essential tremor    Hypothyroidism (acquired)    Paroxysmal A-fib (on eliquis & metoprolol)    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  Would plan for only short-term and amiodarone due to propensity for hypothyroidism with this drug in the past  Anticoagulation with Lovenox continues  Okay with slightly elevated rates with current agitated state.      Ani Mari PA-C working with Tenzin Johnson MD   04/05/22  11:48 EDT

## 2022-04-05 NOTE — NURSING NOTE
Wocn follow up for: Bilateral lower extremity vasculitis    Assessment: For the most part the wounds are healing the scabs are very superficial and flaking off revealing healed epithelium underneath.  There is a spots on the left heel left lateral foot and the left anterior shin that present with some necrotic areas yellow on the shin black eschar on the foot.  We will continue to watch this foot completely although now it does not look like a blister is very very hard indicating hardened blood underneath.  The right leg is not as far advanced at the scabs are still attached to skin there are some open blisters and drainage from the right posterior calf.                      Improvement: Overall yes    Recommendations/ changes: No recommended changes to dressing orders    Areas of concern/ new issues: No new issues.    Skin interventions in place.    Specialty bed: No sign of buried bed with a Dolphin mattress and LORENZO Topper.    Pressure Injury Protocol (initiate for Dane Score of 18 or less):   *Maintain good skin care, keep dry, turn q 2 hr, keep heels elevated and offloaded with heel boots.    *Apply z-guard to sacrococcygeal area/ perineal area BID or daily and PRN per incontinent episodes.  *Follow C.A.R.E protocol if medical devices (Bipap, christian, Ng tube, etc) are being used.     Woc will follow.    Please contact with questions or concerns.

## 2022-04-06 LAB
ALBUMIN SERPL-MCNC: 2.8 G/DL (ref 3.5–5.2)
ANION GAP SERPL CALCULATED.3IONS-SCNC: 11 MMOL/L (ref 5–15)
BASOPHILS # BLD AUTO: 0.03 10*3/MM3 (ref 0–0.2)
BASOPHILS NFR BLD AUTO: 0.4 % (ref 0–1.5)
BUN SERPL-MCNC: 69 MG/DL (ref 8–23)
BUN/CREAT SERPL: 30.8 (ref 7–25)
CALCIUM SPEC-SCNC: 8.1 MG/DL (ref 8.6–10.5)
CHLORIDE SERPL-SCNC: 119 MMOL/L (ref 98–107)
CO2 SERPL-SCNC: 24 MMOL/L (ref 22–29)
CREAT SERPL-MCNC: 2.24 MG/DL (ref 0.76–1.27)
DEPRECATED RDW RBC AUTO: 55.4 FL (ref 37–54)
EGFRCR SERPLBLD CKD-EPI 2021: 29.8 ML/MIN/1.73
EOSINOPHIL # BLD AUTO: 0.11 10*3/MM3 (ref 0–0.4)
EOSINOPHIL NFR BLD AUTO: 1.5 % (ref 0.3–6.2)
ERYTHROCYTE [DISTWIDTH] IN BLOOD BY AUTOMATED COUNT: 15.6 % (ref 12.3–15.4)
GLUCOSE BLDC GLUCOMTR-MCNC: 161 MG/DL (ref 70–130)
GLUCOSE BLDC GLUCOMTR-MCNC: 247 MG/DL (ref 70–130)
GLUCOSE BLDC GLUCOMTR-MCNC: 248 MG/DL (ref 70–130)
GLUCOSE BLDC GLUCOMTR-MCNC: 310 MG/DL (ref 70–130)
GLUCOSE BLDC GLUCOMTR-MCNC: 339 MG/DL (ref 70–130)
GLUCOSE SERPL-MCNC: 147 MG/DL (ref 65–99)
HCT VFR BLD AUTO: 26.5 % (ref 37.5–51)
HGB BLD-MCNC: 7.9 G/DL (ref 13–17.7)
IMM GRANULOCYTES # BLD AUTO: 0.13 10*3/MM3 (ref 0–0.05)
IMM GRANULOCYTES NFR BLD AUTO: 1.7 % (ref 0–0.5)
LYMPHOCYTES # BLD AUTO: 1.63 10*3/MM3 (ref 0.7–3.1)
LYMPHOCYTES NFR BLD AUTO: 21.6 % (ref 19.6–45.3)
MCH RBC QN AUTO: 29.4 PG (ref 26.6–33)
MCHC RBC AUTO-ENTMCNC: 29.8 G/DL (ref 31.5–35.7)
MCV RBC AUTO: 98.5 FL (ref 79–97)
MONOCYTES # BLD AUTO: 0.73 10*3/MM3 (ref 0.1–0.9)
MONOCYTES NFR BLD AUTO: 9.7 % (ref 5–12)
NEUTROPHILS NFR BLD AUTO: 4.92 10*3/MM3 (ref 1.7–7)
NEUTROPHILS NFR BLD AUTO: 65.1 % (ref 42.7–76)
NRBC BLD AUTO-RTO: 0 /100 WBC (ref 0–0.2)
PHOSPHATE SERPL-MCNC: 3.5 MG/DL (ref 2.5–4.5)
PLATELET # BLD AUTO: 261 10*3/MM3 (ref 140–450)
PMV BLD AUTO: 9.3 FL (ref 6–12)
POTASSIUM SERPL-SCNC: 4.4 MMOL/L (ref 3.5–5.2)
RBC # BLD AUTO: 2.69 10*6/MM3 (ref 4.14–5.8)
SODIUM SERPL-SCNC: 154 MMOL/L (ref 136–145)
WBC NRBC COR # BLD: 7.55 10*3/MM3 (ref 3.4–10.8)

## 2022-04-06 PROCEDURE — 63710000001 INSULIN LISPRO (HUMAN) PER 5 UNITS: Performed by: INTERNAL MEDICINE

## 2022-04-06 PROCEDURE — 63710000001 INSULIN DETEMIR PER 5 UNITS: Performed by: INTERNAL MEDICINE

## 2022-04-06 PROCEDURE — 25010000002 ENOXAPARIN PER 10 MG

## 2022-04-06 PROCEDURE — 97530 THERAPEUTIC ACTIVITIES: CPT

## 2022-04-06 PROCEDURE — 25010000002 ONDANSETRON PER 1 MG: Performed by: INTERNAL MEDICINE

## 2022-04-06 PROCEDURE — 80069 RENAL FUNCTION PANEL: CPT | Performed by: INTERNAL MEDICINE

## 2022-04-06 PROCEDURE — 97110 THERAPEUTIC EXERCISES: CPT

## 2022-04-06 PROCEDURE — 82962 GLUCOSE BLOOD TEST: CPT

## 2022-04-06 PROCEDURE — 92610 EVALUATE SWALLOWING FUNCTION: CPT

## 2022-04-06 PROCEDURE — 85025 COMPLETE CBC W/AUTO DIFF WBC: CPT | Performed by: INTERNAL MEDICINE

## 2022-04-06 PROCEDURE — 99232 SBSQ HOSP IP/OBS MODERATE 35: CPT | Performed by: INTERNAL MEDICINE

## 2022-04-06 PROCEDURE — 99231 SBSQ HOSP IP/OBS SF/LOW 25: CPT | Performed by: PSYCHIATRY & NEUROLOGY

## 2022-04-06 RX ORDER — SODIUM CHLORIDE 450 MG/100ML
100 INJECTION, SOLUTION INTRAVENOUS CONTINUOUS
Status: DISCONTINUED | OUTPATIENT
Start: 2022-04-06 | End: 2022-04-11

## 2022-04-06 RX ORDER — DEXTROSE AND SODIUM CHLORIDE 5; .45 G/100ML; G/100ML
50 INJECTION, SOLUTION INTRAVENOUS CONTINUOUS
Status: DISCONTINUED | OUTPATIENT
Start: 2022-04-06 | End: 2022-04-06

## 2022-04-06 RX ORDER — DEXTROSE MONOHYDRATE 50 MG/ML
100 INJECTION, SOLUTION INTRAVENOUS CONTINUOUS
Status: DISCONTINUED | OUTPATIENT
Start: 2022-04-06 | End: 2022-04-06

## 2022-04-06 RX ORDER — DIPHENHYDRAMINE HYDROCHLORIDE AND LIDOCAINE HYDROCHLORIDE AND ALUMINUM HYDROXIDE AND MAGNESIUM HYDRO
5 KIT EVERY 6 HOURS
Status: DISCONTINUED | OUTPATIENT
Start: 2022-04-06 | End: 2022-04-07

## 2022-04-06 RX ADMIN — INSULIN LISPRO 7 UNITS: 100 INJECTION, SOLUTION INTRAVENOUS; SUBCUTANEOUS at 12:25

## 2022-04-06 RX ADMIN — DIPHENHYDRAMINE HYDROCHLORIDE AND LIDOCAINE HYDROCHLORIDE AND ALUMINUM HYDROXIDE AND MAGNESIUM HYDRO 5 ML: KIT at 17:54

## 2022-04-06 RX ADMIN — ENOXAPARIN SODIUM 110 MG: 120 INJECTION SUBCUTANEOUS at 08:38

## 2022-04-06 RX ADMIN — QUETIAPINE FUMARATE 25 MG: 25 TABLET ORAL at 22:48

## 2022-04-06 RX ADMIN — ATORVASTATIN CALCIUM 40 MG: 40 TABLET, FILM COATED ORAL at 22:48

## 2022-04-06 RX ADMIN — METOPROLOL TARTRATE 50 MG: 50 TABLET, FILM COATED ORAL at 22:46

## 2022-04-06 RX ADMIN — LEVOTHYROXINE SODIUM 150 MCG: 150 TABLET ORAL at 11:59

## 2022-04-06 RX ADMIN — AMIODARONE HYDROCHLORIDE 200 MG: 200 TABLET ORAL at 22:54

## 2022-04-06 RX ADMIN — MINERAL OIL: 1000 LIQUID ORAL at 12:32

## 2022-04-06 RX ADMIN — AMIODARONE HYDROCHLORIDE 200 MG: 200 TABLET ORAL at 12:28

## 2022-04-06 RX ADMIN — SODIUM CHLORIDE 100 ML/HR: 4.5 INJECTION, SOLUTION INTRAVENOUS at 14:48

## 2022-04-06 RX ADMIN — VITAM B12 50 MCG: 100 TAB at 12:27

## 2022-04-06 RX ADMIN — WHITE PETROLATUM 1 APPLICATION: 1.75 OINTMENT TOPICAL at 22:45

## 2022-04-06 RX ADMIN — MIRTAZAPINE 7.5 MG: 15 TABLET, FILM COATED ORAL at 22:48

## 2022-04-06 RX ADMIN — INSULIN LISPRO 2 UNITS: 100 INJECTION, SOLUTION INTRAVENOUS; SUBCUTANEOUS at 08:28

## 2022-04-06 RX ADMIN — INSULIN LISPRO 4 UNITS: 100 INJECTION, SOLUTION INTRAVENOUS; SUBCUTANEOUS at 17:54

## 2022-04-06 RX ADMIN — Medication 10 ML: at 08:39

## 2022-04-06 RX ADMIN — DULOXETINE HYDROCHLORIDE 90 MG: 30 CAPSULE, DELAYED RELEASE ORAL at 11:58

## 2022-04-06 RX ADMIN — QUETIAPINE FUMARATE 25 MG: 25 TABLET ORAL at 11:59

## 2022-04-06 RX ADMIN — METOPROLOL TARTRATE 50 MG: 50 TABLET, FILM COATED ORAL at 11:59

## 2022-04-06 RX ADMIN — ONDANSETRON 4 MG: 2 INJECTION INTRAMUSCULAR; INTRAVENOUS at 23:24

## 2022-04-06 RX ADMIN — Medication 10 ML: at 22:49

## 2022-04-06 RX ADMIN — DIPHENHYDRAMINE HYDROCHLORIDE AND LIDOCAINE HYDROCHLORIDE AND ALUMINUM HYDROXIDE AND MAGNESIUM HYDRO 5 ML: KIT at 12:47

## 2022-04-06 RX ADMIN — ACETAMINOPHEN 650 MG: 325 TABLET ORAL at 23:24

## 2022-04-06 RX ADMIN — Medication 5 MG: at 22:48

## 2022-04-06 RX ADMIN — INSULIN DETEMIR 10 UNITS: 100 INJECTION, SOLUTION SUBCUTANEOUS at 22:48

## 2022-04-06 RX ADMIN — SENNOSIDES AND DOCUSATE SODIUM 2 TABLET: 50; 8.6 TABLET ORAL at 11:59

## 2022-04-06 RX ADMIN — BUPRENORPHINE AND NALOXONE 2 TABLET: 8; 2 TABLET SUBLINGUAL at 22:48

## 2022-04-06 RX ADMIN — TEMAZEPAM 15 MG: 15 CAPSULE ORAL at 22:48

## 2022-04-06 RX ADMIN — DIPHENHYDRAMINE HYDROCHLORIDE AND LIDOCAINE HYDROCHLORIDE AND ALUMINUM HYDROXIDE AND MAGNESIUM HYDRO 5 ML: KIT at 22:46

## 2022-04-06 RX ADMIN — WHITE PETROLATUM 1 APPLICATION: 1.75 OINTMENT TOPICAL at 12:54

## 2022-04-06 RX ADMIN — DEXTROSE AND SODIUM CHLORIDE 50 ML/HR: 5; 450 INJECTION, SOLUTION INTRAVENOUS at 08:28

## 2022-04-06 RX ADMIN — ENOXAPARIN SODIUM 110 MG: 120 INJECTION SUBCUTANEOUS at 17:54

## 2022-04-06 NOTE — PROGRESS NOTES
[facility] Medicine Services  PROGRESS NOTE    Patient Name: Yinka Cumminsg  : 1945  MRN: [id number]    Date of Admission: 3/25/2022  Primary Care Physician: Vivek Mercer,     Subjective   Subjective     CC:  Follow-up delirium    HPI:  Slept well last night and mentation has significantly improved.  Today states he does not member the last several days but feels completely well and massively improved.  Wife at bedside says he is essentially normal from a mentation standpoint.  No clear evidence of return of rash    ROS:  Gen- No fevers, chills  CV- No chest pain, palpitations  Resp- No cough, dyspnea  GI- No N/V/D, abd pain    Objective   Objective     Vital Signs:   Temp:  [98.1 °F (36.7 °C)-100.6 °F (38.1 °C)] 98.1 °F (36.7 °C)  Heart Rate:  [] 73  Resp:  [18-22] 18  BP: (130-148)/(53-90) 148/56  Flow (L/min):  [2-4] 2     Physical Exam:  Constitutional: Awake, alert, chronically ill-appearing male laying in bed, cooperative interactive  HENT: NCAT, mucous membranes moist, pinpoint pupils  Respiratory: Clear to auscultation bilaterally, respiratory effort normal   Cardiovascular: RRR, no murmurs, rubs, or gallops, palpable radial pulses  Gastrointestinal: Positive bowel sounds, soft, nontender, nondistended  Musculoskeletal: No bilateral ankle edema  Psychiatric: Appropriate affect, cooperative  Neurologic: Speech clear, moving all extremity spontaneously    Results Reviewed:  LAB RESULTS:      Lab 22  0540 22  0634 22  2046 22  0740 22  1038 22  0805   WBC 7.55 9.90 9.55 12.96* 14.84* 15.35*   HEMOGLOBIN 7.9* 8.2* 8.3* 8.5* 8.0* 9.6*   HEMATOCRIT 26.5* 26.7* 24.9* 27.4* 24.1* 30.0*   PLATELETS 261 369 314 377 316 337   NEUTROS ABS 4.92  --   --  9.75*  --  12.41*   IMMATURE GRANS (ABS) 0.13*  --   --  0.15*  --  0.30*   LYMPHS ABS 1.63  --   --  1.69  --  1.27   MONOS ABS 0.73  --   --  1.33*  --  1.22*   EOS ABS 0.11  --    --  0.02  --  0.08   MCV 98.5* 95.4 90.2 97.9* 88.3 93.8   CRP  --   --   --   --   --  6.84*   LACTATE  --   --  1.7  --   --   --    HEPARIN ANTI-XA  --   --   --   --  1.17  --          Lab 04/06/22  0540 04/05/22  1337 04/04/22  0634 04/03/22 2046 04/03/22  0740   SODIUM 154* 148* 138 138 138   POTASSIUM 4.4 4.1 4.8 5.2 4.4   CHLORIDE 119* 112* 104 102 103   CO2 24.0 20.0* 22.0 22.0 21.0*   ANION GAP 11.0 16.0* 12.0 14.0 14.0   BUN 69* 76* 83* 78* 75*   CREATININE 2.24* 2.38* 2.35* 2.77* 2.62*   EGFR 29.8* 27.7* 28.2* 23.1* 24.7*   GLUCOSE 147* 183* 198* 232* 157*   CALCIUM 8.1* 8.3* 8.4* 8.2* 8.4*   PHOSPHORUS 3.5 3.3  --   --   --          Lab 04/06/22  0540 04/05/22 1337 04/03/22 2046 04/01/22  1227 03/31/22  0805   TOTAL PROTEIN  --   --  5.6* 4.8* 4.6*   ALBUMIN 2.80* 2.70* 2.70* 2.40* 2.40*   GLOBULIN  --   --  2.9 2.4 2.2   ALT (SGPT)  --   --  24 12 14   AST (SGOT)  --   --  37 16 16   BILIRUBIN  --   --  0.4 0.3 0.3   ALK PHOS  --   --  76 80 106                     Lab 04/03/22 2056   PH, ARTERIAL 7.381   PCO2, ARTERIAL 37.4   PO2 ART 70.0*   FIO2 21   HCO3 ART 22.2   BASE EXCESS ART -2.7*   CARBOXYHEMOGLOBIN 1.3     Brief Urine Lab Results  (Last result in the past 365 days)      Color   Clarity   Blood   Leuk Est   Nitrite   Protein   CREAT   Urine HCG        04/04/22 0045 Yellow   Clear   Moderate (2+)   Trace   Negative   Negative                 Microbiology Results Abnormal     Procedure Component Value - Date/Time    Wound Culture - Wound, Leg, Left [[id number]] Collected: 03/25/22 0918    Lab Status: Final result Specimen: Wound from Leg, Left Updated: 03/27/22 0903     Wound Culture Light growth (2+) Normal Skin Cami     Gram Stain Rare (1+) WBCs seen      Moderate (3+) Gram positive cocci in pairs and clusters    Eosinophil Smear - Urine, Urine, Clean Catch [[id number]]  (Normal) Collected: 03/26/22 1027    Lab Status: Final result Specimen: Urine, Clean Catch Updated: 03/26/22 1113      Eosinophil Smear 0 % EOS/100 Cells     Narrative:      No eosinophil seen    COVID-19 and FLU A/B PCR - Swab, Nasopharynx [[id number]]  (Normal) Collected: 03/25/22 0918    Lab Status: Final result Specimen: Swab from Nasopharynx Updated: 03/25/22 0955     COVID19 Not Detected     Influenza A PCR Not Detected     Influenza B PCR Not Detected    Narrative:      Fact sheet for providers: https://www.fda.gov/media/227427/download    Fact sheet for patients: https://www.fda.gov/media/414495/download    Test performed by PCR.          No radiology results from the last 24 hrs    Results for orders placed during the hospital encounter of 03/25/22    Adult Transesophageal Echo (LARRY) W/ Cont if Necessary Per Protocol    Interpretation Summary  · Estimated left ventricular EF = 65%  · The cardiac valves are anatomically and functionally normal.  · No evidence of a left atrial appendage thrombus was present.      I have reviewed the medications:  Scheduled Meds:amiodarone, 200 mg, Oral, Q8H   Followed by  amiodarone, 200 mg, Oral, Q12H   Followed by  [START ON 4/20/2022] amiodarone, 200 mg, Oral, Daily  atorvastatin, 40 mg, Oral, Nightly  buprenorphine-naloxone, 2 tablet, Sublingual, Nightly  DULoxetine, 90 mg, Oral, Daily  enoxaparin, 110 mg, Subcutaneous, Q12H  insulin detemir, 10 Units, Subcutaneous, Nightly  insulin lispro, 0-9 Units, Subcutaneous, TID AC  levothyroxine, 150 mcg, Oral, Daily  melatonin, 5 mg, Oral, Nightly  methohexital, , ,   metoprolol tartrate, 50 mg, Oral, Q8H  midazolam, , ,   mirtazapine, 7.5 mg, Oral, Nightly  pantoprazole, 40 mg, Oral, QAM  polyethylene glycol, 17 g, Oral, Daily  QUEtiapine, 25 mg, Oral, Q12H  senna-docusate sodium, 2 tablet, Oral, BID  sodium chloride, 10 mL, Intravenous, Q12H  temazepam, 15 mg, Oral, Nightly  cyanocobalamin, 50 mcg, Oral, Daily      Continuous Infusions:   PRN Meds:.•  acetaminophen **OR** acetaminophen **OR** acetaminophen  •  dextrose  •  dextrose  •  glucagon  (human recombinant)  •  haloperidol  •  ondansetron **OR** ondansetron  •  palliative care oral rinse  •  Sodium Chloride (PF)  •  sodium chloride    Assessment/Plan   Assessment & Plan     Active Hospital Problems    Diagnosis  POA   • **Rash [R21]  Yes   • Acute renal failure, unspecified acute renal failure type (HCC) [N17.9]  Yes   • Nausea and vomiting [R11.2]  Unknown   • Paroxysmal A-fib (on eliquis & metoprolol) [I48.0]  Yes   • 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        Brief Hospital Course to date:  Yinka Cummings is a 77 y.o. 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    Assessment/plan    Vasculitic rash,? 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  -Started on steroids 4/1 with  "significant improvement in rash, however precipitated worsening insomnia and delirium, now monitoring off steroids without evidence of recurrent rash    Acute encephalopathy, multifactorial  Sleep deprivation psychosis/hospital associated delirium  -Neurology adjusted medications yesterday, slept well overnight, mentation essentially normalized today    Acute renal dysfunction on CKD 3, improved  Hx RCC s/p nephrectomy  -Per documentation [facility] records report baseline CR 1.5-1.7 as recent as 3/20/22  -Nephrology follows  -Renal function relatively stable at current level in the mid two range, possible new baseline    Atrial fibrillation/flutter  -Recently Dabigatran was DC'd due to rash; currently on full dose Lovenox with plan to transition to NOAC after 30-day \"washout\" from discontinuation of primidone (concern for drug-drug interaction)  -Cardiology follows, s/p ECV 4/1/22; cardiology planning short course amiodarone with ultimate discontinuation (personal Hx thyroid abnormalities on the same)  -Continue Lopressor    Chronic pain syndrome  -Recently transitioned to Suboxone from Lortab by the [facility] due to concern for contribution to rash; will continue here    DM type 2, A1c 7.9%, with long-term use of insulin  -Continue Levemir, SSI    Hypothyroidism  Hypertension  Hyperlipidemia  Mood disorder  -Continue atorvastatin, duloxetine, Lopressor    Severe tremor  -Primidone recently DC'd for rash  -Prescribed Sinemet recently but has not yet been able to start  -[facility]-up with [facility]    Chronic normocytic anemia  -Relatively stable, monitor    Abnormal abdominal CT  -Per documentation questionable Clif versus duodenitis, potential bibasilar consolidation; ID follows, monitoring off antibiotics    VINI  -Documented as noncompliant with CPAP    Obesity, BMI 38.86 kg/M2  -Complicates all aspects of care      DVT prophylaxis:  Medical DVT prophylaxis orders are present.       AM-PAC 6 Clicks Score (PT): 8 (04/04/22 " 1157)    Disposition: Mentation rapidly at impressively improved with good night sleep, okay to proceed with rehab placement    CODE STATUS:   Code Status and Medical Interventions:   Ordered at: 03/25/22 1328     Level Of Support Discussed With:    Patient     Code Status (Patient has no pulse and is not breathing):    CPR (Attempt to Resuscitate)     Medical Interventions (Patient has pulse or is breathing):    Full Support       Wili Conner, DO  04/06/22

## 2022-04-06 NOTE — PLAN OF CARE
Goal Outcome Evaluation:  Plan of Care Reviewed With: patient, significant other            SLP evaluation completed. Will  plan for MBS tomorrow 4/7  . Please see note for further details and recommendations.

## 2022-04-06 NOTE — CASE MANAGEMENT/SOCIAL WORK
Continued Stay Note  [facility]     Patient Name: Yinka Cummings  MRN: [id number]  Today's Date: 4/6/2022    Admit Date: 3/25/2022     Discharge Plan     Row Name 04/06/22 1508       Plan    Plan Meade Oglesby    Plan Comments Patient awake and talkative when I spoke with him and his wife this afternoon. I explained that rehab is arranged at [facility]. He was hoping just to go home, but agrees to rehab. Await readiness to transfer.    Final Discharge Disposition Code 03 - skilled nursing facility (SNF)               Discharge Codes    No documentation.               Expected Discharge Date and Time     Expected Discharge Date Expected Discharge Time    Apr 7, 2022             Jessica Frias RN

## 2022-04-06 NOTE — PLAN OF CARE
Goal Outcome Evaluation:   VSS on room air. No C/O pain. BP dropped while working with PT but recovered within 15 min. Pt sat in chair for 1.5 hrs with use of lift. Multiple loose/soft BMs today, incontinence. A&O X4. Will continue to monitor.

## 2022-04-06 NOTE — PROGRESS NOTES
INFECTIOUS DISEASE Progress Note    Yinka Cummings  1945  [id number]      Admission Date: 3/25/2022      Requesting Provider: Yinka Desai MD  Evaluating Physician: Brandt Ward MD    Reason for Consultation: vasculitis rash with cellulitis    History of present illness:    3/26/2022: Patient is a 77 y.o. male, ,known to Dr. Wili Rees, with h/o CKD, T2DM, afib/flutter, chronic diastolic heart failure, VINI, chronic pain, gastroparesis, Gilbert's disease,  hypothyroidism, essential tremor, HTN, Obesity, Prostate cancer/radical prostatectomy, renal cell carcinoma/right nephrectomy, multiple skin cancer excisions, and cutaneous vasculitis who we were asked to see for cellulitis.  The patient presented to [facility] ED on 3/25/22 with nausea, vomiting, and LE rash/redness.  [facility] with rash and Afib/RVR.  Dermatology did a punch biopsy with path showing perivascular lymphocytic infiltrate with no evidence of vasculitis at the time.  He was though to have rash from Pradaxa and was changed to Lovenox.  The rash worsened and eventually Lyrica, Primidone, and Norco were stopped one by one.  He was started on Suboxone on 3/16 and his rash began to improve.  Primidone had been a new drug started just prior the start of the rash.  He underwent cardioversion while at [facility] and converted to NSR.  He was discharge home on Lovenox.  He followed up with his PCP on 3/21 and was placed on Keflex for possible cutaneous infections at the rash site and Lasix for BLE edema.    His rash has worsened again on feet, legs, abdomen, and arms prompting him to come to [facility] ED on 3/25.  He developed nausea and vomiting prior to his presentation.  He denies fever, chills, abdominal pain, diarrhea, or dysuria.  On arrival, his Tmax is 99.4.  Initial labs were CRP 12.02-->12.5, INR 1.2, ESR 44-->62, PCT 0.78-->0.94, lactic acid 2.0, lipase 8, proBNP 5100, D-dimer 4.44, RF 16.4, C3 144, C4 29, WBC 10,600  with 83% neutrophils, and creatinine 2.35-->2.75 (baseline 1.6 on 5/20/21).  A leg wound culture showed normal skin ananth with GS showing GPC in pairs/clusters.  A second wound culture is pending with growth present.   A COVID-19/Flu PCR is negative.  A Hep panel was negative.  A UA WBC is 13-20 with TNTC RBCs, trace LE, negative nitrite.  KENZIE, ANCA panel, cryogobulin, and complement are pending.  A CXR showed no acute findings.  A DVT work up of BLE was negative although peroneal veins were not well visualized bilaterally. A CT scan of a/p with contrast has been ordered.  He is currently on low dose Rocephin.  ID was asked to evaluate and manage his antibiotic therapy.    He continues to have nausea and vomiting with cold sweat.  He denies any diarrhea.     3/27/2022: He complains of lower extremity pain which is most prominent in his feet.  He has remained afebrile.  His creatinine today is 2.82 and his C-reactive protein is 11.6.  His white blood cell count is 17.2.  He has anorexia but denies nausea and vomiting.    3/28/22: He has decreased Bilateral foot pain.  He has remained afebrile.  His creatinine is down to 2.5. His 24 hour protein is 360. He denies nausea, vomiting, and diarrhea    3/29/22:  Maximum temperature over the last 24 hours is 99.2. Creatinine yesterday was 2.58. CH 50 was greater than 60, ANCA was negative and KENZIE was negative.  He has decreased foot pain.  He complains of malaise but denies nausea and vomiting.    3/30/22: He has remained afebrile.  He denies nausea and vomiting.  He has decreased lower extremity pain.  He denies dyspnea.    3/31/22: He remains afebrile. His creatinine today is 2.6.  His C-reactive protein is 6.8.  His white blood cell count is 15.4.  His echocardiogram revealed no valvular vegetations. He and his family indicate improvement in his lower extremity rash but he has more extensive lesions over his palms.     4/1/2022: He underwent cardioversion today.  He is  now in sinus rhythm.  He has remained afebrile.  He is currently drowsy from his sedation for cardioversion.  His family thinks that his rash is no worse today.  His creatinine today is 2.33.      4/4/2022: He has been confused over the weekend.  This worsened after he was started on prednisone.  He has been afebrile.  He notes a significant improvement in his rash over the weekend.  He denies nausea, vomiting, and diarrhea.  He has remained afebrile.  His creatinine today is 2.35.    4/5/22: He has been severely confused overnight with agitation. He had a fever to 100.6° earlier today but is now afebrile. Laboratory studies today reveal a creatinine of 2.38.  A urinalysis yesterday revealed 6-12 white blood cells. He is unable to provide a review of systems.    4/6/22: He had a low-grade fever to 100.6 at around noon yesterday but has been afebrile since. His creatinine today is 2.24 and his white blood cell count is 7.6. Sodium is elevated at 154. He feels much better.  He he is much less agitated and has appropriate conversation today.        Past Medical History:   Diagnosis Date   • Anxiety    • Arthritis    • Chronic kidney disease    • Diabetes mellitus (HCC)    • Disease of thyroid gland    • Diverticulosis    • Essential tremor    • HL (hearing loss)    • Hypertension    • Obesity    • Prostate cancer (HCC)    • Renal cell cancer (HCC)    • Skin cancer    • Vasculitis of skin        Past Surgical History:   Procedure Laterality Date   • CHOLECYSTECTOMY     • COLONOSCOPY      Polyps in the past but not on the most recent scope   • NEPHRECTOMY Right    • PROSTATE SURGERY      Radical prostatectomy   • SKIN CANCER EXCISION      Large incision left neck, multiple other cancers removed       Family History   Problem Relation Age of Onset   • Cancer Mother    • Heart attack Father    • Kidney failure Sister    • Coronary artery disease Sister        Social History     Socioeconomic History   • Marital status:     • Number of children: 4   Tobacco Use   • Smoking status: Never Smoker   • Smokeless tobacco: Never Used   Vaping Use   • Vaping Use: Never used   Substance and Sexual Activity   • Alcohol use: Not Currently   • Drug use: Not Currently   • Sexual activity: Defer       Allergies   Allergen Reactions   • Contrast Dye Rash     R[facility]   • Doxycycline Rash     Urticaria per [facility]   • Chlorthalidone Other (See Comments)     Hyponatremia per [facility]   • Morphine Unknown - Low Severity     Headache per [facility]   • Neurontin [Gabapentin] Mental Status Change     Drowsy per [facility]   • Phenergan [Promethazine] Unknown - Low Severity     Confusion [facility]         Medication:    Current Facility-Administered Medications:   •  acetaminophen (TYLENOL) tablet 650 mg, 650 mg, Oral, Q4H PRN, 650 mg at 22 0823 **OR** acetaminophen (TYLENOL) 160 MG/5ML solution 650 mg, 650 mg, Oral, Q4H PRN, 649.6 mg at 22 0943 **OR** acetaminophen (TYLENOL) suppository 650 mg, 650 mg, Rectal, Q4H PRN, Raymond Herrera MD, 650 mg at 22 1237  •  [COMPLETED] amiodarone in dextrose 5% (NEXTERONE) loading dose 150mg/100mL, 150 mg, Intravenous, Once, 150 mg at 2216 **FOLLOWED BY** [] amiodarone 360 mg in 200 mL D5W infusion, 1 mg/min, Intravenous, Continuous, Last Rate: 33.3 mL/hr at 22, 1 mg/min at 22 **FOLLOWED BY** [] amiodarone 360 mg in 200 mL D5W infusion, 0.5 mg/min, Intravenous, Continuous, Last Rate: 16.67 mL/hr at 22, 0.5 mg/min at 22 **FOLLOWED BY** [COMPLETED] amiodarone (PACERONE) tablet 200 mg, 200 mg, Oral, Once, 200 mg at 22 0841 **FOLLOWED BY** amiodarone (PACERONE) tablet 200 mg, 200 mg, Oral, Q8H, 200 mg at 22 **FOLLOWED BY** amiodarone (PACERONE) tablet 200 mg, 200 mg, Oral, Q12H **FOLLOWED BY** [START ON 2022] amiodarone (PACERONE) tablet 200 mg, 200 mg, Oral, Daily, Sharon  MD Raymond  •  atorvastatin (LIPITOR) tablet 40 mg, 40 mg, Oral, Nightly, Raymond Herrera MD, 40 mg at 04/05/22 2036  •  buprenorphine-naloxone (SUBOXONE) 8-2 MG per SL tablet 2 tablet, 2 tablet, Sublingual, Nightly, Raymond Herrera MD, 2 tablet at 04/05/22 2035  •  dextrose (D50W) (25 g/50 mL) IV injection 25 g, 25 g, Intravenous, Q15 Min PRN, Raymond Herrera MD  •  dextrose (GLUTOSE) oral gel 15 g, 15 g, Oral, Q15 Min PRN, Raymond Herrera MD  •  DULoxetine (CYMBALTA) DR capsule 90 mg, 90 mg, Oral, Daily, Raymond Herrera MD, 90 mg at 04/04/22 0812  •  enoxaparin (LOVENOX) syringe 110 mg, 110 mg, Subcutaneous, Q12H, Yinka Brush IV, PharmD, 110 mg at 04/05/22 2059  •  glucagon (human recombinant) (GLUCAGEN DIAGNOSTIC) injection 1 mg, 1 mg, Intramuscular, Q15 Min PRN, Raymond Herrera MD  •  haloperidol (HALDOL) 2 MG/ML solution 2 mg, 2 mg, Oral, Q6H PRN, Peña Weaver MD  •  insulin detemir (LEVEMIR) injection 10 Units, 10 Units, Subcutaneous, Nightly, Yinka Desai MD, 10 Units at 04/05/22 2047  •  insulin lispro (humaLOG) injection 0-9 Units, 0-9 Units, Subcutaneous, TID AC, Raymond Herrera MD, 2 Units at 04/05/22 1839  •  levothyroxine (SYNTHROID, LEVOTHROID) tablet 150 mcg, 150 mcg, Oral, Daily, Raymond Herrera MD, 150 mcg at 04/05/22 1227  •  melatonin tablet 5 mg, 5 mg, Oral, Nightly, Peña Weaver MD, 5 mg at 04/05/22 2035  •  methohexital (BREVITAL) injection  - ADS Override Pull, , , ,   •  metoprolol tartrate (LOPRESSOR) tablet 50 mg, 50 mg, Oral, Q8H, Raymond Herrera MD, 50 mg at 04/05/22 2036  •  midazolam (VERSED) 2 MG/2ML injection  - ADS Override Pull, , , ,   •  mirtazapine (REMERON) tablet 7.5 mg, 7.5 mg, Oral, Nightly, Raymond Herrera MD, 7.5 mg at 04/05/22 2036  •  ondansetron (ZOFRAN) tablet 4 mg, 4 mg, Oral, Q6H PRN **OR** ondansetron (ZOFRAN) injection 4 mg, 4 mg, Intravenous, Q6H PRN, Raymond Herrera MD, 4 mg at 03/30/22 1403  •  palliative care oral rinse, ,  Mouth/Throat, PRN, Yinka Desai MD, Given at 22 0940  •  pantoprazole (PROTONIX) EC tablet 40 mg, 40 mg, Oral, QAM, Raymond Herrera MD, 40 mg at 22 0554  •  polyethylene glycol (MIRALAX) packet 17 g, 17 g, Oral, Daily, Raymond Herrera MD, 17 g at 22 1744  •  QUEtiapine (SEROquel) tablet 25 mg, 25 mg, Oral, Q12H, Wili Conner DO, 25 mg at 22  •  sennosides-docusate (PERICOLACE) 8.6-50 MG per tablet 2 tablet, 2 tablet, Oral, BID, Yinka Desai MD, 2 tablet at 22  •  Sodium Chloride (PF) 0.9 % 10 mL, 10 mL, Intravenous, PRN, Raymond Herrera MD  •  sodium chloride 0.9 % flush 10 mL, 10 mL, Intravenous, Q12H, Raymond Herrera MD, 10 mL at 22  •  sodium chloride 0.9 % flush 10 mL, 10 mL, Intravenous, PRN, Raymond Herrera MD, 10 mL at 22 0830  •  temazepam (RESTORIL) capsule 15 mg, 15 mg, Oral, Nightly, Peña Weaver MD, 15 mg at 22  •  vitamin B-12 (CYANOCOBALAMIN) tablet 50 mcg, 50 mcg, Oral, Daily, Raymond Herrera MD, 50 mcg at 22 0812    Antibiotics:  Anti-Infectives (From admission, onward)    Ordered     Dose/Rate Route Frequency Start Stop    22 1059  cefTRIAXone (ROCEPHIN) 1 g/100 mL 0.9% NS (MBP)        Ordering Provider: Chace Chauhan MD    1 g  over 30 Minutes Intravenous Once 22 1101 22 1244            Review of Systems:  See HPI    Physical Exam:   Vital Signs  Temp (24hrs), Av.9 °F (37.2 °C), Min:98.1 °F (36.7 °C), Max:100.6 °F (38.1 °C)    Temp  Min: 98.1 °F (36.7 °C)  Max: 100.6 °F (38.1 °C)  BP  Min: 130/80  Max: 148/56  Pulse  Min: 71  Max: 100  Resp  Min: 18  Max: 22  SpO2  Min: 91 %  Max: 100 %    GENERAL: Debilitated appearing. Alert and responsive.  He answers questions appropriately.  HEENT: Normocephalic, atraumatic.  PERRL. EOMI. No conjunctival injection. No icterus. Oropharynx clear without evidence of thrush or exudate.  NECK: Supple   HEART: RRR; No murmur  LUNGS: Clear  to auscultation bilaterally without wheezing, rales, rhonchi. Normal respiratory effort. Nonlabored.  ABDOMEN: Soft, lower abdominal tenderness, nondistended.  No rebound or guarding.   Skin: His diffuse hemorrhagic ulcerative rash is now markedly improved.  MSK:  FROM, no joint effusions  NEURO: Alert and responsive.  He answers questions appropriately and moves all of his extremities  Laboratory Data    Results from last 7 days   Lab Units 04/06/22  0540 04/04/22  0634 04/03/22  2046   WBC 10*3/mm3 7.55 9.90 9.55   HEMOGLOBIN g/dL 7.9* 8.2* 8.3*   HEMATOCRIT % 26.5* 26.7* 24.9*   PLATELETS 10*3/mm3 261 369 314     Results from last 7 days   Lab Units 04/06/22  0540   SODIUM mmol/L 154*   POTASSIUM mmol/L 4.4   CHLORIDE mmol/L 119*   CO2 mmol/L 24.0   BUN mg/dL 69*   CREATININE mg/dL 2.24*   GLUCOSE mg/dL 147*   CALCIUM mg/dL 8.1*     Results from last 7 days   Lab Units 04/03/22  2046   ALK PHOS U/L 76   BILIRUBIN mg/dL 0.4   ALT (SGPT) U/L 24   AST (SGOT) U/L 37         Results from last 7 days   Lab Units 03/31/22  0805   CRP mg/dL 6.84*     Results from last 7 days   Lab Units 04/03/22  2046   LACTATE mmol/L 1.7             Estimated Creatinine Clearance: 36.2 mL/min (A) (by C-G formula based on SCr of 2.24 mg/dL (H)).      Microbiology:  Wound cx 3/25: NL skin ananth SF  COVID-19/Flu PCR negative.  2nd wound cx 3/25: growth present but TYTE  Urine Eos Zero        Radiology:  Imaging Results (Last 72 Hours)     Procedure Component Value Units Date/Time    CT Head Without Contrast [[id number]] Collected: 04/03/22 2305     Updated: 04/03/22 2310    Narrative:      EXAMINATION: CT HEAD WO CONTRAST    DATE: 4/3/2022 10:44 PM     INDICATION: Delirium     COMPARISON: CT head, 4/24/2021     TECHNIQUE: Thin section noncontrast axial images were obtained through the head. Coronal reformatted images were created.  CT dose lowering techniques were used, to include: automated exposure control, adjustment for patient size,  and or use of iterative   reconstruction.    FINDINGS:  Intracranial contents:    Generalized parenchymal volume loss without lobar predominance. No hydrocephalus.   A few foci of hypoattenuation within periventricular white matter most commonly represent chronic microangiopathy.  There is no midline shift or mass effect. No   hemorrhage, mass lesion, or evidence of evolving large territorial infarct. Atherosclerosis of the carotid siphons.    Bones and extracranial soft tissues:    Normal calvarium.  Orbits unremarkable. The visualized paranasal sinuses are clear. No mastoid or middle ear effusions. Periodontal disease is partially imaged.        Impression:        1.  No acute intracranial abnormality.    2.  Roughly age commensurate parenchymal volume loss. Mild chronic microvascular changes. Atherosclerosis.      Electronically signed by:  Chace Chin    4/3/2022 9:09 PM Mountain Time            Impression:   1. Vasculitic rash-with palpable purpura.  This rash clinically looks like leukocytoclastic vasculitis.  I suspect that this is secondary to primidone given the apparent shortly after starting on primidone.  His rash is now dramatically better off of primidone and on prednisone. He is now off of prednisone.  2. Duodenitis- per CT scan.  He is on PPI therapy.  3. Elevated procalcitonin,  4. Acute kidney injury-a renal biopsy is relatively contraindicated since he has a solitary kidney.  His acute kidney injury has partially improved.  5. Elevated CRP  6. Chronic diastolic heart failure  7. Afib/on Lovenox, recent cardioversion to NSR  8. Type 2 diabetes mellitus/gastroparesis  9. Hypothyroidism  10. Essential hypertension  11. Morbid obesity  12. Prostate cancer/radical prostatectomy  13. Renal cell carcinoma/radical prostatectomy  14. H/o multiple skin cancer excisions  15. Doxycycline (urticaria) allergy  16. Hematuria  17. Left heel decubitus lesion-this is significantly better.  18. Toxic/metabolic  encephalopathy-some of this may be secondary to his prednisone.  He is now off of steroid therapy  19. Hypernatremia    PLAN/RECOMMENDATIONS:  1.  Continue wound care  2.  Continue off of primidone and as many other medications as possible  3.  Offload both heels  4.  Continue off of prednisone  5.  Blood and urine cultures if his temperature is increased over 101°.        Brandt Ward MD  4/6/2022  07:57 EDT

## 2022-04-06 NOTE — PROGRESS NOTES
"   LOS: 12 days    Patient Care Team:  Vivek Mercer DO as PCP - General (Family Medicine)    Reason For Visit:  F/U SALEEM ON CKD  Subjective     Improvement in renal function. Worsening hypernatremia due to lack of FW intake.       Review of Systems:    Pulm: No soa   CV:  No CP      Objective     amiodarone, 200 mg, Oral, Q12H   Followed by  [START ON 4/20/2022] amiodarone, 200 mg, Oral, Daily  atorvastatin, 40 mg, Oral, Nightly  buprenorphine-naloxone, 2 tablet, Sublingual, Nightly  DULoxetine, 90 mg, Oral, Daily  enoxaparin, 110 mg, Subcutaneous, Q12H  First Mouthwash (Magic Mouthwash), 5 mL, Swish & Spit, Q6H  insulin detemir, 10 Units, Subcutaneous, Nightly  insulin lispro, 0-9 Units, Subcutaneous, TID AC  levothyroxine, 150 mcg, Oral, Daily  melatonin, 5 mg, Oral, Nightly  methohexital, , ,   metoprolol tartrate, 50 mg, Oral, Q8H  midazolam, , ,   mirtazapine, 7.5 mg, Oral, Nightly  pantoprazole, 40 mg, Oral, QAM  polyethylene glycol, 17 g, Oral, Daily  QUEtiapine, 25 mg, Oral, Q12H  senna-docusate sodium, 2 tablet, Oral, BID  sodium chloride, 10 mL, Intravenous, Q12H  temazepam, 15 mg, Oral, Nightly  cyanocobalamin, 50 mcg, Oral, Daily             Vital Signs:  Blood pressure 150/59, pulse 91, temperature 97.9 °F (36.6 °C), temperature source Oral, resp. rate 20, height 175 cm (68.9\"), weight 119 kg (262 lb 5.6 oz), SpO2 94 %.    Flowsheet Rows    Flowsheet Row First Filed Value   Admission Height 175.3 cm (69\") Documented at 03/25/2022 0849   Admission Weight 113 kg (250 lb) Documented at 03/25/2022 0849          No intake/output data recorded.    Physical Exam:    General Appearance: NAD, alert and cooperative, Ox3  Eyes: PER, conjunctivae and sclerae normal, no icterus  Lungs: respirations regular and unlabored, no crepitus, clear to auscultation  Heart/CV: regular rhythm & normal rate, no murmur, no gallop, no rub and 1+ edema  Abdomen: not distended, soft, non-tender, no masses,  bowel sounds " present  Skin:  Rash BETTER., Warm and dry    Radiology:            Labs:  Results from last 7 days   Lab Units 04/06/22  0540 04/04/22  0634 04/03/22 2046   WBC 10*3/mm3 7.55 9.90 9.55   HEMOGLOBIN g/dL 7.9* 8.2* 8.3*   HEMATOCRIT % 26.5* 26.7* 24.9*   PLATELETS 10*3/mm3 261 369 314     Results from last 7 days   Lab Units 04/06/22  0540 04/05/22  1337 04/04/22  0634 04/03/22 2046 04/02/22  1038 04/01/22  1227   SODIUM mmol/L 154* 148* 138 138   < > 135*   POTASSIUM mmol/L 4.4 4.1 4.8 5.2   < > 5.0   CHLORIDE mmol/L 119* 112* 104 102   < > 103   CO2 mmol/L 24.0 20.0* 22.0 22.0   < > 20.0*   BUN mg/dL 69* 76* 83* 78*   < > 63*   CREATININE mg/dL 2.24* 2.38* 2.35* 2.77*   < > 2.33*   CALCIUM mg/dL 8.1* 8.3* 8.4* 8.2*   < > 7.9*   PHOSPHORUS mg/dL 3.5 3.3  --   --   --   --    ALBUMIN g/dL 2.80* 2.70*  --  2.70*  --  2.40*    < > = values in this interval not displayed.     Results from last 7 days   Lab Units 04/06/22  0540   GLUCOSE mg/dL 147*       Results from last 7 days   Lab Units 04/03/22 2046   ALK PHOS U/L 76   BILIRUBIN mg/dL 0.4   ALT (SGPT) U/L 24   AST (SGOT) U/L 37     Results from last 7 days   Lab Units 04/03/22 2056   PH, ARTERIAL pH units 7.381   PO2 ART mm Hg 70.0*   PCO2, ARTERIAL mm Hg 37.4   HCO3 ART mmol/L 22.2       Results from last 7 days   Lab Units 04/04/22  0045   COLOR UA  Yellow   CLARITY UA  Clear   PH, URINE  6.0   SPECIFIC GRAVITY, URINE  1.015   GLUCOSE UA  Negative   KETONES UA  Negative   BILIRUBIN UA  Negative   PROTEIN UA  Negative   BLOOD UA  Moderate (2+)*   LEUKOCYTES UA  Trace*   NITRITE UA  Negative       Estimated Creatinine Clearance: 36.2 mL/min (A) (by C-G formula based on SCr of 2.24 mg/dL (H)).      Assessment       Rash    Benign essential tremor    Hypothyroidism (acquired)    Paroxysmal A-fib (on eliquis & metoprolol)    Insulin dependent type 2 diabetes mellitus (HCC)    Chronic back pain (on chronic prescription lortab)    Acute renal failure, unspecified  acute renal failure type (HCC)    Nausea and vomiting            Impression: Shaila on CKD stage III: baseline cr~ 1.5mg/dl. Cr on this admission btw 2.3-2.7mg/dl. UA large blood 24hr urine protein 380mg. Etiology of SHAILA unspecified. Concern for possible drug eruption and possible AIN vs Systemic Ig-A vasculitis.   Serologies negative so far.     CKD stage III: Hx of solitary kidney. [facility].     Skin rash: Palpable purpura. Suspected drug rash vs Skin vasculitis.   Started on prednisone per primary service.    Hyperkalemia: resolved    Hypernatremia: Due to lack of FW intake. Total body water deficit 3.3 liter+ 1 liter sensible and insensible loss 4.3 liter    Volume status: Dependent edema significantly improved. Diuretics stopped on 4/3/22    Afib: s/p LARRY cardioversion      Recommendations: . Relative contraindication to pursue renal biopsy given solitary kidney and high risk for bleeding. High suspicion for AIN in the setting of drug eruption. Less likely systemic IgA vasculitis w renal involvement given no significant proteinuria. off steroids after 4-5 days for  worsening of mental status. Need aggressive water supplementation to counter hypernatremia as it may also contribute to delirium.       Francisco Atkinson MD  04/06/22  14:04 EDT

## 2022-04-06 NOTE — THERAPY TREATMENT NOTE
"Patient Name: Yinka Cummings  : 1945    MRN: [id number]                              Today's Date: 2022       Admit Date: 3/25/2022    Visit Dx:     ICD-10-CM ICD-9-CM   1. Acute renal failure, unspecified acute renal failure type (HCC)  N17.9 584.9   2. Dehydration  E86.0 276.51   3. Cellulitis of lower extremity, unspecified laterality  L03.119 682.6   4. Vasculitis (HCC)  I77.6 447.6   5. Congestive heart failure, unspecified HF chronicity, unspecified heart failure type (HCC)  I50.9 428.0   6. Positive D dimer  R79.89 790.92   7. 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)   • Chronic back pain (on chronic prescription lortab)   • HTN (hypertension)   • Solitary kidney (s/p nephrectomy for \"mass\")   • History of prostate cancer   • Closed fracture of phalanx of right hand   • Dental caries   • Acute renal failure, unspecified acute renal failure type (HCC)   • Rash   • Nausea and vomiting     Past Medical History:   Diagnosis Date   • Anxiety    • Arthritis    • Chronic kidney disease    • Diabetes mellitus (HCC)    • Disease of thyroid gland    • Diverticulosis    • Essential tremor    • HL (hearing loss)    • Hypertension    • Obesity    • Prostate cancer (HCC)    • Renal cell cancer (HCC)    • Skin cancer    • Vasculitis of skin      Past Surgical History:   Procedure Laterality Date   • CHOLECYSTECTOMY     • COLONOSCOPY      Polyps in the past but not on the most recent scope   • NEPHRECTOMY Right    • PROSTATE SURGERY      Radical prostatectomy   • SKIN CANCER EXCISION      Large incision left neck, multiple other cancers removed      General Information     Row Name 22 1522          Physical Therapy Time and " Intention    Document Type therapy note (daily note);progress note/recertification  -CD     Mode of Treatment physical therapy  -CD     Row Name 04/06/22 1522          General Information    Patient Profile Reviewed yes  -CD     Existing Precautions/Restrictions fall  Blisters on B feet, L heel decubitis, edematous hands, sensitive to touch, [place]  -CD     Barriers to Rehab medically complex;previous functional deficit;hearing deficit  -CD     Row Name 04/06/22 1522          Cognition    Orientation Status (Cognition) oriented to;person  -CD     Row Name 04/06/22 1522          Safety Issues, Functional Mobility    Comment, Safety Issues/Impairments (Mobility) PT ALERT AND FOLLOWING COMMANDS UPON ARRIVAL. AT END OF SESSION WITH DROP IN BP, LETHARGIC BUT CONVERSING. NSG NOTIFIED AND PT LEFT FULLY RECLINED IN CHAIR ON MECHANICAL LIFT SLING.  -CD           User Key  (r) = Recorded By, (t) = Taken By, (c) = Cosigned By    Initials Name Provider Type    CD Alessandra Madrid PT Physical Therapist               Mobility     Row Name 04/06/22 1528          Bed Mobility    Bed Mobility rolling left;rolling right  -CD     Rolling Left [place] (Bed Mobility) contact guard;verbal cues  -CD     Rolling Right [place] (Bed Mobility) contact guard;verbal cues  -CD     Comment, (Bed Mobility) ROLLING L/R FOR PLACEMENT OF MECHANICAL LIFT SLING.  -CD     Row Name 04/06/22 1528          Transfers    Comment, (Transfers) DEFERRED DUE TO DROP IN BP, WEAKNESS, FATIGUE.  -CD     Row Name 04/06/22 1528          Bed-Chair Transfer    Bed-Chair [place] (Transfers) dependent (less than 25% patient effort);2 person assist;verbal cues  -CD     Assistive Device (Bed-Chair Transfers) lift device  -CD           User Key  (r) = Recorded By, (t) = Taken By, (c) = Cosigned By    Initials Name Provider Type    CD Alessandra Madrid PT Physical Therapist               Obj/Interventions     Row Name 04/06/22 1532          Balance    Balance  Assessment sitting static balance;sitting dynamic balance;sit to stand dynamic balance;standing static balance;standing dynamic balance  -CD     Static Sitting Balance minimal assist;verbal cues  -CD     Dynamic Sitting Balance moderate assist;verbal cues  -CD     Position, Sitting Balance supported;sitting in chair  -CD     Comment, Balance WORKED ON SITTING BALANCE WITH ANTERIOR ROCKING AND SCOOTING IN CHAIR. PT C/O NOT FEELING WELL AND WANTING TO LIE DOWN. BP IN SITTING 94/63 FROM BP IN BED  PRIOR TO ARRIVAL /59.  -CD           User Key  (r) = Recorded By, (t) = Taken By, (c) = Cosigned By    Initials Name Provider Type    CD Alessandra Madrid, PT Physical Therapist               Goals/Plan     Row Name 04/06/22 1548          Bed Mobility Goal 1 (PT)    Activity/Assistive Device (Bed Mobility Goal 1, PT) sit to supine/supine to sit  -CD     [place] Level/Cues Needed (Bed Mobility Goal 1, PT) moderate assist (50-74% patient effort)  -CD     Time Frame (Bed Mobility Goal 1, PT) long term goal (LTG)  -CD     Progress/Outcomes (Bed Mobility Goal 1, PT) progress slower than expected;goal revised this date  -CD     Row Name 04/06/22 1548          Transfer Goal 1 (PT)    Activity/Assistive Device (Transfer Goal 1, PT) sit-to-stand/stand-to-sit;bed-to-chair/chair-to-bed  -CD     [place] Level/Cues Needed (Transfer Goal 1, PT) moderate assist (50-74% patient effort)  -CD     Time Frame (Transfer Goal 1, PT) long term goal (LTG);2 weeks  -CD     Progress/Outcome (Transfer Goal 1, PT) progress slower than expected;goal revised this date  -CD     Row Name 04/06/22 1548          Gait Training Goal 1 (PT)    Activity/Assistive Device (Gait Training Goal 1, PT) gait (walking locomotion);assistive device use;walker, rolling  -CD     [place] Level (Gait Training Goal 1, PT) minimum assist (75% or more patient effort);maximum assist (25-49% patient effort)  -CD     Distance (Gait Training Goal 1, PT) 20  -CD      Time Frame (Gait Training Goal 1, PT) long term goal (LTG);2 weeks  -CD     Progress/Outcome (Gait Training Goal 1, PT) goal revised this date;progress slower than expected  -CD           User Key  (r) = Recorded By, (t) = Taken By, (c) = Cosigned By    Initials Name Provider Type    CD Alessandra Madrid, PT Physical Therapist               Clinical Impression     Row Name 04/06/22 1540          Pain    Pretreatment Pain Rating 0/10 - no pain  -CD     Posttreatment Pain Rating 0/10 - no pain  -CD     Pre/Posttreatment Pain Comment INTERMITTENT PAIN WITH MOVEMENT OF LE'S 5/10.  -CD     Pain Intervention(s) Repositioned  -CD     Row Name 04/06/22 1540          Plan of Care Review    Plan of Care Reviewed With patient;daughter;significant other  -CD     Outcome Evaluation PT DEMONSTRATED IMPROVED BED MOBILITY. ALERT AND FOLLOWING COMMANDS INITIALLY BUT THEN AFTER BED TO CHAIR TRANSFER VIA MECHANCIAL LIFT AND SITTING EDGE OF RECLINER, C/O NOT FEELING WELL AND BECAME LETHARGI . /59 IN BED PRIOR TO ARRIVAL, 127/62 SITTING EDGE OF RECLINER AND 94/63 AFTER APPROX 5 MINUTES SITTING. PT RETURNED TO FULLY RECLINED IN RECLINER AND NSG NOTIFIED. GOALS MODIFIED TO REFLECT CURRENT FUNCTIONAL STATUS.  -CD     Row Name 04/06/22 1540          Therapy Assessment/Plan (PT)    Patient/Family Therapy Goals Statement (PT) SNF AT D/C.  -CD     Rehab Potential (PT) good, to achieve stated therapy goals  -CD     Row Name 04/06/22 1540          Vital Signs    Pre Systolic BP Rehab 150  -CD     Pre Treatment Diastolic BP 59  -CD     Intra Systolic BP Rehab 127  -CD     Intra Treatment Diastolic BP 62  -CD     Post Systolic BP Rehab 94  -CD     Post Treatment Diastolic BP 63  -CD     Posttreatment Heart Rate (beats/min) 77  -CD     Pre SpO2 (%) 97  -CD     O2 Delivery Pre Treatment supplemental O2  -CD     O2 Delivery Intra Treatment supplemental O2  -CD     Post SpO2 (%) 97  -CD     O2 Delivery Post Treatment supplemental O2  -CD     Pre  Patient Position Supine  -CD     Intra Patient Position Sitting  -CD     Post Patient Position Sitting  -CD     Row Name 04/06/22 1540          Positioning and Restraints    Pre-Treatment Position in bed  -CD     Post Treatment Position chair  -CD     In Chair reclined;call light within reach;encouraged to call for assist;exit alarm on;with family/caregiver;RUE elevated;legs elevated;notified nsg;heels elevated  -CD           User Key  (r) = Recorded By, (t) = Taken By, (c) = Cosigned By    Initials Name Provider Type    CD Alessandra Madrid, PT Physical Therapist               Outcome Measures     Row Name 04/06/22 1546 04/06/22 1545       How much help from another person do you currently need...    Turning from your back to your side while in flat bed without using bedrails? 3  -CD 2  -CD    Moving from lying on back to sitting on the side of a flat bed without bedrails? 2  -CD --    Moving to and from a bed to a chair (including a wheelchair)? 1  -CD --    Standing up from a chair using your arms (e.g., wheelchair, bedside chair)? 1  -CD --    Climbing 3-5 steps with a railing? 1  -CD --    To walk in hospital room? 1  -CD --    AM-PAC 6 Clicks Score (PT) 9  -CD --    Row Name 04/06/22 0800          How much help from another person do you currently need...    Turning from your back to your side while in flat bed without using bedrails? 3  -CM     Moving from lying on back to sitting on the side of a flat bed without bedrails? 2  -CM     Moving to and from a bed to a chair (including a wheelchair)? 1  -CM     Standing up from a chair using your arms (e.g., wheelchair, bedside chair)? 1  -CM     Climbing 3-5 steps with a railing? 1  -CM     To walk in hospital room? 1  -CM     AM-PAC 6 Clicks Score (PT) 9  -CM     Row Name 04/06/22 1546          Functional Assessment    Outcome Measure Options AM-PAC 6 Clicks Basic Mobility (PT)  -CD           User Key  (r) = Recorded By, (t) = Taken By, (c) = Cosigned By     Initials Name Provider Type    Alessandra Choudhury PT Physical Therapist    China Beltrán, RN Registered Nurse                             Physical Therapy Education                 Title: PT OT SLP Therapies (In Progress)     Topic: Physical Therapy (Done)     Point: Mobility training (Done)     Learning Progress Summary           Patient Acceptance, E, VU,NR by CD at 4/6/2022 1546    Comment: SEE FLOWSHEET    Acceptance, E, NR by KG at 4/4/2022 1506    Acceptance, E, VU,NR by CD at 4/2/2022 1634    Comment: BENEFITS OF OOB ACTIVITY, ROM EXERCISE, PROGRESSION OF POC, D/C PLANNING,    Acceptance, E,D, VU,NR by HP at 3/29/2022 1517    Acceptance, E, NR by BA at 3/27/2022 1412   Family Acceptance, E, VU,NR by CD at 4/6/2022 1546    Comment: SEE FLOWSHEET    Acceptance, E,D, VU,NR by HP at 3/29/2022 1517    Acceptance, E, NR by BA at 3/27/2022 1412   Significant Other Acceptance, E, VU,NR by CD at 4/6/2022 1546    Comment: SEE FLOWSHEET                   Point: Home exercise program (Done)     Learning Progress Summary           Patient Acceptance, E, VU,NR by CD at 4/6/2022 1546    Comment: SEE FLOWSHEET    Acceptance, E, NR by KG at 4/4/2022 1506    Acceptance, E, VU,NR by CD at 4/2/2022 1634    Comment: BENEFITS OF OOB ACTIVITY, ROM EXERCISE, PROGRESSION OF POC, D/C PLANNING,    Acceptance, E,D, VU,NR by HP at 3/29/2022 1517    Acceptance, E, NR by BA at 3/27/2022 1412   Family Acceptance, E, VU,NR by CD at 4/6/2022 1546    Comment: SEE FLOWSHEET    Acceptance, E,D, VU,NR by HP at 3/29/2022 1517    Acceptance, E, NR by BA at 3/27/2022 1412   Significant Other Acceptance, E, VU,NR by CD at 4/6/2022 1546    Comment: SEE FLOWSHEET                   Point: Body mechanics (Done)     Learning Progress Summary           Patient Acceptance, E, VU,NR by CD at 4/6/2022 1546    Comment: SEE FLOWSHEET    Acceptance, E, NR by KG at 4/4/2022 1506    Acceptance, E, VU,NR by CD at 4/2/2022 1634    Comment: BENEFITS OF OOB  ACTIVITY, ROM EXERCISE, PROGRESSION OF POC, D/C PLANNING,    Acceptance, E,D, VU,NR by HP at 3/29/2022 1517    Acceptance, E, NR by BA at 3/27/2022 1412   Family Acceptance, E, VU,NR by CD at 4/6/2022 1546    Comment: SEE FLOWSHEET    Acceptance, E,D, VU,NR by HP at 3/29/2022 1517    Acceptance, E, NR by BA at 3/27/2022 1412   Significant Other Acceptance, E, VU,NR by CD at 4/6/2022 1546    Comment: SEE FLOWSHEET                   Point: Precautions (Done)     Learning Progress Summary           Patient Acceptance, E, VU,NR by CD at 4/6/2022 1546    Comment: SEE FLOWSHEET    Acceptance, E, NR by KG at 4/4/2022 1506    Acceptance, E, VU,NR by CD at 4/2/2022 1634    Comment: BENEFITS OF OOB ACTIVITY, ROM EXERCISE, PROGRESSION OF POC, D/C PLANNING,    Acceptance, E,D, VU,NR by HP at 3/29/2022 1517    Acceptance, E, NR by BA at 3/27/2022 1412   Family Acceptance, E, VU,NR by CD at 4/6/2022 1546    Comment: SEE FLOWSHEET    Acceptance, E,D, VU,NR by HP at 3/29/2022 1517    Acceptance, E, NR by BA at 3/27/2022 1412   Significant Other Acceptance, E, VU,NR by CD at 4/6/2022 1546    Comment: SEE FLOWSHEET                               User Key     Initials Effective Dates Name Provider Type Discipline    CD 06/16/21 -  Alessandra Madrid PT Physical Therapist PT    KG 06/16/21 -  Rita Mchugh Physical Therapist PT    HP 06/01/21 -  Anya Velez, PT Physical Therapist PT    BA 09/21/21 -  Renate Mendoza, PT Physical Therapist PT              PT Recommendation and Plan     Plan of Care Reviewed With: patient, daughter, significant other  Outcome Evaluation: PT DEMONSTRATED IMPROVED BED MOBILITY. ALERT AND FOLLOWING COMMANDS INITIALLY BUT THEN AFTER BED TO CHAIR TRANSFER VIA MECHANCIAL LIFT AND SITTING EDGE OF RECLINER, C/O NOT FEELING WELL AND BECAME LETHARGI . /59 IN BED PRIOR TO ARRIVAL, 127/62 SITTING EDGE OF RECLINER AND 94/63 AFTER APPROX 5 MINUTES SITTING. PT RETURNED TO FULLY RECLINED IN RECLINER AND NSG  NOTIFIED. GOALS MODIFIED TO REFLECT CURRENT FUNCTIONAL STATUS.     Time Calculation:    PT Charges     Row Name 04/06/22 1547             Time Calculation    Start Time 1443  -CD      PT Received On 04/06/22  -CD      PT Goal Re-Cert Due Date 04/16/22  -CD              Time Calculation- PT    Total Timed Code Minutes- PT 35 minute(s)  -CD              Timed Charges    26728 - PT Therapeutic Exercise Minutes 15  -CD      97477 - PT Therapeutic Activity Minutes 20  -CD              Total Minutes    Timed Charges Total Minutes 35  -CD       Total Minutes 35  -CD            User Key  (r) = Recorded By, (t) = Taken By, (c) = Cosigned By    Initials Name Provider Type    CD Alessandra Madrid, PT Physical Therapist              Therapy Charges for Today     Code Description Service Date Service Provider Modifiers Qty    01144431566 HC PT THER PROC EA 15 MIN 4/6/2022 Alessandra Madrid, PT GP 1    04496426571 HC PT THERAPEUTIC ACT EA 15 MIN 4/6/2022 Alessandra Madrid, PT GP 1    74850298604 HC PT THER SUPP EA 15 MIN 4/6/2022 Alessandra Madrid, PT GP 1    74272782520 HC PT THER SUPP EA 15 MIN 4/6/2022 Alessandra Madrid, PT GP 1          PT G-Codes  Outcome Measure Options: AM-PAC 6 Clicks Basic Mobility (PT)  AM-PAC 6 Clicks Score (PT): 9  AM-PAC 6 Clicks Score (OT): 9    Alessandra Madrid PT  4/6/2022

## 2022-04-06 NOTE — PLAN OF CARE
Goal Outcome Evaluation:  Plan of Care Reviewed With: patient, daughter, significant other           Outcome Evaluation: PT DEMONSTRATED IMPROVED BED MOBILITY. ALERT AND FOLLOWING COMMANDS INITIALLY BUT THEN AFTER BED TO CHAIR TRANSFER VIA MECHANCIAL LIFT AND SITTING EDGE OF RECLINER, C/O NOT FEELING WELL AND BECAME LETHARGIC . /59 IN BED PRIOR TO ARRIVAL, 127/62 SITTING EDGE OF RECLINER AND 94/63 AFTER APPROX 5 MINUTES SITTING. PT RETURNED TO FULLY RECLINED IN RECLINER AND NSG NOTIFIED. GOALS MODIFIED TO REFLECT CURRENT FUNCTIONAL STATUS.

## 2022-04-06 NOTE — PROGRESS NOTES
Neurology       Patient Care Team:  Vivek Mercer DO as PCP - General (Family Medicine)    Chief complaint: My lips are chapped    History: Patient has done quite well with the cessation of steroids and increasing his sleep medication.    He is wide-awake today.  Past Medical History:   Diagnosis Date   • Anxiety    • Arthritis    • Chronic kidney disease    • Diabetes mellitus (HCC)    • Disease of thyroid gland    • Diverticulosis    • Essential tremor    • HL (hearing loss)    • Hypertension    • Obesity    • Prostate cancer (HCC)    • Renal cell cancer (HCC)    • Skin cancer    • Vasculitis of skin        Vital Signs   Vitals:    04/06/22 0008 04/06/22 0340 04/06/22 0700 04/06/22 1100   BP: 133/53 136/55 148/56 150/59   BP Location: Right arm Right arm     Patient Position: Lying Lying Lying Lying   Pulse: 71 74 73 91   Resp: 20 18 18 20   Temp:   98.1 °F (36.7 °C) 97.9 °F (36.6 °C)   TempSrc:   Axillary Oral   SpO2: 100% 100% 99% 94%   Weight:       Height:           Physical Exam:   General: Awake alert and oriented              Neuro: Tremor has decreased dramatically    Results Review:  Reviewed  Results from last 7 days   Lab Units 04/06/22  0540   WBC 10*3/mm3 7.55   HEMOGLOBIN g/dL 7.9*   HEMATOCRIT % 26.5*   PLATELETS 10*3/mm3 261     Results from last 7 days   Lab Units 04/06/22  0540 04/05/22  1337 04/04/22  0634 04/03/22  2046 04/02/22  1038 04/01/22  1227 03/31/22  0805   SODIUM mmol/L 154* 148* 138 138   < > 135* 133*   POTASSIUM mmol/L 4.4 4.1 4.8 5.2   < > 5.0 4.9   CHLORIDE mmol/L 119* 112* 104 102   < > 103 100   CO2 mmol/L 24.0 20.0* 22.0 22.0   < > 20.0* 20.0*   BUN mg/dL 69* 76* 83* 78*   < > 63* 70*   CREATININE mg/dL 2.24* 2.38* 2.35* 2.77*   < > 2.33* 2.60*   CALCIUM mg/dL 8.1* 8.3* 8.4* 8.2*   < > 7.9* 7.8*   BILIRUBIN mg/dL  --   --   --  0.4  --  0.3 0.3   ALK PHOS U/L  --   --   --  76  --  80 106   ALT (SGPT) U/L  --   --   --  24  --  12 14   AST (SGOT) U/L  --   --   --  37   --  16 16   GLUCOSE mg/dL 147* 183* 198* 232*   < > 225* 179*    < > = values in this interval not displayed.       Imaging Results (Last 24 Hours)     ** No results found for the last 24 hours. **          Assessment:  Psychosis, likely combination of steroids and sleep deprivation    Plan:  Continue melatonin 5 mg and Restoril 15 mg at bedtime.    Comment:  Doing better          I discussed the patients findings and my recommendations with patient, family, nursing staff and primary care team    Peña Weaver MD  04/06/22  12:25 EDT

## 2022-04-06 NOTE — THERAPY EVALUATION
"Acute Care - Speech Language Pathology   Swallow Initial Evaluation [facility]   Clinical Swallow Evaluation     Patient Name: Yinka Cummings  : 1945  MRN: [id number]  Today's Date: 2022               Admit Date: 3/25/2022    Visit Dx:     ICD-10-CM ICD-9-CM   1. Acute renal failure, unspecified acute renal failure type (HCC)  N17.9 584.9   2. Dehydration  E86.0 276.51   3. Cellulitis of lower extremity, unspecified laterality  L03.119 682.6   4. Vasculitis (HCC)  I77.6 447.6   5. Congestive heart failure, unspecified HF chronicity, unspecified heart failure type (HCC)  I50.9 428.0   6. Positive D dimer  R79.89 790.92   7. 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)   • Chronic back pain (on chronic prescription lortab)   • HTN (hypertension)   • Solitary kidney (s/p nephrectomy for \"mass\")   • History of prostate cancer   • Closed fracture of phalanx of right hand   • Dental caries   • Acute renal failure, unspecified acute renal failure type (HCC)   • Rash   • Nausea and vomiting     Past Medical History:   Diagnosis Date   • Anxiety    • Arthritis    • Chronic kidney disease    • Diabetes mellitus (HCC)    • Disease of thyroid gland    • Diverticulosis    • Essential tremor    • HL (hearing loss)    • Hypertension    • Obesity    • Prostate cancer (HCC)    • Renal cell cancer (HCC)    • Skin cancer    • Vasculitis of skin      Past Surgical History:   Procedure Laterality Date   • CHOLECYSTECTOMY     • COLONOSCOPY      Polyps in the past but not on the most recent scope   • NEPHRECTOMY Right    • PROSTATE SURGERY      Radical prostatectomy   • SKIN CANCER EXCISION      Large incision left neck, multiple other cancers " removed       SLP Recommendation and Plan  SLP Swallowing Diagnosis: suspected pharyngeal dysphagia (04/06/22 1010)  SLP Diet Recommendation: mechanical soft with no mixed consistencies, nectar thick liquids (04/06/22 1010)  Recommended Precautions and Strategies: upright posture during/after eating, small bites of food and sips of liquid, general aspiration precautions (04/06/22 1010)  SLP Rec. for Method of Medication Administration: meds whole, meds crushed, with thick liquids, with pudding or applesauce, as tolerated (04/06/22 1010)     Monitor for Signs of Aspiration: yes, notify SLP if any concerns (04/06/22 1010)  Recommended Diagnostics: VFSS (MBS), other (see comments) (4/7) (04/06/22 1010)  Swallow Criteria for Skilled Therapeutic Interventions Met: demonstrates skilled criteria (04/06/22 1010)  Anticipated Discharge Disposition (SLP): unknown, anticipate therapy at next level of care (04/06/22 1010)  Rehab Potential/Prognosis, Swallowing: good, to achieve stated therapy goals (04/06/22 1010)     Predicted Duration Therapy Intervention (Days): until discharge (04/06/22 1010)                                  Plan of Care Reviewed With: patient, significant other      SWALLOW EVALUATION (last 72 hours)     SLP Adult Swallow Evaluation     Row Name 04/06/22 1010                   Rehab Evaluation    Document Type evaluation  -MP        Subjective Information no complaints  -MP        Patient Observations alert;cooperative  -MP        Patient/Family/Caregiver Comments/Observations Spouse present  -MP        Patient Effort good  -MP                  General Information    Patient Profile Reviewed yes  -MP        Pertinent History Of Current Problem Adm 3/25 w/ lower extremity rash, nausea/vomiting. Recent adm @ [facility] 3/10-28. Hx HTN, hypothy, Afib, DHF, VINI, chronic pain, GERD, CKD3, essential tremor, renal cell carcinoma s/p nephrectomy, gastroparesis, DM2, Gilbert's dz. Seen by SLP 5/'21 - [facility] thin, no straws. Consulted by RN 2' coughing & difficulty remembering to swallow.  -MP        Current Method of Nutrition soft textures;thin liquids  -MP        Precautions/Limitations, Vision WFL;for purposes of eval  -MP        Precautions/Limitations, Hearing WFL;for purposes of eval  -MP        Prior Level of Function-Communication unknown  -MP        Prior Level of Function-Swallowing other (see comments)  see hx above  -MP        Plans/Goals Discussed with patient;spouse/S.O.;agreed upon  -MP        Barriers to Rehab none identified  -MP        Patient's Goals for Discharge patient did not state  -MP        Family Goals for Discharge family did not state  -MP                  Pain    Additional Documentation Pain Scale: FACES Pre/Post-Treatment (Group)  -MP                  Pain Scale: FACES Pre/Post-Treatment    Pain: FACES Scale, Pretreatment 0-->no hurt  -MP        Posttreatment Pain Rating 0-->no hurt  -MP        Pain Location - Side/Orientation --  -MP                  Oral Motor Structure and Function    Dentition Assessment teeth are in poor condition  -MP        Secretion Management dried secretions in oral cavity  -MP        Mucosal Quality dry;cracked  -MP                  Oral Musculature and Cranial Nerve Assessment    Oral Motor General Assessment generalized oral motor weakness  -MP                  General Eating/Swallowing Observations    Respiratory Support Currently in Use nasal cannula  -MP        Eating/Swallowing Skills fed by SLP  -MP        Positioning During Eating upright in bed  -MP        Utensils Used spoon;cup;straw  -MP        Consistencies Trialed thin liquids;nectar/syrup-thick liquids;pureed;regular textures  -MP                  Clinical Swallow Eval    Pharyngeal Phase suspected pharyngeal impairment  -MP        Clinical Swallow Evaluation Summary Immediate cough w/ cup sips thin liquid. No s/sxs w/ nectar-thick, puree, or solid. Rec dysphagia  level IV ([facility] soft/no mixed) diet & nectar-thick liquids. Will plan for MBS tomorrow 4/7 to further assess.  -MP                  Pharyngeal Phase Concerns    Pharyngeal Phase Concerns cough  -MP        Cough thin  -MP                  SLP Evaluation Clinical Impression    SLP Swallowing Diagnosis suspected pharyngeal dysphagia  -MP        Functional Impact risk of aspiration/pneumonia  -MP        Rehab Potential/Prognosis, Swallowing good, to achieve stated therapy goals  -MP        Swallow Criteria for Skilled Therapeutic Interventions Met demonstrates skilled criteria  -MP                  Recommendations    Predicted Duration Therapy Intervention (Days) until discharge  -MP        SLP Diet Recommendation mechanical soft with no mixed consistencies;nectar thick liquids  -MP        Recommended Diagnostics VFSS (MBS);other (see comments)  4/7  -MP        Recommended Precautions and Strategies upright posture during/after eating;small bites of food and sips of liquid;general aspiration precautions  -MP        Oral Care Recommendations Oral Care BID/PRN  -MP        SLP Rec. for Method of Medication Administration meds whole;meds crushed;with thick liquids;with pudding or applesauce;as tolerated  -MP        Monitor for Signs of Aspiration yes;notify SLP if any concerns  -MP        Anticipated Discharge Disposition (SLP) unknown;anticipate therapy at next level of care  -MP              User Key  (r) = Recorded By, (t) = Taken By, (c) = Cosigned By    Initials Name Effective Dates    MP Rivera Bonner MS CCC-SLP 12/28/21 -                 EDUCATION  The patient has been educated in the following areas:   Dysphagia (Swallowing Impairment).              Time Calculation:    Time Calculation- SLP     Row Name 04/06/22 1144             Time Calculation- SLP    SLP Start Time 1010  -MP      SLP Received On 04/06/22  -              Untimed Charges    62703-FH Eval Oral Pharyng Swallow Minutes 40  -MP               Total Minutes    Untimed Charges Total Minutes 40  -MP       Total Minutes 40  -MP            User Key  (r) = Recorded By, (t) = Taken By, (c) = Cosigned By    Initials Name Provider Type    Rivera Guadalupe MS CCC-SLP Speech and Language Pathologist                Therapy Charges for Today     Code Description Service Date Service Provider Modifiers Qty    [id number] HC ST EVAL ORAL PHARYNG SWALLOW 3 4/6/2022 Rivera Bonner MS CCC-SLP GN 1        Patient was not wearing a face mask and did exhibit coughing during this therapy encounter.  Procedure performed was aerosolizing, involved close contact (within 6 feet for at least 15 minutes or longer), and did not involve contact with infectious secretions or specimens.  Therapist used appropriate personal protective equipment including gloves, standard procedure mask and eye protection.  Appropriate PPE was worn during the entire therapy session.  Hand hygiene was completed before and after therapy session.        MS ANDRADE Dubon  4/6/2022

## 2022-04-06 NOTE — PROGRESS NOTES
"                                 [facility] [facility] Progress Note      LOS: 12 days   Patient Care Team:  Vviek Mercer DO as PCP - General (Family Medicine)    Chief Complaint:    Chief Complaint   Patient presents with   • Dizziness   • Vomiting       Subjective     Interval History: Resting comfortably today.      Review of Systems:   A 14 point review of systems was negative except as was stated in the HPI      Objective     Vital Sign Min/Max for last 24 hours  Temp  Min: 98.1 °F (36.7 °C)  Max: 100.6 °F (38.1 °C)   BP  Min: 130/80  Max: 148/56   Pulse  Min: 71  Max: 100   Resp  Min: 18  Max: 22   SpO2  Min: 91 %  Max: 100 %   Flow (L/min)  Min: 2  Max: 4   No data recorded     Flowsheet Rows    Flowsheet Row First Filed Value   Admission Height 175.3 cm (69\") Documented at 03/25/2022 0849   Admission Weight 113 kg (250 lb) Documented at 03/25/2022 0849          Physical Exam:  General Appearance: Alert, appears stated age, agitated, tremors  Lungs: Clear to auscultation  Heart:: Regular rate and rhythm; no Murmurs, Rubs or Gallops  Abdomen: Soft and nontender with adequate bowel sounds.  No organomegaly  Extremities: 1+ pitting edema  Pulses: Pulses palpable and equal bilaterally  Skin: Rash noted  Psych: agitated     Results Review:     I reviewed the patient's new clinical results.  Results from last 7 days   Lab Units 04/06/22  0540 04/05/22  1337 04/04/22  0634   SODIUM mmol/L 154* 148* 138   POTASSIUM mmol/L 4.4 4.1 4.8   CHLORIDE mmol/L 119* 112* 104   CO2 mmol/L 24.0 20.0* 22.0   BUN mg/dL 69* 76* 83*   CREATININE mg/dL 2.24* 2.38* 2.35*   GLUCOSE mg/dL 147* 183* 198*   CALCIUM mg/dL 8.1* 8.3* 8.4*     Results from last 7 days   Lab Units 04/06/22  0540 04/04/22  0634 04/03/22  2046   WBC 10*3/mm3 7.55 9.90 9.55   HEMOGLOBIN g/dL 7.9* 8.2* 8.3*   HEMATOCRIT % 26.5* 26.7* 24.9*   PLATELETS 10*3/mm3 261 369 314     Lab Results   Lab Value Date/Time    TROPONINT 0.026 04/24/2021 0708       "       Results from last 7 days   Lab Units 04/03/22 2056   PH, ARTERIAL pH units 7.381   PO2 ART mm Hg 70.0*   PCO2, ARTERIAL mm Hg 37.4   HCO3 ART mmol/L 22.2           Medication Review: yes  Current Facility-Administered Medications   Medication Dose Route Frequency Provider Last Rate Last Admin   • acetaminophen (TYLENOL) tablet 650 mg  650 mg Oral Q4H PRN Raymond Herrera MD   650 mg at 04/02/22 0823    Or   • acetaminophen (TYLENOL) 160 MG/5ML solution 650 mg  650 mg Oral Q4H PRN Raymond Herrera MD   649.6 mg at 03/27/22 0943    Or   • acetaminophen (TYLENOL) suppository 650 mg  650 mg Rectal Q4H PRN Raymond Herrera MD   650 mg at 04/05/22 1237   • amiodarone (PACERONE) tablet 200 mg  200 mg Oral Q12H Raymond Herrera MD        Followed by   • [START ON 4/20/2022] amiodarone (PACERONE) tablet 200 mg  200 mg Oral Daily Raymond Herrera MD       • atorvastatin (LIPITOR) tablet 40 mg  40 mg Oral Nightly Raymond Herrera MD   40 mg at 04/05/22 2036   • buprenorphine-naloxone (SUBOXONE) 8-2 MG per SL tablet 2 tablet  2 tablet Sublingual Nightly Raymond Herrera MD   2 tablet at 04/05/22 2035   • dextrose (D50W) (25 g/50 mL) IV injection 25 g  25 g Intravenous Q15 Min PRN Raymond Herrera MD       • dextrose (GLUTOSE) oral gel 15 g  15 g Oral Q15 Min PRN Raymond Herrera MD       • dextrose 5 % and sodium chloride 0.45 % infusion  50 mL/hr Intravenous Continuous Wili Conner DO 50 mL/hr at 04/06/22 0828 50 mL/hr at 04/06/22 0828   • DULoxetine (CYMBALTA) DR capsule 90 mg  90 mg Oral Daily Raymond Herrera MD   90 mg at 04/04/22 0812   • enoxaparin (LOVENOX) syringe 110 mg  110 mg Subcutaneous Q12H Yinka Brush IV, PharmD   110 mg at 04/06/22 0838   • glucagon (human recombinant) (GLUCAGEN DIAGNOSTIC) injection 1 mg  1 mg Intramuscular Q15 Min PRN Raymond Herrera MD       • haloperidol (HALDOL) 2 MG/ML solution 2 mg  2 mg Oral Q6H PRN Peña Weaver MD       • insulin detemir (LEVEMIR)  injection 10 Units  10 Units Subcutaneous Nightly Yinka Desai MD   10 Units at 04/05/22 2047   • insulin lispro (humaLOG) injection 0-9 Units  0-9 Units Subcutaneous TID AC Raymond Herrera MD   2 Units at 04/06/22 0828   • levothyroxine (SYNTHROID, LEVOTHROID) tablet 150 mcg  150 mcg Oral Daily Raymond Herrera MD   150 mcg at 04/05/22 1227   • melatonin tablet 5 mg  5 mg Oral Nightly Peña Weaver MD   5 mg at 04/05/22 2035   • methohexital (BREVITAL) injection  - ADS Override Pull            • metoprolol tartrate (LOPRESSOR) tablet 50 mg  50 mg Oral Q8H Raymond Herrera MD   50 mg at 04/05/22 2036   • midazolam (VERSED) 2 MG/2ML injection  - ADS Override Pull            • mirtazapine (REMERON) tablet 7.5 mg  7.5 mg Oral Nightly Raymond Herrera MD   7.5 mg at 04/05/22 2036   • ondansetron (ZOFRAN) tablet 4 mg  4 mg Oral Q6H PRN Raymond Herrera MD        Or   • ondansetron (ZOFRAN) injection 4 mg  4 mg Intravenous Q6H PRN Raymond Herrera MD   4 mg at 03/30/22 1403   • palliative care oral rinse   Mouth/Throat PRN Yinka Desai MD   Given at 04/05/22 0940   • pantoprazole (PROTONIX) EC tablet 40 mg  40 mg Oral QAM Raymond Herrera MD   40 mg at 04/04/22 0554   • polyethylene glycol (MIRALAX) packet 17 g  17 g Oral Daily Raymond Herrera MD   17 g at 04/04/22 1744   • QUEtiapine (SEROquel) tablet 25 mg  25 mg Oral Q12H Wili Conner DO   25 mg at 04/05/22 2036   • sennosides-docusate (PERICOLACE) 8.6-50 MG per tablet 2 tablet  2 tablet Oral BID Yinka Desai MD   2 tablet at 04/05/22 2035   • Sodium Chloride (PF) 0.9 % 10 mL  10 mL Intravenous PRN Raymond Herrera MD       • sodium chloride 0.9 % flush 10 mL  10 mL Intravenous Q12H Raymond Herrera MD   10 mL at 04/06/22 0839   • sodium chloride 0.9 % flush 10 mL  10 mL Intravenous PRN Raymond Herrera MD   10 mL at 04/02/22 0830   • temazepam (RESTORIL) capsule 15 mg  15 mg Oral Nightly Peña Weaver MD   15 mg at 04/05/22 2035   •  vitamin B-12 (CYANOCOBALAMIN) tablet 50 mcg  50 mcg Oral Daily Raymond Herrera MD   50 mcg at 04/04/22 0812         Rash    Benign essential tremor    Hypothyroidism (acquired)    Paroxysmal A-fib (on eliquis & metoprolol)    Insulin dependent type 2 diabetes mellitus (HCC)    Chronic back pain (on chronic prescription lortab)    Acute renal failure, unspecified acute renal failure type (HCC)    Nausea and vomiting        Impression      Atrial flutter status post synchronized cardioversion 4/1/2022  Chronic kidney disease  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  Would plan for only short-term and amiodarone due to propensity for hypothyroidism with this drug in the past  Anticoagulation with Lovenox continues  No change from cardiac standpoint today    Tenzin Johnson MD  04/06/22  09:30 EDT

## 2022-04-07 ENCOUNTER — APPOINTMENT (OUTPATIENT)
Dept: GENERAL RADIOLOGY | Facility: HOSPITAL | Age: 77
End: 2022-04-07

## 2022-04-07 LAB
ANION GAP SERPL CALCULATED.3IONS-SCNC: 9 MMOL/L (ref 5–15)
BUN SERPL-MCNC: 58 MG/DL (ref 8–23)
BUN/CREAT SERPL: 32.4 (ref 7–25)
CALCIUM SPEC-SCNC: 7.8 MG/DL (ref 8.6–10.5)
CHLORIDE SERPL-SCNC: 116 MMOL/L (ref 98–107)
CO2 SERPL-SCNC: 22 MMOL/L (ref 22–29)
CREAT SERPL-MCNC: 1.79 MG/DL (ref 0.76–1.27)
CRP SERPL-MCNC: 3.27 MG/DL (ref 0–0.5)
EGFRCR SERPLBLD CKD-EPI 2021: 39 ML/MIN/1.73
ERYTHROCYTE [SEDIMENTATION RATE] IN BLOOD: 19 MM/HR (ref 0–20)
GLUCOSE BLDC GLUCOMTR-MCNC: 153 MG/DL (ref 70–130)
GLUCOSE BLDC GLUCOMTR-MCNC: 156 MG/DL (ref 70–130)
GLUCOSE BLDC GLUCOMTR-MCNC: 202 MG/DL (ref 70–130)
GLUCOSE BLDC GLUCOMTR-MCNC: 215 MG/DL (ref 70–130)
GLUCOSE SERPL-MCNC: 193 MG/DL (ref 65–99)
POTASSIUM SERPL-SCNC: 3.5 MMOL/L (ref 3.5–5.2)
SODIUM SERPL-SCNC: 147 MMOL/L (ref 136–145)

## 2022-04-07 PROCEDURE — 63710000001 INSULIN LISPRO (HUMAN) PER 5 UNITS: Performed by: INTERNAL MEDICINE

## 2022-04-07 PROCEDURE — 97110 THERAPEUTIC EXERCISES: CPT

## 2022-04-07 PROCEDURE — 92611 MOTION FLUOROSCOPY/SWALLOW: CPT

## 2022-04-07 PROCEDURE — 86140 C-REACTIVE PROTEIN: CPT | Performed by: NURSE PRACTITIONER

## 2022-04-07 PROCEDURE — 25010000002 ENOXAPARIN PER 10 MG

## 2022-04-07 PROCEDURE — 82962 GLUCOSE BLOOD TEST: CPT

## 2022-04-07 PROCEDURE — 74230 X-RAY XM SWLNG FUNCJ C+: CPT

## 2022-04-07 PROCEDURE — 80048 BASIC METABOLIC PNL TOTAL CA: CPT | Performed by: INTERNAL MEDICINE

## 2022-04-07 PROCEDURE — 99231 SBSQ HOSP IP/OBS SF/LOW 25: CPT | Performed by: NURSE PRACTITIONER

## 2022-04-07 PROCEDURE — 85652 RBC SED RATE AUTOMATED: CPT | Performed by: NURSE PRACTITIONER

## 2022-04-07 RX ORDER — ACETAMINOPHEN 325 MG/1
650 TABLET ORAL EVERY 4 HOURS PRN
Status: DISCONTINUED | OUTPATIENT
Start: 2022-04-07 | End: 2022-04-14

## 2022-04-07 RX ORDER — MIRTAZAPINE 15 MG/1
7.5 TABLET, FILM COATED ORAL NIGHTLY
Status: DISCONTINUED | OUTPATIENT
Start: 2022-04-07 | End: 2022-04-14

## 2022-04-07 RX ORDER — NICOTINE POLACRILEX 4 MG
15 LOZENGE BUCCAL
Status: DISCONTINUED | OUTPATIENT
Start: 2022-04-07 | End: 2022-04-14

## 2022-04-07 RX ORDER — ATORVASTATIN CALCIUM 40 MG/1
40 TABLET, FILM COATED ORAL NIGHTLY
Status: DISCONTINUED | OUTPATIENT
Start: 2022-04-07 | End: 2022-04-14

## 2022-04-07 RX ORDER — TEMAZEPAM 15 MG/1
15 CAPSULE ORAL NIGHTLY
Status: DISCONTINUED | OUTPATIENT
Start: 2022-04-07 | End: 2022-04-14

## 2022-04-07 RX ORDER — PREDNISONE 20 MG/1
20 TABLET ORAL
Status: DISCONTINUED | OUTPATIENT
Start: 2022-04-08 | End: 2022-04-14

## 2022-04-07 RX ORDER — ACETAMINOPHEN 650 MG/1
650 SUPPOSITORY RECTAL EVERY 4 HOURS PRN
Status: DISCONTINUED | OUTPATIENT
Start: 2022-04-07 | End: 2022-04-14

## 2022-04-07 RX ORDER — ACETAMINOPHEN 160 MG/5ML
650 SOLUTION ORAL EVERY 4 HOURS PRN
Status: DISCONTINUED | OUTPATIENT
Start: 2022-04-07 | End: 2022-04-14

## 2022-04-07 RX ORDER — AMOXICILLIN 250 MG
2 CAPSULE ORAL 2 TIMES DAILY
Status: DISCONTINUED | OUTPATIENT
Start: 2022-04-07 | End: 2022-04-14

## 2022-04-07 RX ORDER — LEVOTHYROXINE SODIUM 0.15 MG/1
150 TABLET ORAL DAILY
Status: DISCONTINUED | OUTPATIENT
Start: 2022-04-08 | End: 2022-04-14

## 2022-04-07 RX ORDER — AMIODARONE HYDROCHLORIDE 200 MG/1
200 TABLET ORAL DAILY
Status: DISCONTINUED | OUTPATIENT
Start: 2022-04-20 | End: 2022-04-09

## 2022-04-07 RX ORDER — METOPROLOL TARTRATE 50 MG/1
50 TABLET, FILM COATED ORAL EVERY 8 HOURS SCHEDULED
Status: DISCONTINUED | OUTPATIENT
Start: 2022-04-07 | End: 2022-04-14

## 2022-04-07 RX ORDER — METOPROLOL TARTRATE 50 MG/1
50 TABLET, FILM COATED ORAL EVERY 8 HOURS SCHEDULED
Status: DISCONTINUED | OUTPATIENT
Start: 2022-04-07 | End: 2022-04-07

## 2022-04-07 RX ORDER — CHOLECALCIFEROL (VITAMIN D3) 125 MCG
5 CAPSULE ORAL NIGHTLY
Status: DISCONTINUED | OUTPATIENT
Start: 2022-04-07 | End: 2022-04-14

## 2022-04-07 RX ORDER — AMIODARONE HYDROCHLORIDE 200 MG/1
200 TABLET ORAL EVERY 12 HOURS
Status: DISCONTINUED | OUTPATIENT
Start: 2022-04-07 | End: 2022-04-09

## 2022-04-07 RX ORDER — POLYETHYLENE GLYCOL 3350 17 G/17G
17 POWDER, FOR SOLUTION ORAL DAILY
Status: DISCONTINUED | OUTPATIENT
Start: 2022-04-08 | End: 2022-04-14

## 2022-04-07 RX ORDER — UBIDECARENONE 75 MG
50 CAPSULE ORAL DAILY
Status: DISCONTINUED | OUTPATIENT
Start: 2022-04-08 | End: 2022-04-14

## 2022-04-07 RX ADMIN — NYSTATIN 500000 UNITS: 100000 SUSPENSION ORAL at 18:18

## 2022-04-07 RX ADMIN — ENOXAPARIN SODIUM 110 MG: 120 INJECTION SUBCUTANEOUS at 17:43

## 2022-04-07 RX ADMIN — PANTOPRAZOLE SODIUM 40 MG: 40 TABLET, DELAYED RELEASE ORAL at 05:58

## 2022-04-07 RX ADMIN — BARIUM SULFATE 20 ML: 400 PASTE ORAL at 13:30

## 2022-04-07 RX ADMIN — DIPHENHYDRAMINE HYDROCHLORIDE AND LIDOCAINE HYDROCHLORIDE AND ALUMINUM HYDROXIDE AND MAGNESIUM HYDRO 5 ML: KIT at 12:33

## 2022-04-07 RX ADMIN — SODIUM CHLORIDE 100 ML/HR: 4.5 INJECTION, SOLUTION INTRAVENOUS at 02:38

## 2022-04-07 RX ADMIN — METOPROLOL TARTRATE 50 MG: 50 TABLET, FILM COATED ORAL at 18:18

## 2022-04-07 RX ADMIN — POLYETHYLENE GLYCOL 3350 17 G: 17 POWDER, FOR SOLUTION ORAL at 08:22

## 2022-04-07 RX ADMIN — METOPROLOL TARTRATE 50 MG: 50 TABLET, FILM COATED ORAL at 05:58

## 2022-04-07 RX ADMIN — BUPRENORPHINE AND NALOXONE 2 TABLET: 8; 2 TABLET SUBLINGUAL at 22:49

## 2022-04-07 RX ADMIN — DULOXETINE HYDROCHLORIDE 90 MG: 30 CAPSULE, DELAYED RELEASE ORAL at 08:20

## 2022-04-07 RX ADMIN — AMIODARONE HYDROCHLORIDE 200 MG: 200 TABLET ORAL at 22:52

## 2022-04-07 RX ADMIN — Medication 10 ML: at 08:22

## 2022-04-07 RX ADMIN — SODIUM CHLORIDE 100 ML/HR: 4.5 INJECTION, SOLUTION INTRAVENOUS at 18:17

## 2022-04-07 RX ADMIN — ENOXAPARIN SODIUM 110 MG: 120 INJECTION SUBCUTANEOUS at 05:58

## 2022-04-07 RX ADMIN — Medication 10 ML: at 22:50

## 2022-04-07 RX ADMIN — QUETIAPINE FUMARATE 25 MG: 25 TABLET ORAL at 22:51

## 2022-04-07 RX ADMIN — SENNOSIDES AND DOCUSATE SODIUM 2 TABLET: 50; 8.6 TABLET ORAL at 08:22

## 2022-04-07 RX ADMIN — DIPHENHYDRAMINE HYDROCHLORIDE AND LIDOCAINE HYDROCHLORIDE AND ALUMINUM HYDROXIDE AND MAGNESIUM HYDRO 5 ML: KIT at 05:58

## 2022-04-07 RX ADMIN — AMIODARONE HYDROCHLORIDE 200 MG: 200 TABLET ORAL at 08:21

## 2022-04-07 RX ADMIN — INSULIN LISPRO 2 UNITS: 100 INJECTION, SOLUTION INTRAVENOUS; SUBCUTANEOUS at 17:44

## 2022-04-07 RX ADMIN — NYSTATIN 500000 UNITS: 100000 SUSPENSION ORAL at 22:51

## 2022-04-07 RX ADMIN — LEVOTHYROXINE SODIUM 150 MCG: 150 TABLET ORAL at 08:21

## 2022-04-07 RX ADMIN — BARIUM SULFATE 50 ML: 400 SUSPENSION ORAL at 13:30

## 2022-04-07 RX ADMIN — BARIUM SULFATE 100 ML: 0.81 POWDER, FOR SUSPENSION ORAL at 13:29

## 2022-04-07 RX ADMIN — ATORVASTATIN CALCIUM 40 MG: 40 TABLET, FILM COATED ORAL at 22:54

## 2022-04-07 RX ADMIN — QUETIAPINE FUMARATE 25 MG: 25 TABLET ORAL at 08:22

## 2022-04-07 RX ADMIN — VITAM B12 50 MCG: 100 TAB at 08:32

## 2022-04-07 RX ADMIN — INSULIN LISPRO 4 UNITS: 100 INJECTION, SOLUTION INTRAVENOUS; SUBCUTANEOUS at 12:33

## 2022-04-07 RX ADMIN — INSULIN LISPRO 4 UNITS: 100 INJECTION, SOLUTION INTRAVENOUS; SUBCUTANEOUS at 08:20

## 2022-04-07 RX ADMIN — BARIUM SULFATE 10 ML: 400 SUSPENSION ORAL at 13:30

## 2022-04-07 NOTE — PLAN OF CARE
Goal Outcome Evaluation:      Pt went for MBS. Pt NPO, fluids running. Pending NGT placement, charge RN aware.

## 2022-04-07 NOTE — PROGRESS NOTES
"                                 [place] [facility] Progress Note      LOS: 13 days   Patient Care Team:  Vivek Mercer DO as PCP - General (Family Medicine)    Chief Complaint:  Following for arrhythmia    Chief Complaint   Patient presents with   • Dizziness   • Vomiting       Subjective     Interval History: Much more comfortable today.  No family currently present.  Alert & Oriented x3.  Denies SOA, denies pain or discomfort.  No further tremors.  No events noted overnight.      Review of Systems:   A 14 point review of systems was negative except as was stated in the HPI      Objective     Vital Sign Min/Max for last 24 hours  Temp  Min: 97.9 °F (36.6 °C)  Max: 98 °F (36.7 °C)   BP  Min: 94/63  Max: 150/59   Pulse  Min: 73  Max: 91   Resp  Min: 20  Max: 20   SpO2  Min: 94 %  Max: 99 %   Flow (L/min)  Min: 2  Max: 2   No data recorded     Flowsheet Rows    Flowsheet Row First Filed Value   Admission Height 175.3 cm (69\") Documented at 03/25/2022 0849   Admission Weight 113 kg (250 lb) Documented at 03/25/2022 0849          Physical Exam:  General Appearance: Alert & O x3, appears stated age  Lungs: Clear to auscultation  Heart:: Regular rate and rhythm; no Murmurs, Rubs or Gallops  Abdomen: Soft and nontender with adequate bowel sounds.  No organomegaly  Extremities: 1+ pitting edema  Pulses: Pulses palpable and equal bilaterally  Skin: Rash noted  Psych: Alert, Oriented, appropriate     Results Review:     I reviewed the patient's new clinical results.  Results from last 7 days   Lab Units 04/07/22  0450 04/06/22  0540 04/05/22  1337   SODIUM mmol/L 147* 154* 148*   POTASSIUM mmol/L 3.5 4.4 4.1   CHLORIDE mmol/L 116* 119* 112*   CO2 mmol/L 22.0 24.0 20.0*   BUN mg/dL 58* 69* 76*   CREATININE mg/dL 1.79* 2.24* 2.38*   GLUCOSE mg/dL 193* 147* 183*   CALCIUM mg/dL 7.8* 8.1* 8.3*     Results from last 7 days   Lab Units 04/06/22  0540 04/04/22  0634 04/03/22 2046   WBC 10*3/mm3 7.55 9.90 9.55 "   HEMOGLOBIN g/dL 7.9* 8.2* 8.3*   HEMATOCRIT % 26.5* 26.7* 24.9*   PLATELETS 10*3/mm3 261 369 314     Lab Results   Lab Value Date/Time    TROPONINT 0.026 04/24/2021 0708             Results from last 7 days   Lab Units 04/03/22 2056   PH, ARTERIAL pH units 7.381   PO2 ART mm Hg 70.0*   PCO2, ARTERIAL mm Hg 37.4   HCO3 ART mmol/L 22.2           Medication Review: yes  Current Facility-Administered Medications   Medication Dose Route Frequency Provider Last Rate Last Admin   • acetaminophen (TYLENOL) tablet 650 mg  650 mg Oral Q4H PRN Raymond Herrera MD   650 mg at 04/06/22 2324    Or   • acetaminophen (TYLENOL) 160 MG/5ML solution 650 mg  650 mg Oral Q4H PRN Raymond Herrera MD   649.6 mg at 03/27/22 0943    Or   • acetaminophen (TYLENOL) suppository 650 mg  650 mg Rectal Q4H PRN Raymond Herrera MD   650 mg at 04/05/22 1237   • amiodarone (PACERONE) tablet 200 mg  200 mg Oral Q12H Raymond Herrera MD   200 mg at 04/06/22 2254    Followed by   • [START ON 4/20/2022] amiodarone (PACERONE) tablet 200 mg  200 mg Oral Daily Raymond Herrera MD       • atorvastatin (LIPITOR) tablet 40 mg  40 mg Oral Nightly Raymond Herrera MD   40 mg at 04/06/22 2248   • buprenorphine-naloxone (SUBOXONE) 8-2 MG per SL tablet 2 tablet  2 tablet Sublingual Nightly Raymond Herrera MD   2 tablet at 04/06/22 2248   • dextrose (D50W) (25 g/50 mL) IV injection 25 g  25 g Intravenous Q15 Min PRN Raymond Herrera MD       • dextrose (GLUTOSE) oral gel 15 g  15 g Oral Q15 Min PRN Raymond Herrera MD       • DULoxetine (CYMBALTA) DR capsule 90 mg  90 mg Oral Daily Raymond Herrera MD   90 mg at 04/06/22 1158   • enoxaparin (LOVENOX) syringe 110 mg  110 mg Subcutaneous Q12H Yinka Brush IV, PharmD   110 mg at 04/07/22 0558   • First Mouthwash (Magic Mouthwash) 5 mL  5 mL Swish & Spit Q6H Wili Conner DO   5 mL at 04/07/22 0558   • glucagon (human recombinant) (GLUCAGEN DIAGNOSTIC) injection 1 mg  1 mg Intramuscular Q15 Min PRN  Raymond Herrera MD       • haloperidol (HALDOL) 2 MG/ML solution 2 mg  2 mg Oral Q6H PRN Peña Weaver MD       • insulin detemir (LEVEMIR) injection 10 Units  10 Units Subcutaneous Nightly Yinka Desai MD   10 Units at 04/06/22 2248   • insulin lispro (humaLOG) injection 0-9 Units  0-9 Units Subcutaneous TID AC Raymond Herrera MD   4 Units at 04/06/22 1754   • levothyroxine (SYNTHROID, LEVOTHROID) tablet 150 mcg  150 mcg Oral Daily Raymond Herrera MD   150 mcg at 04/06/22 1159   • melatonin tablet 5 mg  5 mg Oral Nightly Peña Weaver MD   5 mg at 04/06/22 2248   • methohexital (BREVITAL) injection  - ADS Override Pull            • metoprolol tartrate (LOPRESSOR) tablet 50 mg  50 mg Oral Q8H Raymond Herrera MD   50 mg at 04/07/22 0558   • midazolam (VERSED) 2 MG/2ML injection  - ADS Override Pull            • mineral oil-hydrophilic petrolatum (AQUAPHOR) ointment 1 application  1 application Topical BID PRN Peña Weaver MD   1 application at 04/06/22 2245   • mirtazapine (REMERON) tablet 7.5 mg  7.5 mg Oral Nightly Raymond Herrera MD   7.5 mg at 04/06/22 2248   • ondansetron (ZOFRAN) tablet 4 mg  4 mg Oral Q6H PRN Raymond Herrera MD        Or   • ondansetron (ZOFRAN) injection 4 mg  4 mg Intravenous Q6H PRN Raymond Herrera MD   4 mg at 04/06/22 2324   • palliative care oral rinse   Mouth/Throat PRN Yinka Desai MD   Given at 04/06/22 1232   • pantoprazole (PROTONIX) EC tablet 40 mg  40 mg Oral QAM Raymond Herrera MD   40 mg at 04/07/22 0558   • polyethylene glycol (MIRALAX) packet 17 g  17 g Oral Daily Raymond Herrera MD   17 g at 04/04/22 1744   • QUEtiapine (SEROquel) tablet 25 mg  25 mg Oral Q12H Wili Conner DO   25 mg at 04/06/22 2248   • sennosides-docusate (PERICOLACE) 8.6-50 MG per tablet 2 tablet  2 tablet Oral BID Yinka Desai MD   2 tablet at 04/06/22 1159   • Sodium Chloride (PF) 0.9 % 10 mL  10 mL Intravenous PRN Raymond Herrera MD       • sodium  chloride 0.45 % infusion  100 mL/hr Intravenous Continuous Francisco Atkinson  mL/hr at 04/07/22 0238 100 mL/hr at 04/07/22 0238   • sodium chloride 0.9 % flush 10 mL  10 mL Intravenous Q12H Raymond Herrera MD   10 mL at 04/06/22 2249   • sodium chloride 0.9 % flush 10 mL  10 mL Intravenous PRN Raymond Herrera MD   10 mL at 04/02/22 0830   • temazepam (RESTORIL) capsule 15 mg  15 mg Oral Nightly Peña Weaver MD   15 mg at 04/06/22 2248   • vitamin B-12 (CYANOCOBALAMIN) tablet 50 mcg  50 mcg Oral Daily Raymond Herrera MD   50 mcg at 04/06/22 1227         Rash    Benign essential tremor    Hypothyroidism (acquired)    Paroxysmal A-fib (on eliquis & metoprolol)    Insulin dependent type 2 diabetes mellitus (HCC)    Chronic back pain (on chronic prescription lortab)    Acute renal failure, unspecified acute renal failure type (HCC)    Nausea and vomiting        Impression      Atrial flutter status post synchronized cardioversion 4/1/2022  Chronic kidney disease  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  Would plan for only short-term and amiodarone due to propensity for hypothyroidism with this drug in the past  Anticoagulation with Lovenox continues  No change from cardiac standpoint today    Ani Mari PA-C  04/07/22  07:40 EDT

## 2022-04-07 NOTE — PROGRESS NOTES
[facility] Medicine Services  PROGRESS NOTE    Patient Name: Yinka Cummings  : 1945  MRN: [id number]    Date of Admission: 3/25/2022  Primary Care Physician: Vivek Mercer DO    Subjective   Subjective     CC:  Follow-up delirium    HPI:  Patient sleeping awaken easily with conversation.  Patient says he feels better every day.  Wife at the bedside.  His wife states he is essentially back to normal.  He is reporting mouth discomfort. On Exam, patient appears to have oral thrush.  Will try nystatin.     1505 Addendum: SLP recommending NPO due to oral and pharyngeal dysphagia.  Also recommend temporary alternative method of nutrition/hydration.  Will consult dietary.  Keofeed will be placed.  Spoke with the patient and his wife who are agreeable to this intervention.    1600 rash is returning to legs and arms. Will obtain sed rate and CRP. Start prednisone 20 mg daily    ROS:  GEN-no fever, chills  CV no chest pain or palpitations  Respiratory no cough dyspnea  GI no nausea vomiting diarrhea abdominal pain  HEENT, reports mouth discomfort    Objective   Objective     Vital Signs:   Temp:  [97.5 °F (36.4 °C)-98 °F (36.7 °C)] 97.5 °F (36.4 °C)  Heart Rate:  [67-79] 67  Resp:  [18-20] 18  BP: ()/(59-66) 140/63  Flow (L/min):  [2] 2     Physical Exam:  Constitutional: Awake alert chronically ill-appearing  HEENT: NCAT, mucous membranes moist, erythematous patching in the roof of his mouth  Respiratory: Clear, nonlabored  Cardiovascular: RRR no murmurs gallops rubs  GI: Positive bowel sounds soft nontender nondistended  MS: No lower extremity edema  Psych: Appropriate affect cooperative  Neuro: Speech clear no neuro focal deficit noted  Skin: rash returning to bilateral legs and arms    Results Reviewed:  LAB RESULTS:      Lab 22  0540 22  0634 22  0740 22  1038   WBC 7.55 9.90 9.55 12.96* 14.84*   HEMOGLOBIN 7.9* 8.2* 8.3* 8.5*  8.0*   HEMATOCRIT 26.5* 26.7* 24.9* 27.4* 24.1*   PLATELETS 261 369 314 377 316   NEUTROS ABS 4.92  --   --  9.75*  --    IMMATURE GRANS (ABS) 0.13*  --   --  0.15*  --    LYMPHS ABS 1.63  --   --  1.69  --    MONOS ABS 0.73  --   --  1.33*  --    EOS ABS 0.11  --   --  0.02  --    MCV 98.5* 95.4 90.2 97.9* 88.3   LACTATE  --   --  1.7  --   --    HEPARIN ANTI-XA  --   --   --   --  1.17         Lab 04/07/22  0450 04/06/22  0540 04/05/22  1337 04/04/22  0634 04/03/22 2046   SODIUM 147* 154* 148* 138 138   POTASSIUM 3.5 4.4 4.1 4.8 5.2   CHLORIDE 116* 119* 112* 104 102   CO2 22.0 24.0 20.0* 22.0 22.0   ANION GAP 9.0 11.0 16.0* 12.0 14.0   BUN 58* 69* 76* 83* 78*   CREATININE 1.79* 2.24* 2.38* 2.35* 2.77*   EGFR 39.0* 29.8* 27.7* 28.2* 23.1*   GLUCOSE 193* 147* 183* 198* 232*   CALCIUM 7.8* 8.1* 8.3* 8.4* 8.2*   PHOSPHORUS  --  3.5 3.3  --   --          Lab 04/06/22  0540 04/05/22  1337 04/03/22 2046 04/01/22  1227   TOTAL PROTEIN  --   --  5.6* 4.8*   ALBUMIN 2.80* 2.70* 2.70* 2.40*   GLOBULIN  --   --  2.9 2.4   ALT (SGPT)  --   --  24 12   AST (SGOT)  --   --  37 16   BILIRUBIN  --   --  0.4 0.3   ALK PHOS  --   --  76 80                     Lab 04/03/22 2056   PH, ARTERIAL 7.381   PCO2, ARTERIAL 37.4   PO2 ART 70.0*   FIO2 21   HCO3 ART 22.2   BASE EXCESS ART -2.7*   CARBOXYHEMOGLOBIN 1.3     Brief Urine Lab Results  (Last result in the past 365 days)      Color   Clarity   Blood   Leuk Est   Nitrite   Protein   CREAT   Urine HCG        04/04/22 0045 Yellow   Clear   Moderate (2+)   Trace   Negative   Negative                 Microbiology Results Abnormal     Procedure Component Value - Date/Time    Wound Culture - Wound, Leg, Left [[id number]] Collected: 03/25/22 0918    Lab Status: Final result Specimen: Wound from Leg, Left Updated: 03/27/22 0903     Wound Culture Light growth (2+) Normal Skin Cami     Gram Stain Rare (1+) WBCs seen      Moderate (3+) Gram positive cocci in pairs and clusters    Eosinophil  Smear - Urine, Urine, Clean Catch [[id number]]  (Normal) Collected: 03/26/22 1027    Lab Status: Final result Specimen: Urine, Clean Catch Updated: 03/26/22 1113     Eosinophil Smear 0 % EOS/100 Cells     Narrative:      No eosinophil seen    COVID-19 and FLU A/B PCR - Swab, Nasopharynx [[id number]]  (Normal) Collected: 03/25/22 0918    Lab Status: Final result Specimen: Swab from Nasopharynx Updated: 03/25/22 0955     COVID19 Not Detected     Influenza A PCR Not Detected     Influenza B PCR Not Detected    Narrative:      Fact sheet for providers: https://www.fda.gov/media/930822/download    Fact sheet for patients: https://www.fda.gov/media/583041/download    Test performed by PCR.          No radiology results from the last 24 hrs    Results for orders placed during the hospital encounter of 03/25/22    Adult Transesophageal Echo (LARRY) W/ Cont if Necessary Per Protocol    Interpretation Summary  · Estimated left ventricular EF = 65%  · The cardiac valves are anatomically and functionally normal.  · No evidence of a left atrial appendage thrombus was present.      I have reviewed the medications:  Scheduled Meds:amiodarone, 200 mg, Oral, Q12H   Followed by  [START ON 4/20/2022] amiodarone, 200 mg, Oral, Daily  atorvastatin, 40 mg, Oral, Nightly  buprenorphine-naloxone, 2 tablet, Sublingual, Nightly  DULoxetine, 90 mg, Oral, Daily  enoxaparin, 110 mg, Subcutaneous, Q12H  insulin detemir, 10 Units, Subcutaneous, Nightly  insulin lispro, 0-9 Units, Subcutaneous, TID AC  levothyroxine, 150 mcg, Oral, Daily  melatonin, 5 mg, Oral, Nightly  methohexital, , ,   metoprolol tartrate, 50 mg, Oral, Q8H  midazolam, , ,   mirtazapine, 7.5 mg, Oral, Nightly  nystatin, 5 mL, Oral, 4x Daily  pantoprazole, 40 mg, Oral, QAM  polyethylene glycol, 17 g, Oral, Daily  QUEtiapine, 25 mg, Oral, Q12H  senna-docusate sodium, 2 tablet, Oral, BID  sodium chloride, 10 mL, Intravenous, Q12H  temazepam, 15 mg, Oral, Nightly  cyanocobalamin, 50  mcg, Oral, Daily      Continuous Infusions:sodium chloride, 100 mL/hr, Last Rate: 100 mL/hr (04/07/22 0238)      PRN Meds:.•  acetaminophen **OR** acetaminophen **OR** acetaminophen  •  dextrose  •  dextrose  •  glucagon (human recombinant)  •  haloperidol  •  mineral oil-hydrophilic petrolatum  •  ondansetron **OR** ondansetron  •  palliative care oral rinse  •  Sodium Chloride (PF)  •  sodium chloride    Assessment/Plan   Assessment & Plan     Active Hospital Problems    Diagnosis  POA   • **Rash [R21]  Yes   • Acute renal failure, unspecified acute renal failure type (HCC) [N17.9]  Yes   • Nausea and vomiting [R11.2]  Unknown   • Paroxysmal A-fib (on eliquis & metoprolol) [I48.0]  Yes   • 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     Brief Hospital Course to date:  Yinka Cummings is a 77 y.o. 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    Assessment/plan    Vasculitic rash,? 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  -Started on steroids 4/1 with significant improvement in rash, however precipitated worsening insomnia and delirium, now monitoring off steroids without evidence of recurrent rash    Acute encephalopathy, multifactorial  Sleep deprivation psychosis/hospital associated delirium  -Neurology adjusted medications yesterday, slept well overnight, mentation essentially normalized today    Acute renal dysfunction on CKD 3, improved  Hx RCC s/p nephrectomy  -Per documentation [facility] report baseline CR 1.5-1.7 as recent as 3/20/22  -Nephrology follows  -Renal function relatively stable at current level in the mid two range, possible new baseline    Atrial fibrillation/flutter  -Recently Dabigatran was DC'd due to rash; currently on full dose Lovenox with plan to transition to NOAC after 30-day \"washout\" from discontinuation of primidone (concern for drug-drug interaction)  -Cardiology follows, s/p ECV 4/1/22; cardiology planning short course amiodarone with ultimate discontinuation (personal Hx thyroid abnormalities on the same)  -Continue Lopressor    Chronic pain syndrome  -Recently transitioned to Suboxone from Lortab by the [facility] due to concern for contribution to rash; will continue here    DM type 2, A1c 7.9%, with long-term use of insulin  -Continue Levemir, SSI    Hypothyroidism  Hypertension  Hyperlipidemia  Mood disorder  -Continue atorvastatin, duloxetine, Lopressor    Severe tremor  -Primidone recently DC'd for rash  -Prescribed Sinemet recently but has not yet been able to start  -[facility]-up with [facility]    Chronic normocytic anemia  -Relatively stable, monitor    Abnormal abdominal CT  -Per documentation questionable Clif versus duodenitis, potential bibasilar consolidation; ID follows, " monitoring off antibiotics    VINI  -Documented as noncompliant with CPAP    Obesity, BMI 38.86 kg/M2  -Complicates all aspects of care    Oral candidiasis  --nystatin swish and spit      DVT prophylaxis:  Medical DVT prophylaxis orders are present.       AM-PAC 6 Clicks Score (PT): 9 (04/06/22 1546)    Disposition: Mentation has rapidly improved.  Wife states he is essentially normal.  Proceed forward with placement     CODE STATUS:   Code Status and Medical Interventions:   Ordered at: 03/25/22 1328     Level Of Support Discussed With:    Patient     Code Status (Patient has no pulse and is not breathing):    CPR (Attempt to Resuscitate)     Medical Interventions (Patient has pulse or is breathing):    Full Support       Jemma Arriaga, NAZ  04/07/22

## 2022-04-07 NOTE — PLAN OF CARE
Goal Outcome Evaluation:  Plan of Care Reviewed With: patient            SLP MBS evaluation completed. Will address dysphagia. Please see note for further details and recommendations.

## 2022-04-07 NOTE — THERAPY TREATMENT NOTE
"Patient Name: Yinka Cummings  : 1945    MRN: [id number]                              Today's Date: 2022       Admit Date: 3/25/2022    Visit Dx:     ICD-10-CM ICD-9-CM   1. Acute renal failure, unspecified acute renal failure type (HCC)  N17.9 584.9   2. Dehydration  E86.0 276.51   3. Cellulitis of lower extremity, unspecified laterality  L03.119 682.6   4. Vasculitis (HCC)  I77.6 447.6   5. Congestive heart failure, unspecified HF chronicity, unspecified heart failure type (HCC)  I50.9 428.0   6. Positive D dimer  R79.89 790.92   7. 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)   • Chronic back pain (on chronic prescription lortab)   • HTN (hypertension)   • Solitary kidney (s/p nephrectomy for \"mass\")   • History of prostate cancer   • Closed fracture of phalanx of right hand   • Dental caries   • Acute renal failure, unspecified acute renal failure type (HCC)   • Rash   • Nausea and vomiting     Past Medical History:   Diagnosis Date   • Anxiety    • Arthritis    • Chronic kidney disease    • Diabetes mellitus (HCC)    • Disease of thyroid gland    • Diverticulosis    • Essential tremor    • HL (hearing loss)    • Hypertension    • Obesity    • Prostate cancer (HCC)    • Renal cell cancer (HCC)    • Skin cancer    • Vasculitis of skin      Past Surgical History:   Procedure Laterality Date   • CHOLECYSTECTOMY     • COLONOSCOPY      Polyps in the past but not on the most recent scope   • NEPHRECTOMY Right    • PROSTATE SURGERY      Radical prostatectomy   • SKIN CANCER EXCISION      Large incision left neck, multiple other cancers removed      General Information     Row Name 22 1527          OT Time and Intention    " Document Type therapy note (daily note);progress note/recertification  -KF     Mode of Treatment occupational therapy  -     Row Name 04/07/22 1527          General Information    Patient Profile Reviewed yes  -KF     Existing Precautions/Restrictions fall  skin integrity hands and B feet  -KF     Barriers to Rehab medically complex;previous functional deficit;hearing deficit  -     Row Name 04/07/22 1527          Cognition    Orientation Status (Cognition) oriented to;person;place;verbal cues/prompts needed for orientation;time;situation  -     Row Name 04/07/22 1527          Safety Issues, Functional Mobility    Safety Issues Affecting Function (Mobility) awareness of need for assistance;insight into deficits/self-awareness;safety precaution awareness  -KF     Impairments Affecting Function (Mobility) balance;coordination;endurance/activity tolerance;pain;strength  -KF           User Key  (r) = Recorded By, (t) = Taken By, (c) = Cosigned By    Initials Name Provider Type    Alis Kellogg OT Occupational Therapist                 Mobility/ADL's     Row Name 04/07/22 1527          Bed Mobility    Comment, (Bed Mobility) deferred at this time 2/2 fatigue  -     Row Name 04/07/22 1527          Transfers    Comment, (Transfers) deferred to PT  -KF     Row Name 04/07/22 1527          Functional Mobility    Functional Mobility- Comment deferred to PT  -KF     Row Name 04/07/22 1527          Activities of Daily Living    BADL Assessment/Intervention grooming  -     Row Name 04/07/22 1527          Grooming Assessment/Training    [place] Level (Grooming) set up;wash face, hands  -KF     Position (Grooming) sitting up in bed  -KF           User Key  (r) = Recorded By, (t) = Taken By, (c) = Cosigned By    Initials Name Provider Type    Alis Kellogg OT Occupational Therapist               Obj/Interventions     Row Name 04/07/22 1528          Shoulder (Therapeutic Exercise)    Shoulder  (Therapeutic Exercise) AROM (active range of motion)  -KF     Shoulder AROM (Therapeutic Exercise) bilateral;flexion;extension;horizontal aBduction/aDduction;20 repititions;2 sets;supine;scapular protraction  -     Row Name 04/07/22 1528          Elbow/Forearm (Therapeutic Exercise)    Elbow/Forearm (Therapeutic Exercise) AROM (active range of motion)  -KF     Elbow/Forearm AROM (Therapeutic Exercise) bilateral;flexion;extension;20 repititions;2 sets;supine  -     Row Name 04/07/22 1528          Motor Skills    Therapeutic Exercise shoulder;elbow/forearm  -KF           User Key  (r) = Recorded By, (t) = Taken By, (c) = Cosigned By    Initials Name Provider Type    KF Alis Harris, OT Occupational Therapist               Goals/Plan     Row Name 04/07/22 1532          Bed Mobility Goal 1 (OT)    Activity/Assistive Device (Bed Mobility Goal 1, OT) rolling to left;rolling to right;supine to sit;sit to supine  -KF     [place] Level/Cues Needed (Bed Mobility Goal 1, OT) minimum assist (75% or more patient effort)  -KF     Time Frame (Bed Mobility Goal 1, OT) by discharge  -KF     Progress/Outcomes (Bed Mobility Goal 1, OT) continuing progress toward goal  -     Row Name 04/07/22 1532          Transfer Goal 1 (OT)    Activity/Assistive Device (Transfer Goal 1, OT) sit-to-stand/stand-to-sit;bed-to-chair/chair-to-bed;toilet;walker, rolling  -KF     [place] Level/Cues Needed (Transfer Goal 1, OT) moderate assist (50-74% patient effort)  -KF     Time Frame (Transfer Goal 1, OT) by discharge  -KF     Progress/Outcome (Transfer Goal 1, OT) continuing progress toward goal  -KF     Row Name 04/07/22 1532          Dressing Goal 1 (OT)    Activity/Device (Dressing Goal 1, OT) upper body dressing  -KF     [place]/Cues Needed (Dressing Goal 1, OT) minimum assist (75% or more patient effort)  -KF     Time Frame (Dressing Goal 1, OT) by discharge  -KF     Progress/Outcome (Dressing Goal 1, OT) continuing  progress toward goal  -KF     Row Name 04/07/22 1532          Self-Feeding Goal 1 (OT)    Activity/Device (Self-Feeding Goal 1, OT) scoop food and bring to mouth  -KF     [place] Level/Cues Needed (Self-Feeding Goal 1, OT) set-up required;supervision required  -KF     Time Frame (Self-Feeding Goal 1, OT) by discharge  -KF     Progress/Outcomes (Self-Feeding Goal 1, OT) good progress toward goal  -KF           User Key  (r) = Recorded By, (t) = Taken By, (c) = Cosigned By    Initials Name Provider Type    KF Alis Harris, OT Occupational Therapist               Clinical Impression     Row Name 04/07/22 1529          Pain Scale: FACES Pre/Post-Treatment    Pain: FACES Scale, Pretreatment 2-->hurts little bit  -KF     Posttreatment Pain Rating 2-->hurts little bit  -KF     Pain Location - Side/Orientation Bilateral  -KF     Pain Location generalized  -KF     Pain Location - hand  -KF     Pre/Posttreatment Pain Comment tolerated, skin integrity concerns B hands and feet with sore hands RN aware  -KF     Row Name 04/07/22 1529          Plan of Care Review    Plan of Care Reviewed With patient;spouse;family  -KF     Progress improving  -KF     Outcome Evaluation Pt tolerated BUE ther ex with good motivation 20 reps x2 sets, setup grooming sitting upright in bed, cont IPOT per POC cont recom SNF at d/c  -KF     Row Name 04/07/22 1529          Therapy Assessment/Plan (OT)    Rehab Potential (OT) good, to achieve stated therapy goals  -KF     Criteria for Skilled Therapeutic Interventions Met (OT) yes;skilled treatment is necessary  -KF     Therapy Frequency (OT) daily  -KF     Row Name 04/07/22 1529          Therapy Plan Review/Discharge Plan (OT)    Anticipated Discharge Disposition (OT) skilled nursing facility  -KF     Row Name 04/07/22 1529          Vital Signs    Pre Systolic BP Rehab 127  RN cleared VSS  -KF     Pre Treatment Diastolic BP 62  -KF     Pre SpO2 (%) 99  -KF     O2 Delivery Pre Treatment  supplemental O2  -KF     Post SpO2 (%) 97  -KF     O2 Delivery Post Treatment supplemental O2  -KF     Pre Patient Position Supine  -KF     Intra Patient Position Supine  -KF     Post Patient Position Supine  -KF     Row Name 04/07/22 1529          Positioning and Restraints    Pre-Treatment Position in bed  -KF     Post Treatment Position bed  -KF     In Bed notified nsg;supine;fowlers;call light within reach;encouraged to call for assist;exit alarm on;side rails up x2;legs elevated;heels elevated;waffle boots/both;with family/caregiver  -KF           User Key  (r) = Recorded By, (t) = Taken By, (c) = Cosigned By    Initials Name Provider Type    KF Alis Harris, OT Occupational Therapist               Outcome Measures     Row Name 04/07/22 1533          How much help from another is currently needed...    Putting on and taking off regular lower body clothing? 1  -KF     Bathing (including washing, rinsing, and drying) 1  -KF     Toileting (which includes using toilet bed pan or urinal) 1  -KF     Putting on and taking off regular upper body clothing 2  -KF     Taking care of personal grooming (such as brushing teeth) 2  -KF     Eating meals 2  NPO currently  -KF     AM-PAC 6 Clicks Score (OT) 9  -KF     Row Name 04/07/22 0800          How much help from another person do you currently need...    Turning from your back to your side while in flat bed without using bedrails? 2  -MN     Moving from lying on back to sitting on the side of a flat bed without bedrails? 2  -MN     Moving to and from a bed to a chair (including a wheelchair)? 1  -MN     Standing up from a chair using your arms (e.g., wheelchair, bedside chair)? 1  -MN     Climbing 3-5 steps with a railing? 1  -MN     To walk in hospital room? 1  -MN     AM-PAC 6 Clicks Score (PT) 8  -MN     Row Name 04/07/22 1533          Functional Assessment    Outcome Measure Options AM-PAC 6 Clicks Daily Activity (OT)  -KF           User Key  (r) = Recorded By,  (t) = Taken By, (c) = Cosigned By    Initials Name Provider Type    KF Alis Harris OT Occupational Therapist    Mady Amaya RN Registered Nurse                Occupational Therapy Education                 Title: PT OT SLP Therapies (Done)     Topic: Occupational Therapy (Done)     Point: ADL training (Done)     Description:   Instruct learner(s) on proper safety adaptation and remediation techniques during self care or transfers.   Instruct in proper use of assistive devices.              Learning Progress Summary           Patient Acceptance, E,TB,D, DU,VU,NR by  at 4/7/2022 1535    Acceptance, E,TB,D, VU,DU,NR by  at 4/7/2022 1534    Acceptance, E, NR by MR at 4/4/2022 1447    Acceptance, E,D, NR by JAYDEN at 3/30/2022 1147    Acceptance, E,D, VU,NR by TA at 3/27/2022 1502   Family Acceptance, E,TB,D, DU,VU,NR by  at 4/7/2022 1535    Acceptance, E, NR by MR at 4/4/2022 1447    Acceptance, E,D, NR by JAYDEN at 3/30/2022 1147                   Point: Home exercise program (Done)     Description:   Instruct learner(s) on appropriate technique for monitoring, assisting and/or progressing therapeutic exercises/activities.              Learning Progress Summary           Patient Acceptance, E,TB,D, DU,VU,NR by  at 4/7/2022 1535    Acceptance, E,TB,D, VU,DU,NR by  at 4/7/2022 1534    Acceptance, E, NR by MR at 4/4/2022 1447    Acceptance, E,D, NR by JAYDEN at 3/30/2022 1147    Acceptance, E,D, VU,NR by TA at 3/27/2022 1502   Family Acceptance, E,TB,D, DU,VU,NR by  at 4/7/2022 1535    Acceptance, E, NR by MR at 4/4/2022 1447    Acceptance, E,D, NR by JAYDEN at 3/30/2022 1147                   Point: Precautions (Done)     Description:   Instruct learner(s) on prescribed precautions during self-care and functional transfers.              Learning Progress Summary           Patient Acceptance, E,TB,D, DU,VU,NR by  at 4/7/2022 1535    Acceptance, E,TB,SUSHANT, JARRETT KAISER,NR by KF at 4/7/2022 1534    Acceptance, E, NR by  MR at 4/4/2022 1447    Acceptance, E,D, NR by JY at 3/30/2022 1147    Acceptance, E,D, VU,NR by TA at 3/27/2022 1502   Family Acceptance, E,TB,D, DU,VU,NR by  at 4/7/2022 1535    Acceptance, E, NR by MR at 4/4/2022 1447    Acceptance, E,D, NR by KAMILAY at 3/30/2022 1147                   Point: Body mechanics (Done)     Description:   Instruct learner(s) on proper positioning and spine alignment during self-care, functional mobility activities and/or exercises.              Learning Progress Summary           Patient Acceptance, E,TB,D, DU,VU,NR by  at 4/7/2022 1535    Acceptance, E,TB,D, VU,DU,NR by  at 4/7/2022 1534    Acceptance, E, NR by MR at 4/4/2022 1447    Acceptance, E,D, NR by JAYDEN at 3/30/2022 1147    Acceptance, E,D, VU,NR by TA at 3/27/2022 1502   Family Acceptance, E,TB,D, DU,VU,NR by KF at 4/7/2022 1535    Acceptance, E, NR by MR at 4/4/2022 1447    Acceptance, E,D, NR by JAYDEN at 3/30/2022 1147                               User Key     Initials Effective Dates Name Provider Type Discipline    TA 06/16/21 -  Neno Russ, OT Occupational Therapist OT     06/16/21 -  Alis Harris, OT Occupational Therapist OT    JY 06/16/21 -  Annalisa Abbott, OT Occupational Therapist OT    MR 10/06/21 -  Alla Mccauley OT Occupational Therapist OT              OT Recommendation and Plan  Therapy Frequency (OT): daily  Plan of Care Review  Plan of Care Reviewed With: patient, spouse, family  Progress: improving  Outcome Evaluation: Pt tolerated BUE ther ex with good motivation 20 reps x2 sets, setup grooming sitting upright in bed, cont IPOT per POC cont recom SNF at d/c     Time Calculation:    Time Calculation- OT     Row Name 04/07/22 1511             Time Calculation- OT    OT Start Time 1511  -KF      OT Received On 04/07/22  -      OT Goal Re-Cert Due Date 04/17/22  -KF              Timed Charges    46957 - OT Therapeutic Exercise Minutes 14  -KF              Total Minutes    Timed Charges Total  Minutes 14  -KF       Total Minutes 14  -KF            User Key  (r) = Recorded By, (t) = Taken By, (c) = Cosigned By    Initials Name Provider Type    Alis Kellogg OT Occupational Therapist              Therapy Charges for Today     Code Description Service Date Service Provider Modifiers Qty    [id number]  OT THER PROC EA 15 MIN 4/7/2022 Alis Harris OT GO 1               Alis Harris OT  4/7/2022

## 2022-04-07 NOTE — PROGRESS NOTES
INFECTIOUS DISEASE Progress Note    Yinka Cummings  1945  [id number]      Admission Date: 3/25/2022      Requesting Provider: Yinka Desai MD  Evaluating Physician: Brandt Ward MD    Reason for Consultation: vasculitis rash with cellulitis    History of present illness:    3/26/2022: Patient is a 77 y.o. male, ,known to Dr. Wili Rees, with h/o CKD, T2DM, afib/flutter, chronic diastolic heart failure, VINI, chronic pain, gastroparesis, Gilbert's disease,  hypothyroidism, essential tremor, HTN, Obesity, Prostate cancer/radical prostatectomy, renal cell carcinoma/right nephrectomy, multiple skin cancer excisions, and cutaneous vasculitis who we were asked to see for cellulitis.  The patient presented to [facility] ED on 3/25/22 with nausea, vomiting, and LE rash/redness.  [facility] with rash and Afib/RVR.  Dermatology did a punch biopsy with path showing perivascular lymphocytic infiltrate with no evidence of vasculitis at the time.  He was though to have rash from Pradaxa and was changed to Lovenox.  The rash worsened and eventually Lyrica, Primidone, and Norco were stopped one by one.  He was started on Suboxone on 3/16 and his rash began to improve.  Primidone had been a new drug started just prior the start of the rash.  He underwent cardioversion while at [facility] and converted to NSR.  He was discharge home on Lovenox.  He followed up with his PCP on 3/21 and was placed on Keflex for possible cutaneous infections at the rash site and Lasix for BLE edema.    His rash has worsened again on feet, legs, abdomen, and arms prompting him to come to [facility] ED on 3/25.  He developed nausea and vomiting prior to his presentation.  He denies fever, chills, abdominal pain, diarrhea, or dysuria.  On arrival, his Tmax is 99.4.  Initial labs were CRP 12.02-->12.5, INR 1.2, ESR 44-->62, PCT 0.78-->0.94, lactic acid 2.0, lipase 8, proBNP 5100, D-dimer 4.44, RF 16.4, C3 144, C4 29, WBC 10,600  with 83% neutrophils, and creatinine 2.35-->2.75 (baseline 1.6 on 5/20/21).  A leg wound culture showed normal skin ananth with GS showing GPC in pairs/clusters.  A second wound culture is pending with growth present.   A COVID-19/Flu PCR is negative.  A Hep panel was negative.  A UA WBC is 13-20 with TNTC RBCs, trace LE, negative nitrite.  KENZIE, ANCA panel, cryogobulin, and complement are pending.  A CXR showed no acute findings.  A DVT work up of BLE was negative although peroneal veins were not well visualized bilaterally. A CT scan of a/p with contrast has been ordered.  He is currently on low dose Rocephin.  ID was asked to evaluate and manage his antibiotic therapy.    He continues to have nausea and vomiting with cold sweat.  He denies any diarrhea.     3/27/2022: He complains of lower extremity pain which is most prominent in his feet.  He has remained afebrile.  His creatinine today is 2.82 and his C-reactive protein is 11.6.  His white blood cell count is 17.2.  He has anorexia but denies nausea and vomiting.    3/28/22: He has decreased Bilateral foot pain.  He has remained afebrile.  His creatinine is down to 2.5. His 24 hour protein is 360. He denies nausea, vomiting, and diarrhea    3/29/22:  Maximum temperature over the last 24 hours is 99.2. Creatinine yesterday was 2.58. CH 50 was greater than 60, ANCA was negative and KENZIE was negative.  He has decreased foot pain.  He complains of malaise but denies nausea and vomiting.    3/30/22: He has remained afebrile.  He denies nausea and vomiting.  He has decreased lower extremity pain.  He denies dyspnea.    3/31/22: He remains afebrile. His creatinine today is 2.6.  His C-reactive protein is 6.8.  His white blood cell count is 15.4.  His echocardiogram revealed no valvular vegetations. He and his family indicate improvement in his lower extremity rash but he has more extensive lesions over his palms.     4/1/2022: He underwent cardioversion today.  He is  now in sinus rhythm.  He has remained afebrile.  He is currently drowsy from his sedation for cardioversion.  His family thinks that his rash is no worse today.  His creatinine today is 2.33.      4/4/2022: He has been confused over the weekend.  This worsened after he was started on prednisone.  He has been afebrile.  He notes a significant improvement in his rash over the weekend.  He denies nausea, vomiting, and diarrhea.  He has remained afebrile.  His creatinine today is 2.35.    4/5/22: He has been severely confused overnight with agitation. He had a fever to 100.6° earlier today but is now afebrile. Laboratory studies today reveal a creatinine of 2.38.  A urinalysis yesterday revealed 6-12 white blood cells. He is unable to provide a review of systems.    4/6/22: He had a low-grade fever to 100.6 at around noon yesterday but has been afebrile since. His creatinine today is 2.24 and his white blood cell count is 7.6. Sodium is elevated at 154. He feels much better.  He he is much less agitated and has appropriate conversation today.    4/7/22: He has remained afebrile over the last 24 hours. His creatinine today is down to 1.79. He feels much better.  He denies increased foot pain. Agitation and confusion have dramatically improved.    Past Medical History:   Diagnosis Date   • Anxiety    • Arthritis    • Chronic kidney disease    • Diabetes mellitus (HCC)    • Disease of thyroid gland    • Diverticulosis    • Essential tremor    • HL (hearing loss)    • Hypertension    • Obesity    • Prostate cancer (HCC)    • Renal cell cancer (HCC)    • Skin cancer    • Vasculitis of skin        Past Surgical History:   Procedure Laterality Date   • CHOLECYSTECTOMY     • COLONOSCOPY      Polyps in the past but not on the most recent scope   • NEPHRECTOMY Right    • PROSTATE SURGERY      Radical prostatectomy   • SKIN CANCER EXCISION      Large incision left neck, multiple other cancers removed       Family History   Problem  Relation Age of Onset   • Cancer Mother    • Heart attack Father    • Kidney failure Sister    • Coronary artery disease Sister        Social History     Socioeconomic History   • Marital status:    • Number of children: 4   Tobacco Use   • Smoking status: Never Smoker   • Smokeless tobacco: Never Used   Vaping Use   • Vaping Use: Never used   Substance and Sexual Activity   • Alcohol use: Not Currently   • Drug use: Not Currently   • Sexual activity: Defer       Allergies   Allergen Reactions   • Contrast Dye Rash     R[facility]   • Doxycycline Rash     Urticaria per [facility]   • Chlorthalidone Other (See Comments)     Hyponatremia [facility]   • Morphine Unknown - Low Severity     Headache [facility]   • Neurontin [Gabapentin] Mental Status Change     Drowsy [facility]   • Phenergan [Promethazine] Unknown - Low Severity     Confusion [facility]         Medication:    Current Facility-Administered Medications:   •  acetaminophen (TYLENOL) tablet 650 mg, 650 mg, Oral, Q4H PRN, 650 mg at 22 2324 **OR** acetaminophen (TYLENOL) 160 MG/5ML solution 650 mg, 650 mg, Oral, Q4H PRN, 649.6 mg at 22 0943 **OR** acetaminophen (TYLENOL) suppository 650 mg, 650 mg, Rectal, Q4H PRN, Raymond Herrera MD, 650 mg at 22 1237  •  [COMPLETED] amiodarone in dextrose 5% (NEXTERONE) loading dose 150mg/100mL, 150 mg, Intravenous, Once, 150 mg at 22 0816 **FOLLOWED BY** [] amiodarone 360 mg in 200 mL D5W infusion, 1 mg/min, Intravenous, Continuous, Last Rate: 33.3 mL/hr at 22 0829, 1 mg/min at 22 **FOLLOWED BY** [] amiodarone 360 mg in 200 mL D5W infusion, 0.5 mg/min, Intravenous, Continuous, Last Rate: 16.67 mL/hr at 22, 0.5 mg/min at 22 **FOLLOWED BY** [COMPLETED] amiodarone (PACERONE) tablet 200 mg, 200 mg, Oral, Once, 200 mg at 22 0841 **FOLLOWED BY** [] amiodarone (PACERONE) tablet 200 mg, 200 mg, Oral,  Q8H, 200 mg at 04/05/22 2035 **FOLLOWED BY** amiodarone (PACERONE) tablet 200 mg, 200 mg, Oral, Q12H, 200 mg at 04/06/22 2254 **FOLLOWED BY** [START ON 4/20/2022] amiodarone (PACERONE) tablet 200 mg, 200 mg, Oral, Daily, Raymond Herrera MD  •  atorvastatin (LIPITOR) tablet 40 mg, 40 mg, Oral, Nightly, Raymond Herrera MD, 40 mg at 04/06/22 2248  •  buprenorphine-naloxone (SUBOXONE) 8-2 MG per SL tablet 2 tablet, 2 tablet, Sublingual, Nightly, Raymond Herrera MD, 2 tablet at 04/06/22 2248  •  dextrose (D50W) (25 g/50 mL) IV injection 25 g, 25 g, Intravenous, Q15 Min PRN, Raymond Herrera MD  •  dextrose (GLUTOSE) oral gel 15 g, 15 g, Oral, Q15 Min PRN, Raymond Herrera MD  •  DULoxetine (CYMBALTA) DR capsule 90 mg, 90 mg, Oral, Daily, Raymond Herrera MD, 90 mg at 04/06/22 1158  •  enoxaparin (LOVENOX) syringe 110 mg, 110 mg, Subcutaneous, Q12H, Yinka Brush IV, PharmD, 110 mg at 04/07/22 0558  •  First Mouthwash (Magic Mouthwash) 5 mL, 5 mL, Swish & Spit, Q6H, Wili Conner DO, 5 mL at 04/07/22 0558  •  glucagon (human recombinant) (GLUCAGEN DIAGNOSTIC) injection 1 mg, 1 mg, Intramuscular, Q15 Min PRN, Raymond Herrera MD  •  haloperidol (HALDOL) 2 MG/ML solution 2 mg, 2 mg, Oral, Q6H PRN, Peña Weaver MD  •  insulin detemir (LEVEMIR) injection 10 Units, 10 Units, Subcutaneous, Nightly, Yinka Desai MD, 10 Units at 04/06/22 2248  •  insulin lispro (humaLOG) injection 0-9 Units, 0-9 Units, Subcutaneous, TID AC, Raymond Herrera MD, 4 Units at 04/06/22 1754  •  levothyroxine (SYNTHROID, LEVOTHROID) tablet 150 mcg, 150 mcg, Oral, Daily, Raymond Herrera MD, 150 mcg at 04/06/22 1159  •  melatonin tablet 5 mg, 5 mg, Oral, Nightly, Peña Weaver MD, 5 mg at 04/06/22 2248  •  methohexital (BREVITAL) injection  - ADS Override Pull, , , ,   •  metoprolol tartrate (LOPRESSOR) tablet 50 mg, 50 mg, Oral, Q8H, Raymond Herrera MD, 50 mg at 04/07/22 0527  •  midazolam (VERSED) 2 MG/2ML injection   - ADS Override Pull, , , ,   •  mineral oil-hydrophilic petrolatum (AQUAPHOR) ointment 1 application, 1 application, Topical, BID PRN, Peña Weaver MD, 1 application at 04/06/22 2245  •  mirtazapine (REMERON) tablet 7.5 mg, 7.5 mg, Oral, Nightly, Raymond Herrera MD, 7.5 mg at 04/06/22 2248  •  ondansetron (ZOFRAN) tablet 4 mg, 4 mg, Oral, Q6H PRN **OR** ondansetron (ZOFRAN) injection 4 mg, 4 mg, Intravenous, Q6H PRN, Raymond Herrera MD, 4 mg at 04/06/22 2324  •  palliative care oral rinse, , Mouth/Throat, PRN, Yinka Desai MD, Given at 04/06/22 1232  •  pantoprazole (PROTONIX) EC tablet 40 mg, 40 mg, Oral, QAM, Raymond Herrera MD, 40 mg at 04/07/22 0558  •  polyethylene glycol (MIRALAX) packet 17 g, 17 g, Oral, Daily, Raymond Herrera MD, 17 g at 04/04/22 1744  •  QUEtiapine (SEROquel) tablet 25 mg, 25 mg, Oral, Q12H, Wili Conner DO, 25 mg at 04/06/22 2248  •  sennosides-docusate (PERICOLACE) 8.6-50 MG per tablet 2 tablet, 2 tablet, Oral, BID, Yinka Desai MD, 2 tablet at 04/06/22 1159  •  Sodium Chloride (PF) 0.9 % 10 mL, 10 mL, Intravenous, PRN, Raymond Herrera MD  •  sodium chloride 0.45 % infusion, 100 mL/hr, Intravenous, Continuous, Francisco Atkinson MD, Last Rate: 100 mL/hr at 04/07/22 0238, 100 mL/hr at 04/07/22 0238  •  sodium chloride 0.9 % flush 10 mL, 10 mL, Intravenous, Q12H, Raymond Herrera MD, 10 mL at 04/06/22 2249  •  sodium chloride 0.9 % flush 10 mL, 10 mL, Intravenous, PRN, Raymond Herrera MD, 10 mL at 04/02/22 0830  •  temazepam (RESTORIL) capsule 15 mg, 15 mg, Oral, Nightly, Peña Weaver MD, 15 mg at 04/06/22 2248  •  vitamin B-12 (CYANOCOBALAMIN) tablet 50 mcg, 50 mcg, Oral, Daily, Raymond Herrera MD, 50 mcg at 04/06/22 1227    Antibiotics:  Anti-Infectives (From admission, onward)    Ordered     Dose/Rate Route Frequency Start Stop    03/25/22 1059  cefTRIAXone (ROCEPHIN) 1 g/100 mL 0.9% NS (MBP)        Ordering Provider: Chace Chauhan MD    1 g  over 30  Minutes Intravenous Once 22 1101 22 1244            Review of Systems:  See HPI    Physical Exam:   Vital Signs  Temp (24hrs), Av °F (36.7 °C), Min:97.9 °F (36.6 °C), Max:98 °F (36.7 °C)    Temp  Min: 97.9 °F (36.6 °C)  Max: 98 °F (36.7 °C)  BP  Min: 94/63  Max: 150/59  Pulse  Min: 73  Max: 91  Resp  Min: 20  Max: 20  SpO2  Min: 94 %  Max: 99 %    GENERAL: Debilitated appearing. Alert and responsive.  He answers questions appropriately.  HEENT: Normocephalic, atraumatic.  PERRL. EOMI. No conjunctival injection. No icterus. Oropharynx clear without evidence of thrush or exudate.  NECK: Supple   HEART: RRR; No murmur  LUNGS: Clear to auscultation bilaterally without wheezing, rales, rhonchi. Normal respiratory effort. Nonlabored.  ABDOMEN: Soft, lower abdominal tenderness, nondistended.  No rebound or guarding.   Skin: His diffuse hemorrhagic ulcerative rash is now markedly improved.  MSK:  FROM, no joint effusions  NEURO: Alert and responsive.  He answers questions appropriately and moves all of his extremities  Laboratory Data    Results from last 7 days   Lab Units 22  0540 22  0634 22  2046   WBC 10*3/mm3 7.55 9.90 9.55   HEMOGLOBIN g/dL 7.9* 8.2* 8.3*   HEMATOCRIT % 26.5* 26.7* 24.9*   PLATELETS 10*3/mm3 261 369 314     Results from last 7 days   Lab Units 22  0450   SODIUM mmol/L 147*   POTASSIUM mmol/L 3.5   CHLORIDE mmol/L 116*   CO2 mmol/L 22.0   BUN mg/dL 58*   CREATININE mg/dL 1.79*   GLUCOSE mg/dL 193*   CALCIUM mg/dL 7.8*     Results from last 7 days   Lab Units 22  2046   ALK PHOS U/L 76   BILIRUBIN mg/dL 0.4   ALT (SGPT) U/L 24   AST (SGOT) U/L 37         Results from last 7 days   Lab Units 22  0805   CRP mg/dL 6.84*     Results from last 7 days   Lab Units 22  2046   LACTATE mmol/L 1.7             Estimated Creatinine Clearance: 45.3 mL/min (A) (by C-G formula based on SCr of 1.79 mg/dL (H)).      Microbiology:  Wound cx 3/25: NL skin ananth  SF  COVID-19/Flu PCR negative.  2nd wound cx 3/25: growth present but TYTE  Urine Eos Zero        Radiology:  Imaging Results (Last 72 Hours)     ** No results found for the last 72 hours. **            Impression:   1. Vasculitic rash-with palpable purpura.  This rash clinically looks like leukocytoclastic vasculitis.  I suspect that this is secondary to primidone given the apparent shortly after starting on primidone.  His rash is now dramatically better off of primidone and with a short course of prednisone. He is now off of prednisone.  2. Duodenitis- per CT scan.  He is on PPI therapy.  3. Elevated procalcitonin,  4. Acute kidney injury--This is significantly improved.  5. Elevated CRP  6. Chronic diastolic heart failure  7. Afib/on Lovenox, recent cardioversion to NSR  8. Type 2 diabetes mellitus/gastroparesis  9. Hypothyroidism  10. Essential hypertension  11. Morbid obesity  12. Prostate cancer/radical prostatectomy  13. Renal cell carcinoma/radical prostatectomy  14. H/o multiple skin cancer excisions  15. Doxycycline (urticaria) allergy  16. Hematuria  17. Left heel decubitus lesion-this is significantly better.  18. Toxic/metabolic encephalopathy-some of this may be secondary to his prednisone.  He is now off of steroid therapy  19. Hypernatremia    PLAN/RECOMMENDATIONS:  1.  Continue wound care  2.  Continue off of primidone and as many other medications as possible  3.  Offload both heels  4.  Continue off of prednisone      Brandt Ward MD  4/7/2022  07:48 EDT

## 2022-04-07 NOTE — DISCHARGE PLACEMENT REQUEST
"Yinka Mabry (77 y.o. Male)         Thanks  Jessica DE LA CRUZ, RN, [facility]   [phone]        Date of Birth   1945    Social Security Number       Address   [address] [place] [address]    Home Phone   [phone]    MRN   [id number]       [place]   None    Marital Status                               Admission Date   3/25/22    Admission Type   Emergency    Admitting Provider   Wili Conenr DO    Attending Provider   Wili Conner DO    Department, Room/Bed   [facility] 3F, S315/1       Discharge Date       Discharge Disposition       Discharge Destination                               Attending Provider: Wili Conner DO    Allergies: Contrast Dye, Doxycycline, Chlorthalidone, Morphine, Neurontin [Gabapentin], Phenergan [Promethazine]    Isolation: None   Infection: None   Code Status: CPR   Advance Care Planning Activity    Ht: 175 cm (68.9\")   Wt: 119 kg (262 lb 5.6 oz)    Admission Cmt: None   Principal Problem: Rash [R21]                 Active Insurance as of 3/25/2022     Primary Coverage     Payor Plan Insurance Group Employer/Plan Group    MEDICARE MEDICARE A & B      Payor Plan Address Payor Plan Phone Number Payor Plan Fax Number Effective Dates    [address] [phone]  10/1/2009 - None Entered    [place] [id number]       Subscriber Name Subscriber Birth Date Member ID       YINKA MABRY 1945 [id number]                 Emergency Contacts      (Rel.) Home Phone Work Phone Mobile Phone    Gauri Mabry (Spouse) -- -- [phone]    Claudia Mabry (Daughter) -- -- [phone]    NikitaNora (Daughter) -- -- [phone]            Insurance Information                MEDICARE/MEDICARE A & B Phone: [phone]    Subscriber: Yinka Mabry Subscriber#: [id number]    Group#: -- Precert#: --            Alessandra Madrid, PT    Physical Therapist   Physical Therapy   Plan of Care     "   Addendum   Date of Service:  04/06/22 1443   Creation Time:  04/06/22 1546                    Show:Clear all  [x]Manual[x]Template[]Copied    Added by:  [x]Alessandra Madrid, PT      []Brandon for details    Goal Outcome Evaluation:  Plan of Care Reviewed With: patient, daughter, significant other  Outcome Evaluation: PT DEMONSTRATED IMPROVED BED MOBILITY. ALERT AND FOLLOWING COMMANDS INITIALLY BUT THEN AFTER BED TO CHAIR TRANSFER VIA MECHANCIAL LIFT AND SITTING EDGE OF RECLINER, C/O NOT FEELING WELL AND BECAME LETHARGIC . /59 IN BED PRIOR TO ARRIVAL, 127/62 SITTING EDGE OF RECLINER AND 94/63 AFTER APPROX 5 MINUTES SITTING. PT RETURNED TO FULLY RECLINED IN RECLINER AND NSG NOTIFIED. GOALS MODIFIED TO REFLECT CURRENT FUNCTIONAL STATUS.           Revision History                                Rivera Bonner, MS CCC-SLP    Speech and Language Pathologist   Speech Therapy   Plan of Care       Signed   Date of Service:  04/06/22 1143   Creation Time:  04/06/22 1143              Signed              Show:Clear all  [x]Manual[x]Template[]Copied    Added by:  [x]Rivera Bonner, MS CCC-SLP      []Brandon for details    Goal Outcome Evaluation:  Plan of Care Reviewed With: patient, significant other   SLP evaluation completed. Will  plan for MBS tomorrow 4/7  . Please see note for further details and recommendations.                   Bindu Robretson, RN    Registered Nurse   Wound Care   Nursing Note       Signed   Date of Service:  04/04/22 1303   Creation Time:  04/04/22 1303              Signed              Show:Clear all  [x]Manual[]Template[]Copied    Added by:  [x]Bindu Robertson RN      []Brandon for details    [facility] consult for wounds on bottom of feet. I personally assessed patient and BLE vasculitic rash on 3/30. Per nursing, rash is improved, there was a large fluid filled blister back of left heel which may cause patient pain but should not prevent walking. Will plan to see as previously  scheduled in a few days.                                 History & Physical      Yinka Desai MD at 22 1338              [facility] Medicine Services  HISTORY AND PHYSICAL    Patient Name: Yinka Cummings  : 1945  MRN: [id number]  Primary Care Physician: Vivek Mercer DO  Date of admission: 3/25/2022      Subjective   Subjective     Chief Complaint:  Rash, nausea vomiting    HPI:  Yinka Cummings is a 77 y.o. male with history of hypertension, hypothyroid, A. Fib/flutter, DHF, VINI, chronic pain, GERD, CKD 3, essential tremor, renal cell carcinoma s/p nephrectomy, gastroparesis, DM 2 (insulin-dependent) and Gilbert's disease presents with lower extremity rash and nausea vomiting.    Patient was recently hospitalized at the [facility] from March 10 to 2022 with outside records obtained and summarized as follows:    · [facility] and A. fib with RVR  · For the rash, dermatology was consulted and a punch biopsy was performed which showed nonspecific perivascular lymphocytic infiltrate.  No vasculitis was noted at that time.  It was initially thought that the rash was secondary to Pradaxa so the patient was switched to therapeutic Lovenox.  The rash continued to progress.  Lyrica was then stopped but again the rash progressed.  The patient's primidone dose was weaned to 50 mg (eventually discontinued as an outpatient on 3/22).  Dermatology then recommended stopping the patient's Norco.  On 3/16 the patient was transition from Norco to Suboxone and the rash began to improve.  · For the A. fib, the patient was seen by cardiology and a LARRY was performed in conjunction with DCCV, which at that time restart the patient in normal sinus rhythm.  Pradaxa was stopped as above and the patient was placed on therapeutic Lovenox which continues presently.  Because of interactions between primidone and NOAC such as  apixaban, 30-day washout period was recommended therefore the patient remains on low molecular weight heparin at present.    [facility] the patient was seen by his PCP on Monday and placed on Keflex due to concern of secondary infection of the rash site as well as prescribed Lasix for lower extremity edema.  Over the past couple of days the rash is worsened again, including areas on the feet, legs, abdomen and both arms.  The distribution of the rash, however, is unchanged.    The patient denies fever chills    Yesterday Mr. Cummings developed nausea and vomiting.  He denies abdominal pain.  No loose stool.      COVID Details:    Symptoms:    [x] NONE [] Fever []  Cough [] Shortness of breath [] Change in taste/smell      Review of Systems   Constitutional: Positive for fatigue.   HENT: Negative.    Eyes: Negative.    Respiratory: Negative.    Cardiovascular: Negative.    Gastrointestinal: Positive for nausea and vomiting.   Endocrine: Negative.    Genitourinary: Negative.    Musculoskeletal: Negative.    Skin: Positive for rash.   Allergic/Immunologic: Negative.    Neurological: Negative.    Hematological: Negative.    Psychiatric/Behavioral: Negative.         All other systems reviewed and are negative.     Personal History     Past Medical History:   Diagnosis Date   • Anxiety    • Arthritis    • Chronic kidney disease    • Diabetes mellitus (HCC)    • Disease of thyroid gland    • Diverticulosis    • Essential tremor    • HL (hearing loss)    • Hypertension    • Obesity    • Prostate cancer (HCC)    • Renal cell cancer (HCC)    • Skin cancer    • Vasculitis of skin        Past Surgical History:   Procedure Laterality Date   • CHOLECYSTECTOMY     • COLONOSCOPY      Polyps in the past but not on the most recent scope   • NEPHRECTOMY Right    • PROSTATE SURGERY      Radical prostatectomy   • SKIN CANCER EXCISION      Large incision left neck, multiple other cancers removed        Family History:  family history includes Cancer in his mother; Coronary artery disease in his sister; Heart attack in his father; Kidney failure in his sister. Otherwise pertinent FHx was reviewed and unremarkable.     Social History:  reports that he has never smoked. He has never used smokeless tobacco. He reports previous alcohol use. He reports previous drug use.  Social History     Social History Narrative   • Not on file       Medications:  Available home medication information reviewed.  Medications Prior to Admission   Medication Sig Dispense Refill Last Dose   • acetaminophen (TYLENOL) 325 MG tablet Take 1 tablet by mouth 4 (Four) Times a Day.   Past Week at Unknown time   • ascorbic acid (VITAMIN C) 500 MG tablet Take 500 mg by mouth Daily.   3/24/2022 at Unknown time   • atorvastatin (LIPITOR) 40 MG tablet Take 40 mg by mouth Every Night.   3/24/2022 at Unknown time   • buprenorphine-naloxone (SUBOXONE) 8-2 MG per SL tablet Place 2 tablets under the tongue every night at bedtime.   Past Week at Unknown time   • carbidopa-levodopa (SINEMET)  MG per tablet Take 0.5 tablets by mouth 3 (Three) Times a Day.   3/24/2022 at Unknown time   • cephalexin (KEFLEX) 500 MG capsule Take 500 mg by mouth 2 (Two) Times a Day. For 5 days, started on 03-22-22   3/24/2022 at Unknown time   • cholecalciferol (VITAMIN D3) 25 MCG (1000 UT) tablet Take 2,000 Units by mouth Daily.   3/24/2022 at Unknown time   • cyanocobalamin (VITAMIN B-12N) 50 MCG tablet Take 50 mcg by mouth Daily.   3/24/2022 at Unknown time   • Diclofenac Sodium (VOLTAREN) 1 % gel gel Apply 4 g topically to the appropriate area as directed 4 (Four) Times a Day As Needed.   Past Week at Unknown time   • DULoxetine (CYMBALTA) 30 MG capsule Take 90 mg by mouth Daily.   3/24/2022 at Unknown time   • enoxaparin (LOVENOX) 120 MG/0.8ML solution syringe Inject 120 mg under the skin into the appropriate area as directed Every 12 (Twelve) Hours.   3/24/2022  at Unknown time   • erythromycin (ROMYCIN) 5 MG/GM ophthalmic ointment Administer 1 application into the left eye 4 (Four) Times a Day. Left lower eye lid, for 4 days, started on 03-22-22   3/24/2022 at Unknown time   • glipizide (GLUCOTROL) 10 MG tablet Take 10 mg by mouth 2 (Two) Times a Day Before Meals.   3/24/2022 at Unknown time   • insulin NPH (humuLIN N,novoLIN N) 100 UNIT/ML injection Inject 60 Units under the skin into the appropriate area as directed every night at bedtime.   3/24/2022 at Unknown time   • levothyroxine (SYNTHROID, LEVOTHROID) 150 MCG tablet Take 1 tablet by mouth Daily. (Patient taking differently: Take 175 mcg by mouth Daily.)   3/24/2022 at Unknown time   • lidocaine (LIDODERM) 5 % Place 2 patches on the skin as directed by provider Daily. Remove & Discard patch within 12 hours or as directed by MD   Past Week at Unknown time   • metoprolol tartrate (LOPRESSOR) 50 MG tablet Take 1 tablet by mouth Every 12 (Twelve) Hours. (Patient taking differently: Take 100 mg by mouth Every 12 (Twelve) Hours.)   3/24/2022 at Unknown time   • mirtazapine (REMERON) 15 MG tablet Take 7.5 mg by mouth Every Night.   3/24/2022 at Unknown time   • omeprazole (priLOSEC) 20 MG capsule Take 20 mg by mouth Daily.   3/24/2022 at Unknown time   • polyethylene glycol (MIRALAX) 17 g packet Take 17 g by mouth Daily.   3/24/2022 at Unknown time       Allergies   Allergen Reactions   • Contrast Dye Rash     Rash[facility]swelling [facility]   • Doxycycline Rash     Urticaria per [facility] records   • Chlorthalidone Other (See Comments)     Hyponatremia per [facility] records   • Morphine Unknown - Low Severity     Headache per [facility] records   • Neurontin [Gabapentin] Mental Status Change     Drowsy per [facility] records   • Phenergan [Promethazine] Unknown - Low Severity     Confusion per [facility] records       Objective   Objective     Vital Signs:   Temp:  [97.7 °F (36.5 °C)] 97.7 °F (36.5 °C)  Heart Rate:  [84-91] 89  Resp:  [17-19] 19  BP:  (149-181)/(75-79) 149/79       Physical Exam   Constitutional - appears fatigued, mucous membranes slightly dry  HEENT-NCAT, mucous membranes moist, a bit hard of hearing  CV-RRR  Resp-CTAB  Abd-soft, nontender, nondistended, normoactive bowel sounds, obese  Ext-trace to 1+ edema LE bilaterally  Neuro-alert and oriented, speech clear, moves all extremities   Psych-slightly flat affect   Skin- non blanching rash, petechial in some areas (such as upper arms), but more confluent on lower arms, hands and legs.  What appear to be old sloughed blisters on dorsum of feet with mild surrounding erythema. Blood filled blisters on inner right lower leg.       Result Review:  I have personally reviewed the results from the time of this admission to 3/25/2022 13:38 EDT and agree with these findings:  []  Laboratory  []  Microbiology  []  Radiology  []  EKG/Telemetry   []  Cardiology/Vascular   []  Pathology  [x]  Old records  []  Other:  Most notable findings include: wbc 12, creat 2.3  LAB RESULTS:      Lab 03/25/22  0902   WBC 10.64   HEMOGLOBIN 9.8*   HEMATOCRIT 29.4*   PLATELETS 345   NEUTROS ABS 8.79*   IMMATURE GRANS (ABS) 0.10*   LYMPHS ABS 0.69*   MONOS ABS 1.03*   EOS ABS 0.02   MCV 91.0   SED RATE 44*   CRP 12.02*   PROCALCITONIN 0.78*   LACTATE 2.0   PROTIME 14.8*   INR 1.20*   D DIMER QUANT 4.44*         Lab 03/25/22  0902   SODIUM 135*   POTASSIUM 4.9   CHLORIDE 98   CO2 22.0   ANION GAP 15.0   BUN 36*   CREATININE 2.35*   EGFR 28.2*   GLUCOSE 155*   CALCIUM 8.3*   MAGNESIUM 2.0         Lab 03/25/22  0902   TOTAL PROTEIN 6.3   ALBUMIN 3.20*   GLOBULIN 3.1   ALT (SGPT) 12   AST (SGOT) 14   BILIRUBIN 0.7   ALK PHOS 96   LIPASE 8*         Lab 03/25/22  0902   PROBNP 5,087.0*                 UA    Urinalysis 5/10/21 5/10/21 5/13/21 5/13/21 3/25/22 3/25/22    1017 1017 1215 1215 1303 1303   Squamous Epithelial Cells, UA  0-2  0-2  0-2   Specific Gravity, UA 1.017  1.017  1.014    Ketones, UA Negative  Negative  Trace (A)     Blood, UA Small (1+) (A)  Negative  Large (3+) (A)    Leukocytes, UA Small (1+) (A)  Trace (A)  Trace (A)    Nitrite, UA Negative  Negative  Negative    RBC, UA  0-2  0-2  Too Numerous to Count (A)   WBC, UA  3-5 (A)  6-12 (A)  13-20 (A)   Bacteria, UA  None Seen  Trace  None Seen   (A) Abnormal value              Microbiology Results (last 10 days)     Procedure Component Value - Date/Time    COVID-19 and FLU A/B PCR - Swab, Nasopharynx [[id number]]  (Normal) Collected: 03/25/22 0918    Lab Status: Final result Specimen: Swab from Nasopharynx Updated: 03/25/22 0955     COVID19 Not Detected     Influenza A PCR Not Detected     Influenza B PCR Not Detected    Narrative:      Fact sheet for providers: https://www.fda.gov/media/096051/download    Fact sheet for patients: https://www.fda.gov/media/761587/download    Test performed by PCR.    Wound Culture - Wound, Leg, Left [[id number]] Collected: 03/25/22 0918    Lab Status: Preliminary result Specimen: Wound from Leg, Left Updated: 03/25/22 1100     Gram Stain Rare (1+) WBCs seen      Moderate (3+) Gram positive cocci in pairs and clusters          XR Chest 1 View    Result Date: 3/25/2022   DATE OF EXAM: 3/25/2022 10:45 AM  PROCEDURE: XR CHEST 1 VW-  INDICATIONS: chf  COMPARISON: 05/12/2021 11:32 PM  TECHNIQUE: [Portable chest radiograph]  FINDINGS: No new consolidations or pleural effusions are observed. The cardiac silhouette and mediastinum are stable. There is stable cardiomegaly. No acute osseous abnormalities are identified.      Impression: No evidence for acute cardiopulmonary process. Stable cardiomegaly is noted.  This report was finalized on 3/25/2022 10:53 AM by Yves Patino MD.        Results for orders placed during the hospital encounter of 04/24/21    Adult Transthoracic Echo Complete W/ Cont if Necessary Per Protocol    Interpretation Summary  · Left ventricular ejection fraction appears to be 56 - 60%.  · No significant valvular  disease      Assessment/Plan   Assessment & Plan     Active Hospital Problems    Diagnosis  POA   • Acute renal failure, unspecified acute renal failure type (HCC) [N17.9]  Yes       Rash  - appears vasculitic, non blanching --  ESR modestly elevated at 44, CRP 12.02  - [facility], biopsy showed a non-specific perivascular lymphocytic infiltrate.  - [facility] stopped after one dose due to significant hyperglycemia  - multiple long term home medications stopped, including lortab, dabigatran, lyrica, lisinopril and primidone with subsequent improvement.  - patient started on keflex and lasix post discharge with rash worsening described by patient and family -- however rash remains confined to the prior distribution  - Drug induced rash?  - patient denies infection prior to rash appearance  - received rocephin in ED for possible secondary infection, will continue for now.  Try to avoid lasix if possible for now.  - will check vasculitic serologies: hep B&C, serum complement levels (C3, C4 and total complement), KENZIE, RF, cyroglobulin level and ANCA panel.  - will ask Rheumatology to evaluate    Chronic Pain  - continue suboxone    Atrial fibrillation/Atrial Flutter  - continue therapeutic lovenox  - metoprolol  - check EKG    SALEEM on CKD  - creatinine 2.3, [facility] baseline around 1.5 (1.7 on 3/20/22).  - patient appears slightly volume depleted from N/V -- gentle IV fluids this afternoon, check bmp am    Nausea and vomiting.  - unclear etiology, denies abdominal pain  - clears diet    Hypothyroid  - check TSH    HTN    DMII  - insulin dependant  - SSI for now until appetite improves, then add basal bolus (patient says he typically uses 15-50 units of insulin/24hrs at home)    VINI  - CPAP non compliant    Tremor  - primidone recently discharge    Mood disorder      DVT prophylaxis:  lovenox      CODE STATUS:  full  Code Status and Medical Interventions:   Ordered at: 03/25/22 1332      Level Of Support Discussed With:    Patient     Code Status (Patient has no pulse and is not breathing):    CPR (Attempt to Resuscitate)     Medical Interventions (Patient has pulse or is breathing):    Full Support         Yinka Desai MD  03/25/22    Electronically signed by Yinka Desai MD at 03/25/22 1440         Current Facility-Administered Medications   Medication Dose Route Frequency Provider Last Rate Last Admin   • acetaminophen (TYLENOL) tablet 650 mg  650 mg Oral Q4H PRN Raymond Herrera MD   650 mg at 04/06/22 2324    Or   • acetaminophen (TYLENOL) 160 MG/5ML solution 650 mg  650 mg Oral Q4H PRN Raymond Herrera MD   649.6 mg at 03/27/22 0943    Or   • acetaminophen (TYLENOL) suppository 650 mg  650 mg Rectal Q4H PRN Raymond Herrera MD   650 mg at 04/05/22 1237   • amiodarone (PACERONE) tablet 200 mg  200 mg Oral Q12H Raymond Herrera MD   200 mg at 04/07/22 0821    Followed by   • [START ON 4/20/2022] amiodarone (PACERONE) tablet 200 mg  200 mg Oral Daily Raymond Herrera MD       • atorvastatin (LIPITOR) tablet 40 mg  40 mg Oral Nightly Raymond Herrera MD   40 mg at 04/06/22 2248   • buprenorphine-naloxone (SUBOXONE) 8-2 MG per SL tablet 2 tablet  2 tablet Sublingual Nightly Raymond Herrera MD   2 tablet at 04/06/22 2248   • dextrose (D50W) (25 g/50 mL) IV injection 25 g  25 g Intravenous Q15 Min PRN Raymond Herrera MD       • dextrose (GLUTOSE) oral gel 15 g  15 g Oral Q15 Min PRN Raymond Herrera MD       • DULoxetine (CYMBALTA) DR capsule 90 mg  90 mg Oral Daily Raymond Herrera MD   90 mg at 04/07/22 0820   • enoxaparin (LOVENOX) syringe 110 mg  110 mg Subcutaneous Q12H Yinka Brush IV, PharmD   110 mg at 04/07/22 0558   • First Mouthwash (Magic Mouthwash) 5 mL  5 mL Swish & Spit Q6H Wili Conner DO   5 mL at 04/07/22 1233   • glucagon (human recombinant) (GLUCAGEN DIAGNOSTIC) injection 1 mg  1 mg Intramuscular Q15 Min PRN Raymond Herrera MD       • haloperidol  (HALDOL) 2 MG/ML solution 2 mg  2 mg Oral Q6H PRN Peña Weaver MD       • insulin detemir (LEVEMIR) injection 10 Units  10 Units Subcutaneous Nightly Yinka Desai MD   10 Units at 04/06/22 2248   • insulin lispro (humaLOG) injection 0-9 Units  0-9 Units Subcutaneous TID AC Raymond Herrera MD   4 Units at 04/07/22 1233   • levothyroxine (SYNTHROID, LEVOTHROID) tablet 150 mcg  150 mcg Oral Daily Raymond Herrera MD   150 mcg at 04/07/22 0821   • melatonin tablet 5 mg  5 mg Oral Nightly Peña Weaver MD   5 mg at 04/06/22 2248   • methohexital (BREVITAL) injection  - ADS Override Pull            • metoprolol tartrate (LOPRESSOR) tablet 50 mg  50 mg Oral Q8H Raymond Herrera MD   50 mg at 04/07/22 0558   • midazolam (VERSED) 2 MG/2ML injection  - ADS Override Pull            • mineral oil-hydrophilic petrolatum (AQUAPHOR) ointment 1 application  1 application Topical BID PRN Peña Weaver MD   1 application at 04/06/22 2245   • mirtazapine (REMERON) tablet 7.5 mg  7.5 mg Oral Nightly Raymond Herrera MD   7.5 mg at 04/06/22 2248   • ondansetron (ZOFRAN) tablet 4 mg  4 mg Oral Q6H PRN Raymond Herrera MD        Or   • ondansetron (ZOFRAN) injection 4 mg  4 mg Intravenous Q6H PRN Raymond Herrera MD   4 mg at 04/06/22 2324   • palliative care oral rinse   Mouth/Throat PRN Yinka Desai MD   Given at 04/06/22 1232   • pantoprazole (PROTONIX) EC tablet 40 mg  40 mg Oral QAM Raymond Herrera MD   40 mg at 04/07/22 0558   • polyethylene glycol (MIRALAX) packet 17 g  17 g Oral Daily Raymond Herrera MD   17 g at 04/07/22 0822   • QUEtiapine (SEROquel) tablet 25 mg  25 mg Oral Q12H Wili Conner DO   25 mg at 04/07/22 0822   • sennosides-docusate (PERICOLACE) 8.6-50 MG per tablet 2 tablet  2 tablet Oral BID Yinka Desai MD   2 tablet at 04/07/22 0822   • Sodium Chloride (PF) 0.9 % 10 mL  10 mL Intravenous Raymond Wallis MD       • sodium chloride 0.45 % infusion  100 mL/hr  "Intravenous Continuous Francisco Atkinson  mL/hr at 22 0238 100 mL/hr at 22 0238   • sodium chloride 0.9 % flush 10 mL  10 mL Intravenous Q12H Raymond Herrera MD   10 mL at 22 0822   • sodium chloride 0.9 % flush 10 mL  10 mL Intravenous PRN Raymond Herrera MD   10 mL at 22 0830   • temazepam (RESTORIL) capsule 15 mg  15 mg Oral Nightly Peña Weavre MD   15 mg at 22 2248   • vitamin B-12 (CYANOCOBALAMIN) tablet 50 mcg  50 mcg Oral Daily Raymond Herrera MD   50 mcg at 22 0832        Physical Therapy Notes (most recent note)      Alessandra Madrid, PT at 22 1443  Version 1 of 1         Patient Name: Yinka Cummings  : 1945    MRN: [id number]                              Today's Date: 2022       Admit Date: 3/25/2022    Visit Dx:     ICD-10-CM ICD-9-CM   1. Acute renal failure, unspecified acute renal failure type (HCC)  N17.9 584.9   2. Dehydration  E86.0 276.51   3. Cellulitis of lower extremity, unspecified laterality  L03.119 682.6   4. Vasculitis (HCC)  I77.6 447.6   5. Congestive heart failure, unspecified HF chronicity, unspecified heart failure type (HCC)  I50.9 428.0   6. Positive D dimer  R79.89 790.92   7. 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)   • Chronic back pain (on chronic prescription lortab)   • HTN (hypertension)   • Solitary kidney (s/p nephrectomy for \"mass\")   • History of prostate cancer   • Closed fracture of phalanx of right hand   • Dental caries   • Acute renal failure, unspecified acute renal failure type (HCC)   • Rash   • Nausea and vomiting     Past Medical History:   Diagnosis Date   • Anxiety    • Arthritis    • Chronic kidney disease    • " Diabetes mellitus (HCC)    • Disease of thyroid gland    • Diverticulosis    • Essential tremor    • HL (hearing loss)    • Hypertension    • Obesity    • Prostate cancer (HCC)    • Renal cell cancer (HCC)    • Skin cancer    • Vasculitis of skin      Past Surgical History:   Procedure Laterality Date   • CHOLECYSTECTOMY     • COLONOSCOPY      Polyps in the past but not on the most recent scope   • NEPHRECTOMY Right    • PROSTATE SURGERY      Radical prostatectomy   • SKIN CANCER EXCISION      Large incision left neck, multiple other cancers removed      General Information     Row Name 04/06/22 1522          Physical Therapy Time and Intention    Document Type therapy note (daily note);progress note/recertification  -CD     Mode of Treatment physical therapy  -CD     Row Name 04/06/22 1522          General Information    Patient Profile Reviewed yes  -CD     Existing Precautions/Restrictions fall  Blisters on B feet, L heel decubitis, edematous hands, sensitive to touch, [place]  -CD     Barriers to Rehab medically complex;previous functional deficit;hearing deficit  -CD     Row Name 04/06/22 1522          Cognition    Orientation Status (Cognition) oriented to;person  -CD     Row Name 04/06/22 1522          Safety Issues, Functional Mobility    Comment, Safety Issues/Impairments (Mobility) PT ALERT AND FOLLOWING COMMANDS UPON ARRIVAL. AT END OF SESSION WITH DROP IN BP, LETHARGIC BUT CONVERSING. NSG NOTIFIED AND PT LEFT FULLY RECLINED IN CHAIR ON MECHANICAL LIFT SLING.  -CD           User Key  (r) = Recorded By, (t) = Taken By, (c) = Cosigned By    Initials Name Provider Type    CD Alessandra Madrid PT Physical Therapist               Mobility     Row Name 04/06/22 1528          Bed Mobility    Bed Mobility rolling left;rolling right  -CD     Rolling Left [place] (Bed Mobility) contact guard;verbal cues  -CD     Rolling Right [place] (Bed Mobility) contact guard;verbal cues  -CD     Comment, (Bed Mobility)  ROLLING L/R FOR PLACEMENT OF MECHANICAL LIFT SLING.  -CD     Row Name 04/06/22 1528          Transfers    Comment, (Transfers) DEFERRED DUE TO DROP IN BP, WEAKNESS, FATIGUE.  -CD     Row Name 04/06/22 1528          Bed-Chair Transfer    Bed-Chair [place] (Transfers) dependent (less than 25% patient effort);2 person assist;verbal cues  -CD     Assistive Device (Bed-Chair Transfers) lift device  -CD           User Key  (r) = Recorded By, (t) = Taken By, (c) = Cosigned By    Initials Name Provider Type    Alessandra Choudhury, ELIZABETH Physical Therapist               Obj/Interventions     Row Name 04/06/22 1532          Balance    Balance Assessment sitting static balance;sitting dynamic balance;sit to stand dynamic balance;standing static balance;standing dynamic balance  -CD     Static Sitting Balance minimal assist;verbal cues  -CD     Dynamic Sitting Balance moderate assist;verbal cues  -CD     Position, Sitting Balance supported;sitting in chair  -CD     Comment, Balance WORKED ON SITTING BALANCE WITH ANTERIOR ROCKING AND SCOOTING IN CHAIR. PT C/O NOT FEELING WELL AND WANTING TO LIE DOWN. BP IN SITTING 94/63 FROM BP IN BED  PRIOR TO ARRIVAL /59.  -CD           User Key  (r) = Recorded By, (t) = Taken By, (c) = Cosigned By    Initials Name Provider Type    Alessandra Choudhury, PT Physical Therapist               Goals/Plan     Row Name 04/06/22 1548          Bed Mobility Goal 1 (PT)    Activity/Assistive Device (Bed Mobility Goal 1, PT) sit to supine/supine to sit  -CD     [place] Level/Cues Needed (Bed Mobility Goal 1, PT) moderate assist (50-74% patient effort)  -CD     Time Frame (Bed Mobility Goal 1, PT) long term goal (LTG)  -CD     Progress/Outcomes (Bed Mobility Goal 1, PT) progress slower than expected;goal revised this date  -CD     Row Name 04/06/22 1544          Transfer Goal 1 (PT)    Activity/Assistive Device (Transfer Goal 1, PT) sit-to-stand/stand-to-sit;bed-to-chair/chair-to-bed  -CD      [place] Level/Cues Needed (Transfer Goal 1, PT) moderate assist (50-74% patient effort)  -CD     Time Frame (Transfer Goal 1, PT) long term goal (LTG);2 weeks  -CD     Progress/Outcome (Transfer Goal 1, PT) progress slower than expected;goal revised this date  -CD     Row Name 04/06/22 7611          Gait Training Goal 1 (PT)    Activity/Assistive Device (Gait Training Goal 1, PT) gait (walking locomotion);assistive device use;walker, rolling  -CD     [place] Level (Gait Training Goal 1, PT) minimum assist (75% or more patient effort);maximum assist (25-49% patient effort)  -CD     Distance (Gait Training Goal 1, PT) 20  -CD     Time Frame (Gait Training Goal 1, PT) long term goal (LTG);2 weeks  -CD     Progress/Outcome (Gait Training Goal 1, PT) goal revised this date;progress slower than expected  -CD           User Key  (r) = Recorded By, (t) = Taken By, (c) = Cosigned By    Initials Name Provider Type    CD Alessandra Madrid, PT Physical Therapist               Clinical Impression     Row Name 04/06/22 0810          Pain    Pretreatment Pain Rating 0/10 - no pain  -CD     Posttreatment Pain Rating 0/10 - no pain  -CD     Pre/Posttreatment Pain Comment INTERMITTENT PAIN WITH MOVEMENT OF LE'S 5/10.  -CD     Pain Intervention(s) Repositioned  -CD     Row Name 04/06/22 7714          Plan of Care Review    Plan of Care Reviewed With patient;daughter;significant other  -CD     Outcome Evaluation PT DEMONSTRATED IMPROVED BED MOBILITY. ALERT AND FOLLOWING COMMANDS INITIALLY BUT THEN AFTER BED TO CHAIR TRANSFER VIA MECHANCIAL LIFT AND SITTING EDGE OF RECLINER, C/O NOT FEELING WELL AND BECAME LETHARGI . /59 IN BED PRIOR TO ARRIVAL, 127/62 SITTING EDGE OF RECLINER AND 94/63 AFTER APPROX 5 MINUTES SITTING. PT RETURNED TO FULLY RECLINED IN RECLINER AND NSG NOTIFIED. GOALS MODIFIED TO REFLECT CURRENT FUNCTIONAL STATUS.  -CD     Row Name 04/06/22 0682          Therapy Assessment/Plan (PT)    Patient/Family Therapy  Goals Statement (PT) SNF AT D/C.  -CD     Rehab Potential (PT) good, to achieve stated therapy goals  -CD     Row Name 04/06/22 1540          Vital Signs    Pre Systolic BP Rehab 150  -CD     Pre Treatment Diastolic BP 59  -CD     Intra Systolic BP Rehab 127  -CD     Intra Treatment Diastolic BP 62  -CD     Post Systolic BP Rehab 94  -CD     Post Treatment Diastolic BP 63  -CD     Posttreatment Heart Rate (beats/min) 77  -CD     Pre SpO2 (%) 97  -CD     O2 Delivery Pre Treatment supplemental O2  -CD     O2 Delivery Intra Treatment supplemental O2  -CD     Post SpO2 (%) 97  -CD     O2 Delivery Post Treatment supplemental O2  -CD     Pre Patient Position Supine  -CD     Intra Patient Position Sitting  -CD     Post Patient Position Sitting  -CD     Row Name 04/06/22 1540          Positioning and Restraints    Pre-Treatment Position in bed  -CD     Post Treatment Position chair  -CD     In Chair reclined;call light within reach;encouraged to call for assist;exit alarm on;with family/caregiver;RUE elevated;legs elevated;notified nsg;heels elevated  -CD           User Key  (r) = Recorded By, (t) = Taken By, (c) = Cosigned By    Initials Name Provider Type    CD Alessandra Madrid, PT Physical Therapist               Outcome Measures     Row Name 04/06/22 1546 04/06/22 1545       How much help from another person do you currently need...    Turning from your back to your side while in flat bed without using bedrails? 3  -CD 2  -CD    Moving from lying on back to sitting on the side of a flat bed without bedrails? 2  -CD --    Moving to and from a bed to a chair (including a wheelchair)? 1  -CD --    Standing up from a chair using your arms (e.g., wheelchair, bedside chair)? 1  -CD --    Climbing 3-5 steps with a railing? 1  -CD --    To walk in hospital room? 1  -CD --    AM-PAC 6 Clicks Score (PT) 9  -CD --    Row Name 04/06/22 0800          How much help from another person do you currently need...    Turning from your back  to your side while in flat bed without using bedrails? 3  -CM     Moving from lying on back to sitting on the side of a flat bed without bedrails? 2  -CM     Moving to and from a bed to a chair (including a wheelchair)? 1  -CM     Standing up from a chair using your arms (e.g., wheelchair, bedside chair)? 1  -CM     Climbing 3-5 steps with a railing? 1  -CM     To walk in hospital room? 1  -CM     AM-PAC 6 Clicks Score (PT) 9  -CM     Row Name 04/06/22 1546          Functional Assessment    Outcome Measure Options AM-PAC 6 Clicks Basic Mobility (PT)  -CD           User Key  (r) = Recorded By, (t) = Taken By, (c) = Cosigned By    Initials Name Provider Type    CD Alessandra Madrid PT Physical Therapist    China Beltrán, RN Registered Nurse                             Physical Therapy Education                 Title: PT OT SLP Therapies (In Progress)     Topic: Physical Therapy (Done)     Point: Mobility training (Done)     Learning Progress Summary           Patient Acceptance, E, VU,NR by CD at 4/6/2022 1546    Comment: SEE FLOWSHEET    Acceptance, E, NR by KG at 4/4/2022 1506    Acceptance, E, VU,NR by CD at 4/2/2022 1634    Comment: BENEFITS OF OOB ACTIVITY, ROM EXERCISE, PROGRESSION OF POC, D/C PLANNING,    Acceptance, E,D, VU,NR by HP at 3/29/2022 1517    Acceptance, E, NR by BA at 3/27/2022 1412   Family Acceptance, E, VU,NR by CD at 4/6/2022 1546    Comment: SEE FLOWSHEET    Acceptance, E,D, VU,NR by HP at 3/29/2022 1517    Acceptance, E, NR by BA at 3/27/2022 1412   Significant Other Acceptance, E, VU,NR by CD at 4/6/2022 1546    Comment: SEE FLOWSHEET                   Point: Home exercise program (Done)     Learning Progress Summary           Patient Acceptance, E, VU,NR by CD at 4/6/2022 1546    Comment: SEE FLOWSHEET    Acceptance, E, NR by KG at 4/4/2022 1506    Acceptance, E, VU,NR by CD at 4/2/2022 1634    Comment: BENEFITS OF OOB ACTIVITY, ROM EXERCISE, PROGRESSION OF POC, D/C PLANNING,     Acceptance, E,D, VU,NR by HP at 3/29/2022 1517    Acceptance, E, NR by BA at 3/27/2022 1412   Family Acceptance, E, VU,NR by CD at 4/6/2022 1546    Comment: SEE FLOWSHEET    Acceptance, E,D, VU,NR by HP at 3/29/2022 1517    Acceptance, E, NR by BA at 3/27/2022 1412   Significant Other Acceptance, E, VU,NR by CD at 4/6/2022 1546    Comment: SEE FLOWSHEET                   Point: Body mechanics (Done)     Learning Progress Summary           Patient Acceptance, E, VU,NR by CD at 4/6/2022 1546    Comment: SEE FLOWSHEET    Acceptance, E, NR by KG at 4/4/2022 1506    Acceptance, E, VU,NR by CD at 4/2/2022 1634    Comment: BENEFITS OF OOB ACTIVITY, ROM EXERCISE, PROGRESSION OF POC, D/C PLANNING,    Acceptance, E,D, VU,NR by HP at 3/29/2022 1517    Acceptance, E, NR by BA at 3/27/2022 1412   Family Acceptance, E, VU,NR by CD at 4/6/2022 1546    Comment: SEE FLOWSHEET    Acceptance, E,D, VU,NR by HP at 3/29/2022 1517    Acceptance, E, NR by BA at 3/27/2022 1412   Significant Other Acceptance, E, VU,NR by CD at 4/6/2022 1546    Comment: SEE FLOWSHEET                   Point: Precautions (Done)     Learning Progress Summary           Patient Acceptance, E, VU,NR by CD at 4/6/2022 1546    Comment: SEE FLOWSHEET    Acceptance, E, NR by KG at 4/4/2022 1506    Acceptance, E, VU,NR by CD at 4/2/2022 1634    Comment: BENEFITS OF OOB ACTIVITY, ROM EXERCISE, PROGRESSION OF POC, D/C PLANNING,    Acceptance, E,D, VU,NR by HP at 3/29/2022 1517    Acceptance, E, NR by BA at 3/27/2022 1412   Family Acceptance, E, VU,NR by CD at 4/6/2022 1546    Comment: SEE FLOWSHEET    Acceptance, E,D, VU,NR by HP at 3/29/2022 1517    Acceptance, E, NR by BA at 3/27/2022 1412   Significant Other Acceptance, E, VU,NR by CD at 4/6/2022 7115    Comment: SEE FLOWSHEET                               User Key     Initials Effective Dates Name Provider Type Discipline    CD 06/16/21 -  Alessandra Madrid, PT Physical Therapist PT    KG 06/16/21 -  Rita Mchugh  Physical Therapist PT    HP 06/01/21 -  Anya Velez, PT Physical Therapist PT    BA 09/21/21 -  Renate Mendoza, PT Physical Therapist PT              PT Recommendation and Plan     Plan of Care Reviewed With: patient, daughter, significant other  Outcome Evaluation: PT DEMONSTRATED IMPROVED BED MOBILITY. ALERT AND FOLLOWING COMMANDS INITIALLY BUT THEN AFTER BED TO CHAIR TRANSFER VIA MECHANCIAL LIFT AND SITTING EDGE OF RECLINER, C/O NOT FEELING WELL AND BECAME LETHARGI . /59 IN BED PRIOR TO ARRIVAL, 127/62 SITTING EDGE OF RECLINER AND 94/63 AFTER APPROX 5 MINUTES SITTING. PT RETURNED TO FULLY RECLINED IN RECLINER AND NSG NOTIFIED. GOALS MODIFIED TO REFLECT CURRENT FUNCTIONAL STATUS.     Time Calculation:    PT Charges     Row Name 04/06/22 1547             Time Calculation    Start Time 1443  -CD      PT Received On 04/06/22  -CD      PT Goal Re-Cert Due Date 04/16/22  -CD              Time Calculation- PT    Total Timed Code Minutes- PT 35 minute(s)  -CD              Timed Charges    20778 - PT Therapeutic Exercise Minutes 15  -CD      93738 - PT Therapeutic Activity Minutes 20  -CD              Total Minutes    Timed Charges Total Minutes 35  -CD       Total Minutes 35  -CD            User Key  (r) = Recorded By, (t) = Taken By, (c) = Cosigned By    Initials Name Provider Type    CD Alessandra Madrid PT Physical Therapist              Therapy Charges for Today     Code Description Service Date Service Provider Modifiers Qty    49213512800 HC PT THER PROC EA 15 MIN 4/6/2022 Alessandra Madrid, PT GP 1    67440705588 HC PT THERAPEUTIC ACT EA 15 MIN 4/6/2022 Alessandra Madrid, PT GP 1    98637889154 HC PT THER SUPP EA 15 MIN 4/6/2022 Alessandra Madrid, PT GP 1    27827199550 HC PT THER SUPP EA 15 MIN 4/6/2022 Alessandra Madrid, PT GP 1          PT G-Codes  Outcome Measure Options: AM-PAC 6 Clicks Basic Mobility (PT)  AM-PAC 6 Clicks Score (PT): 9  AM-PAC 6 Clicks Score (OT): 9    Alessandra Madrid  PT  4/6/2022      Electronically signed by Alessandra Madrid, PT at 04/06/22 1925

## 2022-04-07 NOTE — MBS/VFSS/FEES
"Acute Care - Speech Language Pathology   Swallow Initial Evaluation [facility]   Modified Barium Swallow Study (MBS)     Patient Name: Yinka Cummings  : 1945  MRN: [id number]  Today's Date: 2022               Admit Date: 3/25/2022    Visit Dx:     ICD-10-CM ICD-9-CM   1. Acute renal failure, unspecified acute renal failure type (HCC)  N17.9 584.9   2. Dehydration  E86.0 276.51   3. Cellulitis of lower extremity, unspecified laterality  L03.119 682.6   4. Vasculitis (HCC)  I77.6 447.6   5. Congestive heart failure, unspecified HF chronicity, unspecified heart failure type (HCC)  I50.9 428.0   6. Positive D dimer  R79.89 790.92   7. 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)   • Chronic back pain (on chronic prescription lortab)   • HTN (hypertension)   • Solitary kidney (s/p nephrectomy for \"mass\")   • History of prostate cancer   • Closed fracture of phalanx of right hand   • Dental caries   • Acute renal failure, unspecified acute renal failure type (HCC)   • Rash   • Nausea and vomiting     Past Medical History:   Diagnosis Date   • Anxiety    • Arthritis    • Chronic kidney disease    • Diabetes mellitus (HCC)    • Disease of thyroid gland    • Diverticulosis    • Essential tremor    • HL (hearing loss)    • Hypertension    • Obesity    • Prostate cancer (HCC)    • Renal cell cancer (HCC)    • Skin cancer    • Vasculitis of skin      Past Surgical History:   Procedure Laterality Date   • CHOLECYSTECTOMY     • COLONOSCOPY      Polyps in the past but not on the most recent scope   • NEPHRECTOMY Right    • PROSTATE SURGERY      Radical prostatectomy   • SKIN CANCER EXCISION      Large incision left neck, multiple other " cancers removed       SLP Recommendation and Plan  SLP Swallowing Diagnosis: mild, oral dysphagia, pharyngeal dysphagia (04/07/22 1320)  SLP Diet Recommendation: NPO, temporary alternate methods of nutrition/hydration, other (see comments) (OK for 2-3 ice chips/hr following oral care) (04/07/22 1320)     SLP Rec. for Method of Medication Administration: meds via alternate route (04/07/22 1320)           Swallow Criteria for Skilled Therapeutic Interventions Met: demonstrates skilled criteria (04/07/22 1320)  Anticipated Discharge Disposition (SLP): unknown, anticipate therapy at next level of care (04/07/22 1320)  Rehab Potential/Prognosis, Swallowing: good, to achieve stated therapy goals (04/07/22 1320)  Therapy Frequency (Swallow): 5 days per week (04/07/22 1320)  Predicted Duration Therapy Intervention (Days): until discharge (04/07/22 1320)                                  Plan of Care Reviewed With: patient      SWALLOW EVALUATION (last 72 hours)     SLP Adult Swallow Evaluation     Row Name 04/07/22 1320 04/06/22 1010                Rehab Evaluation    Document Type evaluation  -MP evaluation  -MP       Subjective Information no complaints  -MP no complaints  -MP       Patient Observations alert;cooperative  -MP alert;cooperative  -MP       Patient/Family/Caregiver Comments/Observations No family present  -MP Spouse present  -MP       Patient Effort good  -MP good  -MP                General Information    Patient Profile Reviewed yes  -MP yes  -MP       Pertinent History Of Current Problem See yesterday's eval  -MP Adm 3/25 w/ lower extremity rash, nausea/vomiting. Recent adm @ [facility] 3/10-28. Hx HTN, hypothy, Afib, DHF, VINI, chronic pain, GERD, CKD3, essential tremor, renal cell carcinoma s/p nephrectomy, gastroparesis, DM2, Gilbert's dz. Seen by SLP 5/'21 - recs @ that time for mech soft/no mixed, thin, no straws. Consulted by RN 2' coughing & difficulty remembering to swallow.  -MP       Current  Method of Nutrition mechanical soft, no mixed consistencies;nectar/syrup-thick liquids  -MP soft textures;thin liquids  -MP       Precautions/Limitations, Vision -- WFL;for purposes of eval  -MP       Precautions/Limitations, Hearing -- WFL;for purposes of eval  -MP       Prior Level of Function-Communication -- unknown  -MP       Prior Level of Function-Swallowing -- other (see comments)  see hx above  -MP       Plans/Goals Discussed with -- patient;spouse/S.O.;agreed upon  -MP       Barriers to Rehab -- none identified  -MP       Patient's Goals for Discharge -- patient did not state  -MP       Family Goals for Discharge -- family did not state  -MP                Pain    Additional Documentation Pain Scale: FACES Pre/Post-Treatment (Group)  -MP Pain Scale: FACES Pre/Post-Treatment (Group)  -MP                Pain Scale: FACES Pre/Post-Treatment    Pain: FACES Scale, Pretreatment 0-->no hurt  -MP 0-->no hurt  -MP       Posttreatment Pain Rating 0-->no hurt  -MP 0-->no hurt  -MP       Pain Location - Side/Orientation -- --  -MP                Oral Motor Structure and Function    Dentition Assessment -- teeth are in poor condition  -MP       Secretion Management -- dried secretions in oral cavity  -       Mucosal Quality -- dry;cracked  -MP                Oral Musculature and Cranial Nerve Assessment    Oral Motor General Assessment -- generalized oral motor weakness  -                General Eating/Swallowing Observations    Respiratory Support Currently in Use nasal cannula  -MP nasal cannula  -MP       Eating/Swallowing Skills fed by SLP  -MP fed by SLP  -MP       Positioning During Eating upright in chair  -MP upright in bed  -MP       Utensils Used -- spoon;cup;straw  -MP       Consistencies Trialed -- thin liquids;nectar/syrup-thick liquids;pureed;regular textures  -MP                Clinical Swallow Eval    Pharyngeal Phase -- suspected pharyngeal impairment  -MP       Clinical Swallow Evaluation Summary  -- Immediate cough w/ cup sips thin liquid. No s/sxs w/ nectar-thick, puree, or solid. Rec dysphagia level IV ([facility] soft/no mixed) diet & nectar-thick liquids. Will plan for MBS tomorrow 4/7 to further assess.  -MP                Pharyngeal Phase Concerns    Pharyngeal Phase Concerns -- cough  -MP       Cough -- thin  -MP                MBS/VFSS    Utensils Used spoon;cup  -MP --       Consistencies Trialed thin liquids;nectar/syrup-thick liquids;honey-thick liquids;pudding thick  -MP --                MBS/VFSS Interpretation    Oral Prep Phase WFL  -MP --       Oral Transit Phase impaired  -MP --       Oral Residue impaired  -MP --       VFSS Summary Mild oral & moderate-severe pharyngeal dysphagia. Aspiration during the swallow w/ thin liquids. Cough response, though ineffective @ fully clearing subglottic material. Penetration during the swallow w/ nectar-thick & honey-thick & after the swallow w/ nectar-thick, honey-thick, and pudding from vallecular & pyriform residue. Penetrated material continued to squeeze deeper into vestibule & u/a to fully clear despite cues to cough - suspect eventual aspiration. Suspect diffuse pharyngeal weakness primary contributing factor to dysphagia, though ? some mild anatomical component. Prominent c-spine curvature impacting pharyngeal squeeze and intermittently epiglottis appears to make contact w/ PPW but not fully invert. Safest recommendation @ this time is NPO w/ temporary alternate route of nutrition/hydration/medication. Notified RN & MD.  -MP --                Oral Transit Phase    Impaired Oral Transit Phase premature spillage of liquids into pharynx  -MP --       Premature Spillage of Liquids into Pharynx thin liquids  -MP --                Oral Residue    Impaired Oral Residue lingual residue  -MP --       Lingual Residue all consistencies tested  -MP --       Response to Oral Residue cleared residue;with spontaneous subsequent swallow  -MP --                 Initiation of Pharyngeal Swallow    Initiation of Pharyngeal Swallow bolus in pyriform sinuses  -MP --       Pharyngeal Phase impaired pharyngeal phase of swallowing  -MP --       Penetration During the Swallow nectar-thick liquids;honey-thick liquids;secondary to delayed swallow initiation or mistiming;secondary to reduced laryngeal elevation;secondary to reduced vestibular closure  -MP --       Aspiration During the Swallow thin liquids;secondary to delayed swallow initiation or mistiming;secondary to reduced laryngeal elevation;secondary to reduced vestibular closure  -MP --       Penetration After the Swallow nectar-thick liquids;honey-thick liquids;pudding/puree;secondary to residue;in valleculae;in pyriform sinuses  -MP --       Response to Penetration no response  -MP --       Response to Aspiration cough;could not clear subglottic material  -MP --       Rosenbek's Scale thin:;7--->level 7;nectar:;honey:;pudding/puree:;5--->level 5  -MP --       Pharyngeal Residue thin liquids;nectar-thick liquids;honey-thick liquids;pudding/puree;regular textures;diffuse within pharynx;secondary to reduced base of tongue retraction;secondary to reduced posterior pharyngeal wall stripping;secondary to reduced laryngeal elevation;secondary to reduced hyolaryngeal excursion;other (see comments)  reduced UES opening  -MP --       Response to Residue unable to clear residue  -MP --       Attempted Compensatory Maneuvers bolus size;bolus presentation style;multiple swallows;alternative liquids/solids;effortful (hard swallow);throat clear after swallow  -MP --       Response to Attempted Compensatory Maneuvers did not prevent penetration;did not prevent aspiration;did not reduce residue  -MP --                SLP Evaluation Clinical Impression    SLP Swallowing Diagnosis mild;oral dysphagia;pharyngeal dysphagia  -MP suspected pharyngeal dysphagia  -MP       Functional Impact risk of aspiration/pneumonia  -MP risk of  aspiration/pneumonia  -MP       Rehab Potential/Prognosis, Swallowing good, to achieve stated therapy goals  -MP good, to achieve stated therapy goals  -MP       Swallow Criteria for Skilled Therapeutic Interventions Met demonstrates skilled criteria  -MP demonstrates skilled criteria  -MP                Recommendations    Therapy Frequency (Swallow) 5 days per week  -MP --       Predicted Duration Therapy Intervention (Days) until discharge  -MP until discharge  -MP       SLP Diet Recommendation NPO;temporary alternate methods of nutrition/hydration;other (see comments)  OK for 2-3 ice chips/hr following oral care  -MP mechanical soft with no mixed consistencies;nectar thick liquids  -MP       Recommended Diagnostics -- VFSS (MBS);other (see comments)  4/7  -MP       Recommended Precautions and Strategies -- upright posture during/after eating;small bites of food and sips of liquid;general aspiration precautions  -MP       Oral Care Recommendations Oral Care BID/PRN;Suction toothbrush  -MP Oral Care BID/PRN  -MP       SLP Rec. for Method of Medication Administration meds via alternate route  -MP meds whole;meds crushed;with thick liquids;with pudding or applesauce;as tolerated  -MP       Monitor for Signs of Aspiration -- yes;notify SLP if any concerns  -MP       Anticipated Discharge Disposition (SLP) unknown;anticipate therapy at next level of care  -MP unknown;anticipate therapy at next level of care  -MP             User Key  (r) = Recorded By, (t) = Taken By, (c) = Cosigned By    Initials Name Effective Dates    MP Rivera Bonner, MS CCC-SLP 12/28/21 -                 EDUCATION  The patient has been educated in the following areas:   Dysphagia (Swallowing Impairment) NPO rationale.        SLP GOALS     Row Name 04/07/22 1320             Oral Nutrition/Hydration Goal 1 (SLP)    Oral Nutrition/Hydration Goal 1, SLP LTG: Pt will return to some form of PO diet by d/c.  -MP      Time Frame (Oral  Nutrition/Hydration Goal 1, SLP) by discharge  -MP              Oral Nutrition/Hydration Goal 2 (SLP)    Oral Nutrition/Hydration Goal 2, SLP Pt will tolerate therapeutic trials of thin H2O & puree w/ no overt s/sxs aspiration/distress w/ 60% acc and no cues in order to assess appropriateness for repeat instrumental eval  -MP      Time Frame (Oral Nutrition/Hydration Goal 2, SLP) short term goal (STG)  -MP              Lingual Strengthening Goal 1 (SLP)    Activity (Lingual Strengthening Goal 1, SLP) increase tongue back strength  -MP      Increase Tongue Back Strength lingual resistance exercises;swallow trials  -MP      [place]/Accuracy (Lingual Strengthening Goal 1, SLP) with minimal cues (75-90% accuracy)  -MP      Time Frame (Lingual Strengthening Goal 1, SLP) short term goal (STG)  -MP              Pharyngeal Strengthening Exercise Goal 1 (SLP)    Activity (Pharyngeal Strengthening Goal 1, SLP) increase timing;increase superior movement of the hyolaryngeal complex;increase anterior movement of the hyolaryngeal complex;increase closure at entrance to airway/closure of airway at glottis;increase squeeze/positive pressure generation;increase tongue base retraction;increase PES opening  -MP      Increase Timing prepping - 3 second prep or suck swallow or 3-step swallow  -MP      Increase Superior Movement of the Hyolaryngeal Complex effortful pitch glide (falsetto + pharyngeal squeeze)  -MP      Increase Anterior Movement of the Hyolaryngeal Complex chin tuck against resistance (CTAR)  -MP      Increase Closure at Entrance to Airway/Closure of Airway at Glottis supraglottic swallow  -MP      Increase Squeeze/Positive Pressure Generation hard effortful swallow  -MP      Increase Tongue Base Retraction alia  -MP      Increase PES Opening chin tuck against resistance (CTAR)  -MP      [place]/Accuracy (Pharyngeal Strengthening Goal 1, SLP) with minimal cues (75-90% accuracy)  -MP      Time Frame  (Pharyngeal Strengthening Goal 1, SLP) short term goal (STG)  -MP            User Key  (r) = Recorded By, (t) = Taken By, (c) = Cosigned By    Initials Name Provider Type    Rivera Guadalupe MS CCC-SLP Speech and Language Pathologist                   Time Calculation:    Time Calculation- SLP     Row Name 04/07/22 1423             Time Calculation- SLP    SLP Start Time 1320  -MP      SLP Received On 04/07/22  -MP              Untimed Charges    74034-DE Motion Fluoro Eval Swallow Minutes 60  -MP              Total Minutes    Untimed Charges Total Minutes 60  -MP       Total Minutes 60  -MP            User Key  (r) = Recorded By, (t) = Taken By, (c) = Cosigned By    Initials Name Provider Type    Rivera Guadalupe MS CCC-SLP Speech and Language Pathologist                Therapy Charges for Today     Code Description Service Date Service Provider Modifiers Qty    [id number] HC ST EVAL ORAL PHARYNG SWALLOW 3 4/6/2022 Rivera Bonner MS CCC-SLP GN 1    [id number] HC ST MOTION FLUORO EVAL SWALLOW 4 4/7/2022 Rivera Bonner MS CCC-SLP GN 1            Patient was not wearing a face mask and did exhibit coughing during this therapy encounter.  Procedure performed was aerosolizing, involved close contact (within 6 feet for at least 15 minutes or longer), and did not involve contact with infectious secretions or specimens.  Therapist used appropriate personal protective equipment including gloves, standard procedure mask and eye protection.  Appropriate PPE was worn during the entire therapy session.  Hand hygiene was completed before and after therapy session.       MS ANDRADE Dubon  4/7/2022

## 2022-04-07 NOTE — THERAPY PROGRESS REPORT/RE-CERT
"Patient Name: Yinka Cummings  : 1945    MRN: [id number]                              Today's Date: 2022       Admit Date: 3/25/2022    Visit Dx:     ICD-10-CM ICD-9-CM   1. Acute renal failure, unspecified acute renal failure type (HCC)  N17.9 584.9   2. Dehydration  E86.0 276.51   3. Cellulitis of lower extremity, unspecified laterality  L03.119 682.6   4. Vasculitis (HCC)  I77.6 447.6   5. Congestive heart failure, unspecified HF chronicity, unspecified heart failure type (HCC)  I50.9 428.0   6. Positive D dimer  R79.89 790.92   7. 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)   • Chronic back pain (on chronic prescription lortab)   • HTN (hypertension)   • Solitary kidney (s/p nephrectomy for \"mass\")   • History of prostate cancer   • Closed fracture of phalanx of right hand   • Dental caries   • Acute renal failure, unspecified acute renal failure type (HCC)   • Rash   • Nausea and vomiting     Past Medical History:   Diagnosis Date   • Anxiety    • Arthritis    • Chronic kidney disease    • Diabetes mellitus (HCC)    • Disease of thyroid gland    • Diverticulosis    • Essential tremor    • HL (hearing loss)    • Hypertension    • Obesity    • Prostate cancer (HCC)    • Renal cell cancer (HCC)    • Skin cancer    • Vasculitis of skin      Past Surgical History:   Procedure Laterality Date   • CHOLECYSTECTOMY     • COLONOSCOPY      Polyps in the past but not on the most recent scope   • NEPHRECTOMY Right    • PROSTATE SURGERY      Radical prostatectomy   • SKIN CANCER EXCISION      Large incision left neck, multiple other cancers removed      General Information     Row Name 22 1527          OT Time and Intention    " Document Type therapy note (daily note);progress note/recertification  -KF     Mode of Treatment occupational therapy  -     Row Name 04/07/22 1527          General Information    Patient Profile Reviewed yes  -KF     Existing Precautions/Restrictions fall  skin integrity hands and B feet  -KF     Barriers to Rehab medically complex;previous functional deficit;hearing deficit  -     Row Name 04/07/22 1527          Cognition    Orientation Status (Cognition) oriented to;person;place;verbal cues/prompts needed for orientation;time;situation  -     Row Name 04/07/22 1527          Safety Issues, Functional Mobility    Safety Issues Affecting Function (Mobility) awareness of need for assistance;insight into deficits/self-awareness;safety precaution awareness  -KF     Impairments Affecting Function (Mobility) balance;coordination;endurance/activity tolerance;pain;strength  -KF           User Key  (r) = Recorded By, (t) = Taken By, (c) = Cosigned By    Initials Name Provider Type    Alis Kellogg OT Occupational Therapist                 Mobility/ADL's     Row Name 04/07/22 1527          Bed Mobility    Comment, (Bed Mobility) deferred at this time 2/2 fatigue  -     Row Name 04/07/22 1527          Transfers    Comment, (Transfers) deferred to PT  -KF     Row Name 04/07/22 1527          Functional Mobility    Functional Mobility- Comment deferred to PT  -KF     Row Name 04/07/22 1527          Activities of Daily Living    BADL Assessment/Intervention grooming  -     Row Name 04/07/22 1527          Grooming Assessment/Training    [place] Level (Grooming) set up;wash face, hands  -KF     Position (Grooming) sitting up in bed  -KF           User Key  (r) = Recorded By, (t) = Taken By, (c) = Cosigned By    Initials Name Provider Type    Alis Kellogg OT Occupational Therapist               Obj/Interventions     Row Name 04/07/22 1528          Shoulder (Therapeutic Exercise)    Shoulder  (Therapeutic Exercise) AROM (active range of motion)  -KF     Shoulder AROM (Therapeutic Exercise) bilateral;flexion;extension;horizontal aBduction/aDduction;20 repititions;2 sets;supine;scapular protraction  -     Row Name 04/07/22 1528          Elbow/Forearm (Therapeutic Exercise)    Elbow/Forearm (Therapeutic Exercise) AROM (active range of motion)  -KF     Elbow/Forearm AROM (Therapeutic Exercise) bilateral;flexion;extension;20 repititions;2 sets;supine  -     Row Name 04/07/22 1528          Motor Skills    Therapeutic Exercise shoulder;elbow/forearm  -KF           User Key  (r) = Recorded By, (t) = Taken By, (c) = Cosigned By    Initials Name Provider Type    KF Alis Harris, OT Occupational Therapist               Goals/Plan     Row Name 04/07/22 1532          Bed Mobility Goal 1 (OT)    Activity/Assistive Device (Bed Mobility Goal 1, OT) rolling to left;rolling to right;supine to sit;sit to supine  -KF     [place] Level/Cues Needed (Bed Mobility Goal 1, OT) minimum assist (75% or more patient effort)  -KF     Time Frame (Bed Mobility Goal 1, OT) by discharge  -KF     Progress/Outcomes (Bed Mobility Goal 1, OT) continuing progress toward goal  -     Row Name 04/07/22 1532          Transfer Goal 1 (OT)    Activity/Assistive Device (Transfer Goal 1, OT) sit-to-stand/stand-to-sit;bed-to-chair/chair-to-bed;toilet;walker, rolling  -KF     [place] Level/Cues Needed (Transfer Goal 1, OT) moderate assist (50-74% patient effort)  -KF     Time Frame (Transfer Goal 1, OT) by discharge  -KF     Progress/Outcome (Transfer Goal 1, OT) continuing progress toward goal  -KF     Row Name 04/07/22 1532          Dressing Goal 1 (OT)    Activity/Device (Dressing Goal 1, OT) upper body dressing  -KF     [place]/Cues Needed (Dressing Goal 1, OT) minimum assist (75% or more patient effort)  -KF     Time Frame (Dressing Goal 1, OT) by discharge  -KF     Progress/Outcome (Dressing Goal 1, OT) continuing  progress toward goal  -KF     Row Name 04/07/22 1532          Self-Feeding Goal 1 (OT)    Activity/Device (Self-Feeding Goal 1, OT) scoop food and bring to mouth  -KF     [place] Level/Cues Needed (Self-Feeding Goal 1, OT) set-up required;supervision required  -KF     Time Frame (Self-Feeding Goal 1, OT) by discharge  -KF     Progress/Outcomes (Self-Feeding Goal 1, OT) good progress toward goal  -KF           User Key  (r) = Recorded By, (t) = Taken By, (c) = Cosigned By    Initials Name Provider Type    KF Alis Harris, OT Occupational Therapist               Clinical Impression     Row Name 04/07/22 1529          Pain Scale: FACES Pre/Post-Treatment    Pain: FACES Scale, Pretreatment 2-->hurts little bit  -KF     Posttreatment Pain Rating 2-->hurts little bit  -KF     Pain Location - Side/Orientation Bilateral  -KF     Pain Location generalized  -KF     Pain Location - hand  -KF     Pre/Posttreatment Pain Comment tolerated, skin integrity concerns B hands and feet with sore hands RN aware  -KF     Row Name 04/07/22 1529          Plan of Care Review    Plan of Care Reviewed With patient;spouse;family  -KF     Progress improving  -KF     Outcome Evaluation Pt tolerated BUE ther ex with good motivation 20 reps x2 sets, setup grooming sitting upright in bed, cont IPOT per POC cont recom SNF at d/c  -KF     Row Name 04/07/22 1529          Therapy Assessment/Plan (OT)    Rehab Potential (OT) good, to achieve stated therapy goals  -KF     Criteria for Skilled Therapeutic Interventions Met (OT) yes;skilled treatment is necessary  -KF     Therapy Frequency (OT) daily  -KF     Row Name 04/07/22 1529          Therapy Plan Review/Discharge Plan (OT)    Anticipated Discharge Disposition (OT) skilled nursing facility  -KF     Row Name 04/07/22 1529          Vital Signs    Pre Systolic BP Rehab 127  RN cleared VSS  -KF     Pre Treatment Diastolic BP 62  -KF     Pre SpO2 (%) 99  -KF     O2 Delivery Pre Treatment  supplemental O2  -KF     Post SpO2 (%) 97  -KF     O2 Delivery Post Treatment supplemental O2  -KF     Pre Patient Position Supine  -KF     Intra Patient Position Supine  -KF     Post Patient Position Supine  -KF     Row Name 04/07/22 1529          Positioning and Restraints    Pre-Treatment Position in bed  -KF     Post Treatment Position bed  -KF     In Bed notified nsg;supine;fowlers;call light within reach;encouraged to call for assist;exit alarm on;side rails up x2;legs elevated;heels elevated;waffle boots/both;with family/caregiver  -KF           User Key  (r) = Recorded By, (t) = Taken By, (c) = Cosigned By    Initials Name Provider Type    KF Alis Harris, OT Occupational Therapist               Outcome Measures     Row Name 04/07/22 1533          How much help from another is currently needed...    Putting on and taking off regular lower body clothing? 1  -KF     Bathing (including washing, rinsing, and drying) 1  -KF     Toileting (which includes using toilet bed pan or urinal) 1  -KF     Putting on and taking off regular upper body clothing 2  -KF     Taking care of personal grooming (such as brushing teeth) 2  -KF     Eating meals 2  NPO currently  -KF     AM-PAC 6 Clicks Score (OT) 9  -KF     Row Name 04/07/22 0800          How much help from another person do you currently need...    Turning from your back to your side while in flat bed without using bedrails? 2  -MN     Moving from lying on back to sitting on the side of a flat bed without bedrails? 2  -MN     Moving to and from a bed to a chair (including a wheelchair)? 1  -MN     Standing up from a chair using your arms (e.g., wheelchair, bedside chair)? 1  -MN     Climbing 3-5 steps with a railing? 1  -MN     To walk in hospital room? 1  -MN     AM-PAC 6 Clicks Score (PT) 8  -MN     Row Name 04/07/22 1533          Functional Assessment    Outcome Measure Options AM-PAC 6 Clicks Daily Activity (OT)  -KF           User Key  (r) = Recorded By,  (t) = Taken By, (c) = Cosigned By    Initials Name Provider Type    KF Alis Harris OT Occupational Therapist    Mady Amaya RN Registered Nurse                Occupational Therapy Education                 Title: PT OT SLP Therapies (Done)     Topic: Occupational Therapy (Done)     Point: ADL training (Done)     Description:   Instruct learner(s) on proper safety adaptation and remediation techniques during self care or transfers.   Instruct in proper use of assistive devices.              Learning Progress Summary           Patient Acceptance, E,TB,D, DU,VU,NR by  at 4/7/2022 1535    Acceptance, E,TB,D, VU,DU,NR by  at 4/7/2022 1534    Acceptance, E, NR by MR at 4/4/2022 1447    Acceptance, E,D, NR by JAYDEN at 3/30/2022 1147    Acceptance, E,D, VU,NR by TA at 3/27/2022 1502   Family Acceptance, E,TB,D, DU,VU,NR by  at 4/7/2022 1535    Acceptance, E, NR by MR at 4/4/2022 1447    Acceptance, E,D, NR by JAYDEN at 3/30/2022 1147                   Point: Home exercise program (Done)     Description:   Instruct learner(s) on appropriate technique for monitoring, assisting and/or progressing therapeutic exercises/activities.              Learning Progress Summary           Patient Acceptance, E,TB,D, DU,VU,NR by  at 4/7/2022 1535    Acceptance, E,TB,D, VU,DU,NR by  at 4/7/2022 1534    Acceptance, E, NR by MR at 4/4/2022 1447    Acceptance, E,D, NR by JAYDEN at 3/30/2022 1147    Acceptance, E,D, VU,NR by TA at 3/27/2022 1502   Family Acceptance, E,TB,D, DU,VU,NR by  at 4/7/2022 1535    Acceptance, E, NR by MR at 4/4/2022 1447    Acceptance, E,D, NR by JAYDEN at 3/30/2022 1147                   Point: Precautions (Done)     Description:   Instruct learner(s) on prescribed precautions during self-care and functional transfers.              Learning Progress Summary           Patient Acceptance, E,TB,D, DU,VU,NR by  at 4/7/2022 1535    Acceptance, E,TB,SUSHANT, JARRETT KAISER,NR by KF at 4/7/2022 1534    Acceptance, E, NR by  MR at 4/4/2022 1447    Acceptance, E,D, NR by JY at 3/30/2022 1147    Acceptance, E,D, VU,NR by TA at 3/27/2022 1502   Family Acceptance, E,TB,D, DU,VU,NR by  at 4/7/2022 1535    Acceptance, E, NR by MR at 4/4/2022 1447    Acceptance, E,D, NR by KAMILAY at 3/30/2022 1147                   Point: Body mechanics (Done)     Description:   Instruct learner(s) on proper positioning and spine alignment during self-care, functional mobility activities and/or exercises.              Learning Progress Summary           Patient Acceptance, E,TB,D, DU,VU,NR by  at 4/7/2022 1535    Acceptance, E,TB,D, VU,DU,NR by  at 4/7/2022 1534    Acceptance, E, NR by MR at 4/4/2022 1447    Acceptance, E,D, NR by JAYDEN at 3/30/2022 1147    Acceptance, E,D, VU,NR by TA at 3/27/2022 1502   Family Acceptance, E,TB,D, DU,VU,NR by KF at 4/7/2022 1535    Acceptance, E, NR by MR at 4/4/2022 1447    Acceptance, E,D, NR by JAYDEN at 3/30/2022 1147                               User Key     Initials Effective Dates Name Provider Type Discipline    TA 06/16/21 -  Neno Russ, OT Occupational Therapist OT     06/16/21 -  Alis Harris, OT Occupational Therapist OT    JY 06/16/21 -  Annalisa Abbott, OT Occupational Therapist OT    MR 10/06/21 -  Alla Mccauley OT Occupational Therapist OT              OT Recommendation and Plan  Therapy Frequency (OT): daily  Plan of Care Review  Plan of Care Reviewed With: patient, spouse, family  Progress: improving  Outcome Evaluation: Pt tolerated BUE ther ex with good motivation 20 reps x2 sets, setup grooming sitting upright in bed, cont IPOT per POC cont recom SNF at d/c     Time Calculation:    Time Calculation- OT     Row Name 04/07/22 1511             Time Calculation- OT    OT Start Time 1511  -KF      OT Received On 04/07/22  -      OT Goal Re-Cert Due Date 04/17/22  -KF              Timed Charges    14301 - OT Therapeutic Exercise Minutes 14  -KF              Total Minutes    Timed Charges Total  Minutes 14  -KF       Total Minutes 14  -KF            User Key  (r) = Recorded By, (t) = Taken By, (c) = Cosigned By    Initials Name Provider Type    Alis Kellogg OT Occupational Therapist              Therapy Charges for Today     Code Description Service Date Service Provider Modifiers Qty    [id number]  OT THER PROC EA 15 MIN 4/7/2022 Alis Harris OT GO 1               Alis Hraris OT  4/7/2022

## 2022-04-07 NOTE — CASE MANAGEMENT/SOCIAL WORK
Continued Stay Note  [facility]     Patient Name: Yinka Cummings  MRN: [id number]  Today's Date: 4/7/2022    Admit Date: 3/25/2022     Discharge Plan     Row Name 04/07/22 1416       Plan    Plan [facility]    Plan Comments [facility] unable to accept and have given referral to [facility] and will also reach out to [facility]. I will update patient and his wife.     Final Discharge Disposition Code 03 - skilled nursing facility (SNF)               Discharge Codes    No documentation.               Expected Discharge Date and Time     Expected Discharge Date Expected Discharge Time    Apr 7, 2022             Jessica Frias RN

## 2022-04-07 NOTE — PLAN OF CARE
Goal Outcome Evaluation:  Plan of Care Reviewed With: patient, spouse, family        Progress: improving  Outcome Evaluation: Pt tolerated BUE ther ex with good motivation 20 reps x2 sets, setup grooming sitting upright in bed, cont IPOT per POC cont recom SNF at d/c

## 2022-04-07 NOTE — PROGRESS NOTES
"   LOS: 13 days    Patient Care Team:  Vivek Mercer DO as PCP - General (Family Medicine)    Chief Complaint: SALEEM    Subjective     Interval History:     No new events no other distress  Review of Systems:   Patient denies shortness of breath, chest pain, dysuria, hematuria, nausea, vomiting.      Objective     Vital Sign Min/Max for last 24 hours  Temp  Min: 97.5 °F (36.4 °C)  Max: 98 °F (36.7 °C)   BP  Min: 94/63  Max: 146/66   Pulse  Min: 67  Max: 79   Resp  Min: 18  Max: 20   SpO2  Min: 95 %  Max: 99 %   Flow (L/min)  Min: 2  Max: 2   No data recorded     Flowsheet Rows    Flowsheet Row First Filed Value   Admission Height 175.3 cm (69\") Documented at 03/25/2022 0849   Admission Weight 113 kg (250 lb) Documented at 03/25/2022 0849          No intake/output data recorded.  I/O last 3 completed shifts:  In: 1200 [P.O.:1200]  Out: -     Physical Exam:  General Appearance: Alert, oriented, no obvious distress.Morbid obesity  Eyes: PER, EOMI.  Neck: Supple no JVD.  Lungs: Clear auscultation, no rales rhonchi's, equal chest movement, nonlabored.  Heart: No gallop, murmur, rub, RRR.  Abdomen: Soft, nontender, positive bowel sounds, no organomegaly.  Extremities: No edema, no cyanosis.  Neuro: No focal deficit, moving all extremities, alert oriented X 3  Skin: Rash noted skin is warm and dry.    WBC WBC   Date Value Ref Range Status   04/06/2022 7.55 3.40 - 10.80 10*3/mm3 Final      HGB Hemoglobin   Date Value Ref Range Status   04/06/2022 7.9 (L) 13.0 - 17.7 g/dL Final      HCT Hematocrit   Date Value Ref Range Status   04/06/2022 26.5 (L) 37.5 - 51.0 % Final      Platlets No results found for: LABPLAT   MCV MCV   Date Value Ref Range Status   04/06/2022 98.5 (H) 79.0 - 97.0 fL Final          Sodium Sodium   Date Value Ref Range Status   04/07/2022 147 (H) 136 - 145 mmol/L Final   04/06/2022 154 (H) 136 - 145 mmol/L Final   04/05/2022 148 (H) 136 - 145 mmol/L Final      Potassium Potassium   Date Value Ref " Range Status   04/07/2022 3.5 3.5 - 5.2 mmol/L Final   04/06/2022 4.4 3.5 - 5.2 mmol/L Final   04/05/2022 4.1 3.5 - 5.2 mmol/L Final      Chloride Chloride   Date Value Ref Range Status   04/07/2022 116 (H) 98 - 107 mmol/L Final   04/06/2022 119 (H) 98 - 107 mmol/L Final   04/05/2022 112 (H) 98 - 107 mmol/L Final      CO2 CO2   Date Value Ref Range Status   04/07/2022 22.0 22.0 - 29.0 mmol/L Final   04/06/2022 24.0 22.0 - 29.0 mmol/L Final   04/05/2022 20.0 (L) 22.0 - 29.0 mmol/L Final      BUN BUN   Date Value Ref Range Status   04/07/2022 58 (H) 8 - 23 mg/dL Final   04/06/2022 69 (H) 8 - 23 mg/dL Final   04/05/2022 76 (H) 8 - 23 mg/dL Final      Creatinine Creatinine   Date Value Ref Range Status   04/07/2022 1.79 (H) 0.76 - 1.27 mg/dL Final   04/06/2022 2.24 (H) 0.76 - 1.27 mg/dL Final   04/05/2022 2.38 (H) 0.76 - 1.27 mg/dL Final      Calcium Calcium   Date Value Ref Range Status   04/07/2022 7.8 (L) 8.6 - 10.5 mg/dL Final   04/06/2022 8.1 (L) 8.6 - 10.5 mg/dL Final   04/05/2022 8.3 (L) 8.6 - 10.5 mg/dL Final      PO4 No results found for: CAPO4   Albumin Albumin   Date Value Ref Range Status   04/06/2022 2.80 (L) 3.50 - 5.20 g/dL Final   04/05/2022 2.70 (L) 3.50 - 5.20 g/dL Final      Magnesium No results found for: MG   Uric Acid No results found for: URICACID        Results Review:     I reviewed the patient's new clinical results.    amiodarone, 200 mg, Oral, Q12H   Followed by  [START ON 4/20/2022] amiodarone, 200 mg, Oral, Daily  atorvastatin, 40 mg, Oral, Nightly  buprenorphine-naloxone, 2 tablet, Sublingual, Nightly  DULoxetine, 90 mg, Oral, Daily  enoxaparin, 110 mg, Subcutaneous, Q12H  First Mouthwash (Magic Mouthwash), 5 mL, Swish & Spit, Q6H  insulin detemir, 10 Units, Subcutaneous, Nightly  insulin lispro, 0-9 Units, Subcutaneous, TID AC  levothyroxine, 150 mcg, Oral, Daily  melatonin, 5 mg, Oral, Nightly  methohexital, , ,   metoprolol tartrate, 50 mg, Oral, Q8H  midazolam, , ,   mirtazapine, 7.5  mg, Oral, Nightly  pantoprazole, 40 mg, Oral, QAM  polyethylene glycol, 17 g, Oral, Daily  QUEtiapine, 25 mg, Oral, Q12H  senna-docusate sodium, 2 tablet, Oral, BID  sodium chloride, 10 mL, Intravenous, Q12H  temazepam, 15 mg, Oral, Nightly  cyanocobalamin, 50 mcg, Oral, Daily      sodium chloride, 100 mL/hr, Last Rate: 100 mL/hr (04/07/22 0238)        Medication Review: Reviewed    Assessment/Plan       Rash    Benign essential tremor    Hypothyroidism (acquired)    Paroxysmal A-fib (on eliquis & metoprolol)    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  SALEEM on CKD stage III baseline creatinine 1.5.  Renal function is improving admission creatinine 2.3-2.7.  UA had large blood urine protein was 380 mg etiology of the SALEEM unknown possible drug reaction causing AIN or systemic IgE a vasculitis serology has been negative.  Skin rash has been improving.  2.  Skin rash palpable purpura suspect of drug reaction versus skin vasculitis.  Patient is on prednisone.  3.  Hyperkalemia resolved  4.  Hypernatremia improved with increased fluid intake.  5.  Volume status stable.  6.  Atrial fibrillation: S/p LARRY cardioversion.  Recommendations:  Given patient has solitary kidney renal function improving biopsy is not indicated at this time.  We suspect AIN in the setting of drug reaction.  No significant proteinuria patient is off the steroids for 6 days due to worsening mental status.  Marked improvement in renal function with fluids noted at this time.    Houston Ríos MD  04/07/22  12:12 EDT

## 2022-04-08 ENCOUNTER — APPOINTMENT (OUTPATIENT)
Dept: GENERAL RADIOLOGY | Facility: HOSPITAL | Age: 77
End: 2022-04-08

## 2022-04-08 LAB
ALBUMIN SERPL-MCNC: 2.4 G/DL (ref 3.5–5.2)
ALBUMIN/GLOB SERPL: 0.8 G/DL
ALP SERPL-CCNC: 70 U/L (ref 39–117)
ALT SERPL W P-5'-P-CCNC: 14 U/L (ref 1–41)
ANION GAP SERPL CALCULATED.3IONS-SCNC: 10 MMOL/L (ref 5–15)
AST SERPL-CCNC: 15 U/L (ref 1–40)
BASOPHILS # BLD AUTO: 0.04 10*3/MM3 (ref 0–0.2)
BASOPHILS NFR BLD AUTO: 0.4 % (ref 0–1.5)
BILIRUB SERPL-MCNC: 0.4 MG/DL (ref 0–1.2)
BUN SERPL-MCNC: 48 MG/DL (ref 8–23)
BUN/CREAT SERPL: 26.7 (ref 7–25)
CALCIUM SPEC-SCNC: 7.8 MG/DL (ref 8.6–10.5)
CHLORIDE SERPL-SCNC: 112 MMOL/L (ref 98–107)
CO2 SERPL-SCNC: 21 MMOL/L (ref 22–29)
CREAT SERPL-MCNC: 1.8 MG/DL (ref 0.76–1.27)
DEPRECATED RDW RBC AUTO: 52.8 FL (ref 37–54)
EGFRCR SERPLBLD CKD-EPI 2021: 38.8 ML/MIN/1.73
EOSINOPHIL # BLD AUTO: 0.34 10*3/MM3 (ref 0–0.4)
EOSINOPHIL NFR BLD AUTO: 3.5 % (ref 0.3–6.2)
ERYTHROCYTE [DISTWIDTH] IN BLOOD BY AUTOMATED COUNT: 15.1 % (ref 12.3–15.4)
GLOBULIN UR ELPH-MCNC: 2.9 GM/DL
GLUCOSE BLDC GLUCOMTR-MCNC: 193 MG/DL (ref 70–130)
GLUCOSE BLDC GLUCOMTR-MCNC: 199 MG/DL (ref 70–130)
GLUCOSE SERPL-MCNC: 190 MG/DL (ref 65–99)
HCT VFR BLD AUTO: 26.2 % (ref 37.5–51)
HGB BLD-MCNC: 8 G/DL (ref 13–17.7)
IMM GRANULOCYTES # BLD AUTO: 0.19 10*3/MM3 (ref 0–0.05)
IMM GRANULOCYTES NFR BLD AUTO: 2 % (ref 0–0.5)
LYMPHOCYTES # BLD AUTO: 1.18 10*3/MM3 (ref 0.7–3.1)
LYMPHOCYTES NFR BLD AUTO: 12.2 % (ref 19.6–45.3)
MCH RBC QN AUTO: 29.7 PG (ref 26.6–33)
MCHC RBC AUTO-ENTMCNC: 30.5 G/DL (ref 31.5–35.7)
MCV RBC AUTO: 97.4 FL (ref 79–97)
MONOCYTES # BLD AUTO: 0.54 10*3/MM3 (ref 0.1–0.9)
MONOCYTES NFR BLD AUTO: 5.6 % (ref 5–12)
NEUTROPHILS NFR BLD AUTO: 7.35 10*3/MM3 (ref 1.7–7)
NEUTROPHILS NFR BLD AUTO: 76.3 % (ref 42.7–76)
NRBC BLD AUTO-RTO: 0 /100 WBC (ref 0–0.2)
PLATELET # BLD AUTO: 225 10*3/MM3 (ref 140–450)
PMV BLD AUTO: 10.2 FL (ref 6–12)
POTASSIUM SERPL-SCNC: 4.1 MMOL/L (ref 3.5–5.2)
PROT SERPL-MCNC: 5.3 G/DL (ref 6–8.5)
RBC # BLD AUTO: 2.69 10*6/MM3 (ref 4.14–5.8)
SODIUM SERPL-SCNC: 143 MMOL/L (ref 136–145)
WBC NRBC COR # BLD: 9.64 10*3/MM3 (ref 3.4–10.8)

## 2022-04-08 PROCEDURE — 74018 RADEX ABDOMEN 1 VIEW: CPT

## 2022-04-08 PROCEDURE — 63710000001 INSULIN DETEMIR PER 5 UNITS: Performed by: INTERNAL MEDICINE

## 2022-04-08 PROCEDURE — 85025 COMPLETE CBC W/AUTO DIFF WBC: CPT | Performed by: NURSE PRACTITIONER

## 2022-04-08 PROCEDURE — 92526 ORAL FUNCTION THERAPY: CPT

## 2022-04-08 PROCEDURE — 25010000002 ENOXAPARIN PER 10 MG

## 2022-04-08 PROCEDURE — 99232 SBSQ HOSP IP/OBS MODERATE 35: CPT | Performed by: INTERNAL MEDICINE

## 2022-04-08 PROCEDURE — 82962 GLUCOSE BLOOD TEST: CPT

## 2022-04-08 PROCEDURE — 80053 COMPREHEN METABOLIC PANEL: CPT | Performed by: NURSE PRACTITIONER

## 2022-04-08 RX ORDER — AMINO ACIDS/PROTEIN HYDROLYS 15G-100/30
30 LIQUID (ML) ORAL DAILY
Status: DISCONTINUED | OUTPATIENT
Start: 2022-04-08 | End: 2022-04-13

## 2022-04-08 RX ADMIN — METOPROLOL TARTRATE 50 MG: 50 TABLET, FILM COATED ORAL at 06:38

## 2022-04-08 RX ADMIN — INSULIN DETEMIR 10 UNITS: 100 INJECTION, SOLUTION SUBCUTANEOUS at 23:26

## 2022-04-08 RX ADMIN — ENOXAPARIN SODIUM 110 MG: 120 INJECTION SUBCUTANEOUS at 06:38

## 2022-04-08 RX ADMIN — SENNOSIDES AND DOCUSATE SODIUM 2 TABLET: 50; 8.6 TABLET ORAL at 20:25

## 2022-04-08 RX ADMIN — METOPROLOL TARTRATE 50 MG: 50 TABLET, FILM COATED ORAL at 20:27

## 2022-04-08 RX ADMIN — ENOXAPARIN SODIUM 110 MG: 120 INJECTION SUBCUTANEOUS at 18:22

## 2022-04-08 RX ADMIN — Medication 10 ML: at 20:26

## 2022-04-08 RX ADMIN — Medication 5 MG: at 20:25

## 2022-04-08 RX ADMIN — NYSTATIN 500000 UNITS: 100000 SUSPENSION ORAL at 20:24

## 2022-04-08 RX ADMIN — NYSTATIN 500000 UNITS: 100000 SUSPENSION ORAL at 13:28

## 2022-04-08 RX ADMIN — ATORVASTATIN CALCIUM 40 MG: 40 TABLET, FILM COATED ORAL at 20:24

## 2022-04-08 RX ADMIN — MIRTAZAPINE 7.5 MG: 15 TABLET, FILM COATED ORAL at 20:24

## 2022-04-08 RX ADMIN — QUETIAPINE FUMARATE 25 MG: 25 TABLET ORAL at 20:25

## 2022-04-08 RX ADMIN — AMIODARONE HYDROCHLORIDE 200 MG: 200 TABLET ORAL at 20:28

## 2022-04-08 RX ADMIN — NYSTATIN 500000 UNITS: 100000 SUSPENSION ORAL at 18:22

## 2022-04-08 RX ADMIN — SODIUM CHLORIDE 100 ML/HR: 4.5 INJECTION, SOLUTION INTRAVENOUS at 16:51

## 2022-04-08 NOTE — DISCHARGE PLACEMENT REQUEST
"EmilianoYinka morrell (77 y.o. Male)     Here's more encouraging rehab notes.  We have to address swallowing issues.    Thanks   Jessica EVANSN RN, [facility]  [phone]        Date of Birth   1945    Social Security Number       Address   [address] [id number]    Home Phone   [phone]    MRN   [id number]       Presybeterian   None    Marital Status                               Admission Date   3/25/22    Admission Type   Emergency    Admitting Provider   Wili Conner DO    Attending Provider   Wili Conner DO    Department, Room/Bed   [facility] 3F, S315/1       Discharge Date       Discharge Disposition       Discharge Destination                               Attending Provider: Wili Conner DO    Allergies: Contrast Dye, Doxycycline, Chlorthalidone, Morphine, Neurontin [Gabapentin], Phenergan [Promethazine]    Isolation: None   Infection: None   Code Status: CPR   Advance Care Planning Activity    Ht: 175 cm (68.9\")   Wt: 119 kg (262 lb 5.6 oz)    Admission Cmt: None   Principal Problem: Rash [R21]                 Active Insurance as of 3/25/2022     Primary Coverage     Payor Plan Insurance Group Employer/Plan Group    MEDICARE MEDICARE A & B      Payor Plan Address Payor Plan Phone Number Payor Plan Fax Number Effective Dates    [address] [phone]  10/1/2009 - None Entered    [place] [id number]       Subscriber Name Subscriber Birth Date Member ID       YINKA MABRY 1945 [id number]                 Emergency Contacts      (Rel.) Home Phone Work Phone Mobile Phone    EmilianoGauri (Spouse) -- -- [phone]    Claudia Mabry (Daughter) -- -- [phone]    NikitaNora (Daughter) -- -- [phone]            Insurance Information                MEDICARE/MEDICARE A & B Phone: [phone]    Subscriber: Yinka Mabry Subscriber#: [id number]    Group#: -- Precert#: --          Fister, " Alis RODAS OT    Occupational Therapist   Specialty:  Occupational Therapy   Plan of Care       Signed   Date of Service:  04/07/22 1511   Creation Time:  04/07/22 1534              Signed              Show:Clear all  []Manual[x]Template[]Copied    Added by:  [x]Alis Harris, OT      []Brandon for details    Goal Outcome Evaluation:  Plan of Care Reviewed With: patient, spouse, family  Progress: improving  Outcome Evaluation: Pt tolerated BUE ther ex with good motivation 20 reps x2 sets, setup grooming sitting upright in bed, cont IPOT per POC cont recom SNF at d/c                  Alessandra Madrid PT    Physical Therapist   Physical Therapy   Plan of Care       Addendum   Date of Service:  04/06/22 1443   Creation Time:  04/06/22 1546                    Show:Clear all  [x]Manual[x]Template[]Copied    Added by:  [x]Alessandra Madrid PT      []Brandon for details    Goal Outcome Evaluation:  Plan of Care Reviewed With: patient, daughter, significant other  Outcome Evaluation: PT DEMONSTRATED IMPROVED BED MOBILITY. ALERT AND FOLLOWING COMMANDS INITIALLY BUT THEN AFTER BED TO CHAIR TRANSFER VIA MECHANCIAL LIFT AND SITTING EDGE OF RECLINER, C/O NOT FEELING WELL AND BECAME LETHARGIC . /59 IN BED PRIOR TO ARRIVAL, 127/62 SITTING EDGE OF RECLINER AND 94/63 AFTER APPROX 5 MINUTES SITTING. PT RETURNED TO FULLY RECLINED IN RECLINER AND NSG NOTIFIED. GOALS MODIFIED TO REFLECT CURRENT FUNCTIONAL STATUS.           Revision History                                  Rivera Bonner, MS CCC-SLP    Speech and Language Pathologist   Speech Therapy   Plan of Care       Signed   Date of Service:  04/07/22 1423   Creation Time:  04/07/22 1423              Signed              Show:Clear all  [x]Manual[x]Template[]Copied    Added by:  [x]Rivera Bonner, MS CCC-SLP      []Brandon for details    Goal Outcome Evaluation:  Plan of Care Reviewed With: patient   SLP MBS evaluation completed. Will address dysphagia. Please see  note for further details and recommendations.                 Yinka Desai MD    Physician   Medicine   H&P       Signed   Date of Service:  22   Creation Time:  22              Signed        Expand All Collapse All        Show:Clear all  [x]Manual[x]Template[]Copied    Added by:  [x]Yinka Desai MD      []Brandon for details         [facility] Medicine Services  HISTORY AND PHYSICAL     Patient Name: Yinka Cummings  : 1945  MRN: [id number]  Primary Care Physician: Vivek Mercer DO  Date of admission: 3/25/2022           Subjective      Subjective      Chief Complaint:  Rash, nausea vomiting     HPI:  Yinka Cummings is a 77 y.o. male with history of hypertension, hypothyroid, A. Fib/flutter, DHF, VINI, chronic pain, GERD, CKD 3, essential tremor, renal cell carcinoma s/p nephrectomy, gastroparesis, DM 2 (insulin-dependent) and Gilbert's disease presents with lower extremity rash and nausea vomiting.     Patient was recently hospitalized at the [facility] from March 10 to 2022 with outside records obtained and summarized as follows:     · [facility] and A. fib with RVR  · For the rash, dermatology was consulted and a punch biopsy was performed which showed nonspecific perivascular lymphocytic infiltrate.  No vasculitis was noted at that time.  It was initially thought that the rash was secondary to Pradaxa so the patient was switched to therapeutic Lovenox.  The rash continued to progress.  Lyrica was then stopped but again the rash progressed.  The patient's primidone dose was weaned to 50 mg (eventually discontinued as an outpatient on 3/22).  Dermatology then recommended stopping the patient's Norco.  On 3/16 the patient was transition from Norco to Suboxone and the rash began to improve.  · For the A. fib, the patient was seen by cardiology and a LARRY was performed in  conjunction with DCCV, which at that time restart the patient in normal sinus rhythm.  Pradaxa was stopped as above and the patient was placed on therapeutic Lovenox which continues presently.  Because of interactions between primidone and NOAC such as apixaban, 30-day washout period was recommended therefore the patient remains on low molecular weight heparin at present.     [facility] the patient was seen by his PCP on Monday and placed on Keflex due to concern of secondary infection of the rash site as well as prescribed Lasix for lower extremity edema.  Over the past couple of days the rash is worsened again, including areas on the feet, legs, abdomen and both arms.  The distribution of the rash, however, is unchanged.     The patient denies fever chills     Yesterday Mr. Cummings developed nausea and vomiting.  He denies abdominal pain.  No loose stool.        COVID Details:    Symptoms:    [x]? NONE []? Fever []?  Cough []? Shortness of breath []? Change in taste/smell        Review of Systems   Constitutional: Positive for fatigue.   HENT: Negative.    Eyes: Negative.    Respiratory: Negative.    Cardiovascular: Negative.    Gastrointestinal: Positive for nausea and vomiting.   Endocrine: Negative.    Genitourinary: Negative.    Musculoskeletal: Negative.    Skin: Positive for rash.   Allergic/Immunologic: Negative.    Neurological: Negative.    Hematological: Negative.    Psychiatric/Behavioral: Negative.          All other systems reviewed and are negative.      Personal History      Medical History        Past Medical History:   Diagnosis Date   • Anxiety     • Arthritis     • Chronic kidney disease     • Diabetes mellitus (HCC)     • Disease of thyroid gland     • Diverticulosis     • Essential tremor     • HL (hearing loss)     • Hypertension     • Obesity     • Prostate cancer (HCC)     • Renal cell cancer (HCC)     • Skin cancer     • Vasculitis of skin               Surgical History   Past Surgical History:   Procedure Laterality Date   • CHOLECYSTECTOMY       • COLONOSCOPY         Polyps in the past but not on the most recent scope   • NEPHRECTOMY Right     • PROSTATE SURGERY         Radical prostatectomy   • SKIN CANCER EXCISION         Large incision left neck, multiple other cancers removed            Family History:  family history includes Cancer in his mother; Coronary artery disease in his sister; Heart attack in his father; Kidney failure in his sister. Otherwise pertinent FHx was reviewed and unremarkable.      Social History:  reports that he has never smoked. He has never used smokeless tobacco. He reports previous alcohol use. He reports previous drug use.  Social History          Social History Narrative   • Not on file         Medications:  Available home medication information reviewed.  Prescriptions Prior to Admission           Medications Prior to Admission   Medication Sig Dispense Refill Last Dose   • acetaminophen (TYLENOL) 325 MG tablet Take 1 tablet by mouth 4 (Four) Times a Day.     Past Week at Unknown time   • ascorbic acid (VITAMIN C) 500 MG tablet Take 500 mg by mouth Daily.     3/24/2022 at Unknown time   • atorvastatin (LIPITOR) 40 MG tablet Take 40 mg by mouth Every Night.     3/24/2022 at Unknown time   • buprenorphine-naloxone (SUBOXONE) 8-2 MG per SL tablet Place 2 tablets under the tongue every night at bedtime.     Past Week at Unknown time   • carbidopa-levodopa (SINEMET)  MG per tablet Take 0.5 tablets by mouth 3 (Three) Times a Day.     3/24/2022 at Unknown time   • cephalexin (KEFLEX) 500 MG capsule Take 500 mg by mouth 2 (Two) Times a Day. For 5 days, started on 03-22-22     3/24/2022 at Unknown time   • cholecalciferol (VITAMIN D3) 25 MCG (1000 UT) tablet Take 2,000 Units by mouth Daily.     3/24/2022 at Unknown time   • cyanocobalamin (VITAMIN B-12N) 50 MCG tablet Take 50 mcg by mouth Daily.     3/24/2022 at Unknown time    • Diclofenac Sodium (VOLTAREN) 1 % gel gel Apply 4 g topically to the appropriate area as directed 4 (Four) Times a Day As Needed.     Past Week at Unknown time   • DULoxetine (CYMBALTA) 30 MG capsule Take 90 mg by mouth Daily.     3/24/2022 at Unknown time   • enoxaparin (LOVENOX) 120 MG/0.8ML solution syringe Inject 120 mg under the skin into the appropriate area as directed Every 12 (Twelve) Hours.     3/24/2022 at Unknown time   • erythromycin (ROMYCIN) 5 MG/GM ophthalmic ointment Administer 1 application into the left eye 4 (Four) Times a Day. Left lower eye lid, for 4 days, started on 03-22-22     3/24/2022 at Unknown time   • glipizide (GLUCOTROL) 10 MG tablet Take 10 mg by mouth 2 (Two) Times a Day Before Meals.     3/24/2022 at Unknown time   • insulin NPH (humuLIN N,novoLIN N) 100 UNIT/ML injection Inject 60 Units under the skin into the appropriate area as directed every night at bedtime.     3/24/2022 at Unknown time   • levothyroxine (SYNTHROID, LEVOTHROID) 150 MCG tablet Take 1 tablet by mouth Daily. (Patient taking differently: Take 175 mcg by mouth Daily.)     3/24/2022 at Unknown time   • lidocaine (LIDODERM) 5 % Place 2 patches on the skin as directed by provider Daily. Remove & Discard patch within 12 hours or as directed by MD     Past Week at Unknown time   • metoprolol tartrate (LOPRESSOR) 50 MG tablet Take 1 tablet by mouth Every 12 (Twelve) Hours. (Patient taking differently: Take 100 mg by mouth Every 12 (Twelve) Hours.)     3/24/2022 at Unknown time   • mirtazapine (REMERON) 15 MG tablet Take 7.5 mg by mouth Every Night.     3/24/2022 at Unknown time   • omeprazole (priLOSEC) 20 MG capsule Take 20 mg by mouth Daily.     3/24/2022 at Unknown time   • polyethylene glycol (MIRALAX) 17 g packet Take 17 g by mouth Daily.     3/24/2022 at Unknown time                  Allergies   Allergen Reactions   • Contrast Dye Rash       [facility]   • Doxycycline Rash       Urticaria [facility]   • Chlorthalidone Other (See Comments)       Hyponatremia [facility]   • Morphine Unknown - Low Severity       Headache [facility] records   • Neurontin [Gabapentin] Mental Status Change       Drowsy [facility] records   • Phenergan [Promethazine] Unknown - Low Severity       Confusion [facility] records               Objective      Objective      Vital Signs:   Temp:  [97.7 °F (36.5 °C)] 97.7 °F (36.5 °C)  Heart Rate:  [84-91] 89  Resp:  [17-19] 19  BP: (149-181)/(75-79) 149/79     Physical Exam   Constitutional - appears fatigued, mucous membranes slightly dry  HEENT-NCAT, mucous membranes moist, a bit hard of hearing  CV-RRR  Resp-CTAB  Abd-soft, nontender, nondistended, normoactive bowel sounds, obese  Ext-trace to 1+ edema LE bilaterally  Neuro-alert and oriented, speech clear, moves all extremities   Psych-slightly flat affect   Skin- non blanching rash, petechial in some areas (such as upper arms), but more confluent on lower arms, hands and legs.  What appear to be old sloughed blisters on dorsum of feet with mild surrounding erythema. Blood filled blisters on inner right lower leg.         Result Review:  I have personally reviewed the results from the time of this admission to 3/25/2022 13:38 EDT and agree with these findings:  []?  Laboratory  []?  Microbiology  []?  Radiology  []?  EKG/Telemetry   []?  Cardiology/Vascular   []?  Pathology  [x]?  Old records  []?  Other:  Most notable findings include: wbc 12, creat 2.3  LAB RESULTS:          Lab 03/25/22  0902   WBC 10.64   HEMOGLOBIN 9.8*   HEMATOCRIT 29.4*   PLATELETS 345   NEUTROS ABS 8.79*   IMMATURE GRANS (ABS) 0.10*   LYMPHS ABS 0.69*   MONOS ABS 1.03*   EOS ABS 0.02   MCV 91.0   SED RATE 44*   CRP 12.02*   PROCALCITONIN 0.78*   LACTATE 2.0   PROTIME 14.8*   INR 1.20*   D DIMER QUANT 4.44*              Lab 03/25/22  0902   SODIUM 135*   POTASSIUM 4.9   CHLORIDE 98   CO2 22.0   ANION GAP 15.0   BUN 36*   CREATININE 2.35*   EGFR 28.2*   GLUCOSE  155*   CALCIUM 8.3*   MAGNESIUM 2.0              Lab 03/25/22  0902   TOTAL PROTEIN 6.3   ALBUMIN 3.20*   GLOBULIN 3.1   ALT (SGPT) 12   AST (SGOT) 14   BILIRUBIN 0.7   ALK PHOS 96   LIPASE 8*              Lab 03/25/22  0902   PROBNP 5,087.0*                  Urinalysis Results:            UA    Urinalysis 5/10/21 5/10/21 5/13/21 5/13/21 3/25/22 3/25/22     1017 1017 1215 1215 1303 1303   Squamous Epithelial Cells, UA   0-2   0-2   0-2   Specific Gravity, UA 1.017   1.017   1.014     Ketones, UA Negative   Negative   Trace (A)     Blood, UA Small (1+) (A)   Negative   Large (3+) (A)     Leukocytes, UA Small (1+) (A)   Trace (A)   Trace (A)     Nitrite, UA Negative   Negative   Negative     RBC, UA   0-2   0-2   Too Numerous to Count (A)   WBC, UA   3-5 (A)   6-12 (A)   13-20 (A)   Bacteria, UA   None Seen   Trace   None Seen   (A) Abnormal value                              Microbiology Results (last 10 days)      Procedure Component Value - Date/Time     COVID-19 and FLU A/B PCR - Swab, Nasopharynx [[id number]]  (Normal) Collected: 03/25/22 0918     Lab Status: Final result Specimen: Swab from Nasopharynx Updated: 03/25/22 0955       COVID19 Not Detected       Influenza A PCR Not Detected       Influenza B PCR Not Detected     Narrative:       Fact sheet for providers: https://www.fda.gov/media/743122/download     Fact sheet for patients: https://www.fda.gov/media/110819/download     Test performed by PCR.     Wound Culture - Wound, Leg, Left [[id number]] Collected: 03/25/22 0918     Lab Status: Preliminary result Specimen: Wound from Leg, Left Updated: 03/25/22 1100       Gram Stain Rare (1+) WBCs seen         Moderate (3+) Gram positive cocci in pairs and clusters               Radiology results from the last 24 hours:   XR Chest 1 View     Result Date: 3/25/2022   DATE OF EXAM: 3/25/2022 10:45 AM  PROCEDURE: XR CHEST 1 VW-  INDICATIONS: chf  COMPARISON: 05/12/2021 11:32 PM  TECHNIQUE: [Portable chest radiograph]   FINDINGS: No new consolidations or pleural effusions are observed. The cardiac silhouette and mediastinum are stable. There is stable cardiomegaly. No acute osseous abnormalities are identified.       Impression: No evidence for acute cardiopulmonary process. Stable cardiomegaly is noted.  This report was finalized on 3/25/2022 10:53 AM by Yves Patino MD.           Results for orders placed during the hospital encounter of 04/24/21     Adult Transthoracic Echo Complete W/ Cont if Necessary Per Protocol     Interpretation Summary  · Left ventricular ejection fraction appears to be 56 - 60%.  · No significant valvular disease              Assessment/Plan      Assessment & Plan            Active Hospital Problems     Diagnosis   POA   • Acute renal failure, unspecified acute renal failure type (HCC) [N17.9]   Yes         Rash  - appears vasculitic, non blanching --  ESR modestly elevated at 44, CRP 12.02  - [facility], biopsy showed a non-specific perivascular lymphocytic infiltrate.  - [facility] stopped after one dose due to significant hyperglycemia  - multiple long term home medications stopped, including lortab, dabigatran, lyrica, lisinopril and primidone with subsequent improvement.  - patient started on keflex and lasix post discharge with rash worsening described by patient and family -- however rash remains confined to the prior distribution  - Drug induced rash?  - patient denies infection prior to rash appearance  - received rocephin in ED for possible secondary infection, will continue for now.  Try to avoid lasix if possible for now.  - will check vasculitic serologies: hep B&C, serum complement levels (C3, C4 and total complement), KENZIE, RF, cyroglobulin level and ANCA panel.  - will ask Rheumatology to evaluate     Chronic Pain  - continue suboxone     Atrial fibrillation/Atrial Flutter  - continue therapeutic lovenox  - metoprolol  - check EKG     SALEEM on CKD  -  creatinine 2.3, [facility] baseline around 1.5 (1.7 on 3/20/22).  - patient appears slightly volume depleted from N/V -- gentle IV fluids this afternoon, check bmp am     Nausea and vomiting.  - unclear etiology, denies abdominal pain  - clears diet     Hypothyroid  - check TSH     HTN     DMII  - insulin dependant  - SSI for now until appetite improves, then add basal bolus (patient says he typically uses 15-50 units of insulin/24hrs at home)     VINI  - CPAP non compliant     Tremor  - primidone recently discharge     Mood disorder        DVT prophylaxis:  lovenox        CODE STATUS:  full      Code Status and Medical Interventions:   Ordered at: 22 1328     Level Of Support Discussed With:     Patient     Code Status (Patient has no pulse and is not breathing):     CPR (Attempt to Resuscitate)     Medical Interventions (Patient has pulse or is breathing):     Full Support            Yinka Desai MD  22                          Routing History                Jemma Arriaga APRN    Nurse Practitioner   Hospitalist   Progress Notes       Addendum   Date of Service:  22   Creation Time:  22              Expand All Collapse All        Show:Clear all  [x]Manual[x]Template[x]Copied    Added by:  [x]Jemma Arriaga APRN      []Brandon for details         [facility] Medicine Services  PROGRESS NOTE     Patient Name: Yinka Cummings  : 1945  MRN: [id number]     Date of Admission: 3/25/2022  Primary Care Physician: Vivek Mercer DO        Subjective      Subjective      CC:  Follow-up delirium     HPI:  Patient sleeping awaken easily with conversation.  Patient says he feels better every day.  Wife at the bedside.  His wife states he is essentially back to normal.  He is reporting mouth discomfort. On Exam, patient appears to have oral thrush.  Will try nystatin.      5025 Addendum: SLP recommending NPO due to oral and pharyngeal  dysphagia.  Also recommend temporary alternative method of nutrition/hydration.  Will consult dietary.  Keofeed will be placed.  Spoke with the patient and his wife who are agreeable to this intervention.     1600 rash is returning to legs and arms. Will obtain sed rate and CRP. Start prednisone 20 mg daily     ROS:  GEN-no fever, chills  CV no chest pain or palpitations  Respiratory no cough dyspnea  GI no nausea vomiting diarrhea abdominal pain  HEENT, reports mouth discomfort           Objective      Objective      Vital Signs:   Temp:  [97.5 °F (36.4 °C)-98 °F (36.7 °C)] 97.5 °F (36.4 °C)  Heart Rate:  [67-79] 67  Resp:  [18-20] 18  BP: ()/(59-66) 140/63  Flow (L/min):  [2] 2     Physical Exam:  Constitutional: Awake alert chronically ill-appearing  HEENT: NCAT, mucous membranes moist, erythematous patching in the roof of his mouth  Respiratory: Clear, nonlabored  Cardiovascular: RRR no murmurs gallops rubs  GI: Positive bowel sounds soft nontender nondistended  MS: No lower extremity edema  Psych: Appropriate affect cooperative  Neuro: Speech clear no neuro focal deficit noted  Skin: rash returning to bilateral legs and arms     Results Reviewed:  LAB RESULTS:              Lab 04/06/22  0540 04/04/22  0634 04/03/22  2046 04/03/22  0740 04/02/22  1038   WBC 7.55 9.90 9.55 12.96* 14.84*   HEMOGLOBIN 7.9* 8.2* 8.3* 8.5* 8.0*   HEMATOCRIT 26.5* 26.7* 24.9* 27.4* 24.1*   PLATELETS 261 369 314 377 316   NEUTROS ABS 4.92  --   --  9.75*  --    IMMATURE GRANS (ABS) 0.13*  --   --  0.15*  --    LYMPHS ABS 1.63  --   --  1.69  --    MONOS ABS 0.73  --   --  1.33*  --    EOS ABS 0.11  --   --  0.02  --    MCV 98.5* 95.4 90.2 97.9* 88.3   LACTATE  --   --  1.7  --   --    HEPARIN ANTI-XA  --   --   --   --  1.17                  Lab 04/07/22  0450 04/06/22  0540 04/05/22  1337 04/04/22  0634 04/03/22  2046   SODIUM 147* 154* 148* 138 138   POTASSIUM 3.5 4.4 4.1 4.8 5.2   CHLORIDE 116* 119* 112* 104 102   CO2 22.0  24.0 20.0* 22.0 22.0   ANION GAP 9.0 11.0 16.0* 12.0 14.0   BUN 58* 69* 76* 83* 78*   CREATININE 1.79* 2.24* 2.38* 2.35* 2.77*   EGFR 39.0* 29.8* 27.7* 28.2* 23.1*   GLUCOSE 193* 147* 183* 198* 232*   CALCIUM 7.8* 8.1* 8.3* 8.4* 8.2*   PHOSPHORUS  --  3.5 3.3  --   --                  Lab 04/06/22  0540 04/05/22  1337 04/03/22  2046 04/01/22  1227   TOTAL PROTEIN  --   --  5.6* 4.8*   ALBUMIN 2.80* 2.70* 2.70* 2.40*   GLOBULIN  --   --  2.9 2.4   ALT (SGPT)  --   --  24 12   AST (SGOT)  --   --  37 16   BILIRUBIN  --   --  0.4 0.3   ALK PHOS  --   --  76 80                          Lab 04/03/22 2056   PH, ARTERIAL 7.381   PCO2, ARTERIAL 37.4   PO2 ART 70.0*   FIO2 21   HCO3 ART 22.2   BASE EXCESS ART -2.7*   CARBOXYHEMOGLOBIN 1.3                            Brief Urine Lab Results  (Last result in the past 365 days)       Color   Clarity   Blood   Leuk Est   Nitrite   Protein   CREAT   Urine HCG         04/04/22 0045 Yellow    Clear    Moderate (2+)    Trace    Negative    Negative                                   Microbiology Results Abnormal      Procedure Component Value - Date/Time     Wound Culture - Wound, Leg, Left [[id number]] Collected: 03/25/22 0918     Lab Status: Final result Specimen: Wound from Leg, Left Updated: 03/27/22 0903       Wound Culture Light growth (2+) Normal Skin Cami       Gram Stain Rare (1+) WBCs seen         Moderate (3+) Gram positive cocci in pairs and clusters     Eosinophil Smear - Urine, Urine, Clean Catch [[id number]]  (Normal) Collected: 03/26/22 1027     Lab Status: Final result Specimen: Urine, Clean Catch Updated: 03/26/22 1113       Eosinophil Smear 0 % EOS/100 Cells       Narrative:       No eosinophil seen     COVID-19 and FLU A/B PCR - Swab, Nasopharynx [[id number]]  (Normal) Collected: 03/25/22 0918     Lab Status: Final result Specimen: Swab from Nasopharynx Updated: 03/25/22 0955       COVID19 Not Detected       Influenza A PCR Not Detected       Influenza B PCR Not  Detected     Narrative:       Fact sheet for providers: https://www.fda.gov/media/322081/download     Fact sheet for patients: https://www.fda.gov/media/140771/download     Test performed by PCR.             Radiology results from the last 24 hours:   No radiology results from the last 24 hrs        Results for orders placed during the hospital encounter of 03/25/22     Adult Transesophageal Echo (LARRY) W/ Cont if Necessary Per Protocol     Interpretation Summary  · Estimated left ventricular EF = 65%  · The cardiac valves are anatomically and functionally normal.  · No evidence of a left atrial appendage thrombus was present.        I have reviewed the medications:  Scheduled Meds:amiodarone, 200 mg, Oral, Q12H   Followed by  [START ON 4/20/2022] amiodarone, 200 mg, Oral, Daily  atorvastatin, 40 mg, Oral, Nightly  buprenorphine-naloxone, 2 tablet, Sublingual, Nightly  DULoxetine, 90 mg, Oral, Daily  enoxaparin, 110 mg, Subcutaneous, Q12H  insulin detemir, 10 Units, Subcutaneous, Nightly  insulin lispro, 0-9 Units, Subcutaneous, TID AC  levothyroxine, 150 mcg, Oral, Daily  melatonin, 5 mg, Oral, Nightly  methohexital, , ,   metoprolol tartrate, 50 mg, Oral, Q8H  midazolam, , ,   mirtazapine, 7.5 mg, Oral, Nightly  nystatin, 5 mL, Oral, 4x Daily  pantoprazole, 40 mg, Oral, QAM  polyethylene glycol, 17 g, Oral, Daily  QUEtiapine, 25 mg, Oral, Q12H  senna-docusate sodium, 2 tablet, Oral, BID  sodium chloride, 10 mL, Intravenous, Q12H  temazepam, 15 mg, Oral, Nightly  cyanocobalamin, 50 mcg, Oral, Daily        Continuous Infusions:sodium chloride, 100 mL/hr, Last Rate: 100 mL/hr (04/07/22 0238)        PRN Meds:.•  acetaminophen **OR** acetaminophen **OR** acetaminophen  •  dextrose  •  dextrose  •  glucagon (human recombinant)  •  haloperidol  •  mineral oil-hydrophilic petrolatum  •  ondansetron **OR** ondansetron  •  palliative care oral rinse  •  Sodium Chloride (PF)  •  sodium chloride           Assessment/Plan       Assessment & Plan            Active Hospital Problems     Diagnosis   POA   • **Rash [R21]   Yes   • Acute renal failure, unspecified acute renal failure type (HCC) [N17.9]   Yes   • Nausea and vomiting [R11.2]   Unknown   • Paroxysmal A-fib (on eliquis & metoprolol) [I48.0]   Yes   • Insulin dependent type 2 diabetes mellitus (HCC) [E11.9, Z79.4]   Not Applicable   • Hypothyroidism (acquired) [E03.9]   Yes   • Chronic back pain (on chronic prescription lortab) [M54.9, G89.29]   Yes   • Benign essential tremor [G25.0]   Yes       Resolved Hospital Problems   No resolved problems to display.      Brief Hospital Course to date:  Yinka Cummings is a 77 y.o. 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     Assessment/plan     Vasculitic rash,? 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  -Started on steroids 4/1 with significant improvement in rash, however precipitated worsening insomnia and delirium, now monitoring off steroids without evidence of recurrent  "rash     Acute encephalopathy, multifactorial  Sleep deprivation psychosis/hospital associated delirium  -Neurology adjusted medications yesterday, slept well overnight, mentation essentially normalized today     Acute renal dysfunction on CKD 3, improved  Hx RCC s/p nephrectomy  -Per documentation [facility] report baseline CR 1.5-1.7 as recent as 3/20/22  -Nephrology follows  -Renal function relatively stable at current level in the mid two range, possible new baseline     Atrial fibrillation/flutter  -Recently Dabigatran was DC'd due to rash; currently on full dose Lovenox with plan to transition to NOAC after 30-day \"washout\" from discontinuation of primidone (concern for drug-drug interaction)  -Cardiology follows, s/p ECV 4/1/22; cardiology planning short course amiodarone with ultimate discontinuation (personal Hx thyroid abnormalities on the same)  -Continue Lopressor     Chronic pain syndrome  -Recently transitioned to Suboxone [facility] due to concern for contribution to rash; will continue here     DM type 2, A1c 7.9%, with long-term use of insulin  -Continue Levemir, SSI     Hypothyroidism  Hypertension  Hyperlipidemia  Mood disorder  -Continue atorvastatin, duloxetine, Lopressor     Severe tremor  -Primidone recently DC'd for rash  -Prescribed Sinemet recently but has not yet been able to start  -[facility]     Chronic normocytic anemia  -Relatively stable, monitor     Abnormal abdominal CT  -Per documentation questionable Clif versus duodenitis, potential bibasilar consolidation; ID follows, monitoring off antibiotics     VINI  -Documented as noncompliant with CPAP     Obesity, BMI 38.86 kg/M2  -Complicates all aspects of care     Oral candidiasis  --nystatin swish and spit        DVT prophylaxis:  Medical DVT prophylaxis orders are present.         AM-PAC 6 Clicks Score (PT): 9 (04/06/22 1177)     Disposition: Mentation has rapidly improved.  Wife states he is essentially " normal.  Proceed forward with placement      CODE STATUS:       Code Status and Medical Interventions:   Ordered at: 03/25/22 1328     Level Of Support Discussed With:     Patient     Code Status (Patient has no pulse and is not breathing):     CPR (Attempt to Resuscitate)     Medical Interventions (Patient has pulse or is breathing):     Full Support         NAZ Parrish  04/07/22                                  Revision History

## 2022-04-08 NOTE — PROGRESS NOTES
[facility] Medicine Services  PROGRESS NOTE    Patient Name: Yinka Cummings  : 1945  MRN: [id number]    Date of Admission: 3/25/2022  Primary Care Physician: Vivek Mercer,     Subjective   Subjective     CC:  Rash    HPI:  Patient's rash returning on all extremities after recent early discontinuation of steroid, began returning yesterday.  Prednisone was ordered however due to n.p.o. status at the recommendation of SLP he has not received steroids at this time.    This morning there are more than the hospital allotted number of guests in the patient's room-notably patient's daughter is very agitated/frustrated, expressing multiple concerns over her father's condition    The patient himself expresses concern that he is going to die in the hospital and is having some despair    ROS:  Gen- No fevers, chills  CV- No chest pain, palpitations  Resp- No cough, dyspnea  GI- No N/V/D, abd pain    Objective   Objective     Vital Signs:   Temp:  [97 °F (36.1 °C)-98 °F (36.7 °C)] 97 °F (36.1 °C)  Heart Rate:  [67-85] 71  Resp:  [18] 18  BP: (139-159)/(63-77) 148/63  Flow (L/min):  [2] 2     Physical Exam:  Constitutional: Awake, alert, chronically ill-appearing male laying in bed, cooperative interactive  HENT: NCAT, mucous membranes moist, pinpoint pupils  Respiratory: Clear to auscultation bilaterally, respiratory effort normal   Cardiovascular: RRR, no murmurs, rubs, or gallops, palpable radial pulses  Gastrointestinal: Positive bowel sounds, soft, nontender, nondistended  Musculoskeletal: No bilateral ankle edema  Psychiatric: Appropriate affect, cooperative  Neurologic: Speech clear, moving all extremity spontaneously  Dermatologic: Diffuse purpuric rash over all extremities present and extending beyond marker lines on his arms    Results Reviewed:  LAB RESULTS:      Lab 22  1623 22  0540 22  0634 22  2046 22  0740 22  1038   WBC  --  7.55  9.90 9.55 12.96* 14.84*   HEMOGLOBIN  --  7.9* 8.2* 8.3* 8.5* 8.0*   HEMATOCRIT  --  26.5* 26.7* 24.9* 27.4* 24.1*   PLATELETS  --  261 369 314 377 316   NEUTROS ABS  --  4.92  --   --  9.75*  --    IMMATURE GRANS (ABS)  --  0.13*  --   --  0.15*  --    LYMPHS ABS  --  1.63  --   --  1.69  --    MONOS ABS  --  0.73  --   --  1.33*  --    EOS ABS  --  0.11  --   --  0.02  --    MCV  --  98.5* 95.4 90.2 97.9* 88.3   SED RATE 19  --   --   --   --   --    CRP 3.27*  --   --   --   --   --    LACTATE  --   --   --  1.7  --   --    HEPARIN ANTI-XA  --   --   --   --   --  1.17         Lab 04/07/22  0450 04/06/22  0540 04/05/22  1337 04/04/22  0634 04/03/22  2046   SODIUM 147* 154* 148* 138 138   POTASSIUM 3.5 4.4 4.1 4.8 5.2   CHLORIDE 116* 119* 112* 104 102   CO2 22.0 24.0 20.0* 22.0 22.0   ANION GAP 9.0 11.0 16.0* 12.0 14.0   BUN 58* 69* 76* 83* 78*   CREATININE 1.79* 2.24* 2.38* 2.35* 2.77*   EGFR 39.0* 29.8* 27.7* 28.2* 23.1*   GLUCOSE 193* 147* 183* 198* 232*   CALCIUM 7.8* 8.1* 8.3* 8.4* 8.2*   PHOSPHORUS  --  3.5 3.3  --   --          Lab 04/06/22  0540 04/05/22  1337 04/03/22  2046 04/01/22  1227   TOTAL PROTEIN  --   --  5.6* 4.8*   ALBUMIN 2.80* 2.70* 2.70* 2.40*   GLOBULIN  --   --  2.9 2.4   ALT (SGPT)  --   --  24 12   AST (SGOT)  --   --  37 16   BILIRUBIN  --   --  0.4 0.3   ALK PHOS  --   --  76 80                     Lab 04/03/22 2056   PH, ARTERIAL 7.381   PCO2, ARTERIAL 37.4   PO2 ART 70.0*   FIO2 21   HCO3 ART 22.2   BASE EXCESS ART -2.7*   CARBOXYHEMOGLOBIN 1.3     Brief Urine Lab Results  (Last result in the past 365 days)      Color   Clarity   Blood   Leuk Est   Nitrite   Protein   CREAT   Urine HCG        04/04/22 0045 Yellow   Clear   Moderate (2+)   Trace   Negative   Negative                 Microbiology Results Abnormal     Procedure Component Value - Date/Time    Wound Culture - Wound, Leg, Left [[id number]] Collected: 03/25/22 0918    Lab Status: Final result Specimen: Wound from Leg, Left  Updated: 03/27/22 0903     Wound Culture Light growth (2+) Normal Skin Cami     Gram Stain Rare (1+) WBCs seen      Moderate (3+) Gram positive cocci in pairs and clusters    Eosinophil Smear - Urine, Urine, Clean Catch [[id number]]  (Normal) Collected: 03/26/22 1027    Lab Status: Final result Specimen: Urine, Clean Catch Updated: 03/26/22 1113     Eosinophil Smear 0 % EOS/100 Cells     Narrative:      No eosinophil seen    COVID-19 and FLU A/B PCR - Swab, Nasopharynx [[id number]]  (Normal) Collected: 03/25/22 0918    Lab Status: Final result Specimen: Swab from Nasopharynx Updated: 03/25/22 0955     COVID19 Not Detected     Influenza A PCR Not Detected     Influenza B PCR Not Detected    Narrative:      Fact sheet for providers: https://www.fda.gov/media/299683/download    Fact sheet for patients: https://www.fda.gov/media/212557/download    Test performed by PCR.          FL Video Swallow With Speech Single Contrast    Result Date: 4/7/2022  EXAMINATION: FL VIDEO SWALLOW W SPEECH SINGLE-CONTRAST-  INDICATION: dysphagia; N17.9-Acute kidney failure, unspecified; E86.0-Dehydration; L03.119-Cellulitis of unspecified part of limb; I77.6-Arteritis, unspecified; I50.9-Heart failure, unspecified; R79.89-Other specified abnormal findings of blood chemistry; R13.10-Dysphagia, unspecified  TECHNIQUE: 1 minute and 36 seconds of fluoroscopic time was used for this exam. 12 associated fluoroscopic loops were saved. The patient was evaluated in the seated lateral position while taking a variety of consistencies of barium by mouth under the direction of speech pathology.  COMPARISON: NONE  FINDINGS: 1 minute and 36 seconds of fluoroscopy provided for a modified barium swallow. Please see speech therapy report for full details and recommendations.       Impression: Fluoroscopy prior for a modified barium swallow. Please see speech therapy report for full details and recommendations.    This report was finalized on 4/7/2022 9:17 PM  by Dr. Neal Mahoney MD.        Results for orders placed during the hospital encounter of 03/25/22    Adult Transesophageal Echo (LARRY) W/ Cont if Necessary Per Protocol    Interpretation Summary  · Estimated left ventricular EF = 65%  · The cardiac valves are anatomically and functionally normal.  · No evidence of a left atrial appendage thrombus was present.      I have reviewed the medications:  Scheduled Meds:amiodarone, 200 mg, Nasogastric, Q12H   Followed by  [START ON 4/20/2022] amiodarone, 200 mg, Nasogastric, Daily  atorvastatin, 40 mg, Nasogastric, Nightly  buprenorphine-naloxone, 2 tablet, Sublingual, Nightly  DULoxetine, 90 mg, Oral, Daily  enoxaparin, 110 mg, Subcutaneous, Q12H  insulin detemir, 10 Units, Subcutaneous, Nightly  insulin lispro, 0-9 Units, Subcutaneous, TID AC  levothyroxine, 150 mcg, Nasogastric, Daily  melatonin, 5 mg, Nasogastric, Nightly  methohexital, , ,   metoprolol tartrate, 50 mg, Nasogastric, Q8H  midazolam, , ,   mirtazapine, 7.5 mg, Nasogastric, Nightly  nystatin, 5 mL, Oral, 4x Daily  pantoprazole, 40 mg, Oral, QAM  polyethylene glycol, 17 g, Nasogastric, Daily  predniSONE, 20 mg, Nasogastric, Daily With Breakfast  QUEtiapine, 25 mg, Oral, Q12H  senna-docusate sodium, 2 tablet, Nasogastric, BID  sodium chloride, 10 mL, Intravenous, Q12H  temazepam, 15 mg, Nasogastric, Nightly  cyanocobalamin, 50 mcg, Nasogastric, Daily      Continuous Infusions:sodium chloride, 100 mL/hr, Last Rate: 100 mL/hr (04/07/22 1817)      PRN Meds:.•  acetaminophen **OR** acetaminophen **OR** acetaminophen  •  dextrose  •  dextrose  •  glucagon (human recombinant)  •  haloperidol  •  mineral oil-hydrophilic petrolatum  •  [DISCONTINUED] ondansetron **OR** ondansetron  •  palliative care oral rinse  •  Sodium Chloride (PF)  •  sodium chloride    Assessment/Plan   Assessment & Plan     Active Hospital Problems    Diagnosis  POA   • **Rash [R21]  Yes   • Acute renal failure, unspecified acute renal failure  type (HCC) [N17.9]  Yes   • Nausea and vomiting [R11.2]  Unknown   • Paroxysmal A-fib (on eliquis & metoprolol) [I48.0]  Yes   • 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        Brief Hospital Course to date:  Yinka Cummings is a 77 y.o. 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    Assessment/plan    Vasculitic rash,? 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  -Started on steroids 4/1 with rapid improvement in rash, however had encephalopathy and steroids were discontinued early to rule out steroid-induced psychosis, now patient's rash is returning-restart oral prednisone, start low-dose at 20 mg and monitor for clinical response    Oropharyngeal dysphagia  -Called by SLP yesterday, they stated he was not overtly aspirating however he was high  "risk for fatigue and subsequent aspiration; rounding APRN yesterday discussed with patient and family and they are agreeable for Keofeed/NG tube placement and temporary artificial nutrition while the patient regains his strength, SLP will continue to follow  -Keofeed tube placement pending; nutrition to follow for tube feeds    Acute encephalopathy, multifactorial, improved  Sleep deprivation psychosis/hospital associated delirium  -Initially blamed on steroid-induced psychosis however neurology has felt it was more significantly a sleep deprivation psychosis; reattempting steroids    Acute renal dysfunction on CKD 3, improved  Hx RCC s/p nephrectomy  -Per documentation [facility] report baseline CR 1.5-1.7 as recent as 3/20/22  -Nephrology follows  -Renal function stable/improved, near to baseline    Atrial fibrillation/flutter  -Recently Dabigatran was DC'd due to rash; currently on full dose Lovenox with plan to transition to NOAC after 30-day \"washout\" from discontinuation of primidone (concern for drug-drug interaction)  -Cardiology follows, s/p ECV 4/1/22; cardiology planning short course amiodarone with ultimate discontinuation (personal Hx thyroid abnormalities on the same)  -Continue Lopressor    Chronic pain syndrome  -Recently transitioned to Suboxone [facility] due to concern for contribution to rash; will continue here    DM type 2, A1c 7.9%, with long-term use of insulin  -Continue Levemir, SSI    Hypothyroidism  Hypertension  Hyperlipidemia  Mood disorder  -Continue atorvastatin, duloxetine, Lopressor    Severe tremor  -Primidone recently DC'd for rash  -Prescribed Sinemet recently but has not yet been able to start  -[facility] with [facility]    Chronic normocytic anemia  -Relatively stable, monitor    Abnormal abdominal CT  -Per documentation questionable pyelo versus more likely duodenitis, potential bibasilar consolidation; ID follows, monitoring off antibiotics    VINI  -Documented as " noncompliant with CPAP    Obesity, BMI 38.86 kg/M2  -Complicates all aspects of care      DVT prophylaxis:  Medical DVT prophylaxis orders are present.       AM-PAC 6 Clicks Score (PT): 8 (04/07/22 0800)    Disposition: Recurrence of rash after early discontinuation of steroids, now requiring Keofeed for tube feeds; anticipate will remain hospitalized for the weekend however if rash improving in mentation stable he can likely discharge to rehab early next week    CODE STATUS:   Code Status and Medical Interventions:   Ordered at: 03/25/22 1328     Level Of Support Discussed With:    Patient     Code Status (Patient has no pulse and is not breathing):    CPR (Attempt to Resuscitate)     Medical Interventions (Patient has pulse or is breathing):    Full Support       Wili Conner, DO  04/08/22

## 2022-04-08 NOTE — PROGRESS NOTES
INFECTIOUS DISEASE Progress Note    Yinka Cummings  1945  [id number]      Admission Date: 3/25/2022      Requesting Provider: Yinka Desai MD  Evaluating Physician: Brandt Ward MD    Reason for Consultation: vasculitis rash with cellulitis    History of present illness:    3/26/2022: Patient is a 77 y.o. male, ,known to Dr. Wili Rees, with h/o CKD, T2DM, afib/flutter, chronic diastolic heart failure, VINI, chronic pain, gastroparesis, Gilbert's disease,  hypothyroidism, essential tremor, HTN, Obesity, Prostate cancer/radical prostatectomy, renal cell carcinoma/right nephrectomy, multiple skin cancer excisions, and cutaneous vasculitis who we were asked to see for cellulitis.  The patient presented to [facility] ED on 3/25/22 with nausea, vomiting, and LE rash/redness.  [facility] and Afib/RVR.  Dermatology did a punch biopsy with path showing perivascular lymphocytic infiltrate with no evidence of vasculitis at the time.  He was though to have rash from Pradaxa and was changed to Lovenox.  The rash worsened and eventually Lyrica, Primidone, and Norco were stopped one by one.  He was started on Suboxone on 3/16 and his rash began to improve.  Primidone had been a new drug started just prior the start of the rash.  [facility] and converted to NSR.  He was discharge home on Lovenox.  He followed up with his PCP on 3/21 and was placed on Keflex for possible cutaneous infections at the rash site and Lasix for BLE edema.    His rash has worsened again on feet, legs, abdomen, and arms prompting him to come to [facility] ED on 3/25.  He developed nausea and vomiting prior to his presentation.  He denies fever, chills, abdominal pain, diarrhea, or dysuria.  On arrival, his Tmax is 99.4.  Initial labs were CRP 12.02-->12.5, INR 1.2, ESR 44-->62, PCT 0.78-->0.94, lactic acid 2.0, lipase 8, proBNP 5100, D-dimer 4.44, RF 16.4, C3 144, C4 29, WBC 10,600  with 83% neutrophils, and creatinine 2.35-->2.75 (baseline 1.6 on 5/20/21).  A leg wound culture showed normal skin ananth with GS showing GPC in pairs/clusters.  A second wound culture is pending with growth present.   A COVID-19/Flu PCR is negative.  A Hep panel was negative.  A UA WBC is 13-20 with TNTC RBCs, trace LE, negative nitrite.  KENZIE, ANCA panel, cryogobulin, and complement are pending.  A CXR showed no acute findings.  A DVT work up of BLE was negative although peroneal veins were not well visualized bilaterally. A CT scan of a/p with contrast has been ordered.  He is currently on low dose Rocephin.  ID was asked to evaluate and manage his antibiotic therapy.    He continues to have nausea and vomiting with cold sweat.  He denies any diarrhea.     3/27/2022: He complains of lower extremity pain which is most prominent in his feet.  He has remained afebrile.  His creatinine today is 2.82 and his C-reactive protein is 11.6.  His white blood cell count is 17.2.  He has anorexia but denies nausea and vomiting.    3/28/22: He has decreased Bilateral foot pain.  He has remained afebrile.  His creatinine is down to 2.5. His 24 hour protein is 360. He denies nausea, vomiting, and diarrhea    3/29/22:  Maximum temperature over the last 24 hours is 99.2. Creatinine yesterday was 2.58. CH 50 was greater than 60, ANCA was negative and KENZIE was negative.  He has decreased foot pain.  He complains of malaise but denies nausea and vomiting.    3/30/22: He has remained afebrile.  He denies nausea and vomiting.  He has decreased lower extremity pain.  He denies dyspnea.    3/31/22: He remains afebrile. His creatinine today is 2.6.  His C-reactive protein is 6.8.  His white blood cell count is 15.4.  His echocardiogram revealed no valvular vegetations. He and his family indicate improvement in his lower extremity rash but he has more extensive lesions over his palms.     4/1/2022: He underwent cardioversion today.  He is  now in sinus rhythm.  He has remained afebrile.  He is currently drowsy from his sedation for cardioversion.  His family thinks that his rash is no worse today.  His creatinine today is 2.33.      4/4/2022: He has been confused over the weekend.  This worsened after he was started on prednisone.  He has been afebrile.  He notes a significant improvement in his rash over the weekend.  He denies nausea, vomiting, and diarrhea.  He has remained afebrile.  His creatinine today is 2.35.    4/5/22: He has been severely confused overnight with agitation. He had a fever to 100.6° earlier today but is now afebrile. Laboratory studies today reveal a creatinine of 2.38.  A urinalysis yesterday revealed 6-12 white blood cells. He is unable to provide a review of systems.    4/6/22: He had a low-grade fever to 100.6 at around noon yesterday but has been afebrile since. His creatinine today is 2.24 and his white blood cell count is 7.6. Sodium is elevated at 154. He feels much better.  He he is much less agitated and has appropriate conversation today.    4/7/22: He has remained afebrile over the last 24 hours. His creatinine today is down to 1.79. He feels much better.  He denies increased foot pain. Agitation and confusion have dramatically improved.    4/8/22: He has remained afebrile.  Yesterday his ESR was down to 19 and his C-reactive protein was 3.3. His rash over his upper extremities dramatically worsened overnight although his rash on his lower extremities is unchanged.  He has now being started back on prednisone at 20 mg per day.    Past Medical History:   Diagnosis Date   • Anxiety    • Arthritis    • Chronic kidney disease    • Diabetes mellitus (HCC)    • Disease of thyroid gland    • Diverticulosis    • Essential tremor    • HL (hearing loss)    • Hypertension    • Obesity    • Prostate cancer (HCC)    • Renal cell cancer (HCC)    • Skin cancer    • Vasculitis of skin        Past Surgical History:   Procedure  Laterality Date   • CHOLECYSTECTOMY     • COLONOSCOPY      Polyps in the past but not on the most recent scope   • NEPHRECTOMY Right    • PROSTATE SURGERY      Radical prostatectomy   • SKIN CANCER EXCISION      Large incision left neck, multiple other cancers removed       Family History   Problem Relation Age of Onset   • Cancer Mother    • Heart attack Father    • Kidney failure Sister    • Coronary artery disease Sister        Social History     Socioeconomic History   • Marital status:    • Number of children: 4   Tobacco Use   • Smoking status: Never Smoker   • Smokeless tobacco: Never Used   Vaping Use   • Vaping Use: Never used   Substance and Sexual Activity   • Alcohol use: Not Currently   • Drug use: Not Currently   • Sexual activity: Defer       Allergies   Allergen Reactions   • Contrast Dye Rash     [facility]   • Doxycycline Rash     Urticaria [facility]   • Chlorthalidone Other (See Comments)     Hyponatremia [facility]   • Morphine Unknown - Low Severity     Headache [facility]   • Neurontin [Gabapentin] Mental Status Change     Drowsy [facility]   • Phenergan [Promethazine] Unknown - Low Severity     Confusion [facility]         Medication:    Current Facility-Administered Medications:   •  acetaminophen (TYLENOL) tablet 650 mg, 650 mg, Oral, Q4H PRN **OR** acetaminophen (TYLENOL) 160 MG/5ML solution 650 mg, 650 mg, Nasogastric, Q4H PRN **OR** acetaminophen (TYLENOL) suppository 650 mg, 650 mg, Rectal, Q4H PRN, Jemma Arriaga APRN  •  [COMPLETED] amiodarone in dextrose 5% (NEXTERONE) loading dose 150mg/100mL, 150 mg, Intravenous, Once, 150 mg at 2216 **FOLLOWED BY** [] amiodarone 360 mg in 200 mL D5W infusion, 1 mg/min, Intravenous, Continuous, Last Rate: 33.3 mL/hr at 22, 1 mg/min at 22 **FOLLOWED BY** [] amiodarone 360 mg in 200 mL D5W infusion, 0.5 mg/min, Intravenous, Continuous, Last Rate: 16.67 mL/hr  at 228, 0.5 mg/min at 228 **FOLLOWED BY** [COMPLETED] amiodarone (PACERONE) tablet 200 mg, 200 mg, Oral, Once, 200 mg at 22 0841 **FOLLOWED BY** [] amiodarone (PACERONE) tablet 200 mg, 200 mg, Oral, Q8H, 200 mg at 22 **FOLLOWED BY** amiodarone (PACERONE) tablet 200 mg, 200 mg, Nasogastric, Q12H, 200 mg at 22 **FOLLOWED BY** [START ON 2022] amiodarone (PACERONE) tablet 200 mg, 200 mg, Nasogastric, Daily, Jemma Arriaga APRN  •  atorvastatin (LIPITOR) tablet 40 mg, 40 mg, Nasogastric, Nightly, Jemma Arriaga APRN, 40 mg at 22 225  •  buprenorphine-naloxone (SUBOXONE) 8-2 MG per SL tablet 2 tablet, 2 tablet, Sublingual, Nightly, Raymond Herrera MD, 2 tablet at 22 2249  •  dextrose (D50W) (25 g/50 mL) IV injection 25 g, 25 g, Intravenous, Q15 Min PRN, Raymond Herrera MD  •  dextrose (GLUTOSE) oral gel 15 g, 15 g, Nasogastric, Q15 Min PRN, Jemma Arriaga APRN  •  DULoxetine (CYMBALTA) DR capsule 90 mg, 90 mg, Oral, Daily, Jemma Arriaga APRN, 90 mg at 22 0820  •  enoxaparin (LOVENOX) syringe 110 mg, 110 mg, Subcutaneous, Q12H, Yinka Brush IV, PharmD, 110 mg at 22 1743  •  glucagon (human recombinant) (GLUCAGEN DIAGNOSTIC) injection 1 mg, 1 mg, Intramuscular, Q15 Min PRN, Raymond Herrera MD  •  haloperidol (HALDOL) 2 MG/ML solution 2 mg, 2 mg, Oral, Q6H PRN, Peña Weaver MD  •  insulin detemir (LEVEMIR) injection 10 Units, 10 Units, Subcutaneous, Nightly, Yinka Desai MD, 10 Units at 22 2248  •  insulin lispro (humaLOG) injection 0-9 Units, 0-9 Units, Subcutaneous, TID AC, Raymond Herrera MD, 2 Units at 22 1744  •  levothyroxine (SYNTHROID, LEVOTHROID) tablet 150 mcg, 150 mcg, Nasogastric, Daily, Jemma Arriaga APRN  •  melatonin tablet 5 mg, 5 mg, Nasogastric, Nightly, Jemma Arriaga APRN  •  methohexital (BREVITAL) injection  - ADS Override Pull, , , ,   •  metoprolol tartrate  (LOPRESSOR) tablet 50 mg, 50 mg, Nasogastric, Q8H, Wili Conner DO, 50 mg at 04/07/22 1818  •  midazolam (VERSED) 2 MG/2ML injection  - ADS Override Pull, , , ,   •  mineral oil-hydrophilic petrolatum (AQUAPHOR) ointment 1 application, 1 application, Topical, BID PRN, Peña Weaver MD, 1 application at 04/06/22 2245  •  mirtazapine (REMERON) tablet 7.5 mg, 7.5 mg, Nasogastric, Nightly, Jemma Arriaga APRN  •  nystatin (MYCOSTATIN) 100,000 unit/mL suspension 500,000 Units, 5 mL, Oral, 4x Daily, Jemma Arriaga APRN, 500,000 Units at 04/07/22 2251  •  [DISCONTINUED] ondansetron (ZOFRAN) tablet 4 mg, 4 mg, Oral, Q6H PRN **OR** ondansetron (ZOFRAN) injection 4 mg, 4 mg, Intravenous, Q6H PRN, Raymond Herrera MD, 4 mg at 04/06/22 2324  •  palliative care oral rinse, , Mouth/Throat, PRN, Yinka Desai MD, Given at 04/06/22 1232  •  pantoprazole (PROTONIX) EC tablet 40 mg, 40 mg, Oral, QAM, Jemma Arriaga APRN, 40 mg at 04/07/22 0558  •  polyethylene glycol (MIRALAX) packet 17 g, 17 g, Nasogastric, Daily, Jemma Arriaga APRN  •  predniSONE (DELTASONE) tablet 20 mg, 20 mg, Nasogastric, Daily With Breakfast, Jemma Arriaga APRN  •  QUEtiapine (SEROquel) tablet 25 mg, 25 mg, Oral, Q12H, Wili Conner DO, 25 mg at 04/07/22 2251  •  sennosides-docusate (PERICOLACE) 8.6-50 MG per tablet 2 tablet, 2 tablet, Nasogastric, BID, Jemma Arriaga APRN  •  Sodium Chloride (PF) 0.9 % 10 mL, 10 mL, Intravenous, PRN, Raymond Herrera MD  •  sodium chloride 0.45 % infusion, 100 mL/hr, Intravenous, Continuous, Francisco Atkinson MD, Last Rate: 100 mL/hr at 04/07/22 1817, 100 mL/hr at 04/07/22 1817  •  sodium chloride 0.9 % flush 10 mL, 10 mL, Intravenous, Q12H, Raymond Herrear MD, 10 mL at 04/07/22 2250  •  sodium chloride 0.9 % flush 10 mL, 10 mL, Intravenous, PRN, Raymond Herrera MD, 10 mL at 04/02/22 0830  •  temazepam (RESTORIL) capsule 15 mg, 15 mg, Nasogastric, Nightly, Jemma Arriaga, NAZ  •   vitamin B-12 (CYANOCOBALAMIN) tablet 50 mcg, 50 mcg, Nasogastric, Daily, Jemma Arriaga APRN    Antibiotics:  Anti-Infectives (From admission, onward)    Ordered     Dose/Rate Route Frequency Start Stop    22 1059  cefTRIAXone (ROCEPHIN) 1 g/100 mL 0.9% NS (MBP)        Ordering Provider: Chace Chauhan MD    1 g  over 30 Minutes Intravenous Once 22 1101 22 1244            Review of Systems:  See HPI    Physical Exam:   Vital Signs  Temp (24hrs), Av.6 °F (36.4 °C), Min:97 °F (36.1 °C), Max:98 °F (36.7 °C)    Temp  Min: 97 °F (36.1 °C)  Max: 98 °F (36.7 °C)  BP  Min: 139/64  Max: 159/72  Pulse  Min: 67  Max: 85  Resp  Min: 18  Max: 20  SpO2  Min: 96 %  Max: 99 %    GENERAL: Debilitated appearing. Alert and responsive.  He answers questions appropriately.  HEENT: Normocephalic, atraumatic.  PERRL. EOMI. No conjunctival injection. No icterus. Oropharynx clear without evidence of thrush or exudate.  NECK: Supple   HEART: RRR; No murmur  LUNGS: Clear to auscultation bilaterally without wheezing, rales, rhonchi. Normal respiratory effort. Nonlabored.  ABDOMEN: Soft, lower abdominal tenderness, nondistended.  No rebound or guarding.   Skin: He has diffuse hemorrhagic ulcerative rash is dramatically improved on his lower extremities and feet.  His rash worsened overnight on his upper extremities.  MSK:  FROM, no joint effusions  NEURO: Alert and responsive.  He answers questions appropriately and moves all of his extremities  Laboratory Data    Results from last 7 days   Lab Units 22  0540 22  0634 22  2046   WBC 10*3/mm3 7.55 9.90 9.55   HEMOGLOBIN g/dL 7.9* 8.2* 8.3*   HEMATOCRIT % 26.5* 26.7* 24.9*   PLATELETS 10*3/mm3 261 369 314     Results from last 7 days   Lab Units 22  0450   SODIUM mmol/L 147*   POTASSIUM mmol/L 3.5   CHLORIDE mmol/L 116*   CO2 mmol/L 22.0   BUN mg/dL 58*   CREATININE mg/dL 1.79*   GLUCOSE mg/dL 193*   CALCIUM mg/dL 7.8*     Results from last 7  days   Lab Units 04/03/22  2046   ALK PHOS U/L 76   BILIRUBIN mg/dL 0.4   ALT (SGPT) U/L 24   AST (SGOT) U/L 37     Results from last 7 days   Lab Units 04/07/22  1623   SED RATE mm/hr 19     Results from last 7 days   Lab Units 04/07/22  1623   CRP mg/dL 3.27*     Results from last 7 days   Lab Units 04/03/22  2046   LACTATE mmol/L 1.7             Estimated Creatinine Clearance: 45.3 mL/min (A) (by C-G formula based on SCr of 1.79 mg/dL (H)).      Microbiology:  Wound cx 3/25: NL skin ananth SF  COVID-19/Flu PCR negative.  2nd wound cx 3/25: growth present but TYTE  Urine Eos Zero        Radiology:  Imaging Results (Last 72 Hours)     Procedure Component Value Units Date/Time    FL Video Swallow With Speech Single Contrast [[id number]] Collected: 04/07/22 1529     Updated: 04/07/22 2120    Narrative:      EXAMINATION: FL VIDEO SWALLOW W SPEECH SINGLE-CONTRAST-     INDICATION: dysphagia; N17.9-Acute kidney failure, unspecified;  E86.0-Dehydration; L03.119-Cellulitis of unspecified part of limb;  I77.6-Arteritis, unspecified; I50.9-Heart failure, unspecified;  R79.89-Other specified abnormal findings of blood chemistry;  R13.10-Dysphagia, unspecified     TECHNIQUE: 1 minute and 36 seconds of fluoroscopic time was used for  this exam. 12 associated fluoroscopic loops were saved. The patient was  evaluated in the seated lateral position while taking a variety of  consistencies of barium by mouth under the direction of speech  pathology.     COMPARISON: NONE     FINDINGS: 1 minute and 36 seconds of fluoroscopy provided for a modified  barium swallow. Please see speech therapy report for full details and  recommendations.          Impression:      Fluoroscopy prior for a modified barium swallow. Please see  speech therapy report for full details and recommendations.         This report was finalized on 4/7/2022 9:17 PM by Dr. Neal Mahoney MD.               Impression:   1. Vasculitic rash-with palpable purpura.  This rash  clinically looks like leukocytoclastic vasculitis.  I suspect that this is secondary to primidone given that it began shortly after starting on primidone. This dramatically improved with prednisone therapy but his prednisone therapy was aborted early due to delirium.  His rash worsened overnight.  I still think this is likely secondary to primidone-primidone/primidone metabolites have a very long half-life.  We will start him back on a lower dose of prednisone at 20 mg per day.  If he fails to improve, he may need a repeat skin biopsy and/or kidney biopsy.  Kidney biopsy would be risky due to the fact that he has a solitary kidney.  2. Duodenitis- per CT scan.  He is on PPI therapy.  3. Elevated procalcitonin,  4. Acute kidney injury--This is significantly improved.  5. Elevated CRP  6. Chronic diastolic heart failure  7. Afib/on Lovenox, recent cardioversion to NSR  8. Type 2 diabetes mellitus/gastroparesis  9. Hypothyroidism  10. Essential hypertension  11. Morbid obesity  12. Prostate cancer/radical prostatectomy  13. Renal cell carcinoma/radical prostatectomy  14. H/o multiple skin cancer excisions  15. Doxycycline (urticaria) allergy  16. Hematuria  17. Left heel decubitus lesion-this is significantly better.  18. Toxic/metabolic encephalopathy-some of this may be secondary to his prednisone.  He is now off of steroid therapy  19. Hypernatremia    PLAN/RECOMMENDATIONS:  1.  Continue wound care  2.  Continue off of primidone and as many other medications as possible  3.  Offload both heels  4.  Resume prednisone at 20 mg per day  5.  [facility] with IgA stains    I discussed his extremely complex situation in detail again with his wife and then later with his daughter.  I discussed his situation in detail with Dr. Conner today.  Dr. Yves Ward will see him on Sunday.          Brandt Ward MD  4/8/2022  06:22 EDT

## 2022-04-08 NOTE — CONSULTS
Clinical Nutrition   Reason For Visit: Follow-up protocol, Physician consult, EN    Patient Name: Yinka Cummings  YOB: 1945  MRN: [id number]  Date of Encounter: 04/08/22 07:58 EDT  Admission date: 3/25/2022    Pt failed MBS yesterday. Nutrition consult received for tube feeding assessment. RD has been following pt, per family he has had minimal intakes for at least 1 month r/t AMS.    At risk refeeding, monitor and replace electrolytes as needed, ordered for am.     Upon confirmation NG placement with KUB, recommend:  Initiate continuous EN regimen of Replete with Fiber @ 25 ml/hr and advance by 15 ml/hr Q 6 hr to goal rate of 85 . ProStat QD. No FW, flush with 30-60 ml Q 4 hr for tube irrigation.    Formula provides adequate fluid, recommend d/c or decreasing IVF as EN advances to goal. Na noted, will add FW as needed.    At goal this regimen provides:  1870 ml / 1870 kcal (101% est. needs)  119 g protein (103% est. 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   PSxH: He  has a past surgical history that includes Prostate surgery; Nephrectomy (Right); Skin cancer excision; Cholecystectomy; and Colonoscopy.        Reported/Observed/Food/Nutrition  Related History     4/8) Pt failed MBS yesterday. Nutrition consult received for tube feeding assessment. Pt admit with N/V, but this has resolved, no emesis documented since 3/27. Pt has received EN twice within the past year at hospitalizations at  and tolerated Replete with fiber well. RD spoke with pt and family at bedside. Pt's mentation significantly improved from last visit. They are on board with plan, had questions about EN.    4/4) Pt has been struggling with nutrition t/o admission. He remains extremely confused, asks RD where he is and where my car is. Actually asks about food and if he can get some juice, seems to think he is still NPO despite being on diet for 4 days. He asks how long his family can stay today, clearly very anxious about them leaving during non vistor hours. RD efforts to encourage/educate on importance nutrition futile. He has tried breeze and boost and does not like either. Will trial magic cups and ensure clear. Per family pt has never been a big eater, eats 1 meal/day and snacks t/o the day. He did have N/V at beginning of admission but this has resolved. His tremor seems to have worsened at this RD visit. Dr. Weaver consulted RD to give diet education on discontinuing caffeine in diet. Education provided to family at bedside, will f/u with pt upon improvement mentation. Of concern pt with several wounds.     3/30) Pt now 5 days minimal nutrition on clear liquid diet. RD discussed with family at bedside who are frustrated stating he is not going to eat anything clear liquid. They request that he is advanced to full liquids so that he can have ensure and other options. He is not taking in broth which is the only source of protein on clear liquid diet. He is open to trying Breeze, however will need to monitor BG closely as already high.    Anthropometrics   Height: 68.9 in  Weight: 119 kg / 263 lb per zeroed bed scale 3/25/22  *RD asked nsg to document current wt  BMI: 38.86  BMI  classification: Obese Class II: 35-39.9kg/m2   IBW: 155 lb    UBW: large variation in bed weights over past year from 260-397 lb  RD suspects ~260 is most accurate dry/UBW  Weight change:   Last 15 Recorded Weights  View Complete Flowsheet  Weight Weight (kg) Weight (lbs) Weight Method   4/1/2022 119 kg 262 lb 5.6 oz -   4/1/2022 119 kg 262 lb 5.6 oz -   3/25/2022 119.296 kg 263 lb Bed scale    3/25/2022 113 kg 249 lb 1.9 oz -   3/25/2022 113.399 kg 250 lb Stated   5/18/2021 180.1 kg 397 lb 0.8 oz Bed scale   5/17/2021 180.078 kg 397 lb -   5/17/2021 180.3 kg 397 lb 7.8 oz Bed scale   5/16/2021 177.5 kg 391 lb 5.1 oz Bed scale   5/12/2021 144 kg 317 lb 7.4 oz Bed scale   5/10/2021 143.881 kg 317 lb 3.2 oz Bed scale   5/9/2021 143.881 kg 317 lb 3.2 oz Bed scale   5/8/2021 138.166 kg 304 lb 9.6 oz Bed scale   5/7/2021 136.532 kg 301 lb Bed scale   5/6/2021 136.533 kg 301 lb -     Nutrition Focused Physical Exam     Unable to perform exam due to: Patient agitation     Labs reviewed   Labs reviewed: Yes    Results from last 7 days   Lab Units 04/07/22  0450 04/06/22  0540 04/05/22  1337   SODIUM mmol/L 147* 154* 148*   POTASSIUM mmol/L 3.5 4.4 4.1   CHLORIDE mmol/L 116* 119* 112*   CO2 mmol/L 22.0 24.0 20.0*   BUN mg/dL 58* 69* 76*   CREATININE mg/dL 1.79* 2.24* 2.38*   GLUCOSE mg/dL 193* 147* 183*   CALCIUM mg/dL 7.8* 8.1* 8.3*   PHOSPHORUS mg/dL  --  3.5 3.3     Results from last 7 days   Lab Units 04/07/22  1623 04/06/22  0540 04/05/22  1337 04/04/22  0634 04/03/22 2046   WBC 10*3/mm3  --  7.55  --  9.90 9.55   ALBUMIN g/dL  --  2.80* 2.70*  --  2.70*   CRP mg/dL 3.27*  --   --   --   --      Results from last 7 days   Lab Units 04/08/22  0720 04/07/22 2055 04/07/22  1645 04/07/22  1141 04/07/22  0800 04/06/22 2025   GLUCOSE mg/dL 193* 156* 153* 202* 215* 248*     Lab Results   Lab Value Date/Time    HGBA1C 7.90 (H) 03/26/2022 0708    HGBA1C 8.10 (H) 04/24/2021 0708     Medications reviewed   Medications reviewed:  Yes  Scheduled: insulin, remeron, protonix, steroids, bowel regimen, b12  GTT: NS @ 100 ml/hr  PRN: [email or web address]    Needs Assessment   Height used: 175 cm  Weight used: 119 kg  IBW: 70.5 kg    Estimated Calories needs: ~1900 kcal / day  25 kcal/kg IBW = 1815  14-20 kcal/kg ABW = 2193-8166    Estimated Protein needs: ~130 g / day  1-1.2 g/kg = 119-143 g  *CKD III and solitary kidney    Estimated Fluid needs:   Per clinical status    Current Nutrition Prescription   PO: NPO Diet NPO Except: Ice Chips, Sips With Meds  Oral Nutrition Supplement: No active supplement orders       EN: Replete with Fiber   Patient isn't on Tube Feeding  Route: NG  Verified at bedside: N/A      Average PO intake: none documented since 3/27- 18% x 4 meals documented at that time. Minimal intake per family     EN delivery:  n/a     Nutrition Diagnosis     Problem Inadequate oral intake   Etiology Clinical status/dysphagia   Signs/Symptoms NPO with need alternative method nutrition per SLP     Goal:   General: Nutrition support treatment, Nutrition to support treatment  PO: Advace diet as medically appropriate   EN/PN: Initiate EN, Tolerate EN at goal    Intervention   Intervention: Follow treatment progress, Care plan reviewed, Nutrition support order placed, Education provided on EN and dysphagia to pt/family     Upon confirmation NG placement with KUB, recommend:  Initiate continuous EN regimen of Replete with Fiber @ 25 ml/hr and advance by 15 ml/hr Q 6 hr to goal rate of 85 ml/hr. ProStat QD. No FW, flush with 30-60 ml Q 4 hr for tube irrigation.    Formula provides adequate fluid, recommend d/c or decreasing IVF as EN advances to goal. Na noted, will add FW as needed.    At goal this regimen provides:  1870 ml / 1870 kcal (101% est. needs)  119 g protein (103% est. needs)  1552 ml FW in formula    RD asked nsg to obtain sling wt as able    Monitoring/Evaluation:   Monitoring/Evaluation: Per protocol, I&O, Pertinent labs, EN delivery/tolerance,  Weight, Skin status, GI status, Symptoms, POC/GOC, Swallow function, Hemodynamic stability    Rhea Cobb RD  Time Spent: 50

## 2022-04-08 NOTE — PROGRESS NOTES
"   LOS: 14 days    Patient Care Team:  Vivek Mercer DO as PCP - General (Family Medicine)    Chief Complaint: SALEEM    Subjective     Interval History:   Rash returned   No new events   no other distress  Review of Systems:   Patient denies shortness of breath, chest pain, dysuria, hematuria, nausea, vomiting.      Objective     Vital Sign Min/Max for last 24 hours  Temp  Min: 97 °F (36.1 °C)  Max: 98 °F (36.7 °C)   BP  Min: 139/64  Max: 179/69   Pulse  Min: 71  Max: 85   Resp  Min: 18  Max: 18   SpO2  Min: 96 %  Max: 99 %   Flow (L/min)  Min: 2  Max: 2   No data recorded     Flowsheet Rows    Flowsheet Row First Filed Value   Admission Height 175.3 cm (69\") Documented at 03/25/2022 0849   Admission Weight 113 kg (250 lb) Documented at 03/25/2022 0849          No intake/output data recorded.  I/O last 3 completed shifts:  In: 4220 [P.O.:200; I.V.:4020]  Out: 200 [Urine:200]    Physical Exam:  General Appearance: Alert, oriented, no obvious distress.Morbid obesity  Eyes: PER, EOMI.  Neck: Supple no JVD.  Lungs: Clear auscultation, no rales rhonchi's, equal chest movement, nonlabored.  Heart: No gallop, murmur, rub, RRR.  Abdomen: Soft, nontender, positive bowel sounds, no organomegaly.  Extremities: No edema, no cyanosis.  Neuro: No focal deficit, moving all extremities, alert oriented X 3  Skin: + Rash noted skin is warm and dry.    WBC WBC   Date Value Ref Range Status   04/08/2022 9.64 3.40 - 10.80 10*3/mm3 Final   04/06/2022 7.55 3.40 - 10.80 10*3/mm3 Final      HGB Hemoglobin   Date Value Ref Range Status   04/08/2022 8.0 (L) 13.0 - 17.7 g/dL Final   04/06/2022 7.9 (L) 13.0 - 17.7 g/dL Final      HCT Hematocrit   Date Value Ref Range Status   04/08/2022 26.2 (L) 37.5 - 51.0 % Final   04/06/2022 26.5 (L) 37.5 - 51.0 % Final      Platlets No results found for: LABPLAT   MCV MCV   Date Value Ref Range Status   04/08/2022 97.4 (H) 79.0 - 97.0 fL Final   04/06/2022 98.5 (H) 79.0 - 97.0 fL Final          Sodium " Sodium   Date Value Ref Range Status   04/08/2022 143 136 - 145 mmol/L Final   04/07/2022 147 (H) 136 - 145 mmol/L Final   04/06/2022 154 (H) 136 - 145 mmol/L Final      Potassium Potassium   Date Value Ref Range Status   04/08/2022 4.1 3.5 - 5.2 mmol/L Final   04/07/2022 3.5 3.5 - 5.2 mmol/L Final   04/06/2022 4.4 3.5 - 5.2 mmol/L Final      Chloride Chloride   Date Value Ref Range Status   04/08/2022 112 (H) 98 - 107 mmol/L Final   04/07/2022 116 (H) 98 - 107 mmol/L Final   04/06/2022 119 (H) 98 - 107 mmol/L Final      CO2 CO2   Date Value Ref Range Status   04/08/2022 21.0 (L) 22.0 - 29.0 mmol/L Final   04/07/2022 22.0 22.0 - 29.0 mmol/L Final   04/06/2022 24.0 22.0 - 29.0 mmol/L Final      BUN BUN   Date Value Ref Range Status   04/08/2022 48 (H) 8 - 23 mg/dL Final   04/07/2022 58 (H) 8 - 23 mg/dL Final   04/06/2022 69 (H) 8 - 23 mg/dL Final      Creatinine Creatinine   Date Value Ref Range Status   04/08/2022 1.80 (H) 0.76 - 1.27 mg/dL Final   04/07/2022 1.79 (H) 0.76 - 1.27 mg/dL Final   04/06/2022 2.24 (H) 0.76 - 1.27 mg/dL Final      Calcium Calcium   Date Value Ref Range Status   04/08/2022 7.8 (L) 8.6 - 10.5 mg/dL Final   04/07/2022 7.8 (L) 8.6 - 10.5 mg/dL Final   04/06/2022 8.1 (L) 8.6 - 10.5 mg/dL Final      PO4 No results found for: CAPO4   Albumin Albumin   Date Value Ref Range Status   04/08/2022 2.40 (L) 3.50 - 5.20 g/dL Final   04/06/2022 2.80 (L) 3.50 - 5.20 g/dL Final      Magnesium No results found for: MG   Uric Acid No results found for: URICACID        Results Review:     I reviewed the patient's new clinical results.    amiodarone, 200 mg, Nasogastric, Q12H   Followed by  [START ON 4/20/2022] amiodarone, 200 mg, Nasogastric, Daily  atorvastatin, 40 mg, Nasogastric, Nightly  buprenorphine-naloxone, 2 tablet, Sublingual, Nightly  DULoxetine, 90 mg, Oral, Daily  enoxaparin, 110 mg, Subcutaneous, Q12H  insulin detemir, 10 Units, Subcutaneous, Nightly  insulin lispro, 0-9 Units, Subcutaneous, TID  AC  levothyroxine, 150 mcg, Nasogastric, Daily  melatonin, 5 mg, Nasogastric, Nightly  methohexital, , ,   metoprolol tartrate, 50 mg, Nasogastric, Q8H  midazolam, , ,   mirtazapine, 7.5 mg, Nasogastric, Nightly  nystatin, 5 mL, Oral, 4x Daily  pantoprazole, 40 mg, Oral, QAM  polyethylene glycol, 17 g, Nasogastric, Daily  predniSONE, 20 mg, Nasogastric, Daily With Breakfast  PRO-STAT, 30 mL, Nasogastric, Daily  QUEtiapine, 25 mg, Oral, Q12H  senna-docusate sodium, 2 tablet, Nasogastric, BID  sodium chloride, 10 mL, Intravenous, Q12H  temazepam, 15 mg, Nasogastric, Nightly  cyanocobalamin, 50 mcg, Nasogastric, Daily      sodium chloride, 100 mL/hr, Last Rate: 100 mL/hr (04/07/22 1817)        Medication Review: Reviewed    Assessment/Plan       Rash    Benign essential tremor    Hypothyroidism (acquired)    Paroxysmal A-fib (on eliquis & metoprolol)    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  SALEEM on CKD stage III baseline creatinine 1.5.  Cr 1.8, Renal function is improving admission creatinine 2.3-2.7.  UA had large blood urine protein was 380 mg etiology of the SALEEM unknown possible drug reaction causing AIN or systemic IgE a vasculitis serology has been negative.  Skin rash has been improving. C3 144, C3 29, cryo neg,   2.  Skin rash palpable purpura suspect of drug reaction versus skin vasculitis.  Patient is  off prednisone, rash worst.  3.  Hyperkalemia resolved  4.  Hypernatremia improved with increased fluid intake.  5.  Volume status stable.  6.  Atrial fibrillation: S/p LARRY cardioversion.  Recommendations:  Given patient has solitary kidney renal function improving biopsy is not indicated at this time.  We suspect AIN in the setting of drug reaction.  No significant proteinuria patient is off the steroids for 6 days due to worsening mental status.  Marked  improvement in renal function with fluids noted at this time.  I will order KASHMIR Ríos MD  04/08/22  14:36 EDT

## 2022-04-08 NOTE — PLAN OF CARE
Goal Outcome Evaluation:  Plan of Care Reviewed With: patient, family        Progress: improving   SLP treatment completed. Will continue to address for dysphagia. Please see note for further details and recommendations.

## 2022-04-08 NOTE — THERAPY TREATMENT NOTE
"Acute Care - Speech Language Pathology   Swallow Treatment Note [facility]     Patient Name: Yinka Cummings  : 1945  MRN: [id number]  Today's Date: 2022               Admit Date: 3/25/2022    Visit Dx:     ICD-10-CM ICD-9-CM   1. Oropharyngeal dysphagia  R13.12 787.22   2. Acute renal failure, unspecified acute renal failure type (HCC)  N17.9 584.9   3. Dehydration  E86.0 276.51   4. Cellulitis of lower extremity, unspecified laterality  L03.119 682.6   5. Vasculitis (HCC)  I77.6 447.6   6. Congestive heart failure, unspecified HF chronicity, unspecified heart failure type (HCC)  I50.9 428.0   7. 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)   • Chronic back pain (on chronic prescription lortab)   • HTN (hypertension)   • Solitary kidney (s/p nephrectomy for \"mass\")   • History of prostate cancer   • Closed fracture of phalanx of right hand   • Dental caries   • Acute renal failure, unspecified acute renal failure type (HCC)   • Rash   • Nausea and vomiting     Past Medical History:   Diagnosis Date   • Anxiety    • Arthritis    • Chronic kidney disease    • Diabetes mellitus (HCC)    • Disease of thyroid gland    • Diverticulosis    • Essential tremor    • HL (hearing loss)    • Hypertension    • Obesity    • Prostate cancer (HCC)    • Renal cell cancer (HCC)    • Skin cancer    • Vasculitis of skin      Past Surgical History:   Procedure Laterality Date   • CHOLECYSTECTOMY     • COLONOSCOPY      Polyps in the past but not on the most recent scope   • NEPHRECTOMY Right    • PROSTATE SURGERY      Radical prostatectomy   • SKIN CANCER EXCISION      Large incision left neck, multiple other cancers removed       SLP Recommendation and " Plan     SLP Diet Recommendation: NPO, temporary alternate methods of nutrition/hydration, other (see comments) (Ok to allow 2-3 ice chips per hour after oral care.) (04/08/22 1515)                          Therapy Frequency (Swallow): 5 days per week (04/08/22 1515)  Predicted Duration Therapy Intervention (Days): until discharge (04/08/22 1515)     Daily Summary of Progress (SLP): progress toward functional goals as expected (04/08/22 1515)               Treatment Assessment (SLP): Pt participated well in tx. Motivated. (04/08/22 1515)  Plan for Continued Treatment (SLP): continue treatment per plan of care (04/08/22 1515)         Plan of Care Reviewed With: patient, family  Progress: improving      SWALLOW EVALUATION (last 72 hours)     SLP Adult Swallow Evaluation     Row Name 04/08/22 1515 04/07/22 1320 04/06/22 1010             Rehab Evaluation    Document Type therapy note (daily note)  -HG evaluation  -MP evaluation  -MP      Subjective Information no complaints  -HG no complaints  -MP no complaints  -MP      Patient Observations alert;cooperative  -HG alert;cooperative  -MP alert;cooperative  -MP      Patient/Family/Caregiver Comments/Observations Family present  -HG No family present  -MP Spouse present  -MP      Patient Effort -- good  -MP good  -MP              General Information    Patient Profile Reviewed -- yes  -MP yes  -MP      Pertinent History Of Current Problem -- See yesterday's eval  -MP Adm 3/25 w/ lower extremity rash, nausea/vomiting. Recent adm @ [facility] 3/10-28. Hx HTN, hypothy, Afib, DHF, VINI, chronic pain, GERD, CKD3, essential tremor, renal cell carcinoma s/p nephrectomy, gastroparesis, DM2, Gilbert's dz. Seen by SLP 5/'21 - recs @ that time for mech soft/no mixed, thin, no straws. Consulted by RN 2' coughing & difficulty remembering to swallow.  -MP      Current Method of Nutrition -- mechanical soft, no mixed consistencies;nectar/syrup-thick liquids  -MP soft textures;thin  liquids  -MP      Precautions/Limitations, Vision -- -- WFL;for purposes of eval  -MP      Precautions/Limitations, Hearing -- -- WFL;for purposes of eval  -MP      Prior Level of Function-Communication -- -- unknown  -MP      Prior Level of Function-Swallowing -- -- other (see comments)  see hx above  -      Plans/Goals Discussed with -- -- patient;spouse/S.O.;agreed upon  -      Barriers to Rehab -- -- none identified  -      Patient's Goals for Discharge -- -- patient did not state  -MP      Family Goals for Discharge -- -- family did not state  -MP              Pain    Additional Documentation -- Pain Scale: FACES Pre/Post-Treatment (Group)  -MP Pain Scale: FACES Pre/Post-Treatment (Group)  -MP              Pain Scale: FACES Pre/Post-Treatment    Pain: FACES Scale, Pretreatment -- 0-->no hurt  -MP 0-->no hurt  -MP      Posttreatment Pain Rating -- 0-->no hurt  -MP 0-->no hurt  -MP      Pain Location - Side/Orientation -- -- --  -              Oral Motor Structure and Function    Dentition Assessment -- -- teeth are in poor condition  -      Secretion Management -- -- dried secretions in oral cavity  -      Mucosal Quality -- -- dry;cracked  -              Oral Musculature and Cranial Nerve Assessment    Oral Motor General Assessment -- -- generalized oral motor weakness  -              General Eating/Swallowing Observations    Respiratory Support Currently in Use -- nasal cannula  - nasal cannula  -      Eating/Swallowing Skills -- fed by SLP  -MP fed by SLP  -      Positioning During Eating -- upright in chair  - upright in bed  -      Utensils Used -- -- spoon;cup;straw  -      Consistencies Trialed -- -- thin liquids;nectar/syrup-thick liquids;pureed;regular textures  -              Clinical Swallow Eval    Pharyngeal Phase -- -- suspected pharyngeal impairment  -      Clinical Swallow Evaluation Summary -- -- Immediate cough w/ cup sips thin liquid. No s/sxs w/ nectar-thick,  puree, or solid. Rec dysphagia level IV ([facility] soft/no mixed) diet & nectar-thick liquids. Will plan for MBS tomorrow 4/7 to further assess.  -MP              Pharyngeal Phase Concerns    Pharyngeal Phase Concerns -- -- cough  -MP      Cough -- -- thin  -MP              MBS/VFSS    Utensils Used -- spoon;cup  -MP --      Consistencies Trialed -- thin liquids;nectar/syrup-thick liquids;honey-thick liquids;pudding thick  -MP --              MBS/VFSS Interpretation    Oral Prep Phase -- WFL  -MP --      Oral Transit Phase -- impaired  -MP --      Oral Residue -- impaired  -MP --      VFSS Summary -- Mild oral & moderate-severe pharyngeal dysphagia. Aspiration during the swallow w/ thin liquids. Cough response, though ineffective @ fully clearing subglottic material. Penetration during the swallow w/ nectar-thick & honey-thick & after the swallow w/ nectar-thick, honey-thick, and pudding from vallecular & pyriform residue. Penetrated material continued to squeeze deeper into vestibule & u/a to fully clear despite cues to cough - suspect eventual aspiration. Suspect diffuse pharyngeal weakness primary contributing factor to dysphagia, though ? some mild anatomical component. Prominent c-spine curvature impacting pharyngeal squeeze and intermittently epiglottis appears to make contact w/ PPW but not fully invert. Safest recommendation @ this time is NPO w/ temporary alternate route of nutrition/hydration/medication. Notified RN & MD.  -MP --              Oral Transit Phase    Impaired Oral Transit Phase -- premature spillage of liquids into pharynx  -MP --      Premature Spillage of Liquids into Pharynx -- thin liquids  -MP --              Oral Residue    Impaired Oral Residue -- lingual residue  -MP --      Lingual Residue -- all consistencies tested  -MP --      Response to Oral Residue -- cleared residue;with spontaneous subsequent swallow  -MP --              Initiation of Pharyngeal Swallow    Initiation of Pharyngeal  Swallow -- bolus in pyriform sinuses  -MP --      Pharyngeal Phase -- impaired pharyngeal phase of swallowing  -MP --      Penetration During the Swallow -- nectar-thick liquids;honey-thick liquids;secondary to delayed swallow initiation or mistiming;secondary to reduced laryngeal elevation;secondary to reduced vestibular closure  -MP --      Aspiration During the Swallow -- thin liquids;secondary to delayed swallow initiation or mistiming;secondary to reduced laryngeal elevation;secondary to reduced vestibular closure  -MP --      Penetration After the Swallow -- nectar-thick liquids;honey-thick liquids;pudding/puree;secondary to residue;in valleculae;in pyriform sinuses  -MP --      Response to Penetration -- no response  -MP --      Response to Aspiration -- cough;could not clear subglottic material  -MP --      Rosenbek's Scale -- thin:;7--->level 7;nectar:;honey:;pudding/puree:;5--->level 5  -MP --      Pharyngeal Residue -- thin liquids;nectar-thick liquids;honey-thick liquids;pudding/puree;regular textures;diffuse within pharynx;secondary to reduced base of tongue retraction;secondary to reduced posterior pharyngeal wall stripping;secondary to reduced laryngeal elevation;secondary to reduced hyolaryngeal excursion;other (see comments)  reduced UES opening  -MP --      Response to Residue -- unable to clear residue  -MP --      Attempted Compensatory Maneuvers -- bolus size;bolus presentation style;multiple swallows;alternative liquids/solids;effortful (hard swallow);throat clear after swallow  -MP --      Response to Attempted Compensatory Maneuvers -- did not prevent penetration;did not prevent aspiration;did not reduce residue  -MP --              SLP Evaluation Clinical Impression    SLP Swallowing Diagnosis -- mild;oral dysphagia;pharyngeal dysphagia  -MP suspected pharyngeal dysphagia  -MP      Functional Impact -- risk of aspiration/pneumonia  -MP risk of aspiration/pneumonia  -MP      Rehab  Potential/Prognosis, Swallowing -- good, to achieve stated therapy goals  -MP good, to achieve stated therapy goals  -MP      Swallow Criteria for Skilled Therapeutic Interventions Met -- demonstrates skilled criteria  -MP demonstrates skilled criteria  -MP              SLP Treatment Clinical Impressions    Treatment Assessment (SLP) Pt participated well in tx. Motivated.  -HG -- --      Daily Summary of Progress (SLP) progress toward functional goals as expected  -HG -- --      Plan for Continued Treatment (SLP) continue treatment per plan of care  -HG -- --              Recommendations    Therapy Frequency (Swallow) 5 days per week  -HG 5 days per week  -MP --      Predicted Duration Therapy Intervention (Days) until discharge  -HG until discharge  -MP until discharge  -MP      SLP Diet Recommendation NPO;temporary alternate methods of nutrition/hydration;other (see comments)  Ok to allow 2-3 ice chips per hour after oral care.  - NPO;temporary alternate methods of nutrition/hydration;other (see comments)  OK for 2-3 ice chips/hr following oral care  -MP mechanical soft with no mixed consistencies;nectar thick liquids  -      Recommended Diagnostics -- -- VFSS (MBS);other (see comments)  4/7  -      Recommended Precautions and Strategies -- -- upright posture during/after eating;small bites of food and sips of liquid;general aspiration precautions  -      Oral Care Recommendations -- Oral Care BID/PRN;Suction toothbrush  - Oral Care BID/PRN  -      SLP Rec. for Method of Medication Administration -- meds via alternate route  - meds whole;meds crushed;with thick liquids;with pudding or applesauce;as tolerated  -      Monitor for Signs of Aspiration -- -- yes;notify SLP if any concerns  -      Anticipated Discharge Disposition (SLP) -- unknown;anticipate therapy at next level of care  -MP unknown;anticipate therapy at next level of care  -MP            User Key  (r) = Recorded By, (t) = Taken By,  (c) = Cosigned By    Initials Name Effective Dates    HG Herlinda Vicente, MS CCC-SLP 06/16/21 -     MP Rivera Bonner, MS CCC-SLP 12/28/21 -                 EDUCATION  The patient has been educated in the following areas:   Dysphagia (Swallowing Impairment).        SLP GOALS     Row Name 04/08/22 1515 04/07/22 1320          Oral Nutrition/Hydration Goal 1 (SLP)    Oral Nutrition/Hydration Goal 1, SLP LTG: Pt will return to some form of PO diet by d/c.  -HG LTG: Pt will return to some form of PO diet by d/c.  -MP     Time Frame (Oral Nutrition/Hydration Goal 1, SLP) by discharge  -HG by discharge  -MP     Progress/Outcomes (Oral Nutrition/Hydration Goal 1, SLP) continuing progress toward goal  -HG --            Oral Nutrition/Hydration Goal 2 (SLP)    Oral Nutrition/Hydration Goal 2, SLP Pt will tolerate therapeutic trials of thin H2O & puree w/ no overt s/sxs aspiration/distress w/ 60% acc and no cues in order to assess appropriateness for repeat instrumental eval  -HG Pt will tolerate therapeutic trials of thin H2O & puree w/ no overt s/sxs aspiration/distress w/ 60% acc and no cues in order to assess appropriateness for repeat instrumental eval  -MP     Time Frame (Oral Nutrition/Hydration Goal 2, SLP) short term goal (STG)  -HG short term goal (STG)  -MP     Barriers (Oral Nutrition/Hydration Goal 2, SLP) Oral care completed. Trials of ice chips and water via tsp and pt tolerated with no s/s of aspiration.  -HG --     Progress/Outcomes (Oral Nutrition/Hydration Goal 2, SLP) continuing progress toward goal  -HG --            Lingual Strengthening Goal 1 (SLP)    Activity (Lingual Strengthening Goal 1, SLP) increase tongue back strength  -HG increase tongue back strength  -MP     Increase Tongue Back Strength lingual resistance exercises;swallow trials  -HG lingual resistance exercises;swallow trials  -MP     [place]/Accuracy (Lingual Strengthening Goal 1, SLP) with minimal cues (75-90% accuracy)  -HG  with minimal cues (75-90% accuracy)  -MP     Time Frame (Lingual Strengthening Goal 1, SLP) short term goal (STG)  -HG short term goal (STG)  -MP     Barriers (Lingual Strengthening Goal 1, SLP) Completed x 5  -HG --     Progress/Outcomes (Lingual Strengthening Goal 1, SLP) continuing progress toward goal  -HG --            Pharyngeal Strengthening Exercise Goal 1 (SLP)    Activity (Pharyngeal Strengthening Goal 1, SLP) increase timing;increase superior movement of the hyolaryngeal complex;increase anterior movement of the hyolaryngeal complex;increase closure at entrance to airway/closure of airway at glottis;increase squeeze/positive pressure generation;increase tongue base retraction;increase PES opening  -HG increase timing;increase superior movement of the hyolaryngeal complex;increase anterior movement of the hyolaryngeal complex;increase closure at entrance to airway/closure of airway at glottis;increase squeeze/positive pressure generation;increase tongue base retraction;increase PES opening  -MP     Increase Timing prepping - 3 second prep or suck swallow or 3-step swallow  -HG prepping - 3 second prep or suck swallow or 3-step swallow  -MP     Increase Superior Movement of the Hyolaryngeal Complex effortful pitch glide (falsetto + pharyngeal squeeze)  -HG effortful pitch glide (falsetto + pharyngeal squeeze)  -MP     Increase Anterior Movement of the Hyolaryngeal Complex chin tuck against resistance (CTAR)  -HG chin tuck against resistance (CTAR)  -MP     Increase Closure at Entrance to Airway/Closure of Airway at Glottis supraglottic swallow  -HG supraglottic swallow  -MP     Increase Squeeze/Positive Pressure Generation hard effortful swallow  -HG hard effortful swallow  -MP     Increase Tongue Base Retraction alia  -HG alia  -MP     Increase PES Opening chin tuck against resistance (CTAR)  -HG chin tuck against resistance (CTAR)  -MP     [place]/Accuracy (Pharyngeal Strengthening Goal 1, SLP)  with minimal cues (75-90% accuracy)  -HG with minimal cues (75-90% accuracy)  -MP     Time Frame (Pharyngeal Strengthening Goal 1, SLP) short term goal (STG)  -HG short term goal (STG)  -MP     Barriers (Pharyngeal Strengthening Goal 1, SLP) Handout provided and completed x 2-3 each.  -HG --     Progress/Outcomes (Pharyngeal Strengthening Goal 1, SLP) continuing progress toward goal  -HG --           User Key  (r) = Recorded By, (t) = Taken By, (c) = Cosigned By    Initials Name Provider Type    Herlinda Potter MS CCC-SLP Speech and Language Pathologist    Rivera Guadalupe MS CCC-SLP Speech and Language Pathologist                   Time Calculation:    Time Calculation- SLP     Row Name 04/08/22 1627             Time Calculation- SLP    SLP Start Time 1515  -HG      SLP Received On 04/08/22  -HG              Untimed Charges    65826-AD Treatment Swallow Minutes 60  -HG              Total Minutes    Untimed Charges Total Minutes 60  -HG       Total Minutes 60  -HG            User Key  (r) = Recorded By, (t) = Taken By, (c) = Cosigned By    Initials Name Provider Type    Herlinda Potter MS CCC-SLP Speech and Language Pathologist                Therapy Charges for Today     Code Description Service Date Service Provider Modifiers Qty    [id number]  ST TREATMENT SWALLOW 4 4/8/2022 Herlinda Vicente MS CCC-SLP GN 1               Herlinda Vicente MS CCC-ZEINAB  4/8/2022

## 2022-04-08 NOTE — PLAN OF CARE
VSS.  A&Ox4.  NSR.  O2 2L NC.  Keofeed placed today.  Family at bedside throughout the day.

## 2022-04-08 NOTE — PROGRESS NOTES
"                                 [facility] Progress Note      LOS: 14 days   Patient Care Team:  Vivek Mercer DO as PCP - General (Family Medicine)    Chief Complaint:  Following for arrhythmia    Chief Complaint   Patient presents with   • Dizziness   • Vomiting       Subjective     Interval History: Continues to be comfortable.  No events noted overnight.  HRs controlled.  NGT to be placed.    Review of Systems:   A 14 point review of systems was negative except as was stated in the HPI      Objective     Vital Sign Min/Max for last 24 hours  Temp  Min: 97 °F (36.1 °C)  Max: 98 °F (36.7 °C)   BP  Min: 139/64  Max: 159/72   Pulse  Min: 67  Max: 85   Resp  Min: 18  Max: 18   SpO2  Min: 96 %  Max: 99 %   Flow (L/min)  Min: 2  Max: 2   No data recorded     Flowsheet Rows    Flowsheet Row First Filed Value   Admission Height 175.3 cm (69\") Documented at 03/25/2022 0849   Admission Weight 113 kg (250 lb) Documented at 03/25/2022 0849          Physical Exam:  General Appearance: Alert & O x3, appears stated age  Lungs: Clear to auscultation  Heart:: Regular rate and rhythm; no Murmurs, Rubs or Gallops  Abdomen: Soft and nontender with adequate bowel sounds.  No organomegaly  Extremities: 1+ pitting edema  Pulses: Pulses palpable and equal bilaterally  Skin: Rash noted  Psych: Alert, Oriented, appropriate     Results Review:     I reviewed the patient's new clinical results.  Results from last 7 days   Lab Units 04/07/22  0450 04/06/22  0540 04/05/22  1337   SODIUM mmol/L 147* 154* 148*   POTASSIUM mmol/L 3.5 4.4 4.1   CHLORIDE mmol/L 116* 119* 112*   CO2 mmol/L 22.0 24.0 20.0*   BUN mg/dL 58* 69* 76*   CREATININE mg/dL 1.79* 2.24* 2.38*   GLUCOSE mg/dL 193* 147* 183*   CALCIUM mg/dL 7.8* 8.1* 8.3*     Results from last 7 days   Lab Units 04/06/22  0540 04/04/22  0634 04/03/22  2046   WBC 10*3/mm3 7.55 9.90 9.55   HEMOGLOBIN g/dL 7.9* 8.2* 8.3*   HEMATOCRIT % 26.5* 26.7* 24.9*   PLATELETS 10*3/mm3 " 261 369 314     Lab Results   Lab Value Date/Time    TROPONINT 0.026 04/24/2021 0708             Results from last 7 days   Lab Units 04/03/22 2056   PH, ARTERIAL pH units 7.381   PO2 ART mm Hg 70.0*   PCO2, ARTERIAL mm Hg 37.4   HCO3 ART mmol/L 22.2           Medication Review: yes  Current Facility-Administered Medications   Medication Dose Route Frequency Provider Last Rate Last Admin   • acetaminophen (TYLENOL) 160 MG/5ML solution 650 mg  650 mg Nasogastric Q4H PRN Jemma Arriaga APRN        Or   • acetaminophen (TYLENOL) tablet 650 mg  650 mg Oral Q4H PRN Jemma Arriaga APRN        Or   • acetaminophen (TYLENOL) suppository 650 mg  650 mg Rectal Q4H PRN Jemma Arriaga APRN       • amiodarone (PACERONE) tablet 200 mg  200 mg Nasogastric Q12H Jemma Arriaga APRN   200 mg at 04/07/22 2252    Followed by   • [START ON 4/20/2022] amiodarone (PACERONE) tablet 200 mg  200 mg Nasogastric Daily Jemma Arriaga APRN       • atorvastatin (LIPITOR) tablet 40 mg  40 mg Nasogastric Nightly Jemma Arriaga APRN   40 mg at 04/07/22 2254   • buprenorphine-naloxone (SUBOXONE) 8-2 MG per SL tablet 2 tablet  2 tablet Sublingual Nightly Raymond Herrera MD   2 tablet at 04/07/22 2249   • dextrose (D50W) (25 g/50 mL) IV injection 25 g  25 g Intravenous Q15 Min PRN Raymond Herrera MD       • dextrose (GLUTOSE) oral gel 15 g  15 g Nasogastric Q15 Min PRN Jemma Arriaga APRN       • DULoxetine (CYMBALTA) DR capsule 90 mg  90 mg Oral Daily Jemma Arriaga APRN   90 mg at 04/07/22 0820   • enoxaparin (LOVENOX) syringe 110 mg  110 mg Subcutaneous Q12H Yinka Brush IV, PharmD   110 mg at 04/08/22 0638   • glucagon (human recombinant) (GLUCAGEN DIAGNOSTIC) injection 1 mg  1 mg Intramuscular Q15 Min PRN Raymond Herrera MD       • haloperidol (HALDOL) 2 MG/ML solution 2 mg  2 mg Oral Q6H PRN Peña Weaver MD       • insulin detemir (LEVEMIR) injection 10 Units  10 Units Subcutaneous Nightly Yinka Desai,  MD   10 Units at 04/06/22 2248   • insulin lispro (humaLOG) injection 0-9 Units  0-9 Units Subcutaneous TID AC Raymond Herrera MD   2 Units at 04/07/22 1744   • levothyroxine (SYNTHROID, LEVOTHROID) tablet 150 mcg  150 mcg Nasogastric Daily Jemma Arriaga APRN       • melatonin tablet 5 mg  5 mg Nasogastric Nightly Jemma Arriaga APRN       • methohexital (BREVITAL) injection  - ADS Override Pull            • metoprolol tartrate (LOPRESSOR) tablet 50 mg  50 mg Nasogastric Q8H Wili Conner, DO   50 mg at 04/08/22 0638   • midazolam (VERSED) 2 MG/2ML injection  - ADS Override Pull            • mineral oil-hydrophilic petrolatum (AQUAPHOR) ointment 1 application  1 application Topical BID PRN Peña Weaver MD   1 application at 04/06/22 2245   • mirtazapine (REMERON) tablet 7.5 mg  7.5 mg Nasogastric Nightly Jemma Arriaga APRN       • nystatin (MYCOSTATIN) 100,000 unit/mL suspension 500,000 Units  5 mL Oral 4x Daily Jemma Arriaga APRN   500,000 Units at 04/07/22 2251   • ondansetron (ZOFRAN) injection 4 mg  4 mg Intravenous Q6H PRN Raymond Herrera MD   4 mg at 04/06/22 2324   • palliative care oral rinse   Mouth/Throat PRN Yinka Desai MD   Given at 04/06/22 1232   • pantoprazole (PROTONIX) EC tablet 40 mg  40 mg Oral QAM Jemma Arriaga APRN   40 mg at 04/07/22 0558   • polyethylene glycol (MIRALAX) packet 17 g  17 g Nasogastric Daily Jemma Arriaga APRN       • predniSONE (DELTASONE) tablet 20 mg  20 mg Nasogastric Daily With Breakfast Jemma Arriaga APRN       • QUEtiapine (SEROquel) tablet 25 mg  25 mg Oral Q12H Wili Conner, DO   25 mg at 04/07/22 2251   • sennosides-docusate (PERICOLACE) 8.6-50 MG per tablet 2 tablet  2 tablet Nasogastric BID Jemma Arriaga APRN       • Sodium Chloride (PF) 0.9 % 10 mL  10 mL Intravenous PRN Raymond Herrera MD       • sodium chloride 0.45 % infusion  100 mL/hr Intravenous Continuous Francisco Atkinson  mL/hr at 04/07/22 1815  100 mL/hr at 04/07/22 1817   • sodium chloride 0.9 % flush 10 mL  10 mL Intravenous Q12H Raymond Herrera MD   10 mL at 04/07/22 2250   • sodium chloride 0.9 % flush 10 mL  10 mL Intravenous PRN Raymond Herrera MD   10 mL at 04/02/22 0830   • temazepam (RESTORIL) capsule 15 mg  15 mg Nasogastric Nightly Jemma Arriaga APRN       • vitamin B-12 (CYANOCOBALAMIN) tablet 50 mcg  50 mcg Nasogastric Daily Jemma Arriaga APRN             Rash    Benign essential tremor    Hypothyroidism (acquired)    Paroxysmal A-fib (on eliquis & metoprolol)    Insulin dependent type 2 diabetes mellitus (HCC)    Chronic back pain (on chronic prescription lortab)    Acute renal failure, unspecified acute renal failure type (HCC)    Nausea and vomiting        Impression      Atrial flutter status post synchronized cardioversion 4/1/2022  Chronic kidney disease  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  Would plan for only short-term and amiodarone due to propensity for hypothyroidism with this drug in the past  Anticoagulation with Lovenox continues  No change from cardiac standpoint today    Ani Mari PA-C working with Dr. Johnson.  04/08/22  08:04 EDT

## 2022-04-08 NOTE — CASE MANAGEMENT/SOCIAL WORK
Continued Stay Note  [facility]     Patient Name: Yinka Cummings  MRN: [id number]  Today's Date: 4/8/2022    Admit Date: 3/25/2022     Discharge Plan     Row Name 04/08/22 0914       Plan    Plan Rehab    Plan Comments [facility] declined patient yesterday, so ongoing placement being pursued. Patient's daughter had visited [facility] yesterday and I sent updated rehab notes to them. [facility] and [facility] are considering but cannot make a decision until closer to discharge. Additional referrals given to [facility] and [facility]. Patient discussed in am rounds with anticipated acute care for several more days. I have gone ahead and gave LTACH referral to [facility] and [facility] with hopes of continued LTACH waiver in place.     Final Discharge Disposition Code 03 - skilled nursing facility (SNF)               Discharge Codes    No documentation.               Expected Discharge Date and Time     Expected Discharge Date Expected Discharge Time    Apr 12, 2022             Jessica Frias RN

## 2022-04-09 LAB
ALBUMIN SERPL-MCNC: 2.6 G/DL (ref 3.5–5.2)
ALP SERPL-CCNC: 79 U/L (ref 39–117)
ALT SERPL W P-5'-P-CCNC: 10 U/L (ref 1–41)
ANION GAP SERPL CALCULATED.3IONS-SCNC: 8 MMOL/L (ref 5–15)
AST SERPL-CCNC: 12 U/L (ref 1–40)
BILIRUB SERPL-MCNC: 0.3 MG/DL (ref 0–1.2)
BUN SERPL-MCNC: 45 MG/DL (ref 8–23)
CALCIUM SPEC-SCNC: 8 MG/DL (ref 8.6–10.5)
CHLORIDE SERPL-SCNC: 115 MMOL/L (ref 98–107)
CHOLEST SERPL-MCNC: 82 MG/DL (ref 0–200)
CO2 SERPL-SCNC: 23 MMOL/L (ref 22–29)
CREAT SERPL-MCNC: 1.72 MG/DL (ref 0.76–1.27)
GLUCOSE BLDC GLUCOMTR-MCNC: 267 MG/DL (ref 70–130)
GLUCOSE BLDC GLUCOMTR-MCNC: 286 MG/DL (ref 70–130)
GLUCOSE BLDC GLUCOMTR-MCNC: 305 MG/DL (ref 70–130)
GLUCOSE SERPL-MCNC: 181 MG/DL (ref 65–99)
MAGNESIUM SERPL-MCNC: 1.8 MG/DL (ref 1.6–2.4)
PHOSPHATE SERPL-MCNC: 2.3 MG/DL (ref 2.5–4.5)
POTASSIUM SERPL-SCNC: 3.7 MMOL/L (ref 3.5–5.2)
PROT SERPL-MCNC: 5.4 G/DL (ref 6–8.5)
SODIUM SERPL-SCNC: 146 MMOL/L (ref 136–145)
TRIGL SERPL-MCNC: 148 MG/DL (ref 0–150)

## 2022-04-09 PROCEDURE — 82962 GLUCOSE BLOOD TEST: CPT

## 2022-04-09 PROCEDURE — 63710000001 PREDNISONE PER 1 MG: Performed by: NURSE PRACTITIONER

## 2022-04-09 PROCEDURE — 99232 SBSQ HOSP IP/OBS MODERATE 35: CPT | Performed by: INTERNAL MEDICINE

## 2022-04-09 PROCEDURE — 63710000001 INSULIN DETEMIR PER 5 UNITS: Performed by: INTERNAL MEDICINE

## 2022-04-09 PROCEDURE — 63710000001 INSULIN LISPRO (HUMAN) PER 5 UNITS: Performed by: INTERNAL MEDICINE

## 2022-04-09 PROCEDURE — 82465 ASSAY BLD/SERUM CHOLESTEROL: CPT | Performed by: INTERNAL MEDICINE

## 2022-04-09 PROCEDURE — 83520 IMMUNOASSAY QUANT NOS NONAB: CPT | Performed by: INTERNAL MEDICINE

## 2022-04-09 PROCEDURE — 97110 THERAPEUTIC EXERCISES: CPT

## 2022-04-09 PROCEDURE — 25010000002 ENOXAPARIN PER 10 MG

## 2022-04-09 PROCEDURE — 84478 ASSAY OF TRIGLYCERIDES: CPT | Performed by: INTERNAL MEDICINE

## 2022-04-09 PROCEDURE — 80053 COMPREHEN METABOLIC PANEL: CPT | Performed by: INTERNAL MEDICINE

## 2022-04-09 PROCEDURE — 84100 ASSAY OF PHOSPHORUS: CPT | Performed by: INTERNAL MEDICINE

## 2022-04-09 PROCEDURE — 83735 ASSAY OF MAGNESIUM: CPT | Performed by: INTERNAL MEDICINE

## 2022-04-09 PROCEDURE — 97530 THERAPEUTIC ACTIVITIES: CPT

## 2022-04-09 RX ORDER — PANTOPRAZOLE SODIUM 40 MG/10ML
40 INJECTION, POWDER, LYOPHILIZED, FOR SOLUTION INTRAVENOUS
Status: DISCONTINUED | OUTPATIENT
Start: 2022-04-09 | End: 2022-04-14

## 2022-04-09 RX ORDER — ECHINACEA PURPUREA EXTRACT 125 MG
1 TABLET ORAL AS NEEDED
Status: DISCONTINUED | OUTPATIENT
Start: 2022-04-09 | End: 2022-04-15 | Stop reason: HOSPADM

## 2022-04-09 RX ADMIN — INSULIN DETEMIR 10 UNITS: 100 INJECTION, SOLUTION SUBCUTANEOUS at 21:17

## 2022-04-09 RX ADMIN — METOPROLOL TARTRATE 50 MG: 50 TABLET, FILM COATED ORAL at 05:56

## 2022-04-09 RX ADMIN — VITAM B12 50 MCG: 100 TAB at 08:16

## 2022-04-09 RX ADMIN — SODIUM CHLORIDE 100 ML/HR: 4.5 INJECTION, SOLUTION INTRAVENOUS at 04:17

## 2022-04-09 RX ADMIN — NYSTATIN 500000 UNITS: 100000 SUSPENSION ORAL at 12:45

## 2022-04-09 RX ADMIN — QUETIAPINE FUMARATE 25 MG: 25 TABLET ORAL at 08:18

## 2022-04-09 RX ADMIN — SODIUM CHLORIDE 100 ML/HR: 4.5 INJECTION, SOLUTION INTRAVENOUS at 14:33

## 2022-04-09 RX ADMIN — LEVOTHYROXINE SODIUM 150 MCG: 150 TABLET ORAL at 08:17

## 2022-04-09 RX ADMIN — PANTOPRAZOLE SODIUM 40 MG: 40 INJECTION, POWDER, FOR SOLUTION INTRAVENOUS at 07:06

## 2022-04-09 RX ADMIN — SENNOSIDES AND DOCUSATE SODIUM 2 TABLET: 50; 8.6 TABLET ORAL at 08:18

## 2022-04-09 RX ADMIN — Medication 5 MG: at 21:19

## 2022-04-09 RX ADMIN — METOPROLOL TARTRATE 50 MG: 50 TABLET, FILM COATED ORAL at 21:20

## 2022-04-09 RX ADMIN — INSULIN LISPRO 2 UNITS: 100 INJECTION, SOLUTION INTRAVENOUS; SUBCUTANEOUS at 08:17

## 2022-04-09 RX ADMIN — PREDNISONE 20 MG: 20 TABLET ORAL at 08:18

## 2022-04-09 RX ADMIN — INSULIN LISPRO 6 UNITS: 100 INJECTION, SOLUTION INTRAVENOUS; SUBCUTANEOUS at 12:45

## 2022-04-09 RX ADMIN — BUPRENORPHINE AND NALOXONE 2 TABLET: 8; 2 TABLET SUBLINGUAL at 21:19

## 2022-04-09 RX ADMIN — METOPROLOL TARTRATE 50 MG: 50 TABLET, FILM COATED ORAL at 14:12

## 2022-04-09 RX ADMIN — DULOXETINE HYDROCHLORIDE 90 MG: 30 CAPSULE, DELAYED RELEASE ORAL at 08:18

## 2022-04-09 RX ADMIN — POLYETHYLENE GLYCOL 3350 17 G: 17 POWDER, FOR SOLUTION ORAL at 08:17

## 2022-04-09 RX ADMIN — MIRTAZAPINE 7.5 MG: 15 TABLET, FILM COATED ORAL at 21:19

## 2022-04-09 RX ADMIN — Medication 10 ML: at 08:19

## 2022-04-09 RX ADMIN — NYSTATIN 500000 UNITS: 100000 SUSPENSION ORAL at 18:22

## 2022-04-09 RX ADMIN — ENOXAPARIN SODIUM 110 MG: 120 INJECTION SUBCUTANEOUS at 05:55

## 2022-04-09 RX ADMIN — NYSTATIN 500000 UNITS: 100000 SUSPENSION ORAL at 08:17

## 2022-04-09 RX ADMIN — ATORVASTATIN CALCIUM 40 MG: 40 TABLET, FILM COATED ORAL at 21:19

## 2022-04-09 RX ADMIN — QUETIAPINE FUMARATE 25 MG: 25 TABLET ORAL at 21:19

## 2022-04-09 RX ADMIN — NYSTATIN 500000 UNITS: 100000 SUSPENSION ORAL at 21:20

## 2022-04-09 RX ADMIN — ENOXAPARIN SODIUM 110 MG: 120 INJECTION SUBCUTANEOUS at 18:22

## 2022-04-09 RX ADMIN — INSULIN LISPRO 6 UNITS: 100 INJECTION, SOLUTION INTRAVENOUS; SUBCUTANEOUS at 18:22

## 2022-04-09 NOTE — PROGRESS NOTES
"   LOS: 15 days    Patient Care Team:  Vivek Mercer DO as PCP - General (Family Medicine)    Chief Complaint: SALEEM    Subjective     Interval History:   Rash looking better today  No new events   no other distress  Review of Systems:   Patient denied any nausea, vomiting, chest pain, shortness of breath, dysuria, hematuria.      Objective     Vital Sign Min/Max for last 24 hours  Temp  Min: 97.8 °F (36.6 °C)  Max: 99.2 °F (37.3 °C)   BP  Min: 126/54  Max: 161/65   Pulse  Min: 73  Max: 89   Resp  Min: 16  Max: 18   SpO2  Min: 88 %  Max: 98 %   Flow (L/min)  Min: 2  Max: 2   No data recorded     Flowsheet Rows    Flowsheet Row First Filed Value   Admission Height 175.3 cm (69\") Documented at 03/25/2022 0849   Admission Weight 113 kg (250 lb) Documented at 03/25/2022 0849          No intake/output data recorded.  I/O last 3 completed shifts:  In: 1571.7 [P.O.:200; I.V.:1271.7; Other:100]  Out: 200 [Urine:200]    Physical Exam:  General Appearance: Alert, oriented x3 no obvious distress morbid obesity  Eyes: PER, EOMI.  Neck: Supple no JVD.  Lungs: Clear auscultation, no rales rhonchi's, equal chest movement, nonlabored.  Heart: No gallop, murmur, rub, RRR.  Abdomen: Morbid obesity, soft, nontender, positive bowel sounds, no organomegaly.  Extremities: No edema, no cyanosis.  Neuro: No focal deficit, moving all extremities, alert oriented X 3  Skin: Skin rash looking better today    WBC WBC   Date Value Ref Range Status   04/08/2022 9.64 3.40 - 10.80 10*3/mm3 Final      HGB Hemoglobin   Date Value Ref Range Status   04/08/2022 8.0 (L) 13.0 - 17.7 g/dL Final      HCT Hematocrit   Date Value Ref Range Status   04/08/2022 26.2 (L) 37.5 - 51.0 % Final      Platlets No results found for: LABPLAT   MCV MCV   Date Value Ref Range Status   04/08/2022 97.4 (H) 79.0 - 97.0 fL Final          Sodium Sodium   Date Value Ref Range Status   04/09/2022 146 (H) 136 - 145 mmol/L Final   04/08/2022 143 136 - 145 mmol/L Final "   04/07/2022 147 (H) 136 - 145 mmol/L Final      Potassium Potassium   Date Value Ref Range Status   04/09/2022 3.7 3.5 - 5.2 mmol/L Final   04/08/2022 4.1 3.5 - 5.2 mmol/L Final   04/07/2022 3.5 3.5 - 5.2 mmol/L Final      Chloride Chloride   Date Value Ref Range Status   04/09/2022 115 (H) 98 - 107 mmol/L Final   04/08/2022 112 (H) 98 - 107 mmol/L Final   04/07/2022 116 (H) 98 - 107 mmol/L Final      CO2 CO2   Date Value Ref Range Status   04/09/2022 23.0 22.0 - 29.0 mmol/L Final   04/08/2022 21.0 (L) 22.0 - 29.0 mmol/L Final   04/07/2022 22.0 22.0 - 29.0 mmol/L Final      BUN BUN   Date Value Ref Range Status   04/09/2022 45 (H) 8 - 23 mg/dL Final   04/08/2022 48 (H) 8 - 23 mg/dL Final   04/07/2022 58 (H) 8 - 23 mg/dL Final      Creatinine Creatinine   Date Value Ref Range Status   04/09/2022 1.72 (H) 0.76 - 1.27 mg/dL Final   04/08/2022 1.80 (H) 0.76 - 1.27 mg/dL Final   04/07/2022 1.79 (H) 0.76 - 1.27 mg/dL Final      Calcium Calcium   Date Value Ref Range Status   04/09/2022 8.0 (L) 8.6 - 10.5 mg/dL Final   04/08/2022 7.8 (L) 8.6 - 10.5 mg/dL Final   04/07/2022 7.8 (L) 8.6 - 10.5 mg/dL Final      PO4 No results found for: CAPO4   Albumin Albumin   Date Value Ref Range Status   04/09/2022 2.60 (L) 3.50 - 5.20 g/dL Final   04/08/2022 2.40 (L) 3.50 - 5.20 g/dL Final      Magnesium Magnesium   Date Value Ref Range Status   04/09/2022 1.8 1.6 - 2.4 mg/dL Final      Uric Acid No results found for: URICACID        Results Review:     I reviewed the patient's new clinical results.    atorvastatin, 40 mg, Nasogastric, Nightly  buprenorphine-naloxone, 2 tablet, Sublingual, Nightly  DULoxetine, 90 mg, Oral, Daily  enoxaparin, 110 mg, Subcutaneous, Q12H  insulin detemir, 10 Units, Subcutaneous, Nightly  insulin lispro, 0-9 Units, Subcutaneous, TID AC  levothyroxine, 150 mcg, Nasogastric, Daily  melatonin, 5 mg, Nasogastric, Nightly  methohexital, , ,   metoprolol tartrate, 50 mg, Nasogastric, Q8H  midazolam, , ,    mirtazapine, 7.5 mg, Nasogastric, Nightly  nystatin, 5 mL, Oral, 4x Daily  pantoprazole, 40 mg, Intravenous, Q AM  polyethylene glycol, 17 g, Nasogastric, Daily  predniSONE, 20 mg, Nasogastric, Daily With Breakfast  PRO-STAT, 30 mL, Nasogastric, Daily  QUEtiapine, 25 mg, Oral, Q12H  senna-docusate sodium, 2 tablet, Nasogastric, BID  sodium chloride, 10 mL, Intravenous, Q12H  temazepam, 15 mg, Nasogastric, Nightly  cyanocobalamin, 50 mcg, Nasogastric, Daily      sodium chloride, 100 mL/hr, Last Rate: 100 mL/hr (04/09/22 6110)        Medication Review: Reviewed    Assessment/Plan       Rash    Benign essential tremor    Hypothyroidism (acquired)    Paroxysmal A-fib (on eliquis & metoprolol)    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  SALEEM on CKD stage III baseline creatinine 1.5.  Creatinine 1.72 renal function is improving admission creatinine 2.3-2.7.  UA had large blood urine protein was 380 mg etiology of the SALEEM unknown possible drug reaction causing AIN or systemic IgE a vasculitis serology has been negative.  Skin rash has been improving. C3 144, C3 29, cryo neg,   2.  Skin rash palpable purpura suspect of drug reaction versus skin vasculitis.  Patient is  off prednisone, rash worst.  3.  Hyperkalemia resolved  4.  Hypernatremia improved with increased fluid intake.  5.  Volume status stable.  6.  Atrial fibrillation: S/p LARRY cardioversion.  Recommendations:  Given patient has solitary kidney renal function improving biopsy is not indicated at this time.  We suspect AIN in the setting of drug reaction.  No significant proteinuria patient was taken of the steroid due to mental status changes, he is placed back on steroid 20 mg daily due to recurrence of the rash..  Marked improvement in renal function with fluids noted at this time.  Pending MPO, ME-3  level  Case discussed with the wife and the daughter in the room  Houston Ríos MD  04/09/22  13:18 EDT

## 2022-04-09 NOTE — PLAN OF CARE
VSS.  A&Ox4.  NSR.  O2 2L NC.  Pt up to chair for a couple hours today.  Family at bedside throughout the day.

## 2022-04-09 NOTE — THERAPY TREATMENT NOTE
"Patient Name: Yinka Cummings  : 1945    MRN: [id number]                              Today's Date: 2022       Admit Date: 3/25/2022    Visit Dx:     ICD-10-CM ICD-9-CM   1. Oropharyngeal dysphagia  R13.12 787.22   2. Acute renal failure, unspecified acute renal failure type (HCC)  N17.9 584.9   3. Dehydration  E86.0 276.51   4. Cellulitis of lower extremity, unspecified laterality  L03.119 682.6   5. Vasculitis (HCC)  I77.6 447.6   6. Congestive heart failure, unspecified HF chronicity, unspecified heart failure type (HCC)  I50.9 428.0   7. 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)   • Chronic back pain (on chronic prescription lortab)   • HTN (hypertension)   • Solitary kidney (s/p nephrectomy for \"mass\")   • History of prostate cancer   • Closed fracture of phalanx of right hand   • Dental caries   • Acute renal failure, unspecified acute renal failure type (HCC)   • Rash   • Nausea and vomiting     Past Medical History:   Diagnosis Date   • Anxiety    • Arthritis    • Chronic kidney disease    • Diabetes mellitus (HCC)    • Disease of thyroid gland    • Diverticulosis    • Essential tremor    • HL (hearing loss)    • Hypertension    • Obesity    • Prostate cancer (HCC)    • Renal cell cancer (HCC)    • Skin cancer    • Vasculitis of skin      Past Surgical History:   Procedure Laterality Date   • CHOLECYSTECTOMY     • COLONOSCOPY      Polyps in the past but not on the most recent scope   • NEPHRECTOMY Right    • PROSTATE SURGERY      Radical prostatectomy   • SKIN CANCER EXCISION      Large incision left neck, multiple other cancers removed      General Information     Row Name 22 1051          Physical Therapy Time and " Intention    Document Type therapy note (daily note)  -AS     Mode of Treatment physical therapy  -AS     Row Name 04/09/22 1051          General Information    Patient Profile Reviewed yes  -AS     Existing Precautions/Restrictions fall  decreased skin integrity bilateral hands and feet  -AS     Barriers to Rehab medically complex;previous functional deficit;hearing deficit  -AS     Row Name 04/09/22 1051          Cognition    Orientation Status (Cognition) oriented to;person;place;verbal cues/prompts needed for orientation;situation;time  -AS     Row Name 04/09/22 1051          Safety Issues, Functional Mobility    Safety Issues Affecting Function (Mobility) awareness of need for assistance;friction/shear risk;insight into deficits/self-awareness;safety precaution awareness;safety precautions follow-through/compliance  -AS     Impairments Affecting Function (Mobility) balance;coordination;endurance/activity tolerance;pain;strength  -AS     Comment, Safety Issues/Impairments (Mobility) alert and following commands  -AS           User Key  (r) = Recorded By, (t) = Taken By, (c) = Cosigned By    Initials Name Provider Type    AS Delfina Daniels PTA Physical Therapist Assistant               Mobility     Row Name 04/09/22 1052          Bed Mobility    Rolling Left [place] (Bed Mobility) contact guard;1 person assist  -AS     Rolling Right [place] (Bed Mobility) contact guard;1 person assist  -AS     Supine-Sit [place] (Bed Mobility) not tested  -AS     Sit-Supine [place] (Bed Mobility) not tested  -AS     Assistive Device (Bed Mobility) bed rails  -AS     Comment, (Bed Mobility) rolled several times to change linen and place lift sling for transfer to recliner  -AS     Row Name 04/09/22 1052          Transfers    Comment, (Transfers) bed>chair via lift  -AS     Row Name 04/09/22 1052          Bed-Chair Transfer    Bed-Chair Godfrey (Transfers) dependent (less than 25% patient effort);2  person assist  -AS     Assistive Device (Bed-Chair Transfers) lift device  -AS     Comment, (Bed-Chair Transfer) lift to chair for safety  -AS     Row Name 04/09/22 1052          Sit-Stand Transfer    Sit-Stand [place] (Transfers) unable to assess  -AS     Comment, (Sit-Stand Transfer) sit<>stands deferred due to bilateral foot pain nad blisters/wounds on heels  -AS     Row Name 04/09/22 1052          Gait/Stairs (Locomotion)    [place] Level (Gait) unable to assess  -AS           User Key  (r) = Recorded By, (t) = Taken By, (c) = Cosigned By    Initials Name Provider Type    AS Delfina Daniels PTA Physical Therapist Assistant               Obj/Interventions     Row Name 04/09/22 1054          Motor Skills    Therapeutic Exercise shoulder;knee;ankle;hip  -AS     [facility] Name 04/09/22 1054          Shoulder (Therapeutic Exercise)    Shoulder (Therapeutic Exercise) AROM (active range of motion)  -AS     Shoulder AROM (Therapeutic Exercise) bilateral;flexion;extension;aBduction;aDduction;sitting;10 repetitions  bicep curls and sup/pronation  -AS     [facility] Name 04/09/22 1054          Hip (Therapeutic Exercise)    Hip (Therapeutic Exercise) AAROM (active assistive range of motion)  -AS     Hip AAROM (Therapeutic Exercise) bilateral;aBduction;aDduction;external rotation;internal rotation;supine;10 repetitions  -AS     [facility] Name 04/09/22 1054          Knee (Therapeutic Exercise)    Knee AROM (Therapeutic Exercise) bilateral;LAQ (long arc quad);heel slides;supine;10 repetitions  -AS     Knee Strengthening (Therapeutic Exercise) bilateral;LAQ (long arc quad);sitting;10 repetitions  -AS     [facility] Name 04/09/22 1054          Ankle (Therapeutic Exercise)    Ankle (Therapeutic Exercise) AROM (active range of motion)  -AS     Ankle AROM (Therapeutic Exercise) bilateral;dorsiflexion;plantarflexion;supine;10 repetitions  -AS           User Key  (r) = Recorded By, (t) = Taken By, (c) = Cosigned By    Initials Name Provider  Type    AS Delfina Daniels PTA Physical Therapist Assistant               Goals/Plan    No documentation.                Clinical Impression     Row Name 04/09/22 1055          Pain    Pretreatment Pain Rating 3/10  -AS     Posttreatment Pain Rating 3/10  -AS     Pain Location - Side/Orientation Bilateral  -AS     Pain Location lower  -AS     Pain Location - nose  -AS     Pre/Posttreatment Pain Comment c/o pain with NG tube throughout session  -AS     Pain Intervention(s) Repositioned  -AS     Row Name 04/09/22 1055          Plan of Care Review    Plan of Care Reviewed With patient  -AS     Progress no change  -AS     Outcome Evaluation patient completed bed mobility with CGA x1, transfer bed>recliner via lift for safety. Patient unable to attempt standing due to bilateral foot pain from blisters/wounds on heels. Completed B UE/LE exercises with minimal complaints of pain/stiffness reported throughout session. VSS throughout session.  -AS     Row Name 04/09/22 1055          Positioning and Restraints    Pre-Treatment Position in bed  -AS     Post Treatment Position chair  -AS     In Chair reclined;call light within reach;encouraged to call for assist;exit alarm on;with family/caregiver;waffle cushion;on mechanical lift sling;heels elevated;waffle boot/both;LUE elevated  -AS           User Key  (r) = Recorded By, (t) = Taken By, (c) = Cosigned By    Initials Name Provider Type    AS Delfina Daniels PTA Physical Therapist Assistant               Outcome Measures     Row Name 04/09/22 1059          How much help from another person do you currently need...    Turning from your back to your side while in flat bed without using bedrails? 3  -AS     Moving from lying on back to sitting on the side of a flat bed without bedrails? 2  -AS     Moving to and from a bed to a chair (including a wheelchair)? 1  -AS     Standing up from a chair using your arms (e.g., wheelchair, bedside chair)? 1  -AS     Climbing 3-5  steps with a railing? 1  -AS     To walk in hospital room? 1  -AS     AM-PAC 6 Clicks Score (PT) 9  -AS     Row Name 04/09/22 1058          Functional Assessment    Outcome Measure Options AM-PAC 6 Clicks Basic Mobility (PT)  -AS           User Key  (r) = Recorded By, (t) = Taken By, (c) = Cosigned By    Initials Name Provider Type    AS Delfina Daniels PTA Physical Therapist Assistant                             Physical Therapy Education                 Title: PT OT SLP Therapies (In Progress)     Topic: Physical Therapy (In Progress)     Point: Mobility training (In Progress)     Learning Progress Summary           Patient Acceptance, E, NR by AS at 4/9/2022 1058    Acceptance, E, VU,NR by CD at 4/6/2022 1546    Comment: SEE FLOWSHEET    Acceptance, E, NR by KG at 4/4/2022 1506    Acceptance, E, VU,NR by CD at 4/2/2022 1634    Comment: BENEFITS OF OOB ACTIVITY, ROM EXERCISE, PROGRESSION OF POC, D/C PLANNING,    Acceptance, E,D, VU,NR by HP at 3/29/2022 1517    Acceptance, E, NR by BA at 3/27/2022 1412   Family Acceptance, E, VU,NR by CD at 4/6/2022 1546    Comment: SEE FLOWSHEET    Acceptance, E,D, VU,NR by HP at 3/29/2022 1517    Acceptance, E, NR by BA at 3/27/2022 1412   Significant Other Acceptance, E, VU,NR by CD at 4/6/2022 1546    Comment: SEE FLOWSHEET                   Point: Home exercise program (In Progress)     Learning Progress Summary           Patient Acceptance, E, NR by AS at 4/9/2022 1058    Acceptance, E, VU,NR by CD at 4/6/2022 1546    Comment: SEE FLOWSHEET    Acceptance, E, NR by KG at 4/4/2022 1506    Acceptance, E, VU,NR by CD at 4/2/2022 1634    Comment: BENEFITS OF OOB ACTIVITY, ROM EXERCISE, PROGRESSION OF POC, D/C PLANNING,    Acceptance, E,D, VU,NR by HP at 3/29/2022 1517    Acceptance, E, NR by BA at 3/27/2022 1412   Family Acceptance, E, VU,NR by CD at 4/6/2022 1546    Comment: SEE FLOWSHEET    Acceptance, E,D, VU,NR by HP at 3/29/2022 1517    Acceptance, E, NR by BA at  3/27/2022 1412   Significant Other Acceptance, E, VU,NR by CD at 4/6/2022 1546    Comment: SEE FLOWSHEET                   Point: Body mechanics (In Progress)     Learning Progress Summary           Patient Acceptance, E, NR by AS at 4/9/2022 1058    Acceptance, E, VU,NR by CD at 4/6/2022 1546    Comment: SEE FLOWSHEET    Acceptance, E, NR by KG at 4/4/2022 1506    Acceptance, E, VU,NR by CD at 4/2/2022 1634    Comment: BENEFITS OF OOB ACTIVITY, ROM EXERCISE, PROGRESSION OF POC, D/C PLANNING,    Acceptance, E,D, VU,NR by HP at 3/29/2022 1517    Acceptance, E, NR by BA at 3/27/2022 1412   Family Acceptance, E, VU,NR by CD at 4/6/2022 1546    Comment: SEE FLOWSHEET    Acceptance, E,D, VU,NR by HP at 3/29/2022 1517    Acceptance, E, NR by BA at 3/27/2022 1412   Significant Other Acceptance, E, VU,NR by CD at 4/6/2022 1546    Comment: SEE FLOWSHEET                   Point: Precautions (In Progress)     Learning Progress Summary           Patient Acceptance, E, NR by AS at 4/9/2022 1058    Acceptance, E, VU,NR by CD at 4/6/2022 1546    Comment: SEE FLOWSHEET    Acceptance, E, NR by KG at 4/4/2022 1506    Acceptance, E, VU,NR by CD at 4/2/2022 1634    Comment: BENEFITS OF OOB ACTIVITY, ROM EXERCISE, PROGRESSION OF POC, D/C PLANNING,    Acceptance, E,D, VU,NR by HP at 3/29/2022 1517    Acceptance, E, NR by BA at 3/27/2022 1412   Family Acceptance, E, VU,NR by CD at 4/6/2022 1546    Comment: SEE FLOWSHEET    Acceptance, E,D, VU,NR by HP at 3/29/2022 1517    Acceptance, E, NR by BA at 3/27/2022 1412   Significant Other Acceptance, E, VU,NR by CD at 4/6/2022 1546    Comment: SEE FLOWSHEET                               User Key     Initials Effective Dates Name Provider Type Discipline    CD 06/16/21 -  Duby, Alessandra A, PT Physical Therapist PT    AS 06/16/21 -  Delfina Daniels, PTA Physical Therapist Assistant PT    KG 06/16/21 -  Rita Mchugh Physical Therapist PT    HP 06/01/21 -  Anya Velez, PT Physical  Therapist PT    BA 09/21/21 -  Renate Mendoza PT Physical Therapist PT              PT Recommendation and Plan     Plan of Care Reviewed With: patient  Progress: no change  Outcome Evaluation: patient completed bed mobility with CGA x1, transfer bed>recliner via lift for safety. Patient unable to attempt standing due to bilateral foot pain from blisters/wounds on heels. Completed B UE/LE exercises with minimal complaints of pain/stiffness reported throughout session. VSS throughout session.     Time Calculation:    PT Charges     Row Name 04/09/22 1059             Time Calculation    Start Time 1010  -AS      PT Received On 04/09/22  -AS      PT Goal Re-Cert Due Date 04/16/22  -AS              Timed Charges    16948 - PT Therapeutic Exercise Minutes 15  -AS      49258 - PT Therapeutic Activity Minutes 10  -AS              Total Minutes    Timed Charges Total Minutes 25  -AS       Total Minutes 25  -AS            User Key  (r) = Recorded By, (t) = Taken By, (c) = Cosigned By    Initials Name Provider Type    AS Delfina Daniels PTA Physical Therapist Assistant              Therapy Charges for Today     Code Description Service Date Service Provider Modifiers Qty    38079875307 HC PT THER PROC EA 15 MIN 4/9/2022 Delfina Daniels PTA GP 1    30626383714 HC PT THERAPEUTIC ACT EA 15 MIN 4/9/2022 Delfina Daniels PTA GP 1    53193582002 HC PT THER SUPP EA 15 MIN 4/9/2022 Delfina Daniels PTA GP 2          PT G-Codes  Outcome Measure Options: AM-PAC 6 Clicks Basic Mobility (PT)  AM-PAC 6 Clicks Score (PT): 9  AM-PAC 6 Clicks Score (OT): 9    Delfina Daniels PTA  4/9/2022

## 2022-04-09 NOTE — PROGRESS NOTES
"                                 [facility] Progress Note      LOS: 15 days   Patient Care Team:  Vivek Mercer DO as PCP - General (Family Medicine)    Chief Complaint:    Chief Complaint   Patient presents with   • Dizziness   • Vomiting       Subjective     Interval History: Resting comfortably today.      Review of Systems:   A 14 point review of systems was negative except as was stated in the HPI      Objective     Vital Sign Min/Max for last 24 hours  Temp  Min: 97.5 °F (36.4 °C)  Max: 99.2 °F (37.3 °C)   BP  Min: 126/54  Max: 179/69   Pulse  Min: 73  Max: 89   Resp  Min: 16  Max: 18   SpO2  Min: 88 %  Max: 98 %   Flow (L/min)  Min: 2  Max: 21   No data recorded     Flowsheet Rows    Flowsheet Row First Filed Value   Admission Height 175.3 cm (69\") Documented at 03/25/2022 0849   Admission Weight 113 kg (250 lb) Documented at 03/25/2022 0849          Physical Exam:  General Appearance: Alert, appears stated age, agitated, tremors  Lungs: Clear to auscultation  Heart:: Regular rate and rhythm; no Murmurs, Rubs or Gallops  Abdomen: Soft and nontender with adequate bowel sounds.  No organomegaly  Extremities: Trace pitting edema  Pulses: Pulses palpable and equal bilaterally  Skin: Rash noted  Psych: agitated     Results Review:     I reviewed the patient's new clinical results.  Results from last 7 days   Lab Units 04/09/22  0518 04/08/22  1158 04/07/22  0450   SODIUM mmol/L 146* 143 147*   POTASSIUM mmol/L 3.7 4.1 3.5   CHLORIDE mmol/L 115* 112* 116*   CO2 mmol/L 23.0 21.0* 22.0   BUN mg/dL 45* 48* 58*   CREATININE mg/dL 1.72* 1.80* 1.79*   GLUCOSE mg/dL 181* 190* 193*   CALCIUM mg/dL 8.0* 7.8* 7.8*     Results from last 7 days   Lab Units 04/08/22  1158 04/06/22  0540 04/04/22  0634   WBC 10*3/mm3 9.64 7.55 9.90   HEMOGLOBIN g/dL 8.0* 7.9* 8.2*   HEMATOCRIT % 26.2* 26.5* 26.7*   PLATELETS 10*3/mm3 225 261 369     Lab Results   Lab Value Date/Time    TROPONINT 0.026 04/24/2021 0708 "     Results from last 7 days   Lab Units 04/09/22  0518   CHOLESTEROL mg/dL 82   TRIGLYCERIDES mg/dL 148         Results from last 7 days   Lab Units 04/03/22 2056   PH, ARTERIAL pH units 7.381   PO2 ART mm Hg 70.0*   PCO2, ARTERIAL mm Hg 37.4   HCO3 ART mmol/L 22.2           Medication Review: yes  Current Facility-Administered Medications   Medication Dose Route Frequency Provider Last Rate Last Admin   • acetaminophen (TYLENOL) 160 MG/5ML solution 650 mg  650 mg Nasogastric Q4H PRN Jemma Arriaga APRN        Or   • acetaminophen (TYLENOL) tablet 650 mg  650 mg Oral Q4H PRN Jemma Arriaga APRN        Or   • acetaminophen (TYLENOL) suppository 650 mg  650 mg Rectal Q4H PRN Jemma Arriaga APRN       • amiodarone (PACERONE) tablet 200 mg  200 mg Nasogastric Q12H Jemma Arriaga APRN   200 mg at 04/08/22 2028    Followed by   • [START ON 4/20/2022] amiodarone (PACERONE) tablet 200 mg  200 mg Nasogastric Daily Jemma Arriaga APRN       • atorvastatin (LIPITOR) tablet 40 mg  40 mg Nasogastric Nightly Jemma Arriaga APRN   40 mg at 04/08/22 2024   • buprenorphine-naloxone (SUBOXONE) 8-2 MG per SL tablet 2 tablet  2 tablet Sublingual Nightly Raymond Herrera MD   2 tablet at 04/07/22 2249   • dextrose (D50W) (25 g/50 mL) IV injection 25 g  25 g Intravenous Q15 Min PRN Raymond Herrera MD       • dextrose (GLUTOSE) oral gel 15 g  15 g Nasogastric Q15 Min PRN Jemma Arriaga APRN       • DULoxetine (CYMBALTA) DR capsule 90 mg  90 mg Oral Daily Jemma Arriaga APRN   90 mg at 04/07/22 0820   • enoxaparin (LOVENOX) syringe 110 mg  110 mg Subcutaneous Q12H Yinka Brush IV, PharmD   110 mg at 04/09/22 0555   • glucagon (human recombinant) (GLUCAGEN DIAGNOSTIC) injection 1 mg  1 mg Intramuscular Q15 Min PRN Raymond Herrera MD       • haloperidol (HALDOL) 2 MG/ML solution 2 mg  2 mg Oral Q6H PRN Peña Weaver MD       • insulin detemir (LEVEMIR) injection 10 Units  10 Units Subcutaneous Nightly  Yinka Desai MD   10 Units at 04/08/22 2326   • insulin lispro (humaLOG) injection 0-9 Units  0-9 Units Subcutaneous TID AC Raymond Herrera MD   2 Units at 04/07/22 1744   • levothyroxine (SYNTHROID, LEVOTHROID) tablet 150 mcg  150 mcg Nasogastric Daily Jemma Arriaga APRN       • melatonin tablet 5 mg  5 mg Nasogastric Nightly Jemma Arriaga APRN   5 mg at 04/08/22 2025   • methohexital (BREVITAL) injection  - ADS Override Pull            • metoprolol tartrate (LOPRESSOR) tablet 50 mg  50 mg Nasogastric Q8H Wili Conner DO   50 mg at 04/09/22 0556   • midazolam (VERSED) 2 MG/2ML injection  - ADS Override Pull            • mineral oil-hydrophilic petrolatum (AQUAPHOR) ointment 1 application  1 application Topical BID PRN Peña Weaver MD   1 application at 04/06/22 2245   • mirtazapine (REMERON) tablet 7.5 mg  7.5 mg Nasogastric Nightly Jemma Arriaga APRN   7.5 mg at 04/08/22 2024   • nystatin (MYCOSTATIN) 100,000 unit/mL suspension 500,000 Units  5 mL Oral 4x Daily Jemma Arriaga APRN   500,000 Units at 04/08/22 2024   • ondansetron (ZOFRAN) injection 4 mg  4 mg Intravenous Q6H PRN Raymond Herrera MD   4 mg at 04/06/22 2324   • palliative care oral rinse   Mouth/Throat PRN Yinka Desai MD   Given at 04/06/22 1232   • pantoprazole (PROTONIX) injection 40 mg  40 mg Intravenous Q AM Jemma Arriaga APRN   40 mg at 04/09/22 0706   • polyethylene glycol (MIRALAX) packet 17 g  17 g Nasogastric Daily Jemma Arriaga APRN       • predniSONE (DELTASONE) tablet 20 mg  20 mg Nasogastric Daily With Breakfast Jemma Arriaga APRN       • PRO-STAT oral liquid 30 mL  30 mL Nasogastric Daily Wili Conner DO       • QUEtiapine (SEROquel) tablet 25 mg  25 mg Oral Q12H Wili Conner DO   25 mg at 04/08/22 2025   • sennosides-docusate (PERICOLACE) 8.6-50 MG per tablet 2 tablet  2 tablet Nasogastric BID Jemma Arriaga APRN   2 tablet at 04/08/22 2025   • Sodium Chloride  (PF) 0.9 % 10 mL  10 mL Intravenous PRN Raymond Herrera MD       • sodium chloride 0.45 % infusion  100 mL/hr Intravenous Continuous Francisco Atkinson  mL/hr at 04/09/22 0417 100 mL/hr at 04/09/22 0417   • sodium chloride 0.9 % flush 10 mL  10 mL Intravenous Q12H Raymond Herrera MD   10 mL at 04/08/22 2026   • sodium chloride 0.9 % flush 10 mL  10 mL Intravenous PRN Raymond Herrera MD   10 mL at 04/02/22 0830   • temazepam (RESTORIL) capsule 15 mg  15 mg Nasogastric Nightly Jemma Arriaga APRN       • vitamin B-12 (CYANOCOBALAMIN) tablet 50 mcg  50 mcg Nasogastric Daily Jemma Arriaga APRN             Rash    Benign essential tremor    Hypothyroidism (acquired)    Paroxysmal A-fib (on eliquis & metoprolol)    Insulin dependent type 2 diabetes mellitus (HCC)    Chronic back pain (on chronic prescription lortab)    Acute renal failure, unspecified acute renal failure type (HCC)    Nausea and vomiting        Impression      Atrial flutter status post synchronized cardioversion 4/1/2022  Chronic kidney disease  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

## 2022-04-09 NOTE — PROGRESS NOTES
[facility]  PROGRESS NOTE    Patient Name: Yinka Cummings  : 1945  MRN: [id number]    Date of Admission: 3/25/2022  Primary Care Physician: Vivek Mercer DO    Subjective   Subjective     CC:  Follow-up rash, dysphagia    HPI:  Even after Keofeed was placed yesterday patient was not administered as ordered prednisone, just got his first dose shortly prior to my assessment.  Has been tolerating tube feeds this morning.  Per patient family he has been left in bed despite up in chair orders, has not been out of bed for multiple days.    ROS:  Gen- No fevers, chills  CV- No chest pain, palpitations  Resp- No cough, dyspnea  GI- No N/V/D, abd pain    Objective   Objective     Vital Signs:   Temp:  [98.6 °F (37 °C)-99.2 °F (37.3 °C)] 98.8 °F (37.1 °C)  Heart Rate:  [73-89] 80  Resp:  [16-18] 18  BP: (126-161)/(54-65) 147/65  Flow (L/min):  [2] 2     Physical Exam:  Constitutional: Awake, alert, chronically ill-appearing male laying in bed  HENT: NCAT, mucous membranes moist, Keofeed in nare  Respiratory: Clear to auscultation bilaterally, respiratory effort normal   Cardiovascular: RRR, no murmurs, rubs, or gallops, palpable radial pulses  Gastrointestinal: Positive bowel sounds, soft, nontender, nondistended  Musculoskeletal: No bilateral ankle edema  Psychiatric: Appropriate affect, cooperative  Neurologic: Speech clear, moving all extremity spontaneously  Dermatologic: Diffuse purpuric rash over all extremities present and extending beyond marker lines on his arms    Results Reviewed:  LAB RESULTS:      Lab 22  1158 22  1623 22  0540 22  0634 22  2046 22  0740   WBC 9.64  --  7.55 9.90 9.55 12.96*   HEMOGLOBIN 8.0*  --  7.9* 8.2* 8.3* 8.5*   HEMATOCRIT 26.2*  --  26.5* 26.7* 24.9* 27.4*   PLATELETS 225  --  261 369 314 377   NEUTROS ABS 7.35*  --  4.92  --   --  9.75*   IMMATURE GRANS (ABS) 0.19*  --  0.13*  --   --   0.15*   LYMPHS ABS 1.18  --  1.63  --   --  1.69   MONOS ABS 0.54  --  0.73  --   --  1.33*   EOS ABS 0.34  --  0.11  --   --  0.02   MCV 97.4*  --  98.5* 95.4 90.2 97.9*   SED RATE  --  19  --   --   --   --    CRP  --  3.27*  --   --   --   --    LACTATE  --   --   --   --  1.7  --          Lab 04/09/22 0518 04/08/22  1158 04/07/22  0450 04/06/22  0540 04/05/22  1337 04/04/22  0634   SODIUM 146* 143 147* 154* 148* 138   POTASSIUM 3.7 4.1 3.5 4.4 4.1 4.8   CHLORIDE 115* 112* 116* 119* 112* 104   CO2 23.0 21.0* 22.0 24.0 20.0* 22.0   ANION GAP 8.0 10.0 9.0 11.0 16.0* 12.0   BUN 45* 48* 58* 69* 76* 83*   CREATININE 1.72* 1.80* 1.79* 2.24* 2.38* 2.35*   EGFR  --  38.8* 39.0* 29.8* 27.7* 28.2*   GLUCOSE 181* 190* 193* 147* 183* 198*   CALCIUM 8.0* 7.8* 7.8* 8.1* 8.3* 8.4*   MAGNESIUM 1.8  --   --   --   --   --    PHOSPHORUS 2.3*  --   --  3.5 3.3  --          Lab 04/09/22 0518 04/08/22  1158 04/06/22  0540 04/05/22  1337 04/03/22  2046   TOTAL PROTEIN 5.4* 5.3*  --   --  5.6*   ALBUMIN 2.60* 2.40* 2.80* 2.70* 2.70*   GLOBULIN  --  2.9  --   --  2.9   ALT (SGPT) 10 14  --   --  24   AST (SGOT) 12 15  --   --  37   BILIRUBIN 0.3 0.4  --   --  0.4   ALK PHOS 79 70  --   --  76             Lab 04/09/22 0518   CHOLESTEROL 82   TRIGLYCERIDES 148             Lab 04/03/22 2056   PH, ARTERIAL 7.381   PCO2, ARTERIAL 37.4   PO2 ART 70.0*   FIO2 21   HCO3 ART 22.2   BASE EXCESS ART -2.7*   CARBOXYHEMOGLOBIN 1.3     Brief Urine Lab Results  (Last result in the past 365 days)      Color   Clarity   Blood   Leuk Est   Nitrite   Protein   CREAT   Urine HCG        04/04/22 0045 Yellow   Clear   Moderate (2+)   Trace   Negative   Negative                 Microbiology Results Abnormal     Procedure Component Value - Date/Time    Wound Culture - Wound, Leg, Left [[id number]] Collected: 03/25/22 0918    Lab Status: Final result Specimen: Wound from Leg, Left Updated: 03/27/22 0903     Wound Culture Light growth (2+) Normal Skin Cami      Gram Stain Rare (1+) WBCs seen      Moderate (3+) Gram positive cocci in pairs and clusters    Eosinophil Smear - Urine, Urine, Clean Catch [[id number]]  (Normal) Collected: 03/26/22 1027    Lab Status: Final result Specimen: Urine, Clean Catch Updated: 03/26/22 1113     Eosinophil Smear 0 % EOS/100 Cells     Narrative:      No eosinophil seen    COVID-19 and FLU A/B PCR - Swab, Nasopharynx [[id number]]  (Normal) Collected: 03/25/22 0918    Lab Status: Final result Specimen: Swab from Nasopharynx Updated: 03/25/22 0955     COVID19 Not Detected     Influenza A PCR Not Detected     Influenza B PCR Not Detected    Narrative:      Fact sheet for providers: https://www.fda.gov/media/313816/download    Fact sheet for patients: https://www.fda.gov/media/422179/download    Test performed by PCR.          XR Abdomen KUB    Result Date: 4/8/2022   DATE OF EXAM: 4/8/2022 3:07 PM  PROCEDURE: XR ABDOMEN KUB-  INDICATIONS: Keofeed Placement; N17.9-Acute kidney failure, unspecified; E86.0-Dehydration; L03.119-Cellulitis of unspecified part of limb; I77.6-Arteritis, unspecified; I50.9-Heart failure, unspecified; R79.89-Other specified abnormal findings of blood chemistry; R13.12-Dysphagia, oropharyngeal phase  COMPARISON: Recent CT abdomen examination  FINDINGS:  Bowel gas pattern within normal limits. No evidence of ileus. No evidence of obstruction. No bowel wall thickening demonstrated.  Tip of the feeding tube is in the right midabdomen.      Impression: IMPRESSION :  Tip of the feeding tube at the duodenal bulb versus distal stomach, at location of the pylorus  This report was finalized on 4/8/2022 3:27 PM by Priyank Payton.        Results for orders placed during the hospital encounter of 03/25/22    Adult Transesophageal Echo (LARRY) W/ Cont if Necessary Per Protocol    Interpretation Summary  · Estimated left ventricular EF = 65%  · The cardiac valves are anatomically and functionally normal.  · No evidence of a left atrial  appendage thrombus was present.      I have reviewed the medications:  Scheduled Meds:atorvastatin, 40 mg, Nasogastric, Nightly  buprenorphine-naloxone, 2 tablet, Sublingual, Nightly  DULoxetine, 90 mg, Oral, Daily  enoxaparin, 110 mg, Subcutaneous, Q12H  insulin detemir, 10 Units, Subcutaneous, Nightly  insulin lispro, 0-9 Units, Subcutaneous, TID AC  levothyroxine, 150 mcg, Nasogastric, Daily  melatonin, 5 mg, Nasogastric, Nightly  methohexital, , ,   metoprolol tartrate, 50 mg, Nasogastric, Q8H  midazolam, , ,   mirtazapine, 7.5 mg, Nasogastric, Nightly  nystatin, 5 mL, Oral, 4x Daily  pantoprazole, 40 mg, Intravenous, Q AM  polyethylene glycol, 17 g, Nasogastric, Daily  predniSONE, 20 mg, Nasogastric, Daily With Breakfast  PRO-STAT, 30 mL, Nasogastric, Daily  QUEtiapine, 25 mg, Oral, Q12H  senna-docusate sodium, 2 tablet, Nasogastric, BID  sodium chloride, 10 mL, Intravenous, Q12H  temazepam, 15 mg, Nasogastric, Nightly  cyanocobalamin, 50 mcg, Nasogastric, Daily      Continuous Infusions:sodium chloride, 100 mL/hr, Last Rate: 100 mL/hr (04/09/22 1433)      PRN Meds:.•  acetaminophen **OR** acetaminophen **OR** acetaminophen  •  dextrose  •  dextrose  •  glucagon (human recombinant)  •  haloperidol  •  mineral oil-hydrophilic petrolatum  •  [DISCONTINUED] ondansetron **OR** ondansetron  •  palliative care oral rinse  •  Sodium Chloride (PF)  •  sodium chloride    Assessment/Plan   Assessment & Plan     Active Hospital Problems    Diagnosis  POA   • **Rash [R21]  Yes   • Acute renal failure, unspecified acute renal failure type (HCC) [N17.9]  Yes   • Nausea and vomiting [R11.2]  Unknown   • Paroxysmal A-fib (on eliquis & metoprolol) [I48.0]  Yes   • Insulin dependent type 2 diabetes mellitus (HCC) [E11.9, Z79.4]  Not Applicable   • Hypothyroidism (acquired) [E03.9]  Yes   • Chronic back pain (on chronic prescription lortab) [M54.9, G89.29]  Yes   • Benign essential tremor [G25.0]  Yes      Resolved Hospital  Problems   No resolved problems to display.        Brief Hospital Course to date:  Yinka Cummings is a 77 y.o. 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  -[facility], principal concern was his previous primidone, additionally his Lortab, Dabigatran, Lyrica, lisinopril have all been discontinued  -[facility] Bx w/ nonspecific perivascular lymphocytic infiltrate  -Rheumatology 3/29, clinical syndrome most c/w leukocytoclastic vasculitis  -Initial significant improvement with oral prednisone, which was discontinued due to acute encephalopathy, which was more likely from sleep deprivation/hospital delirium  -Prednisone 20 mg reordered for yesterday a.m., not administered despite placement of Keofeed midday, first dose given today, monitor response  -PT WOC 4/5 for wounds on bilateral lower extremities, I have requested they come reevaluate/follow-up    Oropharyngeal dysphagia  -Keofeed placed 4/8, nutrition follows for tube feeds  -SLP follows, per my previous phone call they are optimistic this is a temporary measure    Sarcopenia  -Has not been out of bed for multiple days despite up in chair orders; needs to be in chair daily at minimum as tolerated either by PT/OT or nursing staff  -Plan for rehab at  "discharge    Acute encephalopathy, multifactorial, improved  Sleep deprivation psychosis/hospital associated delirium  -Initially blamed on steroid-induced psychosis however neurology has felt it was more significantly a sleep deprivation psychosis; reattempting steroids    Acute renal dysfunction on CKD 3, improved  Hx RCC s/p nephrectomy  -Per documentation [facility] records report baseline CR 1.5-1.7 as recent as 3/20/22  -Nephrology follows  -Renal function stable/improved, essentially baseline    Atrial fibrillation/flutter  -Recently Dabigatran was DC'd due to rash; currently on full dose Lovenox with plan to transition to NOAC after 30-day \"washout\" from discontinuation of primidone (concern for drug-drug interaction)  -Cardiology follows, s/p ECV 4/1/22; they are discontinuing amiodarone, continue Lopressor    Chronic pain syndrome  -Recently transitioned to Suboxone from Lortab by the [facility] due to concern for contribution to rash; will continue here    DM type 2, A1c 7.9%, with long-term use of insulin  -Continue Levemir, SSI    Hypothyroidism  Hypertension  Hyperlipidemia  Mood disorder  -Continue atorvastatin, duloxetine, Lopressor    Severe tremor  -Primidone recently DC'd for rash  -Prescribed Sinemet recently but has not yet been able to start  -[facility]-up with [facility]    Chronic normocytic anemia  -Relatively stable, monitor    Abnormal abdominal CT  -Per documentation questionable pyelo versus more likely duodenitis, potential bibasilar consolidation; ID follows, monitoring off antibiotics    VINI  -Documented as noncompliant with CPAP    Obesity, BMI 38.86 kg/M2  -Complicates all aspects of care      DVT prophylaxis:  Medical DVT prophylaxis orders are present.       AM-PAC 6 Clicks Score (PT): 9 (04/09/22 3522)    Disposition: Starting prednisone today, monitoring for response, if rash improving and tolerating physical therapy I would hope we can reattempt rehab placement this week    CODE STATUS:   Code " Status and Medical Interventions:   Ordered at: 03/25/22 1328     Level Of Support Discussed With:    Patient     Code Status (Patient has no pulse and is not breathing):    CPR (Attempt to Resuscitate)     Medical Interventions (Patient has pulse or is breathing):    Full Support       Wili Conner, DO  04/09/22

## 2022-04-09 NOTE — PLAN OF CARE
Goal Outcome Evaluation:  Plan of Care Reviewed With: patient        Progress: no change  Outcome Evaluation: patient completed bed mobility with CGA x1, transfer bed>recliner via lift for safety. Patient unable to attempt standing due to bilateral foot pain from blisters/wounds on heels. Completed B UE/LE exercises with minimal complaints of pain/stiffness reported throughout session. VSS throughout session.

## 2022-04-10 LAB
ALBUMIN SERPL-MCNC: 2.6 G/DL (ref 3.5–5.2)
ANION GAP SERPL CALCULATED.3IONS-SCNC: 7 MMOL/L (ref 5–15)
BUN SERPL-MCNC: 39 MG/DL (ref 8–23)
BUN/CREAT SERPL: 25.5 (ref 7–25)
CALCIUM SPEC-SCNC: 8 MG/DL (ref 8.6–10.5)
CHLORIDE SERPL-SCNC: 112 MMOL/L (ref 98–107)
CO2 SERPL-SCNC: 23 MMOL/L (ref 22–29)
CREAT SERPL-MCNC: 1.53 MG/DL (ref 0.76–1.27)
EGFRCR SERPLBLD CKD-EPI 2021: 47.1 ML/MIN/1.73
GLUCOSE BLDC GLUCOMTR-MCNC: 274 MG/DL (ref 70–130)
GLUCOSE BLDC GLUCOMTR-MCNC: 300 MG/DL (ref 70–130)
GLUCOSE BLDC GLUCOMTR-MCNC: 381 MG/DL (ref 70–130)
GLUCOSE BLDC GLUCOMTR-MCNC: 382 MG/DL (ref 70–130)
GLUCOSE SERPL-MCNC: 271 MG/DL (ref 65–99)
PHOSPHATE SERPL-MCNC: 1.6 MG/DL (ref 2.5–4.5)
POTASSIUM SERPL-SCNC: 3.8 MMOL/L (ref 3.5–5.2)
SODIUM SERPL-SCNC: 142 MMOL/L (ref 136–145)

## 2022-04-10 PROCEDURE — 63710000001 INSULIN LISPRO (HUMAN) PER 5 UNITS: Performed by: INTERNAL MEDICINE

## 2022-04-10 PROCEDURE — 82962 GLUCOSE BLOOD TEST: CPT

## 2022-04-10 PROCEDURE — 80069 RENAL FUNCTION PANEL: CPT | Performed by: INTERNAL MEDICINE

## 2022-04-10 PROCEDURE — 63710000001 INSULIN DETEMIR PER 5 UNITS: Performed by: INTERNAL MEDICINE

## 2022-04-10 PROCEDURE — 63710000001 PREDNISONE PER 1 MG: Performed by: NURSE PRACTITIONER

## 2022-04-10 PROCEDURE — 25010000002 ENOXAPARIN PER 10 MG

## 2022-04-10 PROCEDURE — 25010000002 ONDANSETRON PER 1 MG: Performed by: INTERNAL MEDICINE

## 2022-04-10 PROCEDURE — 99232 SBSQ HOSP IP/OBS MODERATE 35: CPT | Performed by: INTERNAL MEDICINE

## 2022-04-10 RX ORDER — QUETIAPINE FUMARATE 25 MG/1
25 TABLET, FILM COATED ORAL EVERY 12 HOURS SCHEDULED
Status: DISCONTINUED | OUTPATIENT
Start: 2022-04-10 | End: 2022-04-14

## 2022-04-10 RX ADMIN — NYSTATIN 500000 UNITS: 100000 SUSPENSION ORAL at 17:21

## 2022-04-10 RX ADMIN — ENOXAPARIN SODIUM 110 MG: 120 INJECTION SUBCUTANEOUS at 05:32

## 2022-04-10 RX ADMIN — NYSTATIN 500000 UNITS: 100000 SUSPENSION ORAL at 12:43

## 2022-04-10 RX ADMIN — INSULIN LISPRO 7 UNITS: 100 INJECTION, SOLUTION INTRAVENOUS; SUBCUTANEOUS at 12:42

## 2022-04-10 RX ADMIN — Medication 10 ML: at 08:40

## 2022-04-10 RX ADMIN — INSULIN DETEMIR 12 UNITS: 100 INJECTION, SOLUTION SUBCUTANEOUS at 20:59

## 2022-04-10 RX ADMIN — ATORVASTATIN CALCIUM 40 MG: 40 TABLET, FILM COATED ORAL at 20:57

## 2022-04-10 RX ADMIN — METOPROLOL TARTRATE 50 MG: 50 TABLET, FILM COATED ORAL at 14:25

## 2022-04-10 RX ADMIN — SENNOSIDES AND DOCUSATE SODIUM 2 TABLET: 50; 8.6 TABLET ORAL at 21:08

## 2022-04-10 RX ADMIN — QUETIAPINE FUMARATE 25 MG: 25 TABLET ORAL at 20:57

## 2022-04-10 RX ADMIN — INSULIN LISPRO 6 UNITS: 100 INJECTION, SOLUTION INTRAVENOUS; SUBCUTANEOUS at 08:43

## 2022-04-10 RX ADMIN — SALINE NASAL SPRAY 1 SPRAY: 1.5 SOLUTION NASAL at 08:41

## 2022-04-10 RX ADMIN — ENOXAPARIN SODIUM 110 MG: 120 INJECTION SUBCUTANEOUS at 17:21

## 2022-04-10 RX ADMIN — INSULIN LISPRO 8 UNITS: 100 INJECTION, SOLUTION INTRAVENOUS; SUBCUTANEOUS at 17:21

## 2022-04-10 RX ADMIN — QUETIAPINE FUMARATE 25 MG: 25 TABLET ORAL at 08:43

## 2022-04-10 RX ADMIN — BUPRENORPHINE AND NALOXONE 2 TABLET: 8; 2 TABLET SUBLINGUAL at 20:57

## 2022-04-10 RX ADMIN — TEMAZEPAM 15 MG: 15 CAPSULE ORAL at 20:57

## 2022-04-10 RX ADMIN — Medication 5 MG: at 20:56

## 2022-04-10 RX ADMIN — PREDNISONE 20 MG: 20 TABLET ORAL at 08:42

## 2022-04-10 RX ADMIN — MIRTAZAPINE 7.5 MG: 15 TABLET, FILM COATED ORAL at 20:57

## 2022-04-10 RX ADMIN — INSULIN LISPRO 4 UNITS: 100 INJECTION, SOLUTION INTRAVENOUS; SUBCUTANEOUS at 17:20

## 2022-04-10 RX ADMIN — SODIUM PHOSPHATE, MONOBASIC, MONOHYDRATE 20 MMOL: 276; 142 INJECTION, SOLUTION INTRAVENOUS at 14:25

## 2022-04-10 RX ADMIN — PANTOPRAZOLE SODIUM 40 MG: 40 INJECTION, POWDER, FOR SOLUTION INTRAVENOUS at 05:32

## 2022-04-10 RX ADMIN — METOPROLOL TARTRATE 50 MG: 50 TABLET, FILM COATED ORAL at 05:32

## 2022-04-10 RX ADMIN — NYSTATIN 500000 UNITS: 100000 SUSPENSION ORAL at 08:42

## 2022-04-10 RX ADMIN — POLYETHYLENE GLYCOL 3350 17 G: 17 POWDER, FOR SOLUTION ORAL at 08:42

## 2022-04-10 RX ADMIN — METOPROLOL TARTRATE 50 MG: 50 TABLET, FILM COATED ORAL at 20:56

## 2022-04-10 RX ADMIN — SENNOSIDES AND DOCUSATE SODIUM 2 TABLET: 50; 8.6 TABLET ORAL at 08:43

## 2022-04-10 RX ADMIN — VITAM B12 50 MCG: 100 TAB at 08:41

## 2022-04-10 RX ADMIN — DULOXETINE HYDROCHLORIDE 90 MG: 30 CAPSULE, DELAYED RELEASE ORAL at 08:42

## 2022-04-10 RX ADMIN — MINERAL OIL: 1000 LIQUID ORAL at 08:41

## 2022-04-10 RX ADMIN — Medication 10 ML: at 20:57

## 2022-04-10 RX ADMIN — LEVOTHYROXINE SODIUM 150 MCG: 150 TABLET ORAL at 08:43

## 2022-04-10 NOTE — PROGRESS NOTES
"   LOS: 16 days    Patient Care Team:  Vivek Mercer DO as PCP - General (Family Medicine)    Chief Complaint: SALEEM    Subjective     Interval History:   Skin rash much better today.  No new events no added distress.  Review of Systems:   She denies nausea vomiting chest pain shortness of breath no dysuria hematuria.  Rash is getting better      Objective     Vital Sign Min/Max for last 24 hours  Temp  Min: 98.5 °F (36.9 °C)  Max: 98.9 °F (37.2 °C)   BP  Min: 155/95  Max: 181/87   Pulse  Min: 76  Max: 90   Resp  Min: 16  Max: 18   SpO2  Min: 93 %  Max: 100 %   Flow (L/min)  Min: 2  Max: 2   No data recorded     Flowsheet Rows    Flowsheet Row First Filed Value   Admission Height 175.3 cm (69\") Documented at 03/25/2022 0849   Admission Weight 113 kg (250 lb) Documented at 03/25/2022 0849          I/O this shift:  In: 60 [Other:60]  Out: -   I/O last 3 completed shifts:  In: 2241 [I.V.:900; Other:366; NG/GT:975]  Out: -     Physical Exam:  General Appearance: With obesity, alert oriented x3 no obvious distress.  Eyes: PER, EOMI.  Neck: Supple no JVD.  Lungs: Clear auscultation, no rales rhonchi's, equal chest movement, nonlabored.  Heart: No gallop, murmur, rub, RRR.  Abdomen: Morbid obesity, soft, nontender, positive bowel sounds, no organomegaly.  Extremities: No edema cyanosis.  Neuro: No focal deficit, moving all extremities, alert oriented X 3  Skin: Skin rash much better today    WBC WBC   Date Value Ref Range Status   04/08/2022 9.64 3.40 - 10.80 10*3/mm3 Final      HGB Hemoglobin   Date Value Ref Range Status   04/08/2022 8.0 (L) 13.0 - 17.7 g/dL Final      HCT Hematocrit   Date Value Ref Range Status   04/08/2022 26.2 (L) 37.5 - 51.0 % Final      Platlets No results found for: LABPLAT   MCV MCV   Date Value Ref Range Status   04/08/2022 97.4 (H) 79.0 - 97.0 fL Final          Sodium Sodium   Date Value Ref Range Status   04/09/2022 146 (H) 136 - 145 mmol/L Final   04/08/2022 143 136 - 145 mmol/L Final    "   Potassium Potassium   Date Value Ref Range Status   04/09/2022 3.7 3.5 - 5.2 mmol/L Final   04/08/2022 4.1 3.5 - 5.2 mmol/L Final      Chloride Chloride   Date Value Ref Range Status   04/09/2022 115 (H) 98 - 107 mmol/L Final   04/08/2022 112 (H) 98 - 107 mmol/L Final      CO2 CO2   Date Value Ref Range Status   04/09/2022 23.0 22.0 - 29.0 mmol/L Final   04/08/2022 21.0 (L) 22.0 - 29.0 mmol/L Final      BUN BUN   Date Value Ref Range Status   04/09/2022 45 (H) 8 - 23 mg/dL Final   04/08/2022 48 (H) 8 - 23 mg/dL Final      Creatinine Creatinine   Date Value Ref Range Status   04/09/2022 1.72 (H) 0.76 - 1.27 mg/dL Final   04/08/2022 1.80 (H) 0.76 - 1.27 mg/dL Final      Calcium Calcium   Date Value Ref Range Status   04/09/2022 8.0 (L) 8.6 - 10.5 mg/dL Final   04/08/2022 7.8 (L) 8.6 - 10.5 mg/dL Final      PO4 No results found for: CAPO4   Albumin Albumin   Date Value Ref Range Status   04/09/2022 2.60 (L) 3.50 - 5.20 g/dL Final   04/08/2022 2.40 (L) 3.50 - 5.20 g/dL Final      Magnesium Magnesium   Date Value Ref Range Status   04/09/2022 1.8 1.6 - 2.4 mg/dL Final      Uric Acid No results found for: URICACID        Results Review:     I reviewed the patient's new clinical results.    atorvastatin, 40 mg, Nasogastric, Nightly  buprenorphine-naloxone, 2 tablet, Sublingual, Nightly  DULoxetine, 90 mg, Oral, Daily  enoxaparin, 110 mg, Subcutaneous, Q12H  insulin detemir, 10 Units, Subcutaneous, Nightly  insulin lispro, 0-9 Units, Subcutaneous, TID AC  levothyroxine, 150 mcg, Nasogastric, Daily  melatonin, 5 mg, Nasogastric, Nightly  methohexital, , ,   metoprolol tartrate, 50 mg, Nasogastric, Q8H  midazolam, , ,   mirtazapine, 7.5 mg, Nasogastric, Nightly  nystatin, 5 mL, Oral, 4x Daily  pantoprazole, 40 mg, Intravenous, Q AM  polyethylene glycol, 17 g, Nasogastric, Daily  predniSONE, 20 mg, Nasogastric, Daily With Breakfast  PRO-STAT, 30 mL, Nasogastric, Daily  QUEtiapine, 25 mg, Nasogastric, Q12H  senna-docusate  sodium, 2 tablet, Nasogastric, BID  sodium chloride, 10 mL, Intravenous, Q12H  temazepam, 15 mg, Nasogastric, Nightly  cyanocobalamin, 50 mcg, Nasogastric, Daily      sodium chloride, 100 mL/hr, Last Rate: Stopped (04/09/22 2200)        Medication Review: Reviewed    Assessment/Plan       Rash    Benign essential tremor    Hypothyroidism (acquired)    Paroxysmal A-fib (on eliquis & metoprolol)    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  SALEEM on CKD stage III baseline creatinine 1.5.  Creatinine 1.72 renal function is improving admission creatinine 2.3-2.7.  UA had large blood urine protein was 380 mg etiology of the SALEEM unknown possible drug reaction causing AIN or systemic IgE a vasculitis serology has been negative.  Skin rash has been improving. C3 144, C3 29, cryo neg,   2.  Skin rash palpable purpura suspect of drug reaction versus skin vasculitis.  Patient is  off prednisone, rash worst.  3.  Hyperkalemia resolved  4.  Hypernatremia improved with increased fluid intake.  5.  Volume status stable.  6.  Atrial fibrillation: S/p LARRY cardioversion.  Recommendations:  Labs in the morning.  Monitor sodium level  Given patient has solitary kidney renal function improving biopsy is not indicated at this time.  We suspect AIN in the setting of drug reaction.  No significant proteinuria patient was taken of the steroid due to mental status changes, he is placed back on steroid 20 mg daily due to recurrence of the rash..  Marked improvement in renal function with fluids noted at this time.  Pending MPO, ID-3 level  Discussed with daughter and wife in the room  Houston Ríos MD  04/10/22  11:43 EDT

## 2022-04-10 NOTE — PROGRESS NOTES
INFECTIOUS DISEASE Progress Note    Yinka Cummings  1945  [id number]      Admission Date: 3/25/2022      Requesting Provider: Yinka Desai MD  Evaluating Physician: Brandt Ward MD    Reason for Consultation: vasculitis rash with cellulitis    History of present illness:    3/26/2022: Patient is a 77 y.o. male, ,known to Dr. Wili Rees, with h/o CKD, T2DM, afib/flutter, chronic diastolic heart failure, VINI, chronic pain, gastroparesis, Gilbert's disease,  hypothyroidism, essential tremor, HTN, Obesity, Prostate cancer/radical prostatectomy, renal cell carcinoma/right nephrectomy, multiple skin cancer excisions, and cutaneous vasculitis who we were asked to see for cellulitis.  The patient presented to [facility] ED on 3/25/22 with nausea, vomiting, and LE rash/redness.  [facility] and Afib/RVR.  Dermatology did a punch biopsy with path showing perivascular lymphocytic infiltrate with no evidence of vasculitis at the time.  He was though to have rash from Pradaxa and was changed to Lovenox.  The rash worsened and eventually Lyrica, Primidone, and Norco were stopped one by one.  He was started on Suboxone on 3/16 and his rash began to improve.  Primidone had been a new drug started just prior the start of the rash.  [facility] and converted to NSR.  He was discharge home on Lovenox.  He followed up with his PCP on 3/21 and was placed on Keflex for possible cutaneous infections at the rash site and Lasix for BLE edema.    His rash has worsened again on feet, legs, abdomen, and arms prompting him to come to [facility] ED on 3/25.  He developed nausea and vomiting prior to his presentation.  He denies fever, chills, abdominal pain, diarrhea, or dysuria.  On arrival, his Tmax is 99.4.  Initial labs were CRP 12.02-->12.5, INR 1.2, ESR 44-->62, PCT 0.78-->0.94, lactic acid 2.0, lipase 8, proBNP 5100, D-dimer 4.44, RF 16.4, C3 144, C4 29, WBC 10,600  with 83% neutrophils, and creatinine 2.35-->2.75 (baseline 1.6 on 5/20/21).  A leg wound culture showed normal skin ananth with GS showing GPC in pairs/clusters.  A second wound culture is pending with growth present.   A COVID-19/Flu PCR is negative.  A Hep panel was negative.  A UA WBC is 13-20 with TNTC RBCs, trace LE, negative nitrite.  KENZIE, ANCA panel, cryogobulin, and complement are pending.  A CXR showed no acute findings.  A DVT work up of BLE was negative although peroneal veins were not well visualized bilaterally. A CT scan of a/p with contrast has been ordered.  He is currently on low dose Rocephin.  ID was asked to evaluate and manage his antibiotic therapy.    He continues to have nausea and vomiting with cold sweat.  He denies any diarrhea.     3/27/2022: He complains of lower extremity pain which is most prominent in his feet.  He has remained afebrile.  His creatinine today is 2.82 and his C-reactive protein is 11.6.  His white blood cell count is 17.2.  He has anorexia but denies nausea and vomiting.    3/28/22: He has decreased Bilateral foot pain.  He has remained afebrile.  His creatinine is down to 2.5. His 24 hour protein is 360. He denies nausea, vomiting, and diarrhea    3/29/22:  Maximum temperature over the last 24 hours is 99.2. Creatinine yesterday was 2.58. CH 50 was greater than 60, ANCA was negative and KENZIE was negative.  He has decreased foot pain.  He complains of malaise but denies nausea and vomiting.    3/30/22: He has remained afebrile.  He denies nausea and vomiting.  He has decreased lower extremity pain.  He denies dyspnea.    3/31/22: He remains afebrile. His creatinine today is 2.6.  His C-reactive protein is 6.8.  His white blood cell count is 15.4.  His echocardiogram revealed no valvular vegetations. He and his family indicate improvement in his lower extremity rash but he has more extensive lesions over his palms.     4/1/2022: He underwent cardioversion today.  He is  now in sinus rhythm.  He has remained afebrile.  He is currently drowsy from his sedation for cardioversion.  His family thinks that his rash is no worse today.  His creatinine today is 2.33.      4/4/2022: He has been confused over the weekend.  This worsened after he was started on prednisone.  He has been afebrile.  He notes a significant improvement in his rash over the weekend.  He denies nausea, vomiting, and diarrhea.  He has remained afebrile.  His creatinine today is 2.35.    4/5/22: He has been severely confused overnight with agitation. He had a fever to 100.6° earlier today but is now afebrile. Laboratory studies today reveal a creatinine of 2.38.  A urinalysis yesterday revealed 6-12 white blood cells. He is unable to provide a review of systems.    4/6/22: He had a low-grade fever to 100.6 at around noon yesterday but has been afebrile since. His creatinine today is 2.24 and his white blood cell count is 7.6. Sodium is elevated at 154. He feels much better.  He he is much less agitated and has appropriate conversation today.    4/7/22: He has remained afebrile over the last 24 hours. His creatinine today is down to 1.79. He feels much better.  He denies increased foot pain. Agitation and confusion have dramatically improved.    4/8/22: He has remained afebrile.  Yesterday his ESR was down to 19 and his C-reactive protein was 3.3. His rash over his upper extremities dramatically worsened overnight although his rash on his lower extremities is unchanged.  He has now being started back on prednisone at 20 mg per day.    4/10/22: I follow the patient for Dr. Brandt Ward while he is out.  The patient and his family members at bedside report that his rash over his extremities appears to be improving over the last couple of days since restarting the prednisone.  Renal function is slightly better today.  He is tolerating the steroids better without any significant confusion today.  No fevers.    Past  Medical History:   Diagnosis Date   • Anxiety    • Arthritis    • Chronic kidney disease    • Diabetes mellitus (HCC)    • Disease of thyroid gland    • Diverticulosis    • Essential tremor    • HL (hearing loss)    • Hypertension    • Obesity    • Prostate cancer (HCC)    • Renal cell cancer (HCC)    • Skin cancer    • Vasculitis of skin        Past Surgical History:   Procedure Laterality Date   • CHOLECYSTECTOMY     • COLONOSCOPY      Polyps in the past but not on the most recent scope   • NEPHRECTOMY Right    • PROSTATE SURGERY      Radical prostatectomy   • SKIN CANCER EXCISION      Large incision left neck, multiple other cancers removed       Family History   Problem Relation Age of Onset   • Cancer Mother    • Heart attack Father    • Kidney failure Sister    • Coronary artery disease Sister        Social History     Socioeconomic History   • Marital status:    • Number of children: 4   Tobacco Use   • Smoking status: Never Smoker   • Smokeless tobacco: Never Used   Vaping Use   • Vaping Use: Never used   Substance and Sexual Activity   • Alcohol use: Not Currently   • Drug use: Not Currently   • Sexual activity: Defer       Allergies   Allergen Reactions   • Contrast Dye Rash     R[facility]   • Doxycycline Rash     Urticaria [facility]   • Chlorthalidone Other (See Comments)     Hyponatremia [facility]   • Morphine Unknown - Low Severity     Headache [facility]   • Neurontin [Gabapentin] Mental Status Change     Drowsy [facility]   • Phenergan [Promethazine] Unknown - Low Severity     Confusion [facility]         Medication:    Current Facility-Administered Medications:   •  acetaminophen (TYLENOL) tablet 650 mg, 650 mg, Oral, Q4H PRN **OR** acetaminophen (TYLENOL) 160 MG/5ML solution 650 mg, 650 mg, Nasogastric, Q4H PRN **OR** acetaminophen (TYLENOL) suppository 650 mg, 650 mg, Rectal, Q4H PRN, Jemma Arriaga APRN  •  atorvastatin (LIPITOR) tablet 40 mg, 40  mg, Nasogastric, Nightly, Jemma Arriaga APRN, 40 mg at 04/09/22 2119  •  buprenorphine-naloxone (SUBOXONE) 8-2 MG per SL tablet 2 tablet, 2 tablet, Sublingual, Nightly, Raymond Herrera MD, 2 tablet at 04/09/22 2119  •  dextrose (D50W) (25 g/50 mL) IV injection 25 g, 25 g, Intravenous, Q15 Min PRN, Raymond Herrera MD  •  dextrose (GLUTOSE) oral gel 15 g, 15 g, Nasogastric, Q15 Min PRN, Jemma Arriaga APRN  •  DULoxetine (CYMBALTA) DR capsule 90 mg, 90 mg, Oral, Daily, Jemma Arriaga APRN, 90 mg at 04/10/22 0842  •  enoxaparin (LOVENOX) syringe 110 mg, 110 mg, Subcutaneous, Q12H, Yinka Brush IV, PharmD, 110 mg at 04/10/22 1721  •  glucagon (human recombinant) (GLUCAGEN DIAGNOSTIC) injection 1 mg, 1 mg, Intramuscular, Q15 Min PRN, Raymond Herrera MD  •  haloperidol (HALDOL) 2 MG/ML solution 2 mg, 2 mg, Oral, Q6H PRN, Peña Weaver MD  •  insulin detemir (LEVEMIR) injection 12 Units, 12 Units, Subcutaneous, Nightly, Wili Conner DO  •  insulin lispro (humaLOG) injection 0-9 Units, 0-9 Units, Subcutaneous, TID AC, Raymond Herrera MD, 8 Units at 04/10/22 1721  •  insulin lispro (humaLOG) injection 4 Units, 4 Units, Subcutaneous, TID With Meals, Wili Conner DO, 4 Units at 04/10/22 1720  •  levothyroxine (SYNTHROID, LEVOTHROID) tablet 150 mcg, 150 mcg, Nasogastric, Daily, Jemma Arriaga APRN, 150 mcg at 04/10/22 0843  •  melatonin tablet 5 mg, 5 mg, Nasogastric, Nightly, Jemma Arriaga APRN, 5 mg at 04/09/22 2119  •  methohexital (BREVITAL) injection  - ADS Override Pull, , , ,   •  metoprolol tartrate (LOPRESSOR) tablet 50 mg, 50 mg, Nasogastric, Q8H, Wili Conner DO, 50 mg at 04/10/22 1425  •  midazolam (VERSED) 2 MG/2ML injection  - ADS Override Pull, , , ,   •  mineral oil-hydrophilic petrolatum (AQUAPHOR) ointment 1 application, 1 application, Topical, BID PRN, Peña Weaver MD, 1 application at 04/06/22 0563  •  mirtazapine (REMERON) tablet 7.5 mg, 7.5  mg, Nasogastric, Nightly, Jemma Arriaga APRN, 7.5 mg at 04/09/22 2119  •  nystatin (MYCOSTATIN) 100,000 unit/mL suspension 500,000 Units, 5 mL, Oral, 4x Daily, Jemma Arriaga APRN, 500,000 Units at 04/10/22 1721  •  [DISCONTINUED] ondansetron (ZOFRAN) tablet 4 mg, 4 mg, Oral, Q6H PRN **OR** ondansetron (ZOFRAN) injection 4 mg, 4 mg, Intravenous, Q6H PRN, Raymond Herrera MD, 4 mg at 04/06/22 2324  •  palliative care oral rinse, , Mouth/Throat, PRN, Yinka Desai MD, Given at 04/10/22 0841  •  pantoprazole (PROTONIX) injection 40 mg, 40 mg, Intravenous, Q AM, Jemma Arriaga APRN, 40 mg at 04/10/22 0532  •  polyethylene glycol (MIRALAX) packet 17 g, 17 g, Nasogastric, Daily, Jemma Arriaga APRN, 17 g at 04/10/22 0842  •  predniSONE (DELTASONE) tablet 20 mg, 20 mg, Nasogastric, Daily With Breakfast, Jemma Arriaga APRN, 20 mg at 04/10/22 0842  •  PRO-STAT oral liquid 30 mL, 30 mL, Nasogastric, Daily, Wili Conner DO  •  QUEtiapine (SEROquel) tablet 25 mg, 25 mg, Nasogastric, Q12H, Wili Conner DO  •  sennosides-docusate (PERICOLACE) 8.6-50 MG per tablet 2 tablet, 2 tablet, Nasogastric, BID, Jemma Arriaga APRN, 2 tablet at 04/10/22 0843  •  Sodium Chloride (PF) 0.9 % 10 mL, 10 mL, Intravenous, PRN, Raymond Herrera MD  •  sodium chloride 0.45 % infusion, 100 mL/hr, Intravenous, Continuous, Francisco Atkinson MD, Stopped at 04/09/22 2200  •  sodium chloride 0.9 % flush 10 mL, 10 mL, Intravenous, Q12H, Raymond Herrera MD, 10 mL at 04/10/22 0840  •  sodium chloride 0.9 % flush 10 mL, 10 mL, Intravenous, PRN, Raymond Herrera MD, 10 mL at 04/02/22 0830  •  sodium chloride nasal spray 1 spray, 1 spray, Each Nare, PRN, Yaima Mansfield, APRN, 1 spray at 04/10/22 0841  •  temazepam (RESTORIL) capsule 15 mg, 15 mg, Nasogastric, Nightly, Jemma Arriaga APRN  •  vitamin B-12 (CYANOCOBALAMIN) tablet 50 mcg, 50 mcg, Nasogastric, Daily, Jemma Arriaga APRN, 50 mcg at 04/10/22  0841    Antibiotics:  Anti-Infectives (From admission, onward)    Ordered     Dose/Rate Route Frequency Start Stop    22 1059  cefTRIAXone (ROCEPHIN) 1 g/100 mL 0.9% NS (MBP)        Ordering Provider: Chace Chauhan MD    1 g  over 30 Minutes Intravenous Once 22 1101 22 1244            Review of Systems:  See HPI    Physical Exam:   Vital Signs  Temp (24hrs), Av.6 °F (37 °C), Min:98.3 °F (36.8 °C), Max:98.9 °F (37.2 °C)    Temp  Min: 98.3 °F (36.8 °C)  Max: 98.9 °F (37.2 °C)  BP  Min: 155/95  Max: 181/87  Pulse  Min: 75  Max: 93  Resp  Min: 16  Max: 18  SpO2  Min: 93 %  Max: 100 %    GENERAL: Debilitated appearing. Alert and responsive. Sitting up in a bedside chair. He answers questions appropriately.  HEENT: Normocephalic, atraumatic. No external oral lesions  NECK: Supple   HEART: RRR; No murmur  LUNGS: Clear to auscultation bilaterally without wheezing, rales, rhonchi. Normal respiratory effort. Nonlabored.  ABDOMEN: Soft, lower abdominal tenderness, nondistended.  No rebound or guarding.   Skin: He has diffuse hemorrhagic ulcerative rash is dramatically improved on his lower extremities and feet. His rash over his upper extremities appears to be improving from the marked areas although this is the first time I am visualizing it.  It does appear vasculitic.  MSK:  FROM, no joint effusions  NEURO: Alert and responsive. Oriented ×3.  He answers questions appropriately and moves all of his extremities  Psych: Cooperative.  Appropriate mood.    Laboratory Data    Results from last 7 days   Lab Units 22  1158 22  0540 22  0634   WBC 10*3/mm3 9.64 7.55 9.90   HEMOGLOBIN g/dL 8.0* 7.9* 8.2*   HEMATOCRIT % 26.2* 26.5* 26.7*   PLATELETS 10*3/mm3 225 261 369     Results from last 7 days   Lab Units 04/10/22  1042   SODIUM mmol/L 142   POTASSIUM mmol/L 3.8   CHLORIDE mmol/L 112*   CO2 mmol/L 23.0   BUN mg/dL 39*   CREATININE mg/dL 1.53*   GLUCOSE mg/dL 271*   CALCIUM mg/dL 8.0*      Results from last 7 days   Lab Units 04/09/22  0518   ALK PHOS U/L 79   BILIRUBIN mg/dL 0.3   ALT (SGPT) U/L 10   AST (SGOT) U/L 12     Results from last 7 days   Lab Units 04/07/22  1623   SED RATE mm/hr 19     Results from last 7 days   Lab Units 04/07/22  1623   CRP mg/dL 3.27*     Results from last 7 days   Lab Units 04/03/22  2046   LACTATE mmol/L 1.7             Estimated Creatinine Clearance: 53 mL/min (A) (by C-G formula based on SCr of 1.53 mg/dL (H)).      Microbiology:  Wound cx 3/25: NL skin ananth SF  COVID-19/Flu PCR negative.  2nd wound cx 3/25: growth present but TYTE  Urine Eos Zero        Radiology:  Imaging Results (Last 72 Hours)     Procedure Component Value Units Date/Time    XR Abdomen KUB [[id number]] Collected: 04/08/22 1525     Updated: 04/08/22 1530    Narrative:         DATE OF EXAM:   4/8/2022 3:07 PM     PROCEDURE:   XR ABDOMEN KUB-     INDICATIONS:   Keofeed Placement; N17.9-Acute kidney failure, unspecified;  E86.0-Dehydration; L03.119-Cellulitis of unspecified part of limb;  I77.6-Arteritis, unspecified; I50.9-Heart failure, unspecified;  R79.89-Other specified abnormal findings of blood chemistry;  R13.12-Dysphagia, oropharyngeal phase     COMPARISON:  Recent CT abdomen examination      FINDINGS:      Bowel gas pattern within normal limits. No evidence of ileus. No  evidence of obstruction. No bowel wall thickening demonstrated.      Tip of the feeding tube is in the right midabdomen.        Impression:      IMPRESSION :      Tip of the feeding tube at the duodenal bulb versus distal stomach, at  location of the pylorus     This report was finalized on 4/8/2022 3:27 PM by Priyank Payton.       FL Video Swallow With Speech Single Contrast [[id number]] Collected: 04/07/22 1529     Updated: 04/07/22 2120    Narrative:      EXAMINATION: FL VIDEO SWALLOW W SPEECH SINGLE-CONTRAST-     INDICATION: dysphagia; N17.9-Acute kidney failure, unspecified;  E86.0-Dehydration; L03.119-Cellulitis of  unspecified part of limb;  I77.6-Arteritis, unspecified; I50.9-Heart failure, unspecified;  R79.89-Other specified abnormal findings of blood chemistry;  R13.10-Dysphagia, unspecified     TECHNIQUE: 1 minute and 36 seconds of fluoroscopic time was used for  this exam. 12 associated fluoroscopic loops were saved. The patient was  evaluated in the seated lateral position while taking a variety of  consistencies of barium by mouth under the direction of speech  pathology.     COMPARISON: NONE     FINDINGS: 1 minute and 36 seconds of fluoroscopy provided for a modified  barium swallow. Please see speech therapy report for full details and  recommendations.          Impression:      Fluoroscopy prior for a modified barium swallow. Please see  speech therapy report for full details and recommendations.         This report was finalized on 4/7/2022 9:17 PM by Dr. Neal Mahoney MD.               Impression:   1. Vasculitic rash-with palpable purpura.  This rash clinically looks like leukocytoclastic vasculitis.  I suspect that this is secondary to primidone given that it began shortly after starting on primidone. This dramatically improved with prednisone therapy but his prednisone therapy was aborted early due to delirium.  His rash worsened overnight.  I still think this is likely secondary to primidone-primidone/primidone metabolites have a very long half-life.  We will start him back on a lower dose of prednisone at 20 mg per day.  If he fails to improve, he may need a repeat skin biopsy and/or kidney biopsy.  Kidney biopsy would be risky due to the fact that he has a solitary kidney.- rash and renal function are improving on steroids  2. Duodenitis- per CT scan.  He is on PPI therapy.  3. Elevated procalcitonin,  4. Acute kidney injury--This is significantly improved.  5. Elevated CRP  6. Chronic diastolic heart failure  7. Afib/on Lovenox, recent cardioversion to NSR  8. Type 2 diabetes  mellitus/gastroparesis  9. Hypothyroidism  10. Essential hypertension  11. Morbid obesity  12. Prostate cancer/radical prostatectomy  13. Renal cell carcinoma/radical prostatectomy  14. H/o multiple skin cancer excisions  15. Doxycycline (urticaria) allergy  16. Hematuria  17. Left heel decubitus lesion-this is significantly better.  18. Toxic/metabolic encephalopathy-some of this may be secondary to his prednisone.  He is now off of steroid therapy  19. Hypernatremia    PLAN/RECOMMENDATIONS:  1.  Continue wound care  2.  Continue off of primidone and as many other medications as possible  3.  Offload both heels  4.  continue prednisone at 20 mg per day  5.  [facility] with IgA stains    His rash and renal function appear to be improving some on steroids now.  He seems to be tolerating steroids better without significant encephalopathy today.    I discussed his extremely complex situation in detail again with his wife and daughter  At bedside today    I discussed his case in length with Dr. Brandt Ward yesterday        Yves Ward MD  4/10/2022  18:30 EDT

## 2022-04-10 NOTE — PROGRESS NOTES
[facility] Medicine Services  PROGRESS NOTE    Patient Name: Yinka Cummings  : 1945  MRN: [id number]    Date of Admission: 3/25/2022  Primary Care Physician: Vivek Mercer,     Subjective   Subjective     CC:  Follow-up rash    HPI:  Received first dose of prednisone yesterday, rash is starting to clear up today.  Wife and daughter at bedside, think that his mental status is remaining stable thus far.  Blood sugar and blood pressure little bit elevated.    Able to spend 2.5-3 hours in the chair yesterday, he is hopeful to attempt to try standing in the near future    ROS:  Gen- No fevers, chills  CV- No chest pain, palpitations  Resp- No cough, dyspnea  GI- No N/V/D, abd pain    Objective   Objective     Vital Signs:   Temp:  [98.5 °F (36.9 °C)-98.9 °F (37.2 °C)] 98.5 °F (36.9 °C)  Heart Rate:  [76-90] 89  Resp:  [16-18] 16  BP: (147-174)/(63-95) 155/95  Flow (L/min):  [2] (P) 2     Physical Exam:  Constitutional: Awake, alert, chronically ill-appearing male laying in bed  HENT: NCAT, mucous membranes moist, Keofeed in nare  Respiratory: Clear to auscultation bilaterally, respiratory effort normal   Cardiovascular: RRR, no murmurs, rubs, or gallops, palpable radial pulses  Gastrointestinal: Positive bowel sounds, soft, nontender, nondistended  Musculoskeletal: No bilateral ankle edema  Psychiatric: Appropriate affect, cooperative  Neurologic: Speech clear, moving all extremity spontaneously  Dermatologic: Diffuse purpuric rash over all extremities decreased compared to yesterday    Results Reviewed:  LAB RESULTS:      Lab 22  1158 22  1623 22  0540 22  0634 22  2046 22  0740   WBC 9.64  --  7.55 9.90 9.55 12.96*   HEMOGLOBIN 8.0*  --  7.9* 8.2* 8.3* 8.5*   HEMATOCRIT 26.2*  --  26.5* 26.7* 24.9* 27.4*   PLATELETS 225  --  261 369 314 377   NEUTROS ABS 7.35*  --  4.92  --   --  9.75*   IMMATURE GRANS (ABS) 0.19*  --  0.13*  --   --   0.15*   LYMPHS ABS 1.18  --  1.63  --   --  1.69   MONOS ABS 0.54  --  0.73  --   --  1.33*   EOS ABS 0.34  --  0.11  --   --  0.02   MCV 97.4*  --  98.5* 95.4 90.2 97.9*   SED RATE  --  19  --   --   --   --    CRP  --  3.27*  --   --   --   --    LACTATE  --   --   --   --  1.7  --          Lab 04/09/22 0518 04/08/22  1158 04/07/22  0450 04/06/22  0540 04/05/22  1337 04/04/22  0634   SODIUM 146* 143 147* 154* 148* 138   POTASSIUM 3.7 4.1 3.5 4.4 4.1 4.8   CHLORIDE 115* 112* 116* 119* 112* 104   CO2 23.0 21.0* 22.0 24.0 20.0* 22.0   ANION GAP 8.0 10.0 9.0 11.0 16.0* 12.0   BUN 45* 48* 58* 69* 76* 83*   CREATININE 1.72* 1.80* 1.79* 2.24* 2.38* 2.35*   EGFR  --  38.8* 39.0* 29.8* 27.7* 28.2*   GLUCOSE 181* 190* 193* 147* 183* 198*   CALCIUM 8.0* 7.8* 7.8* 8.1* 8.3* 8.4*   MAGNESIUM 1.8  --   --   --   --   --    PHOSPHORUS 2.3*  --   --  3.5 3.3  --          Lab 04/09/22 0518 04/08/22  1158 04/06/22  0540 04/05/22  1337 04/03/22  2046   TOTAL PROTEIN 5.4* 5.3*  --   --  5.6*   ALBUMIN 2.60* 2.40* 2.80* 2.70* 2.70*   GLOBULIN  --  2.9  --   --  2.9   ALT (SGPT) 10 14  --   --  24   AST (SGOT) 12 15  --   --  37   BILIRUBIN 0.3 0.4  --   --  0.4   ALK PHOS 79 70  --   --  76             Lab 04/09/22 0518   CHOLESTEROL 82   TRIGLYCERIDES 148             Lab 04/03/22 2056   PH, ARTERIAL 7.381   PCO2, ARTERIAL 37.4   PO2 ART 70.0*   FIO2 21   HCO3 ART 22.2   BASE EXCESS ART -2.7*   CARBOXYHEMOGLOBIN 1.3     Brief Urine Lab Results  (Last result in the past 365 days)      Color   Clarity   Blood   Leuk Est   Nitrite   Protein   CREAT   Urine HCG        04/04/22 0045 Yellow   Clear   Moderate (2+)   Trace   Negative   Negative                 Microbiology Results Abnormal     Procedure Component Value - Date/Time    Wound Culture - Wound, Leg, Left [[id number]] Collected: 03/25/22 0918    Lab Status: Final result Specimen: Wound from Leg, Left Updated: 03/27/22 0903     Wound Culture Light growth (2+) Normal Skin Cami      Gram Stain Rare (1+) WBCs seen      Moderate (3+) Gram positive cocci in pairs and clusters    Eosinophil Smear - Urine, Urine, Clean Catch [[id number]]  (Normal) Collected: 03/26/22 1027    Lab Status: Final result Specimen: Urine, Clean Catch Updated: 03/26/22 1113     Eosinophil Smear 0 % EOS/100 Cells     Narrative:      No eosinophil seen    COVID-19 and FLU A/B PCR - Swab, Nasopharynx [[id number]]  (Normal) Collected: 03/25/22 0918    Lab Status: Final result Specimen: Swab from Nasopharynx Updated: 03/25/22 0955     COVID19 Not Detected     Influenza A PCR Not Detected     Influenza B PCR Not Detected    Narrative:      Fact sheet for providers: https://www.fda.gov/media/612644/download    Fact sheet for patients: https://www.fda.gov/media/948275/download    Test performed by PCR.          XR Abdomen KUB    Result Date: 4/8/2022   DATE OF EXAM: 4/8/2022 3:07 PM  PROCEDURE: XR ABDOMEN KUB-  INDICATIONS: Keofeed Placement; N17.9-Acute kidney failure, unspecified; E86.0-Dehydration; L03.119-Cellulitis of unspecified part of limb; I77.6-Arteritis, unspecified; I50.9-Heart failure, unspecified; R79.89-Other specified abnormal findings of blood chemistry; R13.12-Dysphagia, oropharyngeal phase  COMPARISON: Recent CT abdomen examination  FINDINGS:  Bowel gas pattern within normal limits. No evidence of ileus. No evidence of obstruction. No bowel wall thickening demonstrated.  Tip of the feeding tube is in the right midabdomen.      Impression: IMPRESSION :  Tip of the feeding tube at the duodenal bulb versus distal stomach, at location of the pylorus  This report was finalized on 4/8/2022 3:27 PM by Priyank Payton.        Results for orders placed during the hospital encounter of 03/25/22    Adult Transesophageal Echo (LARRY) W/ Cont if Necessary Per Protocol    Interpretation Summary  · Estimated left ventricular EF = 65%  · The cardiac valves are anatomically and functionally normal.  · No evidence of a left atrial  appendage thrombus was present.      I have reviewed the medications:  Scheduled Meds:atorvastatin, 40 mg, Nasogastric, Nightly  buprenorphine-naloxone, 2 tablet, Sublingual, Nightly  DULoxetine, 90 mg, Oral, Daily  enoxaparin, 110 mg, Subcutaneous, Q12H  insulin detemir, 10 Units, Subcutaneous, Nightly  insulin lispro, 0-9 Units, Subcutaneous, TID AC  levothyroxine, 150 mcg, Nasogastric, Daily  melatonin, 5 mg, Nasogastric, Nightly  methohexital, , ,   metoprolol tartrate, 50 mg, Nasogastric, Q8H  midazolam, , ,   mirtazapine, 7.5 mg, Nasogastric, Nightly  nystatin, 5 mL, Oral, 4x Daily  pantoprazole, 40 mg, Intravenous, Q AM  polyethylene glycol, 17 g, Nasogastric, Daily  predniSONE, 20 mg, Nasogastric, Daily With Breakfast  PRO-STAT, 30 mL, Nasogastric, Daily  QUEtiapine, 25 mg, Oral, Q12H  senna-docusate sodium, 2 tablet, Nasogastric, BID  sodium chloride, 10 mL, Intravenous, Q12H  temazepam, 15 mg, Nasogastric, Nightly  cyanocobalamin, 50 mcg, Nasogastric, Daily      Continuous Infusions:sodium chloride, 100 mL/hr, Last Rate: Stopped (04/09/22 2200)      PRN Meds:.•  acetaminophen **OR** acetaminophen **OR** acetaminophen  •  dextrose  •  dextrose  •  glucagon (human recombinant)  •  haloperidol  •  mineral oil-hydrophilic petrolatum  •  [DISCONTINUED] ondansetron **OR** ondansetron  •  palliative care oral rinse  •  Sodium Chloride (PF)  •  sodium chloride  •  sodium chloride    Assessment/Plan   Assessment & Plan     Active Hospital Problems    Diagnosis  POA   • **Rash [R21]  Yes   • Acute renal failure, unspecified acute renal failure type (HCC) [N17.9]  Yes   • Nausea and vomiting [R11.2]  Unknown   • Paroxysmal A-fib (on eliquis & metoprolol) [I48.0]  Yes   • Insulin dependent type 2 diabetes mellitus (HCC) [E11.9, Z79.4]  Not Applicable   • Hypothyroidism (acquired) [E03.9]  Yes   • Chronic back pain (on chronic prescription lortab) [M54.9, G89.29]  Yes   • Benign essential tremor [G25.0]  Yes       Resolved Hospital Problems   No resolved problems to display.        Brief Hospital Course to date:  Yinka Cummings is a 77 y.o. 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  -[facility], principal concern was his previous primidone, additionally his Lortab, Dabigatran, Lyrica, lisinopril have all been discontinued  -[facility] dermat[facility] Bx w/ nonspecific perivascular lymphocytic infiltrate  -Rheumatology 3/29, clinical syndrome most c/w leukocytoclastic vasculitis  -Initial significant improvement with oral prednisone, which was discontinued due to acute encephalopathy, which was more likely from sleep deprivation/hospital delirium  -PT WOC 4/5 for wounds on bilateral lower extremities, I have requested they come reevaluate/follow-up  -Reinitiated on prednisone 20 mg daily 4/9, demonstrating improvement in rash today    Oropharyngeal dysphagia  -Keofeed placed 4/8, nutrition follows for tube feeds  -SLP follows, per my previous phone call they are optimistic this is a temporary measure    Sarcopenia  -PT/OT following, needs to be out of bed at least once daily if not more as tolerated; continue to mobilize  -Plan for rehab at discharge    Acute encephalopathy, multifactorial, improved  Sleep deprivation psychosis/hospital  "associated delirium, improved  -Initially blamed on steroid-induced psychosis however neurology has felt it was more significantly a sleep deprivation psychosis; reattempting steroids    Acute renal dysfunction on CKD 3, improved  Hx RCC s/p nephrectomy  -Per documentation [facility] records report baseline CR 1.5-1.7 as recent as 3/20/22  -Nephrology follows  -Creatinine continues to improve, now at approximate baseline    Atrial fibrillation/flutter  -Recently Dabigatran was DC'd due to rash; currently on full dose Lovenox with plan to transition to NOAC after 30-day \"washout\" from discontinuation of primidone (concern for drug-drug interaction)  -Cardiology follows, s/p ECV 4/1/22; amiodarone discontinued, continue Lopressor    Chronic pain syndrome  -Recently transitioned to Suboxone from Lortab by [facility] due to concern for contribution to rash; will continue here    DM type 2, A1c 7.9%, with long-term use of insulin, with steroid-induced hyperglycemia  -Prior to initiating steroids was on 10U Levemir at bedtime with SSI  -Increase Levemir, start scheduled Humalog, follow Accu-Cheks and titrate as appropriate    Hypothyroidism  Hypertension  Hyperlipidemia  Mood disorder  -Continue atorvastatin, duloxetine, Lopressor    Severe tremor, stable/improved  -Primidone recently DC'd for rash  -Prescribed Sinemet recently but has not yet been able to start  -[facility]up with [facility]    Chronic normocytic anemia  -Relatively stable, monitor    Abnormal abdominal CT  -Per documentation questionable pyelo versus more likely duodenitis, potential bibasilar consolidation; ID follows, monitoring off antibiotics    VINI  -Documented as noncompliant with CPAP    Obesity, BMI 38.86 kg/M2  -Complicates all aspects of care      DVT prophylaxis:  Medical DVT prophylaxis orders are present.       AM-PAC 6 Clicks Score (PT): 9 (04/09/22 6436)    Disposition: Rash improvement on steroids, mentation appears stable today, continue aggressive " mobilization/therapy while hospitalized, hopefully his swallowing will improve and we can target discharge towards middle/late this week    CODE STATUS:   Code Status and Medical Interventions:   Ordered at: 03/25/22 1328     Level Of Support Discussed With:    Patient     Code Status (Patient has no pulse and is not breathing):    CPR (Attempt to Resuscitate)     Medical Interventions (Patient has pulse or is breathing):    Full Support       Wili Conner, DO  04/10/22

## 2022-04-10 NOTE — PLAN OF CARE
Problem: Behavioral Health Comorbidity  Goal: Maintenance of Behavioral Health Symptom Control  Outcome: Ongoing, Progressing  Intervention: Maintain Behavioral Health Symptom Control  Recent Flowsheet Documentation  Taken 4/10/2022 0800 by Iliana Barbosa RN  Medication Review/Management: medications reviewed   Goal Outcome Evaluation:  Plan of Care Reviewed With: patient, spouse, daughter

## 2022-04-11 LAB
ALBUMIN SERPL-MCNC: 2.9 G/DL (ref 3.5–5.2)
ALP SERPL-CCNC: 88 U/L (ref 39–117)
ALT SERPL W P-5'-P-CCNC: 11 U/L (ref 1–41)
ANION GAP SERPL CALCULATED.3IONS-SCNC: 7 MMOL/L (ref 5–15)
AST SERPL-CCNC: 9 U/L (ref 1–40)
BILIRUB SERPL-MCNC: 0.3 MG/DL (ref 0–1.2)
BUN SERPL-MCNC: 39 MG/DL (ref 8–23)
CALCIUM SPEC-SCNC: 8.2 MG/DL (ref 8.6–10.5)
CHLORIDE SERPL-SCNC: 111 MMOL/L (ref 98–107)
CHOLEST SERPL-MCNC: 87 MG/DL (ref 0–200)
CO2 SERPL-SCNC: 26 MMOL/L (ref 22–29)
CREAT SERPL-MCNC: 1.5 MG/DL (ref 0.76–1.27)
DEPRECATED RDW RBC AUTO: 52.1 FL (ref 37–54)
ERYTHROCYTE [DISTWIDTH] IN BLOOD BY AUTOMATED COUNT: 15.6 % (ref 12.3–15.4)
GLUCOSE BLDC GLUCOMTR-MCNC: 325 MG/DL (ref 70–130)
GLUCOSE BLDC GLUCOMTR-MCNC: 325 MG/DL (ref 70–130)
GLUCOSE BLDC GLUCOMTR-MCNC: 339 MG/DL (ref 70–130)
GLUCOSE BLDC GLUCOMTR-MCNC: 393 MG/DL (ref 70–130)
GLUCOSE SERPL-MCNC: 353 MG/DL (ref 65–99)
HCT VFR BLD AUTO: 23.6 % (ref 37.5–51)
HGB BLD-MCNC: 7.6 G/DL (ref 13–17.7)
MAGNESIUM SERPL-MCNC: 2 MG/DL (ref 1.6–2.4)
MCH RBC QN AUTO: 30.2 PG (ref 26.6–33)
MCHC RBC AUTO-ENTMCNC: 32.2 G/DL (ref 31.5–35.7)
MCV RBC AUTO: 93.7 FL (ref 79–97)
MYELOPEROXIDASE AB SER IA-ACNC: <9 U/ML (ref 0–9)
PHOSPHATE SERPL-MCNC: 1.6 MG/DL (ref 2.5–4.5)
PLATELET # BLD AUTO: 246 10*3/MM3 (ref 140–450)
PMV BLD AUTO: 10.7 FL (ref 6–12)
POTASSIUM SERPL-SCNC: 4.2 MMOL/L (ref 3.5–5.2)
PROT SERPL-MCNC: 5.8 G/DL (ref 6–8.5)
PROTEINASE3 AB SER IA-ACNC: <3.5 U/ML (ref 0–3.5)
RBC # BLD AUTO: 2.52 10*6/MM3 (ref 4.14–5.8)
SODIUM SERPL-SCNC: 144 MMOL/L (ref 136–145)
TRIGL SERPL-MCNC: 140 MG/DL (ref 0–150)
WBC NRBC COR # BLD: 9.92 10*3/MM3 (ref 3.4–10.8)

## 2022-04-11 PROCEDURE — 99232 SBSQ HOSP IP/OBS MODERATE 35: CPT | Performed by: PSYCHIATRY & NEUROLOGY

## 2022-04-11 PROCEDURE — 63710000001 INSULIN LISPRO (HUMAN) PER 5 UNITS: Performed by: INTERNAL MEDICINE

## 2022-04-11 PROCEDURE — 92507 TX SP LANG VOICE COMM INDIV: CPT

## 2022-04-11 PROCEDURE — 83735 ASSAY OF MAGNESIUM: CPT | Performed by: INTERNAL MEDICINE

## 2022-04-11 PROCEDURE — 82962 GLUCOSE BLOOD TEST: CPT

## 2022-04-11 PROCEDURE — 99232 SBSQ HOSP IP/OBS MODERATE 35: CPT | Performed by: INTERNAL MEDICINE

## 2022-04-11 PROCEDURE — 63710000001 INSULIN DETEMIR PER 5 UNITS: Performed by: INTERNAL MEDICINE

## 2022-04-11 PROCEDURE — 84478 ASSAY OF TRIGLYCERIDES: CPT | Performed by: INTERNAL MEDICINE

## 2022-04-11 PROCEDURE — 80053 COMPREHEN METABOLIC PANEL: CPT | Performed by: INTERNAL MEDICINE

## 2022-04-11 PROCEDURE — 63710000001 PREDNISONE PER 1 MG: Performed by: INTERNAL MEDICINE

## 2022-04-11 PROCEDURE — 82465 ASSAY BLD/SERUM CHOLESTEROL: CPT | Performed by: INTERNAL MEDICINE

## 2022-04-11 PROCEDURE — 25010000002 ENOXAPARIN PER 10 MG

## 2022-04-11 PROCEDURE — 85027 COMPLETE CBC AUTOMATED: CPT | Performed by: INTERNAL MEDICINE

## 2022-04-11 PROCEDURE — 84100 ASSAY OF PHOSPHORUS: CPT | Performed by: INTERNAL MEDICINE

## 2022-04-11 PROCEDURE — 92526 ORAL FUNCTION THERAPY: CPT

## 2022-04-11 RX ORDER — OXYMETAZOLINE HYDROCHLORIDE 0.05 G/100ML
2 SPRAY NASAL 2 TIMES DAILY
Status: ACTIVE | OUTPATIENT
Start: 2022-04-11 | End: 2022-04-14

## 2022-04-11 RX ORDER — AMLODIPINE BESYLATE 10 MG/1
10 TABLET ORAL
Status: DISCONTINUED | OUTPATIENT
Start: 2022-04-11 | End: 2022-04-14

## 2022-04-11 RX ADMIN — INSULIN LISPRO 7 UNITS: 100 INJECTION, SOLUTION INTRAVENOUS; SUBCUTANEOUS at 11:37

## 2022-04-11 RX ADMIN — INSULIN DETEMIR 20 UNITS: 100 INJECTION, SOLUTION SUBCUTANEOUS at 21:46

## 2022-04-11 RX ADMIN — INSULIN LISPRO 5 UNITS: 100 INJECTION, SOLUTION INTRAVENOUS; SUBCUTANEOUS at 17:52

## 2022-04-11 RX ADMIN — TEMAZEPAM 15 MG: 15 CAPSULE ORAL at 21:30

## 2022-04-11 RX ADMIN — QUETIAPINE FUMARATE 25 MG: 25 TABLET ORAL at 08:43

## 2022-04-11 RX ADMIN — VITAM B12 50 MCG: 100 TAB at 08:48

## 2022-04-11 RX ADMIN — NYSTATIN 500000 UNITS: 100000 SUSPENSION ORAL at 17:53

## 2022-04-11 RX ADMIN — SALINE NASAL SPRAY 1 SPRAY: 1.5 SOLUTION NASAL at 08:44

## 2022-04-11 RX ADMIN — NYSTATIN 500000 UNITS: 100000 SUSPENSION ORAL at 21:30

## 2022-04-11 RX ADMIN — METOPROLOL TARTRATE 50 MG: 50 TABLET, FILM COATED ORAL at 05:30

## 2022-04-11 RX ADMIN — METOPROLOL TARTRATE 50 MG: 50 TABLET, FILM COATED ORAL at 21:34

## 2022-04-11 RX ADMIN — MIRTAZAPINE 7.5 MG: 15 TABLET, FILM COATED ORAL at 21:31

## 2022-04-11 RX ADMIN — AMLODIPINE BESYLATE 10 MG: 10 TABLET ORAL at 08:43

## 2022-04-11 RX ADMIN — Medication 5 MG: at 21:31

## 2022-04-11 RX ADMIN — Medication 2 SPRAY: at 08:48

## 2022-04-11 RX ADMIN — NYSTATIN 500000 UNITS: 100000 SUSPENSION ORAL at 11:37

## 2022-04-11 RX ADMIN — SENNOSIDES AND DOCUSATE SODIUM 2 TABLET: 50; 8.6 TABLET ORAL at 08:43

## 2022-04-11 RX ADMIN — DULOXETINE HYDROCHLORIDE 90 MG: 30 CAPSULE, DELAYED RELEASE ORAL at 08:43

## 2022-04-11 RX ADMIN — ENOXAPARIN SODIUM 110 MG: 120 INJECTION SUBCUTANEOUS at 17:53

## 2022-04-11 RX ADMIN — NYSTATIN 500000 UNITS: 100000 SUSPENSION ORAL at 08:44

## 2022-04-11 RX ADMIN — POTASSIUM & SODIUM PHOSPHATES POWDER PACK 280-160-250 MG 2 PACKET: 280-160-250 PACK at 17:59

## 2022-04-11 RX ADMIN — INSULIN LISPRO 4 UNITS: 100 INJECTION, SOLUTION INTRAVENOUS; SUBCUTANEOUS at 11:38

## 2022-04-11 RX ADMIN — BUPRENORPHINE AND NALOXONE 2 TABLET: 8; 2 TABLET SUBLINGUAL at 21:31

## 2022-04-11 RX ADMIN — INSULIN LISPRO 7 UNITS: 100 INJECTION, SOLUTION INTRAVENOUS; SUBCUTANEOUS at 08:44

## 2022-04-11 RX ADMIN — ATORVASTATIN CALCIUM 40 MG: 40 TABLET, FILM COATED ORAL at 21:31

## 2022-04-11 RX ADMIN — QUETIAPINE FUMARATE 25 MG: 25 TABLET ORAL at 21:31

## 2022-04-11 RX ADMIN — POLYETHYLENE GLYCOL 3350 17 G: 17 POWDER, FOR SOLUTION ORAL at 08:44

## 2022-04-11 RX ADMIN — INSULIN LISPRO 4 UNITS: 100 INJECTION, SOLUTION INTRAVENOUS; SUBCUTANEOUS at 08:44

## 2022-04-11 RX ADMIN — SENNOSIDES AND DOCUSATE SODIUM 2 TABLET: 50; 8.6 TABLET ORAL at 21:31

## 2022-04-11 RX ADMIN — Medication 30 ML: at 08:53

## 2022-04-11 RX ADMIN — Medication 10 ML: at 21:35

## 2022-04-11 RX ADMIN — Medication 2 SPRAY: at 21:35

## 2022-04-11 RX ADMIN — LEVOTHYROXINE SODIUM 150 MCG: 150 TABLET ORAL at 08:43

## 2022-04-11 RX ADMIN — METOPROLOL TARTRATE 50 MG: 50 TABLET, FILM COATED ORAL at 16:13

## 2022-04-11 RX ADMIN — INSULIN LISPRO 8 UNITS: 100 INJECTION, SOLUTION INTRAVENOUS; SUBCUTANEOUS at 17:52

## 2022-04-11 RX ADMIN — PREDNISONE 20 MG: 20 TABLET ORAL at 08:43

## 2022-04-11 NOTE — PROGRESS NOTES
[facility] Medicine Services  PROGRESS NOTE    Patient Name: Yinka Cummings  : 1945  MRN: [id number]    Date of Admission: 3/25/2022  Primary Care Physician: Vivek Mercer DO    Subjective   Subjective     CC:  Follow-up rash    HPI:  Per nursing unable to get IV access despite ultrasound-guided attempts.  Keofeed reportedly not working.  Patient reports speech therapy came to see him and had to leave, that they are coming back.  Tolerating steroids so far with stable mental status.  Rash is improving.  He would really like to try to stand with therapy    ROS:  Gen- No fevers, chills  CV- No chest pain, palpitations  Resp- No cough, dyspnea  GI- No N/V/D, abd pain    Objective   Objective     Vital Signs:   Temp:  [98.2 °F (36.8 °C)-99.2 °F (37.3 °C)] 98.2 °F (36.8 °C)  Heart Rate:  [70-93] 80  Resp:  [16-18] 18  BP: (166-188)/(66-87) 174/66     Physical Exam:  Constitutional: Awake, alert, chronically ill-appearing male laying in bed  HENT: NCAT, mucous membranes moist, Keofeed in nare, poor dentition with multiple broken teeth  Respiratory: Clear to auscultation bilaterally, respiratory effort normal   Cardiovascular: RRR, no murmurs, rubs, or gallops, palpable radial pulses  Gastrointestinal: Positive bowel sounds, soft, nontender, nondistended  Musculoskeletal: No bilateral ankle edema  Psychiatric: Appropriate affect, cooperative  Neurologic: Speech clear, moving all extremity spontaneously  Dermatologic: Diffuse purpuric rash over all extremities continues to fade    Results Reviewed:  LAB RESULTS:      Lab 22  1158 22  1623 22  0540   WBC 9.64  --  7.55   HEMOGLOBIN 8.0*  --  7.9*   HEMATOCRIT 26.2*  --  26.5*   PLATELETS 225  --  261   NEUTROS ABS 7.35*  --  4.92   IMMATURE GRANS (ABS) 0.19*  --  0.13*   LYMPHS ABS 1.18  --  1.63   MONOS ABS 0.54  --  0.73   EOS ABS 0.34  --  0.11   MCV 97.4*  --  98.5*   SED RATE  --  19  --    CRP  --  3.27*   --          Lab 04/10/22  1042 04/09/22  0518 04/08/22  1158 04/07/22  0450 04/06/22  0540 04/05/22  1337   SODIUM 142 146* 143 147* 154* 148*   POTASSIUM 3.8 3.7 4.1 3.5 4.4 4.1   CHLORIDE 112* 115* 112* 116* 119* 112*   CO2 23.0 23.0 21.0* 22.0 24.0 20.0*   ANION GAP 7.0 8.0 10.0 9.0 11.0 16.0*   BUN 39* 45* 48* 58* 69* 76*   CREATININE 1.53* 1.72* 1.80* 1.79* 2.24* 2.38*   EGFR 47.1*  --  38.8* 39.0* 29.8* 27.7*   GLUCOSE 271* 181* 190* 193* 147* 183*   CALCIUM 8.0* 8.0* 7.8* 7.8* 8.1* 8.3*   MAGNESIUM  --  1.8  --   --   --   --    PHOSPHORUS 1.6* 2.3*  --   --  3.5 3.3         Lab 04/10/22  1042 04/09/22  0518 04/08/22  1158 04/06/22  0540 04/05/22  1337   TOTAL PROTEIN  --  5.4* 5.3*  --   --    ALBUMIN 2.60* 2.60* 2.40* 2.80* 2.70*   GLOBULIN  --   --  2.9  --   --    ALT (SGPT)  --  10 14  --   --    AST (SGOT)  --  12 15  --   --    BILIRUBIN  --  0.3 0.4  --   --    ALK PHOS  --  79 70  --   --              Lab 04/09/22 0518   CHOLESTEROL 82   TRIGLYCERIDES 148             Brief Urine Lab Results  (Last result in the past 365 days)      Color   Clarity   Blood   Leuk Est   Nitrite   Protein   CREAT   Urine HCG        04/04/22 0045 Yellow   Clear   Moderate (2+)   Trace   Negative   Negative                 Microbiology Results Abnormal     Procedure Component Value - Date/Time    Wound Culture - Wound, Leg, Left [[id number]] Collected: 03/25/22 0918    Lab Status: Final result Specimen: Wound from Leg, Left Updated: 03/27/22 0903     Wound Culture Light growth (2+) Normal Skin Cami     Gram Stain Rare (1+) WBCs seen      Moderate (3+) Gram positive cocci in pairs and clusters    Eosinophil Smear - Urine, Urine, Clean Catch [[id number]]  (Normal) Collected: 03/26/22 1027    Lab Status: Final result Specimen: Urine, Clean Catch Updated: 03/26/22 1113     Eosinophil Smear 0 % EOS/100 Cells     Narrative:      No eosinophil seen    COVID-19 and FLU A/B PCR - Swab, Nasopharynx [[id number]]  (Normal) Collected:  03/25/22 0918    Lab Status: Final result Specimen: Swab from Nasopharynx Updated: 03/25/22 0955     COVID19 Not Detected     Influenza A PCR Not Detected     Influenza B PCR Not Detected    Narrative:      Fact sheet for providers: https://www.fda.gov/media/320342/download    Fact sheet for patients: https://www.fda.gov/media/020791/download    Test performed by PCR.          No radiology results from the last 24 hrs    Results for orders placed during the hospital encounter of 03/25/22    Adult Transesophageal Echo (LARRY) W/ Cont if Necessary Per Protocol    Interpretation Summary  · Estimated left ventricular EF = 65%  · The cardiac valves are anatomically and functionally normal.  · No evidence of a left atrial appendage thrombus was present.      I have reviewed the medications:  Scheduled Meds:atorvastatin, 40 mg, Nasogastric, Nightly  buprenorphine-naloxone, 2 tablet, Sublingual, Nightly  DULoxetine, 90 mg, Oral, Daily  enoxaparin, 110 mg, Subcutaneous, Q12H  insulin detemir, 12 Units, Subcutaneous, Nightly  insulin lispro, 0-9 Units, Subcutaneous, TID AC  insulin lispro, 4 Units, Subcutaneous, TID With Meals  levothyroxine, 150 mcg, Nasogastric, Daily  melatonin, 5 mg, Nasogastric, Nightly  methohexital, , ,   metoprolol tartrate, 50 mg, Nasogastric, Q8H  midazolam, , ,   mirtazapine, 7.5 mg, Nasogastric, Nightly  nystatin, 5 mL, Oral, 4x Daily  oxymetazoline, 2 spray, Each Nare, BID  pantoprazole, 40 mg, Intravenous, Q AM  polyethylene glycol, 17 g, Nasogastric, Daily  predniSONE, 20 mg, Nasogastric, Daily With Breakfast  PRO-STAT, 30 mL, Nasogastric, Daily  QUEtiapine, 25 mg, Nasogastric, Q12H  senna-docusate sodium, 2 tablet, Nasogastric, BID  sodium chloride, 10 mL, Intravenous, Q12H  temazepam, 15 mg, Nasogastric, Nightly  cyanocobalamin, 50 mcg, Nasogastric, Daily      Continuous Infusions:sodium chloride, 100 mL/hr, Last Rate: Stopped (04/09/22 2200)      PRN Meds:.•  acetaminophen **OR** acetaminophen  **OR** acetaminophen  •  dextrose  •  dextrose  •  glucagon (human recombinant)  •  haloperidol  •  mineral oil-hydrophilic petrolatum  •  [DISCONTINUED] ondansetron **OR** ondansetron  •  palliative care oral rinse  •  Sodium Chloride (PF)  •  sodium chloride  •  sodium chloride    Assessment/Plan   Assessment & Plan     Active Hospital Problems    Diagnosis  POA   • **Rash [R21]  Yes   • Acute renal failure, unspecified acute renal failure type (HCC) [N17.9]  Yes   • Nausea and vomiting [R11.2]  Unknown   • Paroxysmal A-fib (on eliquis & metoprolol) [I48.0]  Yes   • 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        Brief Hospital Course to date:  Yinka Cummings is a 77 y.o. 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  -Initial [facility], principal concern was his previous primidone, additionally his Lortab, Dabigatran, Lyrica, lisinopril have all been discontinued  -[facility] dermatology Bx w/ nonspecific perivascular lymphocytic infiltrate  -Rheumatology 3/29, clinical  "syndrome most c/w leukocytoclastic vasculitis  -Initial significant improvement with oral prednisone, which was discontinued due to acute encephalopathy, which was more likely from sleep deprivation/hospital delirium  -PT WOC 4/5 for wounds on bilateral lower extremities, I have requested they come reevaluate/follow-up  -Reinitiated on prednisone 20 mg daily 4/9, rash continues to improve    Oropharyngeal dysphagia  -Keofeed placed 4/8, nutrition follows for tube feeds  -SLP following, planning reassessment today    Sarcopenia  -PT/OT following, needs to be out of bed at least once daily if not more as tolerated; continue to mobilize  -Plan for rehab at discharge, discussed during case management rounds, needs ongoing physical therapy    Acute encephalopathy, multifactorial, improved  Sleep deprivation psychosis/hospital associated delirium, improved  -Initially blamed on steroid-induced psychosis however neurology has felt it was more significantly a sleep deprivation psychosis; reattempting steroids    Acute renal dysfunction on CKD 3, improved  Hx RCC s/p nephrectomy  -Per documentation [facility] records report baseline CR 1.5-1.7 as recent as 3/20/22  -Nephrology follows  -Creatinine continues to improve, now at approximate baseline    Atrial fibrillation/flutter  -Recently Dabigatran was DC'd due to rash; currently on full dose Lovenox with plan to transition to NOAC after 30-day \"washout\" from discontinuation of primidone (concern for drug-drug interaction)  -Cardiology follows, s/p ECV 4/1/22; amiodarone discontinued, continue Lopressor    Chronic pain syndrome  -Recently transitioned to Suboxone from Lortab by the [facility] due to concern for contribution to rash; will continue here    DM type 2, A1c 7.9%, with long-term use of insulin, with steroid-induced hyperglycemia  -Prior to initiating steroids was on 10U Levemir at bedtime with SSI  -Further increases in Levemir and scheduled Humalog; continue Accu-Cheks with " SSI    Hypothyroidism  Hypertension  Hyperlipidemia  Mood disorder  -Continue atorvastatin, duloxetine, Lopressor  -Addition of amlodipine for steroid-induced hypertension    Severe tremor, stable/improved  -Primidone recently DC'd for rash  -Prescribed Sinemet recently but has not yet been able to start  -[facility]-up with [facility]    Chronic normocytic anemia  -Relatively stable, monitor    Abnormal abdominal CT  -Per documentation questionable pyelo versus more likely duodenitis, potential bibasilar consolidation; ID follows, monitoring off antibiotics    VINI  -Documented as noncompliant with CPAP    Obesity, BMI 38.86 kg/M2  -Complicates all aspects of care      DVT prophylaxis:  Medical DVT prophylaxis orders are present.       AM-PAC 6 Clicks Score (PT): 9 (04/10/22 0800)    Disposition: Limiting factor at this point is his diet, SLP following, once he can safely swallow-assuming that therapy works with him aggressively, I am hopeful he can discharge to SNF rehab    CODE STATUS:   Code Status and Medical Interventions:   Ordered at: 03/25/22 1328     Level Of Support Discussed With:    Patient     Code Status (Patient has no pulse and is not breathing):    CPR (Attempt to Resuscitate)     Medical Interventions (Patient has pulse or is breathing):    Full Support       Wili Conner, DO  04/11/22

## 2022-04-11 NOTE — PROGRESS NOTES
Neurology       Patient Care Team:  Vivek Mercer DO as PCP - General (Family Medicine)    Chief complaint: Dry mouth    History: Patient is asking for ice chips.    He said he did not sleep well with the nasogastric tube.      Past Medical History:   Diagnosis Date   • Anxiety    • Arthritis    • Chronic kidney disease    • Diabetes mellitus (HCC)    • Disease of thyroid gland    • Diverticulosis    • Essential tremor    • HL (hearing loss)    • Hypertension    • Obesity    • Prostate cancer (HCC)    • Renal cell cancer (HCC)    • Skin cancer    • Vasculitis of skin        Vital Signs   Vitals:    04/10/22 2004 04/10/22 2245 04/11/22 0256 04/11/22 0710   BP: (!) 188/77 172/75 174/66 (!) 195/82   BP Location: Right arm Right arm Right arm Right arm   Patient Position: Lying Lying Lying Lying   Pulse: 84 70 80 78   Resp: 18 18 18 18   Temp: 98.2 °F (36.8 °C) 99.2 °F (37.3 °C) 98.2 °F (36.8 °C)    TempSrc: Oral Oral Axillary    SpO2:    98%   Weight:       Height:           Physical Exam:   General: Pleasant and alert              Neuro: Oriented to person place and time.    Calm and nonagitated.    Results Review:  Reviewed  Results from last 7 days   Lab Units 04/08/22  1158   WBC 10*3/mm3 9.64   HEMOGLOBIN g/dL 8.0*   HEMATOCRIT % 26.2*   PLATELETS 10*3/mm3 225     Results from last 7 days   Lab Units 04/10/22  1042 04/09/22  0518 04/08/22  1158   SODIUM mmol/L 142 146* 143   POTASSIUM mmol/L 3.8 3.7 4.1   CHLORIDE mmol/L 112* 115* 112*   CO2 mmol/L 23.0 23.0 21.0*   BUN mg/dL 39* 45* 48*   CREATININE mg/dL 1.53* 1.72* 1.80*   CALCIUM mg/dL 8.0* 8.0* 7.8*   BILIRUBIN mg/dL  --  0.3 0.4   ALK PHOS U/L  --  79 70   ALT (SGPT) U/L  --  10 14   AST (SGOT) U/L  --  12 15   GLUCOSE mg/dL 271* 181* 190*       Imaging Results (Last 24 Hours)     ** No results found for the last 24 hours. **          Assessment:  Dysphagia, speech following study today.    Vasculitic rash, likely drug induced.    Mild cognitive  impairment with periodic agitation.    Plan:  Patient can have ice chips.        Comment:  The patient since entering comfort feeding if he fails a swallowing test.         I discussed the patients findings and my recommendations with patient, family and primary care team    Peña Weaver MD  04/11/22  11:15 EDT

## 2022-04-11 NOTE — PLAN OF CARE
Goal Outcome Evaluation:   SLP treatment completed. Will continue to address dysphagia. Please see note for further details and recommendations.

## 2022-04-11 NOTE — PROGRESS NOTES
"   LOS: 17 days    Patient Care Team:  Vivek Mercer DO as PCP - General (Family Medicine)    Chief Complaint: SALEEM    Subjective     Interval History:   No new events no added distress.  The skin rash is much better  Review of Systems:   He denies any nausea vomiting chest pain or shortness of breath.      Objective     Vital Sign Min/Max for last 24 hours  Temp  Min: 98.2 °F (36.8 °C)  Max: 99.2 °F (37.3 °C)   BP  Min: 166/76  Max: 195/82   Pulse  Min: 70  Max: 90   Resp  Min: 18  Max: 18   SpO2  Min: 95 %  Max: 98 %   No data recorded   No data recorded     Flowsheet Rows    Flowsheet Row First Filed Value   Admission Height 175.3 cm (69\") Documented at 03/25/2022 0849   Admission Weight 113 kg (250 lb) Documented at 03/25/2022 0849          No intake/output data recorded.  I/O last 3 completed shifts:  In: 2302 [Other:416; NG/GT:1654; IV Piggyback:232]  Out: -     Physical Exam:  General Appearance: Obese male alert oriented x3 no obvious distress.  Neck: Supple no JVD.  Lungs: Clear auscultation, no rales rhonchi's, equal chest movement, nonlabored.  Heart: No gallop, murmur, rub, RRR.  Abdomen: Morbid obesity, soft, nontender, positive bowel sounds, no organomegaly.  Extremities: No edema cyanosis.  Neuro: No focal deficit, moving all extremities, alert oriented X 3  Skin: Skin rash looking much better now.    WBC WBC   Date Value Ref Range Status   04/11/2022 9.92 3.40 - 10.80 10*3/mm3 Final      HGB Hemoglobin   Date Value Ref Range Status   04/11/2022 7.6 (L) 13.0 - 17.7 g/dL Final      HCT Hematocrit   Date Value Ref Range Status   04/11/2022 23.6 (L) 37.5 - 51.0 % Final      Platlets No results found for: LABPLAT   MCV MCV   Date Value Ref Range Status   04/11/2022 93.7 79.0 - 97.0 fL Final          Sodium Sodium   Date Value Ref Range Status   04/11/2022 144 136 - 145 mmol/L Final   04/10/2022 142 136 - 145 mmol/L Final   04/09/2022 146 (H) 136 - 145 mmol/L Final      Potassium Potassium   Date " Value Ref Range Status   04/11/2022 4.2 3.5 - 5.2 mmol/L Final   04/10/2022 3.8 3.5 - 5.2 mmol/L Final   04/09/2022 3.7 3.5 - 5.2 mmol/L Final      Chloride Chloride   Date Value Ref Range Status   04/11/2022 111 (H) 98 - 107 mmol/L Final   04/10/2022 112 (H) 98 - 107 mmol/L Final   04/09/2022 115 (H) 98 - 107 mmol/L Final      CO2 CO2   Date Value Ref Range Status   04/11/2022 26.0 22.0 - 29.0 mmol/L Final   04/10/2022 23.0 22.0 - 29.0 mmol/L Final   04/09/2022 23.0 22.0 - 29.0 mmol/L Final      BUN BUN   Date Value Ref Range Status   04/11/2022 39 (H) 8 - 23 mg/dL Final   04/10/2022 39 (H) 8 - 23 mg/dL Final   04/09/2022 45 (H) 8 - 23 mg/dL Final      Creatinine Creatinine   Date Value Ref Range Status   04/11/2022 1.50 (H) 0.76 - 1.27 mg/dL Final   04/10/2022 1.53 (H) 0.76 - 1.27 mg/dL Final   04/09/2022 1.72 (H) 0.76 - 1.27 mg/dL Final      Calcium Calcium   Date Value Ref Range Status   04/11/2022 8.2 (L) 8.6 - 10.5 mg/dL Final   04/10/2022 8.0 (L) 8.6 - 10.5 mg/dL Final   04/09/2022 8.0 (L) 8.6 - 10.5 mg/dL Final      PO4 No results found for: CAPO4   Albumin Albumin   Date Value Ref Range Status   04/11/2022 2.90 (L) 3.50 - 5.20 g/dL Final   04/10/2022 2.60 (L) 3.50 - 5.20 g/dL Final   04/09/2022 2.60 (L) 3.50 - 5.20 g/dL Final      Magnesium Magnesium   Date Value Ref Range Status   04/11/2022 2.0 1.6 - 2.4 mg/dL Final   04/09/2022 1.8 1.6 - 2.4 mg/dL Final      Uric Acid No results found for: URICACID        Results Review:     I reviewed the patient's new clinical results.    amLODIPine, 10 mg, Per G Tube, Q24H  atorvastatin, 40 mg, Nasogastric, Nightly  buprenorphine-naloxone, 2 tablet, Sublingual, Nightly  DULoxetine, 90 mg, Oral, Daily  enoxaparin, 110 mg, Subcutaneous, Q12H  insulin detemir, 12 Units, Subcutaneous, Nightly  insulin lispro, 0-9 Units, Subcutaneous, TID AC  insulin lispro, 4 Units, Subcutaneous, TID With Meals  levothyroxine, 150 mcg, Nasogastric, Daily  melatonin, 5 mg, Nasogastric,  Nightly  methohexital, , ,   metoprolol tartrate, 50 mg, Nasogastric, Q8H  midazolam, , ,   mirtazapine, 7.5 mg, Nasogastric, Nightly  nystatin, 5 mL, Oral, 4x Daily  oxymetazoline, 2 spray, Each Nare, BID  pantoprazole, 40 mg, Intravenous, Q AM  polyethylene glycol, 17 g, Nasogastric, Daily  potassium & sodium phosphates, 2 packet, Oral, BID AC  predniSONE, 20 mg, Nasogastric, Daily With Breakfast  PRO-STAT, 30 mL, Nasogastric, Daily  QUEtiapine, 25 mg, Nasogastric, Q12H  senna-docusate sodium, 2 tablet, Nasogastric, BID  sodium chloride, 10 mL, Intravenous, Q12H  sodium phosphate IVPB, 30 mmol, Intravenous, Once  temazepam, 15 mg, Nasogastric, Nightly  cyanocobalamin, 50 mcg, Nasogastric, Daily      sodium chloride, 100 mL/hr, Last Rate: Stopped (04/09/22 2200)        Medication Review: Reviewed    Assessment/Plan       Rash    Benign essential tremor    Hypothyroidism (acquired)    Paroxysmal A-fib (on eliquis & metoprolol)    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  SALEEM on CKD stage III baseline creatinine 1.5.  Creatinine 1.72 renal function is improving admission creatinine 2.3-2.7.  UA had large blood urine protein was 380 mg etiology of the SALEEM unknown possible drug reaction causing AIN or systemic IgE a vasculitis serology has been negative.  Skin rash has been improving. C3 144, C3 29, cryo neg,   2.  Skin rash palpable purpura suspect of drug reaction versus skin vasculitis.  Patient is  off prednisone, rash worst.  3.  Hyperkalemia resolved  4.  Hypernatremia improved with increased fluid intake.  5.  Volume status stable.  6.  Atrial fibrillation: S/p LARRY cardioversion.  Recommendations:  Renal function stable, sodium improved.  Given patient has solitary kidney renal function improving biopsy is not indicated at this time.  We suspect AIN in the  setting of drug reaction.  No significant proteinuria patient was taken of the steroid due to mental status changes, he is placed back on steroid 20 mg daily due to recurrence of the rash..  Marked improvement in renal function with fluids noted at this time.  Pending MPO, VT-3 level  Discussed with daughter and the wife in the room.  Houston Ríos MD  04/11/22  15:19 EDT

## 2022-04-11 NOTE — CASE MANAGEMENT/SOCIAL WORK
Continued Stay Note  [facility]     Patient Name: Yinka Cummings  MRN: [id number]  Today's Date: 4/11/2022    Admit Date: 3/25/2022     Discharge Plan     Row Name 04/11/22 1443       Plan    Plan [facility], swing    Plan Comments I have received  a positive response from Annalisa at [facility] but after discussion in morning rounds, patient appearing heading toward skilled rehab sooner than I had expected. I updated Fifi at [facility] and gave her an upddate. I will await updated rehab notes and send to her. [facility] in still in consideration as well. I have updated patient/his wife and daughter.    Final Discharge Disposition Code 03 - skilled nursing facility (SNF)               Discharge Codes    No documentation.               Expected Discharge Date and Time     Expected Discharge Date Expected Discharge Time    Apr 12, 2022             Jessica Frias RN

## 2022-04-11 NOTE — THERAPY TREATMENT NOTE
"Acute Care - Speech Language Pathology   Swallow Treatment Note [facility]     Patient Name: Yinka Cummings  : 1945  MRN: [id number]  Today's Date: 2022               Admit Date: 3/25/2022    Visit Dx:     ICD-10-CM ICD-9-CM   1. Oropharyngeal dysphagia  R13.12 787.22   2. Acute renal failure, unspecified acute renal failure type (HCC)  N17.9 584.9   3. Dehydration  E86.0 276.51   4. Cellulitis of lower extremity, unspecified laterality  L03.119 682.6   5. Vasculitis (HCC)  I77.6 447.6   6. Congestive heart failure, unspecified HF chronicity, unspecified heart failure type (HCC)  I50.9 428.0   7. 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)   • Chronic back pain (on chronic prescription lortab)   • HTN (hypertension)   • Solitary kidney (s/p nephrectomy for \"mass\")   • History of prostate cancer   • Closed fracture of phalanx of right hand   • Dental caries   • Acute renal failure, unspecified acute renal failure type (HCC)   • Rash   • Nausea and vomiting     Past Medical History:   Diagnosis Date   • Anxiety    • Arthritis    • Chronic kidney disease    • Diabetes mellitus (HCC)    • Disease of thyroid gland    • Diverticulosis    • Essential tremor    • HL (hearing loss)    • Hypertension    • Obesity    • Prostate cancer (HCC)    • Renal cell cancer (HCC)    • Skin cancer    • Vasculitis of skin      Past Surgical History:   Procedure Laterality Date   • CHOLECYSTECTOMY     • COLONOSCOPY      Polyps in the past but not on the most recent scope   • NEPHRECTOMY Right    • PROSTATE SURGERY      Radical prostatectomy   • SKIN CANCER EXCISION      Large incision left neck, multiple other cancers removed       SLP Recommendation and " Plan     SLP Diet Recommendation: NPO, temporary alternate methods of nutrition/hydration, other (see comments) (04/11/22 1340)  Recommended Precautions and Strategies: upright posture during/after eating, small bites of food and sips of liquid, general aspiration precautions (04/11/22 1340)  SLP Rec. for Method of Medication Administration: meds via alternate route (04/11/22 1340)     Monitor for Signs of Aspiration: yes, notify SLP if any concerns (04/11/22 1340)  Recommended Diagnostics: VFSS (MBS), other (see comments) (04/11/22 1340)     Anticipated Discharge Disposition (SLP): unknown, anticipate therapy at next level of care (04/11/22 1340)     Therapy Frequency (Swallow): 5 days per week (04/11/22 1340)  Predicted Duration Therapy Intervention (Days): until discharge (04/11/22 1340)     Daily Summary of Progress (SLP): progress toward functional goals as expected (04/11/22 1340)               Treatment Assessment (SLP): Pt seen for therapy this afternoon. Pt w/ immediate hard cough w/ thin. Wife and dtr present; pt and wife eager for pt to have Keofeed pulled and begin PO intake. Provided extensive education regarding recs for NPO based on MBS 4/7. Plan for MBS tomorrow (4/12), cont NPO. (04/11/22 1340)  Plan for Continued Treatment (SLP): continue treatment per plan of care (04/11/22 1340)                SWALLOW EVALUATION (last 72 hours)     SLP Adult Swallow Evaluation     Row Name 04/11/22 1340                   Rehab Evaluation    Document Type therapy note (daily note)  -CJ (r) MH (t) CJ (c)        Subjective Information no complaints  -CJ (r) MH (t) CJ (c)        Patient Observations alert;cooperative  -CJ (r) MH (t) CJ (c)        Patient/Family/Caregiver Comments/Observations Wife and daughter present  -CJ (r) MH (t) CJ (c)        Patient Effort good  -CJ (r) MH (t) CJ (c)        Symptoms Noted During/After Treatment none  -CJ (r) MH (t) CJ (c)                  Pain    Additional Documentation Pain  Scale: FACES Pre/Post-Treatment (Group)  -MANASA (ryan) CHUCHO (t) MANASA (c)                  Pain Scale: FACES Pre/Post-Treatment    Pain: FACES Scale, Pretreatment 0-->no hurt  -MANASA (ryan) CHUCHO (t) MANASA (c)        Posttreatment Pain Rating 0-->no hurt  -MANASA (ryan) CHUCHO (t) MANASA (c)                  SLP Treatment Clinical Impressions    Treatment Assessment (SLP) Pt seen for therapy this afternoon. Pt w/ immediate hard cough w/ thin. Wife and dtr present; pt and wife eager for pt to have Keofeed pulled and begin PO intake. Provided extensive education regarding recs for NPO based on MBS 4/7. Plan for MBS tomorrow (4/12), cont NPO.  -MANASA (ryan) CHUCHO (t) MANASA (c)        Daily Summary of Progress (SLP) progress toward functional goals as expected  -MANASA hernandez) CHUCHO (linda) MANASA (c)        Barriers to Overall Progress (SLP) Medically complex  -MANASA (ryan) CHUCHO (t) MANASA (c)        Plan for Continued Treatment (SLP) continue treatment per plan of care  -MANASA (ryan) CHUCHO (t) MANASA (c)        Care Plan Review evaluation/treatment results reviewed  -MANASA (ryan) CHUCHO (t) MANASA (c)        Care Plan Review, Other Participant(s) daughter;spouse  -MANASA (ryan) CHUCHO (t) MANASA (c)                  Recommendations    Therapy Frequency (Swallow) 5 days per week  -MANASA hernandez) CHUCHO (t) MANASA (c)        Predicted Duration Therapy Intervention (Days) until discharge  -MANASA (ryan) CHUCHO (linda) MANASA (c)        SLP Diet Recommendation NPO;temporary alternate methods of nutrition/hydration;other (see comments)  -MANASA (ryan) CHUCHO (t) MANASA (c)        Recommended Diagnostics VFSS (MBS);other (see comments)  -MANASA (ryan) CHUCHO (t) MANASA (c)        Recommended Precautions and Strategies upright posture during/after eating;small bites of food and sips of liquid;general aspiration precautions  -MANASA (ryan) CHUCHO (t) MANASA (c)        Oral Care Recommendations Oral Care BID/PRN;Suction toothbrush  -MANASA (ryan) CHUCHO (t) MANASA (c)        SLP Rec. for Method of Medication Administration meds via alternate route  -MANASA (ryan) CHUCHO (t) MANASA (c)        Monitor for Signs of Aspiration yes;notify SLP if any concerns   -MANASA (r) CHUCHO (t) MANASA (c)        Anticipated Discharge Disposition (SLP) unknown;anticipate therapy at next level of care  -MANASA (r) CHUCHO (t) MANASA (c)              User Key  (r) = Recorded By, (t) = Taken By, (c) = Cosigned By    Initials Name Effective Dates    MANASA Rose Li, MS CCC-SLP 06/16/21 -     Maren Garner, Speech Therapy Student 01/24/22 -                 EDUCATION  The patient has been educated in the following areas:   Dysphagia (Swallowing Impairment) NPO rationale.        SLP GOALS     Row Name 04/11/22 1340             Oral Nutrition/Hydration Goal 1 (SLP)    Oral Nutrition/Hydration Goal 1, SLP LTG: Pt will return to some form of PO diet by d/c.  -MANASA (ryan) CHUCHO (t) MANASA (c)      Time Frame (Oral Nutrition/Hydration Goal 1, SLP) by discharge  -MANASA hernandez) CHUCHO (t) MANASA (c)      Progress/Outcomes (Oral Nutrition/Hydration Goal 1, SLP) continuing progress toward goal  -MANASA (ryan) CHUCHO (t) MANASA (c)              Oral Nutrition/Hydration Goal 2 (SLP)    Oral Nutrition/Hydration Goal 2, SLP Pt will tolerate therapeutic trials of thin H2O & puree w/ no overt s/sxs aspiration/distress w/ 60% acc and no cues in order to assess appropriateness for repeat instrumental eval  -MANASA (ryan) CHUCHO (t) MANASA (c)      Time Frame (Oral Nutrition/Hydration Goal 2, SLP) short term goal (STG)  -MANASA (ryan) CHUCHO (t) MANASA (c)      Barriers (Oral Nutrition/Hydration Goal 2, SLP) Completed oral care. Pt w/ immediate hard cough w thin via ice chip  -MANASA (ryan) CHUCHO (t) MANASA (c)      Progress/Outcomes (Oral Nutrition/Hydration Goal 2, SLP) progress slower than expected  -MANASA (ryan) CHUCHO (t) MANASA (c)              Lingual Strengthening Goal 1 (SLP)    Activity (Lingual Strengthening Goal 1, SLP) increase tongue back strength  -MANASA hernandez) CHUCHO (t) MANASA (c)      Increase Tongue Back Strength lingual resistance exercises;swallow trials  -MANASA RANKIN (r) (t) MANASA (c)      [place]/Accuracy (Lingual Strengthening Goal 1, SLP) with minimal cues (75-90% accuracy)  -MANASA hernandez) CHUCHO (t) MANASA (c)      Time Frame  (Lingual Strengthening Goal 1, SLP) short term goal (STG)  -CJ (r) MH (t) CJ (c)      Progress/Outcomes (Lingual Strengthening Goal 1, SLP) goal ongoing  -CJ (r) MH (t) CJ (c)              Pharyngeal Strengthening Exercise Goal 1 (SLP)    Activity (Pharyngeal Strengthening Goal 1, SLP) increase timing;increase superior movement of the hyolaryngeal complex;increase anterior movement of the hyolaryngeal complex;increase closure at entrance to airway/closure of airway at glottis;increase squeeze/positive pressure generation;increase tongue base retraction;increase PES opening  -CJ (r) MH (t) CJ (c)      Increase Timing prepping - 3 second prep or suck swallow or 3-step swallow  -CJ (r) MH (t) CJ (c)      Increase Superior Movement of the Hyolaryngeal Complex effortful pitch glide (falsetto + pharyngeal squeeze)  -CJ (r) MH (t) CJ (c)      Increase Anterior Movement of the Hyolaryngeal Complex chin tuck against resistance (CTAR)  -CJ (r) MH (t) CJ (c)      Increase Closure at Entrance to Airway/Closure of Airway at Glottis supraglottic swallow  -CJ (r) MH (t) CJ (c)      Increase Squeeze/Positive Pressure Generation hard effortful swallow  -CJ (r) MH (t) CJ (c)      Increase Tongue Base Retraction alia  -CJ (r) MH (t) CJ (c)      Increase PES Opening chin tuck against resistance (CTAR)  -CJ (r) MH (t) CJ (c)      [place]/Accuracy (Pharyngeal Strengthening Goal 1, SLP) with minimal cues (75-90% accuracy)  -CJ (r) MH (t) CJ (c)      Time Frame (Pharyngeal Strengthening Goal 1, SLP) short term goal (STG)  -CJ (r) MH (t) CJ (c)      Progress/Outcomes (Pharyngeal Strengthening Goal 1, SLP) goal ongoing  -CJ (r) MH (t) CJ (c)            User Key  (r) = Recorded By, (t) = Taken By, (c) = Cosigned By    Initials Name Provider Type    Rose Oconnell, MS CCC-SLP Speech and Language Pathologist    Maren Garner, Speech Therapy Student SLP Student                   Time Calculation:    Time Calculation- SLP      Row Name 04/11/22 1543             Time Calculation- SLP    SLP Start Time 1340  -CJ (r) MH (t) CJ (c)      SLP Received On 04/11/22  -CJ (r) MH (t) CJ (c)              Untimed Charges    26201-QZ Treatment Swallow Minutes 60  -CJ (r) MH (t) CJ (c)              Total Minutes    Untimed Charges Total Minutes 60  -CJ (r) MH (t)       Total Minutes 60  -CJ (r) MH (t)            User Key  (r) = Recorded By, (t) = Taken By, (c) = Cosigned By    Initials Name Provider Type    Rose Oconnell, MS CCC-SLP Speech and Language Pathologist    Maren Garner, Speech Therapy Student SLP Student                Therapy Charges for Today     Code Description Service Date Service Provider Modifiers Qty    [id number]  ST TREATMENT SWALLOW 4 4/11/2022 Maren Camacho, Speech Therapy Student GN 1            Patient was not wearing a face mask and did not exhibit coughing during this therapy encounter.  Procedure performed was not aerosolizing, did not involve close contact (within 6 feet for at least 15 minutes or longer), and did not involve contact with infectious secretions or specimens.  Therapist used appropriate personal protective equipment including gloves, standard procedure mask and eye protection.  Appropriate PPE was worn during the entire therapy session.  Hand hygiene was completed before and after therapy session.       Maren Camacho Speech Therapy Student  4/11/2022

## 2022-04-11 NOTE — PROGRESS NOTES
Clinical Nutrition   Reason For Visit: Follow-up protocol, EN    Patient Name: Yinka Cummings  YOB: 1945  MRN: [id number]  Date of Encounter: 04/11/22 08:55 EDT  Admission date: 3/25/2022    Pt tolerating EN at goal, continue current regimen:  Continuous EN regimen of Replete with Fiber @ goal rate of 85 ml/hr, FW @ 20 ml/hr. ProStat QD.     Phos low, being replaced    RD notes IVF was stopped and pt currently receiving free water at 40 ml/hr through kangaroo pump. Unsure when this was started as change not documented and was not put in EN order, I/O documentation also does not reflect FW at this rate. RD adjusted FW as to not give excessive rate. Discussed with RN recommendation to continue without IVF.     Please note RD is available over the weekend for questions/if changes are needed regarding EN. Please consult when needed.    At goal this regimen provides:  1870 ml / 1870 kcal (101% est. needs)  119 g protein (103% est. 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   PSxH: He  has a past surgical history that includes Prostate surgery; Nephrectomy (Right); Skin cancer excision; Cholecystectomy; and  Colonoscopy.        Reported/Observed/Food/Nutrition Related History     4/11) EN initiated 4/8 and currently at goal, tolerating. Phos is low and being replaced. Pt with improved mentation, able to communicate effectively. States he is feeling okay, denies altered GI function. He is frustrated with progress and asks when SLP will reevaluate him. Family at bedside also frustrated state no one has been in to check on him this morning or tell them when SLP will be coming. RD asked if pt had any needs at this time and wife stated no, RD encouraged to use call light for any needs. RD did express concern that pt may continue with minimal intakes when keofeed removed. Pt states he will eat. RD noted IVF stopped and called RN to discuss continuing without IVF and adding FW to EN regimen. Apparently kangaroo pump already running FW at 40 ml/hr. No documentation regarding this change and was not placed in order. I/O documentation also does not reflect fluids at this rate. RD altered FW to appropriately meet pt's needs.    4/8) Pt failed MBS yesterday. Nutrition consult received for tube feeding assessment. Pt admit with N/V, but this has resolved, no emesis documented since 3/27. Pt has received EN twice within the past year at hospitalizations at  and tolerated Replete with fiber well. RD spoke with pt and family at bedside. Pt's mentation significantly improved from last visit. They are on board with plan, had questions about EN.    4/4) Pt has been struggling with nutrition t/o admission. He remains extremely confused, asks RD where he is and where my car is. Actually asks about food and if he can get some juice, seems to think he is still NPO despite being on diet for 4 days. He asks how long his family can stay today, clearly very anxious about them leaving during non vistor hours. RD efforts to encourage/educate on importance nutrition futile. He has tried breeze and boost and does not like either. Will trial magic  cups and ensure clear. Per family pt has never been a big eater, eats 1 meal/day and snacks t/o the day. He did have N/V at beginning of admission but this has resolved. His tremor seems to have worsened at this RD visit. Dr. Weaver consulted RD to give diet education on discontinuing caffeine in diet. Education provided to family at bedside, will f/u with pt upon improvement mentation. Of concern pt with several wounds.     3/30) Pt now 5 days minimal nutrition on clear liquid diet. RD discussed with family at bedside who are frustrated stating he is not going to eat anything clear liquid. They request that he is advanced to full liquids so that he can have ensure and other options. He is not taking in broth which is the only source of protein on clear liquid diet. He is open to trying Breeze, however will need to monitor BG closely as already high.    Anthropometrics   Height: 68.9 in  Weight: 119 kg / 263 lb per zeroed bed scale 3/25/22  *RD asked nsg to document current wt  BMI: 38.86  BMI classification: Obese Class II: 35-39.9kg/m2   IBW: 155 lb    UBW: large variation in bed weights over past year from 260-397 lb  RD suspects ~260 is most accurate dry/UBW  Weight change:   Last 15 Recorded Weights  View Complete Flowsheet  Weight Weight (kg) Weight (lbs) Weight Method   4/1/2022 119 kg 262 lb 5.6 oz -   4/1/2022 119 kg 262 lb 5.6 oz -   3/25/2022 119.296 kg 263 lb Bed scale    3/25/2022 113 kg 249 lb 1.9 oz -   3/25/2022 113.399 kg 250 lb Stated   5/18/2021 180.1 kg 397 lb 0.8 oz Bed scale   5/17/2021 180.078 kg 397 lb -   5/17/2021 180.3 kg 397 lb 7.8 oz Bed scale   5/16/2021 177.5 kg 391 lb 5.1 oz Bed scale   5/12/2021 144 kg 317 lb 7.4 oz Bed scale   5/10/2021 143.881 kg 317 lb 3.2 oz Bed scale   5/9/2021 143.881 kg 317 lb 3.2 oz Bed scale   5/8/2021 138.166 kg 304 lb 9.6 oz Bed scale   5/7/2021 136.532 kg 301 lb Bed scale   5/6/2021 136.533 kg 301 lb -     Nutrition Focused Physical Exam     Unable to  perform exam due to: Patient agitation     Labs reviewed   Labs reviewed: Yes    Results from last 7 days   Lab Units 04/10/22  1042 04/09/22  0518 04/08/22  1158 04/07/22  0450 04/06/22  0540   SODIUM mmol/L 142 146* 143   < > 154*   POTASSIUM mmol/L 3.8 3.7 4.1   < > 4.4   CHLORIDE mmol/L 112* 115* 112*   < > 119*   CO2 mmol/L 23.0 23.0 21.0*   < > 24.0   BUN mg/dL 39* 45* 48*   < > 69*   CREATININE mg/dL 1.53* 1.72* 1.80*   < > 2.24*   GLUCOSE mg/dL 271* 181* 190*   < > 147*   CALCIUM mg/dL 8.0* 8.0* 7.8*   < > 8.1*   PHOSPHORUS mg/dL 1.6* 2.3*  --   --  3.5   MAGNESIUM mg/dL  --  1.8  --   --   --    CHOLESTEROL mg/dL  --  82  --   --   --    TRIGLYCERIDES mg/dL  --  148  --   --   --     < > = values in this interval not displayed.     Results from last 7 days   Lab Units 04/10/22  1042 04/09/22  0518 04/08/22  1158 04/07/22  1623 04/06/22  0540   WBC 10*3/mm3  --   --  9.64  --  7.55   ALBUMIN g/dL 2.60* 2.60* 2.40*  --  2.80*   CRP mg/dL  --   --   --  3.27*  --    CHOLESTEROL mg/dL  --  82  --   --   --    TRIGLYCERIDES mg/dL  --  148  --   --   --      Results from last 7 days   Lab Units 04/11/22  0756 04/10/22  2015 04/10/22  1716 04/10/22  1150 04/10/22  0812 04/09/22 2001   GLUCOSE mg/dL 339* 382* 381* 300* 274* 305*     Lab Results   Lab Value Date/Time    HGBA1C 7.90 (H) 03/26/2022 0708    HGBA1C 8.10 (H) 04/24/2021 0708     Medications reviewed   Medications reviewed: Yes  Scheduled: insulin, remeron, protonix, steroids, bowel regimen, b12  GTT:   PRN: [email or web address]    Needs Assessment   Height used: 175 cm  Weight used: 119 kg  IBW: 70.5 kg    Estimated Calories needs: ~1900 kcal / day  25 kcal/kg IBW = 1815  14-20 kcal/kg ABW = 9247-4578    Estimated Protein needs: ~130 g / day  1-1.2 g/kg = 119-143 g  *CKD III and solitary kidney    Estimated Fluid needs:   Per clinical status    Current Nutrition Prescription   PO: NPO Diet NPO Except: Ice Chips, Sips With Meds  Oral Nutrition Supplement: Orders Placed  This Encounter      Diet, Tube Feeding Tube Feeding Formula: Replete with Fiber (Very High Protein)      DIET MESSAGE Will you please send Prostat for the patient.  Thanks!       EN: Replete with Fiber   Diet, Tube Feeding Tube Feeding Formula: Replete with Fiber (Very High Protein)  Route: NG  Verified at bedside: Yes      Average PO intake: none documented since 3/27- 18% x 4 meals documented at that time. Minimal intake per family     EN delivery:  1 Day: 1654 ml while advancing to goal      Nutrition Diagnosis     4/8  updated:  Problem Inadequate oral intake   Etiology Clinical status/dysphagia   Signs/Symptoms NPO with need alternative method nutrition per SLP   Status:    Goal:   General: Nutrition support treatment, Nutrition to support treatment  PO: Advace diet as medically appropriate   EN/PN: Initiate EN, Tolerate EN at goal    Intervention   Intervention: Follow treatment progress, Care plan reviewed, Nutrition support order placed, Education provided on EN and dysphagia to pt/family     Pt tolerating EN at goal, continue current regimen:  Continuous EN regimen of Replete with Fiber @ goal rate of 85 ml/hr, FW @ 20 ml/hr. ProStat QD.     Phos low, being replaced    RD notes IVF was stopped and pt currently receiving free water at 40 ml/hr through kangaroo pump. Unsure when this was started as change not documented and was not put in EN order, I/O documentation also does not reflect FW at this rate. RD adjusted FW as to not give excessive rate. Discussed with RN recommendation to continue without IVF.   At goal this regimen provides:  1870 ml / 1870 kcal (101% est. needs)  119 g protein (103% est. needs)  1552 ml FW in formula    RD asked nsg to obtain current weight    Monitoring/Evaluation:   Monitoring/Evaluation: Per protocol, I&O, Pertinent labs, EN delivery/tolerance, Weight, Skin status, GI status, Symptoms, POC/GOC, Swallow function, Hemodynamic stability    Rhea Cobb RD  Time Spent:  50

## 2022-04-12 ENCOUNTER — APPOINTMENT (OUTPATIENT)
Dept: GENERAL RADIOLOGY | Facility: HOSPITAL | Age: 77
End: 2022-04-12

## 2022-04-12 LAB
GLUCOSE BLDC GLUCOMTR-MCNC: 301 MG/DL (ref 70–130)
GLUCOSE BLDC GLUCOMTR-MCNC: 312 MG/DL (ref 70–130)
GLUCOSE BLDC GLUCOMTR-MCNC: 314 MG/DL (ref 70–130)
GLUCOSE BLDC GLUCOMTR-MCNC: 345 MG/DL (ref 70–130)
GLUCOSE BLDC GLUCOMTR-MCNC: 355 MG/DL (ref 70–130)
GLUCOSE BLDC GLUCOMTR-MCNC: 357 MG/DL (ref 70–130)
PROT ?TM UR-MCNC: 81.5 MG/DL
SODIUM UR-SCNC: 75 MMOL/L
UFH PPP CHRO-ACNC: 1.84 IU/ML

## 2022-04-12 PROCEDURE — 63710000001 PREDNISONE PER 1 MG: Performed by: INTERNAL MEDICINE

## 2022-04-12 PROCEDURE — 82570 ASSAY OF URINE CREATININE: CPT | Performed by: INTERNAL MEDICINE

## 2022-04-12 PROCEDURE — 92611 MOTION FLUOROSCOPY/SWALLOW: CPT

## 2022-04-12 PROCEDURE — 84156 ASSAY OF PROTEIN URINE: CPT | Performed by: INTERNAL MEDICINE

## 2022-04-12 PROCEDURE — 97530 THERAPEUTIC ACTIVITIES: CPT

## 2022-04-12 PROCEDURE — 63710000001 INSULIN LISPRO (HUMAN) PER 5 UNITS: Performed by: INTERNAL MEDICINE

## 2022-04-12 PROCEDURE — 99232 SBSQ HOSP IP/OBS MODERATE 35: CPT | Performed by: INTERNAL MEDICINE

## 2022-04-12 PROCEDURE — 85520 HEPARIN ASSAY: CPT

## 2022-04-12 PROCEDURE — 63710000001 INSULIN DETEMIR PER 5 UNITS: Performed by: INTERNAL MEDICINE

## 2022-04-12 PROCEDURE — 82962 GLUCOSE BLOOD TEST: CPT

## 2022-04-12 PROCEDURE — 84540 ASSAY OF URINE/UREA-N: CPT | Performed by: INTERNAL MEDICINE

## 2022-04-12 PROCEDURE — 25010000002 ENOXAPARIN PER 10 MG

## 2022-04-12 PROCEDURE — 84300 ASSAY OF URINE SODIUM: CPT | Performed by: INTERNAL MEDICINE

## 2022-04-12 PROCEDURE — 74230 X-RAY XM SWLNG FUNCJ C+: CPT

## 2022-04-12 PROCEDURE — 97110 THERAPEUTIC EXERCISES: CPT

## 2022-04-12 RX ORDER — LABETALOL HYDROCHLORIDE 5 MG/ML
10 INJECTION, SOLUTION INTRAVENOUS ONCE
Status: COMPLETED | OUTPATIENT
Start: 2022-04-12 | End: 2022-04-12

## 2022-04-12 RX ADMIN — Medication 10 ML: at 21:51

## 2022-04-12 RX ADMIN — METOPROLOL TARTRATE 50 MG: 50 TABLET, FILM COATED ORAL at 21:51

## 2022-04-12 RX ADMIN — BUPRENORPHINE AND NALOXONE 2 TABLET: 8; 2 TABLET SUBLINGUAL at 21:51

## 2022-04-12 RX ADMIN — Medication 5 MG: at 21:51

## 2022-04-12 RX ADMIN — QUETIAPINE FUMARATE 25 MG: 25 TABLET ORAL at 21:51

## 2022-04-12 RX ADMIN — TEMAZEPAM 15 MG: 15 CAPSULE ORAL at 21:50

## 2022-04-12 RX ADMIN — ATORVASTATIN CALCIUM 40 MG: 40 TABLET, FILM COATED ORAL at 21:51

## 2022-04-12 RX ADMIN — NYSTATIN 500000 UNITS: 100000 SUSPENSION ORAL at 18:05

## 2022-04-12 RX ADMIN — NYSTATIN 500000 UNITS: 100000 SUSPENSION ORAL at 08:29

## 2022-04-12 RX ADMIN — BARIUM SULFATE 20 ML: 400 PASTE ORAL at 11:11

## 2022-04-12 RX ADMIN — PANTOPRAZOLE SODIUM 40 MG: 40 INJECTION, POWDER, FOR SOLUTION INTRAVENOUS at 06:07

## 2022-04-12 RX ADMIN — SALINE NASAL SPRAY 1 SPRAY: 1.5 SOLUTION NASAL at 08:34

## 2022-04-12 RX ADMIN — LEVOTHYROXINE SODIUM 150 MCG: 150 TABLET ORAL at 08:29

## 2022-04-12 RX ADMIN — Medication 10 ML: at 08:29

## 2022-04-12 RX ADMIN — ENOXAPARIN SODIUM 110 MG: 120 INJECTION SUBCUTANEOUS at 08:56

## 2022-04-12 RX ADMIN — PREDNISONE 20 MG: 20 TABLET ORAL at 08:31

## 2022-04-12 RX ADMIN — POLYETHYLENE GLYCOL 3350 17 G: 17 POWDER, FOR SOLUTION ORAL at 08:30

## 2022-04-12 RX ADMIN — BARIUM SULFATE 10 ML: 400 SUSPENSION ORAL at 11:11

## 2022-04-12 RX ADMIN — BARIUM SULFATE 50 ML: 400 SUSPENSION ORAL at 11:11

## 2022-04-12 RX ADMIN — Medication 2 SPRAY: at 08:34

## 2022-04-12 RX ADMIN — INSULIN LISPRO 8 UNITS: 100 INJECTION, SOLUTION INTRAVENOUS; SUBCUTANEOUS at 18:05

## 2022-04-12 RX ADMIN — INSULIN LISPRO 7 UNITS: 100 INJECTION, SOLUTION INTRAVENOUS; SUBCUTANEOUS at 12:44

## 2022-04-12 RX ADMIN — SENNOSIDES AND DOCUSATE SODIUM 2 TABLET: 50; 8.6 TABLET ORAL at 08:30

## 2022-04-12 RX ADMIN — NYSTATIN 500000 UNITS: 100000 SUSPENSION ORAL at 15:02

## 2022-04-12 RX ADMIN — Medication 30 ML: at 09:08

## 2022-04-12 RX ADMIN — QUETIAPINE FUMARATE 25 MG: 25 TABLET ORAL at 08:31

## 2022-04-12 RX ADMIN — BARIUM SULFATE 100 ML: 0.81 POWDER, FOR SUSPENSION ORAL at 11:11

## 2022-04-12 RX ADMIN — METOPROLOL TARTRATE 50 MG: 50 TABLET, FILM COATED ORAL at 15:02

## 2022-04-12 RX ADMIN — ENOXAPARIN SODIUM 110 MG: 120 INJECTION SUBCUTANEOUS at 06:07

## 2022-04-12 RX ADMIN — METOPROLOL TARTRATE 50 MG: 50 TABLET, FILM COATED ORAL at 06:07

## 2022-04-12 RX ADMIN — AMLODIPINE BESYLATE 10 MG: 10 TABLET ORAL at 08:30

## 2022-04-12 RX ADMIN — INSULIN DETEMIR 25 UNITS: 100 INJECTION, SOLUTION SUBCUTANEOUS at 21:49

## 2022-04-12 RX ADMIN — INSULIN LISPRO 8 UNITS: 100 INJECTION, SOLUTION INTRAVENOUS; SUBCUTANEOUS at 08:33

## 2022-04-12 RX ADMIN — Medication 2 SPRAY: at 21:56

## 2022-04-12 RX ADMIN — MIRTAZAPINE 7.5 MG: 15 TABLET, FILM COATED ORAL at 21:50

## 2022-04-12 RX ADMIN — INSULIN LISPRO 8 UNITS: 100 INJECTION, SOLUTION INTRAVENOUS; SUBCUTANEOUS at 12:45

## 2022-04-12 RX ADMIN — LABETALOL 20 MG/4 ML (5 MG/ML) INTRAVENOUS SYRINGE 10 MG: at 21:52

## 2022-04-12 RX ADMIN — NYSTATIN 500000 UNITS: 100000 SUSPENSION ORAL at 21:50

## 2022-04-12 RX ADMIN — DULOXETINE HYDROCHLORIDE 90 MG: 30 CAPSULE, DELAYED RELEASE ORAL at 08:29

## 2022-04-12 RX ADMIN — VITAM B12 50 MCG: 100 TAB at 08:28

## 2022-04-12 RX ADMIN — INSULIN LISPRO 8 UNITS: 100 INJECTION, SOLUTION INTRAVENOUS; SUBCUTANEOUS at 08:32

## 2022-04-12 NOTE — DISCHARGE INSTR - DIET
MBS/VFSS  4/12/22  Reason for Referral  Patient was referred for a MBS to assess the efficiency of his/her swallow function, rule out aspiration and make recommendations regarding safe dietary consistencies, effective compensatory strategies, and safe eating environment.             Recommendations/Treatment  SLP Swallowing Diagnosis: mild, oral dysphagia, severe, pharyngeal dysphagia (04/12/22 1100)  Functional Impact: risk of aspiration/pneumonia (04/12/22 1100)  Rehab Potential/Prognosis, Swallowing: adequate, monitor progress closely (04/12/22 1100)  Swallow Criteria for Skilled Therapeutic Interventions Met: demonstrates skilled criteria, other (see comments) (pending POC/GOC) (04/12/22 1100)  Therapy Frequency (Swallow): 5 days per week (04/12/22 1100)  Predicted Duration Therapy Intervention (Days): until discharge (04/12/22 1100)  SLP Diet Recommendation: NPO, long term alternate methods of nutrition/hydration, other (see comments) (if comfort diet, consider soft/whole & thin) (04/12/22 1100)  Recommended Precautions and Strategies: general aspiration precautions (04/12/22 1100)  SLP Rec. for Method of Medication Administration: meds via alternate route (if comfort, crush in pudding/puree) (04/12/22 1100)  Monitor for Signs of Aspiration: yes, notify SLP if any concerns (04/12/22 1100)  Anticipated Discharge Disposition (SLP): unknown, anticipate therapy at next level of care (04/12/22 1100)    Instrumental Set-up  Utensils Used: spoon, cup (04/12/22 1100)  Consistencies Trialed: thin liquids, nectar/syrup-thick liquids, honey-thick liquids, pudding thick (04/12/22 1100)    Oral Preparation/ Oral Phase  Oral Prep Phase: WFL, other (see comments) (DNA solids) (04/12/22 1100)  Oral Transit Phase: impaired (04/12/22 1100)  Oral Residue: WFL (04/12/22 1100)     Impaired Oral Transit Phase: tongue pumping (04/12/22 1100)  Tongue Pumping: thin liquids, nectar-thick liquids (04/12/22 1100)       Pharyngeal  Phase  Initiation of Pharyngeal Swallow: bolus in pyriform sinuses (04/12/22 1100)  Pharyngeal Phase: impaired pharyngeal phase of swallowing (04/12/22 1100)  Penetration During the Swallow: thin liquids, secondary to delayed swallow initiation or mistiming, secondary to reduced laryngeal elevation, secondary to reduced vestibular closure (04/12/22 1100)  Penetration After the Swallow: nectar-thick liquids, honey-thick liquids, pudding/puree, secondary to residue, in pyriform sinuses (04/12/22 1100)  Aspiration After the Swallow: nectar-thick liquids, honey-thick liquids, secondary to residue, in pyriform sinuses, in laryngeal vestibule (04/12/22 1100)  Response to Penetration: no response (04/12/22 1100)  Response to Aspiration: no response, silent aspiration (04/12/22 1100)  Pharyngeal Residue: thin liquids, nectar-thick liquids, honey-thick liquids, pudding/puree, valleculae, pyriform sinuses, secondary to reduced base of tongue retraction, secondary to reduced laryngeal elevation, secondary to reduced hyolaryngeal excursion (04/12/22 1100)  Response to Residue: unable to clear residue (04/12/22 1100)  Attempted Compensatory Maneuvers: bolus size, bolus presentation style, throat clear after swallow, multiple swallows, effortful (hard swallow) (04/12/22 1100)  Response to Attempted Compensatory Maneuvers: did not prevent penetration, did not prevent aspiration, did not reduce residue (04/12/22 1100)  VFSS Summary: Continued mild oral & severe pharyngeal dysphagia. Penetration during/after & aspiration after w/ thin liquids. Penetration & aspiration after w/ nectar-thick & honey-thick. Penetration after w/ pudding - did not visualize aspiration, but suspect inevitable. Mild-moderate pyriform residue w/ all consistencies that spills into laryngeal vestibule. Pt w/ large osteophyte @ C2-C5 (confirmed by Radiology PA) that prevents complete epiglottic inversion & impacts pharyngeal squeeze/stripping wave. Suspect  dysphagia acute-on-chronic given anatomical component. Safest recommendation is NPO w/ continued alternate route, though pt expressed desire to continue eating/drinking despite risk, so may consider comfort diet. (04/12/22 1100)    Cervical Esophageal Phase

## 2022-04-12 NOTE — PLAN OF CARE
Goal Outcome Evaluation:  Plan of Care Reviewed With: patient, spouse        Progress: no change  Outcome Evaluation: Pt demo significant improvement w/ sitting balance, transfers and activity tolerance. SUA for grooming tasks. Pt completing seate BUE and BLE ther ex, posterior lean noted when completing BLE ther ex. STS x 3 reps w/ initial stand requiring ModA x2 w/ BUE support, Dion x 2 for 2nd and 3rd attempt w/ RW. Pt able to tolerate standing at RW for approx 30 seconds. Continue to progress as able.

## 2022-04-12 NOTE — PLAN OF CARE
Goal Outcome Evaluation:  Plan of Care Reviewed With: patient            SLP MBS re-evaluation completed. Will continue to address dysphagia. Please see note for further details and recommendations.

## 2022-04-12 NOTE — PLAN OF CARE
Goal Outcome Evaluation:  Plan of Care Reviewed With: patient, spouse        Progress: improving  Outcome Evaluation: PT DEMONSTRATED IMPROVED ENDURANCE FOR LE THER EX, BALANCE ACTIVITY AND STANDING AT WALKER. COMPLETED STS FROM RECLINER X 3 REPS. DEMONSTRATED RECIPROCAL SCOOTING FORWARD/BACKWARD IN RECLINER WITH CUES AND CGA. PT ALERT AND FOLLOWING ALL COMMANDS. MOTIVATED TO WORK WITH THERAPY. RECOMMEND SNF AT D/C.

## 2022-04-12 NOTE — PROGRESS NOTES
INFECTIOUS DISEASE Progress Note    Yinka Cummings  1945  [id number]      Admission Date: 3/25/2022      Requesting Provider: Yinka Desai MD  Evaluating Physician: Brandt Ward MD    Reason for Consultation: vasculitis rash with cellulitis    History of present illness:    3/26/2022: Patient is a 77 y.o. male, ,known to Dr. Wili Rees, with h/o CKD, T2DM, afib/flutter, chronic diastolic heart failure, VINI, chronic pain, gastroparesis, Gilbert's disease,  hypothyroidism, essential tremor, HTN, Obesity, Prostate cancer/radical prostatectomy, renal cell carcinoma/right nephrectomy, multiple skin cancer excisions, and cutaneous vasculitis who we were asked to see for cellulitis.  The patient presented to [facility] ED on 3/25/22 with nausea, vomiting, and LE rash/redness.  [facility] and Afib/RVR.  Dermatology did a punch biopsy with path showing perivascular lymphocytic infiltrate with no evidence of vasculitis at the time.  He was though to have rash from Pradaxa and was changed to Lovenox.  The rash worsened and eventually Lyrica, Primidone, and Norco were stopped one by one.  He was started on Suboxone on 3/16 and his rash began to improve.  Primidone had been a new drug started just prior the start of the rash.  [facility] and converted to NSR.  He was discharge home on Lovenox.  He followed up with his PCP on 3/21 and was placed on Keflex for possible cutaneous infections at the rash site and Lasix for BLE edema.    His rash has worsened again on feet, legs, abdomen, and arms prompting him to come to [facility] ED on 3/25.  He developed nausea and vomiting prior to his presentation.  He denies fever, chills, abdominal pain, diarrhea, or dysuria.  On arrival, his Tmax is 99.4.  Initial labs were CRP 12.02-->12.5, INR 1.2, ESR 44-->62, PCT 0.78-->0.94, lactic acid 2.0, lipase 8, proBNP 5100, D-dimer 4.44, RF 16.4, C3 144, C4 29, WBC 10,600  with 83% neutrophils, and creatinine 2.35-->2.75 (baseline 1.6 on 5/20/21).  A leg wound culture showed normal skin ananth with GS showing GPC in pairs/clusters.  A second wound culture is pending with growth present.   A COVID-19/Flu PCR is negative.  A Hep panel was negative.  A UA WBC is 13-20 with TNTC RBCs, trace LE, negative nitrite.  KENZIE, ANCA panel, cryogobulin, and complement are pending.  A CXR showed no acute findings.  A DVT work up of BLE was negative although peroneal veins were not well visualized bilaterally. A CT scan of a/p with contrast has been ordered.  He is currently on low dose Rocephin.  ID was asked to evaluate and manage his antibiotic therapy.    He continues to have nausea and vomiting with cold sweat.  He denies any diarrhea.     3/27/2022: He complains of lower extremity pain which is most prominent in his feet.  He has remained afebrile.  His creatinine today is 2.82 and his C-reactive protein is 11.6.  His white blood cell count is 17.2.  He has anorexia but denies nausea and vomiting.    3/28/22: He has decreased Bilateral foot pain.  He has remained afebrile.  His creatinine is down to 2.5. His 24 hour protein is 360. He denies nausea, vomiting, and diarrhea    3/29/22:  Maximum temperature over the last 24 hours is 99.2. Creatinine yesterday was 2.58. CH 50 was greater than 60, ANCA was negative and KENZIE was negative.  He has decreased foot pain.  He complains of malaise but denies nausea and vomiting.    3/30/22: He has remained afebrile.  He denies nausea and vomiting.  He has decreased lower extremity pain.  He denies dyspnea.    3/31/22: He remains afebrile. His creatinine today is 2.6.  His C-reactive protein is 6.8.  His white blood cell count is 15.4.  His echocardiogram revealed no valvular vegetations. He and his family indicate improvement in his lower extremity rash but he has more extensive lesions over his palms.     4/1/2022: He underwent cardioversion today.  He is  now in sinus rhythm.  He has remained afebrile.  He is currently drowsy from his sedation for cardioversion.  His family thinks that his rash is no worse today.  His creatinine today is 2.33.      4/4/2022: He has been confused over the weekend.  This worsened after he was started on prednisone.  He has been afebrile.  He notes a significant improvement in his rash over the weekend.  He denies nausea, vomiting, and diarrhea.  He has remained afebrile.  His creatinine today is 2.35.    4/5/22: He has been severely confused overnight with agitation. He had a fever to 100.6° earlier today but is now afebrile. Laboratory studies today reveal a creatinine of 2.38.  A urinalysis yesterday revealed 6-12 white blood cells. He is unable to provide a review of systems.    4/6/22: He had a low-grade fever to 100.6 at around noon yesterday but has been afebrile since. His creatinine today is 2.24 and his white blood cell count is 7.6. Sodium is elevated at 154. He feels much better.  He he is much less agitated and has appropriate conversation today.    4/7/22: He has remained afebrile over the last 24 hours. His creatinine today is down to 1.79. He feels much better.  He denies increased foot pain. Agitation and confusion have dramatically improved.    4/8/22: He has remained afebrile.  Yesterday his ESR was down to 19 and his C-reactive protein was 3.3. His rash over his upper extremities dramatically worsened overnight although his rash on his lower extremities is unchanged.  He has now being started back on prednisone at 20 mg per day.    4/10/22: I follow the patient for Dr. Brandt Ward while he is out.  The patient and his family members at bedside report that his rash over his extremities appears to be improving over the last couple of days since restarting the prednisone.  Renal function is slightly better today.  He is tolerating the steroids better without any significant confusion today.  No fevers.    4/11/22:  The patient is having some confusion today.  He does not know the month or the year but he does know that he is in [facility] and he does know his name.  Pain is under better control.  Rash over his upper extremities is improving.  His wife is at bedside and expresses some frustration that he has not yet had his swallow eval.  Feeding tube remains in place.    Past Medical History:   Diagnosis Date   • Anxiety    • Arthritis    • Chronic kidney disease    • Diabetes mellitus (HCC)    • Disease of thyroid gland    • Diverticulosis    • Essential tremor    • HL (hearing loss)    • Hypertension    • Obesity    • Prostate cancer (HCC)    • Renal cell cancer (HCC)    • Skin cancer    • Vasculitis of skin        Past Surgical History:   Procedure Laterality Date   • CHOLECYSTECTOMY     • COLONOSCOPY      Polyps in the past but not on the most recent scope   • NEPHRECTOMY Right    • PROSTATE SURGERY      Radical prostatectomy   • SKIN CANCER EXCISION      Large incision left neck, multiple other cancers removed       Family History   Problem Relation Age of Onset   • Cancer Mother    • Heart attack Father    • Kidney failure Sister    • Coronary artery disease Sister        Social History     Socioeconomic History   • Marital status:    • Number of children: 4   Tobacco Use   • Smoking status: Never Smoker   • Smokeless tobacco: Never Used   Vaping Use   • Vaping Use: Never used   Substance and Sexual Activity   • Alcohol use: Not Currently   • Drug use: Not Currently   • Sexual activity: Defer       Allergies   Allergen Reactions   • Contrast Dye Rash     [facility]   • Doxycycline Rash     Urticaria per [facility]   • Chlorthalidone Other (See Comments)     Hyponatremia [facility]   • Morphine Unknown - Low Severity     Headache [facility]   • Neurontin [Gabapentin] Mental Status Change     Drowsy [facility]   • Phenergan [Promethazine] Unknown - Low Severity      Confusion [facility]         Medication:    Current Facility-Administered Medications:   •  acetaminophen (TYLENOL) tablet 650 mg, 650 mg, Oral, Q4H PRN **OR** acetaminophen (TYLENOL) 160 MG/5ML solution 650 mg, 650 mg, Nasogastric, Q4H PRN **OR** acetaminophen (TYLENOL) suppository 650 mg, 650 mg, Rectal, Q4H PRN, Jemma Arriaga APRN  •  amLODIPine (NORVASC) tablet 10 mg, 10 mg, Per G Tube, Q24H, Wili Conner DO, 10 mg at 04/11/22 0843  •  atorvastatin (LIPITOR) tablet 40 mg, 40 mg, Nasogastric, Nightly, Jemma Arriaga APRN, 40 mg at 04/10/22 2057  •  buprenorphine-naloxone (SUBOXONE) 8-2 MG per SL tablet 2 tablet, 2 tablet, Sublingual, Nightly, Raymond Herrera MD, 2 tablet at 04/10/22 2057  •  dextrose (D50W) (25 g/50 mL) IV injection 25 g, 25 g, Intravenous, Q15 Min PRN, Raymond Herrera MD  •  dextrose (GLUTOSE) oral gel 15 g, 15 g, Nasogastric, Q15 Min PRN, Jemma Arriaga APRN  •  DULoxetine (CYMBALTA) DR capsule 90 mg, 90 mg, Oral, Daily, Jemma Arriaga APRN, 90 mg at 04/11/22 0843  •  enoxaparin (LOVENOX) syringe 110 mg, 110 mg, Subcutaneous, Q12H, Yinka Brush IV, PharmD, 110 mg at 04/11/22 1753  •  glucagon (human recombinant) (GLUCAGEN DIAGNOSTIC) injection 1 mg, 1 mg, Intramuscular, Q15 Min PRN, Raymond Herrera MD  •  haloperidol (HALDOL) 2 MG/ML solution 2 mg, 2 mg, Oral, Q6H PRN, Peña Weaver MD  •  insulin detemir (LEVEMIR) injection 20 Units, 20 Units, Subcutaneous, Nightly, Wili Conner DO  •  insulin lispro (humaLOG) injection 0-9 Units, 0-9 Units, Subcutaneous, TID AC, Raymond Herrera MD, 8 Units at 04/11/22 1752  •  insulin lispro (humaLOG) injection 5 Units, 5 Units, Subcutaneous, TID With Meals, Wili Conner DO, 5 Units at 04/11/22 1752  •  levothyroxine (SYNTHROID, LEVOTHROID) tablet 150 mcg, 150 mcg, Nasogastric, Daily, Jemma Arriaga APRN, 150 mcg at 04/11/22 0843  •  melatonin tablet 5 mg, 5 mg, Nasogastric, Nightly, Bart  NAZ Easton, 5 mg at 04/10/22 2056  •  methohexital (BREVITAL) injection  - ADS Override Pull, , , ,   •  metoprolol tartrate (LOPRESSOR) tablet 50 mg, 50 mg, Nasogastric, Q8H, Wili Conner DO, 50 mg at 04/11/22 1613  •  midazolam (VERSED) 2 MG/2ML injection  - ADS Override Pull, , , ,   •  mineral oil-hydrophilic petrolatum (AQUAPHOR) ointment 1 application, 1 application, Topical, BID PRN, Peña Weaver MD, 1 application at 04/06/22 2245  •  mirtazapine (REMERON) tablet 7.5 mg, 7.5 mg, Nasogastric, Nightly, Jemma Arriaga APRN, 7.5 mg at 04/10/22 2057  •  nystatin (MYCOSTATIN) 100,000 unit/mL suspension 500,000 Units, 5 mL, Oral, 4x Daily, Jemma Arriaga APRN, 500,000 Units at 04/11/22 1753  •  [DISCONTINUED] ondansetron (ZOFRAN) tablet 4 mg, 4 mg, Oral, Q6H PRN **OR** ondansetron (ZOFRAN) injection 4 mg, 4 mg, Intravenous, Q6H PRN, Raymond Herrera MD, 4 mg at 04/06/22 2324  •  oxymetazoline (AFRIN) nasal spray 2 spray, 2 spray, Each Nare, BID, Ramy Balderas PA-C, 2 spray at 04/11/22 0848  •  palliative care oral rinse, , Mouth/Throat, PRN, Yinka Desai MD, Given at 04/10/22 0841  •  pantoprazole (PROTONIX) injection 40 mg, 40 mg, Intravenous, Q AM, Jemma Arriaga APRN, 40 mg at 04/10/22 0532  •  polyethylene glycol (MIRALAX) packet 17 g, 17 g, Nasogastric, Daily, Jemma Arriaga APRN, 17 g at 04/11/22 0844  •  predniSONE (DELTASONE) tablet 20 mg, 20 mg, Nasogastric, Daily With Breakfast, Wili Conner DO, 20 mg at 04/11/22 0843  •  PRO-STAT oral liquid 30 mL, 30 mL, Nasogastric, Daily, Wili Conner DO, 30 mL at 04/11/22 0853  •  QUEtiapine (SEROquel) tablet 25 mg, 25 mg, Nasogastric, Q12H, Wili Conner DO, 25 mg at 04/11/22 0843  •  sennosides-docusate (PERICOLACE) 8.6-50 MG per tablet 2 tablet, 2 tablet, Nasogastric, BID, Jemma Arraiga, APRN, 2 tablet at 04/11/22 0843  •  Sodium Chloride (PF) 0.9 % 10 mL, 10 mL, Intravenous, PRN, Sharon,  MD Raymond  •  sodium chloride 0.9 % flush 10 mL, 10 mL, Intravenous, Q12H, Raymond Herrera MD, 10 mL at 04/10/22 2057  •  sodium chloride 0.9 % flush 10 mL, 10 mL, Intravenous, PRN, Raymond Herrera MD, 10 mL at 22 0830  •  sodium chloride nasal spray 1 spray, 1 spray, Each Nare, PRN, Yaima Mansfield, APRN, 1 spray at 22 0844  •  sodium phosphates 30 mmol in sodium chloride 0.9 % 250 mL IVPB, 30 mmol, Intravenous, Once, Wili Conner DO  •  temazepam (RESTORIL) capsule 15 mg, 15 mg, Nasogastric, Nightly, Jemma Arriaga APRN, 15 mg at 04/10/22 2057  •  vitamin B-12 (CYANOCOBALAMIN) tablet 50 mcg, 50 mcg, Nasogastric, Daily, Jemma Arriaga APRN, 50 mcg at 22 0848    Antibiotics:  Anti-Infectives (From admission, onward)    Ordered     Dose/Rate Route Frequency Start Stop    22 1059  cefTRIAXone (ROCEPHIN) 1 g/100 mL 0.9% NS (MBP)        Ordering Provider: Chace Chauhan MD    1 g  over 30 Minutes Intravenous Once 22 1101 22 1244            Review of Systems:  See HPI    Physical Exam:   Vital Signs  Temp (24hrs), Av.6 °F (37 °C), Min:97.9 °F (36.6 °C), Max:99.2 °F (37.3 °C)    Temp  Min: 97.9 °F (36.6 °C)  Max: 99.2 °F (37.3 °C)  BP  Min: 171/69  Max: 195/82  Pulse  Min: 70  Max: 90  Resp  Min: 18  Max: 18  SpO2  Min: 97 %  Max: 98 %    GENERAL: Debilitated appearing. Alert and responsive. Sitting up in bed  HEENT: Normocephalic, atraumatic. No external oral lesions.  NG tube in place  NECK: Supple   HEART: RRR; No murmur  LUNGS: Clear to auscultation bilaterally without wheezing, rales, rhonchi. Normal respiratory effort. Nonlabored.  ABDOMEN: Soft, lower abdominal tenderness, nondistended.  No rebound or guarding.   Skin: He has diffuse hemorrhagic ulcerative rash is dramatically improved on his lower extremities and feet. His rash over his upper extremities appears to be improving from the marked areas.  It does appear vasculitic.  MSK:  FROM, no  joint effusions  NEURO: Alert and responsive. He is oriented to person and place but he is not oriented to month or year.  He answers most questions appropriately.  Psych: Cooperative.  Appropriate mood.    Laboratory Data    Results from last 7 days   Lab Units 04/11/22  0924 04/08/22  1158 04/06/22  0540   WBC 10*3/mm3 9.92 9.64 7.55   HEMOGLOBIN g/dL 7.6* 8.0* 7.9*   HEMATOCRIT % 23.6* 26.2* 26.5*   PLATELETS 10*3/mm3 246 225 261     Results from last 7 days   Lab Units 04/11/22  0924   SODIUM mmol/L 144   POTASSIUM mmol/L 4.2   CHLORIDE mmol/L 111*   CO2 mmol/L 26.0   BUN mg/dL 39*   CREATININE mg/dL 1.50*   GLUCOSE mg/dL 353*   CALCIUM mg/dL 8.2*     Results from last 7 days   Lab Units 04/11/22  0924   ALK PHOS U/L 88   BILIRUBIN mg/dL 0.3   ALT (SGPT) U/L 11   AST (SGOT) U/L 9     Results from last 7 days   Lab Units 04/07/22  1623   SED RATE mm/hr 19     Results from last 7 days   Lab Units 04/07/22  1623   CRP mg/dL 3.27*                 Estimated Creatinine Clearance: 54.1 mL/min (A) (by C-G formula based on SCr of 1.5 mg/dL (H)).      Microbiology:  Wound cx 3/25: NL skin ananth SF  COVID-19/Flu PCR negative.  2nd wound cx 3/25: growth present but TYTE  Urine Eos Zero        Radiology:  Imaging Results (Last 72 Hours)     ** No results found for the last 72 hours. **            Impression:   1. Vasculitic rash-with palpable purpura.  This rash clinically looks like leukocytoclastic vasculitis.  I suspect that this is secondary to primidone given that it began shortly after starting on primidone. This dramatically improved with prednisone therapy but his prednisone therapy was aborted early due to delirium.  His rash worsened overnight.  I still think this is likely secondary to primidone-primidone/primidone metabolites have a very long half-life.  We will start him back on a lower dose of prednisone at 20 mg per day.  If he fails to improve, he may need a repeat skin biopsy and/or kidney biopsy.  Kidney  biopsy would be risky due to the fact that he has a solitary kidney.- rash and renal function are improving on steroids  2. Duodenitis- per CT scan.  He is on PPI therapy.  3. Elevated procalcitonin,  4. Acute kidney injury--This is significantly improved.  5. Elevated CRP  6. Chronic diastolic heart failure  7. Afib/on Lovenox, recent cardioversion to NSR  8. Type 2 diabetes mellitus/gastroparesis  9. Hypothyroidism  10. Essential hypertension  11. Morbid obesity  12. Prostate cancer/radical prostatectomy  13. Renal cell carcinoma/radical prostatectomy  14. H/o multiple skin cancer excisions  15. Doxycycline (urticaria) allergy  16. Hematuria  17. Left heel decubitus lesion-this is significantly better.  18. Toxic/metabolic encephalopathy-some of this may be secondary to his prednisone.  He is now off of steroid therapy  19. Hypernatremia    PLAN/RECOMMENDATIONS:  1.  Continue wound care  2.  Continue off of primidone and as many other medications as possible  3.  Offload both heels  4.  continue prednisone at 20 mg per day  5.  [facility] with IgA stains  6.  Swallow eval by speech today    His rash and renal function appear to be improving some on steroids now.  having some encephalopathy but not significantly worse today. He does not know the year or the month.    I discussed his extremely complex situation in detail again with his wife and daughter at bedside today            Yves Ward MD  4/11/2022  20:44 EDT

## 2022-04-12 NOTE — THERAPY TREATMENT NOTE
"Patient Name: Yinka Cummings  : 1945    MRN: [id number]                              Today's Date: 2022       Admit Date: 3/25/2022    Visit Dx:     ICD-10-CM ICD-9-CM   1. Oropharyngeal dysphagia  R13.12 787.22   2. Acute renal failure, unspecified acute renal failure type (HCC)  N17.9 584.9   3. Dehydration  E86.0 276.51   4. Cellulitis of lower extremity, unspecified laterality  L03.119 682.6   5. Vasculitis (HCC)  I77.6 447.6   6. Congestive heart failure, unspecified HF chronicity, unspecified heart failure type (HCC)  I50.9 428.0   7. 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)   • Chronic back pain (on chronic prescription lortab)   • HTN (hypertension)   • Solitary kidney (s/p nephrectomy for \"mass\")   • History of prostate cancer   • Closed fracture of phalanx of right hand   • Dental caries   • Acute renal failure, unspecified acute renal failure type (HCC)   • Rash   • Nausea and vomiting     Past Medical History:   Diagnosis Date   • Anxiety    • Arthritis    • Chronic kidney disease    • Diabetes mellitus (HCC)    • Disease of thyroid gland    • Diverticulosis    • Essential tremor    • HL (hearing loss)    • Hypertension    • Obesity    • Prostate cancer (HCC)    • Renal cell cancer (HCC)    • Skin cancer    • Vasculitis of skin      Past Surgical History:   Procedure Laterality Date   • CHOLECYSTECTOMY     • COLONOSCOPY      Polyps in the past but not on the most recent scope   • NEPHRECTOMY Right    • PROSTATE SURGERY      Radical prostatectomy   • SKIN CANCER EXCISION      Large incision left neck, multiple other cancers removed      General Information     Row Name 22 1339          OT Time and Intention    " Document Type therapy note (daily note)  Simultaneous filing. User may be unaware of other data.  -MR     Mode of Treatment occupational therapy;co-treatment  -MR     Row Name 04/12/22 5782          General Information    Patient Profile Reviewed yes  Simultaneous filing. User may be unaware of other data.  -MR     Existing Precautions/Restrictions fall  decreased skin integrity bilateral hands and feet; NG tube; Simultaneous filing. User may be unaware of other data.  -MR     Barriers to Rehab medically complex;previous functional deficit;hearing deficit  Simultaneous filing. User may be unaware of other data.  -MR     Row Name 04/12/22 7684          Cognition    Orientation Status (Cognition) oriented to;person;place;verbal cues/prompts needed for orientation;situation;time  Simultaneous filing. User may be unaware of other data.  -MR     Row Name 04/12/22 0485          Safety Issues, Functional Mobility    Safety Issues Affecting Function (Mobility) awareness of need for assistance;friction/shear risk;safety precautions follow-through/compliance;safety precaution awareness  Simultaneous filing. User may be unaware of other data.  -MR     Impairments Affecting Function (Mobility) balance;coordination;endurance/activity tolerance;pain;strength  Simultaneous filing. User may be unaware of other data.  -MR           User Key  (r) = Recorded By, (t) = Taken By, (c) = Cosigned By    Initials Name Provider Type    MR Alla Mccauley, OT Occupational Therapist                 Mobility/ADL's     Row Name 04/12/22 1340          Bed Mobility    Comment, (Bed Mobility) Pt received UIC and left UIC  -MR     Row Name 04/12/22 1340          Transfers    Transfers sit-stand transfer;stand-sit transfer  -MR     Sit-Stand [place] (Transfers) moderate assist (50% patient effort);2 person assist;verbal cues;nonverbal cues (demo/gesture)  -MR     Stand-Sit [place] (Transfers) verbal cues;nonverbal cues  (demo/gesture);moderate assist (50% patient effort);2 person assist  -MR     Row Name 04/12/22 1340          Sit-Stand Transfer    Assistive Device (Sit-Stand Transfers) other (see comments)  x1 trial w/ BUE support, x2 to 3 trial w/ RW.  -MR     Comment, (Sit-Stand Transfer) v/c for hand placement, sequencing and safety. Pt demo good technique and effort to advance self forward to edge of chair. Pt requiring assistance for advancement of UE to RW limited by tremors and anxiousness.  -MR     Row Name 04/12/22 1340          Stand-Sit Transfer    Assistive Device (Stand-Sit Transfers) walker, front-wheeled  -MR     Row Name 04/12/22 1340          Activities of Daily Living    BADL Assessment/Intervention grooming;lower body dressing  -MR     Row Name 04/12/22 1340          Grooming Assessment/Training    [place] (Grooming) wash face, hands;set up  -MR     Position (Grooming) supported sitting  -MR     Row Name 04/12/22 1340          Lower Body Dressing Assessment/Training    [place] (Lower Body Dressing) don;doff;socks;dependent (less than 25% patient effort)  -MR     Position (Lower Body Dressing) supported sitting  -MR           User Key  (r) = Recorded By, (t) = Taken By, (c) = Cosigned By    Initials Name Provider Type    Alla Garces OT Occupational Therapist               Obj/Interventions     Row Name 04/12/22 1344          Shoulder (Therapeutic Exercise)    Shoulder (Therapeutic Exercise) AROM (active range of motion)  -MR     Shoulder AROM (Therapeutic Exercise) bilateral;flexion;extension;scapular retraction;scapular protraction;5 repetitions  -MR     Row Name 04/12/22 1344          Elbow/Forearm (Therapeutic Exercise)    Elbow/Forearm (Therapeutic Exercise) AROM (active range of motion)  -MR     Elbow/Forearm AROM (Therapeutic Exercise) bilateral;flexion;extension;5 repetitions  -MR     Row Name 04/12/22 1344          Motor Skills    Therapeutic Exercise shoulder;elbow/forearm   -MR     Row Name 04/12/22 8252          Balance    Balance Assessment sitting static balance;sitting dynamic balance;standing static balance;standing dynamic balance  -MR     Static Sitting Balance contact guard  -MR     Dynamic Sitting Balance minimal assist  -MR     Position, Sitting Balance supported;sitting in chair  -MR     Static Standing Balance minimal assist  -MR     Dynamic Standing Balance moderate assist  -MR     Position/Device Used, Standing Balance supported;walker, rolling  -MR     Balance Interventions sitting;standing;sit to stand;supported;static;dynamic;minimal challenge;occupation based/functional task  -MR     Comment, Balance Pt demo a posterior lean when engaging in dynamic sitting balance tasks  -MR           User Key  (r) = Recorded By, (t) = Taken By, (c) = Cosigned By    Initials Name Provider Type    MR Alla Mccauley, OT Occupational Therapist               Goals/Plan    No documentation.                Clinical Impression     Row Name 04/12/22 1346          Pain Assessment    Pretreatment Pain Rating 1/10  -MR     Posttreatment Pain Rating 1/10  -MR     Pain Location generalized  -MR     Pain Location - buttock  -MR     Pre/Posttreatment Pain Comment Pt reporting minimal discomfort in the buttock from sitting this date.  -MR     Pain Intervention(s) Ambulation/increased activity;Repositioned  -MR     Row Name 04/12/22 1346          Plan of Care Review    Plan of Care Reviewed With patient;spouse  -MR     Progress no change  -MR     Outcome Evaluation Pt demo significant improvement w/ sitting balance, transfers and activity tolerance. SUA for grooming tasks. Pt completing seate BUE and BLE ther ex, posterior lean noted when completing BLE ther ex. STS x 3 reps w/ initial stand requiring ModA x2 w/ BUE support, Dion x 2 for 2nd and 3rd attempt w/ RW. Pt able to tolerate standing at RW for approx 30 seconds. Continue to progress as able.  -MR     Row Name 04/12/22 0598          Therapy  Plan Review/Discharge Plan (OT)    Anticipated Discharge Disposition (OT) skilled nursing facility  -MR     Row Name 04/12/22 1346          Vital Signs    Pre Systolic BP Rehab 171  -MR     Pre Treatment Diastolic BP 74  -MR     Post Systolic BP Rehab 160  -MR     Post Treatment Diastolic   -MR     Pretreatment Heart Rate (beats/min) 96  -MR     Intratreatment Heart Rate (beats/min) 106  -MR     Posttreatment Heart Rate (beats/min) 98  -MR     Pre SpO2 (%) 98  -MR     O2 Delivery Pre Treatment nasal cannula  -MR     O2 Delivery Intra Treatment nasal cannula  -MR     Post SpO2 (%) 97  -MR     O2 Delivery Post Treatment nasal cannula  -MR     Pre Patient Position Sitting  -MR     Intra Patient Position Standing  -MR     Post Patient Position Sitting  -MR     Row Name 04/12/22 1346          Positioning and Restraints    Pre-Treatment Position sitting in chair/recliner  -MR     Post Treatment Position chair  -MR     In Chair notified nsg;reclined;sitting;call light within reach;encouraged to call for assist;exit alarm on;with family/caregiver;legs elevated;waffle boot/both;on mechanical lift sling;waffle cushion;RUE elevated;LUE elevated  -MR           User Key  (r) = Recorded By, (t) = Taken By, (c) = Cosigned By    Initials Name Provider Type    MR Alla Mccauley, OT Occupational Therapist               Outcome Measures     Row Name 04/12/22 5583          How much help from another is currently needed...    Putting on and taking off regular lower body clothing? 1  -MR     Bathing (including washing, rinsing, and drying) 1  -MR     Toileting (which includes using toilet bed pan or urinal) 1  -MR     Putting on and taking off regular upper body clothing 2  -MR     Taking care of personal grooming (such as brushing teeth) 3  -MR     Eating meals 1  -MR     AM-PAC 6 Clicks Score (OT) 9  -MR     Row Name 04/12/22 1349          Functional Assessment    Outcome Measure Options AM-PAC 6 Clicks Daily Activity (OT)  -MR            User Key  (r) = Recorded By, (t) = Taken By, (c) = Cosigned By    Initials Name Provider Type    Alla Garces, OT Occupational Therapist                Occupational Therapy Education                 Title: PT OT SLP Therapies (In Progress)     Topic: Occupational Therapy (Done)     Point: ADL training (Done)     Description:   Instruct learner(s) on proper safety adaptation and remediation techniques during self care or transfers.   Instruct in proper use of assistive devices.              Learning Progress Summary           Patient Acceptance, E, VU,NR by MR at 4/12/2022 1350    Acceptance, E,TB,D, DU,VU,NR by  at 4/7/2022 1535    Acceptance, E,TB,D, VU,DU,NR by  at 4/7/2022 1534    Acceptance, E, NR by MR at 4/4/2022 1447    Acceptance, E,D, NR by JAYDEN at 3/30/2022 1147    Acceptance, E,D, VU,NR by TA at 3/27/2022 1502   Family Acceptance, E, VU,NR by MR at 4/12/2022 1350    Acceptance, E,TB,D, DU,VU,NR by  at 4/7/2022 1535    Acceptance, E, NR by MR at 4/4/2022 1447    Acceptance, E,D, NR by JAYDEN at 3/30/2022 1147                   Point: Home exercise program (Done)     Description:   Instruct learner(s) on appropriate technique for monitoring, assisting and/or progressing therapeutic exercises/activities.              Learning Progress Summary           Patient Acceptance, E, VU,NR by MR at 4/12/2022 1350    Acceptance, E,TB,D, DU,VU,NR by  at 4/7/2022 1535    Acceptance, E,TB,D, VU,DU,NR by  at 4/7/2022 1534    Acceptance, E, NR by MR at 4/4/2022 1447    Acceptance, E,D, NR by JAYDEN at 3/30/2022 1147    Acceptance, E,D, VU,NR by TA at 3/27/2022 1502   Family Acceptance, E, VU,NR by MR at 4/12/2022 1350    Acceptance, E,TB,D, DU,VU,NR by KF at 4/7/2022 1535    Acceptance, E, NR by MR at 4/4/2022 1447    Acceptance, E,D, NR by JAYDEN at 3/30/2022 1147                   Point: Precautions (Done)     Description:   Instruct learner(s) on prescribed precautions during self-care and functional  transfers.              Learning Progress Summary           Patient Acceptance, E, VU,NR by MR at 4/12/2022 1350    Acceptance, E,TB,D, DU,VU,NR by KF at 4/7/2022 1535    Acceptance, E,TB,D, VU,DU,NR by KF at 4/7/2022 1534    Acceptance, E, NR by MR at 4/4/2022 1447    Acceptance, E,D, NR by JY at 3/30/2022 1147    Acceptance, E,D, VU,NR by TA at 3/27/2022 1502   Family Acceptance, E, VU,NR by MR at 4/12/2022 1350    Acceptance, E,TB,D, DU,VU,NR by KF at 4/7/2022 1535    Acceptance, E, NR by MR at 4/4/2022 1447    Acceptance, E,D, NR by JY at 3/30/2022 1147                   Point: Body mechanics (Done)     Description:   Instruct learner(s) on proper positioning and spine alignment during self-care, functional mobility activities and/or exercises.              Learning Progress Summary           Patient Acceptance, E, VU,NR by MR at 4/12/2022 1350    Acceptance, E,TB,D, DU,VU,NR by KF at 4/7/2022 1535    Acceptance, E,TB,D, VU,DU,NR by KF at 4/7/2022 1534    Acceptance, E, NR by MR at 4/4/2022 1447    Acceptance, E,D, NR by JY at 3/30/2022 1147    Acceptance, E,D, VU,NR by TA at 3/27/2022 1502   Family Acceptance, E, VU,NR by MR at 4/12/2022 1350    Acceptance, E,TB,D, DU,VU,NR by KF at 4/7/2022 1535    Acceptance, E, NR by MR at 4/4/2022 1447    Acceptance, E,D, NR by JY at 3/30/2022 1147                               User Key     Initials Effective Dates Name Provider Type Discipline    TA 06/16/21 -  Neno Russ, OT Occupational Therapist OT    KF 06/16/21 -  Alis Harris, OT Occupational Therapist OT    JY 06/16/21 -  Young, Annalisa, OT Occupational Therapist OT    MR 10/06/21 -  Alla Mccauley OT Occupational Therapist OT              OT Recommendation and Plan     Plan of Care Review  Plan of Care Reviewed With: patient, spouse  Progress: no change  Outcome Evaluation: Pt demo significant improvement w/ sitting balance, transfers and activity tolerance. SUA for grooming tasks. Pt completing  seate BUE and BLE ther ex, posterior lean noted when completing BLE ther ex. STS x 3 reps w/ initial stand requiring ModA x2 w/ BUE support, Dion x 2 for 2nd and 3rd attempt w/ RW. Pt able to tolerate standing at RW for approx 30 seconds. Continue to progress as able.     Time Calculation:    Time Calculation- OT     Row Name 04/12/22 1350             Time Calculation- OT    OT Start Time 1306  -MR      OT Received On 04/12/22  -MR      OT Goal Re-Cert Due Date 04/17/22  -MR              Timed Charges    24071 - OT Therapeutic Activity Minutes 10  -MR      26393 - OT Self Care/Mgmt Minutes 10  -MR              Total Minutes    Timed Charges Total Minutes 20  -MR       Total Minutes 20  -MR            User Key  (r) = Recorded By, (t) = Taken By, (c) = Cosigned By    Initials Name Provider Type    Catalino Garces OT Occupational Therapist              Therapy Charges for Today     Code Description Service Date Service Provider Modifiers Qty    31262134303 HC OT THERAPEUTIC ACT EA 15 MIN 4/12/2022 Catalino Mccauley OT GO 1               CATALINO MCCAULEY OT  4/12/2022

## 2022-04-12 NOTE — THERAPY TREATMENT NOTE
"Patient Name: Yinka Cummings  : 1945    MRN: [id number]                              Today's Date: 2022       Admit Date: 3/25/2022    Visit Dx:     ICD-10-CM ICD-9-CM   1. Oropharyngeal dysphagia  R13.12 787.22   2. Acute renal failure, unspecified acute renal failure type (HCC)  N17.9 584.9   3. Dehydration  E86.0 276.51   4. Cellulitis of lower extremity, unspecified laterality  L03.119 682.6   5. Vasculitis (HCC)  I77.6 447.6   6. Congestive heart failure, unspecified HF chronicity, unspecified heart failure type (HCC)  I50.9 428.0   7. 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)   • Chronic back pain (on chronic prescription lortab)   • HTN (hypertension)   • Solitary kidney (s/p nephrectomy for \"mass\")   • History of prostate cancer   • Closed fracture of phalanx of right hand   • Dental caries   • Acute renal failure, unspecified acute renal failure type (HCC)   • Rash   • Nausea and vomiting     Past Medical History:   Diagnosis Date   • Anxiety    • Arthritis    • Chronic kidney disease    • Diabetes mellitus (HCC)    • Disease of thyroid gland    • Diverticulosis    • Essential tremor    • HL (hearing loss)    • Hypertension    • Obesity    • Prostate cancer (HCC)    • Renal cell cancer (HCC)    • Skin cancer    • Vasculitis of skin      Past Surgical History:   Procedure Laterality Date   • CHOLECYSTECTOMY     • COLONOSCOPY      Polyps in the past but not on the most recent scope   • NEPHRECTOMY Right    • PROSTATE SURGERY      Radical prostatectomy   • SKIN CANCER EXCISION      Large incision left neck, multiple other cancers removed      General Information     Row Name 22 1322          Physical Therapy Time and " Intention    Document Type therapy note (daily note)  -CD     Mode of Treatment physical therapy  CO-RX WITH O.T.  -CD     Row Name 04/12/22 1343          General Information    Patient Profile Reviewed yes  -CD     Existing Precautions/Restrictions fall  NG, B FOOT WOUNDS WITH LEG WRAPS, O2, TREMORS.  -CD     Barriers to Rehab medically complex;previous functional deficit;hearing deficit  -CD     Row Name 04/12/22 1343          Cognition    Orientation Status (Cognition) oriented to;person  -CD     Row Name 04/12/22 1343 04/12/22 1339       Safety Issues, Functional Mobility    Safety Issues Affecting Function (Mobility) safety precaution awareness;safety precautions follow-through/compliance;sequencing abilities;insight into deficits/self-awareness;awareness of need for assistance  -CD --    Impairments Affecting Function (Mobility) balance;endurance/activity tolerance;coordination;strength;pain  -CD --    Comment, Safety Issues/Impairments (Mobility) -- PT ALERT AND FOLLOWING ALL COMMANDS. MOTIVATED TO STAND WITH THERAPY IF ABLE.  -CD          User Key  (r) = Recorded By, (t) = Taken By, (c) = Cosigned By    Initials Name Provider Type    CD Alessandra Madrid, PT Physical Therapist               Mobility     Row Name 04/12/22 134          Bed Mobility    Comment, (Bed Mobility) PT UIC UPON ARRIVAL ON 2.5 L O2. WIFE PRESENT.  -CD     Row Name 04/12/22 2456          Sit-Stand Transfer    Sit-Stand [place] (Transfers) moderate assist (50% patient effort);2 person assist;verbal cues  PROGRESSED TO MIN ASSIST OF 2 ON 2ND,3RD REPS.  -CD     Assistive Device (Sit-Stand Transfers) other (see comments)  1X WITH B UE SUPPORT, 2X WITH WALKER.  -CD     Comment, (Sit-Stand Transfer) MANUAL ASSSIST TO GUIDE HAND TO WALKER UPON STANDING AND WHEN REACHING BACK FOR CHAIR TO SIT.  -CD     Row Name 04/12/22 1348          Gait/Stairs (Locomotion)    [place] Level (Gait) unable to assess  -CD           User Key  (r) =  Recorded By, (t) = Taken By, (c) = Cosigned By    Initials Name Provider Type    CD Alessandra Madrid PT Physical Therapist               Obj/Interventions     Row Name 04/12/22 1348          Motor Skills    Therapeutic Exercise --  B LE AROM, SEATED, 1 SET OF 5 REPS EACH: LAQ, HIP FLEX, AP  -CD     Row Name 04/12/22 1348          Balance    Balance Assessment sitting static balance;sitting dynamic balance;sit to stand dynamic balance;standing static balance;standing dynamic balance  -CD     Static Sitting Balance contact guard  -CD     Dynamic Sitting Balance minimal assist  -CD     Position, Sitting Balance supported;sitting in chair  -CD     Sit to Stand Dynamic Balance moderate assist;2-person assist  -CD     Static Standing Balance minimal assist  -CD     Dynamic Standing Balance verbal cues  -CD     Position/Device Used, Standing Balance supported;walker, front-wheeled  -CD     Comment, Balance PT ABLE TO RECIPROCAL SCOOT FORWARD AND BACKWARD IN RECLINER WITH CGA AND CUES. DEMONSTRATED HEAVY POSTERIOR LEAN WITH LE THER EX RERQUIRING MIN ASSIST.  -CD           User Key  (r) = Recorded By, (t) = Taken By, (c) = Cosigned By    Initials Name Provider Type     Alessandra Madrid PT Physical Therapist               Goals/Plan    No documentation.                Clinical Impression     Row Name 04/12/22 1352          Pain    Pretreatment Pain Rating 1/10  -CD     Posttreatment Pain Rating 1/10  -CD     Pain Location generalized  -CD     Pain Location - buttock  -CD     Pre/Posttreatment Pain Comment MILD DISCOMFORT FROM SITTING.  -CD     Row Name 04/12/22 1352          Plan of Care Review    Plan of Care Reviewed With patient;spouse  -CD     Progress improving  -CD     Outcome Evaluation PT DEMONSTRATED IMPROVED ENDURANCE FOR LE THER EX, BALANCE ACTIVITY AND STANDING AT WALKER. COMPLETED STS FROM RECLINER X 3 REPS. DEMONSTRATED RECIPROCAL SCOOTING FORWARD/BACKWARD IN RECLINER WITH CUES AND CGA. PT ALERT AND FOLLOWING  ALL COMMANDS. MOTIVATED TO WORK WITH THERAPY. RECOMMEND SNF AT D/C.  -CD     Row Name 04/12/22 1352          Therapy Assessment/Plan (PT)    Patient/Family Therapy Goals Statement (PT) SNF AT D/C.  -CD     Rehab Potential (PT) good, to achieve stated therapy goals  -CD     Criteria for Skilled Interventions Met (PT) yes;meets criteria  -CD     Therapy Frequency (PT) daily  -CD     Row Name 04/12/22 1352          Vital Signs    Pre Systolic BP Rehab 171  -CD     Pre Treatment Diastolic BP 74  -CD     Intra Systolic BP Rehab 160  -CD     Intra Treatment Diastolic   -CD     Post Systolic BP Rehab 192  -CD     Post Treatment Diastolic BP 89  -CD     Pretreatment Heart Rate (beats/min) 98  -CD     Intratreatment Heart Rate (beats/min) 98  -CD     Posttreatment Heart Rate (beats/min) 101  -CD     Pretreatment Resp Rate (breaths/min) 101  -CD     Pre SpO2 (%) 99  -CD     O2 Delivery Pre Treatment supplemental O2  -CD     O2 Delivery Intra Treatment supplemental O2  -CD     Post SpO2 (%) 97  -CD     O2 Delivery Post Treatment supplemental O2  -CD     Pre Patient Position Sitting  -CD     Intra Patient Position Standing  -CD     Post Patient Position Sitting  -CD     Row Name 04/12/22 1352          Positioning and Restraints    Pre-Treatment Position sitting in chair/recliner  -CD     Post Treatment Position chair  -CD     In Chair reclined;call light within reach;encouraged to call for assist;exit alarm on;with family/caregiver;legs elevated;RUE elevated;LUE elevated;notified nsg;on mechanical lift sling;waffle cushion  NOTIFIED NSG OF CURRENT FUNCTIONAL STATUS WITH THERAPY.  -CD           User Key  (r) = Recorded By, (t) = Taken By, (c) = Cosigned By    Initials Name Provider Type    CD Alessandra Madrid, PT Physical Therapist               Outcome Measures     Row Name 04/12/22 1357          How much help from another person do you currently need...    Turning from your back to your side while in flat bed without using  bedrails? 3  -CD     Moving from lying on back to sitting on the side of a flat bed without bedrails? 2  -CD     Moving to and from a bed to a chair (including a wheelchair)? 1  -CD     Standing up from a chair using your arms (e.g., wheelchair, bedside chair)? 2  -CD     Climbing 3-5 steps with a railing? 1  -CD     To walk in hospital room? 2  -CD     AM-PAC 6 Clicks Score (PT) 11  -CD     Row Name 04/12/22 1357 04/12/22 1349       Functional Assessment    Outcome Measure Options AM-PAC 6 Clicks Basic Mobility (PT);Modified [place]  -CD AM-PAC 6 Clicks Daily Activity (OT)  -MR          User Key  (r) = Recorded By, (t) = Taken By, (c) = Cosigned By    Initials Name Provider Type    CD Alessandra Madrid, PT Physical Therapist     Alla Mccauley, OT Occupational Therapist                             Physical Therapy Education                 Title: PT OT SLP Therapies (Done)     Topic: Physical Therapy (Done)     Point: Mobility training (Done)     Learning Progress Summary           Patient Acceptance, E, VU,NR by CD at 4/12/2022 1357    Comment: SEE FLOWSHEET    Acceptance, E, NR by AS at 4/9/2022 1058    Acceptance, E, VU,NR by CD at 4/6/2022 1546    Comment: SEE FLOWSHEET    Acceptance, E, NR by KG at 4/4/2022 1506    Acceptance, E, VU,NR by CD at 4/2/2022 1634    Comment: BENEFITS OF OOB ACTIVITY, ROM EXERCISE, PROGRESSION OF POC, D/C PLANNING,    Acceptance, E,D, VU,NR by HP at 3/29/2022 1517    Acceptance, E, NR by BA at 3/27/2022 1412   Family Acceptance, E, VU,NR by CD at 4/6/2022 1546    Comment: SEE FLOWSHEET    Acceptance, E,D, VU,NR by HP at 3/29/2022 1517    Acceptance, E, NR by BA at 3/27/2022 1412   Significant Other Acceptance, E, VU,NR by CD at 4/12/2022 1357    Comment: SEE FLOWSHEET    Acceptance, E, VU,NR by CD at 4/6/2022 1546    Comment: SEE FLOWSHEET                   Point: Home exercise program (Done)     Learning Progress Summary           Patient Acceptance, E, VU,NR by CD at 4/12/2022 1348     Comment: SEE FLOWSHEET    Acceptance, E, NR by AS at 4/9/2022 1058    Acceptance, E, VU,NR by CD at 4/6/2022 1546    Comment: SEE FLOWSHEET    Acceptance, E, NR by KG at 4/4/2022 1506    Acceptance, E, VU,NR by CD at 4/2/2022 1634    Comment: BENEFITS OF OOB ACTIVITY, ROM EXERCISE, PROGRESSION OF POC, D/C PLANNING,    Acceptance, E,D, VU,NR by HP at 3/29/2022 1517    Acceptance, E, NR by BA at 3/27/2022 1412   Family Acceptance, E, VU,NR by CD at 4/6/2022 1546    Comment: SEE FLOWSHEET    Acceptance, E,D, VU,NR by HP at 3/29/2022 1517    Acceptance, E, NR by BA at 3/27/2022 1412   Significant Other Acceptance, E, VU,NR by CD at 4/12/2022 1357    Comment: SEE FLOWSHEET    Acceptance, E, VU,NR by CD at 4/6/2022 1546    Comment: SEE FLOWSHEET                   Point: Body mechanics (Done)     Learning Progress Summary           Patient Acceptance, E, VU,NR by CD at 4/12/2022 1357    Comment: SEE FLOWSHEET    Acceptance, E, NR by AS at 4/9/2022 1058    Acceptance, E, VU,NR by CD at 4/6/2022 1546    Comment: SEE FLOWSHEET    Acceptance, E, NR by KG at 4/4/2022 1506    Acceptance, E, VU,NR by CD at 4/2/2022 1634    Comment: BENEFITS OF OOB ACTIVITY, ROM EXERCISE, PROGRESSION OF POC, D/C PLANNING,    Acceptance, E,D, VU,NR by HP at 3/29/2022 1517    Acceptance, E, NR by BA at 3/27/2022 1412   Family Acceptance, E, VU,NR by CD at 4/6/2022 1546    Comment: SEE FLOWSHEET    Acceptance, E,D, VU,NR by HP at 3/29/2022 1517    Acceptance, E, NR by BA at 3/27/2022 1412   Significant Other Acceptance, E, VU,NR by CD at 4/12/2022 1357    Comment: SEE FLOWSHEET    Acceptance, E, VU,NR by CD at 4/6/2022 1546    Comment: SEE FLOWSHEET                   Point: Precautions (Done)     Learning Progress Summary           Patient Acceptance, E, VU,NR by CD at 4/12/2022 1357    Comment: SEE FLOWSHEET    Acceptance, E, NR by AS at 4/9/2022 1058    Acceptance, E, VU,NR by CD at 4/6/2022 1546    Comment: SEE FLOWSHEET    Acceptance, E, NR by  KG at 4/4/2022 1506    Acceptance, E, VU,NR by CD at 4/2/2022 1634    Comment: BENEFITS OF OOB ACTIVITY, ROM EXERCISE, PROGRESSION OF POC, D/C PLANNING,    Acceptance, E,D, VU,NR by HP at 3/29/2022 1517    Acceptance, E, NR by BA at 3/27/2022 1412   Family Acceptance, E, VU,NR by CD at 4/6/2022 1546    Comment: SEE FLOWSHEET    Acceptance, E,D, VU,NR by HP at 3/29/2022 1517    Acceptance, E, NR by BA at 3/27/2022 1412   Significant Other Acceptance, E, VU,NR by CD at 4/12/2022 1357    Comment: SEE FLOWSHEET    Acceptance, E, VU,NR by CD at 4/6/2022 1546    Comment: SEE FLOWSHEET                               User Key     Initials Effective Dates Name Provider Type Discipline    CD 06/16/21 -  Alessandra Madrid, PT Physical Therapist PT    AS 06/16/21 -  Delfina Daniels, PTA Physical Therapist Assistant PT    KG 06/16/21 -  Rita Mchugh Physical Therapist PT     06/01/21 -  Anya Velez, PT Physical Therapist PT     09/21/21 -  Renate Mendoza, PT Physical Therapist PT              PT Recommendation and Plan     Plan of Care Reviewed With: patient, spouse  Progress: improving  Outcome Evaluation: PT DEMONSTRATED IMPROVED ENDURANCE FOR LE THER EX, BALANCE ACTIVITY AND STANDING AT WALKER. COMPLETED STS FROM RECLINER X 3 REPS. DEMONSTRATED RECIPROCAL SCOOTING FORWARD/BACKWARD IN RECLINER WITH CUES AND CGA. PT ALERT AND FOLLOWING ALL COMMANDS. MOTIVATED TO WORK WITH THERAPY. RECOMMEND SNF AT D/C.     Time Calculation:    PT Charges     Row Name 04/12/22 1358             Time Calculation    Start Time 1306  -CD      PT Received On 04/12/22  -CD      PT Goal Re-Cert Due Date 04/16/22  -CD              Time Calculation- PT    Total Timed Code Minutes- PT 29 minute(s)  -CD              Timed Charges    77589 - PT Therapeutic Exercise Minutes 15  -CD              Total Minutes    Timed Charges Total Minutes 15  -CD       Total Minutes 15  -CD            User Key  (r) = Recorded By, (t) = Taken By, (c) =  Cosigned By    Initials Name Provider Type    CD Alessandra Madrid, PT Physical Therapist              Therapy Charges for Today     Code Description Service Date Service Provider Modifiers Qty    89619177193 HC PT THER PROC EA 15 MIN 4/12/2022 Alessandra Madrid, PT GP 1          PT G-Codes  Outcome Measure Options: AM-PAC 6 Clicks Basic Mobility (PT), Modified [place]  AM-PAC 6 Clicks Score (PT): 11  AM-PAC 6 Clicks Score (OT): 9    Alessandra Madrid, PT  4/12/2022

## 2022-04-12 NOTE — PROGRESS NOTES
"    [facility] Medicine Services  PROGRESS NOTE    Patient Name: Yinka Cummings  : 1945  MRN: [id number]    Date of Admission: 3/25/2022  Primary Care Physician: Vivek Mercer,     Subjective   Subjective     CC:  Follow-up rash    HPI:  Sleeping late this morning, awaiting modified swallow study this morning.  I attempted to discuss PEG tube should he fail his swallow study and his wife states \"we are not going to talk about that now.\"  Family focused on PT/OT, I discussed with rehab services were going to attempt to stand him today    ROS:  Gen- No fevers, chills  CV- No chest pain, palpitations  Resp- No cough, dyspnea  GI- No N/V/D, abd pain    Objective   Objective     Vital Signs:   Temp:  [97.6 °F (36.4 °C)-99 °F (37.2 °C)] 97.6 °F (36.4 °C)  Heart Rate:  [75-90] 75  Resp:  [18] 18  BP: (147-193)/(69-84) 152/72  Flow (L/min):  [2] 2     Physical Exam:  Constitutional: Awake, lethargic, chronically ill-appearing male laying in bed  HENT: NCAT, mucous membranes moist, Keofeed in nare, poor dentition with multiple broken teeth  Respiratory: Clear to auscultation bilaterally, respiratory effort normal   Cardiovascular: RRR, no murmurs, rubs, or gallops, palpable radial pulses  Gastrointestinal: Positive bowel sounds, soft, nontender, nondistended  Musculoskeletal: No bilateral ankle edema  Psychiatric: Appropriate affect, cooperative  Neurologic: Speech clear, moving all extremity spontaneously  Dermatologic: Diffuse rash significantly improved    Results Reviewed:  LAB RESULTS:      Lab 22  0924 22  1158 22  1623 22  0540   WBC 9.92 9.64  --  7.55   HEMOGLOBIN 7.6* 8.0*  --  7.9*   HEMATOCRIT 23.6* 26.2*  --  26.5*   PLATELETS 246 225  --  261   NEUTROS ABS  --  7.35*  --  4.92   IMMATURE GRANS (ABS)  --  0.19*  --  0.13*   LYMPHS ABS  --  1.18  --  1.63   MONOS ABS  --  0.54  --  0.73   EOS ABS  --  0.34  --  0.11   MCV 93.7 97.4*  --  98.5* "   SED RATE  --   --  19  --    CRP  --   --  3.27*  --          Lab 04/11/22  0924 04/10/22  1042 04/09/22 0518 04/08/22  1158 04/07/22  0450 04/06/22  0540 04/05/22  1337   SODIUM 144 142 146* 143 147* 154* 148*   POTASSIUM 4.2 3.8 3.7 4.1 3.5 4.4 4.1   CHLORIDE 111* 112* 115* 112* 116* 119* 112*   CO2 26.0 23.0 23.0 21.0* 22.0 24.0 20.0*   ANION GAP 7.0 7.0 8.0 10.0 9.0 11.0 16.0*   BUN 39* 39* 45* 48* 58* 69* 76*   CREATININE 1.50* 1.53* 1.72* 1.80* 1.79* 2.24* 2.38*   EGFR  --  47.1*  --  38.8* 39.0* 29.8* 27.7*   GLUCOSE 353* 271* 181* 190* 193* 147* 183*   CALCIUM 8.2* 8.0* 8.0* 7.8* 7.8* 8.1* 8.3*   MAGNESIUM 2.0  --  1.8  --   --   --   --    PHOSPHORUS 1.6* 1.6* 2.3*  --   --  3.5 3.3         Lab 04/11/22  0924 04/10/22  1042 04/09/22  0518 04/08/22  1158 04/06/22  0540   TOTAL PROTEIN 5.8*  --  5.4* 5.3*  --    ALBUMIN 2.90* 2.60* 2.60* 2.40* 2.80*   GLOBULIN  --   --   --  2.9  --    ALT (SGPT) 11  --  10 14  --    AST (SGOT) 9  --  12 15  --    BILIRUBIN 0.3  --  0.3 0.4  --    ALK PHOS 88  --  79 70  --              Lab 04/11/22 0924 04/09/22  0518   CHOLESTEROL 87 82   TRIGLYCERIDES 140 148             Brief Urine Lab Results  (Last result in the past 365 days)      Color   Clarity   Blood   Leuk Est   Nitrite   Protein   CREAT   Urine HCG        04/04/22 0045 Yellow   Clear   Moderate (2+)   Trace   Negative   Negative                 Microbiology Results Abnormal     Procedure Component Value - Date/Time    Wound Culture - Wound, Leg, Left [[id number]] Collected: 03/25/22 0918    Lab Status: Final result Specimen: Wound from Leg, Left Updated: 03/27/22 0903     Wound Culture Light growth (2+) Normal Skin Cami     Gram Stain Rare (1+) WBCs seen      Moderate (3+) Gram positive cocci in pairs and clusters    Eosinophil Smear - Urine, Urine, Clean Catch [[id number]]  (Normal) Collected: 03/26/22 1027    Lab Status: Final result Specimen: Urine, Clean Catch Updated: 03/26/22 1113     Eosinophil Smear 0  % EOS/100 Cells     Narrative:      No eosinophil seen    COVID-19 and FLU A/B PCR - Swab, Nasopharynx [[id number]]  (Normal) Collected: 03/25/22 0918    Lab Status: Final result Specimen: Swab from Nasopharynx Updated: 03/25/22 0955     COVID19 Not Detected     Influenza A PCR Not Detected     Influenza B PCR Not Detected    Narrative:      Fact sheet for providers: https://www.fda.gov/media/442258/download    Fact sheet for patients: https://www.fda.gov/media/623114/download    Test performed by PCR.          No radiology results from the last 24 hrs    Results for orders placed during the hospital encounter of 03/25/22    Adult Transesophageal Echo (LARRY) W/ Cont if Necessary Per Protocol    Interpretation Summary  · Estimated left ventricular EF = 65%  · The cardiac valves are anatomically and functionally normal.  · No evidence of a left atrial appendage thrombus was present.      I have reviewed the medications:  Scheduled Meds:amLODIPine, 10 mg, Per G Tube, Q24H  atorvastatin, 40 mg, Nasogastric, Nightly  buprenorphine-naloxone, 2 tablet, Sublingual, Nightly  DULoxetine, 90 mg, Oral, Daily  enoxaparin, 110 mg, Subcutaneous, Q12H  insulin detemir, 20 Units, Subcutaneous, Nightly  insulin lispro, 0-9 Units, Subcutaneous, TID AC  insulin lispro, 5 Units, Subcutaneous, TID With Meals  levothyroxine, 150 mcg, Nasogastric, Daily  melatonin, 5 mg, Nasogastric, Nightly  methohexital, , ,   metoprolol tartrate, 50 mg, Nasogastric, Q8H  midazolam, , ,   mirtazapine, 7.5 mg, Nasogastric, Nightly  nystatin, 5 mL, Oral, 4x Daily  oxymetazoline, 2 spray, Each Nare, BID  pantoprazole, 40 mg, Intravenous, Q AM  polyethylene glycol, 17 g, Nasogastric, Daily  predniSONE, 20 mg, Nasogastric, Daily With Breakfast  PRO-STAT, 30 mL, Nasogastric, Daily  QUEtiapine, 25 mg, Nasogastric, Q12H  senna-docusate sodium, 2 tablet, Nasogastric, BID  sodium chloride, 10 mL, Intravenous, Q12H  sodium phosphate IVPB, 30 mmol, Intravenous,  Once  temazepam, 15 mg, Nasogastric, Nightly  cyanocobalamin, 50 mcg, Nasogastric, Daily      Continuous Infusions:   PRN Meds:.•  acetaminophen **OR** acetaminophen **OR** acetaminophen  •  dextrose  •  dextrose  •  glucagon (human recombinant)  •  haloperidol  •  mineral oil-hydrophilic petrolatum  •  [DISCONTINUED] ondansetron **OR** ondansetron  •  palliative care oral rinse  •  Sodium Chloride (PF)  •  sodium chloride  •  sodium chloride    Assessment/Plan   Assessment & Plan     Active Hospital Problems    Diagnosis  POA   • **Rash [R21]  Yes   • Acute renal failure, unspecified acute renal failure type (HCC) [N17.9]  Yes   • Nausea and vomiting [R11.2]  Unknown   • Paroxysmal A-fib (on eliquis & metoprolol) [I48.0]  Yes   • 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        Brief Hospital Course to date:  Yinka Cummings is a 77 y.o. 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  -[facility], principal concern was his previous primidone, additionally his  "Lortab, Dabigatran, Lyrica, lisinopril have all been discontinued  -[facility] Bx w/ nonspecific perivascular lymphocytic infiltrate  -Rheumatology 3/29, clinical syndrome most c/w leukocytoclastic vasculitis  -Initial significant improvement with oral prednisone, which was discontinued due to acute encephalopathy, which was more likely from sleep deprivation/hospital delirium  -PT WOC 4/5 for wounds on bilateral lower extremities, they have rewrapped today  -Reinitiated on prednisone 20 mg daily 4/9, rash continues to improve    Oropharyngeal dysphagia  -Keofeed placed 4/8, nutrition follows for tube feeds  -SLP following, MBS pending today    Sarcopenia  -PT/OT following, needs to be out of bed at least once daily if not more as tolerated; continue to mobilize  -Plan for rehab at discharge  -D/W therapy services, they are going to attempt to stand him today    Acute encephalopathy, multifactorial, improved  Sleep deprivation psychosis/hospital associated delirium, improved  -Initially blamed on steroid-induced psychosis however neurology has felt it was more significantly a sleep deprivation psychosis; reattempting steroids    Acute renal dysfunction on CKD 3, improved  Hx RCC s/p nephrectomy  -Per documentation [facility] records report baseline CR 1.5-1.7 as recent as 3/20/22  -Nephrology follows  -Now at approximate baseline    Atrial fibrillation/flutter  -Recently Dabigatran was DC'd due to rash; currently on full dose Lovenox with plan to transition to NOAC after 30-day \"washout\" from discontinuation of primidone (concern for drug-drug interaction)  -Cardiology follows, s/p ECV 4/1/22; amiodarone discontinued, continue Lopressor    Chronic pain syndrome  -Recently transitioned to Suboxone from Lortab by the [facility] due to concern for contribution to rash; will continue here    DM type 2, A1c 7.9%, with long-term use of insulin, with steroid-induced hyperglycemia  -Prior to initiating steroids was on 10U Levemir at " bedtime with SSI  -Increase Levemir/Humalog again, continue Accu-Cheks with SSI    Hypothyroidism  Hypertension  Hyperlipidemia  Mood disorder  -Continue atorvastatin, duloxetine, Lopressor  -Addition of amlodipine for steroid-induced hypertension    Severe tremor, stable/improved  -Primidone recently DC'd for rash  -Prescribed Sinemet recently but has not yet been able to start  -[facility]-up with [facility]    Chronic normocytic anemia  -Relatively stable, monitor    Abnormal abdominal CT  -Per documentation questionable pyelo versus more likely duodenitis, potential bibasilar consolidation; ID follows, monitoring off antibiotics    VINI  -Documented as noncompliant with CPAP    Obesity, BMI 38.86 kg/M2  -Complicates all aspects of care      DVT prophylaxis:  Medical DVT prophylaxis orders are present.       AM-PAC 6 Clicks Score (PT): 9 (04/11/22 0800)    Disposition: Aggressive PT/OT, reevaluating swallowing; the remainder of his issues are essentially source of long-term nutrition and placement    CODE STATUS:   Code Status and Medical Interventions:   Ordered at: 03/25/22 1328     Level Of Support Discussed With:    Patient     Code Status (Patient has no pulse and is not breathing):    CPR (Attempt to Resuscitate)     Medical Interventions (Patient has pulse or is breathing):    Full Support       Wili Conner, DO  04/12/22

## 2022-04-12 NOTE — MBS/VFSS/FEES
"Acute Care - Speech Language Pathology   Swallow Re-Evaluation  [place]   Modified Barium Swallow Study (MBS)     Patient Name: Yinka Cummings  : 1945  MRN: [id number]  Today's Date: 2022               Admit Date: 3/25/2022    Visit Dx:     ICD-10-CM ICD-9-CM   1. Oropharyngeal dysphagia  R13.12 787.22   2. Acute renal failure, unspecified acute renal failure type (HCC)  N17.9 584.9   3. Dehydration  E86.0 276.51   4. Cellulitis of lower extremity, unspecified laterality  L03.119 682.6   5. Vasculitis (HCC)  I77.6 447.6   6. Congestive heart failure, unspecified HF chronicity, unspecified heart failure type (HCC)  I50.9 428.0   7. 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)   • Chronic back pain (on chronic prescription lortab)   • HTN (hypertension)   • Solitary kidney (s/p nephrectomy for \"mass\")   • History of prostate cancer   • Closed fracture of phalanx of right hand   • Dental caries   • Acute renal failure, unspecified acute renal failure type (HCC)   • Rash   • Nausea and vomiting     Past Medical History:   Diagnosis Date   • Anxiety    • Arthritis    • Chronic kidney disease    • Diabetes mellitus (HCC)    • Disease of thyroid gland    • Diverticulosis    • Essential tremor    • HL (hearing loss)    • Hypertension    • Obesity    • Prostate cancer (HCC)    • Renal cell cancer (HCC)    • Skin cancer    • Vasculitis of skin      Past Surgical History:   Procedure Laterality Date   • CHOLECYSTECTOMY     • COLONOSCOPY      Polyps in the past but not on the most recent scope   • NEPHRECTOMY Right    • PROSTATE SURGERY      Radical prostatectomy   • SKIN CANCER EXCISION      Large incision left neck, multiple other cancers " removed       SLP Recommendation and Plan  SLP Swallowing Diagnosis: mild, oral dysphagia, severe, pharyngeal dysphagia (04/12/22 1100)  SLP Diet Recommendation: NPO, long term alternate methods of nutrition/hydration, other (see comments) (if comfort diet, consider soft/whole & thin) (04/12/22 1100)  Recommended Precautions and Strategies: general aspiration precautions (04/12/22 1100)  SLP Rec. for Method of Medication Administration: meds via alternate route (if comfort, crush in pudding/puree) (04/12/22 1100)     Monitor for Signs of Aspiration: yes, notify SLP if any concerns (04/12/22 1100)     Swallow Criteria for Skilled Therapeutic Interventions Met: demonstrates skilled criteria, other (see comments) (pending POC/GOC) (04/12/22 1100)  Anticipated Discharge Disposition (SLP): unknown, anticipate therapy at next level of care (04/12/22 1100)  Rehab Potential/Prognosis, Swallowing: adequate, monitor progress closely (04/12/22 1100)  Therapy Frequency (Swallow): 5 days per week (04/12/22 1100)  Predicted Duration Therapy Intervention (Days): until discharge (04/12/22 1100)                                  Plan of Care Reviewed With: patient      SWALLOW EVALUATION (last 72 hours)     SLP Adult Swallow Evaluation     Row Name 04/12/22 1100 04/11/22 1340                Rehab Evaluation    Document Type re-evaluation  -MP therapy note (daily note)  -CJ (r) MH (t) CJ (c)       Subjective Information no complaints  -MP no complaints  -CJ (r) MH (t) CJ (c)       Patient Observations alert;cooperative  -MP alert;cooperative  -CJ (r) MH (t) CJ (c)       Patient/Family/Caregiver Comments/Observations No family present  -MP Wife and daughter present  -CJ (r) MH (t) CJ (c)       Patient Effort good  -MP good  -CJ (r) MH (t) CJ (c)       Symptoms Noted During/After Treatment -- none  -CJ (r) MH (t) CJ (c)                General Information    Patient Profile Reviewed yes  -MP --       Pertinent History Of Current Problem  Adm 3/25 w/ lower extremity rash, nausea/vomiting. Recent adm @ [facility] 3/10-28. Hx HTN, hypothy, Afib, DHF, VINI, chronic pain, GERD, CKD3, essential tremor, renal cell carcinoma s/p nephrectomy, gastroparesis, DM2, Gilbert's dz. Last MBS w/ recs for NPO w/ alternate route  -MP --       Current Method of Nutrition NPO;nasogastric feedings  -MP --       Precautions/Limitations, Vision WFL;for purposes of eval  -MP --       Precautions/Limitations, Hearing WFL;for purposes of eval  -MP --       Prior Level of Function-Communication unknown  -MP --       Prior Level of Function-Swallowing mechanical soft with no mixed consistencies;thin liquids;other (see comments)  per [place] 2021  -MP --       Plans/Goals Discussed with patient;agreed upon  -MP --       Barriers to Rehab previous functional deficit  -MP --       Patient's Goals for Discharge return to PO diet  -MP --                Pain    Additional Documentation Pain Scale: FACES Pre/Post-Treatment (Group)  -MP Pain Scale: FACES Pre/Post-Treatment (Group)  -CJ (r)  (t) CJ (c)                Pain Scale: FACES Pre/Post-Treatment    Pain: FACES Scale, Pretreatment 0-->no hurt  -MP 0-->no hurt  - (r) MH (t) CJ (c)       Posttreatment Pain Rating 0-->no hurt  -MP 0-->no hurt  -CJ (r) MH (t) CJ (c)                MBS/VFSS    Utensils Used spoon;cup  -MP --       Consistencies Trialed thin liquids;nectar/syrup-thick liquids;honey-thick liquids;pudding thick  -MP --                MBS/VFSS Interpretation    Oral Prep Phase WFL;other (see comments)  DNA solids  -MP --       Oral Transit Phase impaired  -MP --       Oral Residue WFL  -MP --       VFSS Summary Continued mild oral & severe pharyngeal dysphagia. Penetration during/after & aspiration after w/ thin liquids. Penetration & aspiration after w/ nectar-thick & honey-thick. Penetration after w/ pudding - did not visualize aspiration, but suspect inevitable. Mild-moderate pyriform residue w/ all consistencies  that spills into laryngeal vestibule. Pt w/ large osteophyte @ C2-C5 (confirmed by Radiology PA) that prevents complete epiglottic inversion & impacts pharyngeal squeeze/stripping wave. Suspect dysphagia acute-on-chronic given anatomical component. Safest recommendation is NPO w/ continued alternate route, though pt expressed desire to continue eating/drinking despite risk, so may consider comfort diet.  -MP --                Oral Transit Phase    Impaired Oral Transit Phase tongue pumping  -MP --       Tongue Pumping thin liquids;nectar-thick liquids  -MP --                Initiation of Pharyngeal Swallow    Initiation of Pharyngeal Swallow bolus in pyriform sinuses  -MP --       Pharyngeal Phase impaired pharyngeal phase of swallowing  -MP --       Penetration During the Swallow thin liquids;secondary to delayed swallow initiation or mistiming;secondary to reduced laryngeal elevation;secondary to reduced vestibular closure  -MP --       Penetration After the Swallow nectar-thick liquids;honey-thick liquids;pudding/puree;secondary to residue;in pyriform sinuses  -MP --       Aspiration After the Swallow nectar-thick liquids;honey-thick liquids;secondary to residue;in pyriform sinuses;in laryngeal vestibule  -MP --       Response to Penetration no response  -MP --       Response to Aspiration no response, silent aspiration  -MP --       Rosenbek's Scale thin:;nectar:;honey:;8--->level 8  -MP --       Pharyngeal Residue thin liquids;nectar-thick liquids;honey-thick liquids;pudding/puree;valleculae;pyriform sinuses;secondary to reduced base of tongue retraction;secondary to reduced laryngeal elevation;secondary to reduced hyolaryngeal excursion  -MP --       Response to Residue unable to clear residue  -MP --       Attempted Compensatory Maneuvers bolus size;bolus presentation style;throat clear after swallow;multiple swallows;effortful (hard swallow)  -MP --       Response to Attempted Compensatory Maneuvers did not  prevent penetration;did not prevent aspiration;did not reduce residue  -MP --                SLP Evaluation Clinical Impression    SLP Swallowing Diagnosis mild;oral dysphagia;severe;pharyngeal dysphagia  -MP --       Functional Impact risk of aspiration/pneumonia  -MP --       Rehab Potential/Prognosis, Swallowing adequate, monitor progress closely  -MP --       Swallow Criteria for Skilled Therapeutic Interventions Met demonstrates skilled criteria;other (see comments)  pending POC/GOC  -MP --                SLP Treatment Clinical Impressions    Treatment Assessment (SLP) -- Pt seen for therapy this afternoon. Pt w/ immediate hard cough w/ thin. Wife and dtr present; pt and wife eager for pt to have Keofeed pulled and begin PO intake. Provided extensive education regarding recs for NPO based on MBS 4/7. Plan for MBS tomorrow (4/12), cont NPO.  -MANASA (ryan) CHUCHO (t) MANASA (c)       Daily Summary of Progress (SLP) -- progress toward functional goals as expected  -MANASA (ryan) CHUCHO (linda) MANASA (c)       Barriers to Overall Progress (SLP) -- Medically complex  -MANASA (ryan) CHUCHO (linda) MANASA (c)       Plan for Continued Treatment (SLP) -- continue treatment per plan of care  -MANASA (ryan) CHUCHO (t) MANASA (c)       Care Plan Review -- evaluation/treatment results reviewed  -MANASA (ryan) CHUCHO (t) MANASA (c)       Care Plan Review, Other Participant(s) -- daughter;spouse  -MANASA (ryan) CHUCHO (t) MANASA (c)                Recommendations    Therapy Frequency (Swallow) 5 days per week  -MP 5 days per week  -MANASA (ryan) CHUCHO (t) MANASA (c)       Predicted Duration Therapy Intervention (Days) until discharge  -MP until discharge  -MANASA (ryan) CHUCHO (t) MANASA (c)       SLP Diet Recommendation NPO;long term alternate methods of nutrition/hydration;other (see comments)  if comfort diet, consider soft/whole & thin  -MP NPO;temporary alternate methods of nutrition/hydration;other (see comments)  -MANASA (ryan) CHUCHO (t) CJ (c)       Recommended Diagnostics -- VFSS (MBS);other (see comments)  -MANASA (ryan) CHUCHO (t) MANASA (c)       Recommended  Precautions and Strategies general aspiration precautions  -MP upright posture during/after eating;small bites of food and sips of liquid;general aspiration precautions  -MANASA (r) CHUCHO (t) MANASA (c)       Oral Care Recommendations Oral Care BID/PRN;Suction toothbrush  -MP Oral Care BID/PRN;Suction toothbrush  -MANASA (r) CHUCHO (t) MANASA (c)       SLP Rec. for Method of Medication Administration meds via alternate route  if comfort, crush in pudding/puree  -MP meds via alternate route  -MANASA (r) CHUCHO (t) MANASA (c)       Monitor for Signs of Aspiration yes;notify SLP if any concerns  -MP yes;notify SLP if any concerns  -MANASA (r) CHUCHO (t) MANASA (c)       Anticipated Discharge Disposition (SLP) unknown;anticipate therapy at next level of care  -MP unknown;anticipate therapy at next level of care  -MANASA (r) CHUCHO (t) MANASA (c)             User Key  (r) = Recorded By, (t) = Taken By, (c) = Cosigned By    Initials Name Effective Dates    Rose Oconnell, MS CCC-SLP 06/16/21 -     Rivera Guadalupe MS CCC-SLP 12/28/21 -     Maren Garner, Speech Therapy Student 01/24/22 -                 EDUCATION  The patient has been educated in the following areas:   Dysphagia (Swallowing Impairment) NPO rationale.        SLP GOALS     Row Name 04/12/22 1100 04/11/22 1340          Oral Nutrition/Hydration Goal 1 (SLP)    Oral Nutrition/Hydration Goal 1, SLP LTG: Pt will return to some form of PO diet by d/c.  -MP LTG: Pt will return to some form of PO diet by d/c.  -MANASA (r) CHUCHO (t) MANASA (c)     Time Frame (Oral Nutrition/Hydration Goal 1, SLP) by discharge  -MP by discharge  -MANASA (r) CHUCHO (t) MANASA (c)     Progress/Outcomes (Oral Nutrition/Hydration Goal 1, SLP) goal ongoing  -MP continuing progress toward goal  -MANASA (r) CHUCHO (t) MANASA (c)            Oral Nutrition/Hydration Goal 2 (SLP)    Oral Nutrition/Hydration Goal 2, SLP Pt will tolerate therapeutic trials of thin H2O & puree w/ no overt s/sxs aspiration/distress w/ 60% acc and no cues in order to assess  appropriateness for repeat instrumental eval  -MP Pt will tolerate therapeutic trials of thin H2O & puree w/ no overt s/sxs aspiration/distress w/ 60% acc and no cues in order to assess appropriateness for repeat instrumental eval  -MANASA (r) CHUCHO (t) CJ (c)     Time Frame (Oral Nutrition/Hydration Goal 2, SLP) short term goal (STG)  -MP short term goal (STG)  -CJ (r) CHUCHO (t) CJ (c)     Barriers (Oral Nutrition/Hydration Goal 2, SLP) -- Completed oral care. Pt w/ immediate hard cough w thin via ice chip  -CJ (r) MH (t) CJ (c)     Progress/Outcomes (Oral Nutrition/Hydration Goal 2, SLP) goal ongoing  -MP progress slower than expected  -MANASA (r) CHUCHO (t) CJ (c)            Lingual Strengthening Goal 1 (SLP)    Activity (Lingual Strengthening Goal 1, SLP) increase tongue back strength  -MP increase tongue back strength  -MANASA (r) CHUCHO (t) MANASA (c)     Increase Tongue Back Strength lingual resistance exercises;swallow trials  -MP lingual resistance exercises;swallow trials  -MANASA (r) CHUCHO (t) MANASA (c)     [place]/Accuracy (Lingual Strengthening Goal 1, SLP) with minimal cues (75-90% accuracy)  -MP with minimal cues (75-90% accuracy)  -MANASA (r) CHUCHO (t) CJ (c)     Time Frame (Lingual Strengthening Goal 1, SLP) short term goal (STG)  -MP short term goal (STG)  -MANASA (r) CHUCHO (t) CJ (c)     Progress/Outcomes (Lingual Strengthening Goal 1, SLP) goal ongoing  -MP goal ongoing  -MANASA (r) CHUCHO (t) CJ (c)            Pharyngeal Strengthening Exercise Goal 1 (SLP)    Activity (Pharyngeal Strengthening Goal 1, SLP) increase timing;increase superior movement of the hyolaryngeal complex;increase anterior movement of the hyolaryngeal complex;increase closure at entrance to airway/closure of airway at glottis;increase squeeze/positive pressure generation;increase tongue base retraction;increase PES opening  -MP increase timing;increase superior movement of the hyolaryngeal complex;increase anterior movement of the hyolaryngeal complex;increase closure at entrance  to airway/closure of airway at glottis;increase squeeze/positive pressure generation;increase tongue base retraction;increase PES opening  -CJ (r) MH (t) CJ (c)     Increase Timing prepping - 3 second prep or suck swallow or 3-step swallow  -MP prepping - 3 second prep or suck swallow or 3-step swallow  -CJ (r) MH (t) CJ (c)     Increase Superior Movement of the Hyolaryngeal Complex effortful pitch glide (falsetto + pharyngeal squeeze)  -MP effortful pitch glide (falsetto + pharyngeal squeeze)  -CJ (r) MH (t) CJ (c)     Increase Anterior Movement of the Hyolaryngeal Complex chin tuck against resistance (CTAR)  -MP chin tuck against resistance (CTAR)  -CJ (r) MH (t) CJ (c)     Increase Closure at Entrance to Airway/Closure of Airway at Glottis supraglottic swallow  -MP supraglottic swallow  -CJ (r) MH (t) CJ (c)     Increase Squeeze/Positive Pressure Generation hard effortful swallow  -MP hard effortful swallow  -CJ (r) MH (t) CJ (c)     Increase Tongue Base Retraction alia  -MP alia  -CJ (r) MH (t) CJ (c)     Increase PES Opening chin tuck against resistance (CTAR)  -MP chin tuck against resistance (CTAR)  -CJ (r) MH (t) CJ (c)     [place]/Accuracy (Pharyngeal Strengthening Goal 1, SLP) with minimal cues (75-90% accuracy)  -MP with minimal cues (75-90% accuracy)  -CJ (r) MH (t) CJ (c)     Time Frame (Pharyngeal Strengthening Goal 1, SLP) short term goal (STG)  -MP short term goal (STG)  -CJ (r) MH (t) CJ (c)     Progress/Outcomes (Pharyngeal Strengthening Goal 1, SLP) goal ongoing  -MP goal ongoing  -CJ (r) MH (t) CJ (c)           User Key  (r) = Recorded By, (t) = Taken By, (c) = Cosigned By    Initials Name Provider Type    Rose Oconnell, MS CCC-SLP Speech and Language Pathologist    Rivera Guadalupe MS CCC-SLP Speech and Language Pathologist    Maren Garner, Speech Therapy Student SLP Student                   Time Calculation:    Time Calculation- SLP     Row Name 04/12/22 120              Time Calculation- SLP    SLP Start Time 1100  -MP      SLP Received On 04/12/22  -MP              Untimed Charges    33684-MD Motion Fluoro Eval Swallow Minutes 60  -MP              Total Minutes    Untimed Charges Total Minutes 60  -MP       Total Minutes 60  -MP            User Key  (r) = Recorded By, (t) = Taken By, (c) = Cosigned By    Initials Name Provider Type    Rivera Guadalupe MS CCC-SLP Speech and Language Pathologist                Therapy Charges for Today     Code Description Service Date Service Provider Modifiers Qty    [id number]  ST MOTION FLUORO EVAL SWALLOW 4 4/12/2022 Rivera Bonner MS CCC-SLP GN 1        Patient was not wearing a face mask and did not exhibit coughing during this therapy encounter.  Procedure performed was aerosolizing, involved close contact (within 6 feet for at least 15 minutes or longer), and did not involve contact with infectious secretions or specimens.  Therapist used appropriate personal protective equipment including gloves, standard procedure mask and eye protection.  Appropriate PPE was worn during the entire therapy session.  Hand hygiene was completed before and after therapy session.        Rivera Kelly MS CCC-SLP  4/12/2022

## 2022-04-12 NOTE — PROGRESS NOTES
"   LOS: 18 days    Patient Care Team:  Vivek Mercer DO as PCP - General (Family Medicine)    Stable overnight.  Continues to improve.  Feels rash has continued to improve.    Objective     Vital Sign Min/Max for last 24 hours  Temp  Min: 97.6 °F (36.4 °C)  Max: 99 °F (37.2 °C)   BP  Min: 147/84  Max: 193/82   Pulse  Min: 73  Max: 90   Resp  Min: 18  Max: 18   SpO2  Min: 97 %  Max: 98 %   Flow (L/min)  Min: 2  Max: 2   No data recorded     Flowsheet Rows    Flowsheet Row First Filed Value   Admission Height 175.3 cm (69\") Documented at 03/25/2022 0849   Admission Weight 113 kg (250 lb) Documented at 03/25/2022 0849          No intake/output data recorded.  I/O last 3 completed shifts:  In: 895 [Other:220; NG/GT:675]  Out: -     Physical Exam:    Physical exam limited to avoid unnecessary risk of COVID-19 exposure to both patient and physician.    General Appearance: NAD WDWM  Neuro:   awake alert   ENT: + CorPak  Psych:  Normal mood and affect  CV:   No edema  Lungs: respirations regular and unlabored,   Abdomen: not distended  :  No christian, no palp bladder  Skin: + Petechial rash over legs.  Both feet with dressings.  Resolved rash over arms.        WBC WBC   Date Value Ref Range Status   04/11/2022 9.92 3.40 - 10.80 10*3/mm3 Final      HGB Hemoglobin   Date Value Ref Range Status   04/11/2022 7.6 (L) 13.0 - 17.7 g/dL Final      HCT Hematocrit   Date Value Ref Range Status   04/11/2022 23.6 (L) 37.5 - 51.0 % Final      Platlets No results found for: LABPLAT   MCV MCV   Date Value Ref Range Status   04/11/2022 93.7 79.0 - 97.0 fL Final          Sodium Sodium   Date Value Ref Range Status   04/11/2022 144 136 - 145 mmol/L Final   04/10/2022 142 136 - 145 mmol/L Final      Potassium Potassium   Date Value Ref Range Status   04/11/2022 4.2 3.5 - 5.2 mmol/L Final   04/10/2022 3.8 3.5 - 5.2 mmol/L Final      Chloride Chloride   Date Value Ref Range Status   04/11/2022 111 (H) 98 - 107 mmol/L Final   04/10/2022 " 112 (H) 98 - 107 mmol/L Final      CO2 CO2   Date Value Ref Range Status   04/11/2022 26.0 22.0 - 29.0 mmol/L Final   04/10/2022 23.0 22.0 - 29.0 mmol/L Final      BUN BUN   Date Value Ref Range Status   04/11/2022 39 (H) 8 - 23 mg/dL Final   04/10/2022 39 (H) 8 - 23 mg/dL Final      Creatinine Creatinine   Date Value Ref Range Status   04/11/2022 1.50 (H) 0.76 - 1.27 mg/dL Final   04/10/2022 1.53 (H) 0.76 - 1.27 mg/dL Final      Calcium Calcium   Date Value Ref Range Status   04/11/2022 8.2 (L) 8.6 - 10.5 mg/dL Final   04/10/2022 8.0 (L) 8.6 - 10.5 mg/dL Final      PO4 No results found for: CAPO4   Albumin Albumin   Date Value Ref Range Status   04/11/2022 2.90 (L) 3.50 - 5.20 g/dL Final   04/10/2022 2.60 (L) 3.50 - 5.20 g/dL Final      Magnesium Magnesium   Date Value Ref Range Status   04/11/2022 2.0 1.6 - 2.4 mg/dL Final      Uric Acid No results found for: URICACID          A/P:    SALEEM: No new labs today.  Urine output unmeasured.  Creatinine down to 1.5 yesterday which is close to patient's baseline.  Initially creatinine elevated up to 2.7.  UA had large blood urine protein was 380 mg etiology of the SALEEM unknown possible drug reaction causing AIN or systemic IgE a vasculitis serology has been negative.  Skin rash has been improving. C3 144, C3 29, cryo neg. creatinine improved with s steroids and withholding of potential antigenic medications.MPO and PR3 normal.  For now continue supportive care.  No indication for renal biopsy at this time.  Urine protein is around 380 on 24-hour however volume was only 300 mL.  Unsure of accuracy.  Will get repeat urine indices.    CKD stage III baseline creatinine 1.5.  Unsure of baseline proteinuria.    Skin rash:  palpable purpura suspect of drug reaction versus skin vasculitis.  Improved with steroids.    Hyperkalemia resolved    Hypernatremia improved with increased fluid intake.    Volume status stable.    Atrial fibrillation: S/p LARRY cardioversion.

## 2022-04-12 NOTE — NURSING NOTE
WOC follow-up:     BLE vasculitis       At this time wound areas are looking a lot better.   Currently on Prednisone.     Continue with current POC.  See order for care needs.   Make sure to change dressings daily.     Heel wound is stable.   May need debridement in the near future.     Patient on a bariatric dolphin with topper.  Offloading heel boots in place.     WOC will continue to follow.   Contact if needs arise.     Thanks

## 2022-04-12 NOTE — PROGRESS NOTES
INFECTIOUS DISEASE Progress Note    Yinka Cummings  1945  [id number]      Admission Date: 3/25/2022      Requesting Provider: Yinka Desai MD  Evaluating Physician: Brandt Ward MD    Reason for Consultation: vasculitis rash with cellulitis    History of present illness:    3/26/2022: Patient is a 77 y.o. male, ,known to Dr. Wili Rees, with h/o CKD, T2DM, afib/flutter, chronic diastolic heart failure, VINI, chronic pain, gastroparesis, Gilbert's disease,  hypothyroidism, essential tremor, HTN, Obesity, Prostate cancer/radical prostatectomy, renal cell carcinoma/right nephrectomy, multiple skin cancer excisions, and cutaneous vasculitis who we were asked to see for cellulitis.  [facility] with nausea, vomiting, and LE rash/redness.  [facility] with rash and Afib/RVR.  Dermatology did a punch biopsy with path showing perivascular lymphocytic infiltrate with no evidence of vasculitis at the time.  He was though to have rash from Pradaxa and was changed to Lovenox.  The rash worsened and eventually Lyrica, Primidone, and Norco were stopped one by one.  He was started on Suboxone on 3/16 and his rash began to improve.  Primidone had been a new drug started just prior the start of the rash.  He [facility] cardioversion while at [facility] and converted to NSR.  He was discharge home on Lovenox.  He followed up with his PCP on 3/21 and was placed on Keflex for possible cutaneous infections at the rash site and Lasix for BLE edema.    His rash has worsened again on feet, legs, abdomen, and arms [facility]  He developed nausea and vomiting prior to his presentation.  He denies fever, chills, abdominal pain, diarrhea, or dysuria.  On arrival, his Tmax is 99.4.  Initial labs were CRP 12.02-->12.5, INR 1.2, ESR 44-->62, PCT 0.78-->0.94, lactic acid 2.0, lipase 8, proBNP 5100, D-dimer 4.44, RF 16.4, C3 144, C4 29, WBC 10,600  with 83% neutrophils, and creatinine 2.35-->2.75 (baseline 1.6 on 5/20/21).  A leg wound culture showed normal skin ananth with GS showing GPC in pairs/clusters.  A second wound culture is pending with growth present.   A COVID-19/Flu PCR is negative.  A Hep panel was negative.  A UA WBC is 13-20 with TNTC RBCs, trace LE, negative nitrite.  KENZIE, ANCA panel, cryogobulin, and complement are pending.  A CXR showed no acute findings.  A DVT work up of BLE was negative although peroneal veins were not well visualized bilaterally. A CT scan of a/p with contrast has been ordered.  He is currently on low dose Rocephin.  ID was asked to evaluate and manage his antibiotic therapy.    He continues to have nausea and vomiting with cold sweat.  He denies any diarrhea.     3/27/2022: He complains of lower extremity pain which is most prominent in his feet.  He has remained afebrile.  His creatinine today is 2.82 and his C-reactive protein is 11.6.  His white blood cell count is 17.2.  He has anorexia but denies nausea and vomiting.    3/28/22: He has decreased Bilateral foot pain.  He has remained afebrile.  His creatinine is down to 2.5. His 24 hour protein is 360. He denies nausea, vomiting, and diarrhea    3/29/22:  Maximum temperature over the last 24 hours is 99.2. Creatinine yesterday was 2.58. CH 50 was greater than 60, ANCA was negative and KENZIE was negative.  He has decreased foot pain.  He complains of malaise but denies nausea and vomiting.    3/30/22: He has remained afebrile.  He denies nausea and vomiting.  He has decreased lower extremity pain.  He denies dyspnea.    3/31/22: He remains afebrile. His creatinine today is 2.6.  His C-reactive protein is 6.8.  His white blood cell count is 15.4.  His echocardiogram revealed no valvular vegetations. He and his family indicate improvement in his lower extremity rash but he has more extensive lesions over his palms.     4/1/2022: He underwent cardioversion today.  He is  now in sinus rhythm.  He has remained afebrile.  He is currently drowsy from his sedation for cardioversion.  His family thinks that his rash is no worse today.  His creatinine today is 2.33.      4/4/2022: He has been confused over the weekend.  This worsened after he was started on prednisone.  He has been afebrile.  He notes a significant improvement in his rash over the weekend.  He denies nausea, vomiting, and diarrhea.  He has remained afebrile.  His creatinine today is 2.35.    4/5/22: He has been severely confused overnight with agitation. He had a fever to 100.6° earlier today but is now afebrile. Laboratory studies today reveal a creatinine of 2.38.  A urinalysis yesterday revealed 6-12 white blood cells. He is unable to provide a review of systems.    4/6/22: He had a low-grade fever to 100.6 at around noon yesterday but has been afebrile since. His creatinine today is 2.24 and his white blood cell count is 7.6. Sodium is elevated at 154. He feels much better.  He he is much less agitated and has appropriate conversation today.    4/7/22: He has remained afebrile over the last 24 hours. His creatinine today is down to 1.79. He feels much better.  He denies increased foot pain. Agitation and confusion have dramatically improved.    4/8/22: He has remained afebrile.  Yesterday his ESR was down to 19 and his C-reactive protein was 3.3. His rash over his upper extremities dramatically worsened overnight although his rash on his lower extremities is unchanged.  He has now being started back on prednisone at 20 mg per day.    4/10/22: I follow the patient for Dr. Brandt Ward while he is out.  The patient and his family members at bedside report that his rash over his extremities appears to be improving over the last couple of days since restarting the prednisone.  Renal function is slightly better today.  He is tolerating the steroids better without any significant confusion today.  No fevers.    4/11/22:  The patient is having some confusion today.  He does not know the month or the year but he does know that he is in [facility] and he does know his name.  Pain is under better control.  Rash over his upper extremities is improving.  His wife is at bedside and expresses some frustration that he has not yet had his swallow eval.  Feeding tube remains in place.    4/12/22-confusion seems better today.  He still does not know the year but he is oriented to place and person and president.  He is sitting up in a bedside chair today.  Rash continues to improve.  He states that he did not pass his swallow eval yesterday but he has chronic dysphasia and he states that this happens every time he is admitted to the hospital.  He is eager to eat a regular diet.  No fevers.    Past Medical History:   Diagnosis Date   • Anxiety    • Arthritis    • Chronic kidney disease    • Diabetes mellitus (HCC)    • Disease of thyroid gland    • Diverticulosis    • Essential tremor    • HL (hearing loss)    • Hypertension    • Obesity    • Prostate cancer (HCC)    • Renal cell cancer (HCC)    • Skin cancer    • Vasculitis of skin        Past Surgical History:   Procedure Laterality Date   • CHOLECYSTECTOMY     • COLONOSCOPY      Polyps in the past but not on the most recent scope   • NEPHRECTOMY Right    • PROSTATE SURGERY      Radical prostatectomy   • SKIN CANCER EXCISION      Large incision left neck, multiple other cancers removed       Family History   Problem Relation Age of Onset   • Cancer Mother    • Heart attack Father    • Kidney failure Sister    • Coronary artery disease Sister        Social History     Socioeconomic History   • Marital status:    • Number of children: 4   Tobacco Use   • Smoking status: Never Smoker   • Smokeless tobacco: Never Used   Vaping Use   • Vaping Use: Never used   Substance and Sexual Activity   • Alcohol use: Not Currently   • Drug use: Not Currently   • Sexual activity: Defer        Allergies   Allergen Reactions   • Contrast Dye Rash     R[facility]   • Doxycycline Rash     Urticaria per [facility]   • Chlorthalidone Other (See Comments)     Hyponatremia [facility]   • Morphine Unknown - Low Severity     Headache [facility]   • Neurontin [Gabapentin] Mental Status Change     Drowsy per [facility]   • Phenergan [Promethazine] Unknown - Low Severity     Confusion [facility]         Medication:    Current Facility-Administered Medications:   •  acetaminophen (TYLENOL) tablet 650 mg, 650 mg, Oral, Q4H PRN **OR** acetaminophen (TYLENOL) 160 MG/5ML solution 650 mg, 650 mg, Nasogastric, Q4H PRN **OR** acetaminophen (TYLENOL) suppository 650 mg, 650 mg, Rectal, Q4H PRN, Jemma Arriaga APRN  •  amLODIPine (NORVASC) tablet 10 mg, 10 mg, Per G Tube, Q24H, Wili Conner DO, 10 mg at 04/12/22 0830  •  atorvastatin (LIPITOR) tablet 40 mg, 40 mg, Nasogastric, Nightly, Jemma Arriaga APRN, 40 mg at 04/11/22 2131  •  buprenorphine-naloxone (SUBOXONE) 8-2 MG per SL tablet 2 tablet, 2 tablet, Sublingual, Nightly, Raymond Herrera MD, 2 tablet at 04/11/22 2131  •  dextrose (D50W) (25 g/50 mL) IV injection 25 g, 25 g, Intravenous, Q15 Min PRN, Raymond Herrera MD  •  dextrose (GLUTOSE) oral gel 15 g, 15 g, Nasogastric, Q15 Min PRN, Jemma Arriaga APRN  •  DULoxetine (CYMBALTA) DR capsule 90 mg, 90 mg, Oral, Daily, Jemma Arriaga APRN, 90 mg at 04/12/22 0829  •  enoxaparin (LOVENOX) syringe 110 mg, 110 mg, Subcutaneous, Q12H, Yinka Brush IV, PharmD, 110 mg at 04/12/22 0856  •  glucagon (human recombinant) (GLUCAGEN DIAGNOSTIC) injection 1 mg, 1 mg, Intramuscular, Q15 Min PRN, Raymond Herrera MD  •  haloperidol (HALDOL) 2 MG/ML solution 2 mg, 2 mg, Oral, Q6H PRN, Peña Weaver MD  •  insulin detemir (LEVEMIR) injection 25 Units, 25 Units, Subcutaneous, Nightly, Wili Conner DO  •  insulin lispro (humaLOG) injection 0-9 Units, 0-9 Units,  Subcutaneous, TID AC, Raymond Herrera MD, 8 Units at 04/12/22 1805  •  insulin lispro (humaLOG) injection 8 Units, 8 Units, Subcutaneous, TID With Meals, Wili Conner DO, 8 Units at 04/12/22 1805  •  levothyroxine (SYNTHROID, LEVOTHROID) tablet 150 mcg, 150 mcg, Nasogastric, Daily, Jemma Arriaga APRN, 150 mcg at 04/12/22 0829  •  melatonin tablet 5 mg, 5 mg, Nasogastric, Nightly, Jemma Arriaga APRN, 5 mg at 04/11/22 2131  •  methohexital (BREVITAL) injection  - ADS Override Pull, , , ,   •  metoprolol tartrate (LOPRESSOR) tablet 50 mg, 50 mg, Nasogastric, Q8H, Wili Conner DO, 50 mg at 04/12/22 1502  •  midazolam (VERSED) 2 MG/2ML injection  - ADS Override Pull, , , ,   •  mineral oil-hydrophilic petrolatum (AQUAPHOR) ointment 1 application, 1 application, Topical, BID PRN, Peña Weaver MD, 1 application at 04/06/22 2245  •  mirtazapine (REMERON) tablet 7.5 mg, 7.5 mg, Nasogastric, Nightly, Jemma Arriaga APRN, 7.5 mg at 04/11/22 2131  •  nystatin (MYCOSTATIN) 100,000 unit/mL suspension 500,000 Units, 5 mL, Oral, 4x Daily, Jemma Arriaga APRN, 500,000 Units at 04/12/22 1805  •  [DISCONTINUED] ondansetron (ZOFRAN) tablet 4 mg, 4 mg, Oral, Q6H PRN **OR** ondansetron (ZOFRAN) injection 4 mg, 4 mg, Intravenous, Q6H PRN, Raymond Herrera MD, 4 mg at 04/06/22 2324  •  oxymetazoline (AFRIN) nasal spray 2 spray, 2 spray, Each Nare, BID, Ramy Balderas PA-C, 2 spray at 04/12/22 0834  •  palliative care oral rinse, , Mouth/Throat, PRN, Desai, Walker E, MD, Given at 04/10/22 0841  •  pantoprazole (PROTONIX) injection 40 mg, 40 mg, Intravenous, Q AM, Jemma Arriaga APRN, 40 mg at 04/12/22 0607  •  polyethylene glycol (MIRALAX) packet 17 g, 17 g, Nasogastric, Daily, Jemma Arriaga APRN, 17 g at 04/12/22 0830  •  predniSONE (DELTASONE) tablet 20 mg, 20 mg, Nasogastric, Daily With Breakfast, Wili Conner DO, 20 mg at 04/12/22 0831  •  PRO-STAT oral liquid 30 mL, 30  mL, Nasogastric, Daily, Wili Conner DO, 30 mL at 22 0908  •  QUEtiapine (SEROquel) tablet 25 mg, 25 mg, Nasogastric, Q12H, Wili Conner DO, 25 mg at 22 0831  •  sennosides-docusate (PERICOLACE) 8.6-50 MG per tablet 2 tablet, 2 tablet, Nasogastric, BID, Jemma Arriaga APRN, 2 tablet at 22 0830  •  Sodium Chloride (PF) 0.9 % 10 mL, 10 mL, Intravenous, PRN, Raymond Herrera MD  •  sodium chloride 0.9 % flush 10 mL, 10 mL, Intravenous, Q12H, Raymond Herrera MD, 10 mL at 22 0829  •  sodium chloride 0.9 % flush 10 mL, 10 mL, Intravenous, PRN, Raymond Herrera MD, 10 mL at 22 0830  •  sodium chloride nasal spray 1 spray, 1 spray, Each Nare, PRN, Yaima Mansfield W, APRN, 1 spray at 22 0834  •  sodium phosphates 30 mmol in sodium chloride 0.9 % 250 mL IVPB, 30 mmol, Intravenous, Once, Wili Conner DO  •  temazepam (RESTORIL) capsule 15 mg, 15 mg, Nasogastric, Nightly, Jemma Arriaga APRN, 15 mg at 22 2130  •  vitamin B-12 (CYANOCOBALAMIN) tablet 50 mcg, 50 mcg, Nasogastric, Daily, Jemma Arriaga APRN, 50 mcg at 22 0828    Antibiotics:  Anti-Infectives (From admission, onward)    Ordered     Dose/Rate Route Frequency Start Stop    22 1059  cefTRIAXone (ROCEPHIN) 1 g/100 mL 0.9% NS (MBP)        Ordering Provider: Chace Chauhan MD    1 g  over 30 Minutes Intravenous Once 22 1101 22 1244            Review of Systems:  See HPI    Physical Exam:   Vital Signs  Temp (24hrs), Av.2 °F (36.8 °C), Min:97.6 °F (36.4 °C), Max:98.9 °F (37.2 °C)    Temp  Min: 97.6 °F (36.4 °C)  Max: 98.9 °F (37.2 °C)  BP  Min: 147/84  Max: 171/74  Pulse  Min: 73  Max: 90  Resp  Min: 18  Max: 18  SpO2  Min: 98 %  Max: 98 %    GENERAL: Debilitated appearing. Alert and responsive. Sitting up in bed  HEENT: Normocephalic, atraumatic. No external oral lesions.  NG tube in place  NECK: Supple   HEART: RRR; No murmur  LUNGS: Clear to auscultation  bilaterally without wheezing, rales, rhonchi. Normal respiratory effort. Nonlabored.  ABDOMEN: Soft, lower abdominal tenderness, nondistended.  No rebound or guarding.   Skin: He has diffuse hemorrhagic ulcerative rash is dramatically improved on his lower extremities and feet. His rash over his upper extremities appears to be significantly improved over the last couple of days.  NEURO: Alert and responsive. He is oriented to person and place but he is not oriented to month or year. Oriented to president. He answers most questions appropriately.  Psych: Cooperative.  Appropriate mood.    Laboratory Data    Results from last 7 days   Lab Units 04/11/22  0924 04/08/22  1158 04/06/22  0540   WBC 10*3/mm3 9.92 9.64 7.55   HEMOGLOBIN g/dL 7.6* 8.0* 7.9*   HEMATOCRIT % 23.6* 26.2* 26.5*   PLATELETS 10*3/mm3 246 225 261     Results from last 7 days   Lab Units 04/11/22  0924   SODIUM mmol/L 144   POTASSIUM mmol/L 4.2   CHLORIDE mmol/L 111*   CO2 mmol/L 26.0   BUN mg/dL 39*   CREATININE mg/dL 1.50*   GLUCOSE mg/dL 353*   CALCIUM mg/dL 8.2*     Results from last 7 days   Lab Units 04/11/22  0924   ALK PHOS U/L 88   BILIRUBIN mg/dL 0.3   ALT (SGPT) U/L 11   AST (SGOT) U/L 9     Results from last 7 days   Lab Units 04/07/22  1623   SED RATE mm/hr 19     Results from last 7 days   Lab Units 04/07/22  1623   CRP mg/dL 3.27*                 Estimated Creatinine Clearance: 54.1 mL/min (A) (by C-G formula based on SCr of 1.5 mg/dL (H)).      Microbiology:  Wound cx 3/25: NL skin ananth SF  COVID-19/Flu PCR negative.  2nd wound cx 3/25: growth present but TYTE  Urine Eos Zero        Radiology:  Imaging Results (Last 72 Hours)     Procedure Component Value Units Date/Time    FL Video Swallow With Speech Single Contrast [[id number]] Collected: 04/12/22 1439     Updated: 04/12/22 1635    Narrative:      EXAMINATION: FL VIDEO SWALLOW W SPEECH SINGLE-CONTRAST-     INDICATION: dysphagia; R13.12-Dysphagia, oropharyngeal phase;  N17.9-Acute  kidney failure, unspecified; E86.0-Dehydration;  L03.119-Cellulitis of unspecified part of limb; I77.6-Arteritis,  unspecified; I50.9-Heart failure, unspecified; R79.89-Other specified  abnormal findings of blood chemistry     TECHNIQUE: 42 seconds of fluoroscopic time was used for this exam. 6  associated fluoroscopic loops were saved. The patient was evaluated in  the seated lateral position while taking a variety of consistencies of  barium by mouth under the direction of speech pathology.     COMPARISON: NONE     FINDINGS: 42 seconds of fluoroscopy provided for a modified barium  swallow. Please see speech therapy report for full details and  recommendations.          Impression:      Fluoroscopy provided for a modified barium swallow. Please  see speech therapy report for full details and recommendations.         This report was finalized on 4/12/2022 4:32 PM by Yinka Parker.               Impression:   1. Vasculitic rash-with palpable purpura.  This rash clinically looks like leukocytoclastic vasculitis.  I suspect that this is secondary to primidone given that it began shortly after starting on primidone. This dramatically improved with prednisone therapy but his prednisone therapy was aborted early due to delirium.  His rash worsened overnight.  I still think this is likely secondary to primidone-primidone/primidone metabolites have a very long half-life.  We will start him back on a lower dose of prednisone at 20 mg per day.  If he fails to improve, he may need a repeat skin biopsy and/or kidney biopsy.  Kidney biopsy would be risky due to the fact that he has a solitary kidney.- rash and renal function are improving on steroids  2. Duodenitis- per CT scan.  He is on PPI therapy.  3. Elevated procalcitonin,  4. Acute kidney injury--This is significantly improved.  5. Elevated CRP  6. Chronic diastolic heart failure  7. Afib/on Lovenox, recent cardioversion to NSR  8. Type 2 diabetes  mellitus/gastroparesis  9. Hypothyroidism  10. Essential hypertension  11. Morbid obesity  12. Prostate cancer/radical prostatectomy  13. Renal cell carcinoma/radical prostatectomy  14. H/o multiple skin cancer excisions  15. Doxycycline (urticaria) allergy  16. Hematuria  17. Left heel decubitus lesion-this is significantly better.  18. Toxic/metabolic encephalopathy-some of this may be secondary to his prednisone.  He is now off of steroid therapy  19. Hypernatremia    PLAN/RECOMMENDATIONS:  1.  Continue wound care  2.  Continue off of primidone and as many other medications as possible  3.  Offload both heels  4.  continue prednisone. Could give a long taper with follow-up after discharge.  5.  MPO and FL 3 were negative    Did not pass swallow test but states that he has chronic dysphagia    His rash and renal function appear to be improving some on steroids now.    I discussed his complex situation with his wife at bedside today            Yves Ward MD  4/12/2022  19:41 EDT

## 2022-04-13 LAB
ALBUMIN SERPL-MCNC: 2.8 G/DL (ref 3.5–5.2)
ANION GAP SERPL CALCULATED.3IONS-SCNC: 8 MMOL/L (ref 5–15)
BUN SERPL-MCNC: 44 MG/DL (ref 8–23)
BUN/CREAT SERPL: 34.4 (ref 7–25)
CALCIUM SPEC-SCNC: 8.5 MG/DL (ref 8.6–10.5)
CHLORIDE SERPL-SCNC: 107 MMOL/L (ref 98–107)
CO2 SERPL-SCNC: 28 MMOL/L (ref 22–29)
CREAT SERPL-MCNC: 1.28 MG/DL (ref 0.76–1.27)
CREAT UR-MCNC: 31.3 MG/DL
EGFRCR SERPLBLD CKD-EPI 2021: 58.4 ML/MIN/1.73
GLUCOSE BLDC GLUCOMTR-MCNC: 255 MG/DL (ref 70–130)
GLUCOSE BLDC GLUCOMTR-MCNC: 334 MG/DL (ref 70–130)
GLUCOSE BLDC GLUCOMTR-MCNC: 334 MG/DL (ref 70–130)
GLUCOSE BLDC GLUCOMTR-MCNC: 339 MG/DL (ref 70–130)
GLUCOSE SERPL-MCNC: 311 MG/DL (ref 65–99)
PHOSPHATE SERPL-MCNC: 1.3 MG/DL (ref 2.5–4.5)
POTASSIUM SERPL-SCNC: 4.5 MMOL/L (ref 3.5–5.2)
SODIUM SERPL-SCNC: 143 MMOL/L (ref 136–145)
UFH PPP CHRO-ACNC: 1.17 IU/ML
UUN 24H UR-MCNC: 512 MG/DL

## 2022-04-13 PROCEDURE — 82962 GLUCOSE BLOOD TEST: CPT

## 2022-04-13 PROCEDURE — 63710000001 INSULIN LISPRO (HUMAN) PER 5 UNITS: Performed by: INTERNAL MEDICINE

## 2022-04-13 PROCEDURE — 85520 HEPARIN ASSAY: CPT

## 2022-04-13 PROCEDURE — 80069 RENAL FUNCTION PANEL: CPT | Performed by: NURSE PRACTITIONER

## 2022-04-13 PROCEDURE — 63710000001 INSULIN DETEMIR PER 5 UNITS: Performed by: INTERNAL MEDICINE

## 2022-04-13 PROCEDURE — 99232 SBSQ HOSP IP/OBS MODERATE 35: CPT | Performed by: INTERNAL MEDICINE

## 2022-04-13 PROCEDURE — 25010000002 ENOXAPARIN PER 10 MG

## 2022-04-13 PROCEDURE — 63710000001 PREDNISONE PER 1 MG: Performed by: INTERNAL MEDICINE

## 2022-04-13 RX ORDER — CLONIDINE HYDROCHLORIDE 0.1 MG/1
0.1 TABLET ORAL EVERY 12 HOURS SCHEDULED
Status: DISCONTINUED | OUTPATIENT
Start: 2022-04-13 | End: 2022-04-14

## 2022-04-13 RX ADMIN — TEMAZEPAM 15 MG: 15 CAPSULE ORAL at 22:04

## 2022-04-13 RX ADMIN — PANTOPRAZOLE SODIUM 40 MG: 40 INJECTION, POWDER, FOR SOLUTION INTRAVENOUS at 06:29

## 2022-04-13 RX ADMIN — NYSTATIN 500000 UNITS: 100000 SUSPENSION ORAL at 17:03

## 2022-04-13 RX ADMIN — CLONIDINE HYDROCHLORIDE 0.1 MG: 0.1 TABLET ORAL at 08:38

## 2022-04-13 RX ADMIN — PREDNISONE 20 MG: 20 TABLET ORAL at 08:25

## 2022-04-13 RX ADMIN — INSULIN LISPRO 12 UNITS: 100 INJECTION, SOLUTION INTRAVENOUS; SUBCUTANEOUS at 11:53

## 2022-04-13 RX ADMIN — Medication 5 MG: at 22:03

## 2022-04-13 RX ADMIN — NYSTATIN 500000 UNITS: 100000 SUSPENSION ORAL at 08:25

## 2022-04-13 RX ADMIN — INSULIN LISPRO 12 UNITS: 100 INJECTION, SOLUTION INTRAVENOUS; SUBCUTANEOUS at 08:24

## 2022-04-13 RX ADMIN — INSULIN LISPRO 12 UNITS: 100 INJECTION, SOLUTION INTRAVENOUS; SUBCUTANEOUS at 17:04

## 2022-04-13 RX ADMIN — Medication 2 SPRAY: at 08:28

## 2022-04-13 RX ADMIN — AMLODIPINE BESYLATE 10 MG: 10 TABLET ORAL at 08:29

## 2022-04-13 RX ADMIN — NYSTATIN 500000 UNITS: 100000 SUSPENSION ORAL at 11:53

## 2022-04-13 RX ADMIN — INSULIN LISPRO 7 UNITS: 100 INJECTION, SOLUTION INTRAVENOUS; SUBCUTANEOUS at 11:54

## 2022-04-13 RX ADMIN — INSULIN LISPRO 7 UNITS: 100 INJECTION, SOLUTION INTRAVENOUS; SUBCUTANEOUS at 08:26

## 2022-04-13 RX ADMIN — METOPROLOL TARTRATE 50 MG: 50 TABLET, FILM COATED ORAL at 06:29

## 2022-04-13 RX ADMIN — METOPROLOL TARTRATE 50 MG: 50 TABLET, FILM COATED ORAL at 22:03

## 2022-04-13 RX ADMIN — CLONIDINE HYDROCHLORIDE 0.1 MG: 0.1 TABLET ORAL at 22:02

## 2022-04-13 RX ADMIN — INSULIN LISPRO 7 UNITS: 100 INJECTION, SOLUTION INTRAVENOUS; SUBCUTANEOUS at 17:04

## 2022-04-13 RX ADMIN — THIAMINE HCL TAB 100 MG 100 MG: 100 TAB at 17:03

## 2022-04-13 RX ADMIN — METOPROLOL TARTRATE 50 MG: 50 TABLET, FILM COATED ORAL at 13:52

## 2022-04-13 RX ADMIN — SENNOSIDES AND DOCUSATE SODIUM 2 TABLET: 50; 8.6 TABLET ORAL at 22:03

## 2022-04-13 RX ADMIN — Medication 10 ML: at 08:25

## 2022-04-13 RX ADMIN — DULOXETINE HYDROCHLORIDE 90 MG: 30 CAPSULE, DELAYED RELEASE ORAL at 08:25

## 2022-04-13 RX ADMIN — MIRTAZAPINE 7.5 MG: 15 TABLET, FILM COATED ORAL at 22:06

## 2022-04-13 RX ADMIN — VITAM B12 50 MCG: 100 TAB at 08:25

## 2022-04-13 RX ADMIN — Medication 10 ML: at 22:16

## 2022-04-13 RX ADMIN — ENOXAPARIN SODIUM 110 MG: 120 INJECTION SUBCUTANEOUS at 08:24

## 2022-04-13 RX ADMIN — LEVOTHYROXINE SODIUM 150 MCG: 150 TABLET ORAL at 08:25

## 2022-04-13 RX ADMIN — BUPRENORPHINE AND NALOXONE 2 TABLET: 8; 2 TABLET SUBLINGUAL at 22:02

## 2022-04-13 RX ADMIN — Medication 30 ML: at 08:27

## 2022-04-13 RX ADMIN — ENOXAPARIN SODIUM 110 MG: 120 INJECTION SUBCUTANEOUS at 22:07

## 2022-04-13 RX ADMIN — NYSTATIN 500000 UNITS: 100000 SUSPENSION ORAL at 22:04

## 2022-04-13 RX ADMIN — INSULIN DETEMIR 35 UNITS: 100 INJECTION, SOLUTION SUBCUTANEOUS at 22:08

## 2022-04-13 RX ADMIN — QUETIAPINE FUMARATE 25 MG: 25 TABLET ORAL at 22:03

## 2022-04-13 RX ADMIN — ATORVASTATIN CALCIUM 40 MG: 40 TABLET, FILM COATED ORAL at 22:04

## 2022-04-13 RX ADMIN — SODIUM PHOSPHATE, MONOBASIC, MONOHYDRATE 30 MMOL: 276; 142 INJECTION, SOLUTION INTRAVENOUS at 10:16

## 2022-04-13 RX ADMIN — QUETIAPINE FUMARATE 25 MG: 25 TABLET ORAL at 08:25

## 2022-04-13 NOTE — PROGRESS NOTES
"   LOS: 19 days    Patient Care Team:  Vivek Mercer DO as PCP - General (Family Medicine)    Reason For Visit:  F/U SALEEM ON CKD  Subjective           Review of Systems:    Pulm: No soa   CV:  No CP      Objective     amLODIPine, 10 mg, Per G Tube, Q24H  atorvastatin, 40 mg, Nasogastric, Nightly  buprenorphine-naloxone, 2 tablet, Sublingual, Nightly  cloNIDine, 0.1 mg, Per G Tube, Q12H  DULoxetine, 90 mg, Oral, Daily  enoxaparin, 110 mg, Subcutaneous, Q12H  insulin detemir, 35 Units, Subcutaneous, Nightly  insulin lispro, 0-9 Units, Subcutaneous, TID AC  insulin lispro, 12 Units, Subcutaneous, TID With Meals  levothyroxine, 150 mcg, Nasogastric, Daily  melatonin, 5 mg, Nasogastric, Nightly  methohexital, , ,   metoprolol tartrate, 50 mg, Nasogastric, Q8H  midazolam, , ,   mirtazapine, 7.5 mg, Nasogastric, Nightly  nystatin, 5 mL, Oral, 4x Daily  oxymetazoline, 2 spray, Each Nare, BID  pantoprazole, 40 mg, Intravenous, Q AM  polyethylene glycol, 17 g, Nasogastric, Daily  predniSONE, 20 mg, Nasogastric, Daily With Breakfast  QUEtiapine, 25 mg, Nasogastric, Q12H  senna-docusate sodium, 2 tablet, Nasogastric, BID  sodium chloride, 10 mL, Intravenous, Q12H  sodium phosphate IVPB, 30 mmol, Intravenous, Once  temazepam, 15 mg, Nasogastric, Nightly  thiamine, 100 mg, Oral, Daily  cyanocobalamin, 50 mcg, Nasogastric, Daily             Vital Signs:  Blood pressure (!) 187/94, pulse 86, temperature 98.4 °F (36.9 °C), temperature source Oral, resp. rate 20, height 175 cm (68.9\"), weight 119 kg (262 lb 5.6 oz), SpO2 97 %.    Flowsheet Rows    Flowsheet Row First Filed Value   Admission Height 175.3 cm (69\") Documented at 03/25/2022 0849   Admission Weight 113 kg (250 lb) Documented at 03/25/2022 0849          04/12 0701 - 04/13 0700  In: 726   Out: -     Physical Exam:    General Appearance: NAD, alert and cooperative, Ox3  Eyes: PER, conjunctivae and sclerae normal, no icterus  Lungs: respirations regular and unlabored, " no crepitus, clear to auscultation  Heart/CV: regular rhythm & normal rate, no murmur, no gallop, no rub and TR edema  Abdomen: not distended, soft, non-tender, no masses,  bowel sounds present  Skin: No rash, Warm and dry    Radiology:            Labs:  Results from last 7 days   Lab Units 04/11/22  0924 04/08/22  1158   WBC 10*3/mm3 9.92 9.64   HEMOGLOBIN g/dL 7.6* 8.0*   HEMATOCRIT % 23.6* 26.2*   PLATELETS 10*3/mm3 246 225     Results from last 7 days   Lab Units 04/13/22  0536 04/11/22  0924 04/10/22  1042 04/09/22  0518   SODIUM mmol/L 143 144 142 146*   POTASSIUM mmol/L 4.5 4.2 3.8 3.7   CHLORIDE mmol/L 107 111* 112* 115*   CO2 mmol/L 28.0 26.0 23.0 23.0   BUN mg/dL 44* 39* 39* 45*   CREATININE mg/dL 1.28* 1.50* 1.53* 1.72*   CALCIUM mg/dL 8.5* 8.2* 8.0* 8.0*   PHOSPHORUS mg/dL 1.3* 1.6* 1.6* 2.3*   MAGNESIUM mg/dL  --  2.0  --  1.8   ALBUMIN g/dL 2.80* 2.90* 2.60* 2.60*     Results from last 7 days   Lab Units 04/13/22  0536   GLUCOSE mg/dL 311*       Results from last 7 days   Lab Units 04/11/22  0924   ALK PHOS U/L 88   BILIRUBIN mg/dL 0.3   ALT (SGPT) U/L 11   AST (SGOT) U/L 9                 Estimated Creatinine Clearance: 63.4 mL/min (A) (by C-G formula based on SCr of 1.28 mg/dL (H)).      Assessment       Rash    Benign essential tremor    Hypothyroidism (acquired)    Paroxysmal A-fib (on eliquis & metoprolol)    Insulin dependent type 2 diabetes mellitus (HCC)    Chronic back pain (on chronic prescription lortab)    Acute renal failure, unspecified acute renal failure type (HCC)    Nausea and vomiting            Impression: SALEEM ON CKD. IMPROVED GFR BACK TO BASELINE. HYPOPHOSPHATEMIA IS BEING SUPPLEMENTED. ANEMIA.             Recommendations: LAB AM.      Yinka Chong MD  04/13/22  16:01 EDT

## 2022-04-13 NOTE — PROGRESS NOTES
[facility] Medicine Services  PROGRESS NOTE    Patient Name: Yinka Cummings  : 1945  MRN: [id number]    Date of Admission: 3/25/2022  Primary Care Physician: Vivek Mercer,     Subjective   Subjective     CC:  Follow-up dysphagia    HPI:  Repeat swallow eval yesterday deemed him not safe for oral diet.  Daughter at bedside, she and the patient both report patient has chronically had throat clearing after drinking for decades.  He does not think he is going to improve.  After much deliberation/discussion at the bedside, notably discussing concerns of aspiration pneumonia, airway obstruction, etc. he would like to proceed with a comfort diet and have the NG tube removed.  His primary goal of care currently is moving forward with getting out of the hospital and continuing rehab closer to home.    Blood pressure and blood sugars remain elevated in the setting of steroid use    Was able to stand x3 with therapy yesterday, able to be transition from bed to bedside chair without the use of the left via nursing this morning    ROS:  Gen- No fevers, chills  CV- No chest pain, palpitations  Resp- No cough, dyspnea  GI- No N/V/D, abd pain    Objective   Objective     Vital Signs:   Temp:  [98 °F (36.7 °C)-98.7 °F (37.1 °C)] 98.4 °F (36.9 °C)  Heart Rate:  [73-90] 73  Resp:  [18-20] 20  BP: (171-200)/(74-87) 197/83  Flow (L/min):  [2] 2     Physical Exam:  Constitutional: Awake, alert, chronically ill-appearing male sitting up in bedside chair  HENT: NCAT, mucous membranes moist, Keofeed in nare, poor dentition with multiple broken teeth  Respiratory: Clear to auscultation bilaterally, respiratory effort normal   Cardiovascular: RRR, no murmurs, rubs, or gallops, palpable radial pulses  Gastrointestinal: Positive bowel sounds, soft, nontender, nondistended  Musculoskeletal: No bilateral ankle edema  Psychiatric: Appropriate affect, cooperative  Neurologic: Speech clear, moving all  extremity spontaneously  Dermatologic: No clearly identified residual rash    Results Reviewed:  LAB RESULTS:      Lab 04/12/22 2229 04/11/22 0924 04/08/22  1158 04/07/22  1623   WBC  --  9.92 9.64  --    HEMOGLOBIN  --  7.6* 8.0*  --    HEMATOCRIT  --  23.6* 26.2*  --    PLATELETS  --  246 225  --    NEUTROS ABS  --   --  7.35*  --    IMMATURE GRANS (ABS)  --   --  0.19*  --    LYMPHS ABS  --   --  1.18  --    MONOS ABS  --   --  0.54  --    EOS ABS  --   --  0.34  --    MCV  --  93.7 97.4*  --    SED RATE  --   --   --  19   CRP  --   --   --  3.27*   HEPARIN ANTI-XA 1.84  --   --   --          Lab 04/11/22  0924 04/10/22  1042 04/09/22  0518 04/08/22  1158 04/07/22  0450   SODIUM 144 142 146* 143 147*   POTASSIUM 4.2 3.8 3.7 4.1 3.5   CHLORIDE 111* 112* 115* 112* 116*   CO2 26.0 23.0 23.0 21.0* 22.0   ANION GAP 7.0 7.0 8.0 10.0 9.0   BUN 39* 39* 45* 48* 58*   CREATININE 1.50* 1.53* 1.72* 1.80* 1.79*   EGFR  --  47.1*  --  38.8* 39.0*   GLUCOSE 353* 271* 181* 190* 193*   CALCIUM 8.2* 8.0* 8.0* 7.8* 7.8*   MAGNESIUM 2.0  --  1.8  --   --    PHOSPHORUS 1.6* 1.6* 2.3*  --   --          Lab 04/11/22  0924 04/10/22  1042 04/09/22  0518 04/08/22  1158   TOTAL PROTEIN 5.8*  --  5.4* 5.3*   ALBUMIN 2.90* 2.60* 2.60* 2.40*   GLOBULIN  --   --   --  2.9   ALT (SGPT) 11  --  10 14   AST (SGOT) 9  --  12 15   BILIRUBIN 0.3  --  0.3 0.4   ALK PHOS 88  --  79 70             Lab 04/11/22  0924 04/09/22 0518   CHOLESTEROL 87 82   TRIGLYCERIDES 140 148             Brief Urine Lab Results  (Last result in the past 365 days)      Color   Clarity   Blood   Leuk Est   Nitrite   Protein   CREAT   Urine HCG        04/12/22 1550             31.3               Microbiology Results Abnormal     Procedure Component Value - Date/Time    Wound Culture - Wound, Leg, Left [[id number]] Collected: 03/25/22 0918    Lab Status: Final result Specimen: Wound from Leg, Left Updated: 03/27/22 0903     Wound Culture Light growth (2+) Normal Skin Cami      Gram Stain Rare (1+) WBCs seen      Moderate (3+) Gram positive cocci in pairs and clusters    Eosinophil Smear - Urine, Urine, Clean Catch [[id number]]  (Normal) Collected: 03/26/22 1027    Lab Status: Final result Specimen: Urine, Clean Catch Updated: 03/26/22 1113     Eosinophil Smear 0 % EOS/100 Cells     Narrative:      No eosinophil seen    COVID-19 and FLU A/B PCR - Swab, Nasopharynx [[id number]]  (Normal) Collected: 03/25/22 0918    Lab Status: Final result Specimen: Swab from Nasopharynx Updated: 03/25/22 0955     COVID19 Not Detected     Influenza A PCR Not Detected     Influenza B PCR Not Detected    Narrative:      Fact sheet for providers: https://www.fda.gov/media/225158/download    Fact sheet for patients: https://www.fda.gov/media/376248/download    Test performed by PCR.          FL Video Swallow With Speech Single Contrast    Result Date: 4/12/2022  EXAMINATION: FL VIDEO SWALLOW W SPEECH SINGLE-CONTRAST-  INDICATION: dysphagia; R13.12-Dysphagia, oropharyngeal phase; N17.9-Acute kidney failure, unspecified; E86.0-Dehydration; L03.119-Cellulitis of unspecified part of limb; I77.6-Arteritis, unspecified; I50.9-Heart failure, unspecified; R79.89-Other specified abnormal findings of blood chemistry  TECHNIQUE: 42 seconds of fluoroscopic time was used for this exam. 6 associated fluoroscopic loops were saved. The patient was evaluated in the seated lateral position while taking a variety of consistencies of barium by mouth under the direction of speech pathology.  COMPARISON: NONE  FINDINGS: 42 seconds of fluoroscopy provided for a modified barium swallow. Please see speech therapy report for full details and recommendations.       Impression: Fluoroscopy provided for a modified barium swallow. Please see speech therapy report for full details and recommendations.    This report was finalized on 4/12/2022 4:32 PM by Yinka Parker.        Results for orders placed during the hospital encounter of  03/25/22    Adult Transesophageal Echo (LARRY) W/ Cont if Necessary Per Protocol    Interpretation Summary  · Estimated left ventricular EF = 65%  · The cardiac valves are anatomically and functionally normal.  · No evidence of a left atrial appendage thrombus was present.      I have reviewed the medications:  Scheduled Meds:amLODIPine, 10 mg, Per G Tube, Q24H  atorvastatin, 40 mg, Nasogastric, Nightly  buprenorphine-naloxone, 2 tablet, Sublingual, Nightly  DULoxetine, 90 mg, Oral, Daily  enoxaparin, 110 mg, Subcutaneous, Q12H  insulin detemir, 25 Units, Subcutaneous, Nightly  insulin lispro, 0-9 Units, Subcutaneous, TID AC  insulin lispro, 8 Units, Subcutaneous, TID With Meals  levothyroxine, 150 mcg, Nasogastric, Daily  melatonin, 5 mg, Nasogastric, Nightly  methohexital, , ,   metoprolol tartrate, 50 mg, Nasogastric, Q8H  midazolam, , ,   mirtazapine, 7.5 mg, Nasogastric, Nightly  nystatin, 5 mL, Oral, 4x Daily  oxymetazoline, 2 spray, Each Nare, BID  pantoprazole, 40 mg, Intravenous, Q AM  polyethylene glycol, 17 g, Nasogastric, Daily  predniSONE, 20 mg, Nasogastric, Daily With Breakfast  PRO-STAT, 30 mL, Nasogastric, Daily  QUEtiapine, 25 mg, Nasogastric, Q12H  senna-docusate sodium, 2 tablet, Nasogastric, BID  sodium chloride, 10 mL, Intravenous, Q12H  sodium phosphate IVPB, 30 mmol, Intravenous, Once  temazepam, 15 mg, Nasogastric, Nightly  cyanocobalamin, 50 mcg, Nasogastric, Daily      Continuous Infusions:   PRN Meds:.•  acetaminophen **OR** acetaminophen **OR** acetaminophen  •  dextrose  •  dextrose  •  glucagon (human recombinant)  •  haloperidol  •  mineral oil-hydrophilic petrolatum  •  [DISCONTINUED] ondansetron **OR** ondansetron  •  palliative care oral rinse  •  Sodium Chloride (PF)  •  sodium chloride  •  sodium chloride    Assessment/Plan   Assessment & Plan     Active Hospital Problems    Diagnosis  POA   • **Rash [R21]  Yes   • Acute renal failure, unspecified acute renal failure type (HCC)  [N17.9]  Yes   • Nausea and vomiting [R11.2]  Unknown   • Paroxysmal A-fib (on eliquis & metoprolol) [I48.0]  Yes   • 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        Brief Hospital Course to date:  Yinka Cummings is a 77 y.o. 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  -[facility], principal concern was his previous primidone, additionally his Lortab, Dabigatran, Lyrica, lisinopril have all been discontinued  -[facility] Bx w/ nonspecific perivascular lymphocytic infiltrate  -Rheumatology 3/29, clinical syndrome most c/w leukocytoclastic vasculitis  -Initial significant improvement with oral prednisone, which was discontinued due to acute encephalopathy, which was more likely from sleep deprivation/hospital delirium  -PT WOC follows  -Reinitiated on prednisone 20 mg daily 4/9, rash essentially absent; plan for steroid taper over approximately 4 weeks    Oropharyngeal dysphagia  -Repeat SLP eval 4/12-not safe for oral intake  -Patient/daughter reporting  "chronic throat clearing after drinking for decades  -Prolonged GOC discussion with patient and daughter at bedside today, oral diet and progression towards discharge are his primary goals, he understands the risk of aspiration/choking and would like the Keofeed removed and a diet ordered; I have made the appropriate changes    Sarcopenia  -PT/OT following  -Making substantial improvements, able to stand x3 yesterday, nursing able to transition from bed to chair without use of lift today    Acute encephalopathy, multifactorial, improved  Sleep deprivation psychosis/hospital associated delirium, improved  -Initially blamed on steroid-induced psychosis however neurology has felt it was more significantly a sleep deprivation psychosis; reattempting steroids    Acute renal dysfunction on CKD 3, improved  Hx RCC s/p nephrectomy  -Per documentation [facility] report baseline CR 1.5-1.7 as recent as 3/20/22  -Nephrology follows  -Now at baseline    Atrial fibrillation/flutter  -Recently Dabigatran was DC'd due to rash; currently on full dose Lovenox with plan to transition to NOAC after 30-day \"washout\" from discontinuation of primidone (concern for drug-drug interaction)  -Cardiology follows, s/p ECV 4/1/22; amiodarone discontinued, continue Lopressor    Chronic pain syndrome  -Recently transitioned to Suboxone from Lortab by the [facility] due to concern for contribution to rash; will continue here    DM type 2, A1c 7.9%, with long-term use of insulin, with steroid-induced hyperglycemia  -Prior to initiating steroids was on 10U Levemir at bedtime with SSI  -Further increases in insulin, needs close monitoring with transition off of tube feeds    Hypothyroidism  Hypertension  Hyperlipidemia  Mood disorder  -Continue atorvastatin, duloxetine, Lopressor, amlodipine   -Persistently hypertensive with steroid use, add clonidine 0.1 mg bid    Severe tremor, stable/improved  -Primidone recently DC'd for rash  -Prescribed Sinemet recently " but has not yet been able to start  -[facility]up with [facility]    Chronic normocytic anemia  -Relatively stable, monitor    Abnormal abdominal CT  -Per documentation questionable pyelo versus more likely duodenitis, potential bibasilar consolidation; ID follows, monitoring off antibiotics    VINI  -Documented as noncompliant with CPAP    Obesity, BMI 38.86 kg/M2  -Complicates all aspects of care      DVT prophylaxis:  Medical DVT prophylaxis orders are present.       AM-PAC 6 Clicks Score (PT): 11 (04/12/22 6837)    Disposition: Switching to comfort diet, otherwise clinically stable; d/w case management and will optimistically plan for disposition to swing bed close to home for ongoing rehab on Friday 4/15, to allow for maximal SLP evaluation while inpatient    CODE STATUS:   Code Status and Medical Interventions:   Ordered at: 03/25/22 1328     Level Of Support Discussed With:    Patient     Code Status (Patient has no pulse and is not breathing):    CPR (Attempt to Resuscitate)     Medical Interventions (Patient has pulse or is breathing):    Full Support       Wili Conner,   04/13/22

## 2022-04-13 NOTE — DISCHARGE PLACEMENT REQUEST
"Yinka Mabry (77 y.o. Male)         Thank you  Jessica DE LA CRUZ, RN, [facility]  [phone]        Date of Birth   1945    Social Security Number       Address   [address] [place] [address]    Home Phone   [phone]    MRN   [id number]       Scientologist   None    Marital Status                               Admission Date   3/25/22    Admission Type   Emergency    Admitting Provider   Wili Conner DO    Attending Provider   Wili Conner DO    Department, Room/Bed   [facility] 3F, S315/1       Discharge Date       Discharge Disposition       Discharge Destination                               Attending Provider: Wili Conner DO    Allergies: Contrast Dye, Doxycycline, Chlorthalidone, Morphine, Neurontin [Gabapentin], Phenergan [Promethazine]    Isolation: None   Infection: None   Code Status: CPR   Advance Care Planning Activity    Ht: 175 cm (68.9\")   Wt: 119 kg (262 lb 5.6 oz)    Admission Cmt: None   Principal Problem: Rash [R21]                 Active Insurance as of 3/25/2022     Primary Coverage     Payor Plan Insurance Group Employer/Plan Group    MEDICARE MEDICARE A & B      Payor Plan Address Payor Plan Phone Number Payor Plan Fax Number Effective Dates    [address] [phone]  10/1/2009 - None Entered    [place] [id number]       Subscriber Name Subscriber Birth Date Member ID       YINKA MABRY 1945 [id number]                 Emergency Contacts      (Rel.) Home Phone Work Phone Mobile Phone    Gauri Mabry (Spouse) -- -- [phone]    Claudia Mabry (Daughter) -- -- [phone]    NikitaNora (Daughter) -- -- [phone]          Rivera Bonner, MS CCC-SLP    Speech and Language Pathologist   Speech Therapy   Plan of Care       Signed   Date of Service:  04/12/22 1208   Creation Time:  04/12/22 1208           Nova Israel, RN    Registered Nurse      Nursing Note       Signed   Date " "of Service:  04/13/22 0928   Creation Time:  04/13/22 0928              Signed              Show:Clear all  [x]Manual[]Template[]Copied    Added by:  [x]Nova Israel RN      []Brandon for details    Patient eager to get oob this am. Pt sat on the side of the bed independently. He was assisted to the chair with min assist x2 for safety. He helen transfer well with no complaints. Daughter at bedside doing arm and leg exercises with patient in the chair.                            Signed              Show:Clear all  [x]Manual[x]Template[]Copied    Added by:  [x]Rivera Bonner, MS CCC-SLP      []Brandon for details    Goal Outcome Evaluation:  Plan of Care Reviewed With: patient   SLP MBS re-evaluation completed. Will continue to address dysphagia. Please see note for further details and recommendations.                       Insurance Information                MEDICARE/MEDICARE A & B Phone: [phone]    Subscriber: Yinka Cummings Subscriber#: [id number]    Group#: -- Precert#: --             Physician Progress Notes (most recent note)      Neo Hoskins MD at 04/12/22 1414             LOS: 18 days    Patient Care Team:  Vivek Mercer DO as PCP - General (Family Medicine)    Stable overnight.  Continues to improve.  Feels rash has continued to improve.    Objective     Vital Sign Min/Max for last 24 hours  Temp  Min: 97.6 °F (36.4 °C)  Max: 99 °F (37.2 °C)   BP  Min: 147/84  Max: 193/82   Pulse  Min: 73  Max: 90   Resp  Min: 18  Max: 18   SpO2  Min: 97 %  Max: 98 %   Flow (L/min)  Min: 2  Max: 2   No data recorded     Flowsheet Rows    Flowsheet Row First Filed Value   Admission Height 175.3 cm (69\") Documented at 03/25/2022 0849   Admission Weight 113 kg (250 lb) Documented at 03/25/2022 0849          No intake/output data recorded.  I/O last 3 completed shifts:  In: 895 [Other:220; NG/GT:675]  Out: -     Physical Exam:    Physical exam limited to avoid unnecessary risk of COVID-19 exposure " to both patient and physician.    General Appearance: NAD WDWM  Neuro:   awake alert   ENT: + CorPak  Psych:  Normal mood and affect  CV:   No edema  Lungs: respirations regular and unlabored,   Abdomen: not distended  :  No christian, no palp bladder  Skin: + Petechial rash over legs.  Both feet with dressings.  Resolved rash over arms.        WBC WBC   Date Value Ref Range Status   04/11/2022 9.92 3.40 - 10.80 10*3/mm3 Final      HGB Hemoglobin   Date Value Ref Range Status   04/11/2022 7.6 (L) 13.0 - 17.7 g/dL Final      HCT Hematocrit   Date Value Ref Range Status   04/11/2022 23.6 (L) 37.5 - 51.0 % Final      Platlets No results found for: LABPLAT   MCV MCV   Date Value Ref Range Status   04/11/2022 93.7 79.0 - 97.0 fL Final          Sodium Sodium   Date Value Ref Range Status   04/11/2022 144 136 - 145 mmol/L Final   04/10/2022 142 136 - 145 mmol/L Final      Potassium Potassium   Date Value Ref Range Status   04/11/2022 4.2 3.5 - 5.2 mmol/L Final   04/10/2022 3.8 3.5 - 5.2 mmol/L Final      Chloride Chloride   Date Value Ref Range Status   04/11/2022 111 (H) 98 - 107 mmol/L Final   04/10/2022 112 (H) 98 - 107 mmol/L Final      CO2 CO2   Date Value Ref Range Status   04/11/2022 26.0 22.0 - 29.0 mmol/L Final   04/10/2022 23.0 22.0 - 29.0 mmol/L Final      BUN BUN   Date Value Ref Range Status   04/11/2022 39 (H) 8 - 23 mg/dL Final   04/10/2022 39 (H) 8 - 23 mg/dL Final      Creatinine Creatinine   Date Value Ref Range Status   04/11/2022 1.50 (H) 0.76 - 1.27 mg/dL Final   04/10/2022 1.53 (H) 0.76 - 1.27 mg/dL Final      Calcium Calcium   Date Value Ref Range Status   04/11/2022 8.2 (L) 8.6 - 10.5 mg/dL Final   04/10/2022 8.0 (L) 8.6 - 10.5 mg/dL Final      PO4 No results found for: CAPO4   Albumin Albumin   Date Value Ref Range Status   04/11/2022 2.90 (L) 3.50 - 5.20 g/dL Final   04/10/2022 2.60 (L) 3.50 - 5.20 g/dL Final      Magnesium Magnesium   Date Value Ref Range Status   04/11/2022 2.0 1.6 - 2.4 mg/dL  Final      Uric Acid No results found for: URICACID          A/P:    SALEEM: No new labs today.  Urine output unmeasured.  Creatinine down to 1.5 yesterday which is close to patient's baseline.  Initially creatinine elevated up to 2.7.  UA had large blood urine protein was 380 mg etiology of the SALEEM unknown possible drug reaction causing AIN or systemic IgE a vasculitis serology has been negative.  Skin rash has been improving. C3 144, C3 29, cryo neg. creatinine improved with s steroids and withholding of potential antigenic medications.MPO and PR3 normal.  For now continue supportive care.  No indication for renal biopsy at this time.  Urine protein is around 380 on 24-hour however volume was only 300 mL.  Unsure of accuracy.  Will get repeat urine indices.    CKD stage III baseline creatinine 1.5.  Unsure of baseline proteinuria.    Skin rash:  palpable purpura suspect of drug reaction versus skin vasculitis.  Improved with steroids.    Hyperkalemia resolved    Hypernatremia improved with increased fluid intake.    Volume status stable.    Atrial fibrillation: S/p LARRY cardioversion.      Electronically signed by Neo Hoskins MD at 22 1643          Physical Therapy Notes (most recent note)      Alessandra Madrid, PT at 22 1306  Version 1 of 1         Patient Name: Yinka Cummings  : 1945    MRN: [id number]                              Today's Date: 2022       Admit Date: 3/25/2022    Visit Dx:     ICD-10-CM ICD-9-CM   1. Oropharyngeal dysphagia  R13.12 787.22   2. Acute renal failure, unspecified acute renal failure type (HCC)  N17.9 584.9   3. Dehydration  E86.0 276.51   4. Cellulitis of lower extremity, unspecified laterality  L03.119 682.6   5. Vasculitis (HCC)  I77.6 447.6   6. Congestive heart failure, unspecified HF chronicity, unspecified heart failure type (HCC)  I50.9 428.0   7. 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)   • Chronic back pain (on chronic prescription lortab)   • HTN (hypertension)   • Solitary kidney (s/p nephrectomy for \"mass\")   • History of prostate cancer   • Closed fracture of phalanx of right hand   • Dental caries   • Acute renal failure, unspecified acute renal failure type (HCC)   • Rash   • Nausea and vomiting     Past Medical History:   Diagnosis Date   • Anxiety    • Arthritis    • Chronic kidney disease    • Diabetes mellitus (HCC)    • Disease of thyroid gland    • Diverticulosis    • Essential tremor    • HL (hearing loss)    • Hypertension    • Obesity    • Prostate cancer (HCC)    • Renal cell cancer (HCC)    • Skin cancer    • Vasculitis of skin      Past Surgical History:   Procedure Laterality Date   • CHOLECYSTECTOMY     • COLONOSCOPY      Polyps in the past but not on the most recent scope   • NEPHRECTOMY Right    • PROSTATE SURGERY      Radical prostatectomy   • SKIN CANCER EXCISION      Large incision left neck, multiple other cancers removed      General Information     Row Name 04/12/22 1343          Physical Therapy Time and Intention    Document Type therapy note (daily note)  -CD     Mode of Treatment physical therapy  CO-RX WITH O.T.  -CD     Row Name 04/12/22 1343          General Information    Patient Profile Reviewed yes  -CD     Existing Precautions/Restrictions fall  NG, B FOOT WOUNDS WITH LEG WRAPS, O2, TREMORS.  -CD     Barriers to Rehab medically complex;previous functional deficit;hearing deficit  -CD     Row Name 04/12/22 1343          Cognition    Orientation Status (Cognition) oriented to;person  -CD     Row Name 04/12/22 1343 04/12/22 1339       Safety Issues, Functional Mobility    Safety Issues Affecting Function (Mobility) safety precaution " awareness;safety precautions follow-through/compliance;sequencing abilities;insight into deficits/self-awareness;awareness of need for assistance  -CD --    Impairments Affecting Function (Mobility) balance;endurance/activity tolerance;coordination;strength;pain  -CD --    Comment, Safety Issues/Impairments (Mobility) -- PT ALERT AND FOLLOWING ALL COMMANDS. MOTIVATED TO STAND WITH THERAPY IF ABLE.  -CD          User Key  (r) = Recorded By, (t) = Taken By, (c) = Cosigned By    Initials Name Provider Type    CD Alessandra Madrid PT Physical Therapist               Mobility     Row Name 04/12/22 1346          Bed Mobility    Comment, (Bed Mobility) PT [facility] UPON ARRIVAL ON 2.5 L O2. WIFE PRESENT.  -CD     Row Name 04/12/22 1346          Sit-Stand Transfer    Sit-Stand [place] (Transfers) moderate assist (50% patient effort);2 person assist;verbal cues  PROGRESSED TO MIN ASSIST OF 2 ON 2ND,3RD REPS.  -CD     Assistive Device (Sit-Stand Transfers) other (see comments)  1X WITH B UE SUPPORT, 2X WITH WALKER.  -CD     Comment, (Sit-Stand Transfer) MANUAL ASSSIST TO GUIDE HAND TO WALKER UPON STANDING AND WHEN REACHING BACK FOR CHAIR TO SIT.  -CD     Row Name 04/12/22 1346          Gait/Stairs (Locomotion)    [place] Level (Gait) unable to assess  -CD           User Key  (r) = Recorded By, (t) = Taken By, (c) = Cosigned By    Initials Name Provider Type    CD Alessandra Madrid PT Physical Therapist               Obj/Interventions     Row Name 04/12/22 1345          Motor Skills    Therapeutic Exercise --  B LE AROM, SEATED, 1 SET OF 5 REPS EACH: LAQ, HIP FLEX, AP  -CD     Row Name 04/12/22 1344          Balance    Balance Assessment sitting static balance;sitting dynamic balance;sit to stand dynamic balance;standing static balance;standing dynamic balance  -CD     Static Sitting Balance contact guard  -CD     Dynamic Sitting Balance minimal assist  -CD     Position, Sitting Balance supported;sitting in chair  -CD     Sit  to Stand Dynamic Balance moderate assist;2-person assist  -CD     Static Standing Balance minimal assist  -CD     Dynamic Standing Balance verbal cues  -CD     Position/Device Used, Standing Balance supported;walker, front-wheeled  -CD     Comment, Balance PT ABLE TO RECIPROCAL SCOOT FORWARD AND BACKWARD IN RECLINER WITH CGA AND CUES. DEMONSTRATED HEAVY POSTERIOR LEAN WITH LE THER EX RERQUIRING MIN ASSIST.  -CD           User Key  (r) = Recorded By, (t) = Taken By, (c) = Cosigned By    Initials Name Provider Type    CD Alessandra Madrid, PT Physical Therapist               Goals/Plan    No documentation.                Clinical Impression     Row Name 04/12/22 1352          Pain    Pretreatment Pain Rating 1/10  -CD     Posttreatment Pain Rating 1/10  -CD     Pain Location generalized  -CD     Pain Location - buttock  -CD     Pre/Posttreatment Pain Comment MILD DISCOMFORT FROM SITTING.  -CD     Row Name 04/12/22 1352          Plan of Care Review    Plan of Care Reviewed With patient;spouse  -CD     Progress improving  -CD     Outcome Evaluation PT DEMONSTRATED IMPROVED ENDURANCE FOR LE THER EX, BALANCE ACTIVITY AND STANDING AT WALKER. COMPLETED STS FROM RECLINER X 3 REPS. DEMONSTRATED RECIPROCAL SCOOTING FORWARD/BACKWARD IN RECLINER WITH CUES AND CGA. PT ALERT AND FOLLOWING ALL COMMANDS. MOTIVATED TO WORK WITH THERAPY. RECOMMEND SNF AT D/C.  -     Row Name 04/12/22 2022          Therapy Assessment/Plan (PT)    Patient/Family Therapy Goals Statement (PT) SNF AT D/C.  -CD     Rehab Potential (PT) good, to achieve stated therapy goals  -CD     Criteria for Skilled Interventions Met (PT) yes;meets criteria  -CD     Therapy Frequency (PT) daily  -CD     Row Name 04/12/22 1359          Vital Signs    Pre Systolic BP Rehab 171  -CD     Pre Treatment Diastolic BP 74  -CD     Intra Systolic BP Rehab 160  -CD     Intra Treatment Diastolic   -CD     Post Systolic BP Rehab 192  -CD     Post Treatment Diastolic BP 89  -CD      Pretreatment Heart Rate (beats/min) 98  -CD     Intratreatment Heart Rate (beats/min) 98  -CD     Posttreatment Heart Rate (beats/min) 101  -CD     Pretreatment Resp Rate (breaths/min) 101  -CD     Pre SpO2 (%) 99  -CD     O2 Delivery Pre Treatment supplemental O2  -CD     O2 Delivery Intra Treatment supplemental O2  -CD     Post SpO2 (%) 97  -CD     O2 Delivery Post Treatment supplemental O2  -CD     Pre Patient Position Sitting  -CD     Intra Patient Position Standing  -CD     Post Patient Position Sitting  -CD     Row Name 04/12/22 1352          Positioning and Restraints    Pre-Treatment Position sitting in chair/recliner  -CD     Post Treatment Position chair  -CD     In Chair reclined;call light within reach;encouraged to call for assist;exit alarm on;with family/caregiver;legs elevated;RUE elevated;LUE elevated;notified nsg;on mechanical lift sling;waffle cushion  NOTIFIED NSG OF CURRENT FUNCTIONAL STATUS WITH THERAPY.  -CD           User Key  (r) = Recorded By, (t) = Taken By, (c) = Cosigned By    Initials Name Provider Type    CD Alessandra Madrid, PT Physical Therapist               Outcome Measures     Row Name 04/12/22 1357          How much help from another person do you currently need...    Turning from your back to your side while in flat bed without using bedrails? 3  -CD     Moving from lying on back to sitting on the side of a flat bed without bedrails? 2  -CD     Moving to and from a bed to a chair (including a wheelchair)? 1  -CD     Standing up from a chair using your arms (e.g., wheelchair, bedside chair)? 2  -CD     Climbing 3-5 steps with a railing? 1  -CD     To walk in hospital room? 2  -CD     AM-PAC 6 Clicks Score (PT) 11  -CD     Row Name 04/12/22 1357 04/12/22 1349       Functional Assessment    Outcome Measure Options AM-PAC 6 Clicks Basic Mobility (PT);Modified [place]  -CD AM-PAC 6 Clicks Daily Activity (OT)  -MR          User Key  (r) = Recorded By, (t) = Taken By, (c) = Cosigned By     Initials Name Provider Type    Alessandra Choudhury, PT Physical Therapist    Alla Garces, OT Occupational Therapist                             Physical Therapy Education                 Title: PT OT SLP Therapies (Done)     Topic: Physical Therapy (Done)     Point: Mobility training (Done)     Learning Progress Summary           Patient Acceptance, E, VU,NR by CD at 4/12/2022 1357    Comment: SEE FLOWSHEET    Acceptance, E, NR by AS at 4/9/2022 1058    Acceptance, E, VU,NR by CD at 4/6/2022 1546    Comment: SEE FLOWSHEET    Acceptance, E, NR by KG at 4/4/2022 1506    Acceptance, E, VU,NR by CD at 4/2/2022 1634    Comment: BENEFITS OF OOB ACTIVITY, ROM EXERCISE, PROGRESSION OF POC, D/C PLANNING,    Acceptance, E,D, VU,NR by HP at 3/29/2022 1517    Acceptance, E, NR by BA at 3/27/2022 1412   Family Acceptance, E, VU,NR by CD at 4/6/2022 1546    Comment: SEE FLOWSHEET    Acceptance, E,D, VU,NR by HP at 3/29/2022 1517    Acceptance, E, NR by BA at 3/27/2022 1412   Significant Other Acceptance, E, VU,NR by CD at 4/12/2022 1357    Comment: SEE FLOWSHEET    Acceptance, E, VU,NR by CD at 4/6/2022 1546    Comment: SEE FLOWSHEET                   Point: Home exercise program (Done)     Learning Progress Summary           Patient Acceptance, E, VU,NR by CD at 4/12/2022 1357    Comment: SEE FLOWSHEET    Acceptance, E, NR by AS at 4/9/2022 1058    Acceptance, E, VU,NR by CD at 4/6/2022 1546    Comment: SEE FLOWSHEET    Acceptance, E, NR by KG at 4/4/2022 1506    Acceptance, E, VU,NR by CD at 4/2/2022 1634    Comment: BENEFITS OF OOB ACTIVITY, ROM EXERCISE, PROGRESSION OF POC, D/C PLANNING,    Acceptance, E,D, VU,NR by HP at 3/29/2022 1517    Acceptance, E, NR by BA at 3/27/2022 1412   Family Acceptance, E, VU,NR by CD at 4/6/2022 1546    Comment: SEE FLOWSHEET    Acceptance, E,D, VU,NR by HP at 3/29/2022 1517    Acceptance, E, NR by BA at 3/27/2022 1412   Significant Other Acceptance, E, VU,NR by CD at 4/12/2022 1357     Comment: SEE FLOWSHEET    Acceptance, E, VU,NR by CD at 4/6/2022 1546    Comment: SEE FLOWSHEET                   Point: Body mechanics (Done)     Learning Progress Summary           Patient Acceptance, E, VU,NR by CD at 4/12/2022 1357    Comment: SEE FLOWSHEET    Acceptance, E, NR by AS at 4/9/2022 1058    Acceptance, E, VU,NR by CD at 4/6/2022 1546    Comment: SEE FLOWSHEET    Acceptance, E, NR by KG at 4/4/2022 1506    Acceptance, E, VU,NR by CD at 4/2/2022 1634    Comment: BENEFITS OF OOB ACTIVITY, ROM EXERCISE, PROGRESSION OF POC, D/C PLANNING,    Acceptance, E,D, VU,NR by HP at 3/29/2022 1517    Acceptance, E, NR by BA at 3/27/2022 1412   Family Acceptance, E, VU,NR by CD at 4/6/2022 1546    Comment: SEE FLOWSHEET    Acceptance, E,D, VU,NR by HP at 3/29/2022 1517    Acceptance, E, NR by BA at 3/27/2022 1412   Significant Other Acceptance, E, VU,NR by CD at 4/12/2022 1357    Comment: SEE FLOWSHEET    Acceptance, E, VU,NR by CD at 4/6/2022 1546    Comment: SEE FLOWSHEET                   Point: Precautions (Done)     Learning Progress Summary           Patient Acceptance, E, VU,NR by CD at 4/12/2022 1357    Comment: SEE FLOWSHEET    Acceptance, E, NR by AS at 4/9/2022 1058    Acceptance, E, VU,NR by CD at 4/6/2022 1546    Comment: SEE FLOWSHEET    Acceptance, E, NR by KG at 4/4/2022 1506    Acceptance, E, VU,NR by CD at 4/2/2022 1634    Comment: BENEFITS OF OOB ACTIVITY, ROM EXERCISE, PROGRESSION OF POC, D/C PLANNING,    Acceptance, E,D, VU,NR by HP at 3/29/2022 1517    Acceptance, E, NR by BA at 3/27/2022 1412   Family Acceptance, E, VU,NR by CD at 4/6/2022 1546    Comment: SEE FLOWSHEET    Acceptance, E,D, VU,NR by HP at 3/29/2022 1517    Acceptance, E, NR by BA at 3/27/2022 1412   Significant Other Acceptance, E, VU,NR by CD at 4/12/2022 1357    Comment: SEE FLOWSHEET    Acceptance, E, VU,NR by CD at 4/6/2022 1546    Comment: SEE FLOWSHEET                               User Key     Initials Effective Dates  Name Provider Type Discipline    CD 06/16/21 -  Alessandra Madrid, PT Physical Therapist PT    AS 06/16/21 -  Delfina Daniels, PTA Physical Therapist Assistant PT    KG 06/16/21 -  Rita Mchugh Physical Therapist PT    HP 06/01/21 -  Anya Velez, PT Physical Therapist PT    BA 09/21/21 -  Renate Mendoza, PT Physical Therapist PT              PT Recommendation and Plan     Plan of Care Reviewed With: patient, spouse  Progress: improving  Outcome Evaluation: PT DEMONSTRATED IMPROVED ENDURANCE FOR LE THER EX, BALANCE ACTIVITY AND STANDING AT WALKER. COMPLETED STS FROM RECLINER X 3 REPS. DEMONSTRATED RECIPROCAL SCOOTING FORWARD/BACKWARD IN RECLINER WITH CUES AND CGA. PT ALERT AND FOLLOWING ALL COMMANDS. MOTIVATED TO WORK WITH THERAPY. RECOMMEND SNF AT D/C.     Time Calculation:    PT Charges     Row Name 04/12/22 1358             Time Calculation    Start Time 1306  -CD      PT Received On 04/12/22  -CD      PT Goal Re-Cert Due Date 04/16/22  -CD              Time Calculation- PT    Total Timed Code Minutes- PT 29 minute(s)  -CD              Timed Charges    30229 - PT Therapeutic Exercise Minutes 15  -CD              Total Minutes    Timed Charges Total Minutes 15  -CD       Total Minutes 15  -CD            User Key  (r) = Recorded By, (t) = Taken By, (c) = Cosigned By    Initials Name Provider Type    CD Alessandra Madrid, PT Physical Therapist              Therapy Charges for Today     Code Description Service Date Service Provider Modifiers Qty    01495600976 HC PT THER PROC EA 15 MIN 4/12/2022 Alessandra Madrid, PT GP 1          PT G-Codes  Outcome Measure Options: AM-PAC 6 Clicks Basic Mobility (PT), Modified [place]  AM-PAC 6 Clicks Score (PT): 11  AM-PAC 6 Clicks Score (OT): 9    Alessandra Madrid PT  4/12/2022      Electronically signed by Alessandra Madrid PT at 04/12/22 1359          Occupational Therapy Notes (most recent note)      Alla Mccauley, OT at 04/12/22 1306          Patient Name: Yinka  "Emiliano  : 1945    MRN: [id number]                              Today's Date: 2022       Admit Date: 3/25/2022    Visit Dx:     ICD-10-CM ICD-9-CM   1. Oropharyngeal dysphagia  R13.12 787.22   2. Acute renal failure, unspecified acute renal failure type (HCC)  N17.9 584.9   3. Dehydration  E86.0 276.51   4. Cellulitis of lower extremity, unspecified laterality  L03.119 682.6   5. Vasculitis (HCC)  I77.6 447.6   6. Congestive heart failure, unspecified HF chronicity, unspecified heart failure type (HCC)  I50.9 428.0   7. 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)   • Chronic back pain (on chronic prescription lortab)   • HTN (hypertension)   • Solitary kidney (s/p nephrectomy for \"mass\")   • History of prostate cancer   • Closed fracture of phalanx of right hand   • Dental caries   • Acute renal failure, unspecified acute renal failure type (HCC)   • Rash   • Nausea and vomiting     Past Medical History:   Diagnosis Date   • Anxiety    • Arthritis    • Chronic kidney disease    • Diabetes mellitus (HCC)    • Disease of thyroid gland    • Diverticulosis    • Essential tremor    • HL (hearing loss)    • Hypertension    • Obesity    • Prostate cancer (HCC)    • Renal cell cancer (HCC)    • Skin cancer    • Vasculitis of skin      Past Surgical History:   Procedure Laterality Date   • CHOLECYSTECTOMY     • COLONOSCOPY      Polyps in the past but not on the most recent scope   • NEPHRECTOMY Right    • PROSTATE SURGERY      Radical prostatectomy   • SKIN CANCER EXCISION      Large incision left neck, multiple other cancers removed      General Information     Row Name 22 1339          OT Time and Intention    Document Type therapy note " (daily note)  Simultaneous filing. User may be unaware of other data.  -MR     Mode of Treatment occupational therapy;co-treatment  -MR     Row Name 04/12/22 1338          General Information    Patient Profile Reviewed yes  Simultaneous filing. User may be unaware of other data.  -MR     Existing Precautions/Restrictions fall  decreased skin integrity bilateral hands and feet; NG tube; Simultaneous filing. User may be unaware of other data.  -MR     Barriers to Rehab medically complex;previous functional deficit;hearing deficit  Simultaneous filing. User may be unaware of other data.  -MR     Row Name 04/12/22 1337          Cognition    Orientation Status (Cognition) oriented to;person;place;verbal cues/prompts needed for orientation;situation;time  Simultaneous filing. User may be unaware of other data.  -MR     Row Name 04/12/22 4763          Safety Issues, Functional Mobility    Safety Issues Affecting Function (Mobility) awareness of need for assistance;friction/shear risk;safety precautions follow-through/compliance;safety precaution awareness  Simultaneous filing. User may be unaware of other data.  -MR     Impairments Affecting Function (Mobility) balance;coordination;endurance/activity tolerance;pain;strength  Simultaneous filing. User may be unaware of other data.  -MR           User Key  (r) = Recorded By, (t) = Taken By, (c) = Cosigned By    Initials Name Provider Type    MR Alla Mccauley, OT Occupational Therapist                 Mobility/ADL's     Row Name 04/12/22 1340          Bed Mobility    Comment, (Bed Mobility) Pt received UIC and left UIC  -MR     Row Name 04/12/22 1349          Transfers    Transfers sit-stand transfer;stand-sit transfer  -MR     Sit-Stand [place] (Transfers) moderate assist (50% patient effort);2 person assist;verbal cues;nonverbal cues (demo/gesture)  -MR     Stand-Sit [place] (Transfers) verbal cues;nonverbal cues (demo/gesture);moderate assist (50% patient  effort);2 person assist  -MR     Row Name 04/12/22 1340          Sit-Stand Transfer    Assistive Device (Sit-Stand Transfers) other (see comments)  x1 trial w/ BUE support, x2 to 3 trial w/ RW.  -MR     Comment, (Sit-Stand Transfer) v/c for hand placement, sequencing and safety. Pt demo good technique and effort to advance self forward to edge of chair. Pt requiring assistance for advancement of UE to RW limited by tremors and anxiousness.  -MR     Row Name 04/12/22 1340          Stand-Sit Transfer    Assistive Device (Stand-Sit Transfers) walker, front-wheeled  -MR     Row Name 04/12/22 1340          Activities of Daily Living    BADL Assessment/Intervention grooming;lower body dressing  -MR     Row Name 04/12/22 1340          Grooming Assessment/Training    [place] Level (Grooming) wash face, hands;set up  -MR     Position (Grooming) supported sitting  -MR     Row Name 04/12/22 1340          Lower Body Dressing Assessment/Training    [place] Level (Lower Body Dressing) don;doff;socks;dependent (less than 25% patient effort)  -MR     Position (Lower Body Dressing) supported sitting  -MR           User Key  (r) = Recorded By, (t) = Taken By, (c) = Cosigned By    Initials Name Provider Type    MR Alla Mccauley, OT Occupational Therapist               Obj/Interventions     Row Name 04/12/22 1344          Shoulder (Therapeutic Exercise)    Shoulder (Therapeutic Exercise) AROM (active range of motion)  -MR     Shoulder AROM (Therapeutic Exercise) bilateral;flexion;extension;scapular retraction;scapular protraction;5 repetitions  -MR     Row Name 04/12/22 1344          Elbow/Forearm (Therapeutic Exercise)    Elbow/Forearm (Therapeutic Exercise) AROM (active range of motion)  -MR     Elbow/Forearm AROM (Therapeutic Exercise) bilateral;flexion;extension;5 repetitions  -MR     Row Name 04/12/22 1344          Motor Skills    Therapeutic Exercise shoulder;elbow/forearm  -MR     Row Name 04/12/22 1346           Balance    Balance Assessment sitting static balance;sitting dynamic balance;standing static balance;standing dynamic balance  -MR     Static Sitting Balance contact guard  -MR     Dynamic Sitting Balance minimal assist  -MR     Position, Sitting Balance supported;sitting in chair  -MR     Static Standing Balance minimal assist  -MR     Dynamic Standing Balance moderate assist  -MR     Position/Device Used, Standing Balance supported;walker, rolling  -MR     Balance Interventions sitting;standing;sit to stand;supported;static;dynamic;minimal challenge;occupation based/functional task  -MR     Comment, Balance Pt demo a posterior lean when engaging in dynamic sitting balance tasks  -MR           User Key  (r) = Recorded By, (t) = Taken By, (c) = Cosigned By    Initials Name Provider Type    MR GarrickAlla, OT Occupational Therapist               Goals/Plan    No documentation.                Clinical Impression     Row Name 04/12/22 7456          Pain Assessment    Pretreatment Pain Rating 1/10  -MR     Posttreatment Pain Rating 1/10  -MR     Pain Location generalized  -MR     Pain Location - buttock  -MR     Pre/Posttreatment Pain Comment Pt reporting minimal discomfort in the buttock from sitting this date.  -MR     Pain Intervention(s) Ambulation/increased activity;Repositioned  -MR     Row Name 04/12/22 7736          Plan of Care Review    Plan of Care Reviewed With patient;spouse  -MR     Progress no change  -MR     Outcome Evaluation Pt demo significant improvement w/ sitting balance, transfers and activity tolerance. SUA for grooming tasks. Pt completing seate BUE and BLE ther ex, posterior lean noted when completing BLE ther ex. STS x 3 reps w/ initial stand requiring ModA x2 w/ BUE support, Dion x 2 for 2nd and 3rd attempt w/ RW. Pt able to tolerate standing at RW for approx 30 seconds. Continue to progress as able.  -MR     Row Name 04/12/22 6807          Therapy Plan Review/Discharge Plan (OT)     Anticipated Discharge Disposition (OT) skilled nursing facility  -MR     Row Name 04/12/22 1346          Vital Signs    Pre Systolic BP Rehab 171  -MR     Pre Treatment Diastolic BP 74  -MR     Post Systolic BP Rehab 160  -MR     Post Treatment Diastolic   -MR     Pretreatment Heart Rate (beats/min) 96  -MR     Intratreatment Heart Rate (beats/min) 106  -MR     Posttreatment Heart Rate (beats/min) 98  -MR     Pre SpO2 (%) 98  -MR     O2 Delivery Pre Treatment nasal cannula  -MR     O2 Delivery Intra Treatment nasal cannula  -MR     Post SpO2 (%) 97  -MR     O2 Delivery Post Treatment nasal cannula  -MR     Pre Patient Position Sitting  -MR     Intra Patient Position Standing  -MR     Post Patient Position Sitting  -MR     Row Name 04/12/22 1346          Positioning and Restraints    Pre-Treatment Position sitting in chair/recliner  -MR     Post Treatment Position chair  -MR     In Chair notified nsg;reclined;sitting;call light within reach;encouraged to call for assist;exit alarm on;with family/caregiver;legs elevated;waffle boot/both;on mechanical lift sling;waffle cushion;RUE elevated;LUE elevated  -MR           User Key  (r) = Recorded By, (t) = Taken By, (c) = Cosigned By    Initials Name Provider Type    MR Alla Mccauley, OT Occupational Therapist               Outcome Measures     Row Name 04/12/22 0708          How much help from another is currently needed...    Putting on and taking off regular lower body clothing? 1  -MR     Bathing (including washing, rinsing, and drying) 1  -MR     Toileting (which includes using toilet bed pan or urinal) 1  -MR     Putting on and taking off regular upper body clothing 2  -MR     Taking care of personal grooming (such as brushing teeth) 3  -MR     Eating meals 1  -MR     AM-PAC 6 Clicks Score (OT) 9  -MR     Row Name 04/12/22 1349          Functional Assessment    Outcome Measure Options AM-PAC 6 Clicks Daily Activity (OT)  -MR           User Key  (r) = Recorded  By, (t) = Taken By, (c) = Cosigned By    Initials Name Provider Type    Alla Garces, OT Occupational Therapist                Occupational Therapy Education                 Title: PT OT SLP Therapies (In Progress)     Topic: Occupational Therapy (Done)     Point: ADL training (Done)     Description:   Instruct learner(s) on proper safety adaptation and remediation techniques during self care or transfers.   Instruct in proper use of assistive devices.              Learning Progress Summary           Patient Acceptance, E, VU,NR by MR at 4/12/2022 1350    Acceptance, E,TB,D, DU,VU,NR by  at 4/7/2022 1535    Acceptance, E,TB,D, VU,DU,NR by  at 4/7/2022 1534    Acceptance, E, NR by MR at 4/4/2022 1447    Acceptance, E,D, NR by JAYDEN at 3/30/2022 1147    Acceptance, E,D, VU,NR by TA at 3/27/2022 1502   Family Acceptance, E, VU,NR by MR at 4/12/2022 1350    Acceptance, E,TB,D, DU,VU,NR by  at 4/7/2022 1535    Acceptance, E, NR by MR at 4/4/2022 1447    Acceptance, E,D, NR by JAYDEN at 3/30/2022 1147                   Point: Home exercise program (Done)     Description:   Instruct learner(s) on appropriate technique for monitoring, assisting and/or progressing therapeutic exercises/activities.              Learning Progress Summary           Patient Acceptance, E, VU,NR by MR at 4/12/2022 1350    Acceptance, E,TB,D, DU,VU,NR by  at 4/7/2022 1535    Acceptance, E,TB,D, VU,DU,NR by  at 4/7/2022 1534    Acceptance, E, NR by  at 4/4/2022 1447    Acceptance, E,D, NR by JAYDEN at 3/30/2022 1147    Acceptance, E,D, VU,NR by TA at 3/27/2022 1502   Family Acceptance, E, VU,NR by MR at 4/12/2022 1350    Acceptance, E,TB,D, DU,VU,NR by  at 4/7/2022 1535    Acceptance, E, NR by MR at 4/4/2022 1447    Acceptance, E,D, NR by JAYDEN at 3/30/2022 1147                   Point: Precautions (Done)     Description:   Instruct learner(s) on prescribed precautions during self-care and functional transfers.              Learning Progress  Summary           Patient Acceptance, E, VU,NR by MR at 4/12/2022 1350    Acceptance, E,TB,D, DU,VU,NR by KF at 4/7/2022 1535    Acceptance, E,TB,D, VU,DU,NR by KF at 4/7/2022 1534    Acceptance, E, NR by MR at 4/4/2022 1447    Acceptance, E,D, NR by JY at 3/30/2022 1147    Acceptance, E,D, VU,NR by TA at 3/27/2022 1502   Family Acceptance, E, VU,NR by MR at 4/12/2022 1350    Acceptance, E,TB,D, DU,VU,NR by KF at 4/7/2022 1535    Acceptance, E, NR by MR at 4/4/2022 1447    Acceptance, E,D, NR by JY at 3/30/2022 1147                   Point: Body mechanics (Done)     Description:   Instruct learner(s) on proper positioning and spine alignment during self-care, functional mobility activities and/or exercises.              Learning Progress Summary           Patient Acceptance, E, VU,NR by MR at 4/12/2022 1350    Acceptance, E,TB,D, DU,VU,NR by KF at 4/7/2022 1535    Acceptance, E,TB,D, VU,DU,NR by KF at 4/7/2022 1534    Acceptance, E, NR by MR at 4/4/2022 1447    Acceptance, E,D, NR by JY at 3/30/2022 1147    Acceptance, E,D, VU,NR by TA at 3/27/2022 1502   Family Acceptance, E, VU,NR by MR at 4/12/2022 1350    Acceptance, E,TB,D, DU,VU,NR by KF at 4/7/2022 1535    Acceptance, E, NR by MR at 4/4/2022 1447    Acceptance, E,D, NR by JY at 3/30/2022 1147                               User Key     Initials Effective Dates Name Provider Type Discipline    TA 06/16/21 -  Neno Russ, OT Occupational Therapist OT    KF 06/16/21 -  Alis Harris, OT Occupational Therapist OT    JY 06/16/21 -  Annalisa Abbott OT Occupational Therapist OT    MR 10/06/21 -  Alla Mccauley OT Occupational Therapist OT              OT Recommendation and Plan     Plan of Care Review  Plan of Care Reviewed With: patient, spouse  Progress: no change  Outcome Evaluation: Pt demo significant improvement w/ sitting balance, transfers and activity tolerance. SUA for grooming tasks. Pt completing seate BUE and BLE ther ex, posterior lean  noted when completing BLE ther ex. STS x 3 reps w/ initial stand requiring ModA x2 w/ BUE support, Dion x 2 for 2nd and 3rd attempt w/ RW. Pt able to tolerate standing at RW for approx 30 seconds. Continue to progress as able.     Time Calculation:    Time Calculation- OT     Row Name 04/12/22 3990             Time Calculation- OT    OT Start Time 1306  -MR      OT Received On 04/12/22  -MR      OT Goal Re-Cert Due Date 04/17/22  -MR              Timed Charges    41514 - OT Therapeutic Activity Minutes 10  -MR      57752 - OT Self Care/Mgmt Minutes 10  -MR              Total Minutes    Timed Charges Total Minutes 20  -MR       Total Minutes 20  -MR            User Key  (r) = Recorded By, (t) = Taken By, (c) = Cosigned By    Initials Name Provider Type    Catalino Garces OT Occupational Therapist              Therapy Charges for Today     Code Description Service Date Service Provider Modifiers Qty    84441844449 HC OT THERAPEUTIC ACT EA 15 MIN 4/12/2022 Catalino Mccauley OT GO 1               CATALINO MCCAULEY OT  4/12/2022    Electronically signed by Catalino Mccauley OT at 04/12/22 2404

## 2022-04-13 NOTE — PROGRESS NOTES
INFECTIOUS DISEASE Progress Note    Yinka Cummings  1945  [id number]      Admission Date: 3/25/2022      Requesting Provider: Yinka Desai MD  Evaluating Physician: Brandt Ward MD    Reason for Consultation: vasculitis rash with cellulitis    History of present illness:    3/26/2022: Patient is a 77 y.o. male, ,known to Dr. Wili Rees, with h/o CKD, T2DM, afib/flutter, chronic diastolic heart failure, VINI, chronic pain, gastroparesis, Gilbert's disease,  hypothyroidism, essential tremor, HTN, Obesity, Prostate cancer/radical prostatectomy, renal cell carcinoma/right nephrectomy, multiple skin cancer excisions, and cutaneous vasculitis who we were asked to see for cellulitis.  The patient presented to [facility] ED on 3/25/22 with nausea, vomiting, and LE rash/redness.  [facility] with rash and Afib/RVR.  Dermatology did a punch biopsy with path showing perivascular lymphocytic infiltrate with no evidence of vasculitis at the time.  He was though to have rash from Pradaxa and was changed to Lovenox.  The rash worsened and eventually Lyrica, Primidone, and Norco were stopped one by one.  He was started on Suboxone on 3/16 and his rash began to improve.  Primidone had been a new drug started just prior the start of the rash.  He underwent cardioversion while at [facility] and converted to NSR.  He was discharge home on Lovenox.  He followed up with his PCP on 3/21 and was placed on Keflex for possible cutaneous infections at the rash site and Lasix for BLE edema.    His rash has worsened again on feet, legs, abdomen, and arms prompting him to come to [facility] ED on 3/25.  He developed nausea and vomiting prior to his presentation.  He denies fever, chills, abdominal pain, diarrhea, or dysuria.  On arrival, his Tmax is 99.4.  Initial labs were CRP 12.02-->12.5, INR 1.2, ESR 44-->62, PCT 0.78-->0.94, lactic acid 2.0, lipase 8, proBNP 5100, D-dimer 4.44, RF 16.4, C3 144, C4 29, WBC 10,600  with 83% neutrophils, and creatinine 2.35-->2.75 (baseline 1.6 on 5/20/21).  A leg wound culture showed normal skin ananth with GS showing GPC in pairs/clusters.  A second wound culture is pending with growth present.   A COVID-19/Flu PCR is negative.  A Hep panel was negative.  A UA WBC is 13-20 with TNTC RBCs, trace LE, negative nitrite.  KENZIE, ANCA panel, cryogobulin, and complement are pending.  A CXR showed no acute findings.  A DVT work up of BLE was negative although peroneal veins were not well visualized bilaterally. A CT scan of a/p with contrast has been ordered.  He is currently on low dose Rocephin.  ID was asked to evaluate and manage his antibiotic therapy.    He continues to have nausea and vomiting with cold sweat.  He denies any diarrhea.     3/27/2022: He complains of lower extremity pain which is most prominent in his feet.  He has remained afebrile.  His creatinine today is 2.82 and his C-reactive protein is 11.6.  His white blood cell count is 17.2.  He has anorexia but denies nausea and vomiting.    3/28/22: He has decreased Bilateral foot pain.  He has remained afebrile.  His creatinine is down to 2.5. His 24 hour protein is 360. He denies nausea, vomiting, and diarrhea    3/29/22:  Maximum temperature over the last 24 hours is 99.2. Creatinine yesterday was 2.58. CH 50 was greater than 60, ANCA was negative and KENZIE was negative.  He has decreased foot pain.  He complains of malaise but denies nausea and vomiting.    3/30/22: He has remained afebrile.  He denies nausea and vomiting.  He has decreased lower extremity pain.  He denies dyspnea.    3/31/22: He remains afebrile. His creatinine today is 2.6.  His C-reactive protein is 6.8.  His white blood cell count is 15.4.  His echocardiogram revealed no valvular vegetations. He and his family indicate improvement in his lower extremity rash but he has more extensive lesions over his palms.     4/1/2022: He underwent cardioversion today.  He is  now in sinus rhythm.  He has remained afebrile.  He is currently drowsy from his sedation for cardioversion.  His family thinks that his rash is no worse today.  His creatinine today is 2.33.      4/4/2022: He has been confused over the weekend.  This worsened after he was started on prednisone.  He has been afebrile.  He notes a significant improvement in his rash over the weekend.  He denies nausea, vomiting, and diarrhea.  He has remained afebrile.  His creatinine today is 2.35.    4/5/22: He has been severely confused overnight with agitation. He had a fever to 100.6° earlier today but is now afebrile. Laboratory studies today reveal a creatinine of 2.38.  A urinalysis yesterday revealed 6-12 white blood cells. He is unable to provide a review of systems.    4/6/22: He had a low-grade fever to 100.6 at around noon yesterday but has been afebrile since. His creatinine today is 2.24 and his white blood cell count is 7.6. Sodium is elevated at 154. He feels much better.  He he is much less agitated and has appropriate conversation today.    4/7/22: He has remained afebrile over the last 24 hours. His creatinine today is down to 1.79. He feels much better.  He denies increased foot pain. Agitation and confusion have dramatically improved.    4/8/22: He has remained afebrile.  Yesterday his ESR was down to 19 and his C-reactive protein was 3.3. His rash over his upper extremities dramatically worsened overnight although his rash on his lower extremities is unchanged.  He has now being started back on prednisone at 20 mg per day.    4/10/22: I follow the patient for Dr. Brandt Ward while he is out.  The patient and his family members at bedside report that his rash over his extremities appears to be improving over the last couple of days since restarting the prednisone.  Renal function is slightly better today.  He is tolerating the steroids better without any significant confusion today.  No fevers.    4/11/22:  The patient is having some confusion today.  He does not know the month or the year but he does know that he is in [facility] and he does know his name.  Pain is under better control.  Rash over his upper extremities is improving.  His wife is at bedside and expresses some frustration that he has not yet had his swallow eval.  Feeding tube remains in place.    4/12/22-confusion seems better today.  He still does not know the year but he is oriented to place and person and president.  He is sitting up in a bedside chair today.  Rash continues to improve.  He states that he did not pass his swallow eval yesterday but he has chronic dysphasia and he states that this happens every time he is admitted to the hospital.  He is eager to eat a regular diet.  No fevers.    4/13/22-the patient still has a feeding tube in place.  He is deciding on whether or not he wants a PEG tube placed or whether he wants to risk aspiration by eating regular food by mouth.  He is weighing this decision with his family.  The rash over his arms and legs is significantly improving.  He is not having any fevers.  Renal function continues to improve.    Past Medical History:   Diagnosis Date   • Anxiety    • Arthritis    • Chronic kidney disease    • Diabetes mellitus (HCC)    • Disease of thyroid gland    • Diverticulosis    • Essential tremor    • HL (hearing loss)    • Hypertension    • Obesity    • Prostate cancer (HCC)    • Renal cell cancer (HCC)    • Skin cancer    • Vasculitis of skin        Past Surgical History:   Procedure Laterality Date   • CHOLECYSTECTOMY     • COLONOSCOPY      Polyps in the past but not on the most recent scope   • NEPHRECTOMY Right    • PROSTATE SURGERY      Radical prostatectomy   • SKIN CANCER EXCISION      Large incision left neck, multiple other cancers removed       Family History   Problem Relation Age of Onset   • Cancer Mother    • Heart attack Father    • Kidney failure Sister    • Coronary  artery disease Sister        Social History     Socioeconomic History   • Marital status:    • Number of children: 4   Tobacco Use   • Smoking status: Never Smoker   • Smokeless tobacco: Never Used   Vaping Use   • Vaping Use: Never used   Substance and Sexual Activity   • Alcohol use: Not Currently   • Drug use: Not Currently   • Sexual activity: Defer       Allergies   Allergen Reactions   • Contrast Dye Rash     [facility]   • Doxycycline Rash     Urticaria per [facility]   • Chlorthalidone Other (See Comments)     Hyponatremia [facility]   • Morphine Unknown - Low Severity     Headache per [facility]   • Neurontin [Gabapentin] Mental Status Change     Drowsy per [facility]   • Phenergan [Promethazine] Unknown - Low Severity     Confusion [facility]         Medication:    Current Facility-Administered Medications:   •  acetaminophen (TYLENOL) tablet 650 mg, 650 mg, Oral, Q4H PRN **OR** acetaminophen (TYLENOL) 160 MG/5ML solution 650 mg, 650 mg, Nasogastric, Q4H PRN **OR** acetaminophen (TYLENOL) suppository 650 mg, 650 mg, Rectal, Q4H PRN, Jemma Arriaga APRN  •  amLODIPine (NORVASC) tablet 10 mg, 10 mg, Per G Tube, Q24H, Wili Conner DO, 10 mg at 04/13/22 0829  •  atorvastatin (LIPITOR) tablet 40 mg, 40 mg, Nasogastric, Nightly, Jemma Arriaga APRN, 40 mg at 04/12/22 2151  •  buprenorphine-naloxone (SUBOXONE) 8-2 MG per SL tablet 2 tablet, 2 tablet, Sublingual, Nightly, Raymond Herrera MD, 2 tablet at 04/12/22 2151  •  cloNIDine (CATAPRES) tablet 0.1 mg, 0.1 mg, Per G Tube, Q12H, Wili Conner DO, 0.1 mg at 04/13/22 0838  •  dextrose (D50W) (25 g/50 mL) IV injection 25 g, 25 g, Intravenous, Q15 Min PRN, Raymond Herrera MD  •  dextrose (GLUTOSE) oral gel 15 g, 15 g, Nasogastric, Q15 Min PRN, Jemma Arriaga APRN  •  DULoxetine (CYMBALTA) DR capsule 90 mg, 90 mg, Oral, Daily, Jemma Arriaga APRN, 90 mg at 04/13/22 0825  •  enoxaparin (LOVENOX) syringe  110 mg, 110 mg, Subcutaneous, Q12H, Yinka Brush IV, PharmD, 110 mg at 04/13/22 0824  •  glucagon (human recombinant) (GLUCAGEN DIAGNOSTIC) injection 1 mg, 1 mg, Intramuscular, Q15 Min PRN, Raymond Herrera MD  •  haloperidol (HALDOL) 2 MG/ML solution 2 mg, 2 mg, Oral, Q6H PRN, Peña Weaver MD  •  insulin detemir (LEVEMIR) injection 35 Units, 35 Units, Subcutaneous, Nightly, Wili Conner DO  •  insulin lispro (humaLOG) injection 0-9 Units, 0-9 Units, Subcutaneous, TID AC, Ryamond Herrera MD, 7 Units at 04/13/22 1704  •  insulin lispro (humaLOG) injection 12 Units, 12 Units, Subcutaneous, TID With Meals, Wili Conner DO, 12 Units at 04/13/22 1704  •  levothyroxine (SYNTHROID, LEVOTHROID) tablet 150 mcg, 150 mcg, Nasogastric, Daily, Jemma Arriaga APRN, 150 mcg at 04/13/22 0825  •  melatonin tablet 5 mg, 5 mg, Nasogastric, Nightly, Jemma Arriaga APRN, 5 mg at 04/12/22 2151  •  methohexital (BREVITAL) injection  - ADS Override Pull, , , ,   •  metoprolol tartrate (LOPRESSOR) tablet 50 mg, 50 mg, Nasogastric, Q8H, Wili Conner DO, 50 mg at 04/13/22 1352  •  midazolam (VERSED) 2 MG/2ML injection  - ADS Override Pull, , , ,   •  mineral oil-hydrophilic petrolatum (AQUAPHOR) ointment 1 application, 1 application, Topical, BID PRN, Peña Weaver MD, 1 application at 04/06/22 2245  •  mirtazapine (REMERON) tablet 7.5 mg, 7.5 mg, Nasogastric, Nightly, Jemma Arriaga APRN, 7.5 mg at 04/12/22 2150  •  nystatin (MYCOSTATIN) 100,000 unit/mL suspension 500,000 Units, 5 mL, Oral, 4x Daily, Jemma Arriaga APRN, 500,000 Units at 04/13/22 1703  •  [DISCONTINUED] ondansetron (ZOFRAN) tablet 4 mg, 4 mg, Oral, Q6H PRN **OR** ondansetron (ZOFRAN) injection 4 mg, 4 mg, Intravenous, Q6H PRN, Raymond Herrera MD, 4 mg at 04/06/22 3036  •  oxymetazoline (AFRIN) nasal spray 2 spray, 2 spray, Each Nare, BID, Ramy Balderas PA-C, 2 spray at 04/13/22 0884  •  palliative care  oral rinse, , Mouth/Throat, PRN, Yinka Desai MD, Given at 04/10/22 0841  •  pantoprazole (PROTONIX) injection 40 mg, 40 mg, Intravenous, Q AM, Jemma Arriaga APRN, 40 mg at 04/13/22 0629  •  polyethylene glycol (MIRALAX) packet 17 g, 17 g, Nasogastric, Daily, Jemma Arriaga APRN, 17 g at 04/12/22 0830  •  potassium phosphate 45 mmol in sodium chloride 0.9 % 500 mL infusion, 45 mmol, Intravenous, PRN **OR** potassium phosphate 30 mmol in sodium chloride 0.9 % 250 mL infusion, 30 mmol, Intravenous, PRN **OR** potassium phosphate 15 mmol in sodium chloride 0.9 % 100 mL infusion, 15 mmol, Intravenous, PRN **OR** sodium phosphates 45 mmol in sodium chloride 0.9 % 250 mL IVPB, 45 mmol, Intravenous, PRN **OR** sodium phosphates 30 mmol in sodium chloride 0.9 % 250 mL IVPB, 30 mmol, Intravenous, PRN, Last Rate: 31.3 mL/hr at 04/13/22 1016, 30 mmol at 04/13/22 1016 **OR** sodium phosphates 15 mmol in sodium chloride 0.9 % 250 mL IVPB, 15 mmol, Intravenous, PRN, Wili Conner DO  •  predniSONE (DELTASONE) tablet 20 mg, 20 mg, Nasogastric, Daily With Breakfast, Wili Conner DO, 20 mg at 04/13/22 0825  •  QUEtiapine (SEROquel) tablet 25 mg, 25 mg, Nasogastric, Q12H, Wili Conner DO, 25 mg at 04/13/22 0825  •  sennosides-docusate (PERICOLACE) 8.6-50 MG per tablet 2 tablet, 2 tablet, Nasogastric, BID, Jemma Arriaga APRN, 2 tablet at 04/12/22 0830  •  Sodium Chloride (PF) 0.9 % 10 mL, 10 mL, Intravenous, PRN, Raymond Herrera MD  •  sodium chloride 0.9 % flush 10 mL, 10 mL, Intravenous, Q12H, Raymond Herrera MD, 10 mL at 04/13/22 0825  •  sodium chloride 0.9 % flush 10 mL, 10 mL, Intravenous, PRN, Raymond Herrera MD, 10 mL at 04/02/22 0830  •  sodium chloride nasal spray 1 spray, 1 spray, Each Nare, PRN, Yaima Mansfield, APRN, 1 spray at 04/12/22 0834  •  sodium phosphates 30 mmol in sodium chloride 0.9 % 250 mL IVPB, 30 mmol, Intravenous, Once, Wili Conner DO  •  temazepam  (RESTORIL) capsule 15 mg, 15 mg, Nasogastric, Nightly, Jemma Arriaga APRN, 15 mg at 22 2150  •  thiamine (VITAMIN B-1) tablet 100 mg, 100 mg, Oral, Daily, Wili Conner DO, 100 mg at 22 1703  •  vitamin B-12 (CYANOCOBALAMIN) tablet 50 mcg, 50 mcg, Nasogastric, Daily, Jemma Arriaga APRN, 50 mcg at 22 0825    Antibiotics:  Anti-Infectives (From admission, onward)    Ordered     Dose/Rate Route Frequency Start Stop    22 1059  cefTRIAXone (ROCEPHIN) 1 g/100 mL 0.9% NS (MBP)        Ordering Provider: Chace Chauhan MD    1 g  over 30 Minutes Intravenous Once 22 1101 22 1244            Review of Systems:  See HPI    Physical Exam:   Vital Signs  Temp (24hrs), Av.3 °F (36.8 °C), Min:98 °F (36.7 °C), Max:98.7 °F (37.1 °C)    Temp  Min: 98 °F (36.7 °C)  Max: 98.7 °F (37.1 °C)  BP  Min: 162/86  Max: 197/83  Pulse  Min: 73  Max: 92  Resp  Min: 20  Max: 20  SpO2  Min: 93 %  Max: 97 %    GENERAL: Debilitated appearing. Alert and responsive. Sitting in a bedside chair  HEENT: Normocephalic, atraumatic. No external oral lesions.  NG tube in place  NECK: Supple   HEART: RRR; No murmur  LUNGS: CTAB. nonlabored breathing on room air  ABDOMEN: Soft, lower abdominal tenderness, nondistended.  No rebound or guarding.   Skin: He has diffuse hemorrhagic ulcerative rash is dramatically improved on his lower extremities and feet-dressings in place. His rash over his upper extremities appears to be significantly improved and has almost resolved  NEURO: Alert and responsive. He seems more oriented today.  Answering questions appropriately.  Psych: Cooperative.  Appropriate mood.    Laboratory Data    Results from last 7 days   Lab Units 22  0924 22  1158   WBC 10*3/mm3 9.92 9.64   HEMOGLOBIN g/dL 7.6* 8.0*   HEMATOCRIT % 23.6* 26.2*   PLATELETS 10*3/mm3 246 225     Results from last 7 days   Lab Units 22  0536   SODIUM mmol/L 143   POTASSIUM mmol/L 4.5   CHLORIDE  mmol/L 107   CO2 mmol/L 28.0   BUN mg/dL 44*   CREATININE mg/dL 1.28*   GLUCOSE mg/dL 311*   CALCIUM mg/dL 8.5*     Results from last 7 days   Lab Units 04/11/22  0924   ALK PHOS U/L 88   BILIRUBIN mg/dL 0.3   ALT (SGPT) U/L 11   AST (SGOT) U/L 9     Results from last 7 days   Lab Units 04/07/22  1623   SED RATE mm/hr 19     Results from last 7 days   Lab Units 04/07/22  1623   CRP mg/dL 3.27*                 Estimated Creatinine Clearance: 63.4 mL/min (A) (by C-G formula based on SCr of 1.28 mg/dL (H)).      Microbiology:  Wound cx 3/25: NL skin ananth SF  COVID-19/Flu PCR negative.  2nd wound cx 3/25: growth present but TYTE  Urine Eos Zero        Radiology:  Imaging Results (Last 72 Hours)     Procedure Component Value Units Date/Time    FL Video Swallow With Speech Single Contrast [[id number]] Collected: 04/12/22 1439     Updated: 04/12/22 1635    Narrative:      EXAMINATION: FL VIDEO SWALLOW W SPEECH SINGLE-CONTRAST-     INDICATION: dysphagia; R13.12-Dysphagia, oropharyngeal phase;  N17.9-Acute kidney failure, unspecified; E86.0-Dehydration;  L03.119-Cellulitis of unspecified part of limb; I77.6-Arteritis,  unspecified; I50.9-Heart failure, unspecified; R79.89-Other specified  abnormal findings of blood chemistry     TECHNIQUE: 42 seconds of fluoroscopic time was used for this exam. 6  associated fluoroscopic loops were saved. The patient was evaluated in  the seated lateral position while taking a variety of consistencies of  barium by mouth under the direction of speech pathology.     COMPARISON: NONE     FINDINGS: 42 seconds of fluoroscopy provided for a modified barium  swallow. Please see speech therapy report for full details and  recommendations.          Impression:      Fluoroscopy provided for a modified barium swallow. Please  see speech therapy report for full details and recommendations.         This report was finalized on 4/12/2022 4:32 PM by Yinka Parker.               Impression:    1. Vasculitic rash-with palpable purpura.  This rash clinically looks like leukocytoclastic vasculitis.  I suspect that this is secondary to primidone given that it began shortly after starting on primidone. This dramatically improved with prednisone therapy but his prednisone therapy was aborted early due to delirium.  His rash worsened overnight.  I still think this is likely secondary to primidone-primidone/primidone metabolites have a very long half-life.  We will start him back on a lower dose of prednisone at 20 mg per day.  If he fails to improve, he may need a repeat skin biopsy and/or kidney biopsy.  Kidney biopsy would be risky due to the fact that he has a solitary kidney.- rash and renal function are improving on steroids  2. Duodenitis- per CT scan.  He is on PPI therapy.  3. Elevated procalcitonin,  4. Acute kidney injury--This is significantly improved.  5. Elevated CRP  6. Chronic diastolic heart failure  7. Afib/on Lovenox, recent cardioversion to NSR  8. Type 2 diabetes mellitus/gastroparesis  9. Hypothyroidism  10. Essential hypertension  11. Morbid obesity  12. Prostate cancer/radical prostatectomy  13. Renal cell carcinoma/radical prostatectomy  14. H/o multiple skin cancer excisions  15. Doxycycline (urticaria) allergy  16. Hematuria  17. Left heel decubitus lesion-this is significantly better.  18. Toxic/metabolic encephalopathy-some of this may be secondary to his prednisone.  He is now off of steroid therapy  19. Hypernatremia    PLAN/RECOMMENDATIONS:  1.  Continue wound care  2.  Continue off of primidone and as many other medications as possible  3.  Offload both heels  4.  continue prednisone. Could give a long taper with follow-up after discharge. I discussed the taper today with Dr. Conner  5.  MPO and WA 3 were negative    Did not pass swallow test but states that he has chronic dysphagia. Patient is making a decision on whether or not he wants a PEG tube.  He will discuss with his  family    I am okay with his discharge whenever.  I will set him up with a follow-up with Dr. Brandt Ward in about 3 weeks    His rash and renal function appear to be improving some on steroids now.    I discussed his complex situation with his daughter at bedside today    I discussed with Dr. Conner today        Yves Ward MD  4/13/2022  19:58 EDT

## 2022-04-13 NOTE — SIGNIFICANT NOTE
GOC with patient and daughter at bedside - electing for comfort diet and removal of NG tube. Orders for the same placed in chart. Formal note to follow.

## 2022-04-13 NOTE — PROGRESS NOTES
Clinical Nutrition   Reason For Visit: Follow-up protocol, EN    Patient Name: Yinka Cummings  YOB: 1945  MRN: [id number]  Date of Encounter: 04/13/22 08:51 EDT  Admission date: 3/25/2022    Pt tolerating EN at goal, continue current regimen:  Continuous EN regimen of Replete with Fiber @ goal rate of 85 ml/hr, FW @ 20 ml/hr.     Pro stat d/c to better meet needs    Phos severely low at 1.3, to be replaced. Recommend also adding thiamin- 200 mg BID, may assist with phos.    Pt failed FEES yesterday with long term alternate methods nutrition recommended. RD notes pt/family do not wish to discuss PEG at this time. Continuing to follow POC/GOC.    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   PSxH: He  has a past surgical history that includes Prostate surgery; Nephrectomy (Right); Skin cancer excision; Cholecystectomy; and Colonoscopy.        Reported/Observed/Food/Nutrition Related History     4/13) EMR reviewed, RD notes pt failed FEES yesterday with long term alternate methods nutrition recommended, however pt/family do not wish to discuss PEG at this time, very frustrated with progress. Will defer discussion to MD. Continuing to tolerate EN at goal per RN. Phos severely low,  RN ordering replacements, RD suggested thiamin as well. Steroid induced hyperglycemia noted, pt already on most appropriate formula with 49% carbohydrates, do not think a switch formula or decrease rate would assist with BG.    4/11) EN initiated 4/8 and currently at goal, tolerating. Phos is low and being replaced. Pt with improved mentation, able to communicate effectively. States he is feeling okay, denies altered GI function. He is frustrated with progress and asks when SLP will reevaluate him. Family at bedside also frustrated state no one has been in to check on him this morning or tell them when SLP will be coming. RD asked if pt had any needs at this time and wife stated no, RD encouraged to use call light for any needs. RD did express concern that pt may continue with minimal intakes when keofeed removed. Pt states he will eat. RD noted IVF stopped and called RN to discuss continuing without IVF and adding FW to EN regimen. Apparently kangaroo pump already running FW at 40 ml/hr. No documentation regarding this change and was not placed in order. I/O documentation also does not reflect fluids at this rate. RD altered FW to appropriately meet pt's needs.    4/8) Pt failed MBS yesterday. Nutrition consult received for tube feeding assessment. Pt admit with N/V, but this has resolved, no emesis documented since 3/27. Pt has received EN twice within the past year at hospitalizations at  and tolerated Replete with fiber well. RD spoke with pt and family at bedside. Pt's mentation significantly improved from last visit. They are on board with plan, had questions about EN.    4/4) Pt has been struggling with nutrition t/o admission. He remains extremely confused, asks RD where he is and where my car is. Actually asks about food and if he can get some juice, seems to think he is still NPO despite being on diet for 4 days. He asks how long his family can stay today, clearly very anxious about them leaving during  non vistor hours. RD efforts to encourage/educate on importance nutrition futile. He has tried breeze and boost and does not like either. Will trial magic cups and ensure clear. Per family pt has never been a big eater, eats 1 meal/day and snacks t/o the day. He did have N/V at beginning of admission but this has resolved. His tremor seems to have worsened at this RD visit. Dr. Weaver consulted RD to give diet education on discontinuing caffeine in diet. Education provided to family at bedside, will f/u with pt upon improvement mentation. Of concern pt with several wounds.     3/30) Pt now 5 days minimal nutrition on clear liquid diet. RD discussed with family at bedside who are frustrated stating he is not going to eat anything clear liquid. They request that he is advanced to full liquids so that he can have ensure and other options. He is not taking in broth which is the only source of protein on clear liquid diet. He is open to trying Breeze, however will need to monitor BG closely as already high.    Anthropometrics   Height: 68.9 in  Weight: 119 kg / 263 lb per zeroed bed scale 3/25/22  *RD asked nsg to document current wt  BMI: 38.86  BMI classification: Obese Class II: 35-39.9kg/m2   IBW: 155 lb    UBW: large variation in bed weights over past year from 260-397 lb  RD suspects ~260 is most accurate dry/UBW  Weight change:   Last 15 Recorded Weights  View Complete Flowsheet  Weight Weight (kg) Weight (lbs) Weight Method   4/1/2022 119 kg 262 lb 5.6 oz -   4/1/2022 119 kg 262 lb 5.6 oz -   3/25/2022 119.296 kg 263 lb Bed scale    3/25/2022 113 kg 249 lb 1.9 oz -   3/25/2022 113.399 kg 250 lb Stated   5/18/2021 180.1 kg 397 lb 0.8 oz Bed scale   5/17/2021 180.078 kg 397 lb -   5/17/2021 180.3 kg 397 lb 7.8 oz Bed scale   5/16/2021 177.5 kg 391 lb 5.1 oz Bed scale   5/12/2021 144 kg 317 lb 7.4 oz Bed scale   5/10/2021 143.881 kg 317 lb 3.2 oz Bed scale   5/9/2021 143.881 kg 317 lb 3.2 oz Bed scale   5/8/2021  138.166 kg 304 lb 9.6 oz Bed scale   5/7/2021 136.532 kg 301 lb Bed scale   5/6/2021 136.533 kg 301 lb -     Nutrition Focused Physical Exam     Unable to perform exam due to: Patient agitation     Labs reviewed   Labs reviewed: Yes    Results from last 7 days   Lab Units 04/13/22  0536 04/11/22  0924 04/10/22  1042 04/09/22  0518   SODIUM mmol/L 143 144 142 146*   POTASSIUM mmol/L 4.5 4.2 3.8 3.7   CHLORIDE mmol/L 107 111* 112* 115*   CO2 mmol/L 28.0 26.0 23.0 23.0   BUN mg/dL 44* 39* 39* 45*   CREATININE mg/dL 1.28* 1.50* 1.53* 1.72*   GLUCOSE mg/dL 311* 353* 271* 181*   CALCIUM mg/dL 8.5* 8.2* 8.0* 8.0*   PHOSPHORUS mg/dL 1.3* 1.6* 1.6* 2.3*   MAGNESIUM mg/dL  --  2.0  --  1.8   CHOLESTEROL mg/dL  --  87  --  82   TRIGLYCERIDES mg/dL  --  140  --  148     Results from last 7 days   Lab Units 04/13/22  0536 04/11/22  0924 04/10/22  1042 04/09/22  0518 04/08/22  1158 04/08/22  1158 04/07/22  1623   WBC 10*3/mm3  --  9.92  --   --   --  9.64  --    ALBUMIN g/dL 2.80* 2.90* 2.60* 2.60*   < > 2.40*  --    CRP mg/dL  --   --   --   --   --   --  3.27*   CHOLESTEROL mg/dL  --  87  --  82  --   --   --    TRIGLYCERIDES mg/dL  --  140  --  148  --   --   --     < > = values in this interval not displayed.     Results from last 7 days   Lab Units 04/13/22 0725 04/12/22 2028 04/12/22  1626 04/12/22  1135 04/12/22  0650 04/12/22  0615   GLUCOSE mg/dL 334* 314* 357* 345* 355* 312*     Lab Results   Lab Value Date/Time    HGBA1C 7.90 (H) 03/26/2022 0708    HGBA1C 8.10 (H) 04/24/2021 0708     Medications reviewed   Medications reviewed: Yes  Scheduled: insulin, remeron, protonix, steroids, bowel regimen, b12  GTT:   PRN: [email or web address]    Needs Assessment   Height used: 175 cm  Weight used: 119 kg  IBW: 70.5 kg    Estimated Calories needs: ~1900 kcal / day  25 kcal/kg IBW = 1815  14-20 kcal/kg ABW = 8358-7946    Estimated Protein needs: ~119 g / day  1  g/kg = 119 g  *CKD III and solitary kidney    Estimated Fluid needs:   Per  clinical status    Current Nutrition Prescription   PO: NPO Diet NPO Except: Ice Chips, Sips With Meds  Oral Nutrition Supplement: Orders Placed This Encounter      DIET MESSAGE Will you please send Prostat for the patient.  Thanks!      Diet, Tube Feeding Tube Feeding Formula: Replete with Fiber (Very High Protein)       EN: Replete with Fiber   Diet, Tube Feeding Tube Feeding Formula: Replete with Fiber (Very High Protein) Continuous EN regimen of Replete with Fiber @ goal rate of 85 ml/hr, FW @ 20 ml/hr. ProStat QD.   Route: NG  Verified at bedside: No at goal per RN    At goal this regimen provides:  1870 ml / 1870 kcal (101% est. needs)  119 g protein (103% est. Needs)  1552 ml FW in formula (+440 ml FW flushes = 1992 ml total fluids)    Average PO intake: NPO    EN delivery:  1 Day: ?580 ml per documentation. RD unable to determine actual delivery from I/O documentation    Nutrition Diagnosis     4/8  Updated: 4/11, 4/13  Problem Inadequate oral intake   Etiology Clinical status/dysphagia   Signs/Symptoms NPO with need alternative method nutrition per SLP   Status: ongoing, receiving EN since 4/8    Goal:   General: Nutrition support treatment, Nutrition to support treatment  PO: Advace diet as medically appropriate   EN/PN: Initiate EN, Tolerate EN at goal    Intervention   Intervention: Follow treatment progress, Care plan reviewed, Nutrition support order placed, Education provided on EN and dysphagia to pt/family     Pt tolerating EN at goal, continue current regimen:  Continuous EN regimen of Replete with Fiber @ goal rate of 85 ml/hr, FW @ 20 ml/hr.    Pro stat d/c to better meet needs    Phos severely low at 1.3, to be replaced. Recommend also adding thiamin- 200 mg BID, may assist with phos.    Pt failed FEES yesterday with long term alternate methods nutrition recommended. RD notes pt/family do not wish to discuss PEG at this time. Continuing to follow POC/GOC.    RD asked nsg to obtain current  weight    Monitoring/Evaluation:   Monitoring/Evaluation: Per protocol, I&O, Pertinent labs, EN delivery/tolerance, Weight, Skin status, GI status, Symptoms, POC/GOC, Swallow function, Hemodynamic stability    Rhea Cobb RD  Time Spent: 35

## 2022-04-13 NOTE — CASE MANAGEMENT/SOCIAL WORK
Continued Stay Note  [facility]     Patient Name: Yinka Cummings  MRN: [id number]  Today's Date: 4/13/2022    Admit Date: 3/25/2022     Discharge Plan     Row Name 04/13/22 1158       Plan    Plan [facility], swing bed    Plan Comments Patient discussed in am rounds. Current plans are for patient to determine comfort diet vs Peg.  I have spoken with Fifi at [facility], updated her  and have faxed his records to her. If able to accept, we are striving for a bed for Friday with ambulance arrangements in process. This would give us time for another speech evaluation.  [facility] [place] for [place] as well. . I need to hear from [facility] as this is patient's preferance.  12:30: patient planning comfort diet    Final Discharge Disposition Code 61 - hospital-based swing bed               Discharge Codes    No documentation.               Expected Discharge Date and Time     Expected Discharge Date Expected Discharge Time    Apr 15, 2022             Jessica Frias RN

## 2022-04-13 NOTE — NURSING NOTE
Patient eager to get oob this am. Pt sat on the side of the bed independently. He was assisted to the chair with min assist x2 for safety. He helen transfer well with no complaints. Daughter at bedside doing arm and leg exercises with patient in the chair.

## 2022-04-14 LAB
ALBUMIN SERPL-MCNC: 2.6 G/DL (ref 3.5–5.2)
ANION GAP SERPL CALCULATED.3IONS-SCNC: 8 MMOL/L (ref 5–15)
BUN SERPL-MCNC: 41 MG/DL (ref 8–23)
BUN/CREAT SERPL: 31.3 (ref 7–25)
CALCIUM SPEC-SCNC: 8.1 MG/DL (ref 8.6–10.5)
CHLORIDE SERPL-SCNC: 110 MMOL/L (ref 98–107)
CO2 SERPL-SCNC: 27 MMOL/L (ref 22–29)
CREAT SERPL-MCNC: 1.31 MG/DL (ref 0.76–1.27)
EGFRCR SERPLBLD CKD-EPI 2021: 56.8 ML/MIN/1.73
GLUCOSE BLDC GLUCOMTR-MCNC: 119 MG/DL (ref 70–130)
GLUCOSE BLDC GLUCOMTR-MCNC: 136 MG/DL (ref 70–130)
GLUCOSE BLDC GLUCOMTR-MCNC: 182 MG/DL (ref 70–130)
GLUCOSE BLDC GLUCOMTR-MCNC: 200 MG/DL (ref 70–130)
GLUCOSE SERPL-MCNC: 111 MG/DL (ref 65–99)
PHOSPHATE SERPL-MCNC: 3.2 MG/DL (ref 2.5–4.5)
POTASSIUM SERPL-SCNC: 3.8 MMOL/L (ref 3.5–5.2)
SARS-COV-2 RNA PNL SPEC NAA+PROBE: NOT DETECTED
SODIUM SERPL-SCNC: 145 MMOL/L (ref 136–145)

## 2022-04-14 PROCEDURE — 63710000001 INSULIN LISPRO (HUMAN) PER 5 UNITS: Performed by: INTERNAL MEDICINE

## 2022-04-14 PROCEDURE — 63710000001 PREDNISONE PER 1 MG: Performed by: INTERNAL MEDICINE

## 2022-04-14 PROCEDURE — 25010000002 ONDANSETRON PER 1 MG: Performed by: INTERNAL MEDICINE

## 2022-04-14 PROCEDURE — 92526 ORAL FUNCTION THERAPY: CPT

## 2022-04-14 PROCEDURE — 25010000002 ENOXAPARIN PER 10 MG

## 2022-04-14 PROCEDURE — 80069 RENAL FUNCTION PANEL: CPT | Performed by: INTERNAL MEDICINE

## 2022-04-14 PROCEDURE — 63710000001 INSULIN DETEMIR PER 5 UNITS: Performed by: INTERNAL MEDICINE

## 2022-04-14 PROCEDURE — U0004 COV-19 TEST NON-CDC HGH THRU: HCPCS | Performed by: INTERNAL MEDICINE

## 2022-04-14 PROCEDURE — 82962 GLUCOSE BLOOD TEST: CPT

## 2022-04-14 PROCEDURE — U0005 INFEC AGEN DETEC AMPLI PROBE: HCPCS | Performed by: INTERNAL MEDICINE

## 2022-04-14 PROCEDURE — 99232 SBSQ HOSP IP/OBS MODERATE 35: CPT | Performed by: INTERNAL MEDICINE

## 2022-04-14 RX ORDER — PREDNISONE 20 MG/1
20 TABLET ORAL
Status: DISCONTINUED | OUTPATIENT
Start: 2022-04-14 | End: 2022-04-15 | Stop reason: HOSPADM

## 2022-04-14 RX ORDER — PANTOPRAZOLE SODIUM 40 MG/1
40 TABLET, DELAYED RELEASE ORAL
Status: DISCONTINUED | OUTPATIENT
Start: 2022-04-15 | End: 2022-04-15 | Stop reason: HOSPADM

## 2022-04-14 RX ORDER — CHOLECALCIFEROL (VITAMIN D3) 125 MCG
5 CAPSULE ORAL NIGHTLY
Status: DISCONTINUED | OUTPATIENT
Start: 2022-04-14 | End: 2022-04-15 | Stop reason: HOSPADM

## 2022-04-14 RX ORDER — AMOXICILLIN 250 MG
2 CAPSULE ORAL 2 TIMES DAILY
Status: DISCONTINUED | OUTPATIENT
Start: 2022-04-14 | End: 2022-04-15 | Stop reason: HOSPADM

## 2022-04-14 RX ORDER — LEVOTHYROXINE SODIUM 0.15 MG/1
150 TABLET ORAL
Status: DISCONTINUED | OUTPATIENT
Start: 2022-04-14 | End: 2022-04-15 | Stop reason: HOSPADM

## 2022-04-14 RX ORDER — METOPROLOL TARTRATE 50 MG/1
50 TABLET, FILM COATED ORAL EVERY 8 HOURS SCHEDULED
Status: DISCONTINUED | OUTPATIENT
Start: 2022-04-14 | End: 2022-04-15 | Stop reason: HOSPADM

## 2022-04-14 RX ORDER — PANTOPRAZOLE SODIUM 40 MG/1
40 TABLET, DELAYED RELEASE ORAL
Status: DISCONTINUED | OUTPATIENT
Start: 2022-04-14 | End: 2022-04-14

## 2022-04-14 RX ORDER — ATORVASTATIN CALCIUM 40 MG/1
40 TABLET, FILM COATED ORAL NIGHTLY
Status: DISCONTINUED | OUTPATIENT
Start: 2022-04-14 | End: 2022-04-15 | Stop reason: HOSPADM

## 2022-04-14 RX ORDER — LEVOTHYROXINE SODIUM 0.15 MG/1
150 TABLET ORAL
Status: DISCONTINUED | OUTPATIENT
Start: 2022-04-14 | End: 2022-04-14

## 2022-04-14 RX ORDER — TEMAZEPAM 15 MG/1
15 CAPSULE ORAL NIGHTLY
Status: COMPLETED | OUTPATIENT
Start: 2022-04-14 | End: 2022-04-14

## 2022-04-14 RX ORDER — QUETIAPINE FUMARATE 25 MG/1
25 TABLET, FILM COATED ORAL EVERY 12 HOURS SCHEDULED
Status: DISCONTINUED | OUTPATIENT
Start: 2022-04-14 | End: 2022-04-15 | Stop reason: HOSPADM

## 2022-04-14 RX ORDER — ACETAMINOPHEN 160 MG/5ML
650 SOLUTION ORAL EVERY 4 HOURS PRN
Status: DISCONTINUED | OUTPATIENT
Start: 2022-04-14 | End: 2022-04-15 | Stop reason: HOSPADM

## 2022-04-14 RX ORDER — MIRTAZAPINE 15 MG/1
7.5 TABLET, FILM COATED ORAL NIGHTLY
Status: DISCONTINUED | OUTPATIENT
Start: 2022-04-14 | End: 2022-04-15 | Stop reason: HOSPADM

## 2022-04-14 RX ORDER — ACETAMINOPHEN 325 MG/1
650 TABLET ORAL EVERY 4 HOURS PRN
Status: DISCONTINUED | OUTPATIENT
Start: 2022-04-14 | End: 2022-04-15 | Stop reason: HOSPADM

## 2022-04-14 RX ORDER — POLYETHYLENE GLYCOL 3350 17 G/17G
17 POWDER, FOR SOLUTION ORAL DAILY
Status: DISCONTINUED | OUTPATIENT
Start: 2022-04-14 | End: 2022-04-15 | Stop reason: HOSPADM

## 2022-04-14 RX ORDER — NICOTINE POLACRILEX 4 MG
15 LOZENGE BUCCAL
Status: DISCONTINUED | OUTPATIENT
Start: 2022-04-14 | End: 2022-04-15 | Stop reason: HOSPADM

## 2022-04-14 RX ORDER — CLONIDINE HYDROCHLORIDE 0.1 MG/1
0.1 TABLET ORAL EVERY 12 HOURS SCHEDULED
Status: DISCONTINUED | OUTPATIENT
Start: 2022-04-14 | End: 2022-04-15 | Stop reason: HOSPADM

## 2022-04-14 RX ORDER — UBIDECARENONE 75 MG
50 CAPSULE ORAL DAILY
Status: DISCONTINUED | OUTPATIENT
Start: 2022-04-14 | End: 2022-04-15 | Stop reason: HOSPADM

## 2022-04-14 RX ORDER — ACETAMINOPHEN 650 MG/1
650 SUPPOSITORY RECTAL EVERY 4 HOURS PRN
Status: DISCONTINUED | OUTPATIENT
Start: 2022-04-14 | End: 2022-04-15 | Stop reason: HOSPADM

## 2022-04-14 RX ORDER — AMLODIPINE BESYLATE 10 MG/1
10 TABLET ORAL
Status: DISCONTINUED | OUTPATIENT
Start: 2022-04-14 | End: 2022-04-15 | Stop reason: HOSPADM

## 2022-04-14 RX ADMIN — VITAM B12 50 MCG: 100 TAB at 10:09

## 2022-04-14 RX ADMIN — AMLODIPINE BESYLATE 10 MG: 10 TABLET ORAL at 08:41

## 2022-04-14 RX ADMIN — METOPROLOL TARTRATE 50 MG: 50 TABLET, FILM COATED ORAL at 21:36

## 2022-04-14 RX ADMIN — QUETIAPINE FUMARATE 25 MG: 25 TABLET ORAL at 21:37

## 2022-04-14 RX ADMIN — LEVOTHYROXINE SODIUM 150 MCG: 150 TABLET ORAL at 08:49

## 2022-04-14 RX ADMIN — ENOXAPARIN SODIUM 110 MG: 120 INJECTION SUBCUTANEOUS at 21:39

## 2022-04-14 RX ADMIN — ATORVASTATIN CALCIUM 40 MG: 40 TABLET, FILM COATED ORAL at 21:36

## 2022-04-14 RX ADMIN — SENNOSIDES AND DOCUSATE SODIUM 2 TABLET: 50; 8.6 TABLET ORAL at 08:33

## 2022-04-14 RX ADMIN — TEMAZEPAM 15 MG: 15 CAPSULE ORAL at 21:35

## 2022-04-14 RX ADMIN — INSULIN LISPRO 2 UNITS: 100 INJECTION, SOLUTION INTRAVENOUS; SUBCUTANEOUS at 12:06

## 2022-04-14 RX ADMIN — INSULIN LISPRO 12 UNITS: 100 INJECTION, SOLUTION INTRAVENOUS; SUBCUTANEOUS at 12:06

## 2022-04-14 RX ADMIN — CLONIDINE HYDROCHLORIDE 0.1 MG: 0.1 TABLET ORAL at 21:37

## 2022-04-14 RX ADMIN — INSULIN LISPRO 12 UNITS: 100 INJECTION, SOLUTION INTRAVENOUS; SUBCUTANEOUS at 17:27

## 2022-04-14 RX ADMIN — METOPROLOL TARTRATE 50 MG: 50 TABLET, FILM COATED ORAL at 06:15

## 2022-04-14 RX ADMIN — NYSTATIN 500000 UNITS: 100000 SUSPENSION ORAL at 08:34

## 2022-04-14 RX ADMIN — METOPROLOL TARTRATE 50 MG: 50 TABLET, FILM COATED ORAL at 14:14

## 2022-04-14 RX ADMIN — PANTOPRAZOLE SODIUM 40 MG: 40 INJECTION, POWDER, FOR SOLUTION INTRAVENOUS at 06:15

## 2022-04-14 RX ADMIN — MIRTAZAPINE 7.5 MG: 15 TABLET, FILM COATED ORAL at 21:35

## 2022-04-14 RX ADMIN — INSULIN LISPRO 12 UNITS: 100 INJECTION, SOLUTION INTRAVENOUS; SUBCUTANEOUS at 08:48

## 2022-04-14 RX ADMIN — PREDNISONE 20 MG: 20 TABLET ORAL at 08:41

## 2022-04-14 RX ADMIN — DULOXETINE HYDROCHLORIDE 90 MG: 30 CAPSULE, DELAYED RELEASE ORAL at 08:34

## 2022-04-14 RX ADMIN — ENOXAPARIN SODIUM 110 MG: 120 INJECTION SUBCUTANEOUS at 10:09

## 2022-04-14 RX ADMIN — LEVOTHYROXINE SODIUM 150 MCG: 150 TABLET ORAL at 08:37

## 2022-04-14 RX ADMIN — NYSTATIN 500000 UNITS: 100000 SUSPENSION ORAL at 12:05

## 2022-04-14 RX ADMIN — Medication 5 MG: at 21:36

## 2022-04-14 RX ADMIN — ONDANSETRON 4 MG: 2 INJECTION INTRAMUSCULAR; INTRAVENOUS at 13:44

## 2022-04-14 RX ADMIN — THIAMINE HCL TAB 100 MG 100 MG: 100 TAB at 08:41

## 2022-04-14 RX ADMIN — BUPRENORPHINE AND NALOXONE 2 TABLET: 8; 2 TABLET SUBLINGUAL at 21:36

## 2022-04-14 RX ADMIN — INSULIN DETEMIR 35 UNITS: 100 INJECTION, SOLUTION SUBCUTANEOUS at 21:46

## 2022-04-14 RX ADMIN — Medication 10 ML: at 21:38

## 2022-04-14 RX ADMIN — Medication 10 ML: at 08:41

## 2022-04-14 RX ADMIN — CLONIDINE HYDROCHLORIDE 0.1 MG: 0.1 TABLET ORAL at 08:37

## 2022-04-14 RX ADMIN — DOCUSATE SODIUM 50MG AND SENNOSIDES 8.6MG 2 TABLET: 8.6; 5 TABLET, FILM COATED ORAL at 21:35

## 2022-04-14 RX ADMIN — QUETIAPINE FUMARATE 25 MG: 25 TABLET ORAL at 08:41

## 2022-04-14 NOTE — PROGRESS NOTES
"   LOS: 20 days    Patient Care Team:  Vivek Mercer DO as PCP - General (Family Medicine)    Reason For Visit:  F/U [facility] ON CKD  Subjective           Review of Systems:    Pulm: No soa   CV:  No CP      Objective     amLODIPine, 10 mg, Oral, Q24H  atorvastatin, 40 mg, Oral, Nightly  buprenorphine-naloxone, 2 tablet, Sublingual, Nightly  cloNIDine, 0.1 mg, Oral, Q12H  DULoxetine, 90 mg, Oral, Daily  enoxaparin, 110 mg, Subcutaneous, Q12H  insulin detemir, 35 Units, Subcutaneous, Nightly  insulin lispro, 0-9 Units, Subcutaneous, TID AC  insulin lispro, 12 Units, Subcutaneous, TID With Meals  levothyroxine, 150 mcg, Oral, Q AM  melatonin, 5 mg, Oral, Nightly  methohexital, , ,   metoprolol tartrate, 50 mg, Oral, Q8H  midazolam, , ,   mirtazapine, 7.5 mg, Oral, Nightly  nystatin, 5 mL, Oral, 4x Daily  [START ON 4/15/2022] pantoprazole, 40 mg, Oral, Q AM  polyethylene glycol, 17 g, Oral, Daily  predniSONE, 20 mg, Oral, Daily With Breakfast  QUEtiapine, 25 mg, Oral, Q12H  senna-docusate sodium, 2 tablet, Oral, BID  sodium chloride, 10 mL, Intravenous, Q12H  sodium phosphate IVPB, 30 mmol, Intravenous, Once  temazepam, 15 mg, Oral, Nightly  thiamine, 100 mg, Oral, Daily  cyanocobalamin, 50 mcg, Oral, Daily             Vital Signs:  Blood pressure 121/60, pulse 80, temperature 98.4 °F (36.9 °C), temperature source Oral, resp. rate 18, height 175 cm (68.9\"), weight 119 kg (262 lb 5.6 oz), SpO2 95 %.    Flowsheet Rows    Flowsheet Row First Filed Value   Admission Height 175.3 cm (69\") Documented at 03/25/2022 0849   Admission Weight 113 kg (250 lb) Documented at 03/25/2022 0849          04/13 0701 - 04/14 0700  In: 250   Out: -     Physical Exam:    General Appearance: NAD, alert and cooperative, Ox3  Eyes: PER, conjunctivae and sclerae normal, no icterus  Lungs: respirations regular and unlabored, no crepitus, clear to auscultation  Heart/CV: regular rhythm & normal rate, no murmur, no gallop, no rub and no " edema  Abdomen: not distended, soft, non-tender, no masses,  bowel sounds present  Skin: No rash, Warm and dry    Radiology:            Labs:  Results from last 7 days   Lab Units 04/11/22  0924 04/08/22  1158   WBC 10*3/mm3 9.92 9.64   HEMOGLOBIN g/dL 7.6* 8.0*   HEMATOCRIT % 23.6* 26.2*   PLATELETS 10*3/mm3 246 225     Results from last 7 days   Lab Units 04/14/22  0656 04/13/22  0536 04/11/22  0924 04/10/22  1042 04/09/22  0518   SODIUM mmol/L 145 143 144 142 146*   POTASSIUM mmol/L 3.8 4.5 4.2 3.8 3.7   CHLORIDE mmol/L 110* 107 111* 112* 115*   CO2 mmol/L 27.0 28.0 26.0 23.0 23.0   BUN mg/dL 41* 44* 39* 39* 45*   CREATININE mg/dL 1.31* 1.28* 1.50* 1.53* 1.72*   CALCIUM mg/dL 8.1* 8.5* 8.2* 8.0* 8.0*   PHOSPHORUS mg/dL 3.2 1.3* 1.6* 1.6* 2.3*   MAGNESIUM mg/dL  --   --  2.0  --  1.8   ALBUMIN g/dL 2.60* 2.80* 2.90* 2.60* 2.60*     Results from last 7 days   Lab Units 04/14/22  0656   GLUCOSE mg/dL 111*       Results from last 7 days   Lab Units 04/11/22  0924   ALK PHOS U/L 88   BILIRUBIN mg/dL 0.3   ALT (SGPT) U/L 11   AST (SGOT) U/L 9                 Estimated Creatinine Clearance: 62 mL/min (A) (by C-G formula based on SCr of 1.31 mg/dL (H)).      Assessment       Rash    Benign essential tremor    Hypothyroidism (acquired)    Paroxysmal A-fib (on eliquis & metoprolol)    Insulin dependent type 2 diabetes mellitus (HCC)    Chronic back pain (on chronic prescription lortab)    Acute renal failure, unspecified acute renal failure type (HCC)    Nausea and vomiting            Impression: [facility] ON CKD. GFR AT BASELINE. ANEMIA.            Recommendations: TO REHAB. PATIENT WILL F/U WITH [facility]. WILL SIGN OFF.      Yinka Chong MD  04/14/22  15:19 EDT

## 2022-04-14 NOTE — THERAPY TREATMENT NOTE
"Acute Care - Speech Language Pathology   Swallow Treatment Note [facility]          Patient Name: Yinka Cummings  : 1945  MRN: [id number]  Today's Date: 2022               Admit Date: 3/25/2022    Visit Dx:     ICD-10-CM ICD-9-CM   1. Oropharyngeal dysphagia  R13.12 787.22   2. Acute renal failure, unspecified acute renal failure type (HCC)  N17.9 584.9   3. Dehydration  E86.0 276.51   4. Cellulitis of lower extremity, unspecified laterality  L03.119 682.6   5. Vasculitis (HCC)  I77.6 447.6   6. Congestive heart failure, unspecified HF chronicity, unspecified heart failure type (HCC)  I50.9 428.0   7. 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)   • Chronic back pain (on chronic prescription lortab)   • HTN (hypertension)   • Solitary kidney (s/p nephrectomy for \"mass\")   • History of prostate cancer   • Closed fracture of phalanx of right hand   • Dental caries   • Acute renal failure, unspecified acute renal failure type (HCC)   • Rash   • Nausea and vomiting     Past Medical History:   Diagnosis Date   • Anxiety    • Arthritis    • Chronic kidney disease    • Diabetes mellitus (HCC)    • Disease of thyroid gland    • Diverticulosis    • Essential tremor    • HL (hearing loss)    • Hypertension    • Obesity    • Prostate cancer (HCC)    • Renal cell cancer (HCC)    • Skin cancer    • Vasculitis of skin      Past Surgical History:   Procedure Laterality Date   • CHOLECYSTECTOMY     • COLONOSCOPY      Polyps in the past but not on the most recent scope   • NEPHRECTOMY Right    • PROSTATE SURGERY      Radical prostatectomy   • SKIN CANCER EXCISION      Large incision left neck, multiple other cancers removed       SLP Recommendation " and Plan     SLP Diet Recommendation: NPO, long term alternate methods of nutrition/hydration, other (see comments) (Patient/family has opted for Comfort diet of soft, ground and thin liquids.) (04/14/22 1120)  Recommended Precautions and Strategies: general aspiration precautions (04/14/22 1120)  SLP Rec. for Method of Medication Administration: meds via alternate route (Comfort diet: meds crushed with pureed) (04/14/22 1120)              Anticipated Discharge Disposition (SLP): unknown, anticipate therapy at next level of care (04/14/22 1120)     Therapy Frequency (Swallow): 5 days per week (04/14/22 1120)  Predicted Duration Therapy Intervention (Days): until discharge (04/14/22 1120)     Daily Summary of Progress (SLP): progress toward functional goals as expected (04/14/22 1120)               Treatment Assessment (SLP): Patient was seen for comfort diet tolerance and dysphagia tx. Patient and family would like to continue with therapy for dysphagia. Good pariticipation with exercises and no discomfort/distress with comfort diet trials of soft solids and thin liquids. Patient did display cough with thin liquids. (04/14/22 1120)  Plan for Continued Treatment (SLP): continue treatment per plan of care (04/14/22 1120)                SWALLOW EVALUATION (last 72 hours)     SLP Adult Swallow Evaluation     Row Name 04/14/22 1120 04/12/22 1100 04/11/22 1340             Rehab Evaluation    Document Type therapy note (daily note)  -CH re-evaluation  -MP therapy note (daily note)  -CJ (r) CHUCHO (t) CJ (c)      Subjective Information no complaints  -CH no complaints  -MP no complaints  -CJ (r) CHUCHO (t) CJ (c)      Patient Observations alert;cooperative;agree to therapy  -CH alert;cooperative  -MP alert;cooperative  -CJ (r) MH (t) CJ (c)      Patient/Family/Caregiver Comments/Observations family present  -CH No family present  -MP Wife and daughter present  -CJ (r) CHUCHO (t) CJ (c)      Patient Effort good  -CH good  -MP good  -CJ  (r) CHUCHO (lidna) MANASA (c)      Symptoms Noted During/After Treatment none  -CH -- none  - (r)  (linda) MANASA (c)              General Information    Patient Profile Reviewed yes  -CH yes  -MP --      Pertinent History Of Current Problem -- Adm 3/25 w/ lower extremity rash, nausea/vomiting. Recent adm @ [facility] 3/10-28. Hx HTN, hypothy, Afib, DHF, VINI, chronic pain, GERD, CKD3, essential tremor, renal cell carcinoma s/p nephrectomy, gastroparesis, DM2, Gilbert's dz. Last MBS w/ recs for NPO w/ alternate route  -MP --      Current Method of Nutrition -- NPO;nasogastric feedings  -MP --      Precautions/Limitations, Vision -- WFL;for purposes of eval  -MP --      Precautions/Limitations, Hearing -- WFL;for purposes of eval  -MP --      Prior Level of Function-Communication -- unknown  -MP --      Prior Level of Function-Swallowing -- mechanical soft with no mixed consistencies;thin liquids;other (see comments)  [place] [facility] [place]  -MP --      Plans/Goals Discussed with -- patient;agreed upon  -MP --      Barriers to Rehab -- previous functional deficit  -MP --      Patient's Goals for Discharge -- return to PO diet  -MP --              Pain    Additional Documentation Pain Scale: Numbers Pre/Post-Treatment (Group)  -CH Pain Scale: FACES Pre/Post-Treatment (Group)  -MP Pain Scale: FACES Pre/Post-Treatment (Group)  - (r) CHUCHO (linda) MANASA (c)              Pain Scale: Numbers Pre/Post-Treatment    Pretreatment Pain Rating 0/10 - no pain  -CH -- --      Posttreatment Pain Rating 0/10 - no pain  -CH -- --              Pain Scale: FACES Pre/Post-Treatment    Pain: FACES Scale, Pretreatment -- 0-->no hurt  -MP 0-->no hurt  -MANASA (r) CHUCHO (linda) MANASA (c)      Posttreatment Pain Rating -- 0-->no hurt  -MP 0-->no hurt  -MANASA (r) CHUCHO (linda) MANASA (c)              MBS/VFSS    Utensils Used -- spoon;cup  -MP --      Consistencies Trialed -- thin liquids;nectar/syrup-thick liquids;honey-thick liquids;pudding thick  -MP --              MBS/VFSS Interpretation    Oral  Prep Phase -- WFL;other (see comments)  DNA solids  -MP --      Oral Transit Phase -- impaired  -MP --      Oral Residue -- WFL  -MP --      VFSS Summary -- Continued mild oral & severe pharyngeal dysphagia. Penetration during/after & aspiration after w/ thin liquids. Penetration & aspiration after w/ nectar-thick & honey-thick. Penetration after w/ pudding - did not visualize aspiration, but suspect inevitable. Mild-moderate pyriform residue w/ all consistencies that spills into laryngeal vestibule. Pt w/ large osteophyte @ C2-C5 (confirmed by Radiology PA) that prevents complete epiglottic inversion & impacts pharyngeal squeeze/stripping wave. Suspect dysphagia acute-on-chronic given anatomical component. Safest recommendation is NPO w/ continued alternate route, though pt expressed desire to continue eating/drinking despite risk, so may consider comfort diet.  -MP --              Oral Transit Phase    Impaired Oral Transit Phase -- tongue pumping  -MP --      Tongue Pumping -- thin liquids;nectar-thick liquids  -MP --              Initiation of Pharyngeal Swallow    Initiation of Pharyngeal Swallow -- bolus in pyriform sinuses  -MP --      Pharyngeal Phase -- impaired pharyngeal phase of swallowing  -MP --      Penetration During the Swallow -- thin liquids;secondary to delayed swallow initiation or mistiming;secondary to reduced laryngeal elevation;secondary to reduced vestibular closure  -MP --      Penetration After the Swallow -- nectar-thick liquids;honey-thick liquids;pudding/puree;secondary to residue;in pyriform sinuses  -MP --      Aspiration After the Swallow -- nectar-thick liquids;honey-thick liquids;secondary to residue;in pyriform sinuses;in laryngeal vestibule  -MP --      Response to Penetration -- no response  -MP --      Response to Aspiration -- no response, silent aspiration  -MP --      Rosenbek's Scale -- thin:;nectar:;honey:;8--->level 8  -MP --      Pharyngeal Residue -- thin  liquids;nectar-thick liquids;honey-thick liquids;pudding/puree;valleculae;pyriform sinuses;secondary to reduced base of tongue retraction;secondary to reduced laryngeal elevation;secondary to reduced hyolaryngeal excursion  -MP --      Response to Residue -- unable to clear residue  -MP --      Attempted Compensatory Maneuvers -- bolus size;bolus presentation style;throat clear after swallow;multiple swallows;effortful (hard swallow)  -MP --      Response to Attempted Compensatory Maneuvers -- did not prevent penetration;did not prevent aspiration;did not reduce residue  -MP --              SLP Evaluation Clinical Impression    SLP Swallowing Diagnosis -- mild;oral dysphagia;severe;pharyngeal dysphagia  -MP --      Functional Impact -- risk of aspiration/pneumonia  -MP --      Rehab Potential/Prognosis, Swallowing -- adequate, monitor progress closely  -MP --      Swallow Criteria for Skilled Therapeutic Interventions Met -- demonstrates skilled criteria;other (see comments)  pending POC/GOC  -MP --              SLP Treatment Clinical Impressions    Treatment Assessment (SLP) Patient was seen for comfort diet tolerance and dysphagia tx. Patient and family would like to continue with therapy for dysphagia. Good pariticipation with exercises and no discomfort/distress with comfort diet trials of soft solids and thin liquids. Patient did display cough with thin liquids.  -CH -- Pt seen for therapy this afternoon. Pt w/ immediate hard cough w/ thin. Wife and dtr present; pt and wife eager for pt to have Keofeed pulled and begin PO intake. Provided extensive education regarding recs for NPO based on MBS 4/7. Plan for MBS tomorrow (4/12), cont NPO.  -MANASA (r) CHUCHO (t) MANASA (c)      Daily Summary of Progress (SLP) progress toward functional goals as expected  -CH -- progress toward functional goals as expected  -MANASA (ryan) CHUCHO (t) MANASA (c)      Barriers to Overall Progress (SLP) Medically complex  -CH -- Medically complex  -MANASA (ryan) CHUCHO  (t) MANASA (c)      Plan for Continued Treatment (SLP) continue treatment per plan of care  - -- continue treatment per plan of care  -MANASA (r)  (t) MANASA (c)      Care Plan Review evaluation/treatment results reviewed;care plan/treatment goals reviewed;risks/benefits reviewed;current/potential barriers reviewed  - -- evaluation/treatment results reviewed  -MANASA (ryan) CHUCHO (linda) MANASA (c)      Care Plan Review, Other Participant(s) spouse;daughter  - -- daughter;spouse  -MANASA (r)  (t) CJ (c)              Recommendations    Therapy Frequency (Swallow) 5 days per week  -CH 5 days per week  -MP 5 days per week  -MANASA (r) CHUCHO (t) CJ (c)      Predicted Duration Therapy Intervention (Days) until discharge  -CH until discharge  -MP until discharge  -MANASA (r)  (t) MANASA (c)      SLP Diet Recommendation NPO;long term alternate methods of nutrition/hydration;other (see comments)  Patient/family has opted for Comfort diet of soft, ground and thin liquids.  - NPO;long term alternate methods of nutrition/hydration;other (see comments)  if comfort diet, consider soft/whole & thin  -MP NPO;temporary alternate methods of nutrition/hydration;other (see comments)  -MANASA (r) CHUCHO (t) CJ (c)      Recommended Diagnostics -- -- VFSS (MBS);other (see comments)  -MANASA (r)  (t) MANASA (c)      Recommended Precautions and Strategies general aspiration precautions  - general aspiration precautions  - upright posture during/after eating;small bites of food and sips of liquid;general aspiration precautions  -MANASA (r)  (t) CJ (c)      Oral Care Recommendations Oral Care BID/PRN;Suction toothbrush  -CH Oral Care BID/PRN;Suction toothbrush  -MP Oral Care BID/PRN;Suction toothbrush  - (r)  (t) CJ (c)      SLP Rec. for Method of Medication Administration meds via alternate route  Comfort diet: meds crushed with pureed  -CH meds via alternate route  if comfort, crush in pudding/puree  -MP meds via alternate route  -MANASA (r) CHUCHO (t) CJ (c)      Monitor for Signs of  Aspiration -- yes;notify SLP if any concerns  -MP yes;notify SLP if any concerns  -MANASA (r) CHUCHO (t) CJ (c)      Anticipated Discharge Disposition (SLP) unknown;anticipate therapy at next level of care  -CH unknown;anticipate therapy at next level of care  -MP unknown;anticipate therapy at next level of care  -MANASA (r) CHUCHO (t) MANASA (c)            User Key  (r) = Recorded By, (t) = Taken By, (c) = Cosigned By    Initials Name Effective Dates     Rose Li, MS CCC-SLP 06/16/21 -     MP Rivera Bonner, MS CCC-SLP 12/28/21 -      Bella Wooten, MS CCC-SLP 06/16/21 -      Maren Camacho, Speech Therapy Student 01/24/22 -                 EDUCATION  The patient has been educated in the following areas:   Home Exercise Program (HEP) Dysphagia (Swallowing Impairment) Oral Care/Hydration.        SLP GOALS     Row Name 04/14/22 1120 04/12/22 1100 04/11/22 1340       Oral Nutrition/Hydration Goal 1 (SLP)    Oral Nutrition/Hydration Goal 1, SLP LTG: Pt will tolerate comfort diet of soft, ground and thin liquids without s/s of discomfort/distress.  -CH LTG: Pt will return to some form of PO diet by d/c.  -MP LTG: Pt will return to some form of PO diet by d/c.  -MANASA (r) CHUCHO (t) MANASA (c)    Time Frame (Oral Nutrition/Hydration Goal 1, SLP) by discharge  -CH by discharge  -MP by discharge  -MANASA (r) CHUCHO (t) MANASA (c)    Barriers (Oral Nutrition/Hydration Goal 1, SLP) Patient tolerated with cough following thin liquids via straw and after soft solid trials. No s/s of discomfort or distress.  -CH -- --    Progress/Outcomes (Oral Nutrition/Hydration Goal 1, SLP) goal revised this date  -CH goal ongoing  -MP continuing progress toward goal  -MANASA (r) CHUCHO (t) MANASA (c)       Oral Nutrition/Hydration Goal 2 (SLP)    Oral Nutrition/Hydration Goal 2, SLP Pt will tolerate therapeutic trials of thin H2O & puree w/ no overt s/sxs aspiration/distress w/ 60% acc and no cues in order to assess appropriateness for repeat instrumental eval  -  Pt will tolerate therapeutic trials of thin H2O & puree w/ no overt s/sxs aspiration/distress w/ 60% acc and no cues in order to assess appropriateness for repeat instrumental eval  -MP Pt will tolerate therapeutic trials of thin H2O & puree w/ no overt s/sxs aspiration/distress w/ 60% acc and no cues in order to assess appropriateness for repeat instrumental eval  -CJ (r) MH (t) CJ (c)    Time Frame (Oral Nutrition/Hydration Goal 2, SLP) short term goal (STG)  -CH short term goal (STG)  -MP short term goal (STG)  -CJ (r) MH (t) CJ (c)    Barriers (Oral Nutrition/Hydration Goal 2, SLP) Cough following small sips of thin via cup and straw  -CH -- Completed oral care. Pt w/ immediate hard cough w thin via ice chip  -CJ (r) MH (t) CJ (c)    Progress/Outcomes (Oral Nutrition/Hydration Goal 2, SLP) continuing progress toward goal  -CH goal ongoing  -MP progress slower than expected  -CJ (r) MH (t) CJ (c)       Lingual Strengthening Goal 1 (SLP)    Activity (Lingual Strengthening Goal 1, SLP) increase tongue back strength  -CH increase tongue back strength  -MP increase tongue back strength  -CJ (r) MH (t) CJ (c)    Increase Tongue Back Strength lingual resistance exercises;swallow trials  -CH lingual resistance exercises;swallow trials  -MP lingual resistance exercises;swallow trials  -MANASA (r) MH (t) CJ (c)    Sampson/Accuracy (Lingual Strengthening Goal 1, SLP) with minimal cues (75-90% accuracy)  -CH with minimal cues (75-90% accuracy)  -MP with minimal cues (75-90% accuracy)  -CJ (r) MH (t) CJ (c)    Time Frame (Lingual Strengthening Goal 1, SLP) short term goal (STG)  -CH short term goal (STG)  -MP short term goal (STG)  -CJ (r) MH (t) CJ (c)    Barriers (Lingual Strengthening Goal 1, SLP) Completed x 5  -CH -- --    Progress/Outcomes (Lingual Strengthening Goal 1, SLP) continuing progress toward goal  -CH goal ongoing  -MP goal ongoing  -CJ (r) MH (t) CJ (c)       Pharyngeal Strengthening Exercise Goal 1 (SLP)     Activity (Pharyngeal Strengthening Goal 1, SLP) increase timing;increase superior movement of the hyolaryngeal complex;increase anterior movement of the hyolaryngeal complex;increase closure at entrance to airway/closure of airway at glottis;increase squeeze/positive pressure generation;increase tongue base retraction;increase PES opening  -CH increase timing;increase superior movement of the hyolaryngeal complex;increase anterior movement of the hyolaryngeal complex;increase closure at entrance to airway/closure of airway at glottis;increase squeeze/positive pressure generation;increase tongue base retraction;increase PES opening  -MP increase timing;increase superior movement of the hyolaryngeal complex;increase anterior movement of the hyolaryngeal complex;increase closure at entrance to airway/closure of airway at glottis;increase squeeze/positive pressure generation;increase tongue base retraction;increase PES opening  -CJ (r) MH (t) CJ (c)    Increase Timing prepping - 3 second prep or suck swallow or 3-step swallow  -CH prepping - 3 second prep or suck swallow or 3-step swallow  -MP prepping - 3 second prep or suck swallow or 3-step swallow  -CJ (r)  (t) CJ (c)    Increase Superior Movement of the Hyolaryngeal Complex effortful pitch glide (falsetto + pharyngeal squeeze)  -CH effortful pitch glide (falsetto + pharyngeal squeeze)  -MP effortful pitch glide (falsetto + pharyngeal squeeze)  -CJ (r) MH (t) CJ (c)    Increase Anterior Movement of the Hyolaryngeal Complex chin tuck against resistance (CTAR)  -CH chin tuck against resistance (CTAR)  -MP chin tuck against resistance (CTAR)  -CJ (r)  (t) CJ (c)    Increase Closure at Entrance to Airway/Closure of Airway at Glottis supraglottic swallow  -CH supraglottic swallow  -MP supraglottic swallow  -CJ (r) MH (t) CJ (c)    Increase Squeeze/Positive Pressure Generation hard effortful swallow  -CH hard effortful swallow  -MP hard effortful swallow  -CJ (r)  MH (t) CJ (c)    Increase Tongue Base Retraction alia  -CH alia  -MP alia  -CJ (r) MH (t) CJ (c)    Increase PES Opening chin tuck against resistance (CTAR)  -CH chin tuck against resistance (CTAR)  -MP chin tuck against resistance (CTAR)  -CJ (r) MH (t) CJ (c)    Donley/Accuracy (Pharyngeal Strengthening Goal 1, SLP) with minimal cues (75-90% accuracy)  -CH with minimal cues (75-90% accuracy)  -MP with minimal cues (75-90% accuracy)  -CJ (r) MH (t) CJ (c)    Time Frame (Pharyngeal Strengthening Goal 1, SLP) short term goal (STG)  -CH short term goal (STG)  -MP short term goal (STG)  -CJ (r) MH (t) CJ (c)    Barriers (Pharyngeal Strengthening Goal 1, SLP) Completed x 5 each. Some difficulty w hard swallow, SSS and alia. Required mod cues.  -CH -- --    Progress/Outcomes (Pharyngeal Strengthening Goal 1, SLP) continuing progress toward goal  -CH goal ongoing  -MP goal ongoing  -CJ (r) MH (t) CJ (c)          User Key  (r) = Recorded By, (t) = Taken By, (c) = Cosigned By    Initials Name Provider Type    Rose Oconnell, MS CCC-SLP Speech and Language Pathologist    Rivera Guadalupe, MS CCC-SLP Speech and Language Pathologist    Bella Honeycutt, MS CCC-SLP Speech and Language Pathologist    Maren Garner, Speech Therapy Student SLP Student                   Time Calculation:    Time Calculation- SLP     Row Name 04/14/22 1259             Time Calculation- SLP    SLP Start Time 1120  -CH      SLP Received On 04/14/22  -              Untimed Charges    47170-PL Treatment Swallow Minutes 53  -CH              Total Minutes    Untimed Charges Total Minutes 53  -CH       Total Minutes 53  -CH            User Key  (r) = Recorded By, (t) = Taken By, (c) = Cosigned By    Initials Name Provider Type    Bella Honeycutt, MS CCC-SLP Speech and Language Pathologist                Therapy Charges for Today     Code Description Service Date Service Provider Modifiers Qty    [id number]   ST TREATMENT SWALLOW 4 4/14/2022 Bella Wooten, MS CCC-SLP GN 1               Bella Wooten MS CCC-SLP  4/14/2022     Patient was not wearing a face mask and did exhibit coughing during this therapy encounter.  Procedure performed was aerosolizing, involved close contact (within 6 feet for at least 15 minutes or longer), and did not involve contact with infectious secretions or specimens.  Therapist used appropriate personal protective equipment including gloves, standard procedure mask and eye protection.  Appropriate PPE was worn during the entire therapy session.  Hand hygiene was completed before and after therapy session.

## 2022-04-14 NOTE — PLAN OF CARE
Goal Outcome Evaluation:  Plan of Care Reviewed With: patient, family    SLP treatment completed. Will continue to address dysphagia. Please see note for further details and recommendations.

## 2022-04-14 NOTE — PROGRESS NOTES
[facility] Medicine Services  PROGRESS NOTE    Patient Name: Yinka Cummings  : 1945  MRN: [id number]    Date of Admission: 3/25/2022  Primary Care Physician: Vivek Mercer,     Subjective   Subjective     CC:  Follow-up comfort diet    HPI:  States that he is doing well with his comfort diet, enjoying eating and drinking.  Denies discomfort swallowing or choking sensation.  Blood glucose control improved.  Per CM can go to swing bed tomorrow afternoon    ROS:  Gen- No fevers, chills  CV- No chest pain, palpitations  Resp- No cough, dyspnea  GI- No N/V/D, abd pain    Objective   Objective     Vital Signs:   Temp:  [97.8 °F (36.6 °C)-98.4 °F (36.9 °C)] 97.8 °F (36.6 °C)  Heart Rate:  [69-92] 75  Resp:  [18-20] 18  BP: (153-197)/(68-98) 153/68     Physical Exam:  Constitutional: Awake, alert, chronically ill-appearing male sitting up in bedside chair in no acute distress  HENT: NCAT, mucous membranes moist, poor dentition with multiple broken teeth  Respiratory: Clear to auscultation bilaterally, respiratory effort normal   Cardiovascular: RRR, no murmurs, rubs, or gallops, palpable radial pulses  Gastrointestinal: Positive bowel sounds, soft, nontender, nondistended  Musculoskeletal: No bilateral ankle edema  Psychiatric: Appropriate affect, cooperative  Neurologic: Speech clear, moving all extremity spontaneously  Dermatologic: No clearly identified residual rash    Results Reviewed:  LAB RESULTS:      Lab 22  0536 22  2229 22  0924 22  1158 22  1623   WBC  --   --  9.92 9.64  --    HEMOGLOBIN  --   --  7.6* 8.0*  --    HEMATOCRIT  --   --  23.6* 26.2*  --    PLATELETS  --   --  246 225  --    NEUTROS ABS  --   --   --  7.35*  --    IMMATURE GRANS (ABS)  --   --   --  0.19*  --    LYMPHS ABS  --   --   --  1.18  --    MONOS ABS  --   --   --  0.54  --    EOS ABS  --   --   --  0.34  --    MCV  --   --  93.7 97.4*  --    SED RATE  --   --    --   --  19   CRP  --   --   --   --  3.27*   HEPARIN ANTI-XA 1.17 1.84  --   --   --          Lab 04/13/22  0536 04/11/22  0924 04/10/22  1042 04/09/22 0518 04/08/22  1158   SODIUM 143 144 142 146* 143   POTASSIUM 4.5 4.2 3.8 3.7 4.1   CHLORIDE 107 111* 112* 115* 112*   CO2 28.0 26.0 23.0 23.0 21.0*   ANION GAP 8.0 7.0 7.0 8.0 10.0   BUN 44* 39* 39* 45* 48*   CREATININE 1.28* 1.50* 1.53* 1.72* 1.80*   EGFR 58.4*  --  47.1*  --  38.8*   GLUCOSE 311* 353* 271* 181* 190*   CALCIUM 8.5* 8.2* 8.0* 8.0* 7.8*   MAGNESIUM  --  2.0  --  1.8  --    PHOSPHORUS 1.3* 1.6* 1.6* 2.3*  --          Lab 04/13/22  0536 04/11/22 0924 04/10/22  1042 04/09/22  0518 04/08/22  1158   TOTAL PROTEIN  --  5.8*  --  5.4* 5.3*   ALBUMIN 2.80* 2.90* 2.60* 2.60* 2.40*   GLOBULIN  --   --   --   --  2.9   ALT (SGPT)  --  11  --  10 14   AST (SGOT)  --  9  --  12 15   BILIRUBIN  --  0.3  --  0.3 0.4   ALK PHOS  --  88  --  79 70             Lab 04/11/22 0924 04/09/22  0518   CHOLESTEROL 87 82   TRIGLYCERIDES 140 148             Brief Urine Lab Results  (Last result in the past 365 days)      Color   Clarity   Blood   Leuk Est   Nitrite   Protein   CREAT   Urine HCG        04/12/22 1550             31.3               Microbiology Results Abnormal     Procedure Component Value - Date/Time    Wound Culture - Wound, Leg, Left [[id number]] Collected: 03/25/22 0918    Lab Status: Final result Specimen: Wound from Leg, Left Updated: 03/27/22 0903     Wound Culture Light growth (2+) Normal Skin Cami     Gram Stain Rare (1+) WBCs seen      Moderate (3+) Gram positive cocci in pairs and clusters    Eosinophil Smear - Urine, Urine, Clean Catch [[id number]]  (Normal) Collected: 03/26/22 1027    Lab Status: Final result Specimen: Urine, Clean Catch Updated: 03/26/22 1113     Eosinophil Smear 0 % EOS/100 Cells     Narrative:      No eosinophil seen    COVID-19 and FLU A/B PCR - Swab, Nasopharynx [[id number]]  (Normal) Collected: 03/25/22 0918    Lab Status:  Final result Specimen: Swab from Nasopharynx Updated: 03/25/22 0955     COVID19 Not Detected     Influenza A PCR Not Detected     Influenza B PCR Not Detected    Narrative:      Fact sheet for providers: https://www.fda.gov/media/603210/download    Fact sheet for patients: https://www.fda.gov/media/708649/download    Test performed by PCR.          FL Video Swallow With Speech Single Contrast    Result Date: 4/12/2022  EXAMINATION: FL VIDEO SWALLOW W SPEECH SINGLE-CONTRAST-  INDICATION: dysphagia; R13.12-Dysphagia, oropharyngeal phase; N17.9-Acute kidney failure, unspecified; E86.0-Dehydration; L03.119-Cellulitis of unspecified part of limb; I77.6-Arteritis, unspecified; I50.9-Heart failure, unspecified; R79.89-Other specified abnormal findings of blood chemistry  TECHNIQUE: 42 seconds of fluoroscopic time was used for this exam. 6 associated fluoroscopic loops were saved. The patient was evaluated in the seated lateral position while taking a variety of consistencies of barium by mouth under the direction of speech pathology.  COMPARISON: NONE  FINDINGS: 42 seconds of fluoroscopy provided for a modified barium swallow. Please see speech therapy report for full details and recommendations.       Impression: Fluoroscopy provided for a modified barium swallow. Please see speech therapy report for full details and recommendations.    This report was finalized on 4/12/2022 4:32 PM by Yinka Parker.        Results for orders placed during the hospital encounter of 03/25/22    Adult Transesophageal Echo (LARRY) W/ Cont if Necessary Per Protocol    Interpretation Summary  · Estimated left ventricular EF = 65%  · The cardiac valves are anatomically and functionally normal.  · No evidence of a left atrial appendage thrombus was present.      I have reviewed the medications:  Scheduled Meds:amLODIPine, 10 mg, Per G Tube, Q24H  atorvastatin, 40 mg, Nasogastric, Nightly  buprenorphine-naloxone, 2 tablet, Sublingual,  Nightly  cloNIDine, 0.1 mg, Per G Tube, Q12H  DULoxetine, 90 mg, Oral, Daily  enoxaparin, 110 mg, Subcutaneous, Q12H  insulin detemir, 35 Units, Subcutaneous, Nightly  insulin lispro, 0-9 Units, Subcutaneous, TID AC  insulin lispro, 12 Units, Subcutaneous, TID With Meals  levothyroxine, 150 mcg, Nasogastric, Daily  melatonin, 5 mg, Nasogastric, Nightly  methohexital, , ,   metoprolol tartrate, 50 mg, Nasogastric, Q8H  midazolam, , ,   mirtazapine, 7.5 mg, Nasogastric, Nightly  nystatin, 5 mL, Oral, 4x Daily  oxymetazoline, 2 spray, Each Nare, BID  pantoprazole, 40 mg, Intravenous, Q AM  polyethylene glycol, 17 g, Nasogastric, Daily  predniSONE, 20 mg, Nasogastric, Daily With Breakfast  QUEtiapine, 25 mg, Nasogastric, Q12H  senna-docusate sodium, 2 tablet, Nasogastric, BID  sodium chloride, 10 mL, Intravenous, Q12H  sodium phosphate IVPB, 30 mmol, Intravenous, Once  temazepam, 15 mg, Nasogastric, Nightly  thiamine, 100 mg, Oral, Daily  cyanocobalamin, 50 mcg, Nasogastric, Daily      Continuous Infusions:   PRN Meds:.•  acetaminophen **OR** acetaminophen **OR** acetaminophen  •  dextrose  •  dextrose  •  glucagon (human recombinant)  •  haloperidol  •  mineral oil-hydrophilic petrolatum  •  [DISCONTINUED] ondansetron **OR** ondansetron  •  palliative care oral rinse  •  potassium phosphate infusion greater than 15 mMoles **OR** potassium phosphate infusion greater than 15 mMoles **OR** potassium phosphate **OR** sodium phosphate IVPB **OR** sodium phosphate IVPB **OR** sodium phosphate IVPB  •  Sodium Chloride (PF)  •  sodium chloride  •  sodium chloride    Assessment/Plan   Assessment & Plan     Active Hospital Problems    Diagnosis  POA   • **Rash [R21]  Yes   • Acute renal failure, unspecified acute renal failure type (HCC) [N17.9]  Yes   • Nausea and vomiting [R11.2]  Unknown   • Paroxysmal A-fib (on eliquis & metoprolol) [I48.0]  Yes   • Insulin dependent type 2 diabetes mellitus (HCC) [E11.9, Z79.4]  Not  Applicable   • Hypothyroidism (acquired) [E03.9]  Yes   • Chronic back pain (on chronic prescription lortab) [M54.9, G89.29]  Yes   • Benign essential tremor [G25.0]  Yes      Resolved Hospital Problems   No resolved problems to display.        Brief Hospital Course to date:  Yinka Cummings is a 77 y.o. 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  -[facility], principal concern was his previous primidone, additionally his Lortab, Dabigatran, Lyrica, lisinopril have all been discontinued  -[facility] dermatology Bx w/ nonspecific perivascular lymphocytic infiltrate  -Rheumatology 3/29, clinical syndrome most c/w leukocytoclastic vasculitis  -Initial significant improvement with oral prednisone, which was discontinued due to acute encephalopathy, which was more likely from sleep deprivation/hospital delirium  -PT WOC follows  -Reinitiated on prednisone 20 mg daily 4/9, rash resolved; plan for steroid taper over approximately 4 weeks    Oropharyngeal dysphagia  -Repeat SLP eval 4/12-not safe for oral intake  -Patient/daughter reporting chronic throat clearing after drinking for decades  -Prolonged GOC discussion 4/13, oral diet and progression towards discharge are his primary goals, he understands the risk of aspiration/choking  "and elected to proceed with comfort diet    Sarcopenia  -PT/OT following  -Making substantial improvements, discharging to swing bed close to home for rehab    Acute encephalopathy, multifactorial, improved  Sleep deprivation psychosis/hospital associated delirium, improved  -Initially blamed on steroid-induced psychosis however neurology has felt it was more significantly a sleep deprivation psychosis; reattempting steroids    Acute renal dysfunction on CKD 3, improved  Hx RCC s/p nephrectomy  -Per documentation [facility] records report baseline CR 1.5-1.7 as recent as 3/20/22  -Nephrology follows  -Now at baseline    Atrial fibrillation/flutter  -Recently Dabigatran was DC'd due to rash; currently on full dose Lovenox with plan to transition to NOAC after 30-day \"washout\" from discontinuation of primidone (concern for drug-drug interaction)  -Cardiology follows, s/p ECV 4/1/22; amiodarone discontinued, continue Lopressor    Chronic pain syndrome  -Recently transitioned to Suboxone from Lortab by the [facility] due to concern for contribution to rash; will continue here    DM type 2, A1c 7.9%, with long-term use of insulin, with steroid-induced hyperglycemia  -Prior to initiating steroids was on 10U Levemir at bedtime with SSI  -Continue current Levemir/Humalog; BG improved with discontinuation of tube feeds    Hypothyroidism  Hypertension  Hyperlipidemia  Mood disorder  -Continue atorvastatin, duloxetine, Lopressor, amlodipine   -Persistently hypertensive with steroid use, improving with clonidine 0.1 mg bid    Severe tremor, stable/improved  -Primidone recently DC'd for rash  -Prescribed Sinemet recently but has not yet been able to start  -[facility]-up with [facility]    Chronic normocytic anemia  -Relatively stable, monitor    Abnormal abdominal CT  -Per documentation questionable pyelo versus more likely duodenitis, potential bibasilar consolidation; ID follows, monitoring off antibiotics    VINI  -Documented as noncompliant " with CPAP    Obesity, BMI 38.86 kg/M2  -Complicates all aspects of care      DVT prophylaxis:  Medical DVT prophylaxis orders are present.       AM-PAC 6 Clicks Score (PT): 17 (04/13/22 3080)    Disposition: Comfort diet, transfer to swing bed close to home/family tomorrow    CODE STATUS:   Code Status and Medical Interventions:   Ordered at: 03/25/22 1328     Level Of Support Discussed With:    Patient     Code Status (Patient has no pulse and is not breathing):    CPR (Attempt to Resuscitate)     Medical Interventions (Patient has pulse or is breathing):    Full Support       Wili Conner, DO  04/14/22

## 2022-04-14 NOTE — CASE MANAGEMENT/SOCIAL WORK
Case Management Discharge Note      Final Note: For Friday: Plan transfer to [facility], swing bed. tele for nursing report is [phone]. Patient will go by [facility] at 14:00. He will need covid test, please. I will discuss plans with patient and his family    Provided Post Acute Provider List?: Yes  Post Acute Provider List: Nursing Home  Delivered To: Other (comment)  Method of Delivery: In person    Selected Continued Care - Admitted Since 3/25/2022     Destination Coordination complete.    Service Provider Selected Services Address Phone Fax Patient Preferred    [facility] SWING BED UNIT  Skilled Nursing [address], [place] [address] [phone] --          Durable Medical Equipment    No services have been selected for the patient.              Dialysis/Infusion    No services have been selected for the patient.              Home Medical Care    No services have been selected for the patient.              Therapy    No services have been selected for the patient.              Community Resources    No services have been selected for the patient.              Community & DME    No services have been selected for the patient.                  Transportation Services  Ambulance: [facility]    Final Discharge Disposition Code: 61 - hospital-based swing bed

## 2022-04-15 VITALS
RESPIRATION RATE: 18 BRPM | WEIGHT: 262.35 LBS | TEMPERATURE: 98.8 F | SYSTOLIC BLOOD PRESSURE: 102 MMHG | OXYGEN SATURATION: 100 % | HEART RATE: 68 BPM | HEIGHT: 69 IN | BODY MASS INDEX: 38.86 KG/M2 | DIASTOLIC BLOOD PRESSURE: 54 MMHG

## 2022-04-15 LAB
GLUCOSE BLDC GLUCOMTR-MCNC: 77 MG/DL (ref 70–130)
GLUCOSE BLDC GLUCOMTR-MCNC: 83 MG/DL (ref 70–130)
QT INTERVAL: 396 MS
QTC INTERVAL: 460 MS

## 2022-04-15 PROCEDURE — 25010000002 ENOXAPARIN PER 10 MG

## 2022-04-15 PROCEDURE — 82962 GLUCOSE BLOOD TEST: CPT

## 2022-04-15 PROCEDURE — 63710000001 INSULIN LISPRO (HUMAN) PER 5 UNITS: Performed by: INTERNAL MEDICINE

## 2022-04-15 PROCEDURE — 99239 HOSP IP/OBS DSCHRG MGMT >30: CPT | Performed by: INTERNAL MEDICINE

## 2022-04-15 PROCEDURE — 63710000001 PREDNISONE PER 1 MG: Performed by: INTERNAL MEDICINE

## 2022-04-15 RX ORDER — CLONIDINE HYDROCHLORIDE 0.1 MG/1
0.1 TABLET ORAL EVERY 12 HOURS SCHEDULED
Start: 2022-04-15 | End: 2022-04-29 | Stop reason: HOSPADM

## 2022-04-15 RX ORDER — ACETAMINOPHEN 325 MG/1
650 TABLET ORAL EVERY 4 HOURS PRN
Start: 2022-04-15

## 2022-04-15 RX ORDER — METOPROLOL TARTRATE 50 MG/1
50 TABLET, FILM COATED ORAL EVERY 8 HOURS SCHEDULED
Start: 2022-04-15

## 2022-04-15 RX ORDER — CHOLECALCIFEROL (VITAMIN D3) 125 MCG
5 CAPSULE ORAL NIGHTLY
Start: 2022-04-15

## 2022-04-15 RX ORDER — AMLODIPINE BESYLATE 10 MG/1
10 TABLET ORAL
Start: 2022-04-16 | End: 2022-04-29 | Stop reason: HOSPADM

## 2022-04-15 RX ORDER — PREDNISONE 1 MG/1
TABLET ORAL
Qty: 70 TABLET | Refills: 0
Start: 2022-04-15 | End: 2022-04-29 | Stop reason: HOSPADM

## 2022-04-15 RX ORDER — QUETIAPINE FUMARATE 25 MG/1
25 TABLET, FILM COATED ORAL EVERY 12 HOURS SCHEDULED
Start: 2022-04-15 | End: 2022-04-29 | Stop reason: HOSPADM

## 2022-04-15 RX ADMIN — PREDNISONE 20 MG: 20 TABLET ORAL at 08:20

## 2022-04-15 RX ADMIN — AMLODIPINE BESYLATE 10 MG: 10 TABLET ORAL at 08:20

## 2022-04-15 RX ADMIN — LEVOTHYROXINE SODIUM 150 MCG: 150 TABLET ORAL at 06:31

## 2022-04-15 RX ADMIN — Medication 10 ML: at 08:25

## 2022-04-15 RX ADMIN — DOCUSATE SODIUM 50MG AND SENNOSIDES 8.6MG 2 TABLET: 8.6; 5 TABLET, FILM COATED ORAL at 08:20

## 2022-04-15 RX ADMIN — INSULIN LISPRO 10 UNITS: 100 INJECTION, SOLUTION INTRAVENOUS; SUBCUTANEOUS at 08:25

## 2022-04-15 RX ADMIN — METOPROLOL TARTRATE 50 MG: 50 TABLET, FILM COATED ORAL at 06:31

## 2022-04-15 RX ADMIN — VITAM B12 50 MCG: 100 TAB at 08:19

## 2022-04-15 RX ADMIN — CLONIDINE HYDROCHLORIDE 0.1 MG: 0.1 TABLET ORAL at 08:20

## 2022-04-15 RX ADMIN — PANTOPRAZOLE SODIUM 40 MG: 40 TABLET, DELAYED RELEASE ORAL at 06:31

## 2022-04-15 RX ADMIN — INSULIN LISPRO 10 UNITS: 100 INJECTION, SOLUTION INTRAVENOUS; SUBCUTANEOUS at 11:37

## 2022-04-15 RX ADMIN — THIAMINE HCL TAB 100 MG 100 MG: 100 TAB at 08:20

## 2022-04-15 RX ADMIN — POLYETHYLENE GLYCOL 3350 17 G: 17 POWDER, FOR SOLUTION ORAL at 08:26

## 2022-04-15 RX ADMIN — DULOXETINE HYDROCHLORIDE 90 MG: 30 CAPSULE, DELAYED RELEASE ORAL at 08:20

## 2022-04-15 RX ADMIN — ENOXAPARIN SODIUM 110 MG: 120 INJECTION SUBCUTANEOUS at 08:19

## 2022-04-15 RX ADMIN — QUETIAPINE FUMARATE 25 MG: 25 TABLET ORAL at 08:20

## 2022-04-15 NOTE — DISCHARGE PLACEMENT REQUEST
"Yinka Mabry (77 y.o. Male)             Date of Birth   1945    Social Security Number       Address   [address] [facility] [address]    Home Phone   [phone]    MRN   [id number]       Mormonism   None    Marital Status                               Admission Date   3/25/22    Admission Type   Emergency    Admitting Provider   Wili Conner DO    Attending Provider   Wili Conner DO    Department, Room/Bed   [facility] 3F, S315/1       Discharge Date       Discharge Disposition   Rehab Facility or Unit (DC - External)    Discharge Destination                               Attending Provider: Wili Conner DO    Allergies: Contrast Dye, Doxycycline, Chlorthalidone, Morphine, Neurontin [Gabapentin], Phenergan [Promethazine]    Isolation: None   Infection: None   Code Status: CPR   Advance Care Planning Activity    Ht: 175 cm (68.9\")   Wt: 119 kg (262 lb 5.6 oz)    Admission Cmt: None   Principal Problem: Rash [R21]                 Active Insurance as of 3/25/2022     Primary Coverage     Payor Plan Insurance Group Employer/Plan Group    MEDICARE MEDICARE A & B      Payor Plan Address Payor Plan Phone Number Payor Plan Fax Number Effective Dates    [address] [phone]  10/1/2009 - None Entered    [facility] [id number]       Subscriber Name Subscriber Birth Date Member ID       YINKA MABRY 1945 [id number]                 Emergency Contacts      (Rel.) Home Phone Work Phone Mobile Phone    Gauri Mabry (Spouse) -- -- [phone]    Emiliano,Page (Daughter) -- -- [phone]    Nora Shelton (Daughter) -- -- [phone]            Patrick Spring, RN    Registered Nurse   Wound Care   Nursing Note       Signed   Date of Service:  04/12/22 0800   Creation Time:  04/12/22 0921              Signed              Show:Clear all  [x]Manual[]Template[]Copied    Added by:  [x]Patrick Spring, RN      []Hover for " details    WOC follow-up:      BLE vasculitis         At this time wound areas are looking a lot better.   Currently on Prednisone.      Continue with current POC.  See order for care needs.   Make sure to change dressings daily.      Heel wound is stable.   May need debridement in the near future.      Patient on a bariatric dolphin with topper.  Offloading heel boots in place.      WOC will continue to follow.   Contact if needs arise.      Thanks                               Neno Coley, RN    Registered Nurse   Wound Care   Nursing Note       Signed   Date of Service:  04/05/22 1105   Creation Time:  04/05/22 1512              Signed              Show:Clear all  [x]Manual[x]Template[]Copied    Added by:  [x]eNno Coley RN      []Hover for details    Wocn follow up for: Bilateral lower extremity vasculitis     Assessment: For the most part the wounds are healing the scabs are very superficial and flaking off revealing healed epithelium underneath.  There is a spots on the left heel left lateral foot and the left anterior shin that present with some necrotic areas yellow on the shin black eschar on the foot.  We will continue to watch this foot completely although now it does not look like a blister is very very hard indicating hardened blood underneath.  The right leg is not as far advanced at the scabs are still attached to skin there are some open blisters and drainage from the right posterior calf.                            Improvement: Overall yes     Recommendations/ changes: No recommended changes to dressing orders     Areas of concern/ new issues: No new issues.     Skin interventions in place.     Specialty bed: No sign of buried bed with a Dolphin mattress and LORENZO Topper.     Pressure Injury Protocol (initiate for Dane Score of 18 or less):   *Maintain good skin care, keep dry, turn q 2 hr, keep heels elevated and offloaded with heel boots.    *Apply z-guard to sacrococcygeal area/  perineal area BID or daily and PRN per incontinent episodes.  *Follow C.A.R.E protocol if medical devices (Bipap, christian, Ng tube, etc) are being used.      Woc will follow.     Please contact with questions or concerns.                             Insurance Information                MEDICARE/MEDICARE A & B Phone: [phone]    Subscriber: Yinka Cummings Subscriber#: [id number]    Group#: -- Precert#: --                [address] [address]  Phone:  [phone]  Fax:  [phone]        Patient:     Yinka Cummings MRN:  [id number]   [address] [facility] [address] :  1945  SSN:    Phone: [phone] Sex:  M      INSURANCE PAYOR PLAN GROUP # SUBSCRIBER ID   Primary:    MEDICARE [id number]   [id number]   Admitting Diagnosis: Acute renal failure, unspecified acute renal failure type (HCC) [N17.9]  Order Date:  Mar 25, 2022        Wound Care       (Order ID: [id number])     Diagnosis:         Priority:  Routine Expected Date:   Expiration Date:        Interval:  Daily Count: 11   Comments: Apply abdominal pads over Xeroform (lining inside of boots), apply pressure relieving boots.  Wound Locations: Bilateral lower legs and feet - circumferential  Wound Care Instructions: Cleansed with pH balanced cleansing foam, pat dry, apply mulitple layers of Xeroform gauze Daily and PRN.  Cleanse: Cleansing Foam  Intervention: Other  Other: Xeroform  Moistened? No  Dressing: Abdominal Pad     Specimen Type:   Specimen Source:   Specimen Taken Date:   Specimen Taken Time:                  Verbal Order Mode: Per protocol: no cosign required  Per protocol: no cosign required  Authorizing Provider:Yinka Desai MD  Authorizing Provider's NPI: [id number]  Order Entered By: Tahmina Salcedo RN 3/25/2022  2:28 PM     Electronically signed by: N/A

## 2022-04-15 NOTE — DISCHARGE SUMMARY
[facility] Medicine Services  DISCHARGE SUMMARY    Patient Name: Yinka Cummings  : 1945  MRN: [id number]    Date of Admission: 3/25/2022  8:50 AM  Date of Discharge: 4/15/2022  Primary Care Physician: Vivek Mercer, DO    Consults     Date and Time Order Name Status Description    4/3/2022  8:25 PM Inpatient Neurology Consult Completed     3/27/2022  2:15 AM Inpatient Cardiology Consult Completed     3/26/2022  9:41 AM Inpatient Nephrology Consult      3/26/2022  9:39 AM Inpatient Infectious Diseases Consult Completed     3/25/2022  1:29 PM Inpatient Rheumatology Consult Completed           Hospital Course     Presenting Problem:   Acute renal failure, unspecified acute renal failure type (HCC) [N17.9]    Active Hospital Problems    Diagnosis  POA   • **Rash [R21]  Yes   • Acute renal failure, unspecified acute renal failure type (HCC) [N17.9]  Yes   • Nausea and vomiting [R11.2]  Unknown   • Paroxysmal A-fib (on eliquis & metoprolol) [I48.0]  Yes   • 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          Hospital Course:  Yinka Cummings is a 77 y.o. male with CKD 3 in the setting of nephrectomy related to RCC, HTN, atrial fibrillation, chronic diastolic CHF, VINI, DM2 with gastroparesis, [facility]  [facility] a [facility], ultimately determined to be vasculitic in nature with reported dermatology biopsy and nonspecific perivascular lymphocytic infiltrate.  Ultimately he presented to our facility for alternate evaluation as well as nausea and vomiting.  He has had a complex hospital course, most notably he has been seen by rheumatology who  evaluated his previous pathology and the patient with his current process being most consistent with a leukocytoclastic vasculitis, which has been presumed to be related from either his primidone or his Pradaxa, both of which have been discontinued.  He was seen by cardiology for atrial fibrillation and briefly on amiodarone though has a history of thyroid issues with the same so this was transitioned to beta-blocker.  He has been followed by nephrology for SALEEM on his CKD 3 with solitary kidney which has resolved to baseline.  He has been seen by ID to rule out infectious process.  He was initially started on prednisone taper starting at 60 mg and had pronounced encephalopathy, apparently does have a history of the same though this was more pronounced, initially this was attributed to steroid induced encephalopathy and held.  However his response was limited, neurology evaluated him and felt that it was severe sleep deprivation psychosis, as his rash had resolved and he is off prednisone his medications were adjusted to facilitate sleep which near instantaneously resolved his encephalopathy.  His rash began to return so he was reinitiated on prednisone at a dose of 20 mg with improvement of his rash and no recurrence of encephalopathy, he is going to discharge on a prednisone taper as below.  While nearing hospital discharge he was reassessed by SLP who identified silent aspiration, there is suspicion this has been a chronic issue for many years, a temporary NG tube was placed and he demonstrated little improvement over multiple days, discussions were held with the patient and his family ultimately he elected to proceed with a comfort diet and has been doing well on the same.  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  -[facility], principal concern was his previous primidone, additionally his Lortab, Dabigatran,  Lyrica, lisinopril have all been discontinued  -[facility] dermat[facility] Bx w/ nonspecific perivascular lymphocytic infiltrate  -Rheumatology 3/29, clinical syndrome most c/w leukocytoclastic vasculitis  -Initial significant improvement with oral prednisone, which was discontinued due to acute encephalopathy, which was more likely from sleep deprivation/hospital delirium  -Reinitiated on prednisone 20 mg daily 4/9 and his rash has resolved, per rheumatology note this should be self-limited over a period of weeks; at discharge we will continue prednisone 20 mg p.o. x1 week, followed by 15 mg x 1 week, followed by 10 mg x 1 week, followed by 5 mg x 1 week then discontinue     Oropharyngeal dysphagia, suspect chronic  -Repeat SLP eval 4/12-not safe for oral intake  -Patient/daughter reporting chronic throat clearing after drinking for decades  -Prolonged GOC discussion 4/13, oral diet and progression towards discharge are his primary goals, he understands the risk of aspiration/choking and elected to proceed with comfort diet; continue SLP at rehab     Sarcopenia  -PT/OT has followed; patient has made remarkable improvements when given the opportunity     Acute encephalopathy, multifactorial, improved  Sleep deprivation psychosis/hospital associated delirium, improved  -Initially blamed on steroid-induced psychosis however neurology has felt it was more significantly a sleep deprivation psychosis; tolerating current steroid  -Started on Seroquel bid by neurology to promote sleep, reasonable to continue while transitioning to new facility and on steroids but ideally would wean down and discontinue when discharging home     Acute renal dysfunction on CKD 3, improved  Hx RCC s/p nephrectomy  -Per documentation [facility] records report baseline CR 1.5-1.7 as recent as 3/20/22  -Nephrology has followed this hospitalization and he is now at baseline; they feel less inclined that he had an acute vasculitic process affecting the kidneys, due  "to solitary kidney a renal biopsy was not pursued     Atrial fibrillation/flutter  -D[facility] Sussex [facility] by [facility] due to concerns for his rash, he has been on full dose Lovenox for stroke prophylaxis; he is reportedly awaiting a 30-day \"washout\" from his primidone before transitioning to NOAC, I do not have the exact date this was discontinued however he has been here since 3/25, it would be reasonable to transition him at 4/25 to apixaban/Eliquis  -He was seen by cardiology and underwent ECV 4/1/2022, briefly on amiodarone however with history of issues with the same he was ultimately transitioned to Lopressor only     Chronic pain syndrome  -His chronic Lortab was discontinued by the [facility] due to concern of contribution to his rash  -Currently he is on Suboxone as [facility] by [facility]; we have continued the same while hospitalized     DM type 2, A1c 7.9%, with long-term use of insulin, with steroid-induced hyperglycemia  -Home medicines listed below reflect his insulin therapy prior to arriving to our facility for completeness and assistance with discharge from rehab  -Currently here while on prednisone 20 mg he has been receiving Levemir 32U nightly with scheduled Humalog 10U TIDAC, with a sliding scale; as his prednisone tapers down this will likely need taper down     Hypothyroidism  Hypertension  Hyperlipidemia  Mood disorder  -Continue atorvastatin, duloxetine, Lopressor, amlodipine   -Clonidine added alongside of prednisone due to uncontrolled hypertension, as steroids taper down this potentially may be discontinued     Severe tremor, stable/improved  -Primidone recently DC'd for rash  -Prescribed Sinemet recently but has not yet been able to start  -Follow-up with [facility]     Chronic normocytic anemia  -Has been relatively stable, monitor     Abnormal abdominal CT  -Per documentation questionable pyelo versus more likely duodenitis, potential bibasilar consolidation; ID has followed and recommended " monitoring off antibiotics     VINI  -Documented as noncompliant with CPAP     Obesity, BMI 38.86 kg/M2  -Complicates all aspects of care    Discharge Follow Up Recommendations for outpatient labs/diagnostics:  PCP 1-2 weeks post rehab discharge  Dr. Brandt Ward, ID, about 3 weeks  Cardiology in 1 month  [facility] in 1 month  Nephrology as needed    Day of Discharge     HPI:   Feels well today, doing well with his diet.  No events overnight.  Eager to discharge to rehab today    Review of Systems  Gen- No fevers, chills  CV- No chest pain, palpitations  Resp- No cough, dyspnea  GI- No N/V/D, abd pain    Vital Signs:   Temp:  [97.4 °F (36.3 °C)-98.4 °F (36.9 °C)] 98.1 °F (36.7 °C)  Heart Rate:  [68-92] 72  Resp:  [18-22] 18  BP: (121-165)/(60-78) 145/66  Flow (L/min):  [5] 5      Physical Exam:  Constitutional: Awake, alert, chronically ill-appearing male sitting up in bed in no acute distress  HENT: NCAT, mucous membranes moist, poor dentition with multiple broken teeth  Respiratory: Clear to auscultation bilaterally, respiratory effort normal   Cardiovascular: RRR, no murmurs, rubs, or gallops, palpable radial pulses  Gastrointestinal: Positive bowel sounds, soft, nontender, nondistended  Musculoskeletal: No bilateral ankle edema  Psychiatric: Appropriate affect, cooperative  Neurologic: Speech clear, moving all extremity spontaneously, resting tremor  Dermatologic: No clearly identified residual rash    Pertinent  and/or Most Recent Results     LAB RESULTS:      Lab 04/13/22  0536 04/12/22  2229 04/11/22  0924 04/08/22  1158   WBC  --   --  9.92 9.64   HEMOGLOBIN  --   --  7.6* 8.0*   HEMATOCRIT  --   --  23.6* 26.2*   PLATELETS  --   --  246 225   NEUTROS ABS  --   --   --  7.35*   IMMATURE GRANS (ABS)  --   --   --  0.19*   LYMPHS ABS  --   --   --  1.18   MONOS ABS  --   --   --  0.54   EOS ABS  --   --   --  0.34   MCV  --   --  93.7 97.4*   HEPARIN ANTI-XA 1.17 1.84  --   --          Lab 04/14/22  0656  04/13/22  0536 04/11/22  0924 04/10/22  1042 04/09/22 0518 04/08/22  1158   SODIUM 145 143 144 142 146* 143   POTASSIUM 3.8 4.5 4.2 3.8 3.7 4.1   CHLORIDE 110* 107 111* 112* 115* 112*   CO2 27.0 28.0 26.0 23.0 23.0 21.0*   ANION GAP 8.0 8.0 7.0 7.0 8.0 10.0   BUN 41* 44* 39* 39* 45* 48*   CREATININE 1.31* 1.28* 1.50* 1.53* 1.72* 1.80*   EGFR 56.8* 58.4*  --  47.1*  --  38.8*   GLUCOSE 111* 311* 353* 271* 181* 190*   CALCIUM 8.1* 8.5* 8.2* 8.0* 8.0* 7.8*   MAGNESIUM  --   --  2.0  --  1.8  --    PHOSPHORUS 3.2 1.3* 1.6* 1.6* 2.3*  --          Lab 04/14/22  0656 04/13/22  0536 04/11/22 0924 04/10/22  1042 04/09/22  0518 04/08/22  1158   TOTAL PROTEIN  --   --  5.8*  --  5.4* 5.3*   ALBUMIN 2.60* 2.80* 2.90* 2.60* 2.60* 2.40*   GLOBULIN  --   --   --   --   --  2.9   ALT (SGPT)  --   --  11  --  10 14   AST (SGOT)  --   --  9  --  12 15   BILIRUBIN  --   --  0.3  --  0.3 0.4   ALK PHOS  --   --  88  --  79 70             Lab 04/11/22  0924 04/09/22 0518   CHOLESTEROL 87 82   TRIGLYCERIDES 140 148             Brief Urine Lab Results  (Last result in the past 365 days)      Color   Clarity   Blood   Leuk Est   Nitrite   Protein   CREAT   Urine HCG        04/12/22 1550             31.3             Microbiology Results (last 10 days)     Procedure Component Value - Date/Time    COVID PRE-OP / PRE-PROCEDURE SCREENING ORDER (NO ISOLATION) - Swab, Nasopharynx [[id number]]  (Normal) Collected: 04/14/22 1327    Lab Status: Final result Specimen: Swab from Nasopharynx Updated: 04/14/22 0921    Narrative:      The following orders were created for panel order COVID PRE-OP / PRE-PROCEDURE SCREENING ORDER (NO ISOLATION) - Swab, Nasopharynx.  Procedure                               Abnormality         Status                     ---------                               -----------         ------                     COVID-19, APTIMA PANTHER...[[id number]]  Normal              Final result                 Please view results for these  tests on the individual orders.    COVID-19, APTIMA PANTHER [facility] IN-HOUSE NP/OP SWAB IN UTM/VTM/SALINE TRANSPORT MEDIA 24HR TAT - Swab, Nasopharynx [[id number]]  (Normal) Collected: 04/14/22 1327    Lab Status: Final result Specimen: Swab from Nasopharynx Updated: 04/14/22 1839     COVID19 Not Detected    Narrative:      Fact sheet for providers: https://www.fda.gov/media/545530/download     Fact sheet for patients: https://www.fda.gov/media/499058/download    Test performed by RT PCR.          FL Video Swallow With Speech Single Contrast    Result Date: 4/12/2022  EXAMINATION: FL VIDEO SWALLOW W SPEECH SINGLE-CONTRAST-  INDICATION: dysphagia; R13.12-Dysphagia, oropharyngeal phase; N17.9-Acute kidney failure, unspecified; E86.0-Dehydration; L03.119-Cellulitis of unspecified part of limb; I77.6-Arteritis, unspecified; I50.9-Heart failure, unspecified; R79.89-Other specified abnormal findings of blood chemistry  TECHNIQUE: 42 seconds of fluoroscopic time was used for this exam. 6 associated fluoroscopic loops were saved. The patient was evaluated in the seated lateral position while taking a variety of consistencies of barium by mouth under the direction of speech pathology.  COMPARISON: NONE  FINDINGS: 42 seconds of fluoroscopy provided for a modified barium swallow. Please see speech therapy report for full details and recommendations.       Fluoroscopy provided for a modified barium swallow. Please see speech therapy report for full details and recommendations.    This report was finalized on 4/12/2022 4:32 PM by Yinka Parker.      FL Video Swallow With Speech Single Contrast    Result Date: 4/7/2022  EXAMINATION: FL VIDEO SWALLOW W SPEECH SINGLE-CONTRAST-  INDICATION: dysphagia; N17.9-Acute kidney failure, unspecified; E86.0-Dehydration; L03.119-Cellulitis of unspecified part of limb; I77.6-Arteritis, unspecified; I50.9-Heart failure, unspecified; R79.89-Other specified abnormal findings of blood chemistry;  R13.10-Dysphagia, unspecified  TECHNIQUE: 1 minute and 36 seconds of fluoroscopic time was used for this exam. 12 associated fluoroscopic loops were saved. The patient was evaluated in the seated lateral position while taking a variety of consistencies of barium by mouth under the direction of speech pathology.  COMPARISON: NONE  FINDINGS: 1 minute and 36 seconds of fluoroscopy provided for a modified barium swallow. Please see speech therapy report for full details and recommendations.       Fluoroscopy prior for a modified barium swallow. Please see speech therapy report for full details and recommendations.    This report was finalized on 4/7/2022 9:17 PM by Dr. Neal Mahoney MD.      XR Abdomen KUB    Result Date: 4/8/2022   DATE OF EXAM: 4/8/2022 3:07 PM  PROCEDURE: XR ABDOMEN KUB-  INDICATIONS: Keofeed Placement; N17.9-Acute kidney failure, unspecified; E86.0-Dehydration; L03.119-Cellulitis of unspecified part of limb; I77.6-Arteritis, unspecified; I50.9-Heart failure, unspecified; R79.89-Other specified abnormal findings of blood chemistry; R13.12-Dysphagia, oropharyngeal phase  COMPARISON: Recent CT abdomen examination  FINDINGS:  Bowel gas pattern within normal limits. No evidence of ileus. No evidence of obstruction. No bowel wall thickening demonstrated.  Tip of the feeding tube is in the right midabdomen.      IMPRESSION :  Tip of the feeding tube at the duodenal bulb versus distal stomach, at location of the pylorus  This report was finalized on 4/8/2022 3:27 PM by Priyank Payton.        Results for orders placed during the hospital encounter of 03/25/22    Duplex Venous Lower Extremity - Bilateral CAR    Interpretation Summary  · The bilateral lower extremity venous duplex scan is negative for evidence of a DVT and SVT in the areas visualized.  · The peroneal veins were not well visualized bilaterally.      Results for orders placed during the hospital encounter of 03/25/22    Duplex Venous Lower Extremity -  Bilateral CAR    Interpretation Summary  · The bilateral lower extremity venous duplex scan is negative for evidence of a DVT and SVT in the areas visualized.  · The peroneal veins were not well visualized bilaterally.      Results for orders placed during the hospital encounter of 03/25/22    Adult Transesophageal Echo (LARRY) W/ Cont if Necessary Per Protocol    Interpretation Summary  · Estimated left ventricular EF = 65%  · The cardiac valves are anatomically and functionally normal.  · No evidence of a left atrial appendage thrombus was present.      Discharge Details        Discharge Medications      New Medications      Instructions Start Date   amLODIPine 10 MG tablet  Commonly known as: NORVASC   10 mg, Oral, Every 24 Hours Scheduled   Start Date: April 16, 2022     cloNIDine 0.1 MG tablet  Commonly known as: CATAPRES   0.1 mg, Oral, Every 12 Hours Scheduled      melatonin 5 MG tablet tablet   5 mg, Oral, Nightly      predniSONE 5 MG tablet  Commonly known as: DELTASONE   Take 4 tablets by mouth Daily for 7 days, THEN 3 tablets Daily for 7 days, THEN 2 tablets Daily for 7 days, THEN 1 tablet Daily for 7 days.   Start Date: April 15, 2022     QUEtiapine 25 MG tablet  Commonly known as: SEROquel   25 mg, Oral, Every 12 Hours Scheduled      thiamine 100 MG tablet  tablet  Commonly known as: VITAMIN B-1   100 mg, Oral, Daily   Start Date: April 16, 2022        Changes to Medications      Instructions Start Date   acetaminophen 325 MG tablet  Commonly known as: TYLENOL  What changed:   how much to take  when to take this  reasons to take this   650 mg, Oral, Every 4 Hours PRN      levothyroxine 150 MCG tablet  Commonly known as: SYNTHROID, LEVOTHROID  What changed: how much to take   150 mcg, Oral, Daily      metoprolol tartrate 50 MG tablet  Commonly known as: LOPRESSOR  What changed: when to take this   50 mg, Oral, Every 8 Hours Scheduled         Continue These Medications      Instructions Start Date    ascorbic acid 500 MG tablet  Commonly known as: VITAMIN C   500 mg, Oral, Daily      atorvastatin 40 MG tablet  Commonly known as: LIPITOR   40 mg, Oral, Nightly      buprenorphine-naloxone 8-2 MG per SL tablet  Commonly known as: SUBOXONE   2 tablets, Sublingual, Every Night at Bedtime      cholecalciferol 25 MCG (1000 UT) tablet  Commonly known as: VITAMIN D3   2,000 Units, Oral, Daily      cyanocobalamin 50 MCG tablet  Commonly known as: VITAMIN B-12N   50 mcg, Oral, Daily      Diclofenac Sodium 1 % gel gel  Commonly known as: VOLTAREN   4 g, Topical, 4 Times Daily PRN      DULoxetine 30 MG capsule  Commonly known as: CYMBALTA   90 mg, Oral, Daily      enoxaparin 120 MG/0.8ML solution syringe  Commonly known as: LOVENOX   120 mg, Subcutaneous, Every 12 Hours Scheduled      erythromycin 5 MG/GM ophthalmic ointment  Commonly known as: ROMYCIN   1 application, Left Eye, 4 Times Daily, Left lower eye lid, for 4 days, started on 03-22-22      glipizide 10 MG tablet  Commonly known as: GLUCOTROL   10 mg, Oral, 2 Times Daily Before Meals      insulin  UNIT/ML injection  Commonly known as: humuLIN N,novoLIN N   60 Units, Subcutaneous, Every Night at Bedtime      lidocaine 5 %  Commonly known as: LIDODERM   2 patches, Transdermal, Every 24 Hours, Remove & Discard patch within 12 hours or as directed by MD      mirtazapine 15 MG tablet  Commonly known as: REMERON   7.5 mg, Oral, Nightly      omeprazole 20 MG capsule  Commonly known as: priLOSEC   20 mg, Oral, Daily      polyethylene glycol 17 g packet  Commonly known as: MIRALAX   17 g, Oral, Daily         Stop These Medications    carbidopa-levodopa  MG per tablet  Commonly known as: SINEMET     cephalexin 500 MG capsule  Commonly known as: KEFLEX            Allergies   Allergen Reactions   • Contrast Dye Rash     Rash/swelling per [facility] records   • Doxycycline Rash     Urticaria per [facility] records   • Chlorthalidone Other (See Comments)     Hyponatremia per [facility]  records   • Morphine Unknown - Low Severity     Headache per [facility] records   • Neurontin [Gabapentin] Mental Status Change     Drowsy per [facility] records   • Phenergan [Promethazine] Unknown - Low Severity     Confusion per [facility] records         Discharge Disposition:  Rehab Facility or Unit (DC - External)    Diet:  Hospital:  Diet Order   Procedures   • Diet Soft Texture; Ground; Consistent Carbohydrate          CODE STATUS:    Code Status and Medical Interventions:   Ordered at: 03/25/22 1328     Level Of Support Discussed With:    Patient     Code Status (Patient has no pulse and is not breathing):    CPR (Attempt to Resuscitate)     Medical Interventions (Patient has pulse or is breathing):    Full Support       Future Appointments   Date Time Provider Department Center   4/15/2022  2:00 PM EMS [facility]       Additional Instructions for the Follow-ups that You Need to Schedule     Discharge Follow-up with PCP   As directed       Currently Documented PCP:    Vivek Mercer DO    PCP Phone Number:    [phone]     Follow Up Details: 1 week post rehab DC         Discharge Follow-up with Specialty: [facility] 1 month   As directed      Specialty: [facility] 1 month         Discharge Follow-up with Specified Provider: Cardiology 1 month   As directed      To: Cardiology 1 month         Discharge Follow-up with Specified Provider: Dr. Brandt Ward ~3 weeks   As directed      To: Dr. Brandt Ward ~3 weeks                     Wili Conner DO  04/15/22      Time Spent on Discharge:  I spent  40  minutes on this discharge activity which included: face-to-face encounter with the patient, reviewing the data in the system, coordination of the care with the nursing staff as well as consultants, documentation, and entering orders.

## 2022-04-15 NOTE — DISCHARGE PLACEMENT REQUEST
"Yinka Mabry (77 y.o. Male)             Date of Birth   1945    Social Security Number       Address   [address] [id number]    Home Phone   [phone]    MRN   [id number]       Rastafari   None    Marital Status                               Admission Date   3/25/22    Admission Type   Emergency    Admitting Provider   Wili Conner DO    Attending Provider   Wili Conner DO    Department, Room/Bed   [address], S[address]/1       Discharge Date       Discharge Disposition   Rehab Facility or Unit (DC - External)    Discharge Destination                               Attending Provider: Wili Conner DO    Allergies: Contrast Dye, Doxycycline, Chlorthalidone, Morphine, Neurontin [Gabapentin], Phenergan [Promethazine]    Isolation: None   Infection: None   Code Status: CPR   Advance Care Planning Activity    Ht: 175 cm (68.9\")   Wt: 119 kg (262 lb 5.6 oz)    Admission Cmt: None   Principal Problem: Rash [R21]                 Active Insurance as of 3/25/2022     Primary Coverage     Payor Plan Insurance Group Employer/Plan Group    MEDICARE MEDICARE A & B      Payor Plan Address Payor Plan Phone Number Payor Plan Fax Number Effective Dates    [address] [phone]  10/1/2009 - None Entered    [place] [address]       Subscriber Name Subscriber Birth Date Member ID       YINKA MABRY 1945 [id number]                 Emergency Contacts      (Rel.) Home Phone Work Phone Mobile Phone    Gauri Mabry (Spouse) -- -- [phone]    Claudia Mabry (Daughter) -- -- [phone]    Nora Shelton (Daughter) -- -- [phone]            Insurance Information                MEDICARE/MEDICARE A & B Phone: [phone]    Subscriber: Yinka Mabry Subscriber#: [id number]    Group#: -- Precert#: --               Discharge Summary      Wili Conner DO at 04/15/22 0954              [facility] " [facility] Medicine Services  DISCHARGE SUMMARY    Patient Name: Yinka Cummings  : 1945  MRN: [id number]    Date of Admission: 3/25/2022  8:50 AM  Date of Discharge: 4/15/2022  Primary Care Physician: Vivek Mercer, DO    Consults     Date and Time Order Name Status Description    4/3/2022  8:25 PM Inpatient Neurology Consult Completed     3/27/2022  2:15 AM Inpatient Cardiology Consult Completed     3/26/2022  9:41 AM Inpatient Nephrology Consult      3/26/2022  9:39 AM Inpatient Infectious Diseases Consult Completed     3/25/2022  1:29 PM Inpatient Rheumatology Consult Completed           Hospital Course     Presenting Problem:   Acute renal failure, unspecified acute renal failure type (HCC) [N17.9]    Active Hospital Problems    Diagnosis  POA   • **Rash [R21]  Yes   • Acute renal failure, unspecified acute renal failure type (HCC) [N17.9]  Yes   • Nausea and vomiting [R11.2]  Unknown   • Paroxysmal A-fib (on eliquis & metoprolol) [I48.0]  Yes   • 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          Hospital Course:  Yinka Cummings is a 77 y.o. male with CKD 3 in the setting of nephrectomy related to RCC, HTN, atrial fibrillation, chronic diastolic CHF, VINI, DM2 with gastroparesis, [facility]  [facility] a [facility], ultimately determined to be vasculitic in nature with reported dermatology biopsy and nonspecific perivascular lymphocytic infiltrate.  Ultimately he presented to our facility for alternate evaluation as well as nausea and vomiting.  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  He was seen by cardiology for atrial fibrillation and briefly on amiodarone though has a history of thyroid issues with the same so this was transitioned to beta-blocker.  He has been followed by nephrology for SALEEM on his CKD 3 with solitary kidney which has resolved to baseline.  He has been seen by ID to rule out infectious process.  He was initially started on prednisone taper starting at 60 mg and had pronounced encephalopathy, apparently does have a history of the same though this was more pronounced, initially this was attributed to steroid induced encephalopathy and held.  However his response was limited, neurology evaluated him and felt that it was severe sleep deprivation psychosis, as his rash had resolved and he is off prednisone his medications were adjusted to facilitate sleep which near instantaneously resolved his encephalopathy.  His rash began to return so he was reinitiated on prednisone at a dose of 20 mg with improvement of his rash and no recurrence of encephalopathy, he is going to discharge on a prednisone taper as below.  While nearing hospital discharge he was reassessed by SLP who identified silent aspiration, there is suspicion this has been a chronic issue for many years, a temporary NG tube was placed and he demonstrated little improvement over multiple days, discussions were held with the patient and his family ultimately he elected to proceed with a comfort diet and has been doing well on the same.  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  -[facility] evaluation/[facility] by [facility], principal concern was his previous primidone, additionally his Lortab, Dabigatran, Lyrica, lisinopril have  all been discontinued  -[facility] dermatology Bx w/ nonspecific perivascular lymphocytic infiltrate  -Rheumatology 3/29, clinical syndrome most c/w leukocytoclastic vasculitis  -Initial significant improvement with oral prednisone, which was discontinued due to acute encephalopathy, which was more likely from sleep deprivation/hospital delirium  -Reinitiated on prednisone 20 mg daily 4/9 and his rash has resolved, per rheumatology note this should be self-limited over a period of weeks; at discharge we will continue prednisone 20 mg p.o. x1 week, followed by 15 mg x 1 week, followed by 10 mg x 1 week, followed by 5 mg x 1 week then discontinue     Oropharyngeal dysphagia, suspect chronic  -Repeat SLP eval 4/12-not safe for oral intake  -Patient/daughter reporting chronic throat clearing after drinking for decades  -Prolonged GOC discussion 4/13, oral diet and progression towards discharge are his primary goals, he understands the risk of aspiration/choking and elected to proceed with comfort diet; continue SLP at rehab     Sarcopenia  -PT/OT has followed; patient has made remarkable improvements when given the opportunity     Acute encephalopathy, multifactorial, improved  Sleep deprivation psychosis/hospital associated delirium, improved  -Initially blamed on steroid-induced psychosis however neurology has felt it was more significantly a sleep deprivation psychosis; tolerating current steroid  -Started on Seroquel bid by neurology to promote sleep, reasonable to continue while transitioning to new facility and on steroids but ideally would wean down and discontinue when discharging home     Acute renal dysfunction on CKD 3, improved  Hx RCC s/p nephrectomy  -Per documentation [facility] records report baseline CR 1.5-1.7 as recent as 3/20/22  -Nephrology has followed this hospitalization and he is now at baseline; they feel less inclined that he had an acute vasculitic process affecting the kidneys, due to solitary kidney a  "renal biopsy was not pursued     Atrial fibrillation/flutter  -Daba Libby [facility]'d by [facility] due to concerns for his rash, he has been on full dose Lovenox for stroke prophylaxis; he is reportedly awaiting a 30-day \"washout\" from his primidone before transitioning to NOAC, I do not have the exact date this was discontinued however he has been here since 3/25, it would be reasonable to transition him at 4/25 to apixaban/Eliquis  -He was seen by cardiology and underwent ECV 4/1/2022, briefly on amiodarone however with history of issues with the same he was ultimately transitioned to Lopressor only     Chronic pain syndrome  -His chronic Lortab was discontinued by the [facility] due to concern of contribution to his rash  -Currently he is on Suboxone as prescribed by the [facility]; we have continued the same while hospitalized     DM type 2, A1c 7.9%, with long-term use of insulin, with steroid-induced hyperglycemia  -Home medicines listed below reflect his insulin therapy prior to arriving to our facility for completeness and assistance with discharge from rehab  -Currently here while on prednisone 20 mg he has been receiving Levemir 32U nightly with scheduled Humalog 10U TIDAC, with a sliding scale; as his prednisone tapers down this will likely need taper down     Hypothyroidism  Hypertension  Hyperlipidemia  Mood disorder  -Continue atorvastatin, duloxetine, Lopressor, amlodipine   -Clonidine added alongside of prednisone due to uncontrolled hypertension, as steroids taper down this potentially may be discontinued     Severe tremor, stable/improved  -Primidone recently DC'd for rash  -Prescribed Sinemet recently but has not yet been able to start  -Follow-up with [facility] neurology     Chronic normocytic anemia  -Has been relatively stable, monitor     Abnormal abdominal CT  -Per documentation questionable pyelo versus more likely duodenitis, potential bibasilar consolidation; ID has followed and recommended monitoring off " antibiotics     VINI  -Documented as noncompliant with CPAP     Obesity, BMI 38.86 kg/M2  -Complicates all aspects of care    Discharge Follow Up Recommendations for outpatient labs/diagnostics:  PCP 1-2 weeks post rehab discharge  Dr. Brandt Ward, ID, about 3 weeks  Cardiology in 1 month  [facility] neph[facility] in 1 month  Nephrology as needed    Day of Discharge     HPI:   Feels well today, doing well with his diet.  No events overnight.  Eager to discharge to rehab today    Review of Systems  Gen- No fevers, chills  CV- No chest pain, palpitations  Resp- No cough, dyspnea  GI- No N/V/D, abd pain    Vital Signs:   Temp:  [97.4 °F (36.3 °C)-98.4 °F (36.9 °C)] 98.1 °F (36.7 °C)  Heart Rate:  [68-92] 72  Resp:  [18-22] 18  BP: (121-165)/(60-78) 145/66  Flow (L/min):  [5] 5      Physical Exam:  Constitutional: Awake, alert, chronically ill-appearing male sitting up in bed in no acute distress  HENT: NCAT, mucous membranes moist, poor dentition with multiple broken teeth  Respiratory: Clear to auscultation bilaterally, respiratory effort normal   Cardiovascular: RRR, no murmurs, rubs, or gallops, palpable radial pulses  Gastrointestinal: Positive bowel sounds, soft, nontender, nondistended  Musculoskeletal: No bilateral ankle edema  Psychiatric: Appropriate affect, cooperative  Neurologic: Speech clear, moving all extremity spontaneously, resting tremor  Dermatologic: No clearly identified residual rash    Pertinent  and/or Most Recent Results     LAB RESULTS:      Lab 04/13/22  0536 04/12/22  2229 04/11/22  0924 04/08/22  1158   WBC  --   --  9.92 9.64   HEMOGLOBIN  --   --  7.6* 8.0*   HEMATOCRIT  --   --  23.6* 26.2*   PLATELETS  --   --  246 225   NEUTROS ABS  --   --   --  7.35*   IMMATURE GRANS (ABS)  --   --   --  0.19*   LYMPHS ABS  --   --   --  1.18   MONOS ABS  --   --   --  0.54   EOS ABS  --   --   --  0.34   MCV  --   --  93.7 97.4*   HEPARIN ANTI-XA 1.17 1.84  --   --          Lab 04/14/22  0656 04/13/22  0536  04/11/22  0924 04/10/22  1042 04/09/22  0518 04/08/22  1158   SODIUM 145 143 144 142 146* 143   POTASSIUM 3.8 4.5 4.2 3.8 3.7 4.1   CHLORIDE 110* 107 111* 112* 115* 112*   CO2 27.0 28.0 26.0 23.0 23.0 21.0*   ANION GAP 8.0 8.0 7.0 7.0 8.0 10.0   BUN 41* 44* 39* 39* 45* 48*   CREATININE 1.31* 1.28* 1.50* 1.53* 1.72* 1.80*   EGFR 56.8* 58.4*  --  47.1*  --  38.8*   GLUCOSE 111* 311* 353* 271* 181* 190*   CALCIUM 8.1* 8.5* 8.2* 8.0* 8.0* 7.8*   MAGNESIUM  --   --  2.0  --  1.8  --    PHOSPHORUS 3.2 1.3* 1.6* 1.6* 2.3*  --          Lab 04/14/22  0656 04/13/22  0536 04/11/22 0924 04/10/22  1042 04/09/22  0518 04/08/22  1158   TOTAL PROTEIN  --   --  5.8*  --  5.4* 5.3*   ALBUMIN 2.60* 2.80* 2.90* 2.60* 2.60* 2.40*   GLOBULIN  --   --   --   --   --  2.9   ALT (SGPT)  --   --  11  --  10 14   AST (SGOT)  --   --  9  --  12 15   BILIRUBIN  --   --  0.3  --  0.3 0.4   ALK PHOS  --   --  88  --  79 70             Lab 04/11/22  0924 04/09/22  0518   CHOLESTEROL 87 82   TRIGLYCERIDES 140 148             Brief Urine Lab Results  (Last result in the past 365 days)      Color   Clarity   Blood   Leuk Est   Nitrite   Protein   CREAT   Urine HCG        04/12/22 1550             31.3             Microbiology Results (last 10 days)     Procedure Component Value - Date/Time    COVID PRE-OP / PRE-PROCEDURE SCREENING ORDER (NO ISOLATION) - Swab, Nasopharynx [[id number]]  (Normal) Collected: 04/14/22 1327    Lab Status: Final result Specimen: Swab from Nasopharynx Updated: 04/14/22 9479    Narrative:      The following orders were created for panel order COVID PRE-OP / PRE-PROCEDURE SCREENING ORDER (NO ISOLATION) - Swab, Nasopharynx.  Procedure                               Abnormality         Status                     ---------                               -----------         ------                     COVID-19, APTIMA PANTHER...[[id number]]  Normal              Final result                 Please view results for these tests on the  individual orders.    COVID-19, APTIMA PANTHER [facility] IN-HOUSE NP/OP SWAB IN UTM/VTM/SALINE TRANSPORT MEDIA 24HR TAT - Swab, Nasopharynx [[id number]]  (Normal) Collected: 04/14/22 1327    Lab Status: Final result Specimen: Swab from Nasopharynx Updated: 04/14/22 1839     COVID19 Not Detected    Narrative:      Fact sheet for providers: https://www.fda.gov/media/942659/download     Fact sheet for patients: https://www.fda.gov/media/971581/download    Test performed by RT PCR.          FL Video Swallow With Speech Single Contrast    Result Date: 4/12/2022  EXAMINATION: FL VIDEO SWALLOW W SPEECH SINGLE-CONTRAST-  INDICATION: dysphagia; R13.12-Dysphagia, oropharyngeal phase; N17.9-Acute kidney failure, unspecified; E86.0-Dehydration; L03.119-Cellulitis of unspecified part of limb; I77.6-Arteritis, unspecified; I50.9-Heart failure, unspecified; R79.89-Other specified abnormal findings of blood chemistry  TECHNIQUE: 42 seconds of fluoroscopic time was used for this exam. 6 associated fluoroscopic loops were saved. The patient was evaluated in the seated lateral position while taking a variety of consistencies of barium by mouth under the direction of speech pathology.  COMPARISON: NONE  FINDINGS: 42 seconds of fluoroscopy provided for a modified barium swallow. Please see speech therapy report for full details and recommendations.       Fluoroscopy provided for a modified barium swallow. Please see speech therapy report for full details and recommendations.    This report was finalized on 4/12/2022 4:32 PM by Yinka Parker.      FL Video Swallow With Speech Single Contrast    Result Date: 4/7/2022  EXAMINATION: FL VIDEO SWALLOW W SPEECH SINGLE-CONTRAST-  INDICATION: dysphagia; N17.9-Acute kidney failure, unspecified; E86.0-Dehydration; L03.119-Cellulitis of unspecified part of limb; I77.6-Arteritis, unspecified; I50.9-Heart failure, unspecified; R79.89-Other specified abnormal findings of blood chemistry; R13.10-Dysphagia,  unspecified  TECHNIQUE: 1 minute and 36 seconds of fluoroscopic time was used for this exam. 12 associated fluoroscopic loops were saved. The patient was evaluated in the seated lateral position while taking a variety of consistencies of barium by mouth under the direction of speech pathology.  COMPARISON: NONE  FINDINGS: 1 minute and 36 seconds of fluoroscopy provided for a modified barium swallow. Please see speech therapy report for full details and recommendations.       Fluoroscopy prior for a modified barium swallow. Please see speech therapy report for full details and recommendations.    This report was finalized on 4/7/2022 9:17 PM by Dr. Neal Mahoney MD.      XR Abdomen KUB    Result Date: 4/8/2022   DATE OF EXAM: 4/8/2022 3:07 PM  PROCEDURE: XR ABDOMEN KUB-  INDICATIONS: Keofeed Placement; N17.9-Acute kidney failure, unspecified; E86.0-Dehydration; L03.119-Cellulitis of unspecified part of limb; I77.6-Arteritis, unspecified; I50.9-Heart failure, unspecified; R79.89-Other specified abnormal findings of blood chemistry; R13.12-Dysphagia, oropharyngeal phase  COMPARISON: Recent CT abdomen examination  FINDINGS:  Bowel gas pattern within normal limits. No evidence of ileus. No evidence of obstruction. No bowel wall thickening demonstrated.  Tip of the feeding tube is in the right midabdomen.      IMPRESSION :  Tip of the feeding tube at the duodenal bulb versus distal stomach, at location of the pylorus  This report was finalized on 4/8/2022 3:27 PM by Priyank Payton.        Results for orders placed during the hospital encounter of 03/25/22    Duplex Venous Lower Extremity - Bilateral CAR    Interpretation Summary  · The bilateral lower extremity venous duplex scan is negative for evidence of a DVT and SVT in the areas visualized.  · The peroneal veins were not well visualized bilaterally.      Results for orders placed during the hospital encounter of 03/25/22    Duplex Venous Lower Extremity - Bilateral  CAR    Interpretation Summary  · The bilateral lower extremity venous duplex scan is negative for evidence of a DVT and SVT in the areas visualized.  · The peroneal veins were not well visualized bilaterally.      Results for orders placed during the hospital encounter of 03/25/22    Adult Transesophageal Echo (LARRY) W/ Cont if Necessary Per Protocol    Interpretation Summary  · Estimated left ventricular EF = 65%  · The cardiac valves are anatomically and functionally normal.  · No evidence of a left atrial appendage thrombus was present.      Discharge Details        Discharge Medications      New Medications      Instructions Start Date   amLODIPine 10 MG tablet  Commonly known as: NORVASC   10 mg, Oral, Every 24 Hours Scheduled   Start Date: April 16, 2022     cloNIDine 0.1 MG tablet  Commonly known as: CATAPRES   0.1 mg, Oral, Every 12 Hours Scheduled      melatonin 5 MG tablet tablet   5 mg, Oral, Nightly      predniSONE 5 MG tablet  Commonly known as: DELTASONE   Take 4 tablets by mouth Daily for 7 days, THEN 3 tablets Daily for 7 days, THEN 2 tablets Daily for 7 days, THEN 1 tablet Daily for 7 days.   Start Date: April 15, 2022     QUEtiapine 25 MG tablet  Commonly known as: SEROquel   25 mg, Oral, Every 12 Hours Scheduled      thiamine 100 MG tablet  tablet  Commonly known as: VITAMIN B-1   100 mg, Oral, Daily   Start Date: April 16, 2022        Changes to Medications      Instructions Start Date   acetaminophen 325 MG tablet  Commonly known as: TYLENOL  What changed:   · how much to take  · when to take this  · reasons to take this   650 mg, Oral, Every 4 Hours PRN      levothyroxine 150 MCG tablet  Commonly known as: SYNTHROID, LEVOTHROID  What changed: how much to take   150 mcg, Oral, Daily      metoprolol tartrate 50 MG tablet  Commonly known as: LOPRESSOR  What changed: when to take this   50 mg, Oral, Every 8 Hours Scheduled         Continue These Medications      Instructions Start Date   ascorbic  acid 500 MG tablet  Commonly known as: VITAMIN C   500 mg, Oral, Daily      atorvastatin 40 MG tablet  Commonly known as: LIPITOR   40 mg, Oral, Nightly      buprenorphine-naloxone 8-2 MG per SL tablet  Commonly known as: SUBOXONE   2 tablets, Sublingual, Every Night at Bedtime      cholecalciferol 25 MCG (1000 UT) tablet  Commonly known as: VITAMIN D3   2,000 Units, Oral, Daily      cyanocobalamin 50 MCG tablet  Commonly known as: VITAMIN B-12N   50 mcg, Oral, Daily      Diclofenac Sodium 1 % gel gel  Commonly known as: VOLTAREN   4 g, Topical, 4 Times Daily PRN      DULoxetine 30 MG capsule  Commonly known as: CYMBALTA   90 mg, Oral, Daily      enoxaparin 120 MG/0.8ML solution syringe  Commonly known as: LOVENOX   120 mg, Subcutaneous, Every 12 Hours Scheduled      erythromycin 5 MG/GM ophthalmic ointment  Commonly known as: ROMYCIN   1 application, Left Eye, 4 Times Daily, Left lower eye lid, for 4 days, started on 03-22-22      glipizide 10 MG tablet  Commonly known as: GLUCOTROL   10 mg, Oral, 2 Times Daily Before Meals      insulin  UNIT/ML injection  Commonly known as: humuLIN N,novoLIN N   60 Units, Subcutaneous, Every Night at Bedtime      lidocaine 5 %  Commonly known as: LIDODERM   2 patches, Transdermal, Every 24 Hours, Remove & Discard patch within 12 hours or as directed by MD      mirtazapine 15 MG tablet  Commonly known as: REMERON   7.5 mg, Oral, Nightly      omeprazole 20 MG capsule  Commonly known as: priLOSEC   20 mg, Oral, Daily      polyethylene glycol 17 g packet  Commonly known as: MIRALAX   17 g, Oral, Daily         Stop These Medications    carbidopa-levodopa  MG per tablet  Commonly known as: SINEMET     cephalexin 500 MG capsule  Commonly known as: KEFLEX            Allergies   Allergen Reactions   • Contrast Dye Rash     Rash/swelling per [facility] records   • Doxycycline Rash     Urticaria per [facility] records   • Chlorthalidone Other (See Comments)     Hyponatremia per [facility] records   •  Morphine Unknown - Low Severity     Headache per [facility] records   • Neurontin [Gabapentin] Mental Status Change     Drowsy per [facility] records   • Phenergan [Promethazine] Unknown - Low Severity     Confusion per [facility] records         Discharge Disposition:  Rehab Facility or Unit (DC - External)    Diet:  Hospital:  Diet Order   Procedures   • Diet Soft Texture; Ground; Consistent Carbohydrate          CODE STATUS:    Code Status and Medical Interventions:   Ordered at: 03/25/22 1328     Level Of Support Discussed With:    Patient     Code Status (Patient has no pulse and is not breathing):    CPR (Attempt to Resuscitate)     Medical Interventions (Patient has pulse or is breathing):    Full Support       Future Appointments   Date Time Provider Department Center   4/15/2022  2:00 PM EMS 2 [facility]       Additional Instructions for the Follow-ups that You Need to Schedule     Discharge Follow-up with PCP   As directed       Currently Documented PCP:    Vivek Mercer DO    PCP Phone Number:    [phone]     Follow Up Details: 1 week post rehab DC         Discharge Follow-up with Specialty: [facility] 1 month   As directed      Specialty: [facility] 1 month         Discharge Follow-up with Specified Provider: Cardiology 1 month   As directed      To: Cardiology 1 month         Discharge Follow-up with Specified Provider: Dr. Brandt Ward ~3 weeks   As directed      To: Dr. Brandt Ward ~3 weeks                     Wili Conner DO  04/15/22      Time Spent on Discharge:  I spent  40  minutes on this discharge activity which included: face-to-face encounter with the patient, reviewing the data in the system, coordination of the care with the nursing staff as well as consultants, documentation, and entering orders.            Electronically signed by Wili Conner DO at 04/15/22 1008       Bella Wooten MS CCC-SLP    Speech and Language Pathologist   Speech Therapy   Therapy  "Treatment Note       Signed   Date of Service:  22   Creation Time:  22              Signed        Expand All Collapse All        Show:Clear all  [x]Manual[x]Template[]Copied    Added by:  [x]Bella Wooten, MS CCC-SLP      []Brandon for details    Acute Care - Speech Language Pathology   Swallow Treatment Note  Chuck            Patient Name: Yinka Cummings                      : 1945                      MRN: [id number]  Today's Date: 2022                                                                           Admit Date: 3/25/2022     Visit Dx:   Visit Diagnosis       ICD-10-CM ICD-9-CM   1. Oropharyngeal dysphagia  R13.12 787.22   2. Acute renal failure, unspecified acute renal failure type (HCC)  N17.9 584.9   3. Dehydration  E86.0 276.51   4. Cellulitis of lower extremity, unspecified laterality  L03.119 682.6   5. Vasculitis ([facility])  I77.6 447.6   6. Congestive heart failure, unspecified HF chronicity, unspecified heart failure type (HCC)  I50.9 428.0   7. Positive D dimer  R79.89 790.92         Problem List       Patient Active Problem List   Diagnosis   • Renal insufficiency (unclear baseline creatinine, suspect CKD)   • Benign essential tremor   • Hypothyroidism (acquired)   • Pyuria   • Paroxysmal A-fib (on eliquis & metoprolol)   • Insulin dependent type 2 diabetes mellitus (HCC)   • Chronic back pain (on chronic prescription lortab)   • HTN (hypertension)   • Solitary kidney (s/p nephrectomy for \"mass\")   • History of prostate cancer   • Closed fracture of phalanx of right hand   • Dental caries   • Acute renal failure, unspecified acute renal failure type (HCC)   • Rash   • Nausea and vomiting         Medical History        Past Medical History:   Diagnosis Date   • Anxiety     • Arthritis     • Chronic kidney disease     • Diabetes mellitus (HCC)     • Disease of thyroid gland     • Diverticulosis     • Essential tremor     • HL (hearing loss)     • " Hypertension     • Obesity     • Prostate cancer (HCC)     • Renal cell cancer (HCC)     • Skin cancer     • Vasculitis of skin           Surgical History         Past Surgical History:   Procedure Laterality Date   • CHOLECYSTECTOMY       • COLONOSCOPY         Polyps in the past but not on the most recent scope   • NEPHRECTOMY Right     • PROSTATE SURGERY         Radical prostatectomy   • SKIN CANCER EXCISION         Large incision left neck, multiple other cancers removed            SLP Recommendation and Plan  SLP Diet Recommendation: NPO, long term alternate methods of nutrition/hydration, other (see comments) (Patient/family has opted for Comfort diet of soft, ground and thin liquids.) (04/14/22 1120)  Recommended Precautions and Strategies: general aspiration precautions (04/14/22 1120)  SLP Rec. for Method of Medication Administration: meds via alternate route (Comfort diet: meds crushed with pureed) (04/14/22 1120)  Anticipated Discharge Disposition (SLP): unknown, anticipate therapy at next level of care (04/14/22 1120)  Therapy Frequency (Swallow): 5 days per week (04/14/22 1120)  Predicted Duration Therapy Intervention (Days): until discharge (04/14/22 1120)  Daily Summary of Progress (SLP): progress toward functional goals as expected (04/14/22 1120)        Treatment Assessment (SLP): Patient was seen for comfort diet tolerance and dysphagia tx. Patient and family would like to continue with therapy for dysphagia. Good pariticipation with exercises and no discomfort/distress with comfort diet trials of soft solids and thin liquids. Patient did display cough with thin liquids. (04/14/22 1120)  Plan for Continued Treatment (SLP): continue treatment per plan of care (04/14/22 1120)                  SWALLOW EVALUATION (last 72 hours)                   SLP Adult Swallow Evaluation                Row Name 04/14/22 1120 04/12/22 1100 04/11/22 1340                           Rehab Evaluation      Document Type  therapy note (daily note)  -CH re-evaluation  -MP therapy note (daily note)  -CJ (r) MH (t) CJ (c)          Subjective Information no complaints  -CH no complaints  -MP no complaints  -CJ (r) MH (t) CJ (c)          Patient Observations alert;cooperative;agree to therapy  -CH alert;cooperative  -MP alert;cooperative  -CJ (r) MH (t) CJ (c)          Patient/Family/Caregiver Comments/Observations family present  -CH No family present  -MP Wife and daughter present  -CJ (r) MH (t) CJ (c)          Patient Effort good  -CH good  -MP good  -CJ (r) MH (t) CJ (c)          Symptoms Noted During/After Treatment none  - -- none  -CJ (r) MH (t) CJ (c)                                  General Information      Patient Profile Reviewed yes  -CH yes  -MP --          Pertinent History Of Current Problem -- Adm 3/25 w/ lower extremity rash, nausea/vomiting. Recent adm @ [facility] 3/10-28. Hx HTN, hypothy, Afib, DHF, VINI, chronic pain, GERD, CKD3, essential tremor, renal cell carcinoma s/p nephrectomy, gastroparesis, DM2, Gilbert's dz. Last MBS w/ recs for NPO w/ alternate route  -MP --          Current Method of Nutrition -- NPO;nasogastric feedings  -MP --          Precautions/Limitations, Vision -- WFL;for purposes of eval  -MP --          Precautions/Limitations, Hearing -- WFL;for purposes of eval  -MP --          Prior Level of Function-Communication -- unknown  -MP --          Prior Level of Function-Swallowing -- mechanical soft with no mixed consistencies;thin liquids;other (see comments)  per MBS 2021  -MP --          Plans/Goals Discussed with -- patient;agreed upon  -MP --          Barriers to Rehab -- previous functional deficit  -MP --          Patient's Goals for Discharge -- return to PO diet  -MP --                                  Pain      Additional Documentation Pain Scale: Numbers Pre/Post-Treatment (Group)  -CH Pain Scale: FACES Pre/Post-Treatment (Group)  -MP Pain Scale: FACES Pre/Post-Treatment (Group)   -CJ (r)  (t)  (c)                                  Pain Scale: Numbers Pre/Post-Treatment      Pretreatment Pain Rating 0/10 - no pain  -CH -- --          Posttreatment Pain Rating 0/10 - no pain  -CH -- --                                  Pain Scale: FACES Pre/Post-Treatment      Pain: FACES Scale, Pretreatment -- 0-->no hurt  -MP 0-->no hurt  -CJ (r)  (t) CJ (c)          Posttreatment Pain Rating -- 0-->no hurt  -MP 0-->no hurt  -CJ (r)  (t)  (c)                                  MBS/VFSS      Utensils Used -- spoon;cup  -MP --          Consistencies Trialed -- thin liquids;nectar/syrup-thick liquids;honey-thick liquids;pudding thick  -MP --                                  MBS/VFSS Interpretation      Oral Prep Phase -- WFL;other (see comments)  DNA solids  -MP --          Oral Transit Phase -- impaired  -MP --          Oral Residue -- WFL  -MP --          VFSS Summary -- Continued mild oral & severe pharyngeal dysphagia. Penetration during/after & aspiration after w/ thin liquids. Penetration & aspiration after w/ nectar-thick & honey-thick. Penetration after w/ pudding - did not visualize aspiration, but suspect inevitable. Mild-moderate pyriform residue w/ all consistencies that spills into laryngeal vestibule. Pt w/ large osteophyte @ C2-C5 (confirmed by Radiology PA) that prevents complete epiglottic inversion & impacts pharyngeal squeeze/stripping wave. Suspect dysphagia acute-on-chronic given anatomical component. Safest recommendation is NPO w/ continued alternate route, though pt expressed desire to continue eating/drinking despite risk, so may consider comfort diet.  -MP --                                  Oral Transit Phase      Impaired Oral Transit Phase -- tongue pumping  -MP --          Tongue Pumping -- thin liquids;nectar-thick liquids  -MP --                                  Initiation of Pharyngeal Swallow      Initiation of Pharyngeal Swallow -- bolus in pyriform sinuses  -MP --           Pharyngeal Phase -- impaired pharyngeal phase of swallowing  -MP --          Penetration During the Swallow -- thin liquids;secondary to delayed swallow initiation or mistiming;secondary to reduced laryngeal elevation;secondary to reduced vestibular closure  -MP --          Penetration After the Swallow -- nectar-thick liquids;honey-thick liquids;pudding/puree;secondary to residue;in pyriform sinuses  -MP --          Aspiration After the Swallow -- nectar-thick liquids;honey-thick liquids;secondary to residue;in pyriform sinuses;in laryngeal vestibule  -MP --          Response to Penetration -- no response  -MP --          Response to Aspiration -- no response, silent aspiration  -MP --          Rosenbek's Scale -- thin:;nectar:;honey:;8--->level 8  -MP --          Pharyngeal Residue -- thin liquids;nectar-thick liquids;honey-thick liquids;pudding/puree;valleculae;pyriform sinuses;secondary to reduced base of tongue retraction;secondary to reduced laryngeal elevation;secondary to reduced hyolaryngeal excursion  -MP --          Response to Residue -- unable to clear residue  -MP --          Attempted Compensatory Maneuvers -- bolus size;bolus presentation style;throat clear after swallow;multiple swallows;effortful (hard swallow)  -MP --          Response to Attempted Compensatory Maneuvers -- did not prevent penetration;did not prevent aspiration;did not reduce residue  -MP --                                  SLP Evaluation Clinical Impression      SLP Swallowing Diagnosis -- mild;oral dysphagia;severe;pharyngeal dysphagia  -MP --          Functional Impact -- risk of aspiration/pneumonia  -MP --          Rehab Potential/Prognosis, Swallowing -- adequate, monitor progress closely  -MP --          Swallow Criteria for Skilled Therapeutic Interventions Met -- demonstrates skilled criteria;other (see comments)  pending POC/GOC  -MP --                                  SLP Treatment Clinical Impressions       Treatment Assessment (SLP) Patient was seen for comfort diet tolerance and dysphagia tx. Patient and family would like to continue with therapy for dysphagia. Good pariticipation with exercises and no discomfort/distress with comfort diet trials of soft solids and thin liquids. Patient did display cough with thin liquids.  -CH -- Pt seen for therapy this afternoon. Pt w/ immediate hard cough w/ thin. Wife and dtr present; pt and wife eager for pt to have Keofeed pulled and begin PO intake. Provided extensive education regarding recs for NPO based on MBS 4/7. Plan for MBS tomorrow (4/12), cont NPO.  -CJ (r) MH (t) CJ (c)          Daily Summary of Progress (SLP) progress toward functional goals as expected  -CH -- progress toward functional goals as expected  -CJ (r) MH (t) CJ (c)          Barriers to Overall Progress (SLP) Medically complex  - -- Medically complex  -CJ (r) MH (t) CJ (c)          Plan for Continued Treatment (SLP) continue treatment per plan of care  -CH -- continue treatment per plan of care  -CJ (r) MH (t) CJ (c)          Care Plan Review evaluation/treatment results reviewed;care plan/treatment goals reviewed;risks/benefits reviewed;current/potential barriers reviewed  - -- evaluation/treatment results reviewed  -CJ (r) MH (t) CJ (c)          Care Plan Review, Other Participant(s) spouse;daughter  - -- daughter;spouse  -MANASA (r) MH (t) CJ (c)                                  Recommendations      Therapy Frequency (Swallow) 5 days per week  -CH 5 days per week  -MP 5 days per week  -CJ (r) MH (t) CJ (c)          Predicted Duration Therapy Intervention (Days) until discharge  -CH until discharge  -MP until discharge  -CJ (r) MH (t) CJ (c)          SLP Diet Recommendation NPO;long term alternate methods of nutrition/hydration;other (see comments)  Patient/family has opted for Comfort diet of soft, ground and thin liquids.  - NPO;long term alternate methods of nutrition/hydration;other (see  comments)  if comfort diet, consider soft/whole & thin  -MP NPO;temporary alternate methods of nutrition/hydration;other (see comments)  -MANASA (r)  (t) MANASA (c)          Recommended Diagnostics -- -- VFSS ([facility]);other (see comments)  -MANASA (r)  (linda) MANASA (c)          Recommended Precautions and Strategies general aspiration precautions  - general aspiration precautions  -MP upright posture during/after eating;small bites of food and sips of liquid;general aspiration precautions  -MANASA (r)  (t) MANASA (c)          Oral Care Recommendations Oral Care BID/PRN;Suction toothbrush  -CH Oral Care BID/PRN;Suction toothbrush  -MP Oral Care BID/PRN;Suction toothbrush  -MANASA (r)  (t) MANASA (c)          SLP Rec. for Method of Medication Administration meds via alternate route  Comfort diet: meds crushed with pureed  -CH meds via alternate route  if comfort, crush in pudding/puree  -MP meds via alternate route  -MANASA (r)  (t) MANASA (c)          Monitor for Signs of Aspiration -- yes;notify SLP if any concerns  -MP yes;notify SLP if any concerns  -MANASA (r)  (t) MANASA (c)          Anticipated Discharge Disposition (SLP) unknown;anticipate therapy at next level of care  -CH unknown;anticipate therapy at next level of care  -MP unknown;anticipate therapy at next level of care  - (r)  (t) MANASA (c)                          User Key  (r) = Recorded By, (t) = Taken By, (c) = Cosigned By             Initials Name Effective Dates      Rose Oconnell, MS CCC-SLP 06/16/21 -       MP Rivera Bonner, MS CCC-SLP 12/28/21 -        Bella Wooten, MS CCC-SLP 06/16/21 -        Maren Camacho, Speech Therapy Student 01/24/22 -                         EDUCATION  The patient has been educated in the following areas:   Home Exercise Program (HEP) Dysphagia (Swallowing Impairment) Oral Care/Hydration.                   SLP GOALS              Row Name 04/14/22 1120 04/12/22 1100 04/11/22 1340               Oral Nutrition/Hydration Goal 1 (SLP)       Oral Nutrition/Hydration Goal 1, SLP LTG: Pt will tolerate comfort diet of soft, ground and thin liquids without s/s of discomfort/distress.  -CH LTG: Pt will return to some form of PO diet by d/c.  -MP LTG: Pt will return to some form of PO diet by d/c.  -MANASA (ryan) CHUCHO (t) MANASA (c)      Time Frame (Oral Nutrition/Hydration Goal 1, SLP) by discharge  -CH by discharge  -MP by discharge  -MANASA (r) CHUCHO (t) MANASA (c)      Barriers (Oral Nutrition/Hydration Goal 1, SLP) Patient tolerated with cough following thin liquids via straw and after soft solid trials. No s/s of discomfort or distress.  -CH -- --      Progress/Outcomes (Oral Nutrition/Hydration Goal 1, SLP) goal revised this date  -CH goal ongoing  -MP continuing progress toward goal  -MANASA (ryan) CHUCHO (linda) MANASA (c)               Oral Nutrition/Hydration Goal 2 (SLP)      Oral Nutrition/Hydration Goal 2, SLP Pt will tolerate therapeutic trials of thin H2O & puree w/ no overt s/sxs aspiration/distress w/ 60% acc and no cues in order to assess appropriateness for repeat instrumental eval  -CH Pt will tolerate therapeutic trials of thin H2O & puree w/ no overt s/sxs aspiration/distress w/ 60% acc and no cues in order to assess appropriateness for repeat instrumental eval  -MP Pt will tolerate therapeutic trials of thin H2O & puree w/ no overt s/sxs aspiration/distress w/ 60% acc and no cues in order to assess appropriateness for repeat instrumental eval  -MANASA (ryan) CHUCHO (t) MANASA (c)      Time Frame (Oral Nutrition/Hydration Goal 2, SLP) short term goal (STG)  -CH short term goal (STG)  -MP short term goal (STG)  -MANASA (ryan) CHUCHO (t) MANASA (c)      Barriers (Oral Nutrition/Hydration Goal 2, SLP) Cough following small sips of thin via cup and straw  - -- Completed oral care. Pt w/ immediate hard cough w thin via ice chip  -MANASA (ryan) CHUCHO (t) MANASA (c)      Progress/Outcomes (Oral Nutrition/Hydration Goal 2, SLP) continuing progress toward goal  -CH goal ongoing  -MP progress slower than expected  -MANASA (ryan) CHUCHO  (t) CJ (c)               Lingual Strengthening Goal 1 (SLP)      Activity (Lingual Strengthening Goal 1, SLP) increase tongue back strength  -CH increase tongue back strength  -MP increase tongue back strength  -CJ (r) MH (t) CJ (c)      Increase Tongue Back Strength lingual resistance exercises;swallow trials  -CH lingual resistance exercises;swallow trials  -MP lingual resistance exercises;swallow trials  -CJ (r) CHUCHO (t) CJ (c)      [place]/Accuracy (Lingual Strengthening Goal 1, SLP) with minimal cues (75-90% accuracy)  -CH with minimal cues (75-90% accuracy)  -MP with minimal cues (75-90% accuracy)  -CJ (r) MH (t) CJ (c)      Time Frame (Lingual Strengthening Goal 1, SLP) short term goal (STG)  -CH short term goal (STG)  -MP short term goal (STG)  -CJ (r) CHUCHO (t) CJ (c)      Barriers (Lingual Strengthening Goal 1, SLP) Completed x 5  -CH -- --      Progress/Outcomes (Lingual Strengthening Goal 1, SLP) continuing progress toward goal  -CH goal ongoing  -MP goal ongoing  -CJ (r) CHUCHO (t) CJ (c)               Pharyngeal Strengthening Exercise Goal 1 (SLP)      Activity (Pharyngeal Strengthening Goal 1, SLP) increase timing;increase superior movement of the hyolaryngeal complex;increase anterior movement of the hyolaryngeal complex;increase closure at entrance to airway/closure of airway at glottis;increase squeeze/positive pressure generation;increase tongue base retraction;increase PES opening  -CH increase timing;increase superior movement of the hyolaryngeal complex;increase anterior movement of the hyolaryngeal complex;increase closure at entrance to airway/closure of airway at glottis;increase squeeze/positive pressure generation;increase tongue base retraction;increase PES opening  -MP increase timing;increase superior movement of the hyolaryngeal complex;increase anterior movement of the hyolaryngeal complex;increase closure at entrance to airway/closure of airway at glottis;increase squeeze/positive  pressure generation;increase tongue base retraction;increase PES opening  -CJ (r) MH (t) CJ (c)      Increase Timing prepping - 3 second prep or suck swallow or 3-step swallow  -CH prepping - 3 second prep or suck swallow or 3-step swallow  -MP prepping - 3 second prep or suck swallow or 3-step swallow  -CJ (r) MH (t) CJ (c)      Increase Superior Movement of the Hyolaryngeal Complex effortful pitch glide (falsetto + pharyngeal squeeze)  -CH effortful pitch glide (falsetto + pharyngeal squeeze)  -MP effortful pitch glide (falsetto + pharyngeal squeeze)  -CJ (r) MH (t) CJ (c)      Increase Anterior Movement of the Hyolaryngeal Complex chin tuck against resistance (CTAR)  -CH chin tuck against resistance (CTAR)  -MP chin tuck against resistance (CTAR)  -CJ (r) MH (t) CJ (c)      Increase Closure at Entrance to Airway/Closure of Airway at Glottis supraglottic swallow  -CH supraglottic swallow  -MP supraglottic swallow  -CJ (r) MH (t) CJ (c)      Increase Squeeze/Positive Pressure Generation hard effortful swallow  -CH hard effortful swallow  -MP hard effortful swallow  -CJ (r) MH (t) CJ (c)      Increase Tongue Base Retraction alia  -CH alia  -MP alia  -CJ (r) MH (t) CJ (c)      Increase PES Opening chin tuck against resistance (CTAR)  -CH chin tuck against resistance (CTAR)  -MP chin tuck against resistance (CTAR)  -CJ (r) MH (t) CJ (c)      [place]/Accuracy (Pharyngeal Strengthening Goal 1, SLP) with minimal cues (75-90% accuracy)  -CH with minimal cues (75-90% accuracy)  -MP with minimal cues (75-90% accuracy)  -CJ (r) MH (t) CJ (c)      Time Frame (Pharyngeal Strengthening Goal 1, SLP) short term goal (STG)  -CH short term goal (STG)  -MP short term goal (STG)  -CJ (r) MH (t) CJ (c)      Barriers (Pharyngeal Strengthening Goal 1, SLP) Completed x 5 each. Some difficulty w hard swallow, SSS and alia. Required mod cues.  -CH -- --      Progress/Outcomes (Pharyngeal Strengthening Goal 1, SLP) continuing  progress toward goal  -CH goal ongoing  -MP goal ongoing  -CJ (r) MH (t) CJ (c)                         User Key  (r) = Recorded By, (t) = Taken By, (c) = Cosigned By             Initials Name Provider Type      Rose Oconnell, MS CCC-SLP Speech and Language Pathologist      AILYN Randhawa Robin Rivera, MS CCC-SLP Speech and Language Pathologist      Bella Honeycutt, MS CCC-SLP Speech and Language Pathologist      Maren Garner, Speech Therapy Student SLP Student                                 Time Calculation:             Time Calculation- SLP              Row Name 04/14/22 1259                         Time Calculation- SLP      SLP Start Time 1120  -CH          SLP Received On 04/14/22  -CH                              Untimed Charges      46400-IC Treatment Swallow Minutes 53  -CH                              Total Minutes      Untimed Charges Total Minutes 53  -CH           Total Minutes 53  -CH                          User Key  (r) = Recorded By, (t) = Taken By, (c) = Cosigned By             Initials Name Provider Type      Bella Honeycutt MS CCC-SLP Speech and Language Pathologist                               Therapy Charges for Today      Code Description Service Date Service Provider Modifiers Qty     [id number] HC ST TREATMENT SWALLOW 4 4/14/2022 Bella Wooten MS CCC-SLP GN 1                Bella Wooten MS CCC-SLP                    4/14/2022      Patient was not wearing a face mask and did exhibit coughing during this therapy encounter.  Procedure performed was aerosolizing, involved close contact (within 6 feet for at least 15 minutes or longer), and did not involve contact with infectious secretions or specimens.  Therapist used appropriate personal protective equipment including gloves, standard procedure mask and eye protection.  Appropriate PPE was worn during the entire therapy session.  Hand hygiene was completed before and after therapy session.

## 2022-04-24 ENCOUNTER — HOSPITAL ENCOUNTER (INPATIENT)
Facility: HOSPITAL | Age: 77
LOS: 4 days | Discharge: HOME HEALTH | End: 2022-04-29
Attending: EMERGENCY MEDICINE | Admitting: INTERNAL MEDICINE

## 2022-04-24 ENCOUNTER — APPOINTMENT (OUTPATIENT)
Dept: CT IMAGING | Facility: HOSPITAL | Age: 77
End: 2022-04-24

## 2022-04-24 ENCOUNTER — APPOINTMENT (OUTPATIENT)
Dept: GENERAL RADIOLOGY | Facility: HOSPITAL | Age: 77
End: 2022-04-24

## 2022-04-24 DIAGNOSIS — M79.81 NONTRAUMATIC INTRAMUSCULAR HEMATOMA: ICD-10-CM

## 2022-04-24 DIAGNOSIS — E11.9 INSULIN DEPENDENT TYPE 2 DIABETES MELLITUS: ICD-10-CM

## 2022-04-24 DIAGNOSIS — N39.0 ACUTE UTI: Primary | ICD-10-CM

## 2022-04-24 DIAGNOSIS — R40.4 TRANSIENT ALTERATION OF AWARENESS: ICD-10-CM

## 2022-04-24 DIAGNOSIS — D62 ACUTE BLOOD LOSS ANEMIA: ICD-10-CM

## 2022-04-24 DIAGNOSIS — G89.29 OTHER CHRONIC PAIN: ICD-10-CM

## 2022-04-24 DIAGNOSIS — N18.30 ACUTE RENAL FAILURE SUPERIMPOSED ON STAGE 3 CHRONIC KIDNEY DISEASE, UNSPECIFIED ACUTE RENAL FAILURE TYPE, UNSPECIFIED WHETHER STAGE 3A OR 3B CKD: ICD-10-CM

## 2022-04-24 DIAGNOSIS — Z79.4 INSULIN DEPENDENT TYPE 2 DIABETES MELLITUS: ICD-10-CM

## 2022-04-24 DIAGNOSIS — D64.9 ACUTE ON CHRONIC ANEMIA: ICD-10-CM

## 2022-04-24 DIAGNOSIS — I10 PRIMARY HYPERTENSION: ICD-10-CM

## 2022-04-24 DIAGNOSIS — G93.40 ACUTE ENCEPHALOPATHY: ICD-10-CM

## 2022-04-24 DIAGNOSIS — I48.0 PAROXYSMAL A-FIB: ICD-10-CM

## 2022-04-24 DIAGNOSIS — N17.9 ACUTE RENAL FAILURE SUPERIMPOSED ON STAGE 3 CHRONIC KIDNEY DISEASE, UNSPECIFIED ACUTE RENAL FAILURE TYPE, UNSPECIFIED WHETHER STAGE 3A OR 3B CKD: ICD-10-CM

## 2022-04-24 DIAGNOSIS — R13.10 DYSPHAGIA, UNSPECIFIED TYPE: ICD-10-CM

## 2022-04-24 LAB
ABO GROUP BLD: NORMAL
ALBUMIN SERPL-MCNC: 2.9 G/DL (ref 3.5–5.2)
ALBUMIN/GLOB SERPL: 1.1 G/DL
ALP SERPL-CCNC: 96 U/L (ref 39–117)
ALT SERPL W P-5'-P-CCNC: 14 U/L (ref 1–41)
ANION GAP SERPL CALCULATED.3IONS-SCNC: 9 MMOL/L (ref 5–15)
AST SERPL-CCNC: 13 U/L (ref 1–40)
BACTERIA UR QL AUTO: ABNORMAL /HPF
BASOPHILS # BLD AUTO: 0.04 10*3/MM3 (ref 0–0.2)
BASOPHILS NFR BLD AUTO: 0.3 % (ref 0–1.5)
BILIRUB SERPL-MCNC: 1 MG/DL (ref 0–1.2)
BILIRUB UR QL STRIP: NEGATIVE
BLD GP AB SCN SERPL QL: NEGATIVE
BUN SERPL-MCNC: 20 MG/DL (ref 8–23)
BUN/CREAT SERPL: 14.1 (ref 7–25)
CALCIUM SPEC-SCNC: 7.7 MG/DL (ref 8.6–10.5)
CHLORIDE SERPL-SCNC: 103 MMOL/L (ref 98–107)
CLARITY UR: ABNORMAL
CO2 SERPL-SCNC: 25 MMOL/L (ref 22–29)
COLOR UR: YELLOW
CREAT SERPL-MCNC: 1.42 MG/DL (ref 0.76–1.27)
D-LACTATE SERPL-SCNC: 1.4 MMOL/L (ref 0.5–2)
DEPRECATED RDW RBC AUTO: 59.6 FL (ref 37–54)
EGFRCR SERPLBLD CKD-EPI 2021: 51.5 ML/MIN/1.73
EOSINOPHIL # BLD AUTO: 0.13 10*3/MM3 (ref 0–0.4)
EOSINOPHIL NFR BLD AUTO: 0.9 % (ref 0.3–6.2)
ERYTHROCYTE [DISTWIDTH] IN BLOOD BY AUTOMATED COUNT: 16.7 % (ref 12.3–15.4)
FLUAV RNA RESP QL NAA+PROBE: NOT DETECTED
FLUBV RNA RESP QL NAA+PROBE: NOT DETECTED
GLOBULIN UR ELPH-MCNC: 2.6 GM/DL
GLUCOSE SERPL-MCNC: 216 MG/DL (ref 65–99)
GLUCOSE UR STRIP-MCNC: NEGATIVE MG/DL
HCT VFR BLD AUTO: 18.1 % (ref 37.5–51)
HGB BLD-MCNC: 5.5 G/DL (ref 13–17.7)
HGB UR QL STRIP.AUTO: ABNORMAL
HOLD SPECIMEN: NORMAL
HOLD SPECIMEN: NORMAL
HYALINE CASTS UR QL AUTO: ABNORMAL /LPF
IMM GRANULOCYTES # BLD AUTO: 0.1 10*3/MM3 (ref 0–0.05)
IMM GRANULOCYTES NFR BLD AUTO: 0.7 % (ref 0–0.5)
KETONES UR QL STRIP: NEGATIVE
LEUKOCYTE ESTERASE UR QL STRIP.AUTO: ABNORMAL
LYMPHOCYTES # BLD AUTO: 1.34 10*3/MM3 (ref 0.7–3.1)
LYMPHOCYTES NFR BLD AUTO: 9.6 % (ref 19.6–45.3)
MCH RBC QN AUTO: 29.7 PG (ref 26.6–33)
MCHC RBC AUTO-ENTMCNC: 30.4 G/DL (ref 31.5–35.7)
MCV RBC AUTO: 97.8 FL (ref 79–97)
MONOCYTES # BLD AUTO: 1.14 10*3/MM3 (ref 0.1–0.9)
MONOCYTES NFR BLD AUTO: 8.2 % (ref 5–12)
NEUTROPHILS NFR BLD AUTO: 11.22 10*3/MM3 (ref 1.7–7)
NEUTROPHILS NFR BLD AUTO: 80.3 % (ref 42.7–76)
NITRITE UR QL STRIP: NEGATIVE
NRBC BLD AUTO-RTO: 0 /100 WBC (ref 0–0.2)
PH UR STRIP.AUTO: 5.5 [PH] (ref 5–8)
PLATELET # BLD AUTO: 244 10*3/MM3 (ref 140–450)
PMV BLD AUTO: 9.5 FL (ref 6–12)
POTASSIUM SERPL-SCNC: 4.3 MMOL/L (ref 3.5–5.2)
PROCALCITONIN SERPL-MCNC: 0.24 NG/ML (ref 0–0.25)
PROT SERPL-MCNC: 5.5 G/DL (ref 6–8.5)
PROT UR QL STRIP: ABNORMAL
RBC # BLD AUTO: 1.85 10*6/MM3 (ref 4.14–5.8)
RBC # UR STRIP: ABNORMAL /HPF
REF LAB TEST METHOD: ABNORMAL
RH BLD: POSITIVE
SARS-COV-2 RNA RESP QL NAA+PROBE: NOT DETECTED
SODIUM SERPL-SCNC: 137 MMOL/L (ref 136–145)
SP GR UR STRIP: 1.01 (ref 1–1.03)
SQUAMOUS #/AREA URNS HPF: ABNORMAL /HPF
T&S EXPIRATION DATE: NORMAL
UROBILINOGEN UR QL STRIP: ABNORMAL
WBC # UR STRIP: ABNORMAL /HPF
WBC NRBC COR # BLD: 13.97 10*3/MM3 (ref 3.4–10.8)
WHOLE BLOOD HOLD SPECIMEN: NORMAL
WHOLE BLOOD HOLD SPECIMEN: NORMAL

## 2022-04-24 PROCEDURE — 73502 X-RAY EXAM HIP UNI 2-3 VIEWS: CPT

## 2022-04-24 PROCEDURE — 86923 COMPATIBILITY TEST ELECTRIC: CPT

## 2022-04-24 PROCEDURE — 83605 ASSAY OF LACTIC ACID: CPT | Performed by: EMERGENCY MEDICINE

## 2022-04-24 PROCEDURE — 72192 CT PELVIS W/O DYE: CPT

## 2022-04-24 PROCEDURE — 86900 BLOOD TYPING SEROLOGIC ABO: CPT | Performed by: EMERGENCY MEDICINE

## 2022-04-24 PROCEDURE — 84145 PROCALCITONIN (PCT): CPT | Performed by: EMERGENCY MEDICINE

## 2022-04-24 PROCEDURE — 85025 COMPLETE CBC W/AUTO DIFF WBC: CPT | Performed by: EMERGENCY MEDICINE

## 2022-04-24 PROCEDURE — 99284 EMERGENCY DEPT VISIT MOD MDM: CPT

## 2022-04-24 PROCEDURE — 87636 SARSCOV2 & INF A&B AMP PRB: CPT | Performed by: EMERGENCY MEDICINE

## 2022-04-24 PROCEDURE — 81001 URINALYSIS AUTO W/SCOPE: CPT | Performed by: EMERGENCY MEDICINE

## 2022-04-24 PROCEDURE — 36415 COLL VENOUS BLD VENIPUNCTURE: CPT

## 2022-04-24 PROCEDURE — 87086 URINE CULTURE/COLONY COUNT: CPT | Performed by: INTERNAL MEDICINE

## 2022-04-24 PROCEDURE — 80053 COMPREHEN METABOLIC PANEL: CPT | Performed by: EMERGENCY MEDICINE

## 2022-04-24 PROCEDURE — 82270 OCCULT BLOOD FECES: CPT | Performed by: EMERGENCY MEDICINE

## 2022-04-24 PROCEDURE — 87040 BLOOD CULTURE FOR BACTERIA: CPT | Performed by: NURSE PRACTITIONER

## 2022-04-24 PROCEDURE — 70450 CT HEAD/BRAIN W/O DYE: CPT

## 2022-04-24 PROCEDURE — 73552 X-RAY EXAM OF FEMUR 2/>: CPT

## 2022-04-24 PROCEDURE — 83735 ASSAY OF MAGNESIUM: CPT | Performed by: INTERNAL MEDICINE

## 2022-04-24 PROCEDURE — 84484 ASSAY OF TROPONIN QUANT: CPT | Performed by: EMERGENCY MEDICINE

## 2022-04-24 PROCEDURE — P9612 CATHETERIZE FOR URINE SPEC: HCPCS

## 2022-04-24 PROCEDURE — 86850 RBC ANTIBODY SCREEN: CPT | Performed by: EMERGENCY MEDICINE

## 2022-04-24 PROCEDURE — 71045 X-RAY EXAM CHEST 1 VIEW: CPT

## 2022-04-24 PROCEDURE — 86901 BLOOD TYPING SEROLOGIC RH(D): CPT | Performed by: EMERGENCY MEDICINE

## 2022-04-24 RX ORDER — SODIUM CHLORIDE 0.9 % (FLUSH) 0.9 %
10 SYRINGE (ML) INJECTION AS NEEDED
Status: DISCONTINUED | OUTPATIENT
Start: 2022-04-24 | End: 2022-04-29 | Stop reason: HOSPADM

## 2022-04-24 RX ADMIN — SODIUM CHLORIDE 500 ML: 9 INJECTION, SOLUTION INTRAVENOUS at 22:09

## 2022-04-25 ENCOUNTER — ANESTHESIA EVENT (OUTPATIENT)
Dept: GASTROENTEROLOGY | Facility: HOSPITAL | Age: 77
End: 2022-04-25

## 2022-04-25 ENCOUNTER — ANESTHESIA (OUTPATIENT)
Dept: GASTROENTEROLOGY | Facility: HOSPITAL | Age: 77
End: 2022-04-25

## 2022-04-25 PROBLEM — R25.1 TREMOR: Status: ACTIVE | Noted: 2022-04-25

## 2022-04-25 PROBLEM — D64.9 ACUTE ON CHRONIC ANEMIA: Status: ACTIVE | Noted: 2022-04-25

## 2022-04-25 PROBLEM — N18.30 ACUTE RENAL FAILURE SUPERIMPOSED ON STAGE 3 CHRONIC KIDNEY DISEASE: Status: ACTIVE | Noted: 2022-03-25

## 2022-04-25 PROBLEM — R41.82 AMS (ALTERED MENTAL STATUS): Status: ACTIVE | Noted: 2022-04-25

## 2022-04-25 PROBLEM — N39.0 ACUTE UTI: Status: ACTIVE | Noted: 2022-04-25

## 2022-04-25 LAB
ABO GROUP BLD: NORMAL
ANION GAP SERPL CALCULATED.3IONS-SCNC: 9 MMOL/L (ref 5–15)
BASOPHILS # BLD AUTO: 0.03 10*3/MM3 (ref 0–0.2)
BASOPHILS NFR BLD AUTO: 0.2 % (ref 0–1.5)
BUN SERPL-MCNC: 22 MG/DL (ref 8–23)
BUN/CREAT SERPL: 15.9 (ref 7–25)
CALCIUM SPEC-SCNC: 7.9 MG/DL (ref 8.6–10.5)
CHLORIDE SERPL-SCNC: 109 MMOL/L (ref 98–107)
CO2 SERPL-SCNC: 26 MMOL/L (ref 22–29)
CREAT SERPL-MCNC: 1.38 MG/DL (ref 0.76–1.27)
DEPRECATED RDW RBC AUTO: 54.4 FL (ref 37–54)
DEVELOPER EXPIRATION DATE: ABNORMAL
DEVELOPER LOT NUMBER: ABNORMAL
EGFRCR SERPLBLD CKD-EPI 2021: 53.3 ML/MIN/1.73
EOSINOPHIL # BLD AUTO: 0.1 10*3/MM3 (ref 0–0.4)
EOSINOPHIL NFR BLD AUTO: 0.7 % (ref 0.3–6.2)
ERYTHROCYTE [DISTWIDTH] IN BLOOD BY AUTOMATED COUNT: 15.6 % (ref 12.3–15.4)
EXPIRATION DATE: ABNORMAL
FECAL OCCULT BLOOD SCREEN, POC: POSITIVE
GLUCOSE BLDC GLUCOMTR-MCNC: 124 MG/DL (ref 70–130)
GLUCOSE BLDC GLUCOMTR-MCNC: 155 MG/DL (ref 70–130)
GLUCOSE BLDC GLUCOMTR-MCNC: 164 MG/DL (ref 70–130)
GLUCOSE BLDC GLUCOMTR-MCNC: 215 MG/DL (ref 70–130)
GLUCOSE BLDC GLUCOMTR-MCNC: 254 MG/DL (ref 70–130)
GLUCOSE SERPL-MCNC: 225 MG/DL (ref 65–99)
HCT VFR BLD AUTO: 23 % (ref 37.5–51)
HCT VFR BLD AUTO: 23.5 % (ref 37.5–51)
HGB BLD-MCNC: 7.6 G/DL (ref 13–17.7)
HGB BLD-MCNC: 7.7 G/DL (ref 13–17.7)
HOLD SPECIMEN: NORMAL
IMM GRANULOCYTES # BLD AUTO: 0.16 10*3/MM3 (ref 0–0.05)
IMM GRANULOCYTES NFR BLD AUTO: 1.2 % (ref 0–0.5)
LYMPHOCYTES # BLD AUTO: 1.31 10*3/MM3 (ref 0.7–3.1)
LYMPHOCYTES NFR BLD AUTO: 9.8 % (ref 19.6–45.3)
Lab: ABNORMAL
MAGNESIUM SERPL-MCNC: 1.7 MG/DL (ref 1.6–2.4)
MCH RBC QN AUTO: 31 PG (ref 26.6–33)
MCHC RBC AUTO-ENTMCNC: 32.8 G/DL (ref 31.5–35.7)
MCV RBC AUTO: 94.8 FL (ref 79–97)
MONOCYTES # BLD AUTO: 1.37 10*3/MM3 (ref 0.1–0.9)
MONOCYTES NFR BLD AUTO: 10.3 % (ref 5–12)
NEGATIVE CONTROL: NEGATIVE
NEUTROPHILS NFR BLD AUTO: 10.37 10*3/MM3 (ref 1.7–7)
NEUTROPHILS NFR BLD AUTO: 77.8 % (ref 42.7–76)
NRBC BLD AUTO-RTO: 0 /100 WBC (ref 0–0.2)
PLATELET # BLD AUTO: 222 10*3/MM3 (ref 140–450)
PMV BLD AUTO: 9.6 FL (ref 6–12)
POSITIVE CONTROL: POSITIVE
POTASSIUM SERPL-SCNC: 4.2 MMOL/L (ref 3.5–5.2)
QT INTERVAL: 372 MS
QTC INTERVAL: 450 MS
RBC # BLD AUTO: 2.48 10*6/MM3 (ref 4.14–5.8)
RH BLD: POSITIVE
SODIUM SERPL-SCNC: 144 MMOL/L (ref 136–145)
TROPONIN T SERPL-MCNC: 0.02 NG/ML (ref 0–0.03)
WBC NRBC COR # BLD: 13.34 10*3/MM3 (ref 3.4–10.8)

## 2022-04-25 PROCEDURE — P9016 RBC LEUKOCYTES REDUCED: HCPCS

## 2022-04-25 PROCEDURE — 85018 HEMOGLOBIN: CPT | Performed by: INTERNAL MEDICINE

## 2022-04-25 PROCEDURE — 25010000002 CEFTRIAXONE PER 250 MG: Performed by: EMERGENCY MEDICINE

## 2022-04-25 PROCEDURE — 25010000002 LORAZEPAM PER 2 MG: Performed by: NURSE PRACTITIONER

## 2022-04-25 PROCEDURE — 63710000001 INSULIN LISPRO (HUMAN) PER 5 UNITS: Performed by: NURSE PRACTITIONER

## 2022-04-25 PROCEDURE — 85014 HEMATOCRIT: CPT | Performed by: INTERNAL MEDICINE

## 2022-04-25 PROCEDURE — 93005 ELECTROCARDIOGRAM TRACING: CPT | Performed by: INTERNAL MEDICINE

## 2022-04-25 PROCEDURE — 25010000002 DIPHENHYDRAMINE PER 50 MG: Performed by: EMERGENCY MEDICINE

## 2022-04-25 PROCEDURE — 36430 TRANSFUSION BLD/BLD COMPNT: CPT

## 2022-04-25 PROCEDURE — 86900 BLOOD TYPING SEROLOGIC ABO: CPT

## 2022-04-25 PROCEDURE — 82962 GLUCOSE BLOOD TEST: CPT

## 2022-04-25 PROCEDURE — 63710000001 INSULIN DETEMIR PER 5 UNITS: Performed by: INTERNAL MEDICINE

## 2022-04-25 PROCEDURE — 93005 ELECTROCARDIOGRAM TRACING: CPT | Performed by: EMERGENCY MEDICINE

## 2022-04-25 PROCEDURE — 92610 EVALUATE SWALLOWING FUNCTION: CPT

## 2022-04-25 PROCEDURE — 25010000002 HYDROMORPHONE PER 4 MG: Performed by: INTERNAL MEDICINE

## 2022-04-25 PROCEDURE — 0DJ08ZZ INSPECTION OF UPPER INTESTINAL TRACT, VIA NATURAL OR ARTIFICIAL OPENING ENDOSCOPIC: ICD-10-PCS | Performed by: FAMILY MEDICINE

## 2022-04-25 PROCEDURE — 63710000001 INSULIN LISPRO (HUMAN) PER 5 UNITS: Performed by: INTERNAL MEDICINE

## 2022-04-25 PROCEDURE — 86901 BLOOD TYPING SEROLOGIC RH(D): CPT

## 2022-04-25 PROCEDURE — 25010000002 PROTHROMBIN COMPLEX CONC HUMAN 1000 UNITS KIT: Performed by: EMERGENCY MEDICINE

## 2022-04-25 PROCEDURE — 43235 EGD DIAGNOSTIC BRUSH WASH: CPT | Performed by: INTERNAL MEDICINE

## 2022-04-25 PROCEDURE — 99223 1ST HOSP IP/OBS HIGH 75: CPT | Performed by: INTERNAL MEDICINE

## 2022-04-25 PROCEDURE — 63710000001 INSULIN DETEMIR PER 5 UNITS: Performed by: NURSE PRACTITIONER

## 2022-04-25 PROCEDURE — 25010000002 HALOPERIDOL LACTATE PER 5 MG: Performed by: INTERNAL MEDICINE

## 2022-04-25 PROCEDURE — 85025 COMPLETE CBC W/AUTO DIFF WBC: CPT | Performed by: NURSE PRACTITIONER

## 2022-04-25 PROCEDURE — 99222 1ST HOSP IP/OBS MODERATE 55: CPT | Performed by: PSYCHIATRY & NEUROLOGY

## 2022-04-25 PROCEDURE — 99222 1ST HOSP IP/OBS MODERATE 55: CPT | Performed by: PHYSICIAN ASSISTANT

## 2022-04-25 PROCEDURE — 25010000002 FLUCONAZOLE PER 200 MG: Performed by: INTERNAL MEDICINE

## 2022-04-25 PROCEDURE — 25010000002 PROPOFOL 10 MG/ML EMULSION

## 2022-04-25 PROCEDURE — 93010 ELECTROCARDIOGRAM REPORT: CPT | Performed by: INTERNAL MEDICINE

## 2022-04-25 PROCEDURE — 80048 BASIC METABOLIC PNL TOTAL CA: CPT | Performed by: NURSE PRACTITIONER

## 2022-04-25 PROCEDURE — 25010000002 CEFTRIAXONE PER 250 MG: Performed by: INTERNAL MEDICINE

## 2022-04-25 RX ORDER — METOPROLOL TARTRATE 50 MG/1
50 TABLET, FILM COATED ORAL EVERY 8 HOURS SCHEDULED
Status: DISCONTINUED | OUTPATIENT
Start: 2022-04-25 | End: 2022-04-25 | Stop reason: ALTCHOICE

## 2022-04-25 RX ORDER — ONDANSETRON 2 MG/ML
4 INJECTION INTRAMUSCULAR; INTRAVENOUS ONCE AS NEEDED
Status: DISCONTINUED | OUTPATIENT
Start: 2022-04-25 | End: 2022-04-25 | Stop reason: HOSPADM

## 2022-04-25 RX ORDER — IPRATROPIUM BROMIDE AND ALBUTEROL SULFATE 2.5; .5 MG/3ML; MG/3ML
3 SOLUTION RESPIRATORY (INHALATION) ONCE AS NEEDED
Status: DISCONTINUED | OUTPATIENT
Start: 2022-04-25 | End: 2022-04-25 | Stop reason: HOSPADM

## 2022-04-25 RX ORDER — METHYLPREDNISOLONE SODIUM SUCCINATE 40 MG/ML
40 INJECTION, POWDER, LYOPHILIZED, FOR SOLUTION INTRAMUSCULAR; INTRAVENOUS EVERY 4 HOURS
Status: DISCONTINUED | OUTPATIENT
Start: 2022-04-25 | End: 2022-04-25

## 2022-04-25 RX ORDER — ONDANSETRON 2 MG/ML
4 INJECTION INTRAMUSCULAR; INTRAVENOUS EVERY 6 HOURS PRN
Status: DISCONTINUED | OUTPATIENT
Start: 2022-04-25 | End: 2022-04-29 | Stop reason: HOSPADM

## 2022-04-25 RX ORDER — HALOPERIDOL 5 MG/ML
1 INJECTION INTRAMUSCULAR ONCE
Status: COMPLETED | OUTPATIENT
Start: 2022-04-25 | End: 2022-04-25

## 2022-04-25 RX ORDER — LORAZEPAM 2 MG/ML
1 INJECTION INTRAMUSCULAR EVERY 4 HOURS PRN
Status: DISCONTINUED | OUTPATIENT
Start: 2022-04-25 | End: 2022-04-25

## 2022-04-25 RX ORDER — DIPHENHYDRAMINE HYDROCHLORIDE 50 MG/ML
50 INJECTION INTRAMUSCULAR; INTRAVENOUS ONCE
Status: COMPLETED | OUTPATIENT
Start: 2022-04-25 | End: 2022-04-25

## 2022-04-25 RX ORDER — POVIDONE-IODINE 10 MG/ML
SOLUTION TOPICAL DAILY
Status: DISCONTINUED | OUTPATIENT
Start: 2022-04-25 | End: 2022-04-29 | Stop reason: HOSPADM

## 2022-04-25 RX ORDER — PANTOPRAZOLE SODIUM 40 MG/10ML
40 INJECTION, POWDER, LYOPHILIZED, FOR SOLUTION INTRAVENOUS
Status: DISCONTINUED | OUTPATIENT
Start: 2022-04-26 | End: 2022-04-28

## 2022-04-25 RX ORDER — ACETAMINOPHEN 650 MG/1
650 SUPPOSITORY RECTAL EVERY 4 HOURS PRN
Status: DISCONTINUED | OUTPATIENT
Start: 2022-04-25 | End: 2022-04-29 | Stop reason: HOSPADM

## 2022-04-25 RX ORDER — FLUCONAZOLE 2 MG/ML
200 INJECTION, SOLUTION INTRAVENOUS DAILY
Status: DISCONTINUED | OUTPATIENT
Start: 2022-04-25 | End: 2022-04-26

## 2022-04-25 RX ORDER — SODIUM CHLORIDE 0.9 % (FLUSH) 0.9 %
10 SYRINGE (ML) INJECTION EVERY 12 HOURS SCHEDULED
Status: DISCONTINUED | OUTPATIENT
Start: 2022-04-25 | End: 2022-04-29 | Stop reason: HOSPADM

## 2022-04-25 RX ORDER — LEVOTHYROXINE SODIUM 175 UG/1
175 TABLET ORAL DAILY
Status: DISCONTINUED | OUTPATIENT
Start: 2022-04-25 | End: 2022-04-29 | Stop reason: HOSPADM

## 2022-04-25 RX ORDER — DEXTROSE MONOHYDRATE 25 G/50ML
25 INJECTION, SOLUTION INTRAVENOUS
Status: DISCONTINUED | OUTPATIENT
Start: 2022-04-25 | End: 2022-04-29 | Stop reason: HOSPADM

## 2022-04-25 RX ORDER — PANTOPRAZOLE SODIUM 40 MG/10ML
40 INJECTION, POWDER, LYOPHILIZED, FOR SOLUTION INTRAVENOUS EVERY 12 HOURS SCHEDULED
Status: DISCONTINUED | OUTPATIENT
Start: 2022-04-25 | End: 2022-04-25

## 2022-04-25 RX ORDER — SODIUM CHLORIDE 9 MG/ML
INJECTION, SOLUTION INTRAVENOUS CONTINUOUS PRN
Status: DISCONTINUED | OUTPATIENT
Start: 2022-04-25 | End: 2022-04-25 | Stop reason: SURG

## 2022-04-25 RX ORDER — AMOXICILLIN 250 MG
2 CAPSULE ORAL 2 TIMES DAILY
Status: DISCONTINUED | OUTPATIENT
Start: 2022-04-25 | End: 2022-04-29 | Stop reason: HOSPADM

## 2022-04-25 RX ORDER — POLYETHYLENE GLYCOL 3350 17 G/17G
17 POWDER, FOR SOLUTION ORAL DAILY PRN
Status: DISCONTINUED | OUTPATIENT
Start: 2022-04-25 | End: 2022-04-29 | Stop reason: HOSPADM

## 2022-04-25 RX ORDER — ACETAMINOPHEN 160 MG/5ML
650 SOLUTION ORAL EVERY 4 HOURS PRN
Status: DISCONTINUED | OUTPATIENT
Start: 2022-04-25 | End: 2022-04-29 | Stop reason: HOSPADM

## 2022-04-25 RX ORDER — NICOTINE POLACRILEX 4 MG
15 LOZENGE BUCCAL
Status: DISCONTINUED | OUTPATIENT
Start: 2022-04-25 | End: 2022-04-29 | Stop reason: HOSPADM

## 2022-04-25 RX ORDER — METOPROLOL TARTRATE 5 MG/5ML
2.5 INJECTION INTRAVENOUS EVERY 6 HOURS
Status: DISCONTINUED | OUTPATIENT
Start: 2022-04-25 | End: 2022-04-27

## 2022-04-25 RX ORDER — SODIUM CHLORIDE 0.9 % (FLUSH) 0.9 %
10 SYRINGE (ML) INJECTION AS NEEDED
Status: DISCONTINUED | OUTPATIENT
Start: 2022-04-25 | End: 2022-04-29 | Stop reason: HOSPADM

## 2022-04-25 RX ORDER — PROPOFOL 10 MG/ML
VIAL (ML) INTRAVENOUS AS NEEDED
Status: DISCONTINUED | OUTPATIENT
Start: 2022-04-25 | End: 2022-04-25 | Stop reason: SURG

## 2022-04-25 RX ORDER — BISACODYL 10 MG
10 SUPPOSITORY, RECTAL RECTAL DAILY PRN
Status: DISCONTINUED | OUTPATIENT
Start: 2022-04-25 | End: 2022-04-29 | Stop reason: HOSPADM

## 2022-04-25 RX ORDER — ONDANSETRON 4 MG/1
4 TABLET, FILM COATED ORAL EVERY 6 HOURS PRN
Status: DISCONTINUED | OUTPATIENT
Start: 2022-04-25 | End: 2022-04-29 | Stop reason: HOSPADM

## 2022-04-25 RX ORDER — LORAZEPAM 2 MG/ML
0.5 INJECTION INTRAMUSCULAR EVERY 4 HOURS PRN
Status: DISCONTINUED | OUTPATIENT
Start: 2022-04-25 | End: 2022-04-29 | Stop reason: HOSPADM

## 2022-04-25 RX ORDER — ATORVASTATIN CALCIUM 40 MG/1
40 TABLET, FILM COATED ORAL NIGHTLY
Status: DISCONTINUED | OUTPATIENT
Start: 2022-04-25 | End: 2022-04-29 | Stop reason: HOSPADM

## 2022-04-25 RX ORDER — ACETAMINOPHEN 325 MG/1
650 TABLET ORAL EVERY 4 HOURS PRN
Status: DISCONTINUED | OUTPATIENT
Start: 2022-04-25 | End: 2022-04-29 | Stop reason: HOSPADM

## 2022-04-25 RX ORDER — BISACODYL 5 MG/1
5 TABLET, DELAYED RELEASE ORAL DAILY PRN
Status: DISCONTINUED | OUTPATIENT
Start: 2022-04-25 | End: 2022-04-29 | Stop reason: HOSPADM

## 2022-04-25 RX ORDER — LIDOCAINE HYDROCHLORIDE 10 MG/ML
INJECTION, SOLUTION EPIDURAL; INFILTRATION; INTRACAUDAL; PERINEURAL AS NEEDED
Status: DISCONTINUED | OUTPATIENT
Start: 2022-04-25 | End: 2022-04-25 | Stop reason: SURG

## 2022-04-25 RX ORDER — HYDROMORPHONE HYDROCHLORIDE 1 MG/ML
0.5 INJECTION, SOLUTION INTRAMUSCULAR; INTRAVENOUS; SUBCUTANEOUS ONCE
Status: COMPLETED | OUTPATIENT
Start: 2022-04-25 | End: 2022-04-25

## 2022-04-25 RX ADMIN — Medication 10 ML: at 09:10

## 2022-04-25 RX ADMIN — INSULIN LISPRO 2 UNITS: 100 INJECTION, SOLUTION INTRAVENOUS; SUBCUTANEOUS at 12:03

## 2022-04-25 RX ADMIN — LORAZEPAM 0.5 MG: 2 INJECTION INTRAMUSCULAR; INTRAVENOUS at 03:28

## 2022-04-25 RX ADMIN — LIDOCAINE HYDROCHLORIDE 50 MG: 10 INJECTION, SOLUTION EPIDURAL; INFILTRATION; INTRACAUDAL; PERINEURAL at 13:39

## 2022-04-25 RX ADMIN — PROPOFOL 25 MCG/KG/MIN: 10 INJECTION, EMULSION INTRAVENOUS at 13:39

## 2022-04-25 RX ADMIN — CEFTRIAXONE 2 G: 2 INJECTION, POWDER, FOR SOLUTION INTRAMUSCULAR; INTRAVENOUS at 00:31

## 2022-04-25 RX ADMIN — INSULIN LISPRO 2 UNITS: 100 INJECTION, SOLUTION INTRAVENOUS; SUBCUTANEOUS at 21:33

## 2022-04-25 RX ADMIN — METOPROLOL TARTRATE 2.5 MG: 5 INJECTION INTRAVENOUS at 21:17

## 2022-04-25 RX ADMIN — PANTOPRAZOLE SODIUM 40 MG: 40 INJECTION, POWDER, FOR SOLUTION INTRAVENOUS at 02:48

## 2022-04-25 RX ADMIN — Medication 10 ML: at 21:28

## 2022-04-25 RX ADMIN — HYDROMORPHONE HYDROCHLORIDE 0.5 MG: 1 INJECTION, SOLUTION INTRAMUSCULAR; INTRAVENOUS; SUBCUTANEOUS at 05:36

## 2022-04-25 RX ADMIN — SODIUM CHLORIDE 1000 ML: 9 INJECTION, SOLUTION INTRAVENOUS at 00:35

## 2022-04-25 RX ADMIN — INSULIN LISPRO 3 UNITS: 100 INJECTION, SOLUTION INTRAVENOUS; SUBCUTANEOUS at 09:09

## 2022-04-25 RX ADMIN — FLUCONAZOLE 200 MG: 200 INJECTION, SOLUTION INTRAVENOUS at 17:36

## 2022-04-25 RX ADMIN — SODIUM CHLORIDE: 9 INJECTION, SOLUTION INTRAVENOUS at 13:35

## 2022-04-25 RX ADMIN — HALOPERIDOL LACTATE 1 MG: 5 INJECTION, SOLUTION INTRAMUSCULAR at 02:43

## 2022-04-25 RX ADMIN — ACETAMINOPHEN 650 MG: 650 SUPPOSITORY RECTAL at 05:36

## 2022-04-25 RX ADMIN — PROTHROMBIN, COAGULATION FACTOR VII HUMAN, COAGULATION FACTOR IX HUMAN, COAGULATION FACTOR X HUMAN, PROTEIN C, PROTEIN S HUMAN, AND WATER 5000 UNITS: KIT at 02:02

## 2022-04-25 RX ADMIN — INSULIN DETEMIR 15 UNITS: 100 INJECTION, SOLUTION SUBCUTANEOUS at 21:23

## 2022-04-25 RX ADMIN — CEFTRIAXONE 2 G: 2 INJECTION, POWDER, FOR SOLUTION INTRAMUSCULAR; INTRAVENOUS at 21:15

## 2022-04-25 RX ADMIN — INSULIN DETEMIR 10 UNITS: 100 INJECTION, SOLUTION SUBCUTANEOUS at 03:54

## 2022-04-25 RX ADMIN — DIPHENHYDRAMINE HYDROCHLORIDE 50 MG: 50 INJECTION, SOLUTION INTRAMUSCULAR; INTRAVENOUS at 01:58

## 2022-04-25 NOTE — ANESTHESIA POSTPROCEDURE EVALUATION
Patient: Yinka Cummigns    Procedure Summary     Date: 04/25/22 Room / Location: [facility] ENDOSCOPY 3 /  [facility]    Anesthesia Start: 1335 Anesthesia Stop: 1403    Procedure: ESOPHAGOGASTRODUODENOSCOPY (N/A ) Diagnosis:       Acute on chronic anemia      (Acute on chronic anemia [D64.9])    Surgeons: Brunner, Mark I, MD Provider: Migue Ribeiro MD    Anesthesia Type: general ASA Status: 3          Anesthesia Type: general    Vitals  No vitals data found for the desired time range.          Post Anesthesia Care and Evaluation    Patient location during evaluation: PACU  Patient participation: complete - patient participated  Level of consciousness: awake and alert  Pain management: adequate  Airway patency: patent  Anesthetic complications: No anesthetic complications  PONV Status: none  Cardiovascular status: hemodynamically stable and acceptable  Respiratory status: nonlabored ventilation, acceptable and face mask  Hydration status: acceptable    Comments: O2 sats 100%  68 HR  120/47  Temp 98.1

## 2022-04-25 NOTE — ANESTHESIA PREPROCEDURE EVALUATION
Anesthesia Evaluation     Patient summary reviewed and Nursing notes reviewed   NPO Solid Status: > 8 hours  NPO Liquid Status: > 2 hours           Airway   Mallampati: II  TM distance: >3 FB  Neck ROM: full  Possible difficult intubation  Dental      Pulmonary    (+) decreased breath sounds,   Cardiovascular   Exercise tolerance: poor (<4 METS)    ECG reviewed  Rhythm: regular  Rate: normal    (+) hypertension, dysrhythmias Atrial Fib,     ROS comment: EF 65%    Neuro/Psych  (+) tremors, psychiatric history Anxiety,    GI/Hepatic/Renal/Endo    (+) obesity,   renal disease CRI, diabetes mellitus type 2 well controlled, thyroid problem hypothyroidism  (-) morbid obesity    Musculoskeletal     Abdominal   (+) obese,     Abdomen: soft.   Substance History      OB/GYN          Other   arthritis, blood dyscrasia anemia,   history of cancer                    Anesthesia Plan    ASA 3     general     intravenous induction     Anesthetic plan, all risks, benefits, and alternatives have been provided, discussed and informed consent has been obtained with: patient.    Plan discussed with CRNA.        CODE STATUS:    Code Status (Patient has no pulse and is not breathing): CPR (Attempt to Resuscitate)  Medical Interventions (Patient has pulse or is breathing): Full Support

## 2022-04-26 LAB
ANION GAP SERPL CALCULATED.3IONS-SCNC: 7 MMOL/L (ref 5–15)
BACTERIA SPEC AEROBE CULT: NORMAL
BH BB BLOOD EXPIRATION DATE: NORMAL
BH BB BLOOD EXPIRATION DATE: NORMAL
BH BB BLOOD TYPE BARCODE: 5100
BH BB BLOOD TYPE BARCODE: 5100
BH BB DISPENSE STATUS: NORMAL
BH BB DISPENSE STATUS: NORMAL
BH BB PRODUCT CODE: NORMAL
BH BB PRODUCT CODE: NORMAL
BH BB UNIT NUMBER: NORMAL
BH BB UNIT NUMBER: NORMAL
BUN SERPL-MCNC: 19 MG/DL (ref 8–23)
BUN/CREAT SERPL: 15.6 (ref 7–25)
CALCIUM SPEC-SCNC: 7.9 MG/DL (ref 8.6–10.5)
CHLORIDE SERPL-SCNC: 111 MMOL/L (ref 98–107)
CO2 SERPL-SCNC: 26 MMOL/L (ref 22–29)
CREAT SERPL-MCNC: 1.22 MG/DL (ref 0.76–1.27)
CROSSMATCH INTERPRETATION: NORMAL
CROSSMATCH INTERPRETATION: NORMAL
EGFRCR SERPLBLD CKD-EPI 2021: 61.8 ML/MIN/1.73
GLUCOSE BLDC GLUCOMTR-MCNC: 157 MG/DL (ref 70–130)
GLUCOSE BLDC GLUCOMTR-MCNC: 253 MG/DL (ref 70–130)
GLUCOSE BLDC GLUCOMTR-MCNC: 302 MG/DL (ref 70–130)
GLUCOSE BLDC GLUCOMTR-MCNC: 392 MG/DL (ref 70–130)
GLUCOSE SERPL-MCNC: 125 MG/DL (ref 65–99)
HCT VFR BLD AUTO: 22.2 % (ref 37.5–51)
HCT VFR BLD AUTO: 23.3 % (ref 37.5–51)
HCT VFR BLD AUTO: 23.3 % (ref 37.5–51)
HGB BLD-MCNC: 7.2 G/DL (ref 13–17.7)
HGB BLD-MCNC: 7.6 G/DL (ref 13–17.7)
HGB BLD-MCNC: 7.7 G/DL (ref 13–17.7)
POTASSIUM SERPL-SCNC: 4.4 MMOL/L (ref 3.5–5.2)
SODIUM SERPL-SCNC: 144 MMOL/L (ref 136–145)
UNIT  ABO: NORMAL
UNIT  ABO: NORMAL
UNIT  RH: NORMAL
UNIT  RH: NORMAL

## 2022-04-26 PROCEDURE — 92610 EVALUATE SWALLOWING FUNCTION: CPT

## 2022-04-26 PROCEDURE — 25010000002 NA FERRIC GLUC CPLX PER 12.5 MG: Performed by: INTERNAL MEDICINE

## 2022-04-26 PROCEDURE — 97162 PT EVAL MOD COMPLEX 30 MIN: CPT

## 2022-04-26 PROCEDURE — 97535 SELF CARE MNGMENT TRAINING: CPT

## 2022-04-26 PROCEDURE — 63710000001 INSULIN DETEMIR PER 5 UNITS: Performed by: INTERNAL MEDICINE

## 2022-04-26 PROCEDURE — 25010000002 HYDRALAZINE PER 20 MG: Performed by: PHYSICIAN ASSISTANT

## 2022-04-26 PROCEDURE — 85018 HEMOGLOBIN: CPT | Performed by: INTERNAL MEDICINE

## 2022-04-26 PROCEDURE — 63710000001 INSULIN LISPRO (HUMAN) PER 5 UNITS: Performed by: INTERNAL MEDICINE

## 2022-04-26 PROCEDURE — 80048 BASIC METABOLIC PNL TOTAL CA: CPT | Performed by: INTERNAL MEDICINE

## 2022-04-26 PROCEDURE — 25010000002 LORAZEPAM PER 2 MG: Performed by: INTERNAL MEDICINE

## 2022-04-26 PROCEDURE — 85014 HEMATOCRIT: CPT | Performed by: INTERNAL MEDICINE

## 2022-04-26 PROCEDURE — 82962 GLUCOSE BLOOD TEST: CPT

## 2022-04-26 PROCEDURE — 99232 SBSQ HOSP IP/OBS MODERATE 35: CPT | Performed by: PSYCHIATRY & NEUROLOGY

## 2022-04-26 PROCEDURE — 99232 SBSQ HOSP IP/OBS MODERATE 35: CPT | Performed by: INTERNAL MEDICINE

## 2022-04-26 PROCEDURE — 97166 OT EVAL MOD COMPLEX 45 MIN: CPT

## 2022-04-26 PROCEDURE — 25010000002 CEFTRIAXONE PER 250 MG: Performed by: INTERNAL MEDICINE

## 2022-04-26 PROCEDURE — 25010000002 FLUCONAZOLE PER 200 MG: Performed by: INTERNAL MEDICINE

## 2022-04-26 PROCEDURE — 63710000001 PREDNISONE PER 5 MG: Performed by: INTERNAL MEDICINE

## 2022-04-26 RX ORDER — CHOLECALCIFEROL (VITAMIN D3) 125 MCG
5 CAPSULE ORAL NIGHTLY
Status: DISCONTINUED | OUTPATIENT
Start: 2022-04-26 | End: 2022-04-29 | Stop reason: HOSPADM

## 2022-04-26 RX ORDER — ASPIRIN 81 MG/1
81 TABLET, CHEWABLE ORAL DAILY
Status: DISCONTINUED | OUTPATIENT
Start: 2022-04-26 | End: 2022-04-29 | Stop reason: HOSPADM

## 2022-04-26 RX ORDER — FLUCONAZOLE 2 MG/ML
200 INJECTION, SOLUTION INTRAVENOUS DAILY
Status: DISCONTINUED | OUTPATIENT
Start: 2022-04-27 | End: 2022-04-26

## 2022-04-26 RX ORDER — FLUCONAZOLE 2 MG/ML
200 INJECTION, SOLUTION INTRAVENOUS DAILY
Status: DISCONTINUED | OUTPATIENT
Start: 2022-04-27 | End: 2022-04-27

## 2022-04-26 RX ORDER — HYDRALAZINE HYDROCHLORIDE 20 MG/ML
10 INJECTION INTRAMUSCULAR; INTRAVENOUS ONCE
Status: COMPLETED | OUTPATIENT
Start: 2022-04-26 | End: 2022-04-26

## 2022-04-26 RX ORDER — MIRTAZAPINE 15 MG/1
7.5 TABLET, FILM COATED ORAL NIGHTLY
Status: DISCONTINUED | OUTPATIENT
Start: 2022-04-26 | End: 2022-04-29 | Stop reason: HOSPADM

## 2022-04-26 RX ORDER — INSULIN LISPRO 100 [IU]/ML
0-7 INJECTION, SOLUTION INTRAVENOUS; SUBCUTANEOUS
Status: DISCONTINUED | OUTPATIENT
Start: 2022-04-26 | End: 2022-04-29 | Stop reason: HOSPADM

## 2022-04-26 RX ORDER — QUETIAPINE FUMARATE 25 MG/1
25 TABLET, FILM COATED ORAL EVERY 12 HOURS SCHEDULED
Status: DISCONTINUED | OUTPATIENT
Start: 2022-04-26 | End: 2022-04-29 | Stop reason: HOSPADM

## 2022-04-26 RX ORDER — CLONIDINE HYDROCHLORIDE 0.1 MG/1
0.1 TABLET ORAL EVERY 12 HOURS SCHEDULED
Status: DISCONTINUED | OUTPATIENT
Start: 2022-04-26 | End: 2022-04-29 | Stop reason: HOSPADM

## 2022-04-26 RX ORDER — AMLODIPINE BESYLATE 10 MG/1
10 TABLET ORAL
Status: DISCONTINUED | OUTPATIENT
Start: 2022-04-26 | End: 2022-04-29 | Stop reason: HOSPADM

## 2022-04-26 RX ADMIN — ASPIRIN 81 MG 81 MG: 81 TABLET ORAL at 17:35

## 2022-04-26 RX ADMIN — INSULIN LISPRO 4 UNITS: 100 INJECTION, SOLUTION INTRAVENOUS; SUBCUTANEOUS at 21:07

## 2022-04-26 RX ADMIN — INSULIN DETEMIR 15 UNITS: 100 INJECTION, SOLUTION SUBCUTANEOUS at 21:07

## 2022-04-26 RX ADMIN — SODIUM CHLORIDE 125 MG: 9 INJECTION, SOLUTION INTRAVENOUS at 11:07

## 2022-04-26 RX ADMIN — LORAZEPAM 0.5 MG: 2 INJECTION INTRAMUSCULAR; INTRAVENOUS at 04:28

## 2022-04-26 RX ADMIN — SENNOSIDES AND DOCUSATE SODIUM 2 TABLET: 50; 8.6 TABLET ORAL at 21:14

## 2022-04-26 RX ADMIN — QUETIAPINE FUMARATE 25 MG: 25 TABLET ORAL at 09:07

## 2022-04-26 RX ADMIN — INSULIN LISPRO 5 UNITS: 100 INJECTION, SOLUTION INTRAVENOUS; SUBCUTANEOUS at 12:08

## 2022-04-26 RX ADMIN — METOPROLOL TARTRATE 2.5 MG: 5 INJECTION INTRAVENOUS at 21:07

## 2022-04-26 RX ADMIN — FLUCONAZOLE 200 MG: 200 INJECTION, SOLUTION INTRAVENOUS at 09:08

## 2022-04-26 RX ADMIN — METOPROLOL TARTRATE 2.5 MG: 5 INJECTION INTRAVENOUS at 14:36

## 2022-04-26 RX ADMIN — Medication 10 ML: at 09:33

## 2022-04-26 RX ADMIN — INSULIN DETEMIR 15 UNITS: 100 INJECTION, SOLUTION SUBCUTANEOUS at 09:29

## 2022-04-26 RX ADMIN — Medication 5 MG: at 21:15

## 2022-04-26 RX ADMIN — AMLODIPINE BESYLATE 10 MG: 10 TABLET ORAL at 09:07

## 2022-04-26 RX ADMIN — INSULIN LISPRO 6 UNITS: 100 INJECTION, SOLUTION INTRAVENOUS; SUBCUTANEOUS at 17:35

## 2022-04-26 RX ADMIN — CEFTRIAXONE 2 G: 2 INJECTION, POWDER, FOR SOLUTION INTRAMUSCULAR; INTRAVENOUS at 21:09

## 2022-04-26 RX ADMIN — HYDRALAZINE HYDROCHLORIDE 10 MG: 20 INJECTION INTRAMUSCULAR; INTRAVENOUS at 01:00

## 2022-04-26 RX ADMIN — CLONIDINE HYDROCHLORIDE 0.1 MG: 0.1 TABLET ORAL at 21:15

## 2022-04-26 RX ADMIN — PREDNISONE 15 MG: 5 TABLET ORAL at 09:07

## 2022-04-26 RX ADMIN — PANTOPRAZOLE SODIUM 40 MG: 40 INJECTION, POWDER, FOR SOLUTION INTRAVENOUS at 09:08

## 2022-04-26 RX ADMIN — DULOXETINE HYDROCHLORIDE 90 MG: 30 CAPSULE, DELAYED RELEASE ORAL at 09:07

## 2022-04-26 RX ADMIN — CLONIDINE HYDROCHLORIDE 0.1 MG: 0.1 TABLET ORAL at 09:07

## 2022-04-26 RX ADMIN — Medication 10 ML: at 22:51

## 2022-04-26 RX ADMIN — METOPROLOL TARTRATE 2.5 MG: 5 INJECTION INTRAVENOUS at 02:52

## 2022-04-26 RX ADMIN — ATORVASTATIN CALCIUM 40 MG: 40 TABLET, FILM COATED ORAL at 21:15

## 2022-04-26 RX ADMIN — SENNOSIDES AND DOCUSATE SODIUM 2 TABLET: 50; 8.6 TABLET ORAL at 09:07

## 2022-04-26 RX ADMIN — QUETIAPINE FUMARATE 25 MG: 25 TABLET ORAL at 21:17

## 2022-04-26 RX ADMIN — POVIDONE-IODINE: 10 SWAB TOPICAL at 10:29

## 2022-04-26 RX ADMIN — MIRTAZAPINE 7.5 MG: 15 TABLET, FILM COATED ORAL at 21:15

## 2022-04-26 RX ADMIN — METOPROLOL TARTRATE 2.5 MG: 5 INJECTION INTRAVENOUS at 09:07

## 2022-04-27 LAB
GLUCOSE BLDC GLUCOMTR-MCNC: 153 MG/DL (ref 70–130)
GLUCOSE BLDC GLUCOMTR-MCNC: 171 MG/DL (ref 70–130)
GLUCOSE BLDC GLUCOMTR-MCNC: 180 MG/DL (ref 70–130)
GLUCOSE BLDC GLUCOMTR-MCNC: 216 MG/DL (ref 70–130)
HCT VFR BLD AUTO: 19.3 % (ref 37.5–51)
HCT VFR BLD AUTO: 22 % (ref 37.5–51)
HCT VFR BLD AUTO: 23 % (ref 37.5–51)
HGB BLD-MCNC: 6.2 G/DL (ref 13–17.7)
HGB BLD-MCNC: 7.1 G/DL (ref 13–17.7)
HGB BLD-MCNC: 7.4 G/DL (ref 13–17.7)

## 2022-04-27 PROCEDURE — 25010000002 FLUCONAZOLE PER 200 MG

## 2022-04-27 PROCEDURE — 97530 THERAPEUTIC ACTIVITIES: CPT

## 2022-04-27 PROCEDURE — 85014 HEMATOCRIT: CPT | Performed by: INTERNAL MEDICINE

## 2022-04-27 PROCEDURE — 82962 GLUCOSE BLOOD TEST: CPT

## 2022-04-27 PROCEDURE — 25010000002 CEFTRIAXONE PER 250 MG: Performed by: INTERNAL MEDICINE

## 2022-04-27 PROCEDURE — 63710000001 INSULIN LISPRO (HUMAN) PER 5 UNITS: Performed by: INTERNAL MEDICINE

## 2022-04-27 PROCEDURE — 63710000001 INSULIN DETEMIR PER 5 UNITS: Performed by: INTERNAL MEDICINE

## 2022-04-27 PROCEDURE — P9016 RBC LEUKOCYTES REDUCED: HCPCS

## 2022-04-27 PROCEDURE — 36430 TRANSFUSION BLD/BLD COMPNT: CPT

## 2022-04-27 PROCEDURE — 99232 SBSQ HOSP IP/OBS MODERATE 35: CPT | Performed by: INTERNAL MEDICINE

## 2022-04-27 PROCEDURE — 92526 ORAL FUNCTION THERAPY: CPT

## 2022-04-27 PROCEDURE — 63710000001 PREDNISONE PER 5 MG: Performed by: INTERNAL MEDICINE

## 2022-04-27 PROCEDURE — 86900 BLOOD TYPING SEROLOGIC ABO: CPT

## 2022-04-27 PROCEDURE — 85018 HEMOGLOBIN: CPT | Performed by: INTERNAL MEDICINE

## 2022-04-27 RX ORDER — METOPROLOL TARTRATE 50 MG/1
50 TABLET, FILM COATED ORAL EVERY 8 HOURS
Status: DISCONTINUED | OUTPATIENT
Start: 2022-04-27 | End: 2022-04-29 | Stop reason: HOSPADM

## 2022-04-27 RX ORDER — INSULIN LISPRO 100 [IU]/ML
5 INJECTION, SOLUTION INTRAVENOUS; SUBCUTANEOUS
Status: DISCONTINUED | OUTPATIENT
Start: 2022-04-27 | End: 2022-04-29 | Stop reason: HOSPADM

## 2022-04-27 RX ORDER — FLUCONAZOLE 2 MG/ML
200 INJECTION, SOLUTION INTRAVENOUS DAILY
Status: DISCONTINUED | OUTPATIENT
Start: 2022-04-27 | End: 2022-04-29 | Stop reason: HOSPADM

## 2022-04-27 RX ADMIN — FLUCONAZOLE 200 MG: 200 INJECTION, SOLUTION INTRAVENOUS at 10:42

## 2022-04-27 RX ADMIN — DULOXETINE HYDROCHLORIDE 90 MG: 30 CAPSULE, DELAYED RELEASE ORAL at 09:05

## 2022-04-27 RX ADMIN — ASPIRIN 81 MG 81 MG: 81 TABLET ORAL at 09:05

## 2022-04-27 RX ADMIN — SENNOSIDES AND DOCUSATE SODIUM 2 TABLET: 50; 8.6 TABLET ORAL at 09:06

## 2022-04-27 RX ADMIN — INSULIN LISPRO 5 UNITS: 100 INJECTION, SOLUTION INTRAVENOUS; SUBCUTANEOUS at 12:16

## 2022-04-27 RX ADMIN — METOPROLOL TARTRATE 50 MG: 50 TABLET, FILM COATED ORAL at 22:17

## 2022-04-27 RX ADMIN — INSULIN LISPRO 2 UNITS: 100 INJECTION, SOLUTION INTRAVENOUS; SUBCUTANEOUS at 12:15

## 2022-04-27 RX ADMIN — Medication 10 ML: at 20:58

## 2022-04-27 RX ADMIN — CLONIDINE HYDROCHLORIDE 0.1 MG: 0.1 TABLET ORAL at 09:06

## 2022-04-27 RX ADMIN — INSULIN DETEMIR 20 UNITS: 100 INJECTION, SOLUTION SUBCUTANEOUS at 20:58

## 2022-04-27 RX ADMIN — INSULIN DETEMIR 15 UNITS: 100 INJECTION, SOLUTION SUBCUTANEOUS at 09:15

## 2022-04-27 RX ADMIN — PANTOPRAZOLE SODIUM 40 MG: 40 INJECTION, POWDER, FOR SOLUTION INTRAVENOUS at 09:06

## 2022-04-27 RX ADMIN — Medication 10 ML: at 09:07

## 2022-04-27 RX ADMIN — PREDNISONE 15 MG: 5 TABLET ORAL at 09:06

## 2022-04-27 RX ADMIN — METOPROLOL TARTRATE 50 MG: 50 TABLET, FILM COATED ORAL at 15:23

## 2022-04-27 RX ADMIN — Medication 5 MG: at 20:58

## 2022-04-27 RX ADMIN — INSULIN LISPRO 2 UNITS: 100 INJECTION, SOLUTION INTRAVENOUS; SUBCUTANEOUS at 09:07

## 2022-04-27 RX ADMIN — METOPROLOL TARTRATE 50 MG: 50 TABLET, FILM COATED ORAL at 05:12

## 2022-04-27 RX ADMIN — CLONIDINE HYDROCHLORIDE 0.1 MG: 0.1 TABLET ORAL at 20:57

## 2022-04-27 RX ADMIN — LEVOTHYROXINE SODIUM 175 MCG: 0.17 TABLET ORAL at 05:12

## 2022-04-27 RX ADMIN — QUETIAPINE FUMARATE 25 MG: 25 TABLET ORAL at 09:06

## 2022-04-27 RX ADMIN — INSULIN LISPRO 5 UNITS: 100 INJECTION, SOLUTION INTRAVENOUS; SUBCUTANEOUS at 17:32

## 2022-04-27 RX ADMIN — SENNOSIDES AND DOCUSATE SODIUM 2 TABLET: 50; 8.6 TABLET ORAL at 20:57

## 2022-04-27 RX ADMIN — INSULIN LISPRO 3 UNITS: 100 INJECTION, SOLUTION INTRAVENOUS; SUBCUTANEOUS at 17:32

## 2022-04-27 RX ADMIN — POVIDONE-IODINE: 10 SWAB TOPICAL at 09:18

## 2022-04-27 RX ADMIN — INSULIN LISPRO 2 UNITS: 100 INJECTION, SOLUTION INTRAVENOUS; SUBCUTANEOUS at 20:57

## 2022-04-27 RX ADMIN — QUETIAPINE FUMARATE 25 MG: 25 TABLET ORAL at 20:57

## 2022-04-27 RX ADMIN — CEFTRIAXONE 2 G: 2 INJECTION, POWDER, FOR SOLUTION INTRAMUSCULAR; INTRAVENOUS at 20:56

## 2022-04-27 RX ADMIN — ATORVASTATIN CALCIUM 40 MG: 40 TABLET, FILM COATED ORAL at 20:58

## 2022-04-27 RX ADMIN — MIRTAZAPINE 7.5 MG: 15 TABLET, FILM COATED ORAL at 20:57

## 2022-04-28 LAB
BH BB BLOOD EXPIRATION DATE: NORMAL
BH BB BLOOD TYPE BARCODE: 5100
BH BB DISPENSE STATUS: NORMAL
BH BB PRODUCT CODE: NORMAL
BH BB UNIT NUMBER: NORMAL
CROSSMATCH INTERPRETATION: NORMAL
GLUCOSE BLDC GLUCOMTR-MCNC: 195 MG/DL (ref 70–130)
GLUCOSE BLDC GLUCOMTR-MCNC: 198 MG/DL (ref 70–130)
GLUCOSE BLDC GLUCOMTR-MCNC: 256 MG/DL (ref 70–130)
GLUCOSE BLDC GLUCOMTR-MCNC: 96 MG/DL (ref 70–130)
HCT VFR BLD AUTO: 22.5 % (ref 37.5–51)
HCT VFR BLD AUTO: 23.1 % (ref 37.5–51)
HCT VFR BLD AUTO: 23.5 % (ref 37.5–51)
HGB BLD-MCNC: 7.2 G/DL (ref 13–17.7)
HGB BLD-MCNC: 7.4 G/DL (ref 13–17.7)
HGB BLD-MCNC: 7.5 G/DL (ref 13–17.7)
UNIT  ABO: NORMAL
UNIT  RH: NORMAL

## 2022-04-28 PROCEDURE — 63710000001 INSULIN DETEMIR PER 5 UNITS: Performed by: INTERNAL MEDICINE

## 2022-04-28 PROCEDURE — 63710000001 PREDNISONE PER 5 MG: Performed by: INTERNAL MEDICINE

## 2022-04-28 PROCEDURE — 63710000001 INSULIN LISPRO (HUMAN) PER 5 UNITS: Performed by: INTERNAL MEDICINE

## 2022-04-28 PROCEDURE — 82962 GLUCOSE BLOOD TEST: CPT

## 2022-04-28 PROCEDURE — 97116 GAIT TRAINING THERAPY: CPT

## 2022-04-28 PROCEDURE — 97110 THERAPEUTIC EXERCISES: CPT

## 2022-04-28 PROCEDURE — 25010000002 FLUCONAZOLE PER 200 MG

## 2022-04-28 PROCEDURE — 25010000002 CEFTRIAXONE PER 250 MG: Performed by: INTERNAL MEDICINE

## 2022-04-28 PROCEDURE — 99232 SBSQ HOSP IP/OBS MODERATE 35: CPT | Performed by: NURSE PRACTITIONER

## 2022-04-28 PROCEDURE — 85014 HEMATOCRIT: CPT | Performed by: INTERNAL MEDICINE

## 2022-04-28 PROCEDURE — 85018 HEMOGLOBIN: CPT | Performed by: INTERNAL MEDICINE

## 2022-04-28 PROCEDURE — G0108 DIAB MANAGE TRN  PER INDIV: HCPCS

## 2022-04-28 RX ORDER — PANTOPRAZOLE SODIUM 40 MG/1
40 TABLET, DELAYED RELEASE ORAL
Status: DISCONTINUED | OUTPATIENT
Start: 2022-04-29 | End: 2022-04-29 | Stop reason: HOSPADM

## 2022-04-28 RX ADMIN — INSULIN LISPRO 2 UNITS: 100 INJECTION, SOLUTION INTRAVENOUS; SUBCUTANEOUS at 11:24

## 2022-04-28 RX ADMIN — CEFTRIAXONE 2 G: 2 INJECTION, POWDER, FOR SOLUTION INTRAMUSCULAR; INTRAVENOUS at 20:47

## 2022-04-28 RX ADMIN — SENNOSIDES AND DOCUSATE SODIUM 2 TABLET: 50; 8.6 TABLET ORAL at 08:33

## 2022-04-28 RX ADMIN — INSULIN LISPRO 4 UNITS: 100 INJECTION, SOLUTION INTRAVENOUS; SUBCUTANEOUS at 17:03

## 2022-04-28 RX ADMIN — PREDNISONE 15 MG: 5 TABLET ORAL at 08:31

## 2022-04-28 RX ADMIN — FLUCONAZOLE 200 MG: 200 INJECTION, SOLUTION INTRAVENOUS at 08:34

## 2022-04-28 RX ADMIN — CLONIDINE HYDROCHLORIDE 0.1 MG: 0.1 TABLET ORAL at 08:32

## 2022-04-28 RX ADMIN — MIRTAZAPINE 7.5 MG: 15 TABLET, FILM COATED ORAL at 20:46

## 2022-04-28 RX ADMIN — POVIDONE-IODINE 1 APPLICATION: 10 SWAB TOPICAL at 09:56

## 2022-04-28 RX ADMIN — QUETIAPINE FUMARATE 25 MG: 25 TABLET ORAL at 20:46

## 2022-04-28 RX ADMIN — Medication 10 ML: at 20:49

## 2022-04-28 RX ADMIN — LEVOTHYROXINE SODIUM 175 MCG: 0.17 TABLET ORAL at 05:35

## 2022-04-28 RX ADMIN — AMLODIPINE BESYLATE 10 MG: 10 TABLET ORAL at 08:33

## 2022-04-28 RX ADMIN — QUETIAPINE FUMARATE 25 MG: 25 TABLET ORAL at 08:32

## 2022-04-28 RX ADMIN — DULOXETINE HYDROCHLORIDE 90 MG: 30 CAPSULE, DELAYED RELEASE ORAL at 08:31

## 2022-04-28 RX ADMIN — ACETAMINOPHEN 650 MG: 325 TABLET ORAL at 22:23

## 2022-04-28 RX ADMIN — Medication 10 ML: at 08:34

## 2022-04-28 RX ADMIN — INSULIN LISPRO 5 UNITS: 100 INJECTION, SOLUTION INTRAVENOUS; SUBCUTANEOUS at 17:03

## 2022-04-28 RX ADMIN — PANTOPRAZOLE SODIUM 40 MG: 40 INJECTION, POWDER, FOR SOLUTION INTRAVENOUS at 08:31

## 2022-04-28 RX ADMIN — INSULIN LISPRO 2 UNITS: 100 INJECTION, SOLUTION INTRAVENOUS; SUBCUTANEOUS at 20:48

## 2022-04-28 RX ADMIN — Medication 5 MG: at 20:46

## 2022-04-28 RX ADMIN — INSULIN LISPRO 5 UNITS: 100 INJECTION, SOLUTION INTRAVENOUS; SUBCUTANEOUS at 08:33

## 2022-04-28 RX ADMIN — METOPROLOL TARTRATE 50 MG: 50 TABLET, FILM COATED ORAL at 20:48

## 2022-04-28 RX ADMIN — ASPIRIN 81 MG 81 MG: 81 TABLET ORAL at 08:33

## 2022-04-28 RX ADMIN — INSULIN DETEMIR 20 UNITS: 100 INJECTION, SOLUTION SUBCUTANEOUS at 20:49

## 2022-04-28 RX ADMIN — INSULIN LISPRO 5 UNITS: 100 INJECTION, SOLUTION INTRAVENOUS; SUBCUTANEOUS at 11:24

## 2022-04-28 RX ADMIN — CLONIDINE HYDROCHLORIDE 0.1 MG: 0.1 TABLET ORAL at 20:47

## 2022-04-28 RX ADMIN — ATORVASTATIN CALCIUM 40 MG: 40 TABLET, FILM COATED ORAL at 20:47

## 2022-04-28 RX ADMIN — INSULIN DETEMIR 20 UNITS: 100 INJECTION, SOLUTION SUBCUTANEOUS at 08:35

## 2022-04-28 RX ADMIN — SENNOSIDES AND DOCUSATE SODIUM 2 TABLET: 50; 8.6 TABLET ORAL at 20:47

## 2022-04-28 RX ADMIN — METOPROLOL TARTRATE 50 MG: 50 TABLET, FILM COATED ORAL at 14:17

## 2022-04-28 RX ADMIN — METOPROLOL TARTRATE 50 MG: 50 TABLET, FILM COATED ORAL at 05:35

## 2022-04-29 VITALS
DIASTOLIC BLOOD PRESSURE: 50 MMHG | OXYGEN SATURATION: 100 % | SYSTOLIC BLOOD PRESSURE: 126 MMHG | WEIGHT: 255.6 LBS | HEART RATE: 62 BPM | BODY MASS INDEX: 37.86 KG/M2 | RESPIRATION RATE: 18 BRPM | HEIGHT: 69 IN | TEMPERATURE: 97.1 F

## 2022-04-29 LAB
GLUCOSE BLDC GLUCOMTR-MCNC: 136 MG/DL (ref 70–130)
GLUCOSE BLDC GLUCOMTR-MCNC: 185 MG/DL (ref 70–130)
HCT VFR BLD AUTO: 23.4 % (ref 37.5–51)
HCT VFR BLD AUTO: 26.2 % (ref 37.5–51)
HGB BLD-MCNC: 7.3 G/DL (ref 13–17.7)
HGB BLD-MCNC: 8.2 G/DL (ref 13–17.7)

## 2022-04-29 PROCEDURE — 82962 GLUCOSE BLOOD TEST: CPT

## 2022-04-29 PROCEDURE — 25010000002 FLUCONAZOLE PER 200 MG

## 2022-04-29 PROCEDURE — 63710000001 INSULIN LISPRO (HUMAN) PER 5 UNITS: Performed by: INTERNAL MEDICINE

## 2022-04-29 PROCEDURE — 99239 HOSP IP/OBS DSCHRG MGMT >30: CPT | Performed by: NURSE PRACTITIONER

## 2022-04-29 PROCEDURE — 85018 HEMOGLOBIN: CPT | Performed by: INTERNAL MEDICINE

## 2022-04-29 PROCEDURE — 63710000001 PREDNISONE PER 5 MG: Performed by: INTERNAL MEDICINE

## 2022-04-29 PROCEDURE — 63710000001 INSULIN DETEMIR PER 5 UNITS: Performed by: INTERNAL MEDICINE

## 2022-04-29 PROCEDURE — 85014 HEMATOCRIT: CPT | Performed by: INTERNAL MEDICINE

## 2022-04-29 RX ORDER — ASPIRIN 81 MG/1
81 TABLET, CHEWABLE ORAL DAILY
Qty: 30 TABLET | Refills: 0 | Status: SHIPPED | OUTPATIENT
Start: 2022-04-29

## 2022-04-29 RX ORDER — PREDNISONE 1 MG/1
15 TABLET ORAL
Qty: 30 TABLET | Refills: 0 | Status: SHIPPED | OUTPATIENT
Start: 2022-04-30

## 2022-04-29 RX ORDER — PANTOPRAZOLE SODIUM 40 MG/1
40 TABLET, DELAYED RELEASE ORAL
Qty: 30 TABLET | Refills: 0 | Status: SHIPPED | OUTPATIENT
Start: 2022-04-30

## 2022-04-29 RX ORDER — FLUCONAZOLE 200 MG/1
200 TABLET ORAL DAILY
Qty: 11 TABLET | Refills: 0 | Status: SHIPPED | OUTPATIENT
Start: 2022-04-29

## 2022-04-29 RX ORDER — QUETIAPINE FUMARATE 25 MG/1
25 TABLET, FILM COATED ORAL EVERY 12 HOURS SCHEDULED
Qty: 60 TABLET | Refills: 0 | Status: SHIPPED | OUTPATIENT
Start: 2022-04-29

## 2022-04-29 RX ADMIN — FLUCONAZOLE 200 MG: 200 INJECTION, SOLUTION INTRAVENOUS at 07:46

## 2022-04-29 RX ADMIN — CLONIDINE HYDROCHLORIDE 0.1 MG: 0.1 TABLET ORAL at 07:40

## 2022-04-29 RX ADMIN — QUETIAPINE FUMARATE 25 MG: 25 TABLET ORAL at 07:40

## 2022-04-29 RX ADMIN — PANTOPRAZOLE SODIUM 40 MG: 40 TABLET, DELAYED RELEASE ORAL at 06:07

## 2022-04-29 RX ADMIN — ASPIRIN 81 MG 81 MG: 81 TABLET ORAL at 07:39

## 2022-04-29 RX ADMIN — AMLODIPINE BESYLATE 10 MG: 10 TABLET ORAL at 07:36

## 2022-04-29 RX ADMIN — SENNOSIDES AND DOCUSATE SODIUM 2 TABLET: 50; 8.6 TABLET ORAL at 07:40

## 2022-04-29 RX ADMIN — LEVOTHYROXINE SODIUM 175 MCG: 0.17 TABLET ORAL at 06:07

## 2022-04-29 RX ADMIN — METOPROLOL TARTRATE 50 MG: 50 TABLET, FILM COATED ORAL at 06:07

## 2022-04-29 RX ADMIN — INSULIN LISPRO 5 UNITS: 100 INJECTION, SOLUTION INTRAVENOUS; SUBCUTANEOUS at 07:36

## 2022-04-29 RX ADMIN — PREDNISONE 15 MG: 5 TABLET ORAL at 07:39

## 2022-04-29 RX ADMIN — INSULIN DETEMIR 20 UNITS: 100 INJECTION, SOLUTION SUBCUTANEOUS at 07:57

## 2022-04-29 RX ADMIN — DULOXETINE HYDROCHLORIDE 90 MG: 30 CAPSULE, DELAYED RELEASE ORAL at 07:39

## 2022-04-30 LAB
BACTERIA SPEC AEROBE CULT: NORMAL
BACTERIA SPEC AEROBE CULT: NORMAL

## 2022-05-06 ENCOUNTER — HOME HEALTH ADMISSION (OUTPATIENT)
Dept: HOME HEALTH SERVICES | Facility: HOME HEALTHCARE | Age: 77
End: 2022-05-06

## 2022-05-12 ENCOUNTER — APPOINTMENT (OUTPATIENT)
Dept: CARDIOLOGY | Facility: HOSPITAL | Age: 77
End: 2022-05-12

## 2022-05-12 ENCOUNTER — HOSPITAL ENCOUNTER (EMERGENCY)
Facility: HOSPITAL | Age: 77
Discharge: HOME | End: 2022-05-12
Attending: EMERGENCY MEDICINE | Admitting: EMERGENCY MEDICINE

## 2022-05-12 VITALS
RESPIRATION RATE: 17 BRPM | HEART RATE: 80 BPM | DIASTOLIC BLOOD PRESSURE: 76 MMHG | HEIGHT: 68 IN | SYSTOLIC BLOOD PRESSURE: 165 MMHG | BODY MASS INDEX: 38.65 KG/M2 | TEMPERATURE: 97.8 F | WEIGHT: 255 LBS | OXYGEN SATURATION: 98 %

## 2022-05-12 DIAGNOSIS — E66.9 DIABETES MELLITUS TYPE 2 IN OBESE: ICD-10-CM

## 2022-05-12 DIAGNOSIS — N18.9 CHRONIC KIDNEY DISEASE, UNSPECIFIED CKD STAGE: ICD-10-CM

## 2022-05-12 DIAGNOSIS — I10 ELEVATED BLOOD PRESSURE READING WITH DIAGNOSIS OF HYPERTENSION: ICD-10-CM

## 2022-05-12 DIAGNOSIS — E11.622 DIABETIC ULCER OF BOTH LOWER EXTREMITIES: Primary | ICD-10-CM

## 2022-05-12 DIAGNOSIS — L97.919 DIABETIC ULCER OF BOTH LOWER EXTREMITIES: Primary | ICD-10-CM

## 2022-05-12 DIAGNOSIS — E11.69 DIABETES MELLITUS TYPE 2 IN OBESE: ICD-10-CM

## 2022-05-12 DIAGNOSIS — L97.929 DIABETIC ULCER OF BOTH LOWER EXTREMITIES: Primary | ICD-10-CM

## 2022-05-12 LAB
ALBUMIN SERPL-MCNC: 3.3 G/DL (ref 3.5–5.2)
ALBUMIN/GLOB SERPL: 1.1 G/DL
ALP SERPL-CCNC: 139 U/L (ref 39–117)
ALT SERPL W P-5'-P-CCNC: 21 U/L (ref 1–41)
ANION GAP SERPL CALCULATED.3IONS-SCNC: 10 MMOL/L (ref 5–15)
AST SERPL-CCNC: 14 U/L (ref 1–40)
BACTERIA UR QL AUTO: ABNORMAL /HPF
BASOPHILS # BLD AUTO: 0.07 10*3/MM3 (ref 0–0.2)
BASOPHILS NFR BLD AUTO: 0.4 % (ref 0–1.5)
BILIRUB SERPL-MCNC: 0.8 MG/DL (ref 0–1.2)
BILIRUB UR QL STRIP: NEGATIVE
BUN SERPL-MCNC: 18 MG/DL (ref 8–23)
BUN/CREAT SERPL: 13 (ref 7–25)
CALCIUM SPEC-SCNC: 9 MG/DL (ref 8.6–10.5)
CHLORIDE SERPL-SCNC: 99 MMOL/L (ref 98–107)
CLARITY UR: CLEAR
CO2 SERPL-SCNC: 28 MMOL/L (ref 22–29)
COLOR UR: YELLOW
CREAT SERPL-MCNC: 1.38 MG/DL (ref 0.76–1.27)
D-LACTATE SERPL-SCNC: 0.9 MMOL/L (ref 0.5–2)
DEPRECATED RDW RBC AUTO: 56.2 FL (ref 37–54)
EGFRCR SERPLBLD CKD-EPI 2021: 53.3 ML/MIN/1.73
EOSINOPHIL # BLD AUTO: 0.1 10*3/MM3 (ref 0–0.4)
EOSINOPHIL NFR BLD AUTO: 0.6 % (ref 0.3–6.2)
ERYTHROCYTE [DISTWIDTH] IN BLOOD BY AUTOMATED COUNT: 15.6 % (ref 12.3–15.4)
GLOBULIN UR ELPH-MCNC: 3 GM/DL
GLUCOSE SERPL-MCNC: 57 MG/DL (ref 65–99)
GLUCOSE UR STRIP-MCNC: NEGATIVE MG/DL
HCT VFR BLD AUTO: 31.4 % (ref 37.5–51)
HGB BLD-MCNC: 9.8 G/DL (ref 13–17.7)
HGB UR QL STRIP.AUTO: ABNORMAL
HYALINE CASTS UR QL AUTO: ABNORMAL /LPF
IMM GRANULOCYTES # BLD AUTO: 0.08 10*3/MM3 (ref 0–0.05)
IMM GRANULOCYTES NFR BLD AUTO: 0.5 % (ref 0–0.5)
KETONES UR QL STRIP: NEGATIVE
LEUKOCYTE ESTERASE UR QL STRIP.AUTO: NEGATIVE
LIPASE SERPL-CCNC: 7 U/L (ref 13–60)
LYMPHOCYTES # BLD AUTO: 0.98 10*3/MM3 (ref 0.7–3.1)
LYMPHOCYTES NFR BLD AUTO: 6 % (ref 19.6–45.3)
MCH RBC QN AUTO: 30.7 PG (ref 26.6–33)
MCHC RBC AUTO-ENTMCNC: 31.2 G/DL (ref 31.5–35.7)
MCV RBC AUTO: 98.4 FL (ref 79–97)
MONOCYTES # BLD AUTO: 1.26 10*3/MM3 (ref 0.1–0.9)
MONOCYTES NFR BLD AUTO: 7.7 % (ref 5–12)
NEUTROPHILS NFR BLD AUTO: 13.78 10*3/MM3 (ref 1.7–7)
NEUTROPHILS NFR BLD AUTO: 84.8 % (ref 42.7–76)
NITRITE UR QL STRIP: NEGATIVE
NRBC BLD AUTO-RTO: 0 /100 WBC (ref 0–0.2)
PH UR STRIP.AUTO: 8 [PH] (ref 5–8)
PLATELET # BLD AUTO: 246 10*3/MM3 (ref 140–450)
PMV BLD AUTO: 8.9 FL (ref 6–12)
POTASSIUM SERPL-SCNC: 4.8 MMOL/L (ref 3.5–5.2)
PROT SERPL-MCNC: 6.3 G/DL (ref 6–8.5)
PROT UR QL STRIP: ABNORMAL
RBC # BLD AUTO: 3.19 10*6/MM3 (ref 4.14–5.8)
RBC # UR STRIP: ABNORMAL /HPF
REF LAB TEST METHOD: ABNORMAL
SODIUM SERPL-SCNC: 137 MMOL/L (ref 136–145)
SP GR UR STRIP: 1.01 (ref 1–1.03)
SQUAMOUS #/AREA URNS HPF: ABNORMAL /HPF
UROBILINOGEN UR QL STRIP: ABNORMAL
WBC # UR STRIP: ABNORMAL /HPF
WBC NRBC COR # BLD: 16.27 10*3/MM3 (ref 3.4–10.8)

## 2022-05-12 PROCEDURE — 93925 LOWER EXTREMITY STUDY: CPT

## 2022-05-12 PROCEDURE — 81001 URINALYSIS AUTO W/SCOPE: CPT | Performed by: EMERGENCY MEDICINE

## 2022-05-12 PROCEDURE — 83605 ASSAY OF LACTIC ACID: CPT | Performed by: EMERGENCY MEDICINE

## 2022-05-12 PROCEDURE — 85025 COMPLETE CBC W/AUTO DIFF WBC: CPT | Performed by: EMERGENCY MEDICINE

## 2022-05-12 PROCEDURE — 99283 EMERGENCY DEPT VISIT LOW MDM: CPT

## 2022-05-12 PROCEDURE — 80053 COMPREHEN METABOLIC PANEL: CPT | Performed by: EMERGENCY MEDICINE

## 2022-05-12 PROCEDURE — 93925 LOWER EXTREMITY STUDY: CPT | Performed by: INTERNAL MEDICINE

## 2022-05-12 PROCEDURE — 83690 ASSAY OF LIPASE: CPT | Performed by: EMERGENCY MEDICINE

## 2022-05-12 PROCEDURE — 36415 COLL VENOUS BLD VENIPUNCTURE: CPT

## 2022-05-12 RX ORDER — CLINDAMYCIN HYDROCHLORIDE 150 MG/1
300 CAPSULE ORAL ONCE
Status: COMPLETED | OUTPATIENT
Start: 2022-05-12 | End: 2022-05-12

## 2022-05-12 RX ORDER — SACCHAROMYCES BOULARDII 250 MG
250 CAPSULE ORAL 2 TIMES DAILY
Qty: 20 CAPSULE | Refills: 0 | Status: SHIPPED | OUTPATIENT
Start: 2022-05-12 | End: 2022-05-13 | Stop reason: SDUPTHER

## 2022-05-12 RX ORDER — CLINDAMYCIN HYDROCHLORIDE 300 MG/1
300 CAPSULE ORAL 4 TIMES DAILY
Qty: 28 CAPSULE | Refills: 0 | Status: SHIPPED | OUTPATIENT
Start: 2022-05-12 | End: 2022-05-13 | Stop reason: SDUPTHER

## 2022-05-12 RX ADMIN — CLINDAMYCIN HYDROCHLORIDE 300 MG: 150 CAPSULE ORAL at 18:11

## 2022-05-13 LAB
BH CV GRAFT BRACHIAL PRESSURE LEFT: 180 MMHG
BH CV GRAFT BRACHIAL PRESSURE RIGHT: 180 MMHG
BH CV LEA LEFT ANT TIBIAL A DISTAL PSV: 155.6 CM/S
BH CV LEA LEFT ANT TIBIAL A MID PSV: -172.9 CM/S
BH CV LEA LEFT ANT TIBIAL A PROX PSV: 125.7 CM/S
BH CV LEA LEFT CFA PROX PSV: 143 CM/S
BH CV LEA LEFT DFA PROX PSV: 100 CM/S
BH CV LEA LEFT DPA PRESSURE: 210 MMHG
BH CV LEA LEFT PERONEAL  MID PSV: 94.4 CM/S
BH CV LEA LEFT POPITEAL A  DISTAL PSV: -118.8 CM/S
BH CV LEA LEFT POPITEAL A  MID PSV: 140 CM/S
BH CV LEA LEFT POPITEAL A  PROX PSV: 163 CM/S
BH CV LEA LEFT PTA DISTAL PSV: 131.6 CM/S
BH CV LEA LEFT PTA MID PSV: 185.6 CM/S
BH CV LEA LEFT PTA PRESSURE: 188 MMHG
BH CV LEA LEFT PTA PROX PSV: 104 CM/S
BH CV LEA LEFT SFA DISTAL PSV: -115.9 CM/S
BH CV LEA LEFT SFA MID PSV: -182.7 CM/S
BH CV LEA LEFT SFA PROX PSV: 174.8 CM/S
BH CV LEA RIGHT ANT TIBIAL A DISTAL PSV: 172.9 CM/S
BH CV LEA RIGHT ANT TIBIAL A MID PSV: -134 CM/S
BH CV LEA RIGHT ANT TIBIAL A PROX PSV: 98.1 CM/S
BH CV LEA RIGHT CFA DISTAL PSV: 115 CM/S
BH CV LEA RIGHT CFA PROX PSV: 120 CM/S
BH CV LEA RIGHT DFA PROX PSV: 99 CM/S
BH CV LEA RIGHT DPA PRESSURE: 200 MMHG
BH CV LEA RIGHT PERONEAL  MID EDV: 14.5 CM/S
BH CV LEA RIGHT PERONEAL  MID PSV: 81.1 CM/S
BH CV LEA RIGHT POPITEAL A  DISTAL PSV: -121.8 CM/S
BH CV LEA RIGHT POPITEAL A  PROX PSV: 146.1 CM/S
BH CV LEA RIGHT PTA DISTAL PSV: 160.1 CM/S
BH CV LEA RIGHT PTA MID PSV: 112.5 CM/S
BH CV LEA RIGHT PTA PROX PSV: 86.4 CM/S
BH CV LEA RIGHT SFA DISTAL PSV: -154 CM/S
BH CV LEA RIGHT SFA MID PSV: -128.9 CM/S
BH CV LEA RIGHT SFA PROX PSV: 118.6 CM/S
BH CV LOWER ARTERIAL LEFT ABI RATIO: 1.17
BH CV LOWER ARTERIAL RIGHT ABI RATIO: 1.11
LEFT GROIN CFA SYS: 143.8 CM/SEC
MAXIMAL PREDICTED HEART RATE: 145 BPM
RIGHT GROIN CFA SYS: 121 CM/SEC
STRESS TARGET HR: 123 BPM

## 2022-05-13 RX ORDER — CLINDAMYCIN HYDROCHLORIDE 300 MG/1
300 CAPSULE ORAL 4 TIMES DAILY
Qty: 28 CAPSULE | Refills: 0 | Status: SHIPPED | OUTPATIENT
Start: 2022-05-13

## 2022-05-13 RX ORDER — SACCHAROMYCES BOULARDII 250 MG
250 CAPSULE ORAL 2 TIMES DAILY
Qty: 20 CAPSULE | Refills: 0 | Status: SHIPPED | OUTPATIENT
Start: 2022-05-13 | End: 2022-05-23

## 2023-02-24 ENCOUNTER — HOSPITAL ENCOUNTER (EMERGENCY)
Facility: HOSPITAL | Age: 78
Discharge: HOME | End: 2023-02-24
Attending: EMERGENCY MEDICINE | Admitting: EMERGENCY MEDICINE
Payer: MEDICARE

## 2023-02-24 VITALS
SYSTOLIC BLOOD PRESSURE: 184 MMHG | OXYGEN SATURATION: 100 % | HEIGHT: 69 IN | TEMPERATURE: 97.8 F | WEIGHT: 218 LBS | RESPIRATION RATE: 16 BRPM | BODY MASS INDEX: 32.29 KG/M2 | HEART RATE: 64 BPM | DIASTOLIC BLOOD PRESSURE: 81 MMHG

## 2023-02-24 DIAGNOSIS — I10 UNCONTROLLED HYPERTENSION: Primary | ICD-10-CM

## 2023-02-24 PROCEDURE — 99283 EMERGENCY DEPT VISIT LOW MDM: CPT

## 2023-02-24 PROCEDURE — 80047 BASIC METABLC PNL IONIZED CA: CPT

## 2023-02-24 PROCEDURE — 85014 HEMATOCRIT: CPT

## 2023-02-25 LAB
BUN BLDA-MCNC: 21 MG/DL (ref 8–26)
CA-I BLDA-SCNC: 1.25 MMOL/L (ref 1.2–1.32)
CHLORIDE BLDA-SCNC: 102 MMOL/L (ref 98–109)
CO2 BLDA-SCNC: 29 MMOL/L (ref 24–29)
CREAT BLDA-MCNC: 1.8 MG/DL (ref 0.6–1.3)
EGFRCR SERPLBLD CKD-EPI 2021: 38.5 ML/MIN/1.73
GLUCOSE BLDC GLUCOMTR-MCNC: 133 MG/DL (ref 70–130)
HCT VFR BLDA CALC: 34 % (ref 38–51)
HGB BLDA-MCNC: 11.6 G/DL (ref 12–17)
POTASSIUM BLDA-SCNC: 4.3 MMOL/L (ref 3.5–4.9)
SODIUM BLD-SCNC: 140 MMOL/L (ref 138–146)

## (undated) DEVICE — SOL IRR H2O BTL 1000ML STRL

## (undated) DEVICE — TRY L/P SFTY A/20G

## (undated) DEVICE — SYR LUERLOK 50ML

## (undated) DEVICE — CONTN GRAD MEAS TRIANG 32OZ BLK

## (undated) DEVICE — THE BITE BLOCK MAXI, LATEX FREE STRAP IS USED TO PROTECT THE ENDOSCOPE INSERTION TUBE FROM BEING BITTEN BY THE PATIENT.

## (undated) DEVICE — TUBING, SUCTION, 1/4" X 10', STRAIGHT: Brand: MEDLINE

## (undated) DEVICE — SPNG ENDO BEDSIDE TUB ENZYM

## (undated) DEVICE — INTRO ACCSR BLNT TP

## (undated) DEVICE — KT ORCA ORCAPOD DISP STRL

## (undated) DEVICE — HYBRID CO2 TUBING/CAP SET FOR OLYMPUS® SCOPES & CO2 SOURCE: Brand: ERBE

## (undated) DEVICE — LUBE GEL ENDOGLIDE 1.1OZ